# Patient Record
Sex: MALE | Race: WHITE | NOT HISPANIC OR LATINO | Employment: OTHER | URBAN - METROPOLITAN AREA
[De-identification: names, ages, dates, MRNs, and addresses within clinical notes are randomized per-mention and may not be internally consistent; named-entity substitution may affect disease eponyms.]

---

## 2017-01-03 DIAGNOSIS — K50.00 CROHN'S DISEASE OF SMALL INTESTINE WITHOUT COMPLICATION (H): ICD-10-CM

## 2017-01-03 LAB
CRP SERPL-MCNC: <2.9 MG/L (ref 0–8)
ERYTHROCYTE [SEDIMENTATION RATE] IN BLOOD BY WESTERGREN METHOD: 6 MM/H (ref 0–20)

## 2017-01-13 ENCOUNTER — TELEPHONE (OUTPATIENT)
Dept: ENDOCRINOLOGY | Facility: CLINIC | Age: 62
End: 2017-01-13

## 2017-01-13 ENCOUNTER — TELEPHONE (OUTPATIENT)
Dept: GASTROENTEROLOGY | Facility: CLINIC | Age: 62
End: 2017-01-13

## 2017-01-13 DIAGNOSIS — M80.08XA VERTEBRAL FRACTURE, OSTEOPOROTIC (H): Primary | ICD-10-CM

## 2017-01-13 DIAGNOSIS — M81.0 OSTEOPOROSIS: ICD-10-CM

## 2017-01-13 DIAGNOSIS — Z79.52 STEROID DEPENDENT FOR ADRENAL SUPRESSION: ICD-10-CM

## 2017-01-13 NOTE — TELEPHONE ENCOUNTER
----- Message from Hermelinda TORRES Link sent at 1/12/2017  2:14 PM CST -----  Regarding: need lab orders entered  We had a message below to call to set up 1 yr f/u with labs/dexa prior but there's no labs ordered. Can you please enter them? Thanks!      ----- Message -----     From: Dari Mcmillan MA     Sent: 1/8/2017       To: [id number]    Call patient to schedule 1 year follow up with Currie and labs and dexa same time July 2017

## 2017-01-18 ENCOUNTER — TELEPHONE (OUTPATIENT)
Dept: ENDOCRINOLOGY | Facility: CLINIC | Age: 62
End: 2017-01-18

## 2017-01-19 ASSESSMENT — ENCOUNTER SYMPTOMS
BLOATING: 0
HEARTBURN: 0
JAUNDICE: 0
RECTAL BLEEDING: 1
ABDOMINAL PAIN: 0
VOMITING: 0
NAUSEA: 0
BLOOD IN STOOL: 0
RECTAL PAIN: 0
BOWEL INCONTINENCE: 0
CONSTIPATION: 0
DIARRHEA: 1

## 2017-01-20 ENCOUNTER — OFFICE VISIT (OUTPATIENT)
Dept: GASTROENTEROLOGY | Facility: CLINIC | Age: 62
End: 2017-01-20

## 2017-01-20 VITALS
OXYGEN SATURATION: 96 % | HEART RATE: 92 BPM | WEIGHT: 167.3 LBS | SYSTOLIC BLOOD PRESSURE: 117 MMHG | DIASTOLIC BLOOD PRESSURE: 87 MMHG | HEIGHT: 69 IN | BODY MASS INDEX: 24.78 KG/M2

## 2017-01-20 DIAGNOSIS — K50.812 CROHN'S DISEASE OF BOTH SMALL AND LARGE INTESTINE WITH INTESTINAL OBSTRUCTION (H): Primary | ICD-10-CM

## 2017-01-20 RX ORDER — FLUCONAZOLE 50 MG/1
TABLET ORAL
COMMUNITY
End: 2017-01-20

## 2017-01-20 RX ORDER — TRAZODONE HYDROCHLORIDE 50 MG/1
TABLET, FILM COATED ORAL
COMMUNITY
End: 2017-01-20

## 2017-01-20 RX ORDER — CLONAZEPAM 0.5 MG/1
TABLET ORAL
COMMUNITY
End: 2018-02-06

## 2017-01-20 NOTE — PATIENT INSTRUCTIONS
It was a pleasure taking care of you today.  I've included a brief summary of our discussion and care plan from today's visit below.  Please review this information with your primary care provider.  _______________________________________________________________________    My recommendations are summarized as follows:    --  Plan for colonoscopy in August 2017    --  No need for blood work today    --  If develop symptoms anticipate we would do a course of Rifaximin    Return to GI Clinic in with Slick as scheduled to review your progress and schedule colonoscopy.    _______________________________________________________________________    Who do I call with any questions after my visit?  Please be in touch if there are any further questions that arise following today's visit.  There are multiple ways to contact your gastroenterology care team.        During business hours, you may reach a Gastroenterology nurse at [phone] and choose option 3.         To schedule or reschedule an appointment, please call [phone].       You can always send a secure message through [employer].  [employer] messages are answered by your nurse or doctor typically within 24 hours.  Please allow extra time on weekends and holidays.        For urgent/emergent questions after business hours, you may reach the on-call GI Fellow by contacting the [facility]  at [phone].     How will I get the results of any tests ordered?    You will receive all of your results.  If you have signed up for [employer], any tests ordered at your visit will be available to you after your physician reviews them.  Typically this takes 1-2 weeks.  If there are urgent results that require a change in your care plan, your physician or nurse will call you to discuss the next steps.      What is [employer]?  [employer] is a secure way for you to access all of your healthcare records from the [facility].  It is a web based computer  program, so you can sign on to it from any location.  It also allows you to send secure messages to your care team.  I recommend signing up for [place] access if you have not already done so and are comfortable with using a computer.      How to I schedule a follow-up visit?  If you did not schedule a follow-up visit today, please call [phone] to schedule a follow-up office visit.        Sincerely,    Fransisco Leach MD    [facility]  Division of Gastroenterology      [facility]

## 2017-01-20 NOTE — PROGRESS NOTES
IBD CLINIC     CC/REFERRING MD:  Referred Self  REASON FOR CONSULTATION: Crohn's disease       IBD history:  Age at diagnosis: ~18  Extend of disease: Ileo-colonic  Disease phenotype: Inflammatory  Key-anal disease: No  Current CD medications: None  Prior IBD surgeries: Yes - ileocecal resection. Has had 1-2 surgical revisions of anastomosis.    Prior IBD Medications:  Asulfazine and Asacol: 1974 to late 90s, stopped to to disease progression  Prednisone 1986 - current.   Mercaptopurine: Briefly in mid 90s to get him off steroids- Patient thinks he has side effect to medication  Methotrexate: After mercaptopurine, stayed on for years, but after bleeding in 1988 and 1989, he would not taper his prednisone. MTX stopped as he could not get off the steroids.      Surgical history  1974 - [facility] Ileocecectomy  1986 - [facility] - revision of anastomosis  1988 - massive bleeding from ulcer at anastomosis ([place]), cauterized via colonoscopy  1989 - laparoscopic  Revision of anastomosis  2005 - Lap [facility]    DRUG MONITORING  TPMT enzyme activity: none    DISEASE ASSESSMENT  Fecal calprotectin: None  CRP, ESR Results  Recent Labs   Lab Test  01/03/17   1103  11/11/16   1343   CRP  <2.9  14.0*   SED  6  7   Endoscopic assessment: Colonoscopy 8/3/2016 showed normal mucosa in entire colon. Non-patent functional end-to-end ileo-colonic anastomosis that was dilated.  No evidence of active Crohn's disease.  Enterography (12/1/16) showed 4cm of mild thickening and enhancement in neoterminal ileum at anastomosis, decompressed sigmoid colon with questionable wall thickening and enhancement.  Enterography: No active Crohn's disease (3/20/15)  C diff: None (7/12/16)    ASSESSMENT/PLAN:   61 year old male with Crohn's disease s/p ileocecectomy on no current Crohn's therapy.     1. Crohn's disease:  Suspect symptoms related to bacterial overgrowth (likely at anastomosis site) given improvement with rifaximin.  Prior work up has included  alpha 1 antitrypsin for PLE, pancreatic elastase, TTG, DGP all of which were normal.  A spot fecal fat the only isolated value that was increased.  Tentative plan to repeat rifaximin if symptoms return (pending insurance coverage)   -- Colonoscopy in August 2017, unless new symptoms that are refractory to rifaximin - then would repeat colonoscopy with dilation.       -- Continue with yearly LFTs to screen for PSC.     2. Colon polyps: Well defined lesions with adenomatous changes.  Will continue to surveillance   -- Plan for chromoendoscopy with next colonoscopy     3. Diarrhea: Quiescent Crohn's.  May be related to IBS, or lactose intolerance.  Could also be bile salt malabsorption or bacterial overgrowth.  Current loose stools in setting of some lactose ingestion   -- Avoid dietary triggers  -- prn lomotil  -- If diarrhea persists 2-3 days, will check for C diff    4. Possible acid reflux:  Has the Omeprazole but did not use it.  Has not had recurrence of symptoms in the back of his throat.  Will wait to see if symptoms recur and if so will try omeprazole.     5. Pancreatic head cystic lesion: Increased from 2006, potentially representing IPMN.  MRCP with no change in cyst.  Reviewed with Dr. Cruz.  EUS with FNA normal.   -- No further follow-up at this time needed.      6. Steroid dependency/Adrenal insufficiency:  Followed closely with endocrinology.  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      8. Depression: Mild depressive symptoms.  Discussing with  .    -- Continue monitoring  -- Consider medications per Psych.     9. IBD healthcare maintenance based on patients current medication:      Vaccinations:  -- Influenza (every year): Last given 2016  -- TdaP (every 10 years): Last given 2015  -- Pneumococcal Pneumonia (once then every 5 years): Has not obtained.    One time confirmation of immunity or serologies:  --  Hepatitis A (serologies or immunizations): 2005  -- Hepatitis B (serologies or immunizations): 2005  -- Zoster vaccination (>59 yo, discuss if over 50): 2005  -- MMR: Not documented.  -- HPV (all aged 18-26): N/A  -- Meningococcal meningitis (all patients at risk for meningitis): Patient is not at risk.     Cancer Screening:  Colon cancer screening:  Since there is evidence of pancolitis histologically. Also with adenomatous polyps. Colonoscopy every 2-3 years recommended.  Dysplasia screening is recommended 2019.    Cervical cancer screening: N/A    Skin cancer screening: Since patient is not immunocompromised, this is per patient's dermatologist recommendations.    Depression Screening:  -- Over the last month, have you felt down, depressed, or hopeless? No  -- Over the last month, have you felt little interest or pleasure doing things? No    Misc:  -- Avoid tobacco use  -- Avoid NSAIDs as there is potentially a 25% chance of causing an IBD flare    RTC 6 months    Thank you for this consultation.  It was a pleasure to participate in the care of this patient; please contact us with any further questions.  A total of 20 minutes was spent with this patient, >50% of which was counseling regarding the above delineated issues.    Fransisco Leach MD   of Medicine  Division of Gastroenterology, Hepatology and Nutrition  [facility]      HPI:   This is a 61-year-old male who Slick Malone and I follow for Crohn's disease.  The patient has had some intermittent symptoms and an elevated CRP in the past.  His symptom is mainly of diarrhea.  We had a concern initially for Crohn's recurrence and had discussed colonoscopy.  We also treated patient with rifaximin.  The patient got substantially better on rifaximin and actually has not had much diarrhea since then.  Actually, he has had issues with starting his fiber after rifaximin because he gets gas and bloating, so he has not started his fiber.  He  has had a few episodes of diarrhea over the past few months.  These are typically in the setting of abnormal diet.  The most recent was a week or 2 ago in the setting of some sushi intake.  The following day he had 1 day of some loose stools.  However, in general, he has improved and currently feels that he is not having any active symptoms.     ROS:    No fevers or chills  No weight loss  No blurry vision, double vision or change in vision  No sore throat  No lymphadenopathy  No headache, paraesthesias, or weakness in a limb  No shortness of breath or wheezing  No chest pain or pressure  No arthralgias or myalgias  No rashes or skin changes  No odynophagia or dysphagia  No BRBPR, hematochezia, melena  No dysuria, frequency or urgency  No hot/cold intolerance or polyria  No anxiety or depression    Extra intestinal manifestations of IBD:  No uveitis/episcleritis  No aphthous ulcers   No arthritis   No erythema nodosum/pyoderma gangrenosum.     PERTINENT PAST MEDICAL HISTORY:  Past Medical History   Diagnosis Date     Crohn disease (H)      Coccidioidomycosis      GERD (gastroesophageal reflux disease)      Nephrolithiasis      TB (tuberculosis)      Cataract 12/2007     both eyes, too much prednisone, implants     Anemia 2007     History of blood transfusion 1988     ulcerated anastomosis term. ilium bleed     Gallbladder problem 2005?     much gravel, removed     Personal history of colonic polyps      small ones found during colonoscopies     Kidney stone various     both sides, all passed naturally     Fracture 1956     green fracture of leg     Osteoporosis 2007     osteoporosis, too much prednisone, last dexa 1/2014     Other nervous system complications 2007     prednisone overload induced kevin     Anxiety 2012?     much worse since July 2014     Depressive disorder 7/2014     much worse since July 2014     Mental health problem 2007     bipolar though perhaps different     Infection 2007     persistant  "coccidioidomycosis     Crohn's disease (H) 1/13/2015     Osteoporosis      Steroid-induced psychosis      Patient extremely sensitive to regular doses of steroids.  Unclear if this is due to chronic fluconazole use or genetic issue.  In the future, use caution when prescribing steroids.     Hypertriglyceridemia 7/8/2016       PREVIOUS SURGERIES:  Past Surgical History   Procedure Laterality Date     Appendectomy  1974     coincidental with Crohn's surgery     Biopsy  2007, 2013     lump on arm, knee, back of hand for coccidioidomycosis cult.     Cholecystectomy  2005     much gravel, removed laparoscopically     Colonoscopy  7/14/2014 last     Dr. Catherine Bowden, [facility] - many previous     Ent surgery  ?     upper endoscopy     Eye surgery  12/2007     cataract surgery both sides     Gi surgery  1974, 1986(x2), 1989     Crohn's, 1974 RO 18\" term. ilium + 9\" asc. colon all one pc     Soft tissue surgery  3/2013     removed buildup on back of r hand, coccidioidomycosis     Back surgery  12/2007     kyphoplasty on compressed 4th??? vertebra     Colonoscopy Left 9/11/2015     Procedure: COMBINED COLONOSCOPY, SINGLE OR MULTIPLE BIOPSY/POLYPECTOMY BY BIOPSY;  Surgeon: Fransisco Leach MD;  Location:  GI     Colonoscopy N/A 8/3/2016     Procedure: COMBINED COLONOSCOPY, SINGLE OR MULTIPLE BIOPSY/POLYPECTOMY BY BIOPSY;  Surgeon: Fransisco Leach MD;  Location:  GI     Esophagoscopy, gastroscopy, duodenoscopy (egd), combined N/A 11/8/2016     Procedure: COMBINED ENDOSCOPIC ULTRASOUND, ESOPHAGOSCOPY, GASTROSCOPY, DUODENOSCOPY (EGD), FINE NEEDLE ASPIRATE/BIOPSY;  Surgeon: Guru Alexsandra Ballesteros MD;  Location:  GI     Esophagoscopy, gastroscopy, duodenoscopy (egd), combined N/A 11/8/2016     Procedure: COMBINED ESOPHAGOSCOPY, GASTROSCOPY, DUODENOSCOPY (EGD), BIOPSY SINGLE OR MULTIPLE;  Surgeon: Guru Alexsandra Ballesteros MD;  Location:  GI       PREVIOUS ENDOSCOPY:  Summer " 2014: Colonoscopy - not in out system    ALLERGIES:     Allergies   Allergen Reactions     Prednisone Other (See Comments)     Diana with more than 2.5mg chronic dosing of prednisone due to fluconazole interaction per patient.        PERTINENT MEDICATIONS:    Current outpatient prescriptions:      clonazePAM (KLONOPIN) 0.5 MG tablet, , Disp: , Rfl:      MELATONIN PO, , Disp: , Rfl:      traZODone (DESYREL) 50 MG tablet, Take 1 tablet (50 mg) by mouth nightly as needed for sleep, Disp: 90 tablet, Rfl: 3     diphenoxylate-atropine (LOMOTIL) 2.5-0.025 MG per tablet, Take 2 tablets by mouth 4 times daily as needed for diarrhea (Patient taking differently: Take 1 tablet by mouth 4 times daily as needed for diarrhea After loose stools), Disp: 30 tablet, Rfl: 0     fluconazole (DIFLUCAN) 200 MG tablet, Take 1 tablet (200 mg) by mouth daily, Disp: 90 tablet, Rfl: 3     acetaminophen (TYLENOL) 500 MG tablet, Take 500-1,000 mg by mouth every 6 hours as needed for mild pain, Disp: , Rfl:      Cyanocobalamin (VITAMIN B 12 PO), Take 500 mcg by mouth, Disp: , Rfl:      Pyridoxine HCl (VITAMIN B6 PO), Take 100 mg by mouth, Disp: , Rfl:      ferrous sulfate 325 (65 FE) MG tablet, Take by mouth daily (with breakfast), Disp: , Rfl:      multivitamin, therapeutic with minerals (MULTI-VITAMIN) TABS, Take 1 tablet by mouth daily, Disp: , Rfl:      calcium-vitamin D (CALTRATE) 600-400 MG-UNIT per tablet, Take 1 tablet by mouth 2 times daily, Disp: , Rfl:      Cholecalciferol (VITAMIN D3 PO), Take 1,000 Units by mouth daily, Disp: , Rfl:      Ascorbic Acid (VITAMIN C PO), Take 500 mg by mouth, Disp: , Rfl:      [DISCONTINUED] CLONAZEPAM PO, Take 0.5 mg by mouth as needed , Disp: , Rfl:     SOCIAL HISTORY:  Social History     Social History     Marital Status:      Spouse Name: N/A     Number of Children: N/A     Years of Education: N/A     Occupational History     Reserach associate at the [employer]  History Main Topics     Smoking status: Never Smoker      Smokeless tobacco: Never Used     Alcohol Use: Yes      Comment: sometimes one glass of wine a week     Drug Use: No     Sexual Activity: No     Other Topics Concern     Not on file     Social History Narrative       FAMILY HISTORY:  Family History   Problem Relation Age of Onset     Heart Failure Father      Cancer - colorectal Mother      CEREBROVASCULAR DISEASE Paternal Grandmother      CANCER Other      Musculoskeletal Disorder Father      Parkinson's     Musculoskeletal Disorder Brother      Parkinson's     Mother with colon cancer in 80s.  No family member with Crohn's disease or Ulcerative Colitis.      Past/family/social history reviewed and no changes    PHYSICAL EXAMINATION:  Vitals reviewed, AFVSS   Wt   Wt Readings from Last 2 Encounters:   01/20/17 75.887 kg (167 lb 4.8 oz)   12/14/16 71.668 kg (158 lb)      Gen: aaox3, cooperative, pleasant, not dyspneic/diaphoretic, nad  HEENT: ncat, neck supple, no clad, normal op w/o ulcer/exudate, anicteric, mmm  Lymph: No axillary, submandibular, supraclavicular or inguinal lymphadenopathy  Resp/CV unremarkable  Abd: Nondistended, +bs, no hepatosplenomegaly, nontender, no peritoneal signs  Ext: no c/c/e  Skin: warm, perfused, no jaundice      PERTINENT STUDIES:  Most recent CBC:  Recent Labs   Lab Test  03/28/16   1716  08/11/15   1057   WBC  7.4  8.9   HGB  15.3  16.7   HCT  45.3  48.1   PLT  220  193     Most recent hepatic panel:  Recent Labs   Lab Test  07/12/16   1129  01/08/16   1159   ALT  22  29   AST  18  20     Most recent creatinine:  Recent Labs   Lab Test  08/16/16   1223  07/12/16   1129   CR  1.14  1.30*       MRE: 3/20/15:   IMPRESSION:   1. Unremarkable small bowel other than postsurgical changes following right hemicolectomy with ileocolic anastomosis.  2. Small gastric diverticulum. 3. 1.5 x 0.8 cm pancreatic head cystic lesion minimally increased in size compared to the prior exam of  2006, but potentially representing intraductal papillary mucinous neoplasm. This can be reassessed on followup.  4. Hepatic steatosis.

## 2017-01-20 NOTE — Clinical Note
1/20/2017       RE: Trevin Gee  [address]   [facility] [address]     Dear Colleague,    Thank you for referring your patient, Trevin Gee, to the [facility] GASTROENTEROLOGY AND IBD at [facility]. Please see a copy of my visit note below.    IBD CLINIC     CC/REFERRING MD:  Referred Self  REASON FOR CONSULTATION: Crohn's disease       IBD history:  Age at diagnosis: ~18  Extend of disease: Ileo-colonic  Disease phenotype: Inflammatory  Key-anal disease: No  Current CD medications: None  Prior IBD surgeries: Yes - ileocecal resection. Has had 1-2 surgical revisions of anastomosis.    Prior IBD Medications:  Asulfazine and Asacol: 1974 to late 90s, stopped to to disease progression  Prednisone 1986 - current.   Mercaptopurine: Briefly in mid 90s to get him off steroids- Patient thinks he has side effect to medication  Methotrexate: After mercaptopurine, stayed on for years, but after bleeding in 1988 and 1989, he would not taper his prednisone. MTX stopped as he could not get off the steroids.      Surgical history  1974 - [facility] Ileocecectomy  1986 - [facility] - revision of anastomosis  1988 - massive bleeding from ulcer at anastomosis ([place]), cauterized via colonoscopy  1989 - laparoscopic  Revision of anastomosis  2005 - Lap CCY    DRUG MONITORING  TPMT enzyme activity: none    DISEASE ASSESSMENT  Fecal calprotectin: None  CRP, ESR Results  Recent Labs   Lab Test  01/03/17   1103  11/11/16   1343   CRP  <2.9  14.0*   SED  6  7   Endoscopic assessment: Colonoscopy 8/3/2016 showed normal mucosa in entire colon. Non-patent functional end-to-end ileo-colonic anastomosis that was dilated.  No evidence of active Crohn's disease.  Enterography (12/1/16) showed 4cm of mild thickening and enhancement in neoterminal ileum at anastomosis, decompressed sigmoid colon with questionable wall thickening and enhancement.  Enterography: No active Crohn's disease  (3/20/15)  C diff: None (7/12/16)    ASSESSMENT/PLAN:   61 year old male with Crohn's disease s/p ileocecectomy on no current Crohn's therapy.     1. Crohn's disease:  Suspect symptoms related to bacterial overgrowth (likely at anastomosis site) given improvement with rifaximin.  Prior work up has included alpha 1 antitrypsin for PLE, pancreatic elastase, TTG, DGP all of which were normal.  A spot fecal fat the only isolated value that was increased.  Tentative plan to repeat rifaximin if symptoms return (pending insurance coverage)   -- Colonoscopy in August 2017, unless new symptoms that are refractory to rifaximin - then would repeat colonoscopy with dilation.       -- Continue with yearly LFTs to screen for PSC.     2. Colon polyps: Well defined lesions with adenomatous changes.  Will continue to surveillance   -- Plan for chromoendoscopy with next colonoscopy     3. Diarrhea: Quiescent Crohn's.  May be related to IBS, or lactose intolerance.  Could also be bile salt malabsorption or bacterial overgrowth.  Current loose stools in setting of some lactose ingestion   -- Avoid dietary triggers  -- prn lomotil  -- If diarrhea persists 2-3 days, will check for C diff    4. Possible acid reflux:  Has the Omeprazole but did not use it.  Has not had recurrence of symptoms in the back of his throat.  Will wait to see if symptoms recur and if so will try omeprazole.     5. Pancreatic head cystic lesion: Increased from 2006, potentially representing IPMN.  MRCP with no change in cyst.  Reviewed with Dr. Cruz.  EUS with FNA normal.   -- No further follow-up at this time needed.      6. Steroid dependency/Adrenal insufficiency:  Followed closely with endocrinology.  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      8. Depression: Mild depressive symptoms.  Discussing with  .    -- Continue monitoring  -- Consider medications per Psych.      9. IBD healthcare maintenance based on patients current medication:      Vaccinations:  -- Influenza (every year): Last given 2016  -- TdaP (every 10 years): Last given 2015  -- Pneumococcal Pneumonia (once then every 5 years): Has not obtained.    One time confirmation of immunity or serologies:  -- Hepatitis A (serologies or immunizations): 2005  -- Hepatitis B (serologies or immunizations): 2005  -- Zoster vaccination (>61 yo, discuss if over 50): 2005  -- MMR: Not documented.  -- HPV (all aged 18-26): N/A  -- Meningococcal meningitis (all patients at risk for meningitis): Patient is not at risk.     Cancer Screening:  Colon cancer screening:  Since there is evidence of pancolitis histologically. Also with adenomatous polyps. Colonoscopy every 2-3 years recommended.  Dysplasia screening is recommended 2019.    Cervical cancer screening: N/A    Skin cancer screening: Since patient is not immunocompromised, this is per patient's dermatologist recommendations.    Depression Screening:  -- Over the last month, have you felt down, depressed, or hopeless? No  -- Over the last month, have you felt little interest or pleasure doing things? No    Misc:  -- Avoid tobacco use  -- Avoid NSAIDs as there is potentially a 25% chance of causing an IBD flare    RTC 6 months    Thank you for this consultation.  It was a pleasure to participate in the care of this patient; please contact us with any further questions.  A total of 20 minutes was spent with this patient, >50% of which was counseling regarding the above delineated issues.    Fransisco Leach MD   of Medicine  Division of Gastroenterology, Hepatology and Nutrition  [facility]      HPI:   This is a 61-year-old male who Slick Malone and I follow for Crohn's disease.  The patient has had some intermittent symptoms and an elevated CRP in the past.  His symptom is mainly of diarrhea.  We had a concern initially for Crohn's recurrence and had  discussed colonoscopy.  We also treated patient with rifaximin.  The patient got substantially better on rifaximin and actually has not had much diarrhea since then.  Actually, he has had issues with starting his fiber after rifaximin because he gets gas and bloating, so he has not started his fiber.  He has had a few episodes of diarrhea over the past few months.  These are typically in the setting of abnormal diet.  The most recent was a week or 2 ago in the setting of some sushi intake.  The following day he had 1 day of some loose stools.  However, in general, he has improved and currently feels that he is not having any active symptoms.     ROS:    No fevers or chills  No weight loss  No blurry vision, double vision or change in vision  No sore throat  No lymphadenopathy  No headache, paraesthesias, or weakness in a limb  No shortness of breath or wheezing  No chest pain or pressure  No arthralgias or myalgias  No rashes or skin changes  No odynophagia or dysphagia  No BRBPR, hematochezia, melena  No dysuria, frequency or urgency  No hot/cold intolerance or polyria  No anxiety or depression    Extra intestinal manifestations of IBD:  No uveitis/episcleritis  No aphthous ulcers   No arthritis   No erythema nodosum/pyoderma gangrenosum.     PERTINENT PAST MEDICAL HISTORY:  Past Medical History   Diagnosis Date     Crohn disease (H)      Coccidioidomycosis      GERD (gastroesophageal reflux disease)      Nephrolithiasis      TB (tuberculosis)      Cataract 12/2007     both eyes, too much prednisone, implants     Anemia 2007     History of blood transfusion 1988     ulcerated anastomosis term. ilium bleed     Gallbladder problem 2005?     much gravel, removed     Personal history of colonic polyps      small ones found during colonoscopies     Kidney stone various     both sides, all passed naturally     Fracture 1956     green fracture of leg     Osteoporosis 2007     osteoporosis, too much prednisone, last dexa  "1/2014     Other nervous system complications 2007     prednisone overload induced kevin     Anxiety 2012?     much worse since July 2014     Depressive disorder 7/2014     much worse since July 2014     Mental health problem 2007     bipolar though perhaps different     Infection 2007     persistant coccidioidomycosis     Crohn's disease (H) 1/13/2015     Osteoporosis      Steroid-induced psychosis      Patient extremely sensitive to regular doses of steroids.  Unclear if this is due to chronic fluconazole use or genetic issue.  In the future, use caution when prescribing steroids.     Hypertriglyceridemia 7/8/2016       PREVIOUS SURGERIES:  Past Surgical History   Procedure Laterality Date     Appendectomy  1974     coincidental with Crohn's surgery     Biopsy  2007, 2013     lump on arm, knee, back of hand for coccidioidomycosis cult.     Cholecystectomy  2005     much gravel, removed laparoscopically     Colonoscopy  7/14/2014 last     Dr. Catherine Bowden, [facility] - many previous     Ent surgery  ?     upper endoscopy     Eye surgery  12/2007     cataract surgery both sides     Gi surgery  1974, 1986(x2), 1989     Crohn's, 1974 RO 18\" term. ilium + 9\" asc. colon all one pc     Soft tissue surgery  3/2013     removed buildup on back of r hand, coccidioidomycosis     Back surgery  12/2007     kyphoplasty on compressed 4th??? vertebra     Colonoscopy Left 9/11/2015     Procedure: COMBINED COLONOSCOPY, SINGLE OR MULTIPLE BIOPSY/POLYPECTOMY BY BIOPSY;  Surgeon: Fransisco Leach MD;  Location:  GI     Colonoscopy N/A 8/3/2016     Procedure: COMBINED COLONOSCOPY, SINGLE OR MULTIPLE BIOPSY/POLYPECTOMY BY BIOPSY;  Surgeon: Fransisco Leach MD;  Location:  GI     Esophagoscopy, gastroscopy, duodenoscopy (egd), combined N/A 11/8/2016     Procedure: COMBINED ENDOSCOPIC ULTRASOUND, ESOPHAGOSCOPY, GASTROSCOPY, DUODENOSCOPY (EGD), FINE NEEDLE ASPIRATE/BIOPSY;  Surgeon: Guru Alexsandra Ballesteros " MD Liset;  Location:  GI     Esophagoscopy, gastroscopy, duodenoscopy (egd), combined N/A 11/8/2016     Procedure: COMBINED ESOPHAGOSCOPY, GASTROSCOPY, DUODENOSCOPY (EGD), BIOPSY SINGLE OR MULTIPLE;  Surgeon: Guru Alexsandra Ballesteros MD;  Location:  GI       PREVIOUS ENDOSCOPY:  Summer 2014: Colonoscopy - not in out system    ALLERGIES:     Allergies   Allergen Reactions     Prednisone Other (See Comments)     Diana with more than 2.5mg chronic dosing of prednisone due to fluconazole interaction per patient.        PERTINENT MEDICATIONS:    Current outpatient prescriptions:      clonazePAM (KLONOPIN) 0.5 MG tablet, , Disp: , Rfl:      MELATONIN PO, , Disp: , Rfl:      traZODone (DESYREL) 50 MG tablet, Take 1 tablet (50 mg) by mouth nightly as needed for sleep, Disp: 90 tablet, Rfl: 3     diphenoxylate-atropine (LOMOTIL) 2.5-0.025 MG per tablet, Take 2 tablets by mouth 4 times daily as needed for diarrhea (Patient taking differently: Take 1 tablet by mouth 4 times daily as needed for diarrhea After loose stools), Disp: 30 tablet, Rfl: 0     fluconazole (DIFLUCAN) 200 MG tablet, Take 1 tablet (200 mg) by mouth daily, Disp: 90 tablet, Rfl: 3     acetaminophen (TYLENOL) 500 MG tablet, Take 500-1,000 mg by mouth every 6 hours as needed for mild pain, Disp: , Rfl:      Cyanocobalamin (VITAMIN B 12 PO), Take 500 mcg by mouth, Disp: , Rfl:      Pyridoxine HCl (VITAMIN B6 PO), Take 100 mg by mouth, Disp: , Rfl:      ferrous sulfate 325 (65 FE) MG tablet, Take by mouth daily (with breakfast), Disp: , Rfl:      multivitamin, therapeutic with minerals (MULTI-VITAMIN) TABS, Take 1 tablet by mouth daily, Disp: , Rfl:      calcium-vitamin D (CALTRATE) 600-400 MG-UNIT per tablet, Take 1 tablet by mouth 2 times daily, Disp: , Rfl:      Cholecalciferol (VITAMIN D3 PO), Take 1,000 Units by mouth daily, Disp: , Rfl:      Ascorbic Acid (VITAMIN C PO), Take 500 mg by mouth, Disp: , Rfl:      [DISCONTINUED] CLONAZEPAM  PO, Take 0.5 mg by mouth as needed , Disp: , Rfl:     SOCIAL HISTORY:  Social History     Social History     Marital Status:      Spouse Name: N/A     Number of Children: N/A     Years of Education: N/A     Occupational History     Reserach associate at the [employer]     Social History Main Topics     Smoking status: Never Smoker      Smokeless tobacco: Never Used     Alcohol Use: Yes      Comment: sometimes one glass of wine a week     Drug Use: No     Sexual Activity: No     Other Topics Concern     Not on file     Social History Narrative       FAMILY HISTORY:  Family History   Problem Relation Age of Onset     Heart Failure Father      Cancer - colorectal Mother      CEREBROVASCULAR DISEASE Paternal Grandmother      CANCER Other      Musculoskeletal Disorder Father      Parkinson's     Musculoskeletal Disorder Brother      Parkinson's     Mother with colon cancer in 80s.  No family member with Crohn's disease or Ulcerative Colitis.      Past/family/social history reviewed and no changes    PHYSICAL EXAMINATION:  Vitals reviewed, AFVSS   Wt   Wt Readings from Last 2 Encounters:   01/20/17 75.887 kg (167 lb 4.8 oz)   12/14/16 71.668 kg (158 lb)      Gen: aaox3, cooperative, pleasant, not dyspneic/diaphoretic, nad  HEENT: ncat, neck supple, no clad, normal op w/o ulcer/exudate, anicteric, mmm  Lymph: No axillary, submandibular, supraclavicular or inguinal lymphadenopathy  Resp/CV unremarkable  Abd: Nondistended, +bs, no hepatosplenomegaly, nontender, no peritoneal signs  Ext: no c/c/e  Skin: warm, perfused, no jaundice      PERTINENT STUDIES:  Most recent CBC:  Recent Labs   Lab Test  03/28/16   1716  08/11/15   1057   WBC  7.4  8.9   HGB  15.3  16.7   HCT  45.3  48.1   PLT  220  193     Most recent hepatic panel:  Recent Labs   Lab Test  07/12/16   1129  01/08/16   1159   ALT  22  29   AST  18  20     Most recent creatinine:  Recent Labs   Lab Test  08/16/16   1223  07/12/16   1129   CR   1.14  1.30*       MRE: 3/20/15:   IMPRESSION:   1. Unremarkable small bowel other than postsurgical changes following right hemicolectomy with ileocolic anastomosis.  2. Small gastric diverticulum. 3. 1.5 x 0.8 cm pancreatic head cystic lesion minimally increased in size compared to the prior exam of 2006, but potentially representing intraductal papillary mucinous neoplasm. This can be reassessed on followup.  4. Hepatic steatosis.    Again, thank you for allowing me to participate in the care of your patient.      Sincerely,    Fransisco Leach MD

## 2017-01-20 NOTE — MR AVS SNAPSHOT
After Visit Summary   1/20/2017    Trevin Gee    MRN: [id number]           Patient Information     Date Of Birth          1955        Visit Information        Provider Department      1/20/2017 8:40 AM Fransisco Leach MD [facility] Gastroenterology and IBD        Care Instructions    It was a pleasure taking care of you today.  I've included a brief summary of our discussion and care plan from today's visit below.  Please review this information with your primary care provider.  _______________________________________________________________________    My recommendations are summarized as follows:    --  Plan for colonoscopy in August 2017    --  No need for blood work today    --  If develop symptoms anticipate we would do a course of Rifaximin    Return to GI Clinic in with Slick as scheduled to review your progress and schedule colonoscopy.    _______________________________________________________________________    Who do I call with any questions after my visit?  Please be in touch if there are any further questions that arise following today's visit.  There are multiple ways to contact your gastroenterology care team.        During business hours, you may reach a Gastroenterology nurse at [phone] and choose option 3.         To schedule or reschedule an appointment, please call [phone].       You can always send a secure message through [employer].  [employer] messages are answered by your nurse or doctor typically within 24 hours.  Please allow extra time on weekends and holidays.        For urgent/emergent questions after business hours, you may reach the on-call GI Fellow by contacting the [facility] at [phone].     How will I get the results of any tests ordered?    You will receive all of your results.  If you have signed up for [employer], any tests ordered at your visit will be available to you after your physician reviews them.  Typically this  takes 1-2 weeks.  If there are urgent results that require a change in your care plan, your physician or nurse will call you to discuss the next steps.      What is [employer]?  El[employer] is a secure way for you to access all of your healthcare records from the [facility].  It is a web based computer program, so you can sign on to it from any location.  It also allows you to send secure messages to your care team.  I recommend signing up for [employer] access if you have not already done so and are comfortable with using a computer.      How to I schedule a follow-up visit?  If you did not schedule a follow-up visit today, please call [phone] to schedule a follow-up office visit.        Sincerely,    Fransisco Leach MD    [facility]  Division of Gastroenterology      [place] [phone]        Follow-ups after your visit        Your next 10 appointments already scheduled     May 31, 2017 11:30 AM   (Arrive by 11:15 AM)   Return Visit with Slick Malone PA-C   [facility] Gastroenterology and IBD ([facility])    [address] [place] [id number] [phone]            Jul 07, 2017  1:30 PM   DX HIP/PELVIS/SPINE with [address] [facility] ([facility])    [address] [place] [id number] [phone]           Please do not take any of the following 48 hours prior to your exam: vitamins, calcium tablets, antacids.            Jul 07, 2017  2:00 PM   LAB with  LAB   [facility] ([facility])    [address] [place] [id number] [phone]           Patient must bring picture ID.  Patient should be prepared to give a urine specimen  Please do not eat 10-12 hours before your appointment if you are coming in fasting for labs on lipids, cholesterol, or glucose (sugar).  Pregnant women should follow their  Care Team instructions. Water with medications is okay. Do not drink coffee or other fluids.   If you have concerns about taking  your medications, please ask at office or if scheduling via [employer], send a message by clicking on Secure Messaging, Message Your Care Team.            Jul 14, 2017  1:30 PM   (Arrive by 1:00 PM)   RETURN ENDOCRINE with Mag Currie MD   [facility]ology ([facility])    [address] [id number] [phone]              Who to contact     Please call your clinic at [phone] to:    Ask questions about your health    Make or cancel appointments    Discuss your medicines    Learn about your test results    Speak to your doctor   If you have compliments or concerns about an experience at your clinic, or if you wish to file a complaint, please contact [facility] Patient Relations at [phone] or email us at [email or web address]         Additional Information About Your Visit        [employer] Information     [employer] gives you secure access to your electronic health record. If you see a primary care provider, you can also send messages to your care team and make appointments. If you have questions, please call your primary care clinic.  If you do not have a primary care provider, please call [phone] and they will assist you.      [employer] is an electronic gateway that provides easy, online access to your medical records. With [employer], you can request a clinic appointment, read your test results, renew a prescription or communicate with your care team.     To access your existing account, please contact your [facility] or call [phone] for assistance.        Care EveryWhere ID     This is your Care EveryWhere ID. This could be used by other organizations to access your [facility] medical records  [id number]        Your Vitals Were     Pulse  "Height BMI (Body Mass Index) Pulse Oximetry          92 1.753 m (5' 9\") 24.69 kg/m2 96%         Blood Pressure from Last 3 Encounters:   01/20/17 117/87   12/14/16 120/85   11/08/16 108/82    Weight from Last 3 Encounters:   01/20/17 75.887 kg (167 lb 4.8 oz)   12/14/16 71.668 kg (158 lb)   11/08/16 70.761 kg (156 lb)              Today, you had the following     No orders found for display         Today's Medication Changes          These changes are accurate as of: 1/20/17  9:08 AM.  If you have any questions, ask your nurse or doctor.               These medicines have changed or have updated prescriptions.        Dose/Directions    diphenoxylate-atropine 2.5-0.025 MG per tablet   Commonly known as:  LOMOTIL   This may have changed:    - how much to take  - additional instructions   Used for:  Crohn's disease (H)        Dose:  2 tablet   Take 2 tablets by mouth 4 times daily as needed for diarrhea   Quantity:  30 tablet   Refills:  0         Stop taking these medicines if you haven't already. Please contact your care team if you have questions.     hydrocortisone sodium succinate 100 MG injection   Commonly known as:  solu-CORTEF           syringe/needle (disp) 25G X 1\" 3 ML Misc   Commonly known as:  BD ECLIPSE SYRINGE                    Primary Care Provider Office Phone # Fax #    Foster Begum [phone]       [address] [address]        Thank you!     Thank you for choosing [facility] GASTROENTEROLOGY AND IBD  for your care. Our goal is always to provide you with excellent care. Hearing back from our patients is one way we can continue to improve our services. Please take a few minutes to complete the written survey that you may receive in the mail after your visit with us. Thank you!             Your Updated Medication List - Protect others around you: Learn how to safely use, store and throw away your medicines at www.disposemymeds.org.        "   This list is accurate as of: 1/20/17  9:08 AM.  Always use your most recent med list.                   Brand Name Dispense Instructions for use    acetaminophen 500 MG tablet    TYLENOL     Take 500-1,000 mg by mouth every 6 hours as needed for mild pain       calcium-vitamin D 600-400 MG-UNIT per tablet    CALTRATE     Take 1 tablet by mouth 2 times daily       clonazePAM 0.5 MG tablet    klonoPIN         diphenoxylate-atropine 2.5-0.025 MG per tablet    LOMOTIL    30 tablet    Take 2 tablets by mouth 4 times daily as needed for diarrhea       ferrous sulfate 325 (65 FE) MG tablet    IRON     Take by mouth daily (with breakfast)       fluconazole 200 MG tablet    DIFLUCAN    90 tablet    Take 1 tablet (200 mg) by mouth daily       MELATONIN PO          Multi-vitamin Tabs tablet      Take 1 tablet by mouth daily       traZODone 50 MG tablet    DESYREL    90 tablet    Take 1 tablet (50 mg) by mouth nightly as needed for sleep       VITAMIN B 12 PO      Take 500 mcg by mouth       VITAMIN B6 PO      Take 100 mg by mouth       VITAMIN C PO      Take 500 mg by mouth       VITAMIN D3 PO      Take 1,000 Units by mouth daily

## 2017-01-20 NOTE — NURSING NOTE
"Chief Complaint   Patient presents with     Crohns       Filed Vitals:    01/20/17 0845   BP: 117/87   Pulse: 92   Height: 1.753 m (5' 9\")   Weight: 75.887 kg (167 lb 4.8 oz)   SpO2: 96%       Body mass index is 24.69 kg/(m^2).    Mag Abebe CMA                       "

## 2017-04-14 DIAGNOSIS — K50.00 CROHN'S DISEASE OF SMALL INTESTINE WITHOUT COMPLICATION (H): Primary | ICD-10-CM

## 2017-04-14 DIAGNOSIS — K50.00 CROHN'S DISEASE OF SMALL INTESTINE WITHOUT COMPLICATION (H): ICD-10-CM

## 2017-04-14 LAB
BASOPHILS # BLD AUTO: 0 10E9/L (ref 0–0.2)
BASOPHILS NFR BLD AUTO: 0.3 %
DIFFERENTIAL METHOD BLD: NORMAL
EOSINOPHIL # BLD AUTO: 0.1 10E9/L (ref 0–0.7)
EOSINOPHIL NFR BLD AUTO: 1.7 %
ERYTHROCYTE [DISTWIDTH] IN BLOOD BY AUTOMATED COUNT: 13.8 % (ref 10–15)
ERYTHROCYTE [SEDIMENTATION RATE] IN BLOOD BY WESTERGREN METHOD: 5 MM/H (ref 0–20)
HCT VFR BLD AUTO: 47.5 % (ref 40–53)
HGB BLD-MCNC: 16.5 G/DL (ref 13.3–17.7)
LYMPHOCYTES # BLD AUTO: 1.5 10E9/L (ref 0.8–5.3)
LYMPHOCYTES NFR BLD AUTO: 24.4 %
MCH RBC QN AUTO: 31.9 PG (ref 26.5–33)
MCHC RBC AUTO-ENTMCNC: 34.7 G/DL (ref 31.5–36.5)
MCV RBC AUTO: 92 FL (ref 78–100)
MONOCYTES # BLD AUTO: 0.5 10E9/L (ref 0–1.3)
MONOCYTES NFR BLD AUTO: 8.7 %
NEUTROPHILS # BLD AUTO: 3.9 10E9/L (ref 1.6–8.3)
NEUTROPHILS NFR BLD AUTO: 64.9 %
PLATELET # BLD AUTO: 218 10E9/L (ref 150–450)
RBC # BLD AUTO: 5.17 10E12/L (ref 4.4–5.9)
WBC # BLD AUTO: 6 10E9/L (ref 4–11)

## 2017-04-14 PROCEDURE — 86140 C-REACTIVE PROTEIN: CPT | Performed by: PHYSICIAN ASSISTANT

## 2017-04-14 PROCEDURE — 80076 HEPATIC FUNCTION PANEL: CPT | Performed by: PHYSICIAN ASSISTANT

## 2017-04-14 PROCEDURE — 85652 RBC SED RATE AUTOMATED: CPT | Performed by: PHYSICIAN ASSISTANT

## 2017-04-14 PROCEDURE — 85025 COMPLETE CBC W/AUTO DIFF WBC: CPT | Performed by: PHYSICIAN ASSISTANT

## 2017-04-14 PROCEDURE — 80048 BASIC METABOLIC PNL TOTAL CA: CPT | Performed by: PHYSICIAN ASSISTANT

## 2017-04-14 PROCEDURE — 36415 COLL VENOUS BLD VENIPUNCTURE: CPT | Performed by: PHYSICIAN ASSISTANT

## 2017-04-14 NOTE — PROGRESS NOTES
Patient called with increased stools (4-5 per day).  No blood in stool.  Increased gurgling per patent, but no abdominal pain.    PLAN:  Labs (CBC, LFTS, CRP, ESR) today  Stool studies     If negative, will proceed with treatment for SIBO, as he has responded well to Rifaximin in the past.      Slick Malone PA-C  Division of Gastroenterology, Hepatology and Nutrition  [facility]

## 2017-04-15 ENCOUNTER — HOSPITAL ENCOUNTER (OUTPATIENT)
Dept: LAB | Facility: CLINIC | Age: 62
Discharge: HOME | End: 2017-04-15
Attending: PHYSICIAN ASSISTANT | Admitting: PHYSICIAN ASSISTANT
Payer: COMMERCIAL

## 2017-04-15 DIAGNOSIS — K50.00 CROHN'S DISEASE OF SMALL INTESTINE WITHOUT COMPLICATION (H): ICD-10-CM

## 2017-04-15 LAB
C DIFF TOX B STL QL: NORMAL
SPECIMEN SOURCE: NORMAL

## 2017-04-15 PROCEDURE — 87209 SMEAR COMPLEX STAIN: CPT | Performed by: PHYSICIAN ASSISTANT

## 2017-04-15 PROCEDURE — 87329 GIARDIA AG IA: CPT | Performed by: PHYSICIAN ASSISTANT

## 2017-04-15 PROCEDURE — 87177 OVA AND PARASITES SMEARS: CPT | Performed by: PHYSICIAN ASSISTANT

## 2017-04-15 PROCEDURE — 87493 C DIFF AMPLIFIED PROBE: CPT | Performed by: PHYSICIAN ASSISTANT

## 2017-04-15 PROCEDURE — 87506 IADNA-DNA/RNA PROBE TQ 6-11: CPT | Performed by: PHYSICIAN ASSISTANT

## 2017-04-16 LAB
CAMPYLOBACTER GROUP BY NAT: NOT DETECTED
ENTERIC PATHOGEN COMMENT: NORMAL
NOROVIRUS I AND II BY NAT: NOT DETECTED
ROTAVIRUS A BY NAT: NOT DETECTED
SALMONELLA SPECIES BY NAT: NOT DETECTED
SHIGA TOXIN 1 GENE BY NAT: NOT DETECTED
SHIGA TOXIN 2 GENE BY NAT: NOT DETECTED
SHIGELLA SP+EIEC IPAH STL QL NAA+PROBE: NOT DETECTED
VIBRIO GROUP BY NAT: NOT DETECTED
YERSINIA ENTEROCOLITICA BY NAT: NOT DETECTED

## 2017-04-17 LAB
ALBUMIN SERPL-MCNC: 3.9 G/DL (ref 3.4–5)
ALP SERPL-CCNC: 81 U/L (ref 40–150)
ALT SERPL W P-5'-P-CCNC: 33 U/L (ref 0–70)
ANION GAP SERPL CALCULATED.3IONS-SCNC: 10 MMOL/L (ref 3–14)
AST SERPL W P-5'-P-CCNC: 19 U/L (ref 0–45)
BILIRUB DIRECT SERPL-MCNC: <0.1 MG/DL (ref 0–0.2)
BILIRUB SERPL-MCNC: 0.5 MG/DL (ref 0.2–1.3)
BUN SERPL-MCNC: 16 MG/DL (ref 7–30)
CALCIUM SERPL-MCNC: 9.5 MG/DL (ref 8.5–10.1)
CHLORIDE SERPL-SCNC: 108 MMOL/L (ref 94–109)
CO2 SERPL-SCNC: 24 MMOL/L (ref 20–32)
CREAT SERPL-MCNC: 1.26 MG/DL (ref 0.66–1.25)
CRP SERPL-MCNC: <2.9 MG/L (ref 0–8)
G LAMBLIA AG STL QL IA: NORMAL
GFR SERPL CREATININE-BSD FRML MDRD: 58 ML/MIN/1.7M2
GLUCOSE SERPL-MCNC: 109 MG/DL (ref 70–99)
MICRO REPORT STATUS: NORMAL
MICRO REPORT STATUS: NORMAL
O+P STL MICRO: NORMAL
POTASSIUM SERPL-SCNC: 4.3 MMOL/L (ref 3.4–5.3)
PROT SERPL-MCNC: 6.8 G/DL (ref 6.8–8.8)
SODIUM SERPL-SCNC: 142 MMOL/L (ref 133–144)
SPECIMEN SOURCE: NORMAL
SPECIMEN SOURCE: NORMAL

## 2017-04-18 DIAGNOSIS — R19.7 DIARRHEA, UNSPECIFIED TYPE: ICD-10-CM

## 2017-04-24 ENCOUNTER — TELEPHONE (OUTPATIENT)
Dept: GASTROENTEROLOGY | Facility: CLINIC | Age: 62
End: 2017-04-24

## 2017-04-24 DIAGNOSIS — K50.00 CROHN'S DISEASE OF SMALL INTESTINE WITHOUT COMPLICATION (H): ICD-10-CM

## 2017-04-24 RX ORDER — DIPHENOXYLATE HCL/ATROPINE 2.5-.025MG
1 TABLET ORAL 4 TIMES DAILY PRN
Qty: 50 TABLET | Refills: 1 | Status: SHIPPED | OUTPATIENT
Start: 2017-04-24 | End: 2018-07-25

## 2017-04-24 NOTE — TELEPHONE ENCOUNTER
Called patient regarding symptoms and recent C/O diarrhea. Currently states his loose stools are now resolved. He has not had any loose stools or malaise for the past 3-5 days.Explained that Slick encourages a colonoscopy in the near future to assess any narrowing. Would like a prescription to Lomotil at this time.Would prefer to wait a few months before he has another colonoscopy.

## 2017-05-18 ENCOUNTER — CARE COORDINATION (OUTPATIENT)
Dept: GASTROENTEROLOGY | Facility: CLINIC | Age: 62
End: 2017-05-18

## 2017-05-18 NOTE — PROGRESS NOTES
Called pt to let her know that need to reschedule his appt has Slick Malone went on maternity leave early.  Pt states he is doing okay. Let him know that he will be contacted in the next couple of days to reschedule. Pt in agreement with the plan.

## 2017-05-31 ENCOUNTER — TELEPHONE (OUTPATIENT)
Dept: GASTROENTEROLOGY | Facility: CLINIC | Age: 62
End: 2017-05-31

## 2017-05-31 NOTE — TELEPHONE ENCOUNTER
Telephone progress note:   Patient reports that gut is doing OK.  He is having 0-1 stools a day.  No blood.  He does note that he will be stable for a few weeks then have a few days of diarrhea.      He was curious about lectins and their role in inflammation (made prominent from Dr. Camara).  Discussed limited evidence for lectin in IBD and difficult to know how this would impact his disease.  Encouraged good nutrition and healthy lifestyle.     Plan:  1. Crohn's disease: in remission at this time.  He has had LFTs in 2017.  Continue to monitor.  Plan for next colonoscopy in 2019 - consider dilation of anastomosis.      2. Diarrhea: IBS v bacterial overgrowth at this time.  Consider repeat rifaximin if symptoms recur.     RTC in winter 2018.

## 2017-07-05 ENCOUNTER — CARE COORDINATION (OUTPATIENT)
Dept: GASTROENTEROLOGY | Facility: CLINIC | Age: 62
End: 2017-07-05

## 2017-07-05 ENCOUNTER — OFFICE VISIT (OUTPATIENT)
Dept: INTERNAL MEDICINE | Facility: CLINIC | Age: 62
End: 2017-07-05

## 2017-07-05 VITALS
TEMPERATURE: 98 F | BODY MASS INDEX: 23.7 KG/M2 | DIASTOLIC BLOOD PRESSURE: 75 MMHG | HEART RATE: 56 BPM | WEIGHT: 160.5 LBS | SYSTOLIC BLOOD PRESSURE: 108 MMHG

## 2017-07-05 DIAGNOSIS — R10.13 EPIGASTRIC PAIN: Primary | ICD-10-CM

## 2017-07-05 DIAGNOSIS — K50.90 CROHN'S DISEASE (H): Primary | ICD-10-CM

## 2017-07-05 DIAGNOSIS — R10.13 EPIGASTRIC PAIN: ICD-10-CM

## 2017-07-05 DIAGNOSIS — K50.90 CROHN'S DISEASE (H): ICD-10-CM

## 2017-07-05 LAB
ALBUMIN SERPL-MCNC: 3.9 G/DL (ref 3.4–5)
ALP SERPL-CCNC: 95 U/L (ref 40–150)
ALT SERPL W P-5'-P-CCNC: 27 U/L (ref 0–70)
AST SERPL W P-5'-P-CCNC: 18 U/L (ref 0–45)
BASOPHILS # BLD AUTO: 0 10E9/L (ref 0–0.2)
BASOPHILS NFR BLD AUTO: 0.5 %
BILIRUB DIRECT SERPL-MCNC: 0.1 MG/DL (ref 0–0.2)
BILIRUB SERPL-MCNC: 0.7 MG/DL (ref 0.2–1.3)
CRP SERPL-MCNC: 4.5 MG/L (ref 0–8)
DIFFERENTIAL METHOD BLD: NORMAL
EOSINOPHIL # BLD AUTO: 0.1 10E9/L (ref 0–0.7)
EOSINOPHIL NFR BLD AUTO: 1.6 %
ERYTHROCYTE [DISTWIDTH] IN BLOOD BY AUTOMATED COUNT: 12.9 % (ref 10–15)
ERYTHROCYTE [SEDIMENTATION RATE] IN BLOOD BY WESTERGREN METHOD: 8 MM/H (ref 0–20)
HCT VFR BLD AUTO: 49 % (ref 40–53)
HGB BLD-MCNC: 16.9 G/DL (ref 13.3–17.7)
IMM GRANULOCYTES # BLD: 0 10E9/L (ref 0–0.4)
IMM GRANULOCYTES NFR BLD: 0.4 %
LIPASE SERPL-CCNC: 383 U/L (ref 73–393)
LYMPHOCYTES # BLD AUTO: 1.3 10E9/L (ref 0.8–5.3)
LYMPHOCYTES NFR BLD AUTO: 22.9 %
MCH RBC QN AUTO: 31.5 PG (ref 26.5–33)
MCHC RBC AUTO-ENTMCNC: 34.5 G/DL (ref 31.5–36.5)
MCV RBC AUTO: 91 FL (ref 78–100)
MONOCYTES # BLD AUTO: 0.7 10E9/L (ref 0–1.3)
MONOCYTES NFR BLD AUTO: 11.8 %
NEUTROPHILS # BLD AUTO: 3.5 10E9/L (ref 1.6–8.3)
NEUTROPHILS NFR BLD AUTO: 62.8 %
NRBC # BLD AUTO: 0 10*3/UL
NRBC BLD AUTO-RTO: 0 /100
PLATELET # BLD AUTO: 212 10E9/L (ref 150–450)
PROT SERPL-MCNC: 7.2 G/DL (ref 6.8–8.8)
RBC # BLD AUTO: 5.36 10E12/L (ref 4.4–5.9)
WBC # BLD AUTO: 5.6 10E9/L (ref 4–11)

## 2017-07-05 RX ORDER — OMEPRAZOLE 40 MG/1
40 CAPSULE, DELAYED RELEASE ORAL DAILY
COMMUNITY
Start: 2017-07-01 | End: 2018-02-06

## 2017-07-05 ASSESSMENT — PAIN SCALES - GENERAL: PAINLEVEL: NO PAIN (0)

## 2017-07-05 NOTE — PROGRESS NOTES
Pt called in and said he feels he needs to be seen due to his increase in symptoms.  Offered an appt on July 11 with Dr. Leach at 8 am.   Short visit. Pt in agreement.  Seeing primary care today.  Will order labs as has not had done for awhile.

## 2017-07-05 NOTE — PROGRESS NOTES
"Trevin Gee is a 62 year old male who comes in for    CC: abdominal pain  HPI:    Mr. Gee reports a \"different sensation\" in his gut that started last week. Its tarted with sharp pain in the epigastric area, \"just below the surface\". He tried adjusting his diet, since the pain worsened after eating. He woke up with severe gas pains early in the morning, even when he hadn't eaten anything after 10 pm the night before. The gas pains caused both belching and flatus. Denies N/V.  Over the past 3 days, he restricted his diet to diluted apple juice, lactose-free/fat-free milk, rice, bread with honey, banana, and then added chicken breast, which went okay.  Feels different from his Crohn's pain--often has tenderness in the abdomen, but this is not prominent now. Thinks it feels slightly distended from gas.  Started Omeprazole on 7/1 (never took in the past, but still had from an old prescription). Unsure if it helped, since he changed diet at the same time. Overall, feels like symptoms are improving.   Due for Dexa scan on Friday.     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)     Osteoporosis     Anxiety     Vitamin B 12 deficiency     History of kevin     Steroid dependent for adrenal supression     Adrenal insufficiency (H)     Bipolar 2 disorder (H)     Hx of psychiatric care     Hypertriglyceridemia     Lactose intolerance     Vertebral fracture, osteoporotic (H)       Current Outpatient Prescriptions   Medication Sig Dispense Refill     omeprazole (PRILOSEC) 40 MG capsule Take 40 mg by mouth daily       diphenoxylate-atropine (LOMOTIL) 2.5-0.025 MG per tablet Take 1 tablet by mouth 4 times daily as needed for diarrhea After loose stools 50 tablet 1     rifaximin (XIFAXAN) 550 MG TABS tablet Take 1 tablet (550 mg) by mouth 3 times daily 42 tablet 0     clonazePAM (KLONOPIN) 0.5 MG tablet        MELATONIN PO  "       traZODone (DESYREL) 50 MG tablet Take 1 tablet (50 mg) by mouth nightly as needed for sleep 90 tablet 3     fluconazole (DIFLUCAN) 200 MG tablet Take 1 tablet (200 mg) by mouth daily 90 tablet 3     acetaminophen (TYLENOL) 500 MG tablet Take 500-1,000 mg by mouth every 6 hours as needed for mild pain       Cyanocobalamin (VITAMIN B 12 PO) Take 500 mcg by mouth       Pyridoxine HCl (VITAMIN B6 PO) Take 100 mg by mouth       ferrous sulfate 325 (65 FE) MG tablet Take by mouth daily (with breakfast)       multivitamin, therapeutic with minerals (MULTI-VITAMIN) TABS Take 1 tablet by mouth daily       calcium-vitamin D (CALTRATE) 600-400 MG-UNIT per tablet Take 1 tablet by mouth 2 times daily       Cholecalciferol (VITAMIN D3 PO) Take 1,000 Units by mouth daily       Ascorbic Acid (VITAMIN C PO) Take 500 mg by mouth           ALLERGIES: Prednisone    PAST MEDICAL HX:   Past Medical History:   Diagnosis Date     Anemia 2007     Anxiety 2012?    much worse since July 2014     Cataract 12/2007    both eyes, too much prednisone, implants     Coccidioidomycosis      Crohn disease (H)      Crohn's disease (H) 1/13/2015     Depressive disorder 7/2014    much worse since July 2014     Fracture 1956    green fracture of leg     Gallbladder problem 2005?    much gravel, removed     GERD (gastroesophageal reflux disease)      History of blood transfusion 1988    ulcerated anastomosis term. ilium bleed     Hypertriglyceridemia 7/8/2016     Infection 2007    persistant coccidioidomycosis     Kidney stone various    both sides, all passed naturally     Mental health problem 2007    bipolar though perhaps different     Nephrolithiasis      Osteoporosis 2007    osteoporosis, too much prednisone, last dexa 1/2014     Osteoporosis      Other nervous system complications 2007    prednisone overload induced kevin     Personal history of colonic polyps     small ones found during colonoscopies     Steroid-induced psychosis     Patient  "extremely sensitive to regular doses of steroids.  Unclear if this is due to chronic fluconazole use or genetic issue.  In the future, use caution when prescribing steroids.     TB (tuberculosis)        PAST SURGICAL HX:   Past Surgical History:   Procedure Laterality Date     APPENDECTOMY  1974    coincidental with Crohn's surgery     BACK SURGERY  12/2007    kyphoplasty on compressed 4th??? vertebra     BIOPSY  2007, 2013    lump on arm, knee, back of hand for coccidioidomycosis cult.     CHOLECYSTECTOMY  2005    much gravel, removed laparoscopically     COLONOSCOPY  7/14/2014 last    Dr. Catherine Bowden, [facility] - many previous     COLONOSCOPY Left 9/11/2015    Procedure: COMBINED COLONOSCOPY, SINGLE OR MULTIPLE BIOPSY/POLYPECTOMY BY BIOPSY;  Surgeon: Fransisco Leach MD;  Location:  GI     COLONOSCOPY N/A 8/3/2016    Procedure: COMBINED COLONOSCOPY, SINGLE OR MULTIPLE BIOPSY/POLYPECTOMY BY BIOPSY;  Surgeon: Fransisco Leach MD;  Location:  GI     ENT SURGERY  ?    upper endoscopy     ESOPHAGOSCOPY, GASTROSCOPY, DUODENOSCOPY (EGD), COMBINED N/A 11/8/2016    Procedure: COMBINED ENDOSCOPIC ULTRASOUND, ESOPHAGOSCOPY, GASTROSCOPY, DUODENOSCOPY (EGD), FINE NEEDLE ASPIRATE/BIOPSY;  Surgeon: Guru Alexsandra Ballesteros MD;  Location: [facility]     ESOPHAGOSCOPY, GASTROSCOPY, DUODENOSCOPY (EGD), COMBINED N/A 11/8/2016    Procedure: COMBINED ESOPHAGOSCOPY, GASTROSCOPY, DUODENOSCOPY (EGD), BIOPSY SINGLE OR MULTIPLE;  Surgeon: Guru Alexsandra Ballesteros MD;  Location:  GI     EYE SURGERY  12/2007    cataract surgery both sides     GI SURGERY  1974, 1986(x2), 1989    Crohn's, 1974 RO 18\" term. ilium + 9\" asc. colon all one pc     SOFT TISSUE SURGERY  3/2013    removed buildup on back of r hand, coccidioidomycosis       IMMUNIZATION HX:   Immunization History   Administered Date(s) Administered     Influenza (IIV3) 10/08/2015, 11/01/2016     TDAP Vaccine (Boostrix) 10/08/2015     " Tdap (Adacel,Boostrix) 10/12/2005     Twinrix A/B 10/12/2005, 11/15/2005     Typhoid IM 11/15/2005       SOCIAL HX:   Social History     Social History Narrative       ROS:   CONSTITUTIONAL: no fatigue, no unexpected change in weight  SKIN: no worrisome rashes, no worrisome moles, no worrisome lesions  RESP: no significant cough, no shortness of breath  CV: no chest pain, no palpitations, no new or worsening peripheral edema  GI: see HPI    OBJECTIVE:  /75  Pulse 56  Temp 98  F (36.7  C) (Oral)  Wt 72.8 kg (160 lb 8 oz)  BMI 23.7 kg/m2   Wt Readings from Last 1 Encounters:   07/05/17 72.8 kg (160 lb 8 oz)     Constitutional: no distress, comfortable, pleasant, well-groomed   Ears, Nose and Throat:  throat clear, mucosa pink and moist.   Neck: supple with full range of motion, no thyromegaly.   Cardiovascular: regular rate and rhythm, normal S1 and S2, no murmurs, rubs or gallops  Respiratory: clear to auscultation with good air movement bilaterally, no wheezes or crackles, non-labored  Gastrointestinal: active bowel sounds, soft, non-distended, nontender, no hepatosplenomegaly, no masses    Psychological: appropriate mood, demonstrates intact judgment and logical thought process      ASSESSMENT/PLAN:    1. Epigastric pain  Pt is concerned about possible H pylori--discussed this is unlikely given sudden onset in symptoms, which are now improving, but we will test to be sure. Will add lipase on to other labs today. Everything is WNL. Discussed gradually advancing diet as tolerated, avoiding gas-causing foods, and continuing with Omeprazole for the next 2-4 weeks. F/u with Dr. Leach in GI clinic next week as scheduled.  - H Pylori antigen stool; Future  - Lipase; Future    FOLLOW UP: If not improving or if worsening, or as needed for any changes or concerns  Lacey Feng, MARISOL CNP

## 2017-07-05 NOTE — MR AVS SNAPSHOT
After Visit Summary   7/5/2017    Trevin Gee    MRN: [id number]           Patient Information     Date Of Birth          1955        Visit Information        Provider Department      7/5/2017 2:00 PM Lacey Feng APRN [facility]        Today's Diagnoses     Epigastric pain    -  1      Care Instructions    Primary Care Center Medication Refill Request Information:  * Please contact your pharmacy regarding ANY request for medication refills.  ** [facility] Prescription Fax = [phone]  * Please allow 3 business days for routine medication refills.  * Please allow 5 business days for controlled substance medication refills.     Primary Care Center Test Result notification information:  *You will be notified with in 7-10 days of your appointment day regarding the results of your test.  If you are on MyChart you will be notified as soon as the provider has reviewed the results and signed off on them.    Primary Care Center [phone]     Excess Gas  Certain foods produce gas when digested. In some people, these foods make an excessive amount of gas. This may cause bloating, burping, or increased gas passing through the rectum (flatulence).    Foods that cause gas  The following foods are more likely to cause this problem. Limit them, or remove them from your diet:    Broccoli    Cauliflower    [place] sprouts    Cabbage    Cooked dried beans    Fizzy (carbonated) drinks, such as sparkling water, soda, beer, and champagne  Other causes  Other causes of excess gas include:    Eating too fast or talking while you chew. This may cause you to swallow air. This increases the amount of gas in your stomach. And it may make your symptoms worse. Chew each mouthful completely before you swallow. Take your time.    Chewing on gum or sucking on hard candy. These cause you to swallow more often. And some of what you are swallowing is air. This leads to more gas in your  stomach. Avoid chewing gum and hard candy.    Overeating. This may increase the feeling of being bloated and cause more gas. When you are full, stop eating.     Being constipated. This can increase the amount of normal intestinal gas. Avoid constipation by getting more fiber in your diet. Good sources of fiber include whole-grain cereal, fresh vegetables (except those in the above list), and fresh fruits. High-fiber foods absorb water and carry it out of the body. When adding more fiber to your diet, you also need to drink more water. You should drink at least 8, 8-ounce glasses of water (2 quarts) per day.  Date Last Reviewed: 8/1/2016 2000-2017 [employer]. [address] [place] [address], [place] [address]. All rights reserved. This information is not intended as a substitute for professional medical care. Always follow your healthcare professional's instructions.                Follow-ups after your visit        Your next 10 appointments already scheduled     Jul 07, 2017  1:30 PM CDT   DX HIP/PELVIS/SPINE with [facility] Dexa ([facility])    [address] [place] [id number] [phone]           Please do not take any of the following 24 hours prior to the day of your exam: vitamins, calcium tablets, antacids.            Jul 07, 2017  2:00 PM CDT   LAB with  LAB    [facility] ([facility])    [address] [place] [id number] [phone]           Patient must bring picture ID.  Patient should be prepared to give a urine specimen  Please do not eat 10-12 hours before your appointment if you are coming in fasting for labs on lipids, cholesterol, or glucose (sugar).  Pregnant women should follow their Care Team instructions. Water with medications is okay. Do not drink coffee or other fluids.   If you have concerns about taking  your medications, please ask at office or if  scheduling via [employer], send a message by clicking on Secure Messaging, Message Your Care Team.            Jul 11, 2017  8:00 AM CDT   (Arrive by 7:45 AM)   RETURN IRRITABLE BOWEL DISEASE with Fransisco Leach MD   [facility] Gastroenterology and IBD ([facility] [place] [facility])    [address]  [address] [facility] [id number] [phone]            Jul 14, 2017  1:30 PM CDT   (Arrive by 1:00 PM)   RETURN ENDOCRINE with Mag Currie MD   [facility] Endocrinology ([facility] [place] [facility])    [address]  3rd Floor  [facility] [id number] [phone]              Future tests that were ordered for you today     Open Future Orders        Priority Expected Expires Ordered    Lipase Routine 7/5/2017 7/19/2017 7/5/2017    H Pylori antigen stool Routine 7/5/2017 7/5/2018 7/5/2017    CBC with platelets differential Routine 7/5/2017 8/4/2017 7/5/2017    CRP inflammation Routine 7/5/2017 8/4/2017 7/5/2017    Erythrocyte sedimentation rate auto Routine 7/5/2017 8/4/2017 7/5/2017    Hepatic panel Routine 7/5/2017 8/4/2017 7/5/2017            Who to contact     Please call your clinic at [phone] to:    Ask questions about your health    Make or cancel appointments    Discuss your medicines    Learn about your test results    Speak to your doctor   If you have compliments or concerns about an experience at your clinic, or if you wish to file a complaint, please contact [facility] Patient Relations at [phone] or email us at [email or web address]         Additional Information About Your Visit        [employer] Information     [employer] gives you secure access to your electronic health record. If you see a primary care provider, you can also send messages to your care team and make appointments. If you have questions, please call your primary care clinic.  If you do not have a primary care provider, please call [phone] and they  will assist you.      [employer] is an electronic gateway that provides easy, online access to your medical records. With [employer], you can request a clinic appointment, read your test results, renew a prescription or communicate with your care team.     To access your existing account, please contact your [facility] or call [phone] for assistance.        Care EveryWhere ID     This is your Care EveryWhere ID. This could be used by other organizations to access your [place] medical records  [id number]        Your Vitals Were     Pulse Temperature BMI (Body Mass Index)             56 98  F (36.7  C) (Oral) 23.7 kg/m2          Blood Pressure from Last 3 Encounters:   07/05/17 108/75   01/20/17 117/87   12/14/16 120/85    Weight from Last 3 Encounters:   07/05/17 72.8 kg (160 lb 8 oz)   01/20/17 75.9 kg (167 lb 4.8 oz)   12/14/16 71.7 kg (158 lb)               Primary Care Provider Office Phone # Fax #    Foster Begum [phone]       [facility] [address]  [facility] [address]        Equal Access to Services     GUMARO CERRATO : Hadii aad ku hadasho Soomaali, waaxda luqadaha, qaybta kaalmada adeegyada, jessika weavern valeriy romero. So [facility] [phone].    ATENCIÓN: Si habla español, tiene a corey disposición servicios gratuitos de asistencia lingüística. Llame al [phone].    We comply with applicable federal civil rights laws and [place] laws. We do not discriminate on the basis of race, color, national origin, age, disability sex, sexual orientation or gender identity.            Thank you!     Thank you for choosing [facility]  for your care. Our goal is always to provide you with excellent care. Hearing back from our patients is one way we can continue to improve our services. Please take a few minutes to complete the written survey that you may receive in the mail after your visit with us. Thank you!              Your Updated Medication List - Protect others around you: Learn how to safely use, store and throw away your medicines at www.disposemymeds.org.          This list is accurate as of: 7/5/17  2:19 PM.  Always use your most recent med list.                   Brand Name Dispense Instructions for use Diagnosis    acetaminophen 500 MG tablet    TYLENOL     Take 500-1,000 mg by mouth every 6 hours as needed for mild pain        calcium-vitamin D 600-400 MG-UNIT per tablet    CALTRATE     Take 1 tablet by mouth 2 times daily        clonazePAM 0.5 MG tablet    klonoPIN          diphenoxylate-atropine 2.5-0.025 MG per tablet    LOMOTIL    50 tablet    Take 1 tablet by mouth 4 times daily as needed for diarrhea After loose stools    Crohn's disease of small intestine without complication (H)       ferrous sulfate 325 (65 FE) MG tablet    IRON     Take by mouth daily (with breakfast)        fluconazole 200 MG tablet    DIFLUCAN    90 tablet    Take 1 tablet (200 mg) by mouth daily    Coccidioidomycosis       MELATONIN PO           Multi-vitamin Tabs tablet      Take 1 tablet by mouth daily        omeprazole 40 MG capsule    priLOSEC     Take 40 mg by mouth daily        rifaximin 550 MG Tabs tablet    XIFAXAN    42 tablet    Take 1 tablet (550 mg) by mouth 3 times daily    Diarrhea, unspecified type       traZODone 50 MG tablet    DESYREL    90 tablet    Take 1 tablet (50 mg) by mouth nightly as needed for sleep    Bipolar 2 disorder (H)       VITAMIN B 12 PO      Take 500 mcg by mouth        VITAMIN B6 PO      Take 100 mg by mouth        VITAMIN C PO      Take 500 mg by mouth        VITAMIN D3 PO      Take 1,000 Units by mouth daily

## 2017-07-05 NOTE — NURSING NOTE
Chief Complaint   Patient presents with     Abdominal Pain     Patient here for abdominal pain x5 days.       Meche Hidalgo CMA at 1:53 PM on 7/5/2017.

## 2017-07-05 NOTE — PATIENT INSTRUCTIONS
[facility] Medication Refill Request Information:  * Please contact your pharmacy regarding ANY request for medication refills.  ** [facility] Prescription Fax = [phone]  * Please allow 3 business days for routine medication refills.  * Please allow 5 business days for controlled substance medication refills.     [facility] Test Result notification information:  *You will be notified with in 7-10 days of your appointment day regarding the results of your test.  If you are on MyChart you will be notified as soon as the provider has reviewed the results and signed off on them.    [facility] [phone]     Excess Gas  Certain foods produce gas when digested. In some people, these foods make an excessive amount of gas. This may cause bloating, burping, or increased gas passing through the rectum (flatulence).    Foods that cause gas  The following foods are more likely to cause this problem. Limit them, or remove them from your diet:    Broccoli    Cauliflower    [place] sprouts    Cabbage    Cooked dried beans    Fizzy (carbonated) drinks, such as sparkling water, soda, beer, and champagne  Other causes  Other causes of excess gas include:    Eating too fast or talking while you chew. This may cause you to swallow air. This increases the amount of gas in your stomach. And it may make your symptoms worse. Chew each mouthful completely before you swallow. Take your time.    Chewing on gum or sucking on hard candy. These cause you to swallow more often. And some of what you are swallowing is air. This leads to more gas in your stomach. Avoid chewing gum and hard candy.    Overeating. This may increase the feeling of being bloated and cause more gas. When you are full, stop eating.     Being constipated. This can increase the amount of normal intestinal gas. Avoid constipation by getting more fiber in your diet. Good sources of fiber include whole-grain cereal, fresh vegetables (except those in  the above list), and fresh fruits. High-fiber foods absorb water and carry it out of the body. When adding more fiber to your diet, you also need to drink more water. You should drink at least 8, 8-ounce glasses of water (2 quarts) per day.  Date Last Reviewed: 8/1/2016 2000-2017 [employer]. [address] [place] [address], [place] [address]. All rights reserved. This information is not intended as a substitute for professional medical care. Always follow your healthcare professional's instructions.

## 2017-07-07 ENCOUNTER — TELEPHONE (OUTPATIENT)
Dept: GASTROENTEROLOGY | Facility: CLINIC | Age: 62
End: 2017-07-07

## 2017-07-07 DIAGNOSIS — M81.0 OSTEOPOROSIS: ICD-10-CM

## 2017-07-07 DIAGNOSIS — R10.13 EPIGASTRIC PAIN: ICD-10-CM

## 2017-07-07 LAB
CHOLEST SERPL-MCNC: 122 MG/DL
HDLC SERPL-MCNC: 42 MG/DL
LDLC SERPL CALC-MCNC: 43 MG/DL
NONHDLC SERPL-MCNC: 80 MG/DL
TRIGL SERPL-MCNC: 187 MG/DL

## 2017-07-07 PROCEDURE — 36415 COLL VENOUS BLD VENIPUNCTURE: CPT | Performed by: FAMILY MEDICINE

## 2017-07-07 PROCEDURE — 87338 HPYLORI STOOL AG IA: CPT | Performed by: FAMILY MEDICINE

## 2017-07-07 PROCEDURE — 80061 LIPID PANEL: CPT | Performed by: FAMILY MEDICINE

## 2017-07-07 NOTE — TELEPHONE ENCOUNTER
SPOKE TO PT TO CONFIRM LISA ON 7/11/17 W/ DR. COURTNEY NOT TO HAPPY TO RECEIVE NUMEROUS LISA REMINDERS.

## 2017-07-10 LAB
H PYLORI AG STL QL IA: NORMAL
MICRO REPORT STATUS: NORMAL
SPECIMEN SOURCE: NORMAL

## 2017-07-10 ASSESSMENT — ENCOUNTER SYMPTOMS
FATIGUE: 0
ABDOMINAL PAIN: 1
DECREASED APPETITE: 0
BOWEL INCONTINENCE: 1
BLOATING: 1
BLOOD IN STOOL: 0
INSOMNIA: 0
POLYPHAGIA: 0
DIARRHEA: 1
FEVER: 0
DEPRESSION: 0
HEARTBURN: 0
RECTAL PAIN: 0
HALLUCINATIONS: 0
PANIC: 0
ALTERED TEMPERATURE REGULATION: 0
PANIC: 0
BLOOD IN STOOL: 0
INSOMNIA: 0
JAUNDICE: 0
CONSTIPATION: 0
POLYDIPSIA: 0
DIARRHEA: 1
CHILLS: 0
HEARTBURN: 0
JAUNDICE: 0
NIGHT SWEATS: 0
FEVER: 0
CONSTIPATION: 0
DECREASED CONCENTRATION: 1
WEIGHT GAIN: 0
VOMITING: 0
RECTAL BLEEDING: 1
INCREASED ENERGY: 0
WEIGHT LOSS: 0
ALTERED TEMPERATURE REGULATION: 0
DECREASED APPETITE: 0
BOWEL INCONTINENCE: 1
WEIGHT LOSS: 0
VOMITING: 0
NAUSEA: 0
CHILLS: 0
WEIGHT GAIN: 0
NAUSEA: 0
RECTAL PAIN: 0
DECREASED CONCENTRATION: 1
POLYDIPSIA: 0
RECTAL BLEEDING: 1
BLOATING: 1
DEPRESSION: 0
NIGHT SWEATS: 0
POLYPHAGIA: 0
FATIGUE: 0
HALLUCINATIONS: 0
ABDOMINAL PAIN: 1
NERVOUS/ANXIOUS: 1
NERVOUS/ANXIOUS: 1
INCREASED ENERGY: 0

## 2017-07-11 ENCOUNTER — OFFICE VISIT (OUTPATIENT)
Dept: GASTROENTEROLOGY | Facility: CLINIC | Age: 62
End: 2017-07-11

## 2017-07-11 VITALS
SYSTOLIC BLOOD PRESSURE: 106 MMHG | WEIGHT: 161 LBS | HEART RATE: 74 BPM | DIASTOLIC BLOOD PRESSURE: 72 MMHG | OXYGEN SATURATION: 95 % | BODY MASS INDEX: 23.85 KG/M2 | TEMPERATURE: 97.7 F | HEIGHT: 69 IN

## 2017-07-11 DIAGNOSIS — K50.012 CROHN'S DISEASE OF SMALL INTESTINE WITH INTESTINAL OBSTRUCTION (H): Primary | ICD-10-CM

## 2017-07-11 ASSESSMENT — PAIN SCALES - GENERAL: PAINLEVEL: NO PAIN (0)

## 2017-07-11 NOTE — NURSING NOTE
Printed after visit  Summary given to pt along with verbal instructions.  Follow up with Slick Malone.

## 2017-07-11 NOTE — MR AVS SNAPSHOT
After Visit Summary   7/11/2017    Trevin Gee    MRN: [id number]           Patient Information     Date Of Birth          1955        Visit Information        Provider Department      7/11/2017 8:00 AM Fransisco Leach MD [facility] Gastroenterology and IBD        Today's Diagnoses     Crohn's disease (H)    -  1      Care Instructions    It was a pleasure taking care of you today.  I've included a brief summary of our discussion and care plan from today's visit below.  Please review this information with your primary care provider.  _______________________________________________________________________    My recommendations are summarized as follows:    --  I am concerned your symptoms are related to narrowing at the prior surgical site    --  Continue to eat a low fiber diet for now      --  Colonoscopy to assess for narrowing of ileocolonic anastomosis (old surgical site)   You are scheduled on August 16  [address] [place] [address] floor   Check in 845  You will be sent the instructions  You will receive a call from the pre assessment nurse about one week before your procedure      Return to GI Clinic in 6 months to review your progress with Slick Malone.      [place] [phone]      _______________________________________________________________________    Who do I call with any questions after my visit?  Please be in touch if there are any further questions that arise following today's visit.  There are multiple ways to contact your gastroenterology care team.        During business hours, you may reach a Gastroenterology nurse at [phone] and choose option 3.         To schedule or reschedule an appointment, please call [phone].       You can always send a secure message through [employer].  [employer] messages are answered by your nurse or doctor typically within 24 hours.  Please allow extra time on weekends and holidays.        For urgent/emergent questions after  business hours, you may reach the on-call GI Fellow by contacting the [facility]  at [phone].     How will I get the results of any tests ordered?    You will receive all of your results.  If you have signed up for Med.lyhart, any tests ordered at your visit will be available to you after your physician reviews them.  Typically this takes 1-2 weeks.  If there are urgent results that require a change in your care plan, your physician or nurse will call you to discuss the next steps.      What is Graphicly?  Graphicly is a secure way for you to access all of your healthcare records from the [facility].  It is a web based computer program, so you can sign on to it from any location.  It also allows you to send secure messages to your care team.  I recommend signing up for Graphicly access if you have not already done so and are comfortable with using a computer.      How to I schedule a follow-up visit?  If you did not schedule a follow-up visit today, please call [phone] to schedule a follow-up office visit.        Sincerely,    Fransisco Leach MD    [facility]  Division of Gastroenterology                Follow-ups after your visit        Additional Services     GASTROENTEROLOGY ADULT REF PROCEDURE ONLY       Last Lab Result: Creatinine (mg/dL)       Date                     Value                 04/14/2017               1.26 (H)         ----------  Body mass index is 23.78 kg/(m^2).     Needed:  No  Language:  English    Patient will be contacted to schedule procedure.     Please be aware that coverage of these services is subject to the terms and limitations of your health insurance plan.  Call member services at your health plan with any benefit or coverage questions.  Any procedures must be performed at a [facility] facility OR coordinated by your clinic's referral office.    Please bring the following with you to your appointment:    (1) Any  X-Rays, CTs or MRIs which have been performed.  Contact the facility where they were done to arrange for  prior to your scheduled appointment.    (2) List of current medications   (3) This referral request   (4) Any documents/labs given to you for this referral                  Your next 10 appointments already scheduled     Jul 11, 2017 10:15 AM CDT   LAB with  LAB   [facility] ([facility])    [address] [place] [address]  1st [facility] [id number] [phone]           Patient must bring picture ID.  Patient should be prepared to give a urine specimen  Please do not eat 10-12 hours before your appointment if you are coming in fasting for labs on lipids, cholesterol, or glucose (sugar).  Pregnant women should follow their Care Team instructions. Water with medications is okay. Do not drink coffee or other fluids.   If you have concerns about taking  your medications, please ask at office or if scheduling via [email or web address], send a message by clicking on Secure Messaging, Message Your Care Team.            Jul 14, 2017  1:30 PM CDT   (Arrive by 1:00 PM)   RETURN ENDOCRINE with Mag Currie MD   [facility] ([facility])    [address] [place] [address]  3rd [facility] [id number] [phone]            Aug 16, 2017   Procedure with Fransisco Leach MD   [facility] ([facility])    [address] [place] [address]  5th [facility] [id number] [phone]           Located in the Clinics and Surgery Center at [address] [place] [address].   parking is very convenient and highly recommended.  is a $6 flat rate fee.  Both  and self parkers should enter the main arrival plaza from [facility]; parking attendants will direct you based on your parking preference.            Jan 08, 2018  1:00 PM CST   (Arrive by 12:45 PM)   RETURN IRRITABLE BOWEL DISEASE with  "Slick Malone PA-C   [facility] Gastroenterology and IBD ([facility] [place])    [address] [address] [phone]              Who to contact     Please call your clinic at [phone] to:    Ask questions about your health    Make or cancel appointments    Discuss your medicines    Learn about your test results    Speak to your doctor   If you have compliments or concerns about an experience at your clinic, or if you wish to file a complaint, please contact [facility] Patient Relations at [phone] or email us at [email or web address]         Additional Information About Your Visit        [employer] Information     [employer] gives you secure access to your electronic health record. If you see a primary care provider, you can also send messages to your care team and make appointments. If you have questions, please call your primary care clinic.  If you do not have a primary care provider, please call [phone] and they will assist you.      [employer] is an electronic gateway that provides easy, online access to your medical records. With [employer], you can request a clinic appointment, read your test results, renew a prescription or communicate with your care team.     To access your existing account, please contact your [facility] Clinic or call [phone] for assistance.        Care EveryWhere ID     This is your Care EveryWhere ID. This could be used by other organizations to access your [facility] medical records  [id number]        Your Vitals Were     Pulse Temperature Height Pulse Oximetry BMI (Body Mass Index)       74 97.7  F (36.5  C) 1.753 m (5' 9\") 95% 23.78 kg/m2        Blood Pressure from Last 3 Encounters:   07/11/17 106/72   07/05/17 108/75   01/20/17 117/87    Weight from Last 3 Encounters:   07/11/17 73 kg (161 lb)   07/05/17 72.8 kg (160 lb 8 oz)   01/20/17 75.9 kg (167 lb 4.8 oz) "              We Performed the Following     GASTROENTEROLOGY ADULT REF PROCEDURE ONLY        Primary Care Provider Office Phone # Fax #    Foster Begum [phone]       [address] [address]        Equal Access to Services     GUMARO CERRATO : Hadii archie ward hadyosefo Soюиляali, waaxda luqadaha, qaybta kaalmada adeegyada, waxblaire darioin haygeorginan adehuyen anastasiiasrikanth romero. So [facility] [phone].    ATENCIÓN: Si habla español, tiene a corey disposición servicios gratuitos de asistencia lingüística. Llame al [phone].    We comply with applicable federal civil rights laws and [place] laws. We do not discriminate on the basis of race, color, national origin, age, disability sex, sexual orientation or gender identity.            Thank you!     Thank you for choosing [facility] GASTROENTEROLOGY AND IBD  for your care. Our goal is always to provide you with excellent care. Hearing back from our patients is one way we can continue to improve our services. Please take a few minutes to complete the written survey that you may receive in the mail after your visit with us. Thank you!             Your Updated Medication List - Protect others around you: Learn how to safely use, store and throw away your medicines at www.disposemymeds.org.          This list is accurate as of: 7/11/17  8:38 AM.  Always use your most recent med list.                   Brand Name Dispense Instructions for use Diagnosis    acetaminophen 500 MG tablet    TYLENOL     Take 500-1,000 mg by mouth every 6 hours as needed for mild pain        calcium-vitamin D 600-400 MG-UNIT per tablet    CALTRATE     Take 1 tablet by mouth 2 times daily        clonazePAM 0.5 MG tablet    klonoPIN          diphenoxylate-atropine 2.5-0.025 MG per tablet    LOMOTIL    50 tablet    Take 1 tablet by mouth 4 times daily as needed for diarrhea After loose stools    Crohn's disease of small intestine without complication (H)        ferrous sulfate 325 (65 FE) MG tablet    IRON     Take by mouth daily (with breakfast)        fluconazole 200 MG tablet    DIFLUCAN    90 tablet    Take 1 tablet (200 mg) by mouth daily    Coccidioidomycosis       MELATONIN PO           Multi-vitamin Tabs tablet      Take 1 tablet by mouth daily        omeprazole 40 MG capsule    priLOSEC     Take 40 mg by mouth daily        rifaximin 550 MG Tabs tablet    XIFAXAN    42 tablet    Take 1 tablet (550 mg) by mouth 3 times daily    Diarrhea, unspecified type       traZODone 50 MG tablet    DESYREL    90 tablet    Take 1 tablet (50 mg) by mouth nightly as needed for sleep    Bipolar 2 disorder (H)       VITAMIN B 12 PO      Take 500 mcg by mouth        VITAMIN B6 PO      Take 100 mg by mouth        VITAMIN C PO      Take 500 mg by mouth        VITAMIN D3 PO      Take 1,000 Units by mouth daily

## 2017-07-11 NOTE — PATIENT INSTRUCTIONS
It was a pleasure taking care of you today.  I've included a brief summary of our discussion and care plan from today's visit below.  Please review this information with your primary care provider.  _______________________________________________________________________    My recommendations are summarized as follows:    --  I am concerned your symptoms are related to narrowing at the prior surgical site    --  Continue to eat a low fiber diet for now      --  Colonoscopy to assess for narrowing of ileocolonic anastomosis (old surgical site)   You are scheduled on August 16  [address] floor   Check in 845  You will be sent the instructions  You will receive a call from the pre assessment nurse about one week before your procedure      Return to GI Clinic in 6 months to review your progress with Slick Malone.      [facility]   [phone]      _______________________________________________________________________    Who do I call with any questions after my visit?  Please be in touch if there are any further questions that arise following today's visit.  There are multiple ways to contact your gastroenterology care team.        During business hours, you may reach a Gastroenterology nurse at [phone] and choose option 3.         To schedule or reschedule an appointment, please call [phone].       You can always send a secure message through [employer].  [employer] [facility] messages are answered by your nurse or doctor typically within 24 hours.  Please allow extra time on weekends and holidays.        For urgent/emergent questions after business hours, you may reach the on-call GI Fellow by contacting the [facility] at [phone].     How will I get the results of any tests ordered?    You will receive all of your results.  If you have signed up for [employer], any tests ordered at your visit will be available to you after your physician reviews them.  Typically this takes 1-2 weeks.  If  there are urgent results that require a change in your care plan, your physician or nurse will call you to discuss the next steps.      What is Creativit Studioshart?  Qoiza is a secure way for you to access all of your healthcare records from the [facility].  It is a web based computer program, so you can sign on to it from any location.  It also allows you to send secure messages to your care team.  I recommend signing up for Qoiza access if you have not already done so and are comfortable with using a computer.      How to I schedule a follow-up visit?  If you did not schedule a follow-up visit today, please call [phone] to schedule a follow-up office visit.        Sincerely,    Fransisco Leach MD    [facility]  Division of Gastroenterology

## 2017-07-11 NOTE — PROGRESS NOTES
IBD CLINIC     CC/REFERRING MD:  Referred Self  REASON FOR CONSULTATION: Crohn's disease       IBD history:  Age at diagnosis: ~18  Extend of disease: Ileo-colonic  Disease phenotype: Inflammatory  Key-anal disease: No  Current CD medications: None  Prior IBD Medications:  - Asulfazine and Asacol: 1974 to late 90s, stopped to to disease progression  - Prednisone 1986 - current.   - Mercaptopurine: Briefly in mid 90s to get him off steroids- Patient thinks he has side effect to medication  - Methotrexate: After mercaptopurine, stayed on for years, but after bleeding in 1988 and 1989, he would not taper his prednisone. MTX stopped as he could not get off the steroids.      Surgical history:   1974 - [facility] Ileocecectomy  1986 - [facility] - revision of anastomosis  1988 - massive bleeding from ulcer at anastomosis ([place]), cauterized via colonoscopy  1989 - laparoscopic  Revision of anastomosis  2005 - Lap [facility]    DRUG MONITORING  TPMT enzyme activity: none    DISEASE ASSESSMENT  Fecal calprotectin: None  CRP, ESR Results  Recent Labs   Lab Test  07/05/17   1442  04/14/17   1526   CRP  4.5  <2.9   SED  8  5   Endoscopic assessment: Colonoscopy 8/3/2016 showed normal mucosa in entire colon. Non-patent functional end-to-end ileo-colonic anastomosis that was dilated.  No evidence of active Crohn's disease.  Enterography (12/1/16) showed 4cm of mild thickening and enhancement in neoterminal ileum at anastomosis, decompressed sigmoid colon with questionable wall thickening and enhancement.    Enterography: No active Crohn's disease (3/20/15)  C diff: None (7/12/16)    ASSESSMENT/PLAN:  62 year old male with Crohn's disease s/p ileocecectomy on no current Crohn's therapy.     1. Crohn's disease:  Atypical symptoms for obsrtuction, although he did improve with clear liquid diet.  He does not think this was an obstructive episode.  Discission options of continued observation, repeat MRE v repeat colonoscopy.  While there are  some atypical symptoms, I think recurrence of his stricture is the most likely etiology for his symptoms.    -- Colonoscopy with potential dilation of anastomosis  -- Continue with yearly LFTs to screen for PSC.     2. Colon polyps: Well defined lesions with adenomatous changes.  Will continue to surveillance   -- Plan for chromoendoscopy with next colonoscopy     3. Diarrhea: Quiescent Crohn's in past. Prior work up has included alpha 1 antitrypsin for PLE, pancreatic elastase, TTG, DGP all of which were normal.  A spot fecal fat the only isolated value that was increased.  Diarrhea may be related to IBS, or lactose intolerance.  Could also be bile salt malabsorption or bacterial overgrowth.   -- Avoid dietary triggers  -- prn lomotil  -- If diarrhea persists 2-3 days, will check for C diff    4. Possible acid reflux:  No current symptoms of acid reflux.      5. Pancreatic head cystic lesion: Increased from 2006, potentially representing IPMN.  MRCP with no change in cyst.  Reviewed with Dr. Cruz.  EUS with FNA normal.   -- No further follow-up at this time needed.      6. Steroid dependency/Adrenal insufficiency:  Followed closely with endocrinology.  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      8. Depression: Unchanged.     9. IBD healthcare maintenance based on patients current medication:      Vaccinations:  -- Influenza (every year): Last given 2016  -- TdaP (every 10 years): Last given 2015  -- Pneumococcal Pneumonia (once then every 5 years): Has not obtained.    One time confirmation of immunity or serologies:  -- Hepatitis A (serologies or immunizations): 2005  -- Hepatitis B (serologies or immunizations): 2005  -- Zoster vaccination (>61 yo, discuss if over 50): 2005  -- MMR: Not documented.  -- HPV (all aged 18-26): N/A  -- Meningococcal meningitis (all patients at risk for meningitis): Patient is not at risk.      Cancer Screening:  Colon cancer screening:  Since there is evidence of pancolitis histologically. Also with adenomatous polyps. Colonoscopy every 2-3 years recommended.  Dysplasia screening is recommended 2019.    Cervical cancer screening: N/A    Skin cancer screening: Since patient is not immunocompromised, this is per patient's dermatologist recommendations.    Depression Screening:  -- Over the last month, have you felt down, depressed, or hopeless? No  -- Over the last month, have you felt little interest or pleasure doing things? No    Misc:  -- Avoid tobacco use  -- Avoid NSAIDs as there is potentially a 25% chance of causing an IBD flare    RTC 6 months    Thank you for this consultation.  It was a pleasure to participate in the care of this patient; please contact us with any further questions.  A total of 25 minutes was spent with this patient, >50% of which was counseling regarding the above delineated issues.    Fransisco Leach MD   of Medicine  Division of Gastroenterology, Hepatology and Nutrition  [facility]      HPI:   This is a 62-year-old male with Crohn's disease who is here to follow up with me today. Patient is scheduled urgently to recent onset of new epigastric pain. He reports a sharp/burning sensation about 30 minutes after eating. They last only a few minutes. Pain started on July 1. He did try taking omeprazole but did not have any relief. He thinks the pain was slightly better if he laid on his right side compared to his left side. He did make some dietary changes including placing himself on a liquid diet which actually improves the pain. He reports that over 3 days while the pain was the most severe, he did not have any bowel movements and is not sure if he was passing gas. He did not have any nausea or vomiting. He had some issue tolerating fats. He was able to advance his diet after 3 days to a BRATdiet, and is now feeling better on this diet. He has  started stooling again about once a day to once every other day. Stools are soft to loose but nonbloody.    Patient is not sure what his symptoms are from. He is concerned that may be originating from his stomach. It does not feel like an obstruction to him or typical Crohn's flare.    ROS:    No fevers or chills  No weight loss  No blurry vision, double vision or change in vision  No sore throat  No lymphadenopathy  No headache, paraesthesias, or weakness in a limb  No shortness of breath or wheezing  No chest pain or pressure  No arthralgias or myalgias  No rashes or skin changes  No odynophagia or dysphagia  No BRBPR, hematochezia, melena  No dysuria, frequency or urgency  No hot/cold intolerance or polyria  No anxiety or depression    Extra intestinal manifestations of IBD:  No uveitis/episcleritis  No aphthous ulcers   No arthritis   No erythema nodosum/pyoderma gangrenosum.     PERTINENT PAST MEDICAL HISTORY:  Past Medical History:   Diagnosis Date     Anemia 2007     Anxiety 2012?    much worse since July 2014     Cataract 12/2007    both eyes, too much prednisone, implants     Coccidioidomycosis      Crohn disease (H)      Crohn's disease (H) 1/13/2015     Depressive disorder 7/2014    much worse since July 2014     Fracture 1956    green fracture of leg     Gallbladder problem 2005?    much gravel, removed     GERD (gastroesophageal reflux disease)      History of blood transfusion 1988    ulcerated anastomosis term. ilium bleed     Hypertriglyceridemia 7/8/2016     Infection 2007    persistant coccidioidomycosis     Kidney stone various    both sides, all passed naturally     Mental health problem 2007    bipolar though perhaps different     Nephrolithiasis      Osteoporosis 2007    osteoporosis, too much prednisone, last dexa 1/2014     Osteoporosis      Other nervous system complications 2007    prednisone overload induced kevin     Personal history of colonic polyps     small ones found during  "colonoscopies     Steroid-induced psychosis     Patient extremely sensitive to regular doses of steroids.  Unclear if this is due to chronic fluconazole use or genetic issue.  In the future, use caution when prescribing steroids.     TB (tuberculosis)        PREVIOUS SURGERIES:  Past Surgical History:   Procedure Laterality Date     APPENDECTOMY  1974    coincidental with Crohn's surgery     BACK SURGERY  12/2007    kyphoplasty on compressed 4th??? vertebra     BIOPSY  2007, 2013    lump on arm, knee, back of hand for coccidioidomycosis cult.     CHOLECYSTECTOMY  2005    much gravel, removed laparoscopically     COLONOSCOPY  7/14/2014 last    Dr. Catherine Bowden, [facility] - many previous     COLONOSCOPY Left 9/11/2015    Procedure: COMBINED COLONOSCOPY, SINGLE OR MULTIPLE BIOPSY/POLYPECTOMY BY BIOPSY;  Surgeon: Fransisco Leach MD;  Location:  GI     COLONOSCOPY N/A 8/3/2016    Procedure: COMBINED COLONOSCOPY, SINGLE OR MULTIPLE BIOPSY/POLYPECTOMY BY BIOPSY;  Surgeon: Fransisco Leach MD;  Location:  GI     ENT SURGERY  ?    upper endoscopy     ESOPHAGOSCOPY, GASTROSCOPY, DUODENOSCOPY (EGD), COMBINED N/A 11/8/2016    Procedure: COMBINED ENDOSCOPIC ULTRASOUND, ESOPHAGOSCOPY, GASTROSCOPY, DUODENOSCOPY (EGD), FINE NEEDLE ASPIRATE/BIOPSY;  Surgeon: Guru Alexsandra Ballesteros MD;  Location: [facility]     ESOPHAGOSCOPY, GASTROSCOPY, DUODENOSCOPY (EGD), COMBINED N/A 11/8/2016    Procedure: COMBINED ESOPHAGOSCOPY, GASTROSCOPY, DUODENOSCOPY (EGD), BIOPSY SINGLE OR MULTIPLE;  Surgeon: Guru Alexsandra Ballesteros MD;  Location:  GI     EYE SURGERY  12/2007    cataract surgery both sides     GI SURGERY  1974, 1986(x2), 1989    Crohn's, 1974 RO 18\" term. ilium + 9\" asc. colon all one pc     SOFT TISSUE SURGERY  3/2013    removed buildup on back of r hand, coccidioidomycosis       PREVIOUS ENDOSCOPY:  Summer 2014: Colonoscopy - not in out system    ALLERGIES:     Allergies   Allergen " Reactions     Prednisone Other (See Comments)     Diana with more than 2.5mg chronic dosing of prednisone due to fluconazole interaction per patient.        PERTINENT MEDICATIONS:    Current Outpatient Prescriptions:      omeprazole (PRILOSEC) 40 MG capsule, Take 40 mg by mouth daily, Disp: , Rfl:      diphenoxylate-atropine (LOMOTIL) 2.5-0.025 MG per tablet, Take 1 tablet by mouth 4 times daily as needed for diarrhea After loose stools, Disp: 50 tablet, Rfl: 1     rifaximin (XIFAXAN) 550 MG TABS tablet, Take 1 tablet (550 mg) by mouth 3 times daily, Disp: 42 tablet, Rfl: 0     clonazePAM (KLONOPIN) 0.5 MG tablet, , Disp: , Rfl:      MELATONIN PO, , Disp: , Rfl:      traZODone (DESYREL) 50 MG tablet, Take 1 tablet (50 mg) by mouth nightly as needed for sleep, Disp: 90 tablet, Rfl: 3     fluconazole (DIFLUCAN) 200 MG tablet, Take 1 tablet (200 mg) by mouth daily, Disp: 90 tablet, Rfl: 3     acetaminophen (TYLENOL) 500 MG tablet, Take 500-1,000 mg by mouth every 6 hours as needed for mild pain, Disp: , Rfl:      Cyanocobalamin (VITAMIN B 12 PO), Take 500 mcg by mouth, Disp: , Rfl:      Pyridoxine HCl (VITAMIN B6 PO), Take 100 mg by mouth, Disp: , Rfl:      ferrous sulfate 325 (65 FE) MG tablet, Take by mouth daily (with breakfast), Disp: , Rfl:      multivitamin, therapeutic with minerals (MULTI-VITAMIN) TABS, Take 1 tablet by mouth daily, Disp: , Rfl:      calcium-vitamin D (CALTRATE) 600-400 MG-UNIT per tablet, Take 1 tablet by mouth 2 times daily, Disp: , Rfl:      Cholecalciferol (VITAMIN D3 PO), Take 1,000 Units by mouth daily, Disp: , Rfl:      Ascorbic Acid (VITAMIN C PO), Take 500 mg by mouth, Disp: , Rfl:     SOCIAL HISTORY:  Social History     Social History     Marital status:      Spouse name: N/A     Number of children: N/A     Years of education: N/A     Occupational History     Reserach associate at the [employer]     Social History Main Topics     Smoking status: Never Smoker      Smokeless tobacco: Never Used     Alcohol use Yes      Comment: sometimes one glass of wine a week     Drug use: No     Sexual activity: No     Other Topics Concern     Not on file     Social History Narrative       FAMILY HISTORY:  Family History   Problem Relation Age of Onset     Heart Failure Father      Cancer - colorectal Mother      CEREBROVASCULAR DISEASE Paternal Grandmother      CANCER Other      Musculoskeletal Disorder Father      Parkinson's     Musculoskeletal Disorder Brother      Parkinson's     Mother with colon cancer in 80s.  No family member with Crohn's disease or Ulcerative Colitis.      Past/family/social history reviewed and no changes    PHYSICAL EXAMINATION:  Vitals reviewed, AFVSS   Wt   Wt Readings from Last 2 Encounters:   07/11/17 73 kg (161 lb)   07/05/17 72.8 kg (160 lb 8 oz)      Gen: aaox3, cooperative, pleasant, not dyspneic/diaphoretic, nad  HEENT: ncat, neck supple, no clad, normal op w/o ulcer/exudate, anicteric, mmm  Lymph: No axillary, submandibular, supraclavicular or inguinal lymphadenopathy  Resp/CV unremarkable  Abd: Nondistended, +bs, no hepatosplenomegaly, nontender, no peritoneal signs  Ext: no c/c/e  Skin: warm, perfused, no jaundice      PERTINENT STUDIES:  Most recent CBC:  Recent Labs   Lab Test  07/05/17   1442  04/14/17   1526   WBC  5.6  6.0   HGB  16.9  16.5   HCT  49.0  47.5   PLT  212  218     Most recent hepatic panel:  Recent Labs   Lab Test  07/05/17   1442  04/14/17   1526   ALT  27  33   AST  18  19     Most recent creatinine:  Recent Labs   Lab Test  04/14/17   1526  08/16/16   1223   CR  1.26*  1.14       MRE: 3/20/15:   IMPRESSION:   1. Unremarkable small bowel other than postsurgical changes following right hemicolectomy with ileocolic anastomosis.  2. Small gastric diverticulum. 3. 1.5 x 0.8 cm pancreatic head cystic lesion minimally increased in size compared to the prior exam of 2006, but potentially representing intraductal papillary mucinous  neoplasm. This can be reassessed on followup.  4. Hepatic steatosis.      Answers for HPI/ROS submitted by the patient on 7/10/2017   General Symptoms: Yes  Skin Symptoms: No  HENT Symptoms: No  EYE SYMPTOMS: No  HEART SYMPTOMS: No  LUNG SYMPTOMS: No  INTESTINAL SYMPTOMS: Yes  URINARY SYMPTOMS: No  REPRODUCTIVE SYMPTOMS: No  SKELETAL SYMPTOMS: No  BLOOD SYMPTOMS: No  NERVOUS SYSTEM SYMPTOMS: No  MENTAL HEALTH SYMPTOMS: Yes  Fever: No  Loss of appetite: No  Weight loss: No  Weight gain: No  Fatigue: No  Night sweats: No  Chills: No  Increased stress: Yes  Excessive hunger: No  Excessive thirst: No  Feeling hot or cold when others believe the temperature is normal: No  Loss of height: No  Post-operative complications: No  Surgical site pain: No  Hallucinations: No  Change in or Loss of Energy: No  Hyperactivity: No  Confusion: No  Heart burn or indigestion: No  Nausea: No  Vomiting: No  Abdominal pain: Yes  Bloating: Yes  Constipation: No  Diarrhea: Yes  Blood in stool: No  Black stools: No  Rectal or Anal pain: No  Fecal incontinence: Yes  Rectal bleeding: Yes  Yellowing of skin or eyes: No  Vomit with blood: No  Change in stools: Yes  Hemorrhoids: Yes  Nervous or Anxious: Yes  Depression: No  Trouble sleeping: No  Trouble thinking or concentrating: Yes  Mood changes: No  Panic attacks: No

## 2017-07-11 NOTE — LETTER
7/11/2017       RE: Trevin Gee  [address]   [facility] [address]     Dear Colleague,    Thank you for referring your patient, Trevin Gee, to the [facility] GASTROENTEROLOGY AND IBD at [facility]. Please see a copy of my visit note below.    IBD CLINIC     CC/REFERRING MD:  Referred Self  REASON FOR CONSULTATION: Crohn's disease       IBD history:  Age at diagnosis: ~18  Extend of disease: Ileo-colonic  Disease phenotype: Inflammatory  Key-anal disease: No  Current CD medications: None  Prior IBD Medications:  - Asulfazine and Asacol: 1974 to late 90s, stopped to to disease progression  - Prednisone 1986 - current.   - Mercaptopurine: Briefly in mid 90s to get him off steroids- Patient thinks he has side effect to medication  - Methotrexate: After mercaptopurine, stayed on for years, but after bleeding in 1988 and 1989, he would not taper his prednisone. MTX stopped as he could not get off the steroids.      Surgical history:   1974 - [facility] Ileocecectomy  1986 - [facility] - revision of anastomosis  1988 - massive bleeding from ulcer at anastomosis ([place]), cauterized via colonoscopy  1989 - laparoscopic  Revision of anastomosis  2005 - Lap CCY  DRUG MONITORING  TPMT enzyme activity: none    DISEASE ASSESSMENT  Fecal calprotectin: None  CRP, ESR Results  Recent Labs   Lab Test  07/05/17   1442  04/14/17   1526   CRP  4.5  <2.9   SED  8  5   Endoscopic assessment: Colonoscopy 8/3/2016 showed normal mucosa in entire colon. Non-patent functional end-to-end ileo-colonic anastomosis that was dilated.  No evidence of active Crohn's disease.  Enterography (12/1/16) showed 4cm of mild thickening and enhancement in neoterminal ileum at anastomosis, decompressed sigmoid colon with questionable wall thickening and enhancement.    Enterography: No active Crohn's disease (3/20/15)  C diff: None (7/12/16)    ASSESSMENT/PLAN:  62 year old male with Crohn's disease s/p  ileocecectomy on no current Crohn's therapy.     1. Crohn's disease:  Atypical symptoms for obsrtuction, although he did improve with clear liquid diet.  He does not think this was an obstructive episode.  Discission options of continued observation, repeat MRE v repeat colonoscopy.  While there are some atypical symptoms, I think recurrence of his stricture is the most likely etiology for his symptoms.    -- Colonoscopy with potential dilation of anastomosis  -- Continue with yearly LFTs to screen for PSC.     2. Colon polyps: Well defined lesions with adenomatous changes.  Will continue to surveillance   -- Plan for chromoendoscopy with next colonoscopy     3. Diarrhea: Quiescent Crohn's in past. Prior work up has included alpha 1 antitrypsin for PLE, pancreatic elastase, TTG, DGP all of which were normal.  A spot fecal fat the only isolated value that was increased.  Diarrhea may be related to IBS, or lactose intolerance.  Could also be bile salt malabsorption or bacterial overgrowth.   -- Avoid dietary triggers  -- prn lomotil  -- If diarrhea persists 2-3 days, will check for C diff    4. Possible acid reflux:  No current symptoms of acid reflux.      5. Pancreatic head cystic lesion: Increased from 2006, potentially representing IPMN.  MRCP with no change in cyst.  Reviewed with Dr. Cruz.  EUS with FNA normal.   -- No further follow-up at this time needed.      6. Steroid dependency/Adrenal insufficiency:  Followed closely with endocrinology.  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      8. Depression: Unchanged.     9. IBD healthcare maintenance based on patients current medication:      Vaccinations:  -- Influenza (every year): Last given 2016  -- TdaP (every 10 years): Last given 2015  -- Pneumococcal Pneumonia (once then every 5 years): Has not obtained.    One time confirmation of immunity or serologies:  -- Hepatitis A  (serologies or immunizations): 2005  -- Hepatitis B (serologies or immunizations): 2005  -- Zoster vaccination (>61 yo, discuss if over 50): 2005  -- MMR: Not documented.  -- HPV (all aged 18-26): N/A  -- Meningococcal meningitis (all patients at risk for meningitis): Patient is not at risk.     Cancer Screening:  Colon cancer screening:  Since there is evidence of pancolitis histologically. Also with adenomatous polyps. Colonoscopy every 2-3 years recommended.  Dysplasia screening is recommended 2019.    Cervical cancer screening: N/A    Skin cancer screening: Since patient is not immunocompromised, this is per patient's dermatologist recommendations.    Depression Screening:  -- Over the last month, have you felt down, depressed, or hopeless? No  -- Over the last month, have you felt little interest or pleasure doing things? No    Misc:  -- Avoid tobacco use  -- Avoid NSAIDs as there is potentially a 25% chance of causing an IBD flare    RTC 6 months    Thank you for this consultation.  It was a pleasure to participate in the care of this patient; please contact us with any further questions.  A total of 25 minutes was spent with this patient, >50% of which was counseling regarding the above delineated issues.    Fransisco Leach MD   of Medicine  Division of Gastroenterology, Hepatology and Nutrition  [facility]    HPI:   This is a 62-year-old male with Crohn's disease who is here to follow up with me today. Patient is scheduled urgently to recent onset of new epigastric pain. He reports a sharp/burning sensation about 30 minutes after eating. They last only a few minutes. Pain started on July 1. He did try taking omeprazole but did not have any relief. He thinks the pain was slightly better if he laid on his right side compared to his left side. He did make some dietary changes including placing himself on a liquid diet which actually improves the pain. He reports that over 3 days  while the pain was the most severe, he did not have any bowel movements and is not sure if he was passing gas. He did not have any nausea or vomiting. He had some issue tolerating fats. He was able to advance his diet after 3 days to a BRATdiet, and is now feeling better on this diet. He has started stooling again about once a day to once every other day. Stools are soft to loose but nonbloody.    Patient is not sure what his symptoms are from. He is concerned that may be originating from his stomach. It does not feel like an obstruction to him or typical Crohn's flare.    ROS:    No fevers or chills  No weight loss  No blurry vision, double vision or change in vision  No sore throat  No lymphadenopathy  No headache, paraesthesias, or weakness in a limb  No shortness of breath or wheezing  No chest pain or pressure  No arthralgias or myalgias  No rashes or skin changes  No odynophagia or dysphagia  No BRBPR, hematochezia, melena  No dysuria, frequency or urgency  No hot/cold intolerance or polyria  No anxiety or depression    Extra intestinal manifestations of IBD:  No uveitis/episcleritis  No aphthous ulcers   No arthritis   No erythema nodosum/pyoderma gangrenosum.     PERTINENT PAST MEDICAL HISTORY:  Past Medical History:   Diagnosis Date     Anemia 2007     Anxiety 2012?    much worse since July 2014     Cataract 12/2007    both eyes, too much prednisone, implants     Coccidioidomycosis      Crohn disease (H)      Crohn's disease (H) 1/13/2015     Depressive disorder 7/2014    much worse since July 2014     Fracture 1956    green fracture of leg     Gallbladder problem 2005?    much gravel, removed     GERD (gastroesophageal reflux disease)      History of blood transfusion 1988    ulcerated anastomosis term. ilium bleed     Hypertriglyceridemia 7/8/2016     Infection 2007    persistant coccidioidomycosis     Kidney stone various    both sides, all passed naturally     Mental health problem 2007    bipolar  though perhaps different     Nephrolithiasis      Osteoporosis 2007    osteoporosis, too much prednisone, last dexa 1/2014     Osteoporosis      Other nervous system complications 2007    prednisone overload induced kevin     Personal history of colonic polyps     small ones found during colonoscopies     Steroid-induced psychosis     Patient extremely sensitive to regular doses of steroids.  Unclear if this is due to chronic fluconazole use or genetic issue.  In the future, use caution when prescribing steroids.     TB (tuberculosis)        PREVIOUS SURGERIES:  Past Surgical History:   Procedure Laterality Date     APPENDECTOMY  1974    coincidental with Crohn's surgery     BACK SURGERY  12/2007    kyphoplasty on compressed 4th??? vertebra     BIOPSY  2007, 2013    lump on arm, knee, back of hand for coccidioidomycosis cult.     CHOLECYSTECTOMY  2005    much gravel, removed laparoscopically     COLONOSCOPY  7/14/2014 last    Dr. Catherine Bowden, [facility] - many previous     COLONOSCOPY Left 9/11/2015    Procedure: COMBINED COLONOSCOPY, SINGLE OR MULTIPLE BIOPSY/POLYPECTOMY BY BIOPSY;  Surgeon: Fransisco Leach MD;  Location:  GI     COLONOSCOPY N/A 8/3/2016    Procedure: COMBINED COLONOSCOPY, SINGLE OR MULTIPLE BIOPSY/POLYPECTOMY BY BIOPSY;  Surgeon: Fransisco Leach MD;  Location:  GI     ENT SURGERY  ?    upper endoscopy     ESOPHAGOSCOPY, GASTROSCOPY, DUODENOSCOPY (EGD), COMBINED N/A 11/8/2016    Procedure: COMBINED ENDOSCOPIC ULTRASOUND, ESOPHAGOSCOPY, GASTROSCOPY, DUODENOSCOPY (EGD), FINE NEEDLE ASPIRATE/BIOPSY;  Surgeon: Guru Alexsandra Ballesteros MD;  Location:  GI     ESOPHAGOSCOPY, GASTROSCOPY, DUODENOSCOPY (EGD), COMBINED N/A 11/8/2016    Procedure: COMBINED ESOPHAGOSCOPY, GASTROSCOPY, DUODENOSCOPY (EGD), BIOPSY SINGLE OR MULTIPLE;  Surgeon: Guru Alexsandra Ballesteros MD;  Location:  GI     EYE SURGERY  12/2007    cataract surgery both sides     GI SURGERY  " 1974, 1986(x2), 1989    Crohn's, 1974 RO 18\" term. ilium + 9\" asc. colon all one pc     SOFT TISSUE SURGERY  3/2013    removed buildup on back of r hand, coccidioidomycosis     PREVIOUS ENDOSCOPY:  Summer 2014: Colonoscopy - not in out system    ALLERGIES:  Allergies   Allergen Reactions     Prednisone Other (See Comments)     Diana with more than 2.5mg chronic dosing of prednisone due to fluconazole interaction per patient.        PERTINENT MEDICATIONS:  Current Outpatient Prescriptions:      omeprazole (PRILOSEC) 40 MG capsule, Take 40 mg by mouth daily, Disp: , Rfl:      diphenoxylate-atropine (LOMOTIL) 2.5-0.025 MG per tablet, Take 1 tablet by mouth 4 times daily as needed for diarrhea After loose stools, Disp: 50 tablet, Rfl: 1     rifaximin (XIFAXAN) 550 MG TABS tablet, Take 1 tablet (550 mg) by mouth 3 times daily, Disp: 42 tablet, Rfl: 0     clonazePAM (KLONOPIN) 0.5 MG tablet, , Disp: , Rfl:      MELATONIN PO, , Disp: , Rfl:      traZODone (DESYREL) 50 MG tablet, Take 1 tablet (50 mg) by mouth nightly as needed for sleep, Disp: 90 tablet, Rfl: 3     fluconazole (DIFLUCAN) 200 MG tablet, Take 1 tablet (200 mg) by mouth daily, Disp: 90 tablet, Rfl: 3     acetaminophen (TYLENOL) 500 MG tablet, Take 500-1,000 mg by mouth every 6 hours as needed for mild pain, Disp: , Rfl:      Cyanocobalamin (VITAMIN B 12 PO), Take 500 mcg by mouth, Disp: , Rfl:      Pyridoxine HCl (VITAMIN B6 PO), Take 100 mg by mouth, Disp: , Rfl:      ferrous sulfate 325 (65 FE) MG tablet, Take by mouth daily (with breakfast), Disp: , Rfl:      multivitamin, therapeutic with minerals (MULTI-VITAMIN) TABS, Take 1 tablet by mouth daily, Disp: , Rfl:      calcium-vitamin D (CALTRATE) 600-400 MG-UNIT per tablet, Take 1 tablet by mouth 2 times daily, Disp: , Rfl:      Cholecalciferol (VITAMIN D3 PO), Take 1,000 Units by mouth daily, Disp: , Rfl:      Ascorbic Acid (VITAMIN C PO), Take 500 mg by mouth, Disp: , Rfl:     SOCIAL HISTORY:  Social " History     Social History     Marital status:      Spouse name: N/A     Number of children: N/A     Years of education: N/A     Occupational History     Reserach associate at the [employer]     Social History Main Topics     Smoking status: Never Smoker     Smokeless tobacco: Never Used     Alcohol use Yes      Comment: sometimes one glass of wine a week     Drug use: No     Sexual activity: No     Other Topics Concern     Not on file     Social History Narrative     FAMILY HISTORY:  Family History   Problem Relation Age of Onset     Heart Failure Father      Cancer - colorectal Mother      CEREBROVASCULAR DISEASE Paternal Grandmother      CANCER Other      Musculoskeletal Disorder Father      Parkinson's     Musculoskeletal Disorder Brother      Parkinson's     Mother with colon cancer in 80s.  No family member with Crohn's disease or Ulcerative Colitis.      Past/family/social history reviewed and no changes    PHYSICAL EXAMINATION:  Vitals reviewed, AFVSS   Wt   Wt Readings from Last 2 Encounters:   07/11/17 73 kg (161 lb)   07/05/17 72.8 kg (160 lb 8 oz)      Gen: aaox3, cooperative, pleasant, not dyspneic/diaphoretic, nad  HEENT: ncat, neck supple, no clad, normal op w/o ulcer/exudate, anicteric, mmm  Lymph: No axillary, submandibular, supraclavicular or inguinal lymphadenopathy  Resp/CV unremarkable  Abd: Nondistended, +bs, no hepatosplenomegaly, nontender, no peritoneal signs  Ext: no c/c/e  Skin: warm, perfused, no jaundice    PERTINENT STUDIES:  Most recent CBC:  Recent Labs   Lab Test  07/05/17   1442  04/14/17   1526   WBC  5.6  6.0   HGB  16.9  16.5   HCT  49.0  47.5   PLT  212  218     Most recent hepatic panel:  Recent Labs   Lab Test  07/05/17   1442  04/14/17   1526   ALT  27  33   AST  18  19     Most recent creatinine:  Recent Labs   Lab Test  04/14/17   1526  08/16/16   1223   CR  1.26*  1.14       MRE: 3/20/15:   IMPRESSION:   1. Unremarkable small bowel other than  postsurgical changes following right hemicolectomy with ileocolic anastomosis.  2. Small gastric diverticulum. 3. 1.5 x 0.8 cm pancreatic head cystic lesion minimally increased in size compared to the prior exam of 2006, but potentially representing intraductal papillary mucinous neoplasm. This can be reassessed on followup.  4. Hepatic steatosis.      Again, thank you for allowing me to participate in the care of your patient.      Sincerely,    Fransisco Leach MD

## 2017-07-11 NOTE — NURSING NOTE
"Chief Complaint   Patient presents with     RECHECK     f/u       Vitals:    07/11/17 0759   BP: 106/72   BP Location: Left arm   Patient Position: Chair   Cuff Size: Adult Regular   Pulse: 74   Temp: 97.7  F (36.5  C)   SpO2: 95%   Weight: 161 lb   Height: 5' 9\"       Body mass index is 23.78 kg/(m^2).    Kale Fernandez MA                          "

## 2017-07-13 ENCOUNTER — CARE COORDINATION (OUTPATIENT)
Dept: GASTROENTEROLOGY | Facility: CLINIC | Age: 62
End: 2017-07-13

## 2017-07-13 ENCOUNTER — TELEPHONE (OUTPATIENT)
Dept: ENDOCRINOLOGY | Facility: CLINIC | Age: 62
End: 2017-07-13

## 2017-07-13 DIAGNOSIS — K50.90 CROHN'S DISEASE (H): ICD-10-CM

## 2017-07-13 DIAGNOSIS — K21.9 ESOPHAGEAL REFLUX: Primary | ICD-10-CM

## 2017-07-13 NOTE — PROGRESS NOTES
Pt wanted to move up his colonoscopy and also add egd.  Dr. Leach had talked to pt about adding egd when pt was in clinic and has now decided that egd makes sense. Pt would like to move up his endoscopies.  Told him right now no openings but will keep him in mind.

## 2017-07-14 ENCOUNTER — OFFICE VISIT (OUTPATIENT)
Dept: ENDOCRINOLOGY | Facility: CLINIC | Age: 62
End: 2017-07-14

## 2017-07-14 VITALS
HEART RATE: 73 BPM | BODY MASS INDEX: 23.7 KG/M2 | WEIGHT: 160 LBS | DIASTOLIC BLOOD PRESSURE: 81 MMHG | SYSTOLIC BLOOD PRESSURE: 120 MMHG | HEIGHT: 69 IN

## 2017-07-14 DIAGNOSIS — M81.0 OSTEOPOROSIS WITHOUT CURRENT PATHOLOGICAL FRACTURE, UNSPECIFIED OSTEOPOROSIS TYPE: Primary | ICD-10-CM

## 2017-07-14 DIAGNOSIS — M81.0 OSTEOPOROSIS WITHOUT CURRENT PATHOLOGICAL FRACTURE, UNSPECIFIED OSTEOPOROSIS TYPE: ICD-10-CM

## 2017-07-14 DIAGNOSIS — M81.0 OSTEOPOROSIS: ICD-10-CM

## 2017-07-14 LAB
ALBUMIN SERPL-MCNC: 3.9 G/DL (ref 3.4–5)
CA-I SERPL ISE-MCNC: 5.1 MG/DL (ref 4.4–5.2)
CALCIUM SERPL-MCNC: 9.7 MG/DL (ref 8.5–10.1)
CREAT SERPL-MCNC: 1.34 MG/DL (ref 0.66–1.25)
GFR SERPL CREATININE-BSD FRML MDRD: 54 ML/MIN/1.7M2
T4 FREE SERPL-MCNC: 0.87 NG/DL (ref 0.76–1.46)
TSH SERPL DL<=0.05 MIU/L-ACNC: 1.4 MU/L (ref 0.4–4)

## 2017-07-14 RX ORDER — ACETAMINOPHEN 325 MG/1
325 TABLET ORAL ONCE
Status: CANCELLED
Start: 2017-07-14 | End: 2017-07-14

## 2017-07-14 RX ORDER — ZOLEDRONIC ACID 5 MG/100ML
5 INJECTION, SOLUTION INTRAVENOUS ONCE
Status: CANCELLED
Start: 2017-07-14 | End: 2017-07-14

## 2017-07-14 ASSESSMENT — PAIN SCALES - GENERAL: PAINLEVEL: NO PAIN (0)

## 2017-07-14 NOTE — LETTER
Patient:  Trevin Gee  :   1955  MRN:     [id number]        Mr.Mark MORENITA Gee  [address]   [facility] [address]        2017    Dear Trevin    Here are your results which are within normal limits, other than the slightly elevated Cr which has been noted previously.  Let's continue with the plan as discussed.    If you have any questions, please feel free to contact my nurse at [phone] select option #3 for triage nurse  or  option #1 for scheduling related questions.    Regards    Mag Currie MD        Resulted Orders   25 Hydroxyvitamin D2 and D3   Result Value Ref Range    25 OH Vit D2 <5 ug/L    25 OH Vit D3 42 ug/L    25 OH Vit D total  20 - 75 ug/L     <47  Season, race, dietary intake, and treatment affect the concentration of   25-hydroxy-Vitamin D. Values may decrease during winter months and increase   during summer months. Values 20-29 ug/L may indicate Vitamin D insufficiency   and values <20 ug/L may indicate Vitamin D deficiency.   This test was developed and its performance characteristics determined by the   [facility],  Special Chemistry Laboratory. It has   not been cleared or approved by the FDA. The laboratory is regulated under CLIA   as qualified to perform high-complexity testing. This test is used for clinical   purposes. It should not be regarded as investigational or for research.     Calcium   Result Value Ref Range    Calcium 9.7 8.5 - 10.1 mg/dL   Calcium ionized   Result Value Ref Range    Calcium Ionized 5.1 4.4 - 5.2 mg/dL   Creatinine   Result Value Ref Range    Creatinine 1.34 (H) 0.66 - 1.25 mg/dL    GFR Estimate 54 (L) >60 mL/min/1.7m2      Comment:      Non  GFR Calc    GFR Estimate If Black 65 >60 mL/min/1.7m2      Comment:      African American GFR Calc   TSH   Result Value Ref Range    TSH 1.40 0.40 - 4.00 mU/L   Albumin level   Result Value Ref Range    Albumin 3.9 3.4 - 5.0 g/dL   T4 free    Result Value Ref Range    T4 Free 0.87 0.76 - 1.46 ng/dL       Lab

## 2017-07-14 NOTE — NURSING NOTE
Chief Complaint   Patient presents with     RECHECK     F/U ADRENAL INSUFFICIENCY     Dari Mcmillan, [facility]  Endocrinology & Diabetes 3G

## 2017-07-14 NOTE — LETTER
Patient:  Trevin Gee  :   1955  MRN:     [id number]        Mr.Mark MORENITA Gee  [address]   [place] [address]        2017    Dear Trevin    Here are your labs. Your renal function remains stable but you may want to increase your hydration. Your calcium is normal. Your thyroid is normal. Your vitamin D is pending. I see that you are set up for Reclast. This is good.    If you have any questions, please feel free to contact my nurse at [phone] select option #3 for triage nurse  or  option #1 for scheduling related questions.    Regards    Mag Currie MD        Resulted Orders   Calcium   Result Value Ref Range    Calcium 9.7 8.5 - 10.1 mg/dL   Calcium ionized   Result Value Ref Range    Calcium Ionized 5.1 4.4 - 5.2 mg/dL   Creatinine   Result Value Ref Range    Creatinine 1.34 (H) 0.66 - 1.25 mg/dL    GFR Estimate 54 (L) >60 mL/min/1.7m2      Comment:      Non  GFR Calc    GFR Estimate If Black 65 >60 mL/min/1.7m2      Comment:      African American GFR Calc   TSH   Result Value Ref Range    TSH 1.40 0.40 - 4.00 mU/L   Albumin level   Result Value Ref Range    Albumin 3.9 3.4 - 5.0 g/dL   T4 free   Result Value Ref Range    T4 Free 0.87 0.76 - 1.46 ng/dL       Lab

## 2017-07-14 NOTE — PROGRESS NOTES
Trevin is a 60 year old male presents today for RECHECK (F/U ADRENAL INSUFFICIENCY)    HPI  #1 adrenal insufficiency possibly due to chronic fluconazole use    Patient was referred endocrinology for management of adrenal insufficiency in the setting of steroid taper.  The patient has a history of Crohn's disease and has been dependent on steroids for most of his adult life.  In 2007, he was diagnosed with coccidiosis and was started on fluconazole treatment.  He had two episodes of kevin which was eventually determined to be from a drug interaction between fluconazole and prednisone, with underlying hypothesis being that fluconazole suppressed metabolism and clearance of prednisone.  He was tapered off prednisone in 2007, which has caused significant mental and on physical deconditioning including severe weakness, and was thought to have adrenal insufficiency even though there was no documented cortisol level in the chart.  Fluconazole was stopped after it was successfully treated initially, he has a relapse in 2013 with a knee infection, and was placed back on fluconazole 200 mg daily, which he is taking indefinitely.  His Crohn's disease is now controlled with low-dose prednisone 1 mg three times a week, and entercort 3 mg daily with minimal symptoms.  MRI in 2015 show that adrenal glands were normal.  May 2015 ACTH was 192, baseline cortisol was 1.6, 30 minute cortisol was high 0.8, and 60 minute cortisol was 6.9.  He does understand that he gets sick, he should take supplemental steroids and/or IM Solu-Cortef.  repeat ACTH test in July 2015 now shows adrenal insufficiency with suppresssed ACTH. The patient saw infectious disease in October 2015 and the plan is to continue with the fluconazole for now given his history of coccidiomycosis.  April 2016 cortisol level was 10.  The patient stopped his steroids as of April 2016.      Interval history since last visit: Pt currently feels well.    #2 history of  osteoporosis  Patient had a documented low T score on DXA scan in 2005, and the clinical vertebral fracture status post kyphoplasty, confirming the diagnosis of osteoporosis and treatment as indicated.  He did have history of kidney stone even though this occurred in the setting of his Crohn's disease and admitted chocolate dependency.  A 24 urine for calcium was low at 90 mg over 24 hours.  May 2015 DEXA scan show the lowest T score -2.4 at the lumbar spine, lower Z score of -2.0 the lumbar spine, is significant increase in his bone density in the lumbar spine compared with the previous exam in 2005 as well as the baseline exam in 2003.  There's been no significant change in bone density at the level of his hips.  The patient is currently on Fosamax 70 mg weekly.  He does take this properly. March 2015 vitamin D is 28. We looked into the price of Reclast and this was cost prohibitive for him.  The patient had a tooth removed in January 2016 and then received his first dose of   Reclast in March 2016.  He tolerated this infusion    Interval history since last visit: Bone density from July 2017 shows significant improvement at  both hips with lowest T-score of -2.5 at the level of the lumbar spine.    #3 mood concerns  Patient seen in the psychiatry clinic.  History of bipolar disorder and anxiety.  Started lithium in January 2016.  April 2016 TSH and testosterone levels were normal.    Interval history since last visit: per pt report, this is improved.    #4 hypertriglyceridemia  triglycerides noted in April 2016 at about 209.      Interval hx : Pt on low residual diet.  July 2017 triglycerides are 187    Past Medical History  Past Medical History:   Diagnosis Date     Anemia 2007     Anxiety 2012?    much worse since July 2014     Cataract 12/2007    both eyes, too much prednisone, implants     Coccidioidomycosis      Crohn disease (H)      Crohn's disease (H) 1/13/2015     Depressive disorder 7/2014    much worse  since July 2014     Fracture 1956    green fracture of leg     Gallbladder problem 2005?    much gravel, removed     GERD (gastroesophageal reflux disease)      History of blood transfusion 1988    ulcerated anastomosis term. ilium bleed     Hypertriglyceridemia 7/8/2016     Infection 2007    persistant coccidioidomycosis     Kidney stone various    both sides, all passed naturally     Mental health problem 2007    bipolar though perhaps different     Nephrolithiasis      Osteoporosis 2007    osteoporosis, too much prednisone, last dexa 1/2014     Osteoporosis      Other nervous system complications 2007    prednisone overload induced diana     Personal history of colonic polyps     small ones found during colonoscopies     Steroid-induced psychosis     Patient extremely sensitive to regular doses of steroids.  Unclear if this is due to chronic fluconazole use or genetic issue.  In the future, use caution when prescribing steroids.     TB (tuberculosis)        Allergies  Allergies   Allergen Reactions     Prednisone Other (See Comments)     Diana with more than 2.5mg chronic dosing of prednisone due to fluconazole interaction per patient.      Medications  Current Outpatient Prescriptions   Medication Sig Dispense Refill     omeprazole (PRILOSEC) 40 MG capsule Take 40 mg by mouth daily       diphenoxylate-atropine (LOMOTIL) 2.5-0.025 MG per tablet Take 1 tablet by mouth 4 times daily as needed for diarrhea After loose stools 50 tablet 1     rifaximin (XIFAXAN) 550 MG TABS tablet Take 1 tablet (550 mg) by mouth 3 times daily 42 tablet 0     clonazePAM (KLONOPIN) 0.5 MG tablet        MELATONIN PO        traZODone (DESYREL) 50 MG tablet Take 1 tablet (50 mg) by mouth nightly as needed for sleep 90 tablet 3     fluconazole (DIFLUCAN) 200 MG tablet Take 1 tablet (200 mg) by mouth daily 90 tablet 3     acetaminophen (TYLENOL) 500 MG tablet Take 500-1,000 mg by mouth every 6 hours as needed for mild pain        Cyanocobalamin (VITAMIN B 12 PO) Take 500 mcg by mouth       Pyridoxine HCl (VITAMIN B6 PO) Take 100 mg by mouth       ferrous sulfate 325 (65 FE) MG tablet Take by mouth daily (with breakfast)       multivitamin, therapeutic with minerals (MULTI-VITAMIN) TABS Take 1 tablet by mouth daily       calcium-vitamin D (CALTRATE) 600-400 MG-UNIT per tablet Take 1 tablet by mouth 2 times daily       Cholecalciferol (VITAMIN D3 PO) Take 1,000 Units by mouth daily       Ascorbic Acid (VITAMIN C PO) Take 500 mg by mouth       Family History  family history includes CANCER in an other family member; CEREBROVASCULAR DISEASE in his paternal grandmother; Cancer - colorectal in his mother; Heart Failure in his father; Musculoskeletal Disorder in his brother and father.  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[employer]     Social History Main Topics     Smoking status: Never Smoker     Smokeless tobacco: Never Used     Alcohol use Yes      Comment: sometimes one glass of wine a week     Drug use: No     Sexual activity: No     Other Topics Concern     Not on file     Social History Narrative       ROS  Answers for HPI/ROS submitted by the patient on 7/10/2017   General Symptoms: Yes  Skin Symptoms: No  HENT Symptoms: No  EYE SYMPTOMS: No  HEART SYMPTOMS: No  LUNG SYMPTOMS: No  INTESTINAL SYMPTOMS: Yes  URINARY SYMPTOMS: No  REPRODUCTIVE SYMPTOMS: No  SKELETAL SYMPTOMS: No  BLOOD SYMPTOMS: No  NERVOUS SYSTEM SYMPTOMS: No  MENTAL HEALTH SYMPTOMS: Yes  Fever: No  Loss of appetite: No  Weight loss: No  Weight gain: No  Fatigue: No  Night sweats: No  Chills: No  Increased stress: Yes  Excessive hunger: No  Excessive thirst: No  Feeling hot or cold when others believe the temperature is normal: No  Loss of height: No  Post-operative complications:  "No  Surgical site pain: No  Hallucinations: No  Change in or Loss of Energy: No  Hyperactivity: No  Confusion: No  Heart burn or indigestion: No  Nausea: No  Vomiting: No  Abdominal pain: Yes  Bloating: Yes  Constipation: No  Diarrhea: Yes  Blood in stool: No  Black stools: No  Rectal or Anal pain: No  Fecal incontinence: Yes  Rectal bleeding: Yes  Yellowing of skin or eyes: No  Vomit with blood: No  Change in stools: Yes  Hemorrhoids: Yes  Nervous or Anxious: Yes  Depression: No  Trouble sleeping: No  Trouble thinking or concentrating: Yes  Mood changes: No  Panic attacks: No        Physical Exam  /81  Pulse 73  Ht 1.753 m (5' 9\")  Wt 72.6 kg (160 lb)  BMI 23.63 kg/m2  Body mass index is 23.63 kg/(m^2).    Exam:  Pleasant, cooperative, no acute distress      RESULT  Orders Only on 07/14/2017   Component Date Value Ref Range Status     Calcium 07/14/2017 9.7  8.5 - 10.1 mg/dL Final     Calcium Ionized 07/14/2017 5.1  4.4 - 5.2 mg/dL Final     Creatinine 07/14/2017 1.34* 0.66 - 1.25 mg/dL Final     GFR Estimate 07/14/2017 54* >60 mL/min/1.7m2 Final    Non  GFR Calc     GFR Estimate If Black 07/14/2017 65  >60 mL/min/1.7m2 Final    African American GFR Calc     TSH 07/14/2017 1.40  0.40 - 4.00 mU/L Final     Albumin 07/14/2017 3.9  3.4 - 5.0 g/dL Final     T4 Free 07/14/2017 0.87  0.76 - 1.46 ng/dL Final           ASSESSMENT:    #1 adrenal insufficiency possibly due to chronic fluconazole use  Pt no longer on steroids since April 2016.  He understands stress dose steroids when he is sick, hospitalized, or with procedure.    #2 history of osteoporosis  The patient has received Reclast  in March 2016 and tolerated this infusion.  His bone density has improved. Congratulated pt.    Denies any upcoming dental work.  His bone density has significantly improved.  Plan on second dose of IV Reclast.  I discussed with the patient that we generally give five years of Reclast and then consider a drug " holiday.  We will arrange his Reclast infusion once insurance approves.    We will check labs for calcium, creatinine, TSH, albumin, and free T4.    #3 mood concerns  Reports reasonable    #4 hypertriglyceridemia  This has improved    Discussed with patient return visit in one year if he is in town.  Otherwise, he will let me know and I will send some information for an endocrinologist locally.    15 minutes spent with patient of which 10 minutes was spent in face-to-face discussion coordination of care

## 2017-07-14 NOTE — PATIENT INSTRUCTIONS
---  Component      Latest Ref Rng & Units 11/2/2005 3/6/2007 7/12/2016 7/7/2017   Cholesterol      <200 mg/dL 115 136 110 122   Triglycerides      <150 mg/dL 130 177 (H) 247 (H) 187 (H)   HDL Cholesterol      >39 mg/dL 50 53 53 42   LDL Cholesterol Calculated      <100 mg/dL 39 47 7 43   VLDL-Cholesterol      0 - 30 mg/dL 26 35 (H)     Cholesterol/HDL Ratio      0.0 - 5.0 2.3 2.6     Non HDL Cholesterol      <130 mg/dL   57 80       To expedite your medication refill(s), please contact your pharmacy and have them fax a refill request to: [phone].  *Please allow 3 business days for routine medication refills.  *Please allow 5 business days for controlled substance medication refills.  --------------------  For scheduling appointments (including lab work), please request an appointment through [employer], or call: [phone].    For questions for your provider or the endocrine nurse, please send a Get[employer] message.  For after-hours urgent issues, please dial [phone], and ask to speak with the Endocrinologist On-Call.  --------------------  Please Note: If you are active on [employer], all future test results will be sent by Get[employer] message only and will no longer be sent by mail. You may also receive communication directly from your physician.

## 2017-07-14 NOTE — LETTER
7/14/2017       RE: Trevin Gee  [address]   [facility] [address]     Dear Colleague,    Thank you for referring your patient, Trevin Gee, to the [facility] at [facility]. Please see a copy of my visit note below.    Trevin is a 60 year old male presents today for RECHECK (F/U ADRENAL INSUFFICIENCY)    HPI  #1 adrenal insufficiency possibly due to chronic fluconazole use    Patient was referred endocrinology for management of adrenal insufficiency in the setting of steroid taper.  The patient has a history of Crohn's disease and has been dependent on steroids for most of his adult life.  In 2007, he was diagnosed with coccidiosis and was started on fluconazole treatment.  He had two episodes of kevin which was eventually determined to be from a drug interaction between fluconazole and prednisone, with underlying hypothesis being that fluconazole suppressed metabolism and clearance of prednisone.  He was tapered off prednisone in 2007, which has caused significant mental and on physical deconditioning including severe weakness, and was thought to have adrenal insufficiency even though there was no documented cortisol level in the chart.  Fluconazole was stopped after it was successfully treated initially, he has a relapse in 2013 with a knee infection, and was placed back on fluconazole 200 mg daily, which he is taking indefinitely.  His Crohn's disease is now controlled with low-dose prednisone 1 mg three times a week, and entercort 3 mg daily with minimal symptoms.  MRI in 2015 show that adrenal glands were normal.  May 2015 ACTH was 192, baseline cortisol was 1.6, 30 minute cortisol was high 0.8, and 60 minute cortisol was 6.9.  He does understand that he gets sick, he should take supplemental steroids and/or IM Solu-Cortef.  repeat ACTH test in July 2015 now shows adrenal insufficiency with suppresssed ACTH. The patient saw infectious  disease in October 2015 and the plan is to continue with the fluconazole for now given his history of coccidiomycosis.  April 2016 cortisol level was 10.  The patient stopped his steroids as of April 2016.      Interval history since last visit: Pt currently feels well.    #2 history of osteoporosis  Patient had a documented low T score on DXA scan in 2005, and the clinical vertebral fracture status post kyphoplasty, confirming the diagnosis of osteoporosis and treatment as indicated.  He did have history of kidney stone even though this occurred in the setting of his Crohn's disease and admitted chocolate dependency.  A 24 urine for calcium was low at 90 mg over 24 hours.  May 2015 DEXA scan show the lowest T score -2.4 at the lumbar spine, lower Z score of -2.0 the lumbar spine, is significant increase in his bone density in the lumbar spine compared with the previous exam in 2005 as well as the baseline exam in 2003.  There's been no significant change in bone density at the level of his hips.  The patient is currently on Fosamax 70 mg weekly.  He does take this properly. March 2015 vitamin D is 28. We looked into the price of Reclast and this was cost prohibitive for him.  The patient had a tooth removed in January 2016 and then received his first dose of   Reclast in March 2016.  He tolerated this infusion    Interval history since last visit: Bone density from July 2017 shows significant improvement at  both hips with lowest T-score of -2.5 at the level of the lumbar spine.    #3 mood concerns  Patient seen in the psychiatry clinic.  History of bipolar disorder and anxiety.  Started lithium in January 2016.  April 2016 TSH and testosterone levels were normal.    Interval history since last visit: per pt report, this is improved.    #4 hypertriglyceridemia  triglycerides noted in April 2016 at about 209.      Interval hx : Pt on low residual diet.  July 2017 triglycerides are 187    Past Medical History  Past  Medical History:   Diagnosis Date     Anemia 2007     Anxiety 2012?    much worse since July 2014     Cataract 12/2007    both eyes, too much prednisone, implants     Coccidioidomycosis      Crohn disease (H)      Crohn's disease (H) 1/13/2015     Depressive disorder 7/2014    much worse since July 2014     Fracture 1956    green fracture of leg     Gallbladder problem 2005?    much gravel, removed     GERD (gastroesophageal reflux disease)      History of blood transfusion 1988    ulcerated anastomosis term. ilium bleed     Hypertriglyceridemia 7/8/2016     Infection 2007    persistant coccidioidomycosis     Kidney stone various    both sides, all passed naturally     Mental health problem 2007    bipolar though perhaps different     Nephrolithiasis      Osteoporosis 2007    osteoporosis, too much prednisone, last dexa 1/2014     Osteoporosis      Other nervous system complications 2007    prednisone overload induced diana     Personal history of colonic polyps     small ones found during colonoscopies     Steroid-induced psychosis     Patient extremely sensitive to regular doses of steroids.  Unclear if this is due to chronic fluconazole use or genetic issue.  In the future, use caution when prescribing steroids.     TB (tuberculosis)        Allergies  Allergies   Allergen Reactions     Prednisone Other (See Comments)     Diana with more than 2.5mg chronic dosing of prednisone due to fluconazole interaction per patient.      Medications  Current Outpatient Prescriptions   Medication Sig Dispense Refill     omeprazole (PRILOSEC) 40 MG capsule Take 40 mg by mouth daily       diphenoxylate-atropine (LOMOTIL) 2.5-0.025 MG per tablet Take 1 tablet by mouth 4 times daily as needed for diarrhea After loose stools 50 tablet 1     rifaximin (XIFAXAN) 550 MG TABS tablet Take 1 tablet (550 mg) by mouth 3 times daily 42 tablet 0     clonazePAM (KLONOPIN) 0.5 MG tablet        MELATONIN PO        traZODone (DESYREL) 50 MG  "tablet Take 1 tablet (50 mg) by mouth nightly as needed for sleep 90 tablet 3     fluconazole (DIFLUCAN) 200 MG tablet Take 1 tablet (200 mg) by mouth daily 90 tablet 3     acetaminophen (TYLENOL) 500 MG tablet Take 500-1,000 mg by mouth every 6 hours as needed for mild pain       Cyanocobalamin (VITAMIN B 12 PO) Take 500 mcg by mouth       Pyridoxine HCl (VITAMIN B6 PO) Take 100 mg by mouth       ferrous sulfate 325 (65 FE) MG tablet Take by mouth daily (with breakfast)       multivitamin, therapeutic with minerals (MULTI-VITAMIN) TABS Take 1 tablet by mouth daily       calcium-vitamin D (CALTRATE) 600-400 MG-UNIT per tablet Take 1 tablet by mouth 2 times daily       Cholecalciferol (VITAMIN D3 PO) Take 1,000 Units by mouth daily       Ascorbic Acid (VITAMIN C PO) Take 500 mg by mouth       Family History  family history includes CANCER in an other family member; CEREBROVASCULAR DISEASE in his paternal grandmother; Cancer - colorectal in his mother; Heart Failure in his father; Musculoskeletal Disorder in his brother and father.  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    Exam:  Pleasant, cooperative, no acute distress      RESULT  Orders Only on 07/14/2017   Component Date Value Ref Range Status     Calcium 07/14/2017 9.7  8.5 - 10.1 mg/dL Final     Calcium Ionized 07/14/2017 5.1  4.4 - 5.2 mg/dL Final "     Creatinine 07/14/2017 1.34* 0.66 - 1.25 mg/dL Final     GFR Estimate 07/14/2017 54* >60 mL/min/1.7m2 Final    Non  GFR Calc     GFR Estimate If Black 07/14/2017 65  >60 mL/min/1.7m2 Final    African American GFR Calc     TSH 07/14/2017 1.40  0.40 - 4.00 mU/L Final     Albumin 07/14/2017 3.9  3.4 - 5.0 g/dL Final     T4 Free 07/14/2017 0.87  0.76 - 1.46 ng/dL Final           ASSESSMENT:    #1 adrenal insufficiency possibly due to chronic fluconazole use  Pt no longer on steroids since April 2016.  He understands stress dose steroids when he is sick, hospitalized, or with procedure.    #2 history of osteoporosis  The patient has received Reclast  in March 2016 and tolerated this infusion.  His bone density has improved. Congratulated pt.    Denies any upcoming dental work.  His bone density has significantly improved.  Plan on second dose of IV Reclast.  I discussed with the patient that we generally give five years of Reclast and then consider a drug holiday.  We will arrange his Reclast infusion once insurance approves.    We will check labs for calcium, creatinine, TSH, albumin, and free T4.    #3 mood concerns  Reports reasonable    #4 hypertriglyceridemia  This has improved    Discussed with patient return visit in one year if he is in town.  Otherwise, he will let me know and I will send some information for an endocrinologist locally.    15 minutes spent with patient of which 10 minutes was spent in face-to-face discussion coordination of care    Mag Currie MD

## 2017-07-14 NOTE — MR AVS SNAPSHOT
After Visit Summary   7/14/2017    Trevin Gee    MRN: [id number]           Patient Information     Date Of Birth          1955        Visit Information        Provider Department      7/14/2017 1:30 PM Mag Currie MD [facility]        Today's Diagnoses     Osteoporosis without current pathological fracture, unspecified osteoporosis type    -  1      Care Instructions        ---  Component      Latest Ref Rng & Units 11/2/2005 3/6/2007 7/12/2016 7/7/2017   Cholesterol      <200 mg/dL 115 136 110 122   Triglycerides      <150 mg/dL 130 177 (H) 247 (H) 187 (H)   HDL Cholesterol      >39 mg/dL 50 53 53 42   LDL Cholesterol Calculated      <100 mg/dL 39 47 7 43   VLDL-Cholesterol      0 - 30 mg/dL 26 35 (H)     Cholesterol/HDL Ratio      0.0 - 5.0 2.3 2.6     Non HDL Cholesterol      <130 mg/dL   57 80       To expedite your medication refill(s), please contact your pharmacy and have them fax a refill request to: [phone].  *Please allow 3 business days for routine medication refills.  *Please allow 5 business days for controlled substance medication refills.  --------------------  For scheduling appointments (including lab work), please request an appointment through [employer], or call: [phone].    For questions for your provider or the endocrine nurse, please send a [employer] message.  For after-hours urgent issues, please dial [phone], and ask to speak with the Endocrinologist On-Call.  --------------------  Please Note: If you are active on [employer], all future test results will be sent by [employer] message only and will no longer be sent by mail. You may also receive communication directly from your physician.            Follow-ups after your visit        Follow-up notes from your care team     Return in about 1 year (around 7/14/2018).      Your next 10 appointments already scheduled     Jul 14, 2017  2:00 PM CDT   LAB with  LAB    [facility] ( [facility]    [address] [place] [address]  [address] [facility] [id number] [phone]           Patient must bring picture ID.  Patient should be prepared to give a urine specimen  Please do not eat 10-12 hours before your appointment if you are coming in fasting for labs on lipids, cholesterol, or glucose (sugar).  Pregnant women should follow their Care Team instructions. Water with medications is okay. Do not drink coffee or other fluids.   If you have concerns about taking  your medications, please ask at office or if scheduling via [employer], send a message by clicking on Secure Messaging, Message Your Care Team.            Aug 16, 2017   Procedure with Fransisco Leach MD   [facility] ([facility])    [address] [place] [address]  [address] [facility] [id number] [phone]           Located in the Clinics and Surgery Center at [address] [place] [address].   parking is very convenient and highly recommended.  is a $6 flat rate fee.  Both  and self parkers should enter the main arrival plaza from [facility]; parking attendants will direct you based on your parking preference.            Jan 08, 2018  1:00 PM CST   (Arrive by 12:45 PM)   RETURN IRRITABLE BOWEL DISEASE with Slick Malone PA-C   [facility] ([facility])    [address] [place] [address]  [address] [facility] [id number] [phone]              Future tests that were ordered for you today     Open Future Orders        Priority Expected Expires Ordered    Creatinine Routine 7/14/2017 7/28/2017 7/14/2017    TSH Routine 7/14/2017 7/28/2017 7/14/2017    T4 free Routine 7/14/2017 7/28/2017 7/14/2017    25 Hydroxyvitamin D2 and D3 Routine 7/14/2017 7/28/2017 7/14/2017    Albumin level Routine 7/14/2017 7/28/2017 7/14/2017    Calcium Routine 7/14/2017 7/28/2017 7/14/2017            Who to contact     Please  "call your clinic at [phone] to:    Ask questions about your health    Make or cancel appointments    Discuss your medicines    Learn about your test results    Speak to your doctor   If you have compliments or concerns about an experience at your clinic, or if you wish to file a complaint, please contact [facility] Patient Relations at [phone] or email us at [email or web address]         Additional Information About Your Visit        [employer] Information     [employer] gives you secure access to your electronic health record. If you see a primary care provider, you can also send messages to your care team and make appointments. If you have questions, please call your primary care clinic.  If you do not have a primary care provider, please call [phone] and they will assist you.      [employer]You is an electronic gateway that provides easy, online access to your medical records. With [employer]You, you can request a clinic appointment, read your test results, renew a prescription or communicate with your care team.     To access your existing account, please contact your [facility] or call [phone] for assistance.        Care EveryWhere ID     This is your Care EveryWhere ID. This could be used by other organizations to access your [facility] medical records  [id number]        Your Vitals Were     Pulse Height BMI (Body Mass Index)             73 1.753 m (5' 9\") 23.63 kg/m2          Blood Pressure from Last 3 Encounters:   07/14/17 120/81   07/11/17 106/72   07/05/17 108/75    Weight from Last 3 Encounters:   07/14/17 72.6 kg (160 lb)   07/11/17 73 kg (161 lb)   07/05/17 72.8 kg (160 lb 8 oz)               Primary Care Provider Office Phone # Fax #    Foster Begum [phone]       [address] [address]        Equal Access to Services     GUMARO CERRATO AH: Aleta Tom, " wakazda jenniferjorje, qaybta kamikey arora, jessika luaasimona ah. So [facility] [phone].    ATENCIÓN: Si jason candelario, tiene a corey disposición servicios gratuitos de asistencia lingüística. Chantal al [phone].    We comply with applicable federal civil rights laws and [place] laws. We do not discriminate on the basis of race, color, national origin, age, disability sex, sexual orientation or gender identity.            Thank you!     Thank you for choosing [facility] ENDO[facility]IN[facility]  for your care. Our goal is always to provide you with excellent care. Hearing back from our patients is one way we can continue to improve our services. Please take a few minutes to complete the written survey that you may receive in the mail after your visit with us. Thank you!             Your Updated Medication List - Protect others around you: Learn how to safely use, store and throw away your medicines at www.disposemymeds.org.          This list is accurate as of: 7/14/17  1:55 PM.  Always use your most recent med list.                   Brand Name Dispense Instructions for use Diagnosis    acetaminophen 500 MG tablet    TYLENOL     Take 500-1,000 mg by mouth every 6 hours as needed for mild pain        calcium-vitamin D 600-400 MG-UNIT per tablet    CALTRATE     Take 1 tablet by mouth 2 times daily        clonazePAM 0.5 MG tablet    klonoPIN          diphenoxylate-atropine 2.5-0.025 MG per tablet    LOMOTIL    50 tablet    Take 1 tablet by mouth 4 times daily as needed for diarrhea After loose stools    Crohn's disease of small intestine without complication (H)       ferrous sulfate 325 (65 FE) MG tablet    IRON     Take by mouth daily (with breakfast)        fluconazole 200 MG tablet    DIFLUCAN    90 tablet    Take 1 tablet (200 mg) by mouth daily    Coccidioidomycosis       MELATONIN PO           Multi-vitamin Tabs tablet      Take 1 tablet by mouth daily        omeprazole 40 MG capsule    priLOSEC      Take 40 mg by mouth daily        rifaximin 550 MG Tabs tablet    XIFAXAN    42 tablet    Take 1 tablet (550 mg) by mouth 3 times daily    Diarrhea, unspecified type       traZODone 50 MG tablet    DESYREL    90 tablet    Take 1 tablet (50 mg) by mouth nightly as needed for sleep    Bipolar 2 disorder (H)       VITAMIN B 12 PO      Take 500 mcg by mouth        VITAMIN B6 PO      Take 100 mg by mouth        VITAMIN C PO      Take 500 mg by mouth        VITAMIN D3 PO      Take 1,000 Units by mouth daily

## 2017-07-17 ENCOUNTER — TELEPHONE (OUTPATIENT)
Dept: ENDOCRINOLOGY | Facility: CLINIC | Age: 62
End: 2017-07-17

## 2017-07-17 NOTE — TELEPHONE ENCOUNTER
----- Message from Hermelinda TORRES Link sent at 7/17/2017 10:01 AM CDT -----  This is done.  ----- Message -----     From: Melodie Turner RN     Sent: 7/16/2017  12:12 PM       To: Clinic [id number]    PA approved for reclast please help Trevin schedule first available   ----- Message -----     From: Mag Currie MD     Sent: 7/14/2017   4:54 PM       To: Melodie Turner RN    As soon as possible    ----- Message -----     From: Melodie Turner RN     Sent: 7/13/2017  12:44 PM       To: Mag Currie MD    PA reclast approved in January Already . When is the Reclast needed?   ----- Message -----     From: Mag Currie MD     Sent: 7/13/2017  10:41 AM       To: [facility] End[facility] Triage-    Did reclast get aproved for pt?    ----- Message -----     From: Mag Currie MD     Sent: 7/13/2017       To: Mag Currie MD    reclast is approved if needed

## 2017-07-18 NOTE — PROGRESS NOTES
Dear Trevin    Here are your labs. Your renal function remains stable but you may want to increase your hydration. Your calcium is normal. Your thyroid is normal. Your vitamin D is pending. I see that you are set up for Reclast. This is good.    If you have any questions, please feel free to contact my nurse at [phone] select option #3 for triage nurse  or  option #1 for scheduling related questions.    Regards    Mag Currie MD

## 2017-07-19 LAB
DEPRECATED CALCIDIOL+CALCIFEROL SERPL-MC: NORMAL UG/L (ref 20–75)
VITAMIN D2 SERPL-MCNC: <5 UG/L
VITAMIN D3 SERPL-MCNC: 42 UG/L

## 2017-07-19 NOTE — PROGRESS NOTES
The  results which are within normal limits. Will continue with plan as discussed. Pt informed.    Mag Currie MD  7/19/2017  2:21 PM

## 2017-07-20 ENCOUNTER — TELEPHONE (OUTPATIENT)
Dept: INFUSION THERAPY | Facility: CLINIC | Age: 62
End: 2017-07-20

## 2017-07-20 NOTE — TELEPHONE ENCOUNTER
----- Message from Mag Currie MD sent at 7/19/2017  2:05 PM CDT -----  Regarding: RE: reclast infusion  yes  ----- Message -----     From: Wendy Alegre RN     Sent: 7/19/2017  10:15 AM       To: Mag Currie MD  Subject: reclast infusion                                 Hello,  Pt is coming in on Friday for a reclast infusion.  Labs from 7-14-17 creatinine 1.34.  Calcium WNL.    Can we proceed with the reclast infusion?    Thank you,    Wendy Alegre RN   [facility]  [phone]

## 2017-07-21 ENCOUNTER — INFUSION THERAPY VISIT (OUTPATIENT)
Dept: INFUSION THERAPY | Facility: CLINIC | Age: 62
End: 2017-07-21
Attending: INTERNAL MEDICINE
Payer: COMMERCIAL

## 2017-07-21 VITALS
DIASTOLIC BLOOD PRESSURE: 79 MMHG | RESPIRATION RATE: 16 BRPM | TEMPERATURE: 97.2 F | SYSTOLIC BLOOD PRESSURE: 125 MMHG | HEART RATE: 77 BPM

## 2017-07-21 DIAGNOSIS — M80.08XD VERTEBRAL FRACTURE, OSTEOPOROTIC, WITH ROUTINE HEALING, SUBSEQUENT ENCOUNTER: Primary | ICD-10-CM

## 2017-07-21 DIAGNOSIS — Z79.52 STEROID DEPENDENT FOR ADRENAL SUPRESSION: ICD-10-CM

## 2017-07-21 DIAGNOSIS — M81.0 OSTEOPOROSIS WITHOUT CURRENT PATHOLOGICAL FRACTURE, UNSPECIFIED OSTEOPOROSIS TYPE: ICD-10-CM

## 2017-07-21 PROCEDURE — 96365 THER/PROPH/DIAG IV INF INIT: CPT

## 2017-07-21 PROCEDURE — 25000128 H RX IP 250 OP 636: Mod: ZF | Performed by: INTERNAL MEDICINE

## 2017-07-21 RX ORDER — ACETAMINOPHEN 325 MG/1
325 TABLET ORAL ONCE
Status: CANCELLED
Start: 2017-07-21 | End: 2017-07-21

## 2017-07-21 RX ORDER — ACETAMINOPHEN 325 MG/1
325 TABLET ORAL ONCE
Status: DISCONTINUED | OUTPATIENT
Start: 2017-07-21 | End: 2017-07-21 | Stop reason: HOSPADM

## 2017-07-21 RX ORDER — ZOLEDRONIC ACID 5 MG/100ML
5 INJECTION, SOLUTION INTRAVENOUS ONCE
Status: COMPLETED | OUTPATIENT
Start: 2017-07-21 | End: 2017-07-21

## 2017-07-21 RX ORDER — ZOLEDRONIC ACID 5 MG/100ML
5 INJECTION, SOLUTION INTRAVENOUS ONCE
Status: CANCELLED
Start: 2017-07-21 | End: 2017-07-21

## 2017-07-21 RX ADMIN — ZOLEDRONIC ACID 5 MG: 0.05 INJECTION, SOLUTION INTRAVENOUS at 15:30

## 2017-07-21 NOTE — PROGRESS NOTES
Infusion nursing note:    Trevin Gee presents today to [facility] for a reclast infusion.  During today's [facility] appointment orders from Dr Mag Currie were completed.  Frequency: yearly infusion    Progress note:  ID verified by name and .  Assessment completed.  Vitals were stable throughout time in [facility].  verbal education given to patient/representative regarding infusion and possible side effects.  Patient verbalized understanding.    Note: Pt states he had no difficulty with this medication after receiving it one year ago.    Infusion given over approximately  30min     Administrations This Visit     zoledronic Acid (RECLAST) infusion 5 mg     Admin Date Action Dose Rate Route Administered By          2017 New Bag 5 mg 200 mL/hr Intravenous Radha Ingram RN                         /79  Pulse 77  Temp 97.2  F (36.2  C)  Resp 16  Discharge plan:    Discharge instructions were reviewed with patient: Yes  Patient/representative verbalized understanding of discharge instructions and all questions answered: Yes.    Discharged from [facility] at 1620 with self to home.    Radha Ingram

## 2017-07-21 NOTE — MR AVS SNAPSHOT
After Visit Summary   7/21/2017    Trevin Gee    MRN: [id number]           Patient Information     Date Of Birth          1955        Visit Information        Provider Department      7/21/2017 3:00 PM UC 46 ATC; [facility]        Today's Diagnoses     Vertebral fracture, osteoporotic, with routine healing, subsequent encounter    -  1    Steroid dependent for adrenal supression        Osteoporosis without current pathological fracture, unspecified osteoporosis type           Follow-ups after your visit        Your next 10 appointments already scheduled     Aug 16, 2017   Procedure with Fransisco Leach MD   [facility] ([facility])    [address] [place] [address]  5th [facility] [id number] [phone]           Located in the Clinics and Surgery Center at [address] [place] [address].   parking is very convenient and highly recommended.  is a $6 flat rate fee.  Both  and self parkers should enter the main arrival plaza from [facility]; parking attendants will direct you based on your parking preference.            Jan 08, 2018  1:00 PM CST   (Arrive by 12:45 PM)   RETURN INFLAMMATORY BOWEL DISEASE with Slick Malone PA-C   [facility] ([facility])    [address] [place] [address]  4th [facility] [id number] [phone]              Who to contact     If you have questions or need follow up information about today's clinic visit or your schedule please contact [facility] SPECIAL[facility] PROCEDURE directly at [phone].  Normal or non-critical lab and imaging results will be communicated to you by MyChart, letter or phone within 4 business days after the clinic has received the results. If you do not hear from us within 7 days, please contact the clinic through  [employer] or phone. If you have a critical or abnormal lab result, we will notify you by phone as soon as possible.  Submit refill requests through [employer] or call your pharmacy and they will forward the refill request to us. Please allow 3 business days for your refill to be completed.          Additional Information About Your Visit        [employer] Information     [employer] gives you secure access to your electronic health record. If you see a primary care provider, you can also send messages to your care team and make appointments. If you have questions, please call your primary care clinic.  If you do not have a primary care provider, please call [phone] and they will assist you.        Care EveryWhere ID     This is your Care EveryWhere ID. This could be used by other organizations to access your [facility] medical records  [id number]        Your Vitals Were     Pulse Temperature Respirations             77 97.2  F (36.2  C) 16          Blood Pressure from Last 3 Encounters:   07/21/17 125/79   07/14/17 120/81   07/11/17 106/72    Weight from Last 3 Encounters:   07/14/17 72.6 kg (160 lb)   07/11/17 73 kg (161 lb)   07/05/17 72.8 kg (160 lb 8 oz)              We Performed the Following     Nursing Communication 1        Primary Care Provider Office Phone # Fax #    Foster Begum [phone]       [address] [address]        Equal Access to Services     GUMARO CERRATO : Hadii aad ku hadasho Soomaali, waaxda luqadaha, qaybta kaalmada adeegyada, jessika romero. So [facility] [phone].    ATENCIÓN: Si habla español, tiene a corey disposición servicios gratuitos de asistencia lingüística. Llame al [phone].    We comply with applicable federal civil rights laws and [place] laws. We do not discriminate on the basis of race, color, national origin, age, disability sex, sexual orientation or gender identity.            Thank you!      Thank you for choosing [facility] SPECIALTY AND PROCEDURE  for your care. Our goal is always to provide you with excellent care. Hearing back from our patients is one way we can continue to improve our services. Please take a few minutes to complete the written survey that you may receive in the mail after your visit with us. Thank you!             Your Updated Medication List - Protect others around you: Learn how to safely use, store and throw away your medicines at www.disposemymeds.org.          This list is accurate as of: 7/21/17  4:48 PM.  Always use your most recent med list.                   Brand Name Dispense Instructions for use Diagnosis    acetaminophen 500 MG tablet    TYLENOL     Take 500-1,000 mg by mouth every 6 hours as needed for mild pain        calcium-vitamin D 600-400 MG-UNIT per tablet    CALTRATE     Take 1 tablet by mouth 2 times daily        clonazePAM 0.5 MG tablet    klonoPIN          diphenoxylate-atropine 2.5-0.025 MG per tablet    LOMOTIL    50 tablet    Take 1 tablet by mouth 4 times daily as needed for diarrhea After loose stools    Crohn's disease of small intestine without complication (H)       ferrous sulfate 325 (65 FE) MG tablet    IRON     Take by mouth daily (with breakfast)        fluconazole 200 MG tablet    DIFLUCAN    90 tablet    Take 1 tablet (200 mg) by mouth daily    Coccidioidomycosis       MELATONIN PO           Multi-vitamin Tabs tablet      Take 1 tablet by mouth daily        omeprazole 40 MG capsule    priLOSEC     Take 40 mg by mouth daily        rifaximin 550 MG Tabs tablet    XIFAXAN    42 tablet    Take 1 tablet (550 mg) by mouth 3 times daily    Diarrhea, unspecified type       traZODone 50 MG tablet    DESYREL    90 tablet    Take 1 tablet (50 mg) by mouth nightly as needed for sleep    Bipolar 2 disorder (H)       VITAMIN B 12 PO      Take 500 mcg by mouth        VITAMIN B6 PO      Take 100 mg by mouth        VITAMIN C PO       Take 500 mg by mouth        VITAMIN D3 PO      Take 1,000 Units by mouth daily

## 2017-07-27 ENCOUNTER — MYC MEDICAL ADVICE (OUTPATIENT)
Dept: INFECTIOUS DISEASES | Facility: CLINIC | Age: 62
End: 2017-07-27

## 2017-07-27 DIAGNOSIS — B38.9 COCCIDIOIDOMYCOSIS: ICD-10-CM

## 2017-07-27 DIAGNOSIS — B38.7 COCCIDIOIDAL GRANULOMA: Primary | ICD-10-CM

## 2017-07-27 RX ORDER — FLUCONAZOLE 200 MG/1
200 TABLET ORAL DAILY
Qty: 90 TABLET | Refills: 3 | Status: SHIPPED | OUTPATIENT
Start: 2017-07-27 | End: 2018-08-22

## 2017-07-27 RX ORDER — FLUCONAZOLE 200 MG/1
200 TABLET ORAL DAILY
Qty: 90 TABLET | Refills: 3 | Status: SHIPPED | OUTPATIENT
Start: 2017-07-27 | End: 2017-07-27

## 2017-08-10 ENCOUNTER — TELEPHONE (OUTPATIENT)
Dept: GASTROENTEROLOGY | Facility: CLINIC | Age: 62
End: 2017-08-10

## 2017-08-10 DIAGNOSIS — Z12.11 ENCOUNTER FOR SCREENING COLONOSCOPY: Primary | ICD-10-CM

## 2017-08-10 NOTE — TELEPHONE ENCOUNTER
Patient scheduled for EGD and Colonoscopy    Indication for procedure. Crohn's disease, Esophageal reflux    Referring Provider. Fransisco Leach MD    ? Not Needed    Arrival time verified? Yes, 0630    Facility location verified? Yes, [facility]    Instructions given regarding prep and procedure    Prep Type NPO and Golytely    Are you taking any anticoagulants or blood thinners? No    Instructions given? N/A    Electronic implanted devices? No    Pre procedure teaching completed? Yes    Transportation from procedure? Friend    H&P / Pre op physical completed? N/A

## 2017-08-16 ENCOUNTER — SURGERY (OUTPATIENT)
Age: 62
End: 2017-08-16

## 2017-08-16 ENCOUNTER — HOSPITAL ENCOUNTER (OUTPATIENT)
Facility: AMBULATORY SURGERY CENTER | Age: 62
End: 2017-08-16
Attending: INTERNAL MEDICINE

## 2017-08-16 VITALS
SYSTOLIC BLOOD PRESSURE: 124 MMHG | RESPIRATION RATE: 16 BRPM | TEMPERATURE: 98 F | DIASTOLIC BLOOD PRESSURE: 82 MMHG | WEIGHT: 160 LBS | HEIGHT: 69 IN | OXYGEN SATURATION: 97 % | BODY MASS INDEX: 23.7 KG/M2

## 2017-08-16 LAB
COLONOSCOPY: NORMAL
UPPER GI ENDOSCOPY: NORMAL

## 2017-08-16 RX ORDER — LIDOCAINE 40 MG/G
CREAM TOPICAL
Status: DISCONTINUED | OUTPATIENT
Start: 2017-08-16 | End: 2017-08-17 | Stop reason: HOSPADM

## 2017-08-16 RX ORDER — FENTANYL CITRATE 50 UG/ML
INJECTION, SOLUTION INTRAMUSCULAR; INTRAVENOUS PRN
Status: DISCONTINUED | OUTPATIENT
Start: 2017-08-16 | End: 2017-08-16 | Stop reason: HOSPADM

## 2017-08-16 RX ORDER — ONDANSETRON 2 MG/ML
4 INJECTION INTRAMUSCULAR; INTRAVENOUS
Status: DISCONTINUED | OUTPATIENT
Start: 2017-08-16 | End: 2017-08-17 | Stop reason: HOSPADM

## 2017-08-16 RX ADMIN — FENTANYL CITRATE 25 MCG: 50 INJECTION, SOLUTION INTRAMUSCULAR; INTRAVENOUS at 08:05

## 2017-08-16 RX ADMIN — FENTANYL CITRATE 50 MCG: 50 INJECTION, SOLUTION INTRAMUSCULAR; INTRAVENOUS at 07:55

## 2017-08-16 RX ADMIN — FENTANYL CITRATE 100 MCG: 50 INJECTION, SOLUTION INTRAMUSCULAR; INTRAVENOUS at 07:51

## 2017-08-16 RX ADMIN — FENTANYL CITRATE 25 MCG: 50 INJECTION, SOLUTION INTRAMUSCULAR; INTRAVENOUS at 08:36

## 2017-08-16 RX ADMIN — Medication 2 ML: at 08:13

## 2017-08-16 NOTE — OR NURSING
EGD with biopsies completed.  Chromo-colonoscopy with biopsies and dilation completed.  Balloon dilation completed to 12mm completed.  Pt tolerated procedures well under conscious sedation.  Veronica CHRIS present to administer sedation and monitor patient.  Pt taken to recovery by RN.

## 2017-08-17 LAB — COPATH REPORT: NORMAL

## 2017-09-22 ENCOUNTER — TELEPHONE (OUTPATIENT)
Dept: ENDOCRINOLOGY | Facility: CLINIC | Age: 62
End: 2017-09-22

## 2017-09-22 DIAGNOSIS — G47.00 INSOMNIA, UNSPECIFIED TYPE: Primary | ICD-10-CM

## 2017-09-22 DIAGNOSIS — F31.81 BIPOLAR 2 DISORDER (H): ICD-10-CM

## 2017-09-22 RX ORDER — TRAZODONE HYDROCHLORIDE 50 MG/1
50 TABLET, FILM COATED ORAL
Qty: 90 TABLET | Refills: 2 | Status: SHIPPED | OUTPATIENT
Start: 2017-09-22 | End: 2018-12-21

## 2017-09-22 NOTE — TELEPHONE ENCOUNTER
Patient wants temporary refills of trazodone.  Limited refills given.  Patient to establish new primary care provider

## 2017-12-01 ENCOUNTER — TELEPHONE (OUTPATIENT)
Dept: GASTROENTEROLOGY | Facility: CLINIC | Age: 62
End: 2017-12-01

## 2017-12-01 NOTE — TELEPHONE ENCOUNTER
In follow up recommendations post procedure in August; There was no evidence of any acid reflux in the esophagus either.  Overall these findings are very reassuring.  Please let me know what you think about a repeat procedure with dilation in 1-2 months, waiting to see if you develop symptoms.  Because of your prior polyps and the Crohn's disease, I would recommend another colonoscopy regardless in 1 year.      Please advise on next steps.    Halle Arce RN

## 2017-12-08 NOTE — TELEPHONE ENCOUNTER
Called pt per in basket message from Dr. Leach.  Pt states he has a couple of episodes. This is described as body aches across his abdomen with central spasms and pain in his abdominal area.  Goes on liquid diet and resolves in 24 hours or less. Then progress back to normal diet.  Last one at Thanksgiving time. Does not feel like he needs to have dilation at this time.  Has appt in January in clinic. .  Will notify us if another episode and will set up colonoscopy as needed.              Can you touch base with Trevin to see if he is having any more abdominal pain - if so we should set up for another colonoscopy with further dilation

## 2018-01-05 ENCOUNTER — TELEPHONE (OUTPATIENT)
Dept: GASTROENTEROLOGY | Facility: CLINIC | Age: 63
End: 2018-01-05

## 2018-01-05 ASSESSMENT — ENCOUNTER SYMPTOMS
ABDOMINAL PAIN: 1
BLOOD IN STOOL: 0
VOMITING: 0
RECTAL PAIN: 0
NAIL CHANGES: 0
BOWEL INCONTINENCE: 0
POOR WOUND HEALING: 0
NAUSEA: 0
DIARRHEA: 1
CONSTIPATION: 1
BLOATING: 0
JAUNDICE: 0
HEARTBURN: 0
SKIN CHANGES: 0

## 2018-01-08 ENCOUNTER — OFFICE VISIT (OUTPATIENT)
Dept: GASTROENTEROLOGY | Facility: CLINIC | Age: 63
End: 2018-01-08
Payer: COMMERCIAL

## 2018-01-08 VITALS
DIASTOLIC BLOOD PRESSURE: 81 MMHG | BODY MASS INDEX: 24.23 KG/M2 | HEART RATE: 81 BPM | HEIGHT: 69 IN | OXYGEN SATURATION: 97 % | SYSTOLIC BLOOD PRESSURE: 116 MMHG | WEIGHT: 163.6 LBS

## 2018-01-08 DIAGNOSIS — K50.80 CROHN'S DISEASE OF BOTH SMALL AND LARGE INTESTINE WITHOUT COMPLICATION (H): ICD-10-CM

## 2018-01-08 DIAGNOSIS — K50.80 CROHN'S DISEASE OF BOTH SMALL AND LARGE INTESTINE WITHOUT COMPLICATION (H): Primary | ICD-10-CM

## 2018-01-08 LAB
ALBUMIN SERPL-MCNC: 4.2 G/DL (ref 3.4–5)
ALP SERPL-CCNC: 79 U/L (ref 40–150)
ALT SERPL W P-5'-P-CCNC: 40 U/L (ref 0–70)
AST SERPL W P-5'-P-CCNC: 26 U/L (ref 0–45)
BASOPHILS # BLD AUTO: 0 10E9/L (ref 0–0.2)
BASOPHILS NFR BLD AUTO: 0.7 %
BILIRUB DIRECT SERPL-MCNC: 0.2 MG/DL (ref 0–0.2)
BILIRUB SERPL-MCNC: 0.7 MG/DL (ref 0.2–1.3)
CRP SERPL-MCNC: <2.9 MG/L (ref 0–8)
DIFFERENTIAL METHOD BLD: NORMAL
EOSINOPHIL # BLD AUTO: 0.1 10E9/L (ref 0–0.7)
EOSINOPHIL NFR BLD AUTO: 1.3 %
ERYTHROCYTE [DISTWIDTH] IN BLOOD BY AUTOMATED COUNT: 12.6 % (ref 10–15)
ERYTHROCYTE [SEDIMENTATION RATE] IN BLOOD BY WESTERGREN METHOD: 4 MM/H (ref 0–20)
HCT VFR BLD AUTO: 50.7 % (ref 40–53)
HGB BLD-MCNC: 17.6 G/DL (ref 13.3–17.7)
IMM GRANULOCYTES # BLD: 0 10E9/L (ref 0–0.4)
IMM GRANULOCYTES NFR BLD: 0.3 %
LYMPHOCYTES # BLD AUTO: 1.5 10E9/L (ref 0.8–5.3)
LYMPHOCYTES NFR BLD AUTO: 25.4 %
MCH RBC QN AUTO: 32.1 PG (ref 26.5–33)
MCHC RBC AUTO-ENTMCNC: 34.7 G/DL (ref 31.5–36.5)
MCV RBC AUTO: 93 FL (ref 78–100)
MONOCYTES # BLD AUTO: 0.5 10E9/L (ref 0–1.3)
MONOCYTES NFR BLD AUTO: 8.9 %
NEUTROPHILS # BLD AUTO: 3.8 10E9/L (ref 1.6–8.3)
NEUTROPHILS NFR BLD AUTO: 63.4 %
NRBC # BLD AUTO: 0 10*3/UL
NRBC BLD AUTO-RTO: 0 /100
PLATELET # BLD AUTO: 223 10E9/L (ref 150–450)
PROT SERPL-MCNC: 7.8 G/DL (ref 6.8–8.8)
RBC # BLD AUTO: 5.48 10E12/L (ref 4.4–5.9)
WBC # BLD AUTO: 6 10E9/L (ref 4–11)

## 2018-01-08 ASSESSMENT — PAIN SCALES - GENERAL: PAINLEVEL: NO PAIN (0)

## 2018-01-08 NOTE — LETTER
1/8/2018       RE: Trevin Gee  [address]   [facility] [address]     Dear Colleague,    Thank you for referring your patient, Trevin Gee, to the [facility] GASTROENTEROLOG[facility] IBD [facility]INIC at [facility]. Please see a copy of my visit note below.    IBD CLINIC     CC/REFERRING MD:  Referred Self  REASON FOR CONSULTATION: Crohn's disease       IBD history:  Age at diagnosis: ~18  Extend of disease: Ileo-colonic  Disease phenotype: Inflammatory  Key-anal disease: No  Current CD medications: None  Prior IBD Medications:  - Asulfazine and Asacol: 1974 to late 90s, stopped to to disease progression  - Prednisone 1986 - current.   - Mercaptopurine: Briefly in mid 90s to get him off steroids- Patient thinks he has side effect to medication  - Methotrexate: After mercaptopurine, stayed on for years, but after bleeding in 1988 and 1989, he would not taper his prednisone. MTX stopped as he could not get off the steroids.    Surgical history:  1974 - [facility] Ileocecectomy  1986 - [facility] - revision of anastomosis  1988 - massive bleeding from ulcer at anastomosis ([place]), cauterized via colonoscopy  1989 - laparoscopic  Revision of anastomosis  2005 - Lap CCY    DRUG MONITORING  TPMT enzyme activity: none    DISEASE ASSESSMENT  Fecal calprotectin: None  CRP, ESR Results  Recent Labs   Lab Test  07/05/17   1442  04/14/17   1526   CRP  4.5  <2.9   SED  8  5   Endoscopic assessment: Colonoscopy 8/16/2017 showed normal mucosa in entire colon. Non-patent functional end-to-end ileo-colonic anastomosis that was dilated to 12mm.  No evidence of active Crohn's disease.  Enterography (12/1/16) showed 4cm of mild thickening and enhancement in neoterminal ileum at anastomosis, decompressed sigmoid colon with questionable wall thickening and enhancement.    Enterography: No active Crohn's disease (3/20/15)  C diff: None (7/12/16)    ASSESSMENT/PLAN:  62 year old male with  Crohn's disease s/p ileocecectomy on no current Crohn's therapy.     1. Crohn's disease:  No evidence of active disease, although concern for re-stricturing at ileocolonic anastomosis requiring re-dilation in 2016 and 2017.  At this time we will continue to monitor for active Crohn's and dilated when necessary.  Plan to repeat MRA to evaluate for any active/stricturing disease.  No evidence of obstructive symptoms.  --MRE to be scheduled in the near future  -- Colonoscopy this year to assess for active disease and with potential dilation of anastomosis  -- Continue with yearly LFTs to screen for PSC.     2. Colon polyps: Well defined lesions with adenomatous changes.  Will continue to surveillance   -- Plan for chromoendoscopy with next colonoscopy     3. Diarrhea: Quiescent Crohn's. Prior work up has included alpha 1 antitrypsin for PLE, pancreatic elastase, TTG, DGP all of which were normal.  A spot fecal fat the only isolated value that was increased.  Diarrhea may be related to IBS, or lactose intolerance.  Could also be bile salt malabsorption or bacterial overgrowth.  Patient has had a decent response to fiber which he continues at this time.  Diarrhea symptoms are stable and unchanged.  -- Avoid dietary triggers  -- prn lomotil  -- If diarrhea persists 2-3 days, will check for C diff    4. Possible acid reflux:  No current symptoms of acid reflux.      5. Pancreatic head cystic lesion: Increased from 2006, potentially representing IPMN.  MRCP with no change in cyst.  Reviewed with Dr. Cruz.  EUS with FNA normal.   -- No further follow-up at this time needed.      6. Steroid dependency/Adrenal insufficiency:  Followed closely with endocrinology.  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      8. Depression: Unchanged.     9. IBD healthcare maintenance based on patients current medication:      Vaccinations:  -- Influenza  (every year): Last given 2016  -- TdaP (every 10 years): Last given 2015  -- Pneumococcal Pneumonia (once then every 5 years): Has not obtained.    One time confirmation of immunity or serologies:  -- Hepatitis A (serologies or immunizations): 2005  -- Hepatitis B (serologies or immunizations): 2005  -- Zoster vaccination (>59 yo, discuss if over 50): 2005  -- MMR: Not documented.  -- HPV (all aged 18-26): N/A  -- Meningococcal meningitis (all patients at risk for meningitis): Patient is not at risk.     Cancer Screening:  Colon cancer screening:  Since there is evidence of pancolitis histologically. Also with adenomatous polyps. Colonoscopy every 2-3 years recommended.  Dysplasia screening is recommended 2018.    Cervical cancer screening: N/A    Skin cancer screening: Since patient is not immunocompromised, this is per patient's dermatologist recommendations.    Depression Screening:  -- Over the last month, have you felt down, depressed, or hopeless? No  -- Over the last month, have you felt little interest or pleasure doing things? No    Misc:  -- Avoid tobacco use  -- Avoid NSAIDs as there is potentially a 25% chance of causing an IBD flare    RTC 6 months    Thank you for this consultation.  It was a pleasure to participate in the care of this patient; please contact us with any further questions.        Slick Malone PA-C  Division of Gastroenterology, Hepatology and Nutrition  [facility]       HPI:   62-year-old male with ileocolonic Crohn's disease after ileocecal resection and 2 surgical revisions in deep remission on no maintenance therapies.  Patient has had evidence of stricturing at the anastomosis site requiring dilations on subsequent colonoscopies.  He still struggles with persistent diarrhea that has been evaluated extensively in the past.  No evidence of active inflammation to suggest reasoning for diarrhea.  Only positive test obtained in diarrhea was increased spot fecal fat  however pancreatic elastase was in normal limits. Al pha 1 antitrypsin for PLE, pancreatic elastase, TTG, DGP all of which were normal.  He continues to have abdominal pain on rare occurrences but is significant in nature.  He is unable to identify triggers or potential etiologies of abdominal pain.  The abdominal pain eventually subsides on its own.  Patient feels that these instances are very rare but have occurred since he has had dilation with colonoscopy.  Patient is quite concerned that he could have another stricture his other anastomosis site.  There has not been any evidence of other stricturing disease or concerns for obstruction on previous MREs. The episodes are described as a sharp/burning sensation about 30 minutes after eating. They last only a few minutes. A liquid diet help in these instances.  He denies any of these symptoms currently.  He denies any nausea or vomiting.    ROS:    No fevers or chills  No weight loss  No blurry vision, double vision or change in vision  No sore throat  No lymphadenopathy  No headache, paraesthesias, or weakness in a limb  No shortness of breath or wheezing  No chest pain or pressure  No arthralgias or myalgias  No rashes or skin changes  No odynophagia or dysphagia  No BRBPR, hematochezia, melena  No dysuria, frequency or urgency  No hot/cold intolerance or polyria  No anxiety or depression    Extra intestinal manifestations of IBD:  No uveitis/episcleritis  No aphthous ulcers   No arthritis   No erythema nodosum/pyoderma gangrenosum.     PERTINENT PAST MEDICAL HISTORY:  Past Medical History:   Diagnosis Date     Anemia 2007     Anxiety 2012?    much worse since July 2014     Cataract 12/2007    both eyes, too much prednisone, implants     Coccidioidomycosis      Crohn disease (H)      Crohn's disease (H) 1/13/2015     Depressive disorder 7/2014    much worse since July 2014     Fracture 1956    green fracture of leg     Gallbladder problem 2005?    much gravel,  removed     GERD (gastroesophageal reflux disease)      History of blood transfusion 1988    ulcerated anastomosis term. ilium bleed     Hypertriglyceridemia 7/8/2016     Infection 2007    persistant coccidioidomycosis     Kidney stone various    both sides, all passed naturally     Mental health problem 2007    bipolar though perhaps different     Nephrolithiasis      Osteoporosis 2007    osteoporosis, too much prednisone, last dexa 1/2014     Osteoporosis      Other nervous system complications 2007    prednisone overload induced kevin     Personal history of colonic polyps     small ones found during colonoscopies     Steroid-induced psychosis     Patient extremely sensitive to regular doses of steroids.  Unclear if this is due to chronic fluconazole use or genetic issue.  In the future, use caution when prescribing steroids.     TB (tuberculosis)        PREVIOUS SURGERIES:  Past Surgical History:   Procedure Laterality Date     APPENDECTOMY  1974    coincidental with Crohn's surgery     BACK SURGERY  12/2007    kyphoplasty on compressed 4th??? vertebra     BIOPSY  2007, 2013    lump on arm, knee, back of hand for coccidioidomycosis cult.     CHOLECYSTECTOMY  2005    much gravel, removed laparoscopically     COLONOSCOPY  7/14/2014 last    Dr. Catherine Bowden, [facility] - many previous     COLONOSCOPY Left 9/11/2015    Procedure: COMBINED COLONOSCOPY, SINGLE OR MULTIPLE BIOPSY/POLYPECTOMY BY BIOPSY;  Surgeon: Fransisco Leach MD;  Location:  GI     COLONOSCOPY N/A 8/3/2016    Procedure: COMBINED COLONOSCOPY, SINGLE OR MULTIPLE BIOPSY/POLYPECTOMY BY BIOPSY;  Surgeon: Fransisco Leach MD;  Location:  GI     COLONOSCOPY N/A 8/16/2017    Procedure: COMBINED COLONOSCOPY, SINGLE OR MULTIPLE BIOPSY/POLYPECTOMY BY BIOPSY;;  Surgeon: Fransisco Leach MD;  [facility]     ENT SURGERY  ?    upper endoscopy     ESOPHAGOSCOPY, GASTROSCOPY, DUODENOSCOPY (EGD), COMBINED N/A 11/8/2016     "Procedure: COMBINED ENDOSCOPIC ULTRASOUND, ESOPHAGOSCOPY, GASTROSCOPY, DUODENOSCOPY (EGD), FINE NEEDLE ASPIRATE/BIOPSY;  Surgeon: Guru Alexsandra Ballesteros MD;  Location:  GI     ESOPHAGOSCOPY, GASTROSCOPY, DUODENOSCOPY (EGD), COMBINED N/A 11/8/2016    Procedure: COMBINED ESOPHAGOSCOPY, GASTROSCOPY, DUODENOSCOPY (EGD), BIOPSY SINGLE OR MULTIPLE;  Surgeon: Guru Alexsandra Ballesteros MD;  Location:  GI     ESOPHAGOSCOPY, GASTROSCOPY, DUODENOSCOPY (EGD), COMBINED N/A 8/16/2017    Procedure: COMBINED ESOPHAGOSCOPY, GASTROSCOPY, DUODENOSCOPY (EGD), BIOPSY SINGLE OR MULTIPLE;;  Surgeon: Fransisco Leach MD;  [facility]     EYE SURGERY  12/2007    cataract surgery both sides     GI SURGERY  1974, 1986(x2), 1989    Crohn's, 1974 RO 18\" term. ilium + 9\" asc. colon all one pc     SOFT TISSUE SURGERY  3/2013    removed buildup on back of r hand, coccidioidomycosis       PREVIOUS ENDOSCOPY:  Summer 2014: Colonoscopy - not in out system    ALLERGIES:     Allergies   Allergen Reactions     Prednisone Other (See Comments)     Diana with more than 2.5mg chronic dosing of prednisone due to fluconazole interaction per patient.        PERTINENT MEDICATIONS:    Current Outpatient Prescriptions:      traZODone (DESYREL) 50 MG tablet, Take 1 tablet (50 mg) by mouth nightly as needed for sleep, Disp: 90 tablet, Rfl: 2     fluconazole (DIFLUCAN) 200 MG tablet, Take 1 tablet (200 mg) by mouth daily, Disp: 90 tablet, Rfl: 3     omeprazole (PRILOSEC) 40 MG capsule, Take 40 mg by mouth daily, Disp: , Rfl:      diphenoxylate-atropine (LOMOTIL) 2.5-0.025 MG per tablet, Take 1 tablet by mouth 4 times daily as needed for diarrhea After loose stools, Disp: 50 tablet, Rfl: 1     clonazePAM (KLONOPIN) 0.5 MG tablet, , Disp: , Rfl:      MELATONIN PO, , Disp: , Rfl:      acetaminophen (TYLENOL) 500 MG tablet, Take 500-1,000 mg by mouth every 6 hours as needed for mild pain, Disp: , Rfl:      Cyanocobalamin (VITAMIN B " 12 PO), Take 500 mcg by mouth, Disp: , Rfl:      Pyridoxine HCl (VITAMIN B6 PO), Take 100 mg by mouth, Disp: , Rfl:      ferrous sulfate 325 (65 FE) MG tablet, Take by mouth daily (with breakfast), Disp: , Rfl:      multivitamin, therapeutic with minerals (MULTI-VITAMIN) TABS, Take 1 tablet by mouth daily, Disp: , Rfl:      calcium-vitamin D (CALTRATE) 600-400 MG-UNIT per tablet, Take 1 tablet by mouth 2 times daily, Disp: , Rfl:      Cholecalciferol (VITAMIN D3 PO), Take 1,000 Units by mouth daily, Disp: , Rfl:      Ascorbic Acid (VITAMIN C PO), Take 500 mg by mouth, Disp: , Rfl:      rifaximin (XIFAXAN) 550 MG TABS tablet, Take 1 tablet (550 mg) by mouth 3 times daily (Patient not taking: Reported on 1/8/2018), Disp: 42 tablet, Rfl: 0    SOCIAL HISTORY:  Social History     Social History     Marital status:      Spouse name: N/A     Number of children: N/A     Years of education: N/A     Occupational History     Reserach associate at the [employer]     Social History Main Topics     Smoking status: Never Smoker     Smokeless tobacco: Never Used     Alcohol use Yes      Comment: sometimes one glass of wine a week     Drug use: No     Sexual activity: No     Other Topics Concern     Not on file     Social History Narrative       FAMILY HISTORY:  Family History   Problem Relation Age of Onset     Heart Failure Father      Musculoskeletal Disorder Father      Parkinson's     Cancer - colorectal Mother      CEREBROVASCULAR DISEASE Paternal Grandmother      CANCER Other      Musculoskeletal Disorder Brother      Parkinson's     Mother with colon cancer in 80s.  No family member with Crohn's disease or Ulcerative Colitis.      Past/family/social history reviewed and no changes    PHYSICAL EXAMINATION:  Vitals reviewed, AFVSS   Wt   Wt Readings from Last 2 Encounters:   01/08/18 74.2 kg (163 lb 9.6 oz)   08/16/17 72.6 kg (160 lb)      Gen: aaox3, cooperative, pleasant, not dyspneic/diaphoretic,  nad  HEENT: ncat, neck supple, no clad, normal op w/o ulcer/exudate, anicteric, mmm  Lymph: No axillary, submandibular, supraclavicular or inguinal lymphadenopathy  Resp/CV unremarkable  Abd: Nondistended, +bs, no hepatosplenomegaly, nontender, no peritoneal signs  Ext: no c/c/e  Skin: warm, perfused, no jaundice      PERTINENT STUDIES:  Most recent CBC:  Recent Labs   Lab Test  07/05/17   1442  04/14/17   1526   WBC  5.6  6.0   HGB  16.9  16.5   HCT  49.0  47.5   PLT  212  218     Most recent hepatic panel:  Recent Labs   Lab Test  07/05/17   1442  04/14/17   1526   ALT  27  33   AST  18  19     Most recent creatinine:  Recent Labs   Lab Test  07/14/17   1425  04/14/17   1526   CR  1.34*  1.26*       MRE: 3/20/15:   IMPRESSION:   1. Unremarkable small bowel other than postsurgical changes following right hemicolectomy with ileocolic anastomosis.  2. Small gastric diverticulum. 3. 1.5 x 0.8 cm pancreatic head cystic lesion minimally increased in size compared to the prior exam of 2006, but potentially representing intraductal papillary mucinous neoplasm. This can be reassessed on followup.  4. Hepatic steatosis.        Again, thank you for allowing me to participate in the care of your patient.      Sincerely,    Slick Malone PA-C

## 2018-01-08 NOTE — NURSING NOTE
"Chief Complaint   Patient presents with     RECHECK     6 month follow up post colonoscopy in August       Vitals:    01/08/18 1235   BP: 116/81   Pulse: 81   SpO2: 97%   Weight: 163 lb 9.6 oz   Height: 5' 9\"       Body mass index is 24.16 kg/(m^2).  Maricarmen Zamorano CMA                     "

## 2018-01-08 NOTE — PATIENT INSTRUCTIONS
It was a pleasure taking care of you today.  I've included a brief summary of our discussion and care plan from today's visit below.  Please review this information with your primary care provider.  ______________________________________________________________________    My recommendations are summarized as follows:    -- Recommend soluble fiber supplementation on a daily basis (Metamucil, citrucel or benefiber).  Start with 1 tablespoon per day and if tolerated, may increase up to 2-3 tablespoons per day.  You may experience some bloating with initiation of fiber supplementation that will improve over the first month.  A good fiber trial to evaluate the effect is 3-6 months.    -- Plan for MRE in the near future    Return to GI Clinic in 3 months to review your progress.    ______________________________________________________________________    Who do I call with any questions after my visit?  Please be in touch if there are any further questions that arise following today's visit.  There are multiple ways to contact your gastroenterology care team.        During business hours, you may reach a Gastroenterology nurse at [phone], option 3.       To schedule or reschedule an appointment, please call [phone].       You can always send a secure message through ChangeTip.  ChangeTip messages are answered by your nurse or doctor typically within 24 hours.  Please allow extra time on weekends and holidays.        For urgent/emergent questions after business hours, you may reach the on-call GI Fellow by contacting the [facility] at [phone].     How will I get the results of any tests ordered?    You will receive all of your results.  If you have signed up for ChangeTip, any tests ordered at your visit will be available to you after your physician reviews them.  Typically this takes 1-2 weeks.  If there are urgent results that require a change in your care plan, your physician or nurse will  call you to discuss the next steps.      What is [employer]?  Retevo is a secure way for you to access all of your healthcare records from the [facility].  It is a web based computer program, so you can sign on to it from any location.  It also allows you to send secure messages to your care team.  I recommend signing up for Retevo access if you have not already done so and are comfortable with using a computer.      How to I schedule a follow-up visit?  If you did not schedule a follow-up visit today, please call [phone] to schedule a follow-up office visit.        Sincerely,    Slick Malone PA-C  [facility]  Division of Gastroenterology

## 2018-01-08 NOTE — MR AVS SNAPSHOT
After Visit Summary   1/8/2018    Trevin Gee    MRN: [id number]           Patient Information     Date Of Birth          1955        Visit Information        Provider Department      1/8/2018 12:40 PM Slick Malone PA-C [facility]        Today's Diagnoses     Crohn's disease of both small and large intestine without complication (H)    -  1      Care Instructions    It was a pleasure taking care of you today.  I've included a brief summary of our discussion and care plan from today's visit below.  Please review this information with your primary care provider.  ______________________________________________________________________    My recommendations are summarized as follows:    -- Recommend soluble fiber supplementation on a daily basis (Metamucil, citrucel or benefiber).  Start with 1 tablespoon per day and if tolerated, may increase up to 2-3 tablespoons per day.  You may experience some bloating with initiation of fiber supplementation that will improve over the first month.  A good fiber trial to evaluate the effect is 3-6 months.    -- Plan for MRE in the near future    Return to GI Clinic in 3 months to review your progress.    ______________________________________________________________________    Who do I call with any questions after my visit?  Please be in touch if there are any further questions that arise following today's visit.  There are multiple ways to contact your gastroenterology care team.        During business hours, you may reach a Gastroenterology nurse at [phone], option 3.       To schedule or reschedule an appointment, please call [phone].       You can always send a secure message through [employer].  [employer] messages are answered by your nurse or doctor typically within 24 hours.  Please allow extra time on weekends and holidays.        For urgent/emergent questions after business hours, you may reach the on-call  GI Fellow by contacting the [facility]  at [phone].     How will I get the results of any tests ordered?    You will receive all of your results.  If you have signed up for [employer], any tests ordered at your visit will be available to you after your physician reviews them.  Typically this takes 1-2 weeks.  If there are urgent results that require a change in your care plan, your physician or nurse will call you to discuss the next steps.      What is [employer]?  [employer] is a secure way for you to access all of your healthcare records from the [facility].  It is a web based computer program, so you can sign on to it from any location.  It also allows you to send secure messages to your care team.  I recommend signing up for [employer] access if you have not already done so and are comfortable with using a computer.      How to I schedule a follow-up visit?  If you did not schedule a follow-up visit today, please call [phone] to schedule a follow-up office visit.        Sincerely,    Slick Malone PA-C  [facility]  Division of Gastroenterology                Follow-ups after your visit        Follow-up notes from your care team     Return in about 3 months (around 4/8/2018).      Future tests that were ordered for you today     Open Future Orders        Priority Expected Expires Ordered    MR Enterography with and without Contrast [[id number]] Routine  1/8/2019 1/8/2018            Who to contact     Please call your clinic at [phone] to:    Ask questions about your health    Make or cancel appointments    Discuss your medicines    Learn about your test results    Speak to your doctor   If you have compliments or concerns about an experience at your clinic, or if you wish to file a complaint, please contact [facility] Patient Relations at [phone] or email us at [email or web address]         Additional Information About Your  "Visit        [employer] Information     [employer] gives you secure access to your electronic health record. If you see a primary care provider, you can also send messages to your care team and make appointments. If you have questions, please call your primary care clinic.  If you do not have a primary care provider, please call [phone] and they will assist you.      [employer] is an electronic gateway that provides easy, online access to your medical records. With [employer], you can request a clinic appointment, read your test results, renew a prescription or communicate with your care team.     To access your existing account, please contact your [facility] or call [phone] for assistance.        Care EveryWhere ID     This is your Care EveryWhere ID. This could be used by other organizations to access your [place] medical records  [id number]        Your Vitals Were     Pulse Height Pulse Oximetry BMI (Body Mass Index)          81 1.753 m (5' 9\") 97% 24.16 kg/m2         Blood Pressure from Last 3 Encounters:   01/08/18 116/81   08/16/17 124/82   07/21/17 125/79    Weight from Last 3 Encounters:   01/08/18 74.2 kg (163 lb 9.6 oz)   08/16/17 72.6 kg (160 lb)   07/14/17 72.6 kg (160 lb)               Primary Care Provider Office Phone # Fax #    Foster ALBA Begum [phone]       [address]  [place] [id number]        Equal Access to Services     GUMARO CERRATO AH: Hadii aad ku hadasho Soomaali, waaxda luqadaha, qaybta kaalmada adeegyada, waxblaire emmanuel romero. So [facility] [phone].    ATENCIÓN: Si habla español, tiene a corey disposición servicios gratuitos de asistencia lingüística. Llame al [phone].    We comply with applicable federal civil rights laws and [place] laws. We do not discriminate on the basis of race, color, national origin, age, disability, sex, sexual orientation, or gender identity.          "   Thank you!     Thank you for choosing [facility]  for your care. Our goal is always to provide you with excellent care. Hearing back from our patients is one way we can continue to improve our services. Please take a few minutes to complete the written survey that you may receive in the mail after your visit with us. Thank you!             Your Updated Medication List - Protect others around you: Learn how to safely use, store and throw away your medicines at www.disposemymeds.org.          This list is accurate as of: 1/8/18  1:15 PM.  Always use your most recent med list.                   Brand Name Dispense Instructions for use Diagnosis    acetaminophen 500 MG tablet    TYLENOL     Take 500-1,000 mg by mouth every 6 hours as needed for mild pain        calcium-vitamin D 600-400 MG-UNIT per tablet    CALTRATE     Take 1 tablet by mouth 2 times daily        clonazePAM 0.5 MG tablet    klonoPIN          diphenoxylate-atropine 2.5-0.025 MG per tablet    LOMOTIL    50 tablet    Take 1 tablet by mouth 4 times daily as needed for diarrhea After loose stools    Crohn's disease of small intestine without complication (H)       ferrous sulfate 325 (65 FE) MG tablet    IRON     Take by mouth daily (with breakfast)        fluconazole 200 MG tablet    DIFLUCAN    90 tablet    Take 1 tablet (200 mg) by mouth daily    Coccidioidal granuloma       MELATONIN PO           Multi-vitamin Tabs tablet      Take 1 tablet by mouth daily        omeprazole 40 MG capsule    priLOSEC     Take 40 mg by mouth daily        rifaximin 550 MG Tabs tablet    XIFAXAN    42 tablet    Take 1 tablet (550 mg) by mouth 3 times daily    Diarrhea, unspecified type       traZODone 50 MG tablet    DESYREL    90 tablet    Take 1 tablet (50 mg) by mouth nightly as needed for sleep    Bipolar 2 disorder (H)       VITAMIN B 12 PO      Take 500 mcg by mouth        VITAMIN B6 PO      Take 100 mg by mouth        VITAMIN C PO       Take 500 mg by mouth        VITAMIN D3 PO      Take 1,000 Units by mouth daily

## 2018-01-08 NOTE — PROGRESS NOTES
IBD CLINIC     CC/REFERRING MD:  Referred Self  REASON FOR CONSULTATION: Crohn's disease       IBD history:  Age at diagnosis: ~18  Extend of disease: Ileo-colonic  Disease phenotype: Inflammatory  Key-anal disease: No  Current CD medications: None  Prior IBD Medications:  - Asulfazine and Asacol: 1974 to late 90s, stopped to to disease progression  - Prednisone 1986 - current.   - Mercaptopurine: Briefly in mid 90s to get him off steroids- Patient thinks he has side effect to medication  - Methotrexate: After mercaptopurine, stayed on for years, but after bleeding in 1988 and 1989, he would not taper his prednisone. MTX stopped as he could not get off the steroids.    Surgical history:  1974 - [place] Ileocecectomy  1986 - [facility] - revision of anastomosis  1988 - massive bleeding from ulcer at anastomosis ([place]), cauterized via colonoscopy  1989 - laparoscopic  Revision of anastomosis  2005 - Lap CCY    DRUG MONITORING  TPMT enzyme activity: none    DISEASE ASSESSMENT  Fecal calprotectin: None  CRP, ESR Results  Recent Labs   Lab Test  07/05/17   1442  04/14/17   1526   CRP  4.5  <2.9   SED  8  5   Endoscopic assessment: Colonoscopy 8/16/2017 showed normal mucosa in entire colon. Non-patent functional end-to-end ileo-colonic anastomosis that was dilated to 12mm.  No evidence of active Crohn's disease.  Enterography (12/1/16) showed 4cm of mild thickening and enhancement in neoterminal ileum at anastomosis, decompressed sigmoid colon with questionable wall thickening and enhancement.    Enterography: No active Crohn's disease (3/20/15)  C diff: None (7/12/16)    ASSESSMENT/PLAN:  62 year old male with Crohn's disease s/p ileocecectomy on no current Crohn's therapy.     1. Crohn's disease:  No evidence of active disease, although concern for re-stricturing at ileocolonic anastomosis requiring re-dilation in 2016 and 2017.  At this time we will continue to monitor for active Crohn's and dilated when necessary.   Plan to repeat MRA to evaluate for any active/stricturing disease.  No evidence of obstructive symptoms.  --MRE to be scheduled in the near future  -- Colonoscopy this year to assess for active disease and with potential dilation of anastomosis  -- Continue with yearly LFTs to screen for PSC.     2. Colon polyps: Well defined lesions with adenomatous changes.  Will continue to surveillance   -- Plan for chromoendoscopy with next colonoscopy     3. Diarrhea: Quiescent Crohn's. Prior work up has included alpha 1 antitrypsin for PLE, pancreatic elastase, TTG, DGP all of which were normal.  A spot fecal fat the only isolated value that was increased.  Diarrhea may be related to IBS, or lactose intolerance.  Could also be bile salt malabsorption or bacterial overgrowth.  Patient has had a decent response to fiber which he continues at this time.  Diarrhea symptoms are stable and unchanged.  -- Avoid dietary triggers  -- prn lomotil  -- If diarrhea persists 2-3 days, will check for C diff    4. Possible acid reflux:  No current symptoms of acid reflux.      5. Pancreatic head cystic lesion: Increased from 2006, potentially representing IPMN.  MRCP with no change in cyst.  Reviewed with Dr. Cruz.  EUS with FNA normal.   -- No further follow-up at this time needed.      6. Steroid dependency/Adrenal insufficiency:  Followed closely with endocrinology.  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      8. Depression: Unchanged.     9. IBD healthcare maintenance based on patients current medication:      Vaccinations:  -- Influenza (every year): Last given 2016  -- TdaP (every 10 years): Last given 2015  -- Pneumococcal Pneumonia (once then every 5 years): Has not obtained.    One time confirmation of immunity or serologies:  -- Hepatitis A (serologies or immunizations): 2005  -- Hepatitis B (serologies or immunizations): 2005  -- Zoster  vaccination (>59 yo, discuss if over 50): 2005  -- MMR: Not documented.  -- HPV (all aged 18-26): N/A  -- Meningococcal meningitis (all patients at risk for meningitis): Patient is not at risk.     Cancer Screening:  Colon cancer screening:  Since there is evidence of pancolitis histologically. Also with adenomatous polyps. Colonoscopy every 2-3 years recommended.  Dysplasia screening is recommended 2018.    Cervical cancer screening: N/A    Skin cancer screening: Since patient is not immunocompromised, this is per patient's dermatologist recommendations.    Depression Screening:  -- Over the last month, have you felt down, depressed, or hopeless? No  -- Over the last month, have you felt little interest or pleasure doing things? No    Misc:  -- Avoid tobacco use  -- Avoid NSAIDs as there is potentially a 25% chance of causing an IBD flare    RTC 6 months    Thank you for this consultation.  It was a pleasure to participate in the care of this patient; please contact us with any further questions.        Slick Malone PA-C  Division of Gastroenterology, Hepatology and Nutrition  [facility]       HPI:   62-year-old male with ileocolonic Crohn's disease after ileocecal resection and 2 surgical revisions in deep remission on no maintenance therapies.  Patient has had evidence of stricturing at the anastomosis site requiring dilations on subsequent colonoscopies.  He still struggles with persistent diarrhea that has been evaluated extensively in the past.  No evidence of active inflammation to suggest reasoning for diarrhea.  Only positive test obtained in diarrhea was increased spot fecal fat however pancreatic elastase was in normal limits. Al pha 1 antitrypsin for PLE, pancreatic elastase, TTG, DGP all of which were normal.  He continues to have abdominal pain on rare occurrences but is significant in nature.  He is unable to identify triggers or potential etiologies of abdominal pain.  The abdominal  pain eventually subsides on its own.  Patient feels that these instances are very rare but have occurred since he has had dilation with colonoscopy.  Patient is quite concerned that he could have another stricture his other anastomosis site.  There has not been any evidence of other stricturing disease or concerns for obstruction on previous MREs. The episodes are described as a sharp/burning sensation about 30 minutes after eating. They last only a few minutes. A liquid diet help in these instances.  He denies any of these symptoms currently.  He denies any nausea or vomiting.    ROS:    No fevers or chills  No weight loss  No blurry vision, double vision or change in vision  No sore throat  No lymphadenopathy  No headache, paraesthesias, or weakness in a limb  No shortness of breath or wheezing  No chest pain or pressure  No arthralgias or myalgias  No rashes or skin changes  No odynophagia or dysphagia  No BRBPR, hematochezia, melena  No dysuria, frequency or urgency  No hot/cold intolerance or polyria  No anxiety or depression    Extra intestinal manifestations of IBD:  No uveitis/episcleritis  No aphthous ulcers   No arthritis   No erythema nodosum/pyoderma gangrenosum.     PERTINENT PAST MEDICAL HISTORY:  Past Medical History:   Diagnosis Date     Anemia 2007     Anxiety 2012?    much worse since July 2014     Cataract 12/2007    both eyes, too much prednisone, implants     Coccidioidomycosis      Crohn disease (H)      Crohn's disease (H) 1/13/2015     Depressive disorder 7/2014    much worse since July 2014     Fracture 1956    green fracture of leg     Gallbladder problem 2005?    much gravel, removed     GERD (gastroesophageal reflux disease)      History of blood transfusion 1988    ulcerated anastomosis term. ilium bleed     Hypertriglyceridemia 7/8/2016     Infection 2007    persistant coccidioidomycosis     Kidney stone various    both sides, all passed naturally     Mental health problem 2007     bipolar though perhaps different     Nephrolithiasis      Osteoporosis 2007    osteoporosis, too much prednisone, last dexa 1/2014     Osteoporosis      Other nervous system complications 2007    prednisone overload induced kevin     Personal history of colonic polyps     small ones found during colonoscopies     Steroid-induced psychosis     Patient extremely sensitive to regular doses of steroids.  Unclear if this is due to chronic fluconazole use or genetic issue.  In the future, use caution when prescribing steroids.     TB (tuberculosis)        PREVIOUS SURGERIES:  Past Surgical History:   Procedure Laterality Date     APPENDECTOMY  1974    coincidental with Crohn's surgery     BACK SURGERY  12/2007    kyphoplasty on compressed 4th??? vertebra     BIOPSY  2007, 2013    lump on arm, knee, back of hand for coccidioidomycosis cult.     CHOLECYSTECTOMY  2005    much gravel, removed laparoscopically     COLONOSCOPY  7/14/2014 last    Dr. Catherine Bowden, [facility] - many previous     COLONOSCOPY Left 9/11/2015    Procedure: COMBINED COLONOSCOPY, SINGLE OR MULTIPLE BIOPSY/POLYPECTOMY BY BIOPSY;  Surgeon: Fransisco Leach MD;  Location: [facility]     COLONOSCOPY N/A 8/3/2016    Procedure: COMBINED COLONOSCOPY, SINGLE OR MULTIPLE BIOPSY/POLYPECTOMY BY BIOPSY;  Surgeon: Fransisco Leach MD;  Location: [facility]     COLONOSCOPY N/A 8/16/2017    Procedure: COMBINED COLONOSCOPY, SINGLE OR MULTIPLE BIOPSY/POLYPECTOMY BY BIOPSY;;  Surgeon: Fransisco Leach MD;  Location: [facility]     ENT SURGERY  ?    upper endoscopy     ESOPHAGOSCOPY, GASTROSCOPY, DUODENOSCOPY (EGD), COMBINED N/A 11/8/2016    Procedure: COMBINED ENDOSCOPIC ULTRASOUND, ESOPHAGOSCOPY, GASTROSCOPY, DUODENOSCOPY (EGD), FINE NEEDLE ASPIRATE/BIOPSY;  Surgeon: Guru Alexsandra Ballesteros MD;  Location:  GI     ESOPHAGOSCOPY, GASTROSCOPY, DUODENOSCOPY (EGD), COMBINED N/A 11/8/2016    Procedure: COMBINED ESOPHAGOSCOPY, GASTROSCOPY,  "DUODENOSCOPY (EGD), BIOPSY SINGLE OR MULTIPLE;  Surgeon: Guru Alexsandra Ballesteros MD;  Location:  GI     ESOPHAGOSCOPY, GASTROSCOPY, DUODENOSCOPY (EGD), COMBINED N/A 8/16/2017    Procedure: COMBINED ESOPHAGOSCOPY, GASTROSCOPY, DUODENOSCOPY (EGD), BIOPSY SINGLE OR MULTIPLE;;  Surgeon: Fransisco Leach MD;  [facility]     EYE SURGERY  12/2007    cataract surgery both sides     GI SURGERY  1974, 1986(x2), 1989    Crohn's, 1974 RO 18\" term. ilium + 9\" asc. colon all one pc     SOFT TISSUE SURGERY  3/2013    removed buildup on back of r hand, coccidioidomycosis       PREVIOUS ENDOSCOPY:  Summer 2014: Colonoscopy - not in out system    ALLERGIES:     Allergies   Allergen Reactions     Prednisone Other (See Comments)     Diana with more than 2.5mg chronic dosing of prednisone due to fluconazole interaction per patient.        PERTINENT MEDICATIONS:    Current Outpatient Prescriptions:      traZODone (DESYREL) 50 MG tablet, Take 1 tablet (50 mg) by mouth nightly as needed for sleep, Disp: 90 tablet, Rfl: 2     fluconazole (DIFLUCAN) 200 MG tablet, Take 1 tablet (200 mg) by mouth daily, Disp: 90 tablet, Rfl: 3     omeprazole (PRILOSEC) 40 MG capsule, Take 40 mg by mouth daily, Disp: , Rfl:      diphenoxylate-atropine (LOMOTIL) 2.5-0.025 MG per tablet, Take 1 tablet by mouth 4 times daily as needed for diarrhea After loose stools, Disp: 50 tablet, Rfl: 1     clonazePAM (KLONOPIN) 0.5 MG tablet, , Disp: , Rfl:      MELATONIN PO, , Disp: , Rfl:      acetaminophen (TYLENOL) 500 MG tablet, Take 500-1,000 mg by mouth every 6 hours as needed for mild pain, Disp: , Rfl:      Cyanocobalamin (VITAMIN B 12 PO), Take 500 mcg by mouth, Disp: , Rfl:      Pyridoxine HCl (VITAMIN B6 PO), Take 100 mg by mouth, Disp: , Rfl:      ferrous sulfate 325 (65 FE) MG tablet, Take by mouth daily (with breakfast), Disp: , Rfl:      multivitamin, therapeutic with minerals (MULTI-VITAMIN) TABS, Take 1 tablet by mouth daily, Disp: " , Rfl:      calcium-vitamin D (CALTRATE) 600-400 MG-UNIT per tablet, Take 1 tablet by mouth 2 times daily, Disp: , Rfl:      Cholecalciferol (VITAMIN D3 PO), Take 1,000 Units by mouth daily, Disp: , Rfl:      Ascorbic Acid (VITAMIN C PO), Take 500 mg by mouth, Disp: , Rfl:      rifaximin (XIFAXAN) 550 MG TABS tablet, Take 1 tablet (550 mg) by mouth 3 times daily (Patient not taking: Reported on 1/8/2018), Disp: 42 tablet, Rfl: 0    SOCIAL HISTORY:  Social History     Social History     Marital status:      Spouse name: N/A     Number of children: N/A     Years of education: N/A     Occupational History     Reserach associate at the [employer]     Social History Main Topics     Smoking status: Never Smoker     Smokeless tobacco: Never Used     Alcohol use Yes      Comment: sometimes one glass of wine a week     Drug use: No     Sexual activity: No     Other Topics Concern     Not on file     Social History Narrative       FAMILY HISTORY:  Family History   Problem Relation Age of Onset     Heart Failure Father      Musculoskeletal Disorder Father      Parkinson's     Cancer - colorectal Mother      CEREBROVASCULAR DISEASE Paternal Grandmother      CANCER Other      Musculoskeletal Disorder Brother      Parkinson's     Mother with colon cancer in 80s.  No family member with Crohn's disease or Ulcerative Colitis.      Past/family/social history reviewed and no changes    PHYSICAL EXAMINATION:  Vitals reviewed, AFVSS   Wt   Wt Readings from Last 2 Encounters:   01/08/18 74.2 kg (163 lb 9.6 oz)   08/16/17 72.6 kg (160 lb)      Gen: aaox3, cooperative, pleasant, not dyspneic/diaphoretic, nad  HEENT: ncat, neck supple, no clad, normal op w/o ulcer/exudate, anicteric, mmm  Lymph: No axillary, submandibular, supraclavicular or inguinal lymphadenopathy  Resp/CV unremarkable  Abd: Nondistended, +bs, no hepatosplenomegaly, nontender, no peritoneal signs  Ext: no c/c/e  Skin: warm, perfused, no  jaundice      PERTINENT STUDIES:  Most recent CBC:  Recent Labs   Lab Test  07/05/17   1442  04/14/17   1526   WBC  5.6  6.0   HGB  16.9  16.5   HCT  49.0  47.5   PLT  212  218     Most recent hepatic panel:  Recent Labs   Lab Test  07/05/17   1442  04/14/17   1526   ALT  27  33   AST  18  19     Most recent creatinine:  Recent Labs   Lab Test  07/14/17   1425  04/14/17   1526   CR  1.34*  1.26*       MRE: 3/20/15:   IMPRESSION:   1. Unremarkable small bowel other than postsurgical changes following right hemicolectomy with ileocolic anastomosis.  2. Small gastric diverticulum. 3. 1.5 x 0.8 cm pancreatic head cystic lesion minimally increased in size compared to the prior exam of 2006, but potentially representing intraductal papillary mucinous neoplasm. This can be reassessed on followup.  4. Hepatic steatosis.      Answers for HPI/ROS submitted by the patient on 1/5/2018   General Symptoms: No  Skin Symptoms: Yes  HENT Symptoms: No  EYE SYMPTOMS: No  HEART SYMPTOMS: No  LUNG SYMPTOMS: No  INTESTINAL SYMPTOMS: Yes  URINARY SYMPTOMS: No  REPRODUCTIVE SYMPTOMS: No  SKELETAL SYMPTOMS: No  BLOOD SYMPTOMS: No  NERVOUS SYSTEM SYMPTOMS: No  MENTAL HEALTH SYMPTOMS: No  Changes in hair: No  Changes in moles/birth marks: No  Itching: Yes  Rashes: No  Changes in nails: No  Acne: No  Change in facial hair: No  Warts: No  Non-healing sores: No  Scarring: No  Flaking of skin: No  Color changes of hands/feet in cold : No  Sun sensitivity: No  Skin thickening: No  Heart burn or indigestion: No  Nausea: No  Vomiting: No  Abdominal pain: Yes  Bloating: No  Constipation: Yes  Diarrhea: Yes  Blood in stool: No  Black stools: No  Rectal or Anal pain: No  Fecal incontinence: No  Yellowing of skin or eyes: No  Vomit with blood: No  Change in stools: No

## 2018-01-09 ENCOUNTER — TELEPHONE (OUTPATIENT)
Dept: ENDOCRINOLOGY | Facility: CLINIC | Age: 63
End: 2018-01-09

## 2018-01-09 NOTE — LETTER
Patient:  Trevin Gee  :   1955  MRN:     [id number]        Mr.Mark MORENITA Gee  [address] [place] [id number]        2018    Dear Trevin    I see that you do not have a follow-up appointment in endocrinology. I would recommend that you be evaluated by an endocrinologist to minimize complications. If you like to be seen at the [facility], please call [phone] select option #1 for an appointment.    Regards    Mag Currie MD

## 2018-01-15 DIAGNOSIS — K63.8219 SMALL INTESTINAL BACTERIAL OVERGROWTH: Primary | ICD-10-CM

## 2018-01-30 ASSESSMENT — ENCOUNTER SYMPTOMS
HEARTBURN: 0
NAUSEA: 0
POOR WOUND HEALING: 0
NAIL CHANGES: 0
VOMITING: 0
DIARRHEA: 1
BOWEL INCONTINENCE: 0
BLOATING: 0
ABDOMINAL PAIN: 0
SKIN CHANGES: 0
BLOOD IN STOOL: 0
CONSTIPATION: 0
JAUNDICE: 0
RECTAL PAIN: 0

## 2018-02-06 ENCOUNTER — OFFICE VISIT (OUTPATIENT)
Dept: INTERNAL MEDICINE | Facility: CLINIC | Age: 63
End: 2018-02-06
Payer: COMMERCIAL

## 2018-02-06 VITALS
HEIGHT: 71 IN | SYSTOLIC BLOOD PRESSURE: 119 MMHG | BODY MASS INDEX: 23.07 KG/M2 | DIASTOLIC BLOOD PRESSURE: 80 MMHG | HEART RATE: 74 BPM | OXYGEN SATURATION: 96 % | WEIGHT: 164.8 LBS

## 2018-02-06 DIAGNOSIS — Z23 NEED FOR INFLUENZA VACCINATION: Primary | ICD-10-CM

## 2018-02-06 DIAGNOSIS — R19.7 DIARRHEA, UNSPECIFIED TYPE: ICD-10-CM

## 2018-02-06 DIAGNOSIS — M54.50 ACUTE BILATERAL LOW BACK PAIN WITHOUT SCIATICA: ICD-10-CM

## 2018-02-06 LAB
ANION GAP SERPL CALCULATED.3IONS-SCNC: 6 MMOL/L (ref 3–14)
BUN SERPL-MCNC: 18 MG/DL (ref 7–30)
CALCIUM SERPL-MCNC: 9.9 MG/DL (ref 8.5–10.1)
CHLORIDE SERPL-SCNC: 106 MMOL/L (ref 94–109)
CO2 SERPL-SCNC: 29 MMOL/L (ref 20–32)
CREAT SERPL-MCNC: 1.32 MG/DL (ref 0.66–1.25)
GFR SERPL CREATININE-BSD FRML MDRD: 55 ML/MIN/1.7M2
GLUCOSE SERPL-MCNC: 101 MG/DL (ref 70–99)
POTASSIUM SERPL-SCNC: 4.8 MMOL/L (ref 3.4–5.3)
SODIUM SERPL-SCNC: 140 MMOL/L (ref 133–144)

## 2018-02-06 ASSESSMENT — PAIN SCALES - GENERAL: PAINLEVEL: SEVERE PAIN (6)

## 2018-02-06 NOTE — MR AVS SNAPSHOT
"              After Visit Summary   2/6/2018    Trevin Gee    MRN: [id number]           Patient Information     Date Of Birth          1955        Visit Information        Provider Department      2/6/2018 10:50 AM Saroj Haas MD [facility]        Today's Diagnoses     Need for influenza vaccination    -  1    Diarrhea, unspecified type        Acute bilateral low back pain without sciatica           Follow-ups after your visit        Additional Services     PHYSICAL THERAPY REFERRAL       *This therapy referral will be filtered to a centralized scheduling office at [facility] and the patient will receive a call to schedule an appointment at a [place] location most convenient for them. *     [facility] provides Physical Therapy evaluation and treatment and many specialty services across the [place] system.  If requesting a specialty program, please choose from the list below.    If you have not heard from the scheduling office within 2 business days, please call [phone] for all locations, with the exception of [place], please call [phone].  Treatment: Evaluation & Treatment  Special Instructions/Modalities: Assessment and treatment of MSK back and hip pain.  Special Programs: None    Please be aware that coverage of these services is subject to the terms and limitations of your health insurance plan.  Call member services at your health plan with any benefit or coverage questions.      **Note to Provider:  If you are referring outside of [place] for the therapy appointment, please list the name of the location in the \"special instructions\" above, print the referral and give to the patient to schedule the appointment.                  Follow-up notes from your care team     Return in about 4 weeks (around 3/6/2018).      Your next 10 appointments already scheduled     Feb 06, 2018 12:45 PM DOV   LAB with [facility] " Lab ([facility])    [address] [id number] [phone]           Please do not eat 10-12 hours before your appointment if you are coming in fasting for labs on lipids, cholesterol, or glucose (sugar). This does not apply to pregnant women. Water, hot tea and black coffee (with nothing added) are okay. Do not drink other fluids, diet soda or chew gum.            Feb 14, 2018 10:00 AM CST   (Arrive by 9:45 AM)   MR ENTEROGRAPHY with [facility] IMAGING NURSE   [facility] MRI ([facility])    [address] [id number] [phone]           Take your medicines as usual, unless your doctor tells you not to. Bring a list of your current medicines to your exam (including vitamins, minerals and over-the-counter drugs).  You will be asked to drink oral contrast for this exam. You will be given the oral contrast in MRI prior to your exam. Please arrive 60-90 minutes before your exam time to drink the oral contrast. To prepare:   The day before your exam, drink extra fluids   at least six 8-ounce glasses (unless your doctor tells you to restrict your fluids).   Have a blood test (creatinine test) within 30 days of your exam. Go to your clinic or Diagnostic Imaging Department for this test.  Do not eat or drink for 6 hours before your exam. If you need to take medicine, you may take it with small sips of water. (We may ask you to take liquid medicine as well.)  The MRI machine uses a strong magnet. Please wear clothes without metal (snaps, zippers). A sweatsuit works well, or we may give you a hospital gown.  Please remove any body piercings and hair extensions before you arrive. You will also remove watches, jewelry, hairpins, wallets, dentures, partial dental plates and hearing aids. You may wear contact lenses, and you may be able to wear your rings. We have a safe place to keep your personal  items, but it is safer to leave them at home.  If you have any questions, please contact your Imaging Department exam site.            Mar 13, 2018  2:40 PM CDT   (Arrive by 2:25 PM)   Return Visit with Saroj Haas MD   [facility] ([facility])    [address] [place] [id number] [phone]            Apr 09, 2018 12:00 PM CDT   (Arrive by 11:45 AM)   RETURN INFLAMMATORY BOWEL DISEASE with Slick Malone PA-C   [facility] ([facility])    [address] [place] [id number] [phone]              Future tests that were ordered for you today     Open Future Orders        Priority Expected Expires Ordered    Basic metabolic panel Routine 2/6/2018 2/20/2018 2/6/2018            Who to contact     Please call your clinic at [phone] to:    Ask questions about your health    Make or cancel appointments    Discuss your medicines    Learn about your test results    Speak to your doctor   If you have compliments or concerns about an experience at your clinic, or if you wish to file a complaint, please contact [facility] Patient Relations at [phone] or email us at [email or web address]         Additional Information About Your Visit        Instablogshart Information     [employer] gives you secure access to your electronic health record. If you see a primary care provider, you can also send messages to your care team and make appointments. If you have questions, please call your primary care clinic.  If you do not have a primary care provider, please call [phone] and they will assist you.      [employer] is an electronic gateway that provides easy, online access to your medical records. With [employer], you can request a clinic appointment, read your test results, renew a prescription or communicate with your care team.     To  "access your existing account, please contact your [facility] or call [phone] for assistance.        Care EveryWhere ID     This is your Care EveryWhere ID. This could be used by other organizations to access your [facility] medical records  [id number]        Your Vitals Were     Pulse Height Pulse Oximetry BMI (Body Mass Index)          74 1.803 m (5' 11\") 96% 22.98 kg/m2         Blood Pressure from Last 3 Encounters:   02/06/18 119/80   01/08/18 116/81   08/16/17 124/82    Weight from Last 3 Encounters:   02/06/18 74.8 kg (164 lb 12.8 oz)   01/08/18 74.2 kg (163 lb 9.6 oz)   08/16/17 72.6 kg (160 lb)              We Performed the Following     PHYSICAL THERAPY REFERRAL          Today's Medication Changes          These changes are accurate as of 2/6/18 12:25 PM.  If you have any questions, ask your nurse or doctor.               These medicines have changed or have updated prescriptions.        Dose/Directions    traZODone 50 MG tablet   Commonly known as:  DESYREL   This may have changed:  additional instructions   Used for:  Bipolar 2 disorder (H)        Dose:  50 mg   Take 1 tablet (50 mg) by mouth nightly as needed for sleep   Quantity:  90 tablet   Refills:  2         Stop taking these medicines if you haven't already. Please contact your care team if you have questions.     clonazePAM 0.5 MG tablet   Commonly known as:  klonoPIN   Stopped by:  Saroj Haas MD           omeprazole 40 MG capsule   Commonly known as:  priLOSEC   Stopped by:  Saroj Haas MD                    Primary Care Provider Office Phone # Fax #    Saroj Haas [phone]       [facility] [address] [facility] [address]  [facility] [address]        Equal Access to Services     GUMARO CERRATO : Aleta Tom, juan dietz, dhruv arora, jessika romero. [facility] [phone].    ATENCIÓN: Si trever garnica " corey disposición servicios gratuitos de asistencia lingüística. Chantal dodd [phone].    We comply with applicable federal civil rights laws and [place] laws. We do not discriminate on the basis of race, color, national origin, age, disability, sex, sexual orientation, or gender identity.            Thank you!     Thank you for choosing [facility]  for your care. Our goal is always to provide you with excellent care. Hearing back from our patients is one way we can continue to improve our services. Please take a few minutes to complete the written survey that you may receive in the mail after your visit with us. Thank you!             Your Updated Medication List - Protect others around you: Learn how to safely use, store and throw away your medicines at www.disposemymeds.org.          This list is accurate as of 2/6/18 12:25 PM.  Always use your most recent med list.                   Brand Name Dispense Instructions for use Diagnosis    acetaminophen 500 MG tablet    TYLENOL     Take 500-1,000 mg by mouth every 6 hours as needed for mild pain        calcium-vitamin D 600-400 MG-UNIT per tablet    CALTRATE     Take 1 tablet by mouth 2 times daily        diphenoxylate-atropine 2.5-0.025 MG per tablet    LOMOTIL    50 tablet    Take 1 tablet by mouth 4 times daily as needed for diarrhea After loose stools    Crohn's disease of small intestine without complication (H)       ferrous sulfate 325 (65 FE) MG tablet    IRON     Take by mouth daily (with breakfast)        fluconazole 200 MG tablet    DIFLUCAN    90 tablet    Take 1 tablet (200 mg) by mouth daily    Coccidioidal granuloma       MELATONIN PO      Take 1.5 mg by mouth nightly as needed        Multi-vitamin Tabs tablet      Take 1 tablet by mouth daily        * rifaximin 550 MG Tabs tablet    XIFAXAN    42 tablet    Take 1 tablet (550 mg) by mouth 3 times daily    Diarrhea, unspecified type       * rifaximin 550 MG Tabs tablet    XIFAXAN     60 tablet    Take 1 tablet (550 mg) by mouth 2 times daily    Small intestinal bacterial overgrowth       traZODone 50 MG tablet    DESYREL    90 tablet    Take 1 tablet (50 mg) by mouth nightly as needed for sleep    Bipolar 2 disorder (H)       VITAMIN B 12 PO      Take 500 mcg by mouth        VITAMIN B6 PO      Take 100 mg by mouth        VITAMIN C PO      Take 500 mg by mouth        VITAMIN D3 PO      Take 1,000 Units by mouth daily        * Notice:  This list has 2 medication(s) that are the same as other medications prescribed for you. Read the directions carefully, and ask your doctor or other care provider to review them with you.
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
## 2018-02-08 NOTE — PROGRESS NOTES
PRIMARY CARE CLINIC    Resident Clinic Note        Visit Date: February 8, 2018    Trevin Gee  MRN: [id number]  YOB: 1955    HPI:  Trevin Gee is a 62 year old male  has a past medical history of Anemia (2007); Anxiety (2012?); Cataract (12/2007); Coccidioidomycosis; Crohn disease (H); Crohn's disease (H) (1/13/2015); Depressive disorder (7/2014); Fracture (1956); Gallbladder problem (2005?); GERD (gastroesophageal reflux disease); History of blood transfusion (1988); Hypertriglyceridemia (7/8/2016); Infection (2007); Kidney stone (various); Mental health problem (2007); Nephrolithiasis; Osteoporosis (2007); Osteoporosis; Other nervous system complications (2007); Personal history of colonic polyps; Steroid-induced psychosis; and TB (tuberculosis).     The patient present to clinic to reestablish care.  He has been closely followed by GI for many years for his Crohn disease and associated complications.    Today he reports with several acute problems, most notably pain in his lower pain and hips, which started several weeks ago.  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  Patient reports no pain or discomfort during these activities, but several started to have pain in his deep lower back several days after.       ROS:  10 point ROS was reviewed and negative other than stated in HPI    Past medical history reviewed.    Past Medical History:   Diagnosis Date     Anemia 2007     Anxiety 2012?    much worse since July 2014     Cataract 12/2007    both eyes, too much prednisone, implants     Coccidioidomycosis      Crohn disease (H)      Crohn's disease (H) 1/13/2015     Depressive disorder 7/2014    much worse since July 2014     Fracture 1956    green fracture of leg     Gallbladder problem 2005?    much gravel,  removed     GERD (gastroesophageal reflux disease)      History of blood transfusion 1988    ulcerated anastomosis term. ilium bleed     Hypertriglyceridemia 7/8/2016     Infection 2007    persistant coccidioidomycosis     Kidney stone various    both sides, all passed naturally     Mental health problem 2007    bipolar though perhaps different     Nephrolithiasis      Osteoporosis 2007    osteoporosis, too much prednisone, last dexa 1/2014     Osteoporosis      Other nervous system complications 2007    prednisone overload induced kevin     Personal history of colonic polyps     small ones found during colonoscopies     Steroid-induced psychosis     Patient extremely sensitive to regular doses of steroids.  Unclear if this is due to chronic fluconazole use or genetic issue.  In the future, use caution when prescribing steroids.     TB (tuberculosis)        Allergies   Allergen Reactions     Prednisone Other (See Comments)     Kevin with more than 2.5mg chronic dosing of prednisone due to fluconazole interaction per patient.        Past Surgical History:   Procedure Laterality Date     APPENDECTOMY  1974    coincidental with Crohn's surgery     BACK SURGERY  12/2007    kyphoplasty on compressed 4th??? vertebra     BIOPSY  2007, 2013    lump on arm, knee, back of hand for coccidioidomycosis cult.     CHOLECYSTECTOMY  2005    much gravel, removed laparoscopically     COLONOSCOPY  7/14/2014 last    Dr. Catherine Bowden, [facility] - many previous     COLONOSCOPY Left 9/11/2015    Procedure: COMBINED COLONOSCOPY, SINGLE OR MULTIPLE BIOPSY/POLYPECTOMY BY BIOPSY;  Surgeon: Fransisco Leach MD;  Location: [facility]     COLONOSCOPY N/A 8/3/2016    Procedure: COMBINED COLONOSCOPY, SINGLE OR MULTIPLE BIOPSY/POLYPECTOMY BY BIOPSY;  Surgeon: Fransisco Leach MD;  Location: [facility]     COLONOSCOPY N/A 8/16/2017    Procedure: COMBINED COLONOSCOPY, SINGLE OR MULTIPLE BIOPSY/POLYPECTOMY BY BIOPSY;;  Surgeon: Hany  "Fransisco Healy MD;  [facility]     ENT SURGERY  ?    upper endoscopy     ESOPHAGOSCOPY, GASTROSCOPY, DUODENOSCOPY (EGD), COMBINED N/A 11/8/2016    Procedure: COMBINED ENDOSCOPIC ULTRASOUND, ESOPHAGOSCOPY, GASTROSCOPY, DUODENOSCOPY (EGD), FINE NEEDLE ASPIRATE/BIOPSY;  Surgeon: Guru Alexsandra Ballesteros MD;  Location: [facility]     ESOPHAGOSCOPY, GASTROSCOPY, DUODENOSCOPY (EGD), COMBINED N/A 11/8/2016    Procedure: COMBINED ESOPHAGOSCOPY, GASTROSCOPY, DUODENOSCOPY (EGD), BIOPSY SINGLE OR MULTIPLE;  Surgeon: Guru Alexsandra Ballesteros MD;  Location: [facility]     ESOPHAGOSCOPY, GASTROSCOPY, DUODENOSCOPY (EGD), COMBINED N/A 8/16/2017    Procedure: COMBINED ESOPHAGOSCOPY, GASTROSCOPY, DUODENOSCOPY (EGD), BIOPSY SINGLE OR MULTIPLE;;  Surgeon: Fransisco Leach MD;  [facility]     EYE SURGERY  12/2007    cataract surgery both sides     GI SURGERY  1974, 1986(x2), 1989    Crohn's, 1974 RO 18\" term. ilium + 9\" asc. colon all one pc     SOFT TISSUE SURGERY  3/2013    removed buildup on back of r hand, coccidioidomycosis       Family History   Problem Relation Age of Onset     Heart Failure Father      Musculoskeletal Disorder Father      Parkinson's     Cancer - colorectal Mother      CEREBROVASCULAR DISEASE Paternal Grandmother      CANCER Other      Musculoskeletal Disorder Brother      Parkinson's       Social History     Social History     Marital status:      Spouse name: N/A     Number of children: N/A     Years of education: N/A     Occupational History     Reserach associate at the [employer]     Social History Main Topics     Smoking status: Never Smoker     Smokeless tobacco: Never Used     Alcohol use Yes      Comment: sometimes one glass of wine a week     Drug use: No     Sexual activity: No     Other Topics Concern     Not on file     Social History Narrative       Current Outpatient Prescriptions   Medication     rifaximin (XIFAXAN) 550 MG TABS tablet     " "traZODone (DESYREL) 50 MG tablet     fluconazole (DIFLUCAN) 200 MG tablet     diphenoxylate-atropine (LOMOTIL) 2.5-0.025 MG per tablet     MELATONIN PO     acetaminophen (TYLENOL) 500 MG tablet     Cyanocobalamin (VITAMIN B 12 PO)     Pyridoxine HCl (VITAMIN B6 PO)     ferrous sulfate 325 (65 FE) MG tablet     multivitamin, therapeutic with minerals (MULTI-VITAMIN) TABS     calcium-vitamin D (CALTRATE) 600-400 MG-UNIT per tablet     Cholecalciferol (VITAMIN D3 PO)     Ascorbic Acid (VITAMIN C PO)     rifaximin (XIFAXAN) 550 MG TABS tablet     No current facility-administered medications for this visit.      Vitals:  /80  Pulse 74  Ht 1.803 m (5' 11\")  Wt 74.8 kg (164 lb 12.8 oz)  SpO2 96%  BMI 22.98 kg/m2    Physical Exam:  General: NAD  HEENT: Oral mucosa moist and non-erythematous, PERRLA, EOM intact  CV: RRR, normal S1S2, no m/c/r  Resp: Clear to auscultation bilaterally, no wheezes or crackles  Abd: Soft, non-tender, BS+, no masses appreciated  Extremities: WWP, no pedal edema.  Pain with internal rotation of the left hip.  No pain with palpation of hip joint, and lateral bursa bilaterally.  Negative straight leg raise bilaterally  Neuro: AAOx3, no lateralizing symptoms or focal neurologic deficits    Assessment/Plan:  Trevin was seen today for LB/hip MSK pain, and diarrhea.    # Diarrhea:  Reports several days of increasingly liquid, non-bloody stools, started rifaximin per GI recommendations.  -  Basic metabolic panel    # Acute bilateral low back pain without sciatica:  Recreation of pain with internal rotation.  No neurologic findings or slight of impingement.  Likely MSK strain/ pain due to recent activity.  -  PHYSICAL THERAPY REFERRAL    Health Maintenance: None this appointment    Follow-up: In approximately 1 month with me in clinic    Patient seen and discussed with Dr. Sonido Haas MD, A  Internal Medicine PGY-2  [facility]  Pager: [phone]    Pt was " seen and examined with Dr. Haas.  I agree with his documentation as noted above.    My additional comments: none    Reny Head MD                 Answers for HPI/ROS submitted by the patient on 1/30/2018   General Symptoms: No  Skin Symptoms: Yes  HENT Symptoms: No  EYE SYMPTOMS: No  HEART SYMPTOMS: No  LUNG SYMPTOMS: No  INTESTINAL SYMPTOMS: Yes  URINARY SYMPTOMS: No  REPRODUCTIVE SYMPTOMS: No  SKELETAL SYMPTOMS: No  BLOOD SYMPTOMS: No  NERVOUS SYSTEM SYMPTOMS: No  MENTAL HEALTH SYMPTOMS: No  Changes in hair: No  Changes in moles/birth marks: No  Itching: Yes  Rashes: No  Changes in nails: No  Acne: No  Change in facial hair: No  Warts: No  Non-healing sores: No  Scarring: No  Flaking of skin: No  Color changes of hands/feet in cold : No  Sun sensitivity: No  Skin thickening: No  Heart burn or indigestion: No  Nausea: No  Vomiting: No  Abdominal pain: No  Bloating: No  Constipation: No  Diarrhea: Yes  Blood in stool: No  Black stools: No  Rectal or Anal pain: No  Fecal incontinence: No  Yellowing of skin or eyes: No  Vomit with blood: No  Change in stools: No

## 2018-02-09 ENCOUNTER — THERAPY VISIT (OUTPATIENT)
Dept: PHYSICAL THERAPY | Facility: CLINIC | Age: 63
End: 2018-02-09
Payer: COMMERCIAL

## 2018-02-09 DIAGNOSIS — M54.16 LUMBAR RADICULOPATHY: Primary | ICD-10-CM

## 2018-02-09 PROCEDURE — 97530 THERAPEUTIC ACTIVITIES: CPT | Mod: GP | Performed by: PHYSICAL THERAPIST

## 2018-02-09 PROCEDURE — 97161 PT EVAL LOW COMPLEX 20 MIN: CPT | Mod: GP | Performed by: PHYSICAL THERAPIST

## 2018-02-09 PROCEDURE — 97110 THERAPEUTIC EXERCISES: CPT | Mod: GP | Performed by: PHYSICAL THERAPIST

## 2018-02-09 NOTE — MR AVS SNAPSHOT
After Visit Summary   2/9/2018    Trevin Gee    MRN: [id number]           Patient Information     Date Of Birth          1955        Visit Information        Provider Department      2/9/2018 3:30 PM Love Luo PT M [facility] Physical Therapy [facility]        Today's Diagnoses     Lumbar radiculopathy    -  1       Follow-ups after your visit        Your next 10 appointments already scheduled     Feb 14, 2018 10:00 AM CST   (Arrive by 9:45 AM)   MR ENTEROGRAPHY with UCMR1, UC IMAGING NURSE   [facility] MRI ([facility])    [address] [place] [id number] [phone]           Take your medicines as usual, unless your doctor tells you not to. Bring a list of your current medicines to your exam (including vitamins, minerals and over-the-counter drugs).  You may or may not receive intravenous (IV) contrast for this exam pending the discretion of the Radiologist.  You will be asked to drink oral contrast for this exam. You will be given the oral contrast in MRI prior to your exam. Please arrive 60-90 minutes before your exam time to drink the oral contrast. To prepare:   Do not eat or drink for 6 hours before the exam. If you need to take medicine, you may take it with small sips of water. (We may ask you to take liquid medicine as well.)  The MRI machine uses a strong magnet. Please wear clothes without metal (snaps, zippers). A sweatsuit works well, or we may give you a hospital gown.  Please remove any body piercings and hair extensions before you arrive. You will also remove watches, jewelry, hairpins, wallets, dentures, partial dental plates and hearing aids. You may wear contact lenses, and you may be able to wear your rings. We have a safe place to keep your personal items, but it is safer to leave them at home.  If you have any questions, please contact your Imaging Department exam site.            Feb 16, 2018  2:40 PM  CST   [facility] Spine with Marifer Persaud PT   [facility] Physical Therapy [facility] ([facility])    [address] [place] [id number] [phone]            Feb 23, 2018  2:40 PM CST   [facility] Spine with Marifer Persaud PT   [facility] Physical Therapy [facility] ([facility])    [address] [place] [id number] [phone]            Mar 13, 2018  2:40 PM CDT   (Arrive by 2:25 PM)   Return Visit with Saroj Haas MD   [facility] ([facility])    [address] [place] [id number] [phone]            Apr 09, 2018 12:00 PM CDT   (Arrive by 11:45 AM)   RETURN INFLAMMATORY BOWEL DISEASE with Slick Malone PA-C   [facility] Gastroenterology and IBD Clinic ([facility])    [address] [place] [id number] [phone]              Who to contact     If you have questions or need follow up information about today's clinic visit or your schedule please contact [facility] PHYSICAL THERAPY [facility] directly at [phone].  Normal or non-critical lab and imaging results will be communicated to you by [employer], letter or phone within 4 business days after the clinic has received the results. If you do not hear from us within 7 days, please contact the clinic through [employer] or phone. If you have a critical or abnormal lab result, we will notify you by phone as soon as possible.  Submit refill requests through [employer] or call your pharmacy and they will forward the refill request to us. Please allow 3 business days for your refill to be completed.          Additional Information About Your Visit        [employer] Information     [employer] gives you secure access to your electronic health record. If you see a primary care provider, you can also send messages to your care team and make appointments. If you have questions,  please call your primary care clinic.  If you do not have a primary care provider, please call [phone] and they will assist you.        Care EveryWhere ID     This is your Care EveryWhere ID. This could be used by other organizations to access your [facility] medical records  [id number]         Blood Pressure from Last 3 Encounters:   02/06/18 119/80   01/08/18 116/81   08/16/17 124/82    Weight from Last 3 Encounters:   02/06/18 74.8 kg (164 lb 12.8 oz)   01/08/18 74.2 kg (163 lb 9.6 oz)   08/16/17 72.6 kg (160 lb)              We Performed the Following     HC PT EVAL, LOW COMPLEXITY     [facility] INITIAL EVAL REPORT     THERAPEUTIC ACTIVITIES     THERAPEUTIC EXERCISES          Today's Medication Changes          These changes are accurate as of 2/9/18  4:22 PM.  If you have any questions, ask your nurse or doctor.               These medicines have changed or have updated prescriptions.        Dose/Directions    traZODone 50 MG tablet   Commonly known as:  DESYREL   This may have changed:  additional instructions   Used for:  Bipolar 2 disorder (H)        Dose:  50 mg   Take 1 tablet (50 mg) by mouth nightly as needed for sleep   Quantity:  90 tablet   Refills:  2                Primary Care Provider Office Phone # Fax #    Saroj Haas [phone]       [address] [address]        Equal Access to Services     GUMARO CERRATO AH: Hadvenita ward hadasho Sonerissa, waaxda luqadaha, qaybta kaalmada jessika arora. So [facility] [place] [facility] [phone].    ATENCIÓN: Si habla español, tiene a corey disposición servicios gratuitos de asistencia lingüística. Chantal dodd [phone].    We comply with applicable federal civil rights laws and [place] laws. We do not discriminate on the basis of race, color, national origin, age, disability, sex, sexual orientation, or gender identity.            Thank you!     Thank you for choosing [facility]  THERAPY [facility]  for your care. Our goal is always to provide you with excellent care. Hearing back from our patients is one way we can continue to improve our services. Please take a few minutes to complete the written survey that you may receive in the mail after your visit with us. Thank you!             Your Updated Medication List - Protect others around you: Learn how to safely use, store and throw away your medicines at www.disposemymeds.org.          This list is accurate as of 2/9/18  4:22 PM.  Always use your most recent med list.                   Brand Name Dispense Instructions for use Diagnosis    acetaminophen 500 MG tablet    TYLENOL     Take 500-1,000 mg by mouth every 6 hours as needed for mild pain        calcium-vitamin D 600-400 MG-UNIT per tablet    CALTRATE     Take 1 tablet by mouth 2 times daily        diphenoxylate-atropine 2.5-0.025 MG per tablet    LOMOTIL    50 tablet    Take 1 tablet by mouth 4 times daily as needed for diarrhea After loose stools    Crohn's disease of small intestine without complication (H)       ferrous sulfate 325 (65 FE) MG tablet    IRON     Take by mouth daily (with breakfast)        fluconazole 200 MG tablet    DIFLUCAN    90 tablet    Take 1 tablet (200 mg) by mouth daily    Coccidioidal granuloma       MELATONIN PO      Take 1.5 mg by mouth nightly as needed        Multi-vitamin Tabs tablet      Take 1 tablet by mouth daily        * rifaximin 550 MG Tabs tablet    XIFAXAN    42 tablet    Take 1 tablet (550 mg) by mouth 3 times daily    Diarrhea, unspecified type       * rifaximin 550 MG Tabs tablet    XIFAXAN    60 tablet    Take 1 tablet (550 mg) by mouth 2 times daily    Small intestinal bacterial overgrowth       traZODone 50 MG tablet    DESYREL    90 tablet    Take 1 tablet (50 mg) by mouth nightly as needed for sleep    Bipolar 2 disorder (H)       VITAMIN B 12 PO      Take 500 mcg by mouth        VITAMIN B6 PO      Take 100 mg by mouth        VITAMIN C PO       Take 500 mg by mouth        VITAMIN D3 PO      Take 1,000 Units by mouth daily        * Notice:  This list has 2 medication(s) that are the same as other medications prescribed for you. Read the directions carefully, and ask your doctor or other care provider to review them with you.
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
## 2018-02-14 ENCOUNTER — RADIANT APPOINTMENT (OUTPATIENT)
Dept: MRI IMAGING | Facility: CLINIC | Age: 63
End: 2018-02-14
Attending: PHYSICIAN ASSISTANT
Payer: COMMERCIAL

## 2018-02-14 DIAGNOSIS — K50.80 CROHN'S DISEASE OF BOTH SMALL AND LARGE INTESTINE WITHOUT COMPLICATION (H): ICD-10-CM

## 2018-02-14 RX ORDER — GADOTERATE MEGLUMINE 376.9 MG/ML
15 INJECTION INTRAVENOUS ONCE
Status: COMPLETED | OUTPATIENT
Start: 2018-02-14 | End: 2018-02-14

## 2018-02-14 RX ADMIN — GADOTERATE MEGLUMINE 15 ML: 376.9 INJECTION INTRAVENOUS at 10:03

## 2018-02-14 NOTE — DISCHARGE INSTRUCTIONS
MRI Contrast Discharge Instructions    The IV contrast you received today will pass out of your body in your  urine. This will happen in the next 24 hours. You will not feel this process.  Your urine will not change color.    Drink at least 4 extra glasses of water or juice today (unless your doctor  has restricted your fluids). This reduces the stress on your kidneys.  You may take your regular medicines.    If you are on dialysis: It is best to have dialysis today.    If you have a reaction: Most reactions happen right away. If you have  any new symptoms after leaving the hospital (such as hives or swelling),  call your hospital at the correct number below. Or call your family doctor.  If you have breathing distress or wheezing, call 911.    Special instructions: ***    I have read and understand the above information.    Signature:______________________________________ Date:___________    Staff:__________________________________________ Date:___________     Time:__________    [facility] Departments:    ___[place]: [phone]  ___[facility]: [phone]  ___[place]: [phone] ___[facility]: [phone]  ___[facility]: [phone]  ___[facility]: [phone]  ___[phone]  ___[facility]: [phone]  ___[phone]

## 2018-02-16 ENCOUNTER — THERAPY VISIT (OUTPATIENT)
Dept: PHYSICAL THERAPY | Facility: CLINIC | Age: 63
End: 2018-02-16
Payer: COMMERCIAL

## 2018-02-16 DIAGNOSIS — M54.16 LUMBAR RADICULOPATHY: ICD-10-CM

## 2018-02-16 PROCEDURE — 97110 THERAPEUTIC EXERCISES: CPT | Mod: GP | Performed by: PHYSICAL THERAPIST

## 2018-02-16 PROCEDURE — 97530 THERAPEUTIC ACTIVITIES: CPT | Mod: GP | Performed by: PHYSICAL THERAPIST

## 2018-03-13 ENCOUNTER — OFFICE VISIT (OUTPATIENT)
Dept: INTERNAL MEDICINE | Facility: CLINIC | Age: 63
End: 2018-03-13
Payer: COMMERCIAL

## 2018-03-13 VITALS
OXYGEN SATURATION: 97 % | HEART RATE: 100 BPM | BODY MASS INDEX: 22.97 KG/M2 | DIASTOLIC BLOOD PRESSURE: 78 MMHG | WEIGHT: 164.7 LBS | TEMPERATURE: 98 F | SYSTOLIC BLOOD PRESSURE: 118 MMHG

## 2018-03-13 DIAGNOSIS — M54.5 ACUTE LOW BACK PAIN, UNSPECIFIED BACK PAIN LATERALITY, WITH SCIATICA PRESENCE UNSPECIFIED: Primary | ICD-10-CM

## 2018-03-13 ASSESSMENT — PAIN SCALES - GENERAL: PAINLEVEL: NO PAIN (0)

## 2018-03-13 NOTE — MR AVS SNAPSHOT
After Visit Summary   3/13/2018    rTevin Gee    MRN: [id number]           Patient Information     Date Of Birth          1955        Visit Information        Provider Department      3/13/2018 2:40 PM Saroj Haas MD [facility]        Today's Diagnoses     Acute low back pain, unspecified back pain laterality, with sciatica presence unspecified    -  1       Follow-ups after your visit        Your next 10 appointments already scheduled     Apr 09, 2018 12:00 PM CDT   (Arrive by 11:45 AM)   RETURN INFLAMMATORY BOWEL DISEASE with Slick Malone PA-C   [facility] Gastroenterology and IBD Clinic ([facility] [place])    [address] [facility] [address] [phone]              Who to contact     Please call your clinic at [phone] to:    Ask questions about your health    Make or cancel appointments    Discuss your medicines    Learn about your test results    Speak to your doctor            Additional Information About Your Visit        [employer] Information     [facility] gives you secure access to your electronic health record. If you see a primary care provider, you can also send messages to your care team and make appointments. If you have questions, please call your primary care clinic.  If you do not have a primary care provider, please call [phone] and they will assist you.      [facility] is an electronic gateway that provides easy, online access to your medical records. With [facility], you can request a clinic appointment, read your test results, renew a prescription or communicate with your care team.     To access your existing account, please contact your [facility] or call [phone] for assistance.        Care EveryWhere ID     This is your Care EveryWhere ID. This could be used by other organizations to access your [facility] medical records  [id number]        Your Vitals  Were     Pulse Temperature Pulse Oximetry BMI (Body Mass Index)          100 98  F (36.7  C) (Oral) 97% 22.97 kg/m2         Blood Pressure from Last 3 Encounters:   03/13/18 118/78   02/06/18 119/80   01/08/18 116/81    Weight from Last 3 Encounters:   03/13/18 74.7 kg (164 lb 11.2 oz)   02/06/18 74.8 kg (164 lb 12.8 oz)   01/08/18 74.2 kg (163 lb 9.6 oz)              Today, you had the following     No orders found for display         Today's Medication Changes          These changes are accurate as of 3/13/18 11:59 PM.  If you have any questions, ask your nurse or doctor.               These medicines have changed or have updated prescriptions.        Dose/Directions    traZODone 50 MG tablet   Commonly known as:  DESYREL   This may have changed:  additional instructions   Used for:  Bipolar 2 disorder (H)        Dose:  50 mg   Take 1 tablet (50 mg) by mouth nightly as needed for sleep   Quantity:  90 tablet   Refills:  2                Primary Care Provider Office Phone # Fax #    Saroj Haas [phone]       [address]  [facility] [address]        Equal Access to Services     GUMARO CERRATO AH: Aleta Tom, walaith dietz, qakarolynta kaalmada ulysses, jessika romero. So [facility] [phone].    ATENCIÓN: Si habla español, tiene a corey disposición servicios gratuitos de asistencia lingüística. Llame al [phone].    We comply with applicable federal civil rights laws and [place] laws. We do not discriminate on the basis of race, color, national origin, age, disability, sex, sexual orientation, or gender identity.            Thank you!     Thank you for choosing [facility] PRIMARY CARE CLINIC  for your care. Our goal is always to provide you with excellent care. Hearing back from our patients is one way we can continue to improve our services. Please take a few minutes to complete the written survey that you may receive in  the mail after your visit with us. Thank you!             Your Updated Medication List - Protect others around you: Learn how to safely use, store and throw away your medicines at www.disposemymeds.org.          This list is accurate as of 3/13/18 11:59 PM.  Always use your most recent med list.                   Brand Name Dispense Instructions for use Diagnosis    acetaminophen 500 MG tablet    TYLENOL     Take 500-1,000 mg by mouth every 6 hours as needed for mild pain        calcium-vitamin D 600-400 MG-UNIT per tablet    CALTRATE     Take 1 tablet by mouth 2 times daily        diphenoxylate-atropine 2.5-0.025 MG per tablet    LOMOTIL    50 tablet    Take 1 tablet by mouth 4 times daily as needed for diarrhea After loose stools    Crohn's disease of small intestine without complication (H)       ferrous sulfate 325 (65 FE) MG tablet    IRON     Take by mouth daily (with breakfast)        fluconazole 200 MG tablet    DIFLUCAN    90 tablet    Take 1 tablet (200 mg) by mouth daily    Coccidioidal granuloma       MELATONIN PO      Take 1.5 mg by mouth nightly as needed        Multi-vitamin Tabs tablet      Take 1 tablet by mouth daily        * rifaximin 550 MG Tabs tablet    XIFAXAN    42 tablet    Take 1 tablet (550 mg) by mouth 3 times daily    Diarrhea, unspecified type       * rifaximin 550 MG Tabs tablet    XIFAXAN    60 tablet    Take 1 tablet (550 mg) by mouth 2 times daily    Small intestinal bacterial overgrowth       traZODone 50 MG tablet    DESYREL    90 tablet    Take 1 tablet (50 mg) by mouth nightly as needed for sleep    Bipolar 2 disorder (H)       VITAMIN B 12 PO      Take 500 mcg by mouth        VITAMIN B6 PO      Take 100 mg by mouth        VITAMIN C PO      Take 500 mg by mouth        VITAMIN D3 PO      Take 1,000 Units by mouth daily        * Notice:  This list has 2 medication(s) that are the same as other medications prescribed for you. Read the directions carefully, and ask your doctor or  other care provider to review them with you.

## 2018-03-13 NOTE — NURSING NOTE
Chief Complaint   Patient presents with     Pain     Patient here for check on back pain after going to ANGELA Parrish LPN at 2:39 PM on 3/13/2018.

## 2018-03-15 NOTE — PROGRESS NOTES
PRIMARY CARE CLINIC    Resident Clinic Note        Visit Date: March 15, 2018    Trevin Gee  MRN: [id number]  YOB: 1955    HPI:  Trevin Gee is a 62 year old male  has a past medical history of Anemia (2007); Anxiety (2012?); Cataract (12/2007); Coccidioidomycosis; Crohn disease (H); Crohn's disease (H) (1/13/2015); Depressive disorder (7/2014); Fracture (1956); Gallbladder problem (2005?); GERD (gastroesophageal reflux disease); History of blood transfusion (1988); Hypertriglyceridemia (7/8/2016); Infection (2007); Kidney stone (various); Mental health problem (2007); Nephrolithiasis; Osteoporosis (2007); Osteoporosis; Other nervous system complications (2007); Personal history of colonic polyps; Steroid-induced psychosis; and TB (tuberculosis).     Patient presents to clinic for follow up regarding low back and hip pain.  He reports overall feeling like his symptoms resolved with 2 sessions with PT and he didn't attend his third scheduled appointment because he was doing so well with no pain or discomfort with sitting.  He also reports his previously reports problem with pins and needles effecting his lower back has also resolved.  Patient reports no other acute changes to his health.  He plans to follow up with GI for his Crohn's disease and recent GI issues, but believes that this is also improved since his last appointment.    ROS:  7 point ROS was reviewed and negative other than stated in HPI    Past medical history reviewed.    Past Medical History:   Diagnosis Date     Anemia 2007     Anxiety 2012?    much worse since July 2014     Cataract 12/2007    both eyes, too much prednisone, implants     Coccidioidomycosis      Crohn disease (H)      Crohn's disease (H) 1/13/2015     Depressive disorder 7/2014    much worse since July 2014     Fracture 1956    green fracture of leg     Gallbladder problem 2005?    much gravel, removed     GERD (gastroesophageal reflux disease)       History of blood transfusion 1988    ulcerated anastomosis term. ilium bleed     Hypertriglyceridemia 7/8/2016     Infection 2007    persistant coccidioidomycosis     Kidney stone various    both sides, all passed naturally     Mental health problem 2007    bipolar though perhaps different     Nephrolithiasis      Osteoporosis 2007    osteoporosis, too much prednisone, last dexa 1/2014     Osteoporosis      Other nervous system complications 2007    prednisone overload induced diana     Personal history of colonic polyps     small ones found during colonoscopies     Steroid-induced psychosis     Patient extremely sensitive to regular doses of steroids.  Unclear if this is due to chronic fluconazole use or genetic issue.  In the future, use caution when prescribing steroids.     TB (tuberculosis)        Allergies   Allergen Reactions     Prednisone Other (See Comments)     Diana with more than 2.5mg chronic dosing of prednisone due to fluconazole interaction per patient.        Past Surgical History:   Procedure Laterality Date     APPENDECTOMY  1974    coincidental with Crohn's surgery     BACK SURGERY  12/2007    kyphoplasty on compressed 4th??? vertebra     BIOPSY  2007, 2013    lump on arm, knee, back of hand for coccidioidomycosis cult.     CHOLECYSTECTOMY  2005    much gravel, removed laparoscopically     COLONOSCOPY  7/14/2014 last    Dr. Catherine Bowden, [facility] - many previous     COLONOSCOPY Left 9/11/2015    Procedure: COMBINED COLONOSCOPY, SINGLE OR MULTIPLE BIOPSY/POLYPECTOMY BY BIOPSY;  Surgeon: Fransisco Leach MD;  Location: [facility]     COLONOSCOPY N/A 8/3/2016    Procedure: COMBINED COLONOSCOPY, SINGLE OR MULTIPLE BIOPSY/POLYPECTOMY BY BIOPSY;  Surgeon: Fransisco Leach MD;  [facility]: [facility]     COLONOSCOPY N/A 8/16/2017    Procedure: COMBINED COLONOSCOPY, SINGLE OR MULTIPLE BIOPSY/POLYPECTOMY BY BIOPSY;;  Surgeon: Fransisco Leach MD;  Location: [facility]     ENT SURGERY  ?     "upper endoscopy     ESOPHAGOSCOPY, GASTROSCOPY, DUODENOSCOPY (EGD), COMBINED N/A 11/8/2016    Procedure: COMBINED ENDOSCOPIC ULTRASOUND, ESOPHAGOSCOPY, GASTROSCOPY, DUODENOSCOPY (EGD), FINE NEEDLE ASPIRATE/BIOPSY;  Surgeon: Guru Alexsandra Ballesteros MD;  Location: [facility]     ESOPHAGOSCOPY, GASTROSCOPY, DUODENOSCOPY (EGD), COMBINED N/A 11/8/2016    Procedure: COMBINED ESOPHAGOSCOPY, GASTROSCOPY, DUODENOSCOPY (EGD), BIOPSY SINGLE OR MULTIPLE;  Surgeon: Guru Alxesandra Ballesteros MD;  Location: [facility]     ESOPHAGOSCOPY, GASTROSCOPY, DUODENOSCOPY (EGD), COMBINED N/A 8/16/2017    Procedure: COMBINED ESOPHAGOSCOPY, GASTROSCOPY, DUODENOSCOPY (EGD), BIOPSY SINGLE OR MULTIPLE;;  Surgeon: Fransisco Leach MD;  Location: [facility]     EYE SURGERY  12/2007    cataract surgery both sides     GI SURGERY  1974, 1986(x2), 1989    Crohn's, 1974 RO 18\" term. ilium + 9\" asc. colon all one pc     SOFT TISSUE SURGERY  3/2013    removed buildup on back of r hand, coccidioidomycosis       Family History   Problem Relation Age of Onset     Heart Failure Father      Musculoskeletal Disorder Father      Parkinson's     Cancer - colorectal Mother      CEREBROVASCULAR DISEASE Paternal Grandmother      CANCER Other      Musculoskeletal Disorder Brother      Parkinson's       Social History     Social History     Marital status:      Spouse name: N/A     Number of children: N/A     Years of education: N/A     Occupational History     Reserach associate at the [employer]     Social History Main Topics     Smoking status: Never Smoker     Smokeless tobacco: Never Used     Alcohol use Yes      Comment: sometimes one glass of wine a week     Drug use: No     Sexual activity: No     Other Topics Concern     Not on file     Social History Narrative       Current Outpatient Prescriptions   Medication     rifaximin (XIFAXAN) 550 MG TABS tablet     traZODone (DESYREL) 50 MG tablet     fluconazole (DIFLUCAN) 200 " MG tablet     diphenoxylate-atropine (LOMOTIL) 2.5-0.025 MG per tablet     rifaximin (XIFAXAN) 550 MG TABS tablet     MELATONIN PO     acetaminophen (TYLENOL) 500 MG tablet     Cyanocobalamin (VITAMIN B 12 PO)     Pyridoxine HCl (VITAMIN B6 PO)     ferrous sulfate 325 (65 FE) MG tablet     multivitamin, therapeutic with minerals (MULTI-VITAMIN) TABS     calcium-vitamin D (CALTRATE) 600-400 MG-UNIT per tablet     Cholecalciferol (VITAMIN D3 PO)     Ascorbic Acid (VITAMIN C PO)     No current facility-administered medications for this visit.        Vitals:  /78  Pulse 100  Temp 98  F (36.7  C) (Oral)  Wt 74.7 kg (164 lb 11.2 oz)  SpO2 97%  BMI 22.97 kg/m2    Physical Exam:  General: NAD  HEENT: Oral mucosa moist and non-erythematous  CV: RRR, normal S1S2, no m/c/r  Resp: Clear to auscultation bilaterally, no wheezes or crackles  Abd: Soft, non-tender, BS+, no masses appreciated  Neuro: AAOx3, no lateralizing symptoms or focal neurologic deficits    Assessment/Plan:  Trevin was seen today for follow up regarding acute LB/hip MSK pain.     # Acute bilateral low back pain: Resolved   Patient reports complete resolution of symptoms after PT treatment, which supports previous diagnosis of MSK strain/ pain due to recent activity.  - Follow up with PT as needed, return to clinic if new symptoms or acute worsening of symptoms.     Health Maintenance: None at today's visit    Follow-up: With me in clinic as needed    Patient seen and discussed with Dr. Sherwin Haas MD, A  Internal Medicine PGY-2  [facility]  Pager: [phone]         While the patient was in clinic, I reviewed the pertinent medical history and results.  I discussed the current findings on physical examination, as well as the patient s diagnosis and treatment plan with the resident and agree with the information as documented with the following exceptions: none.  Halle Courtney MD  Internal Medicine

## 2018-04-04 ENCOUNTER — TELEPHONE (OUTPATIENT)
Dept: GASTROENTEROLOGY | Facility: CLINIC | Age: 63
End: 2018-04-04

## 2018-04-06 ASSESSMENT — ENCOUNTER SYMPTOMS
ABDOMINAL PAIN: 1
RECTAL PAIN: 0
BLOATING: 0
POOR WOUND HEALING: 0
BOWEL INCONTINENCE: 0
DIARRHEA: 0
BLOOD IN STOOL: 0
VOMITING: 0
JAUNDICE: 0
NAIL CHANGES: 0
HEARTBURN: 0
CONSTIPATION: 0
SKIN CHANGES: 0
NAUSEA: 0

## 2018-04-09 ENCOUNTER — OFFICE VISIT (OUTPATIENT)
Dept: GASTROENTEROLOGY | Facility: CLINIC | Age: 63
End: 2018-04-09
Payer: COMMERCIAL

## 2018-04-09 VITALS
BODY MASS INDEX: 23.32 KG/M2 | SYSTOLIC BLOOD PRESSURE: 125 MMHG | OXYGEN SATURATION: 95 % | HEART RATE: 76 BPM | TEMPERATURE: 97.4 F | HEIGHT: 71 IN | DIASTOLIC BLOOD PRESSURE: 76 MMHG | WEIGHT: 166.6 LBS

## 2018-04-09 DIAGNOSIS — K50.80 CROHN'S DISEASE OF BOTH SMALL AND LARGE INTESTINE WITHOUT COMPLICATION (H): Primary | ICD-10-CM

## 2018-04-09 ASSESSMENT — PAIN SCALES - GENERAL: PAINLEVEL: NO PAIN (0)

## 2018-04-09 NOTE — PATIENT INSTRUCTIONS
It was a pleasure taking care of you today.  I've included a brief summary of our discussion and care plan from today's visit below.  Please review this information with your primary care provider.  ______________________________________________________________________    My recommendations are summarized as follows:    -- Call if you experience symptoms of obstruction. We will consider abdominal xray to evaluate.  - If diarrhea returns, we will get a fecal calprotectin  -- Next endoscopic assessment: TBD  -- Patient with IBD we recommend supplementation vitamin D 1000 units daily and calcium 500 mg twice daily.  -- No NSAIDs (ibuprofen, or anything containing ibuprofen)     Return to GI Clinic in 6 months to review your progress.    ______________________________________________________________________    Who do I call with any questions after my visit?  Please be in touch if there are any further questions that arise following today's visit.  There are multiple ways to contact your gastroenterology care team.        During business hours, you may reach a Gastroenterology nurse at [phone], option 3.       To schedule or reschedule an appointment, please call [phone].       You can always send a secure message through Avuxi.  Avuxi messages are answered by your nurse or doctor typically within 24 hours.  Please allow extra time on weekends and holidays.        For urgent/emergent questions after business hours, you may reach the on-call GI Fellow by contacting the [facility] at [phone].     How will I get the results of any tests ordered?    You will receive all of your results.  If you have signed up for [employer], any tests ordered at your visit will be available to you after your physician reviews them.  Typically this takes 1-2 weeks.  If there are urgent results that require a change in your care plan, your physician or nurse will call you to discuss the next steps.       What is MadeClose?  MadeClose is a secure way for you to access all of your healthcare records from the [facility].  It is a web based computer program, so you can sign on to it from any location.  It also allows you to send secure messages to your care team.  I recommend signing up for MadeClose access if you have not already done so and are comfortable with using a computer.      How to I schedule a follow-up visit?  If you did not schedule a follow-up visit today, please call [phone] to schedule a follow-up office visit.        Sincerely,    Slick Malone PA-C  [facility]  Division of Gastroenterology

## 2018-04-09 NOTE — PROGRESS NOTES
IBD CLINIC     CC/REFERRING MD:  Referred Self  REASON FOR CONSULTATION: Crohn's disease       IBD history:  Age at diagnosis: ~18  Extend of disease: Ileo-colonic  Disease phenotype: Inflammatory  Key-anal disease: No  Current CD medications: None  Prior IBD Medications:  - Asulfazine and Asacol: 1974 to late 90s, stopped to to disease progression  - Prednisone 1986 - current.   - Mercaptopurine: Briefly in mid 90s to get him off steroids- Patient thinks he has side effect to medication  - Methotrexate: After mercaptopurine, stayed on for years, but after bleeding in 1988 and 1989, he would not taper his prednisone. MTX stopped as he could not get off the steroids.    Surgical history:  1974 - [facility] Ileocecectomy  1986 - [facility] - revision of anastomosis  1988 - massive bleeding from ulcer at anastomosis ([place]), cauterized via colonoscopy  1989 - laparoscopic  Revision of anastomosis  2005 - [facility]    DRUG MONITORING  TPMT enzyme activity: none    DISEASE ASSESSMENT  Fecal calprotectin: None  CRP, ESR Results  Recent Labs   Lab Test  01/08/18   1345  07/05/17   1442   CRP  <2.9  4.5   SED  4  8   Endoscopic assessment: Colonoscopy 8/16/2017 showed normal mucosa in entire colon. Non-patent functional end-to-end ileo-colonic anastomosis that was dilated to 12mm.  No evidence of active Crohn's disease.  Enterography (12/1/16) showed 4cm of mild thickening and enhancement in neoterminal ileum at anastomosis, decompressed sigmoid colon with questionable wall thickening and enhancement.    Enterography: No active Crohn's disease (3/20/15)  C diff: None (7/12/16)    ASSESSMENT/PLAN:  62 year old male with Crohn's disease s/p ileocecectomy on no current Crohn's therapy.     1. Crohn's disease: In symptomatic and endoscopic remission, and has required intermittent dilations of rao-TI anastomosis sites.  Patient more concerned about 2nd anastomosis site.  I did call radiology to confirm that this additional anastomosis  site did indeed exist and was notable on his CT scan obtained in 2006.  Radiologist stated it was likely a proximal jejunal anastomosis.  Questionable whether this was from a bowel injury?  Patient is concerned that this is the anastomosis that is narrowed. Difficult to see the actual anastomosis site on most recent MRE, however, there is no evidence of significant narrowing or obstruction at this site, rather just evidence of surgical material.  Of course these imaging modalities are a moment in time and he certainly could be presenting with intermittent obstructions.    -- We discussed that if he has another episode, and abdominal x-ray (if acute) could be obtained to evaluate for any possible obstruction.    --Colonoscopy if proceeding with redilation of rao-TI anastomotic site previously dilated, given no substantial change in the 4 cm stricture of the rao-TI at the ileocolonic anastomosis in the right upper quadrant on MRE.  Encouraging there is no active inflammation.  -- Continue to monitor for active Crohn's. No evidence of obstructive symptoms.  -- Continue with yearly LFTs to screen for PSC.     2. Colon polyps: Well defined lesions with adenomatous changes.  Will continue to surveillance   --Would require chromoendoscopy for surveillance colonoscopies     3. Diarrhea: Quiescent Crohn's. Prior work up has included alpha 1 antitrypsin for PLE, pancreatic elastase, TTG, DGP all of which were normal.  A spot fecal fat the only isolated value that was increased.  Diarrhea may be related to IBS, or lactose intolerance.  Could also be bile salt malabsorption or bacterial overgrowth.  Patient has had a decent response to fiber which he continues at this time.  Diarrhea symptoms are stable and unchanged.  -- Avoid dietary triggers  -- prn lomotil  -- If diarrhea persists 2-3 days, will check for C diff    4. Possible acid reflux:  No current symptoms of acid reflux.      5. Pancreatic head cystic lesion: Increased  from 2006, potentially representing IPMN.  MRCP with no change in cyst.  Reviewed with Dr. Cruz.  EUS with FNA normal.   -- No further follow-up at this time needed.      6. Steroid dependency/Adrenal insufficiency:  Followed closely with endocrinology.  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      8. Depression: Unchanged.     9. IBD healthcare maintenance based on patients current medication:      Vaccinations:  -- Influenza (every year): Last given 2016  -- TdaP (every 10 years): Last given 2015  -- Pneumococcal Pneumonia (once then every 5 years): Has not obtained.    One time confirmation of immunity or serologies:  -- Hepatitis A (serologies or immunizations): 2005  -- Hepatitis B (serologies or immunizations): 2005  -- Zoster vaccination (>61 yo, discuss if over 50): 2005  -- MMR: Not documented.  -- HPV (all aged 18-26): N/A  -- Meningococcal meningitis (all patients at risk for meningitis): Patient is not at risk.     Cancer Screening:  Colon cancer screening:  Since there is evidence of pancolitis histologically. Also with adenomatous polyps. Colonoscopy every 2-3 years recommended.  Dysplasia screening is recommended 2018.    Cervical cancer screening: N/A    Skin cancer screening: Since patient is not immunocompromised, this is per patient's dermatologist recommendations.    Depression Screening:  -- Over the last month, have you felt down, depressed, or hopeless? No  -- Over the last month, have you felt little interest or pleasure doing things? No    Misc:  -- Avoid tobacco use  -- Avoid NSAIDs as there is potentially a 25% chance of causing an IBD flare    RTC 6 months    Thank you for this consultation.  It was a pleasure to participate in the care of this patient; please contact us with any further questions.        Slick Malone PA-C  Division of Gastroenterology, Hepatology and Nutrition  [facility]  "[place]       HPI:   62-year-old male with ileocolonic Crohn's disease after ileocecal resection and 2 surgical revisions in deep remission on no maintenance therapies.  Patient has had evidence of stricturing at the anastomosis site requiring dilations on subsequent colonoscopies.  Patient has struggled with intermittent diarrhea however has been quite stable currently.  He continues with a fiber supplementation which has been most helpful in relieving diarrhea episodes.  He is currently having 1 bowel movement per day that is formed without blood.  He also notes he has been struggling with pruritus in variable locations on his body.  It has become so bothersome that he tried a prednisone tablet over the course of a few days (which also happened to be during time of diarrhea) and at that time in particular, interestingly his diarrhea also went away.  He has been treated for small intestinal bacterial overgrowth in the past and shortly after he had tried the prednisone the symptoms went away for approximately 2 weeks but then diarrhea returned so he did complete a rifaximin 2 week trial with minimal relief.  As stated above he has been quite stable as of recently with one formed bowel movement per day. He has not had to use lomotil recently.    He has had 2 instances of \"blockages\" that will clear within 24 hours.  He feels that there are certain dietary triggers prior to these events, such as almonds. Patient feels that these instances are very rare but have occurred since he has had dilation with colonoscopy.  Patient is quite concerned that he could have another stricture his other anastomosis site.  There has not been any evidence of other stricturing disease or concerns for obstruction on previous MREs. The episodes are described as a sharp/burning sensation about 30 minutes after eating. A liquid diet help in these instances.  He denies any of these symptoms currently.  He denies any nausea or vomiting. " Weight is stable.  No EIM.    ROS:    No fevers or chills  No weight loss  No blurry vision, double vision or change in vision  No sore throat  No lymphadenopathy  No headache, paraesthesias, or weakness in a limb  No shortness of breath or wheezing  No chest pain or pressure  No arthralgias or myalgias  No rashes or skin changes  No odynophagia or dysphagia  No BRBPR, hematochezia, melena  No dysuria, frequency or urgency  No hot/cold intolerance or polyria  No anxiety or depression    Extra intestinal manifestations of IBD:  No uveitis/episcleritis  No aphthous ulcers   No arthritis   No erythema nodosum/pyoderma gangrenosum.     PERTINENT PAST MEDICAL HISTORY:  Past Medical History:   Diagnosis Date     Anemia 2007     Anxiety 2012?    much worse since July 2014     Cataract 12/2007    both eyes, too much prednisone, implants     Coccidioidomycosis      Crohn disease (H)      Crohn's disease (H) 1/13/2015     Depressive disorder 7/2014    much worse since July 2014     Fracture 1956    green fracture of leg     Gallbladder problem 2005?    much gravel, removed     GERD (gastroesophageal reflux disease)      History of blood transfusion 1988    ulcerated anastomosis term. ilium bleed     Hypertriglyceridemia 7/8/2016     Infection 2007    persistant coccidioidomycosis     Kidney stone various    both sides, all passed naturally     Mental health problem 2007    bipolar though perhaps different     Nephrolithiasis      Osteoporosis 2007    osteoporosis, too much prednisone, last dexa 1/2014     Osteoporosis      Other nervous system complications 2007    prednisone overload induced kevin     Personal history of colonic polyps     small ones found during colonoscopies     Steroid-induced psychosis     Patient extremely sensitive to regular doses of steroids.  Unclear if this is due to chronic fluconazole use or genetic issue.  In the future, use caution when prescribing steroids.     TB (tuberculosis)   "      PREVIOUS SURGERIES:  Past Surgical History:   Procedure Laterality Date     APPENDECTOMY  1974    coincidental with Crohn's surgery     BACK SURGERY  12/2007    kyphoplasty on compressed 4th??? vertebra     BIOPSY  2007, 2013    lump on arm, knee, back of hand for coccidioidomycosis cult.     CHOLECYSTECTOMY  2005    much gravel, removed laparoscopically     COLONOSCOPY  7/14/2014 last    Dr. Catherine Bowden, [facility] - many previous     COLONOSCOPY Left 9/11/2015    Procedure: COMBINED COLONOSCOPY, SINGLE OR MULTIPLE BIOPSY/POLYPECTOMY BY BIOPSY;  Surgeon: Fransisco Leach MD;  Location: [facility]     COLONOSCOPY N/A 8/3/2016    Procedure: COMBINED COLONOSCOPY, SINGLE OR MULTIPLE BIOPSY/POLYPECTOMY BY BIOPSY;  Surgeon: Fransisco Leach MD;  [facility]     COLONOSCOPY N/A 8/16/2017    Procedure: COMBINED COLONOSCOPY, SINGLE OR MULTIPLE BIOPSY/POLYPECTOMY BY BIOPSY;;  Surgeon: Fransisco Leach MD;  Location: [facility]     ENT SURGERY  ?    upper endoscopy     ESOPHAGOSCOPY, GASTROSCOPY, DUODENOSCOPY (EGD), COMBINED N/A 11/8/2016    Procedure: COMBINED ENDOSCOPIC ULTRASOUND, ESOPHAGOSCOPY, GASTROSCOPY, DUODENOSCOPY (EGD), FINE NEEDLE ASPIRATE/BIOPSY;  Surgeon: Guru Alexsandra Ballesteros MD;  Location:  GI     ESOPHAGOSCOPY, GASTROSCOPY, DUODENOSCOPY (EGD), COMBINED N/A 11/8/2016    Procedure: COMBINED ESOPHAGOSCOPY, GASTROSCOPY, DUODENOSCOPY (EGD), BIOPSY SINGLE OR MULTIPLE;  Surgeon: Guru Alexsandra Ballesteros MD;  Location:  GI     ESOPHAGOSCOPY, GASTROSCOPY, DUODENOSCOPY (EGD), COMBINED N/A 8/16/2017    Procedure: COMBINED ESOPHAGOSCOPY, GASTROSCOPY, DUODENOSCOPY (EGD), BIOPSY SINGLE OR MULTIPLE;;  Surgeon: Fransisco Leach MD;  [facility]     EYE SURGERY  12/2007    cataract surgery both sides     GI SURGERY  1974, 1986(x2), 1989    Crohn's, 1974 RO 18\" term. ilium + 9\" asc. colon all one pc     SOFT TISSUE SURGERY  3/2013    removed buildup on back " of r hand, coccidioidomycosis       PREVIOUS ENDOSCOPY:  Summer 2014: Colonoscopy - not in out system    ALLERGIES:     Allergies   Allergen Reactions     Prednisone Other (See Comments)     Diana with more than 2.5mg chronic dosing of prednisone due to fluconazole interaction per patient.        PERTINENT MEDICATIONS:    Current Outpatient Prescriptions:      rifaximin (XIFAXAN) 550 MG TABS tablet, Take 1 tablet (550 mg) by mouth 2 times daily, Disp: 60 tablet, Rfl: 0     traZODone (DESYREL) 50 MG tablet, Take 1 tablet (50 mg) by mouth nightly as needed for sleep (Patient taking differently: Take 50 mg by mouth nightly as needed for sleep Take 1/2 tablet at night), Disp: 90 tablet, Rfl: 2     fluconazole (DIFLUCAN) 200 MG tablet, Take 1 tablet (200 mg) by mouth daily, Disp: 90 tablet, Rfl: 3     diphenoxylate-atropine (LOMOTIL) 2.5-0.025 MG per tablet, Take 1 tablet by mouth 4 times daily as needed for diarrhea After loose stools, Disp: 50 tablet, Rfl: 1     rifaximin (XIFAXAN) 550 MG TABS tablet, Take 1 tablet (550 mg) by mouth 3 times daily, Disp: 42 tablet, Rfl: 0     MELATONIN PO, Take 1.5 mg by mouth nightly as needed , Disp: , Rfl:      acetaminophen (TYLENOL) 500 MG tablet, Take 500-1,000 mg by mouth every 6 hours as needed for mild pain, Disp: , Rfl:      Cyanocobalamin (VITAMIN B 12 PO), Take 500 mcg by mouth, Disp: , Rfl:      Pyridoxine HCl (VITAMIN B6 PO), Take 100 mg by mouth, Disp: , Rfl:      ferrous sulfate 325 (65 FE) MG tablet, Take by mouth daily (with breakfast), Disp: , Rfl:      multivitamin, therapeutic with minerals (MULTI-VITAMIN) TABS, Take 1 tablet by mouth daily, Disp: , Rfl:      calcium-vitamin D (CALTRATE) 600-400 MG-UNIT per tablet, Take 1 tablet by mouth 2 times daily, Disp: , Rfl:      Cholecalciferol (VITAMIN D3 PO), Take 1,000 Units by mouth daily, Disp: , Rfl:      Ascorbic Acid (VITAMIN C PO), Take 500 mg by mouth, Disp: , Rfl:     SOCIAL HISTORY:  Social History     Social  History     Marital status:      Spouse name: N/A     Number of children: N/A     Years of education: N/A     Occupational History     Reserach associate at the [employer]     Social History Main Topics     Smoking status: Never Smoker     Smokeless tobacco: Never Used     Alcohol use Yes      Comment: sometimes one glass of wine a week     Drug use: No     Sexual activity: No     Other Topics Concern     Not on file     Social History Narrative       FAMILY HISTORY:  Family History   Problem Relation Age of Onset     Heart Failure Father      Musculoskeletal Disorder Father      Parkinson's     Cancer - colorectal Mother      CEREBROVASCULAR DISEASE Paternal Grandmother      CANCER Other      Musculoskeletal Disorder Brother      Parkinson's     Mother with colon cancer in 80s.  No family member with Crohn's disease or Ulcerative Colitis.      Past/family/social history reviewed and no changes    PHYSICAL EXAMINATION:  Vitals reviewed, AFVSS   Wt   Wt Readings from Last 2 Encounters:   03/13/18 74.7 kg (164 lb 11.2 oz)   02/06/18 74.8 kg (164 lb 12.8 oz)      Gen: aaox3, cooperative, pleasant, not dyspneic/diaphoretic, nad  HEENT: ncat, neck supple, no clad, normal op w/o ulcer/exudate, anicteric, mmm  Lymph: No axillary, submandibular, supraclavicular or inguinal lymphadenopathy  Resp/CV unremarkable  Abd: Nondistended, +bs, no hepatosplenomegaly, nontender, no peritoneal signs  Ext: no c/c/e  Skin: warm, perfused, no jaundice      PERTINENT STUDIES:  Most recent CBC:  Recent Labs   Lab Test  01/08/18   1345  07/05/17   1442   WBC  6.0  5.6   HGB  17.6  16.9   HCT  50.7  49.0   PLT  223  212     Most recent hepatic panel:  Recent Labs   Lab Test  01/08/18   1345  07/05/17   1442   ALT  40  27   AST  26  18     Most recent creatinine:  Recent Labs   Lab Test  02/06/18   1237  07/14/17   1425   CR  1.32*  1.34*       MRE: 3/20/15:   IMPRESSION:   1. Unremarkable small bowel other than  postsurgical changes following right hemicolectomy with ileocolic anastomosis.  2. Small gastric diverticulum. 3. 1.5 x 0.8 cm pancreatic head cystic lesion minimally increased in size compared to the prior exam of 2006, but potentially representing intraductal papillary mucinous neoplasm. This can be reassessed on followup.  4. Hepatic steatosis.      Answers for HPI/ROS submitted by the patient on 1/5/2018   General Symptoms: No  Skin Symptoms: Yes  HENT Symptoms: No  EYE SYMPTOMS: No  HEART SYMPTOMS: No  LUNG SYMPTOMS: No  INTESTINAL SYMPTOMS: Yes  URINARY SYMPTOMS: No  REPRODUCTIVE SYMPTOMS: No  SKELETAL SYMPTOMS: No  BLOOD SYMPTOMS: No  NERVOUS SYSTEM SYMPTOMS: No  MENTAL HEALTH SYMPTOMS: No  Changes in hair: No  Changes in moles/birth marks: No  Itching: Yes  Rashes: No  Changes in nails: No  Acne: No  Change in facial hair: No  Warts: No  Non-healing sores: No  Scarring: No  Flaking of skin: No  Color changes of hands/feet in cold : No  Sun sensitivity: No  Skin thickening: No  Heart burn or indigestion: No  Nausea: No  Vomiting: No  Abdominal pain: Yes  Bloating: No  Constipation: Yes  Diarrhea: Yes  Blood in stool: No  Black stools: No  Rectal or Anal pain: No  Fecal incontinence: No  Yellowing of skin or eyes: No  Vomit with blood: No  Change in stools: No    Answers for HPI/ROS submitted by the patient on 4/6/2018   General Symptoms: No  Skin Symptoms: Yes  HENT Symptoms: No  EYE SYMPTOMS: No  HEART SYMPTOMS: No  LUNG SYMPTOMS: No  INTESTINAL SYMPTOMS: Yes  URINARY SYMPTOMS: No  REPRODUCTIVE SYMPTOMS: No  SKELETAL SYMPTOMS: No  BLOOD SYMPTOMS: No  NERVOUS SYSTEM SYMPTOMS: No  MENTAL HEALTH SYMPTOMS: No  Changes in hair: No  Changes in moles/birth marks: No  Itching: Yes  Rashes: No  Changes in nails: No  Acne: No  Change in facial hair: No  Warts: No  Non-healing sores: No  Scarring: No  Flaking of skin: No  Color changes of hands/feet in cold : No  Sun sensitivity: No  Skin thickening: No  Heart burn or  indigestion: No  Nausea: No  Vomiting: No  Abdominal pain: Yes  Bloating: No  Constipation: No  Diarrhea: No  Blood in stool: No  Black stools: No  Rectal or Anal pain: No  Fecal incontinence: No  Yellowing of skin or eyes: No  Vomit with blood: No  Change in stools: No

## 2018-04-09 NOTE — LETTER
4/9/2018       RE: Trevin Gee  [address]   [facility] [address]     Dear Colleague,    Thank you for referring your patient, Trevin Gee, to the [facility] GASTROENTEROLOG[facility] IBD [facility]INIC at [facility]. Please see a copy of my visit note below.    IBD CLINIC     CC/REFERRING MD:  Referred Self  REASON FOR CONSULTATION: Crohn's disease       IBD history:  Age at diagnosis: ~18  Extend of disease: Ileo-colonic  Disease phenotype: Inflammatory  Key-anal disease: No  Current CD medications: None  Prior IBD Medications:  - Asulfazine and Asacol: 1974 to late 90s, stopped to to disease progression  - Prednisone 1986 - current.   - Mercaptopurine: Briefly in mid 90s to get him off steroids- Patient thinks he has side effect to medication  - Methotrexate: After mercaptopurine, stayed on for years, but after bleeding in 1988 and 1989, he would not taper his prednisone. MTX stopped as he could not get off the steroids.    Surgical history:  1974 - [facility]anya Ileocecectomy  1986 - [facility] - revision of anastomosis  1988 - massive bleeding from ulcer at anastomosis ([place]), cauterized via colonoscopy  1989 - laparoscopic  Revision of anastomosis  2005 - Lap [facility]    DRUG MONITORING  TPMT enzyme activity: none    DISEASE ASSESSMENT  Fecal calprotectin: None  CRP, ESR Results  Recent Labs   Lab Test  01/08/18   1345  07/05/17   1442   CRP  <2.9  4.5   SED  4  8   Endoscopic assessment: Colonoscopy 8/16/2017 showed normal mucosa in entire colon. Non-patent functional end-to-end ileo-colonic anastomosis that was dilated to 12mm.  No evidence of active Crohn's disease.  Enterography (12/1/16) showed 4cm of mild thickening and enhancement in neoterminal ileum at anastomosis, decompressed sigmoid colon with questionable wall thickening and enhancement.    Enterography: No active Crohn's disease (3/20/15)  C diff: None (7/12/16)    ASSESSMENT/PLAN:  62 year old male with  Crohn's disease s/p ileocecectomy on no current Crohn's therapy.     1. Crohn's disease: In symptomatic and endoscopic remission, and has required intermittent dilations of rao-TI anastomosis sites.  Patient more concerned about 2nd anastomosis site.  I did call radiology to confirm that this additional anastomosis site did indeed exist and was notable on his CT scan obtained in 2006.  Radiologist stated it was likely a proximal jejunal anastomosis.  Questionable whether this was from a bowel injury?  Patient is concerned that this is the anastomosis that is narrowed. Difficult to see the actual anastomosis site on most recent MRE, however, there is no evidence of significant narrowing or obstruction at this site, rather just evidence of surgical material.  Of course these imaging modalities are a moment in time and he certainly could be presenting with intermittent obstructions.    -- We discussed that if he has another episode, and abdominal x-ray (if acute) could be obtained to evaluate for any possible obstruction.    --Colonoscopy if proceeding with redilation of rao-TI anastomotic site previously dilated, given no substantial change in the 4 cm stricture of the rao-TI at the ileocolonic anastomosis in the right upper quadrant on MRE.  Encouraging there is no active inflammation.  -- Continue to monitor for active Crohn's. No evidence of obstructive symptoms.  -- Continue with yearly LFTs to screen for PSC.     2. Colon polyps: Well defined lesions with adenomatous changes.  Will continue to surveillance   --Would require chromoendoscopy for surveillance colonoscopies     3. Diarrhea: Quiescent Crohn's. Prior work up has included alpha 1 antitrypsin for PLE, pancreatic elastase, TTG, DGP all of which were normal.  A spot fecal fat the only isolated value that was increased.  Diarrhea may be related to IBS, or lactose intolerance.  Could also be bile salt malabsorption or bacterial overgrowth.  Patient has had a  decent response to fiber which he continues at this time.  Diarrhea symptoms are stable and unchanged.  -- Avoid dietary triggers  -- prn lomotil  -- If diarrhea persists 2-3 days, will check for C diff    4. Possible acid reflux:  No current symptoms of acid reflux.      5. Pancreatic head cystic lesion: Increased from 2006, potentially representing IPMN.  MRCP with no change in cyst.  Reviewed with Dr. Cruz.  EUS with FNA normal.   -- No further follow-up at this time needed.      6. Steroid dependency/Adrenal insufficiency:  Followed closely with endocrinology.  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      8. Depression: Unchanged.     9. IBD healthcare maintenance based on patients current medication:      Vaccinations:  -- Influenza (every year): Last given 2016  -- TdaP (every 10 years): Last given 2015  -- Pneumococcal Pneumonia (once then every 5 years): Has not obtained.    One time confirmation of immunity or serologies:  -- Hepatitis A (serologies or immunizations): 2005  -- Hepatitis B (serologies or immunizations): 2005  -- Zoster vaccination (>59 yo, discuss if over 50): 2005  -- MMR: Not documented.  -- HPV (all aged 18-26): N/A  -- Meningococcal meningitis (all patients at risk for meningitis): Patient is not at risk.     Cancer Screening:  Colon cancer screening:  Since there is evidence of pancolitis histologically. Also with adenomatous polyps. Colonoscopy every 2-3 years recommended.  Dysplasia screening is recommended 2018.    Cervical cancer screening: N/A    Skin cancer screening: Since patient is not immunocompromised, this is per patient's dermatologist recommendations.    Depression Screening:  -- Over the last month, have you felt down, depressed, or hopeless? No  -- Over the last month, have you felt little interest or pleasure doing things? No    Misc:  -- Avoid tobacco use  -- Avoid NSAIDs as there is  "potentially a 25% chance of causing an IBD flare    RTC 6 months    Thank you for this consultation.  It was a pleasure to participate in the care of this patient; please contact us with any further questions.        Slick Malone PA-C  Division of Gastroenterology, Hepatology and Nutrition  [facility]       HPI:   62-year-old male with ileocolonic Crohn's disease after ileocecal resection and 2 surgical revisions in deep remission on no maintenance therapies.  Patient has had evidence of stricturing at the anastomosis site requiring dilations on subsequent colonoscopies.  Patient has struggled with intermittent diarrhea however has been quite stable currently.  He continues with a fiber supplementation which has been most helpful in relieving diarrhea episodes.  He is currently having 1 bowel movement per day that is formed without blood.  He also notes he has been struggling with pruritus in variable locations on his body.  It has become so bothersome that he tried a prednisone tablet over the course of a few days (which also happened to be during time of diarrhea) and at that time in particular, interestingly his diarrhea also went away.  He has been treated for small intestinal bacterial overgrowth in the past and shortly after he had tried the prednisone the symptoms went away for approximately 2 weeks but then diarrhea returned so he did complete a rifaximin 2 week trial with minimal relief.  As stated above he has been quite stable as of recently with one formed bowel movement per day. He has not had to use lomotil recently.    He has had 2 instances of \"blockages\" that will clear within 24 hours.  He feels that there are certain dietary triggers prior to these events, such as almonds. Patient feels that these instances are very rare but have occurred since he has had dilation with colonoscopy.  Patient is quite concerned that he could have another stricture his other anastomosis site.  There " has not been any evidence of other stricturing disease or concerns for obstruction on previous MREs. The episodes are described as a sharp/burning sensation about 30 minutes after eating. A liquid diet help in these instances.  He denies any of these symptoms currently.  He denies any nausea or vomiting. Weight is stable.  No EIM.    ROS:    No fevers or chills  No weight loss  No blurry vision, double vision or change in vision  No sore throat  No lymphadenopathy  No headache, paraesthesias, or weakness in a limb  No shortness of breath or wheezing  No chest pain or pressure  No arthralgias or myalgias  No rashes or skin changes  No odynophagia or dysphagia  No BRBPR, hematochezia, melena  No dysuria, frequency or urgency  No hot/cold intolerance or polyria  No anxiety or depression    Extra intestinal manifestations of IBD:  No uveitis/episcleritis  No aphthous ulcers   No arthritis   No erythema nodosum/pyoderma gangrenosum.     PERTINENT PAST MEDICAL HISTORY:  Past Medical History:   Diagnosis Date     Anemia 2007     Anxiety 2012?    much worse since July 2014     Cataract 12/2007    both eyes, too much prednisone, implants     Coccidioidomycosis      Crohn disease (H)      Crohn's disease (H) 1/13/2015     Depressive disorder 7/2014    much worse since July 2014     Fracture 1956    green fracture of leg     Gallbladder problem 2005?    much gravel, removed     GERD (gastroesophageal reflux disease)      History of blood transfusion 1988    ulcerated anastomosis term. ilium bleed     Hypertriglyceridemia 7/8/2016     Infection 2007    persistant coccidioidomycosis     Kidney stone various    both sides, all passed naturally     Mental health problem 2007    bipolar though perhaps different     Nephrolithiasis      Osteoporosis 2007    osteoporosis, too much prednisone, last dexa 1/2014     Osteoporosis      Other nervous system complications 2007    prednisone overload induced kevin     Personal history of  colonic polyps     small ones found during colonoscopies     Steroid-induced psychosis     Patient extremely sensitive to regular doses of steroids.  Unclear if this is due to chronic fluconazole use or genetic issue.  In the future, use caution when prescribing steroids.     TB (tuberculosis)        PREVIOUS SURGERIES:  Past Surgical History:   Procedure Laterality Date     APPENDECTOMY  1974    coincidental with Crohn's surgery     BACK SURGERY  12/2007    kyphoplasty on compressed 4th??? vertebra     BIOPSY  2007, 2013    lump on arm, knee, back of hand for coccidioidomycosis cult.     CHOLECYSTECTOMY  2005    much gravel, removed laparoscopically     COLONOSCOPY  7/14/2014 last    Dr. Catherine Bowden, [facility] - many previous     COLONOSCOPY Left 9/11/2015    Procedure: COMBINED COLONOSCOPY, SINGLE OR MULTIPLE BIOPSY/POLYPECTOMY BY BIOPSY;  Surgeon: Fransisco Leach MD;  Location: [facility]     COLONOSCOPY N/A 8/3/2016    Procedure: COMBINED COLONOSCOPY, SINGLE OR MULTIPLE BIOPSY/POLYPECTOMY BY BIOPSY;  Surgeon: Fransisco Leach MD;  Location: [facility]     COLONOSCOPY N/A 8/16/2017    Procedure: COMBINED COLONOSCOPY, SINGLE OR MULTIPLE BIOPSY/POLYPECTOMY BY BIOPSY;;  Surgeon: Fransisco Leach MD;  Location: [facility]     ENT SURGERY  ?    upper endoscopy     ESOPHAGOSCOPY, GASTROSCOPY, DUODENOSCOPY (EGD), COMBINED N/A 11/8/2016    Procedure: COMBINED ENDOSCOPIC ULTRASOUND, ESOPHAGOSCOPY, GASTROSCOPY, DUODENOSCOPY (EGD), FINE NEEDLE ASPIRATE/BIOPSY;  Surgeon: Guru Alexsandra Ballesteros MD;  Location: [facility]     ESOPHAGOSCOPY, GASTROSCOPY, DUODENOSCOPY (EGD), COMBINED N/A 11/8/2016    Procedure: COMBINED ESOPHAGOSCOPY, GASTROSCOPY, DUODENOSCOPY (EGD), BIOPSY SINGLE OR MULTIPLE;  Surgeon: Guru Alexsandra Ballesteros MD;  Location: [facility]     ESOPHAGOSCOPY, GASTROSCOPY, DUODENOSCOPY (EGD), COMBINED N/A 8/16/2017    Procedure: COMBINED ESOPHAGOSCOPY, GASTROSCOPY, DUODENOSCOPY (EGD),  "BIOPSY SINGLE OR MULTIPLE;;  Surgeon: Fransisco Leach MD;  Location: [facility]     EYE SURGERY  12/2007    cataract surgery both sides     GI SURGERY  1974, 1986(x2), 1989    Crohn's, 1974 RO 18\" term. ilium + 9\" asc. colon all one pc     SOFT TISSUE SURGERY  3/2013    removed buildup on back of r hand, coccidioidomycosis       PREVIOUS ENDOSCOPY:  Summer 2014: Colonoscopy - not in out system    ALLERGIES:     Allergies   Allergen Reactions     Prednisone Other (See Comments)     Diana with more than 2.5mg chronic dosing of prednisone due to fluconazole interaction per patient.        PERTINENT MEDICATIONS:    Current Outpatient Prescriptions:      rifaximin (XIFAXAN) 550 MG TABS tablet, Take 1 tablet (550 mg) by mouth 2 times daily, Disp: 60 tablet, Rfl: 0     traZODone (DESYREL) 50 MG tablet, Take 1 tablet (50 mg) by mouth nightly as needed for sleep (Patient taking differently: Take 50 mg by mouth nightly as needed for sleep Take 1/2 tablet at night), Disp: 90 tablet, Rfl: 2     fluconazole (DIFLUCAN) 200 MG tablet, Take 1 tablet (200 mg) by mouth daily, Disp: 90 tablet, Rfl: 3     diphenoxylate-atropine (LOMOTIL) 2.5-0.025 MG per tablet, Take 1 tablet by mouth 4 times daily as needed for diarrhea After loose stools, Disp: 50 tablet, Rfl: 1     rifaximin (XIFAXAN) 550 MG TABS tablet, Take 1 tablet (550 mg) by mouth 3 times daily, Disp: 42 tablet, Rfl: 0     MELATONIN PO, Take 1.5 mg by mouth nightly as needed , Disp: , Rfl:      acetaminophen (TYLENOL) 500 MG tablet, Take 500-1,000 mg by mouth every 6 hours as needed for mild pain, Disp: , Rfl:      Cyanocobalamin (VITAMIN B 12 PO), Take 500 mcg by mouth, Disp: , Rfl:      Pyridoxine HCl (VITAMIN B6 PO), Take 100 mg by mouth, Disp: , Rfl:      ferrous sulfate 325 (65 FE) MG tablet, Take by mouth daily (with breakfast), Disp: , Rfl:      multivitamin, therapeutic with minerals (MULTI-VITAMIN) TABS, Take 1 tablet by mouth daily, Disp: , Rfl:      calcium-vitamin " D (CALTRATE) 600-400 MG-UNIT per tablet, Take 1 tablet by mouth 2 times daily, Disp: , Rfl:      Cholecalciferol (VITAMIN D3 PO), Take 1,000 Units by mouth daily, Disp: , Rfl:      Ascorbic Acid (VITAMIN C PO), Take 500 mg by mouth, Disp: , Rfl:     SOCIAL HISTORY:  Social History     Social History     Marital status:      Spouse name: N/A     Number of children: N/A     Years of education: N/A     Occupational History     Reserach associate at the [employer]     Social History Main Topics     Smoking status: Never Smoker     Smokeless tobacco: Never Used     Alcohol use Yes      Comment: sometimes one glass of wine a week     Drug use: No     Sexual activity: No     Other Topics Concern     Not on file     Social History Narrative       FAMILY HISTORY:  Family History   Problem Relation Age of Onset     Heart Failure Father      Musculoskeletal Disorder Father      Parkinson's     Cancer - colorectal Mother      CEREBROVASCULAR DISEASE Paternal Grandmother      CANCER Other      Musculoskeletal Disorder Brother      Parkinson's     Mother with colon cancer in 80s.  No family member with Crohn's disease or Ulcerative Colitis.      Past/family/social history reviewed and no changes    PHYSICAL EXAMINATION:  Vitals reviewed, AFVSS   Wt   Wt Readings from Last 2 Encounters:   03/13/18 74.7 kg (164 lb 11.2 oz)   02/06/18 74.8 kg (164 lb 12.8 oz)      Gen: aaox3, cooperative, pleasant, not dyspneic/diaphoretic, nad  HEENT: ncat, neck supple, no clad, normal op w/o ulcer/exudate, anicteric, mmm  Lymph: No axillary, submandibular, supraclavicular or inguinal lymphadenopathy  Resp/CV unremarkable  Abd: Nondistended, +bs, no hepatosplenomegaly, nontender, no peritoneal signs  Ext: no c/c/e  Skin: warm, perfused, no jaundice      PERTINENT STUDIES:  Most recent CBC:  Recent Labs   Lab Test  01/08/18   1345  07/05/17   1442   WBC  6.0  5.6   HGB  17.6  16.9   HCT  50.7  49.0   PLT  223  212     Most  recent hepatic panel:  Recent Labs   Lab Test  01/08/18   1345  07/05/17   1442   ALT  40  27   AST  26  18     Most recent creatinine:  Recent Labs   Lab Test  02/06/18   1237  07/14/17   1425   CR  1.32*  1.34*       MRE: 3/20/15:   IMPRESSION:   1. Unremarkable small bowel other than postsurgical changes following right hemicolectomy with ileocolic anastomosis.  2. Small gastric diverticulum. 3. 1.5 x 0.8 cm pancreatic head cystic lesion minimally increased in size compared to the prior exam of 2006, but potentially representing intraductal papillary mucinous neoplasm. This can be reassessed on followup.  4. Hepatic steatosis.      Again, thank you for allowing me to participate in the care of your patient.      Sincerely,    Slick Malone PA-C

## 2018-04-09 NOTE — MR AVS SNAPSHOT
After Visit Summary   4/9/2018    Trevin Gee    MRN: [id number]           Patient Information     Date Of Birth          1955        Visit Information        Provider Department      4/9/2018 12:00 PM Slick Malone PA-C [facility]        Care Instructions    It was a pleasure taking care of you today.  I've included a brief summary of our discussion and care plan from today's visit below.  Please review this information with your primary care provider.  ______________________________________________________________________    My recommendations are summarized as follows:    -- Call if you experience symptoms of obstruction. We will consider abdominal xray to evaluate.  - If diarrhea returns, we will get a fecal calprotectin  -- Next endoscopic assessment: TBD  -- Patient with IBD we recommend supplementation vitamin D 1000 units daily and calcium 500 mg twice daily.  -- No NSAIDs (ibuprofen, or anything containing ibuprofen)     Return to GI Clinic in 6 months to review your progress.    ______________________________________________________________________    Who do I call with any questions after my visit?  Please be in touch if there are any further questions that arise following today's visit.  There are multiple ways to contact your gastroenterology care team.        During business hours, you may reach a Gastroenterology nurse at [phone], option 3.       To schedule or reschedule an appointment, please call [phone].       You can always send a secure message through [employer].  [employer] messages are answered by your nurse or doctor typically within 24 hours.  Please allow extra time on weekends and holidays.        For urgent/emergent questions after business hours, you may reach the on-call GI Fellow by contacting the [facility] at [phone].     How will I get the results of any tests ordered?    You will receive all  of your results.  If you have signed up for [email or web address], any tests ordered at your visit will be available to you after your physician reviews them.  Typically this takes 1-2 weeks.  If there are urgent results that require a change in your care plan, your physician or nurse will call you to discuss the next steps.      What is [email or web address]?  [employer] is a secure way for you to access all of your healthcare records from the [facility].  It is a web based computer program, so you can sign on to it from any location.  It also allows you to send secure messages to your care team.  I recommend signing up for Tryolabst access if you have not already done so and are comfortable with using a computer.      How to I schedule a follow-up visit?  If you did not schedule a follow-up visit today, please call [phone] to schedule a follow-up office visit.        Sincerely,    Slick Malone PA-C  [facility]  Division of Gastroenterology                Follow-ups after your visit        Follow-up notes from your care team     Return in about 6 months (around 10/9/2018).      Who to contact     Please call your clinic at [phone] to:    Ask questions about your health    Make or cancel appointments    Discuss your medicines    Learn about your test results    Speak to your doctor            Additional Information About Your Visit        [email or web address] Information     [employer] gives you secure access to your electronic health record. If you see a primary care provider, you can also send messages to your care team and make appointments. If you have questions, please call your primary care clinic.  If you do not have a primary care provider, please call [phone] and they will assist you.      [employer] is an electronic gateway that provides easy, online access to your medical records. With [employer], you can request a clinic appointment, read your test results, renew a prescription or communicate with your care team.    "  To access your existing account, please contact your [facility] or call [phone] for assistance.        Care EveryWhere ID     This is your Care EveryWhere ID. This could be used by other organizations to access your [facility] medical records  [id number]        Your Vitals Were     Pulse Temperature Height Pulse Oximetry BMI (Body Mass Index)       76 97.4  F (36.3  C) 1.803 m (5' 11\") 95% 23.24 kg/m2        Blood Pressure from Last 3 Encounters:   04/09/18 125/76   03/13/18 118/78   02/06/18 119/80    Weight from Last 3 Encounters:   04/09/18 75.6 kg (166 lb 9.6 oz)   03/13/18 74.7 kg (164 lb 11.2 oz)   02/06/18 74.8 kg (164 lb 12.8 oz)              Today, you had the following     No orders found for display         Today's Medication Changes          These changes are accurate as of 4/9/18 12:58 PM.  If you have any questions, ask your nurse or doctor.               These medicines have changed or have updated prescriptions.        Dose/Directions    traZODone 50 MG tablet   Commonly known as:  DESYREL   This may have changed:  additional instructions   Used for:  Bipolar 2 disorder (H)        Dose:  50 mg   Take 1 tablet (50 mg) by mouth nightly as needed for sleep   Quantity:  90 tablet   Refills:  2                Primary Care Provider Office Phone # Fax #    Saroj Haas [phone]       [facility] [address]  [place] [address]        Equal Access to Services     GUMARO CERRATO AH: Hadii archie ku hadasho Sonerissa, waaxda luqadaha, qaybta kaalmada vanessaegyada, jessika romero. So [facility] [phone].    ATENCIÓN: Si habla español, tiene a corey disposición servicios gratuitos de asistencia lingüística. Llame al [phone].    We comply with applicable federal civil rights laws and [place] laws. We do not discriminate on the basis of race, color, national origin, age, disability, sex, sexual orientation, or gender " identity.            Thank you!     Thank you for choosing [facility]  for your care. Our goal is always to provide you with excellent care. Hearing back from our patients is one way we can continue to improve our services. Please take a few minutes to complete the written survey that you may receive in the mail after your visit with us. Thank you!             Your Updated Medication List - Protect others around you: Learn how to safely use, store and throw away your medicines at www.disposemymeds.org.          This list is accurate as of 4/9/18 12:58 PM.  Always use your most recent med list.                   Brand Name Dispense Instructions for use Diagnosis    acetaminophen 500 MG tablet    TYLENOL     Take 500-1,000 mg by mouth every 6 hours as needed for mild pain        calcium-vitamin D 600-400 MG-UNIT per tablet    CALTRATE     Take 1 tablet by mouth 2 times daily        diphenoxylate-atropine 2.5-0.025 MG per tablet    LOMOTIL    50 tablet    Take 1 tablet by mouth 4 times daily as needed for diarrhea After loose stools    Crohn's disease of small intestine without complication (H)       ferrous sulfate 325 (65 FE) MG tablet    IRON     Take by mouth daily (with breakfast)        fluconazole 200 MG tablet    DIFLUCAN    90 tablet    Take 1 tablet (200 mg) by mouth daily    Coccidioidal granuloma       MELATONIN PO      Take 1.5 mg by mouth nightly as needed        Multi-vitamin Tabs tablet      Take 1 tablet by mouth daily        * rifaximin 550 MG Tabs tablet    XIFAXAN    42 tablet    Take 1 tablet (550 mg) by mouth 3 times daily    Diarrhea, unspecified type       * rifaximin 550 MG Tabs tablet    XIFAXAN    60 tablet    Take 1 tablet (550 mg) by mouth 2 times daily    Small intestinal bacterial overgrowth       traZODone 50 MG tablet    DESYREL    90 tablet    Take 1 tablet (50 mg) by mouth nightly as needed for sleep    Bipolar 2 disorder (H)       VITAMIN B 12 PO       Take 500 mcg by mouth        VITAMIN B6 PO      Take 100 mg by mouth        VITAMIN C PO      Take 500 mg by mouth        VITAMIN D3 PO      Take 1,000 Units by mouth daily        * Notice:  This list has 2 medication(s) that are the same as other medications prescribed for you. Read the directions carefully, and ask your doctor or other care provider to review them with you.

## 2018-04-09 NOTE — NURSING NOTE
"No chief complaint on file.      Vitals:    04/09/18 1206   BP: 125/76   BP Location: Left arm   Patient Position: Chair   Cuff Size: Adult Regular   Pulse: 76   Temp: 97.4  F (36.3  C)   SpO2: 95%   Weight: 166 lb 9.6 oz   Height: 5' 11\"       Body mass index is 23.24 kg/(m^2).      Rosalinda Cantrell                          "

## 2018-04-11 ENCOUNTER — TELEPHONE (OUTPATIENT)
Dept: ENDOCRINOLOGY | Facility: CLINIC | Age: 63
End: 2018-04-11

## 2018-04-11 NOTE — LETTER
Patient:  Trevin Gee  :   1955  MRN:     [id number]        Mr.Mark MORENITA Gee  [address] [place] [id number]        2018    Dear Trevin    I see that you do not have a follow-up appointment in endocrinology.If you like to be seen at the [facility], please call [phone] select option #1 for an appointment.    Regards    Mag Currie MD

## 2018-04-18 ENCOUNTER — TELEPHONE (OUTPATIENT)
Dept: ENDOCRINOLOGY | Facility: CLINIC | Age: 63
End: 2018-04-18

## 2018-04-18 DIAGNOSIS — L29.9 PRURITIC DISORDER: Primary | ICD-10-CM

## 2018-06-04 ENCOUNTER — RADIANT APPOINTMENT (OUTPATIENT)
Dept: GENERAL RADIOLOGY | Facility: CLINIC | Age: 63
End: 2018-06-04
Attending: PHYSICIAN ASSISTANT
Payer: COMMERCIAL

## 2018-06-04 ENCOUNTER — MYC MEDICAL ADVICE (OUTPATIENT)
Dept: GASTROENTEROLOGY | Facility: CLINIC | Age: 63
End: 2018-06-04

## 2018-06-04 ENCOUNTER — CARE COORDINATION (OUTPATIENT)
Dept: GASTROENTEROLOGY | Facility: CLINIC | Age: 63
End: 2018-06-04

## 2018-06-04 DIAGNOSIS — K50.00 CROHN'S DISEASE OF SMALL INTESTINE WITHOUT COMPLICATION (H): Primary | ICD-10-CM

## 2018-06-04 DIAGNOSIS — K50.00 CROHN'S DISEASE OF SMALL INTESTINE WITHOUT COMPLICATION (H): ICD-10-CM

## 2018-06-04 PROCEDURE — 74019 RADEX ABDOMEN 2 VIEWS: CPT | Mod: FY

## 2018-06-04 NOTE — PROGRESS NOTES
Pt had called and left a message about order for abdominal xray.  sent a message via my chart that the order was in.  Pt said he is doing a little better.  So not sure he would go to have xray.  Suggested liquids to clear liquids until feeling that pain is resolving. Pt said he was doing a low residue diet.  Stressed liquids resolution of symptoms.

## 2018-06-05 ENCOUNTER — HOSPITAL ENCOUNTER (EMERGENCY)
Facility: CLINIC | Age: 63
Discharge: HOME | End: 2018-06-05
Attending: EMERGENCY MEDICINE | Admitting: EMERGENCY MEDICINE
Payer: COMMERCIAL

## 2018-06-05 ENCOUNTER — APPOINTMENT (OUTPATIENT)
Dept: GENERAL RADIOLOGY | Facility: CLINIC | Age: 63
End: 2018-06-05
Attending: EMERGENCY MEDICINE
Payer: COMMERCIAL

## 2018-06-05 VITALS
DIASTOLIC BLOOD PRESSURE: 88 MMHG | HEART RATE: 69 BPM | SYSTOLIC BLOOD PRESSURE: 130 MMHG | RESPIRATION RATE: 16 BRPM | HEIGHT: 70 IN | OXYGEN SATURATION: 95 % | TEMPERATURE: 97.9 F | WEIGHT: 160 LBS | BODY MASS INDEX: 22.9 KG/M2

## 2018-06-05 DIAGNOSIS — S20.211A RIB CONTUSION, RIGHT, INITIAL ENCOUNTER: ICD-10-CM

## 2018-06-05 DIAGNOSIS — R10.11 ABDOMINAL PAIN, RIGHT UPPER QUADRANT: ICD-10-CM

## 2018-06-05 LAB
ALBUMIN SERPL-MCNC: 3.7 G/DL (ref 3.4–5)
ALP SERPL-CCNC: 87 U/L (ref 40–150)
ALT SERPL W P-5'-P-CCNC: 33 U/L (ref 0–70)
ANION GAP SERPL CALCULATED.3IONS-SCNC: 6 MMOL/L (ref 3–14)
AST SERPL W P-5'-P-CCNC: 22 U/L (ref 0–45)
BASOPHILS # BLD AUTO: 0 10E9/L (ref 0–0.2)
BASOPHILS NFR BLD AUTO: 0.3 %
BILIRUB SERPL-MCNC: 0.6 MG/DL (ref 0.2–1.3)
BUN SERPL-MCNC: 14 MG/DL (ref 7–30)
CALCIUM SERPL-MCNC: 9 MG/DL (ref 8.5–10.1)
CHLORIDE SERPL-SCNC: 105 MMOL/L (ref 94–109)
CO2 SERPL-SCNC: 30 MMOL/L (ref 20–32)
CREAT SERPL-MCNC: 1.36 MG/DL (ref 0.66–1.25)
DIFFERENTIAL METHOD BLD: NORMAL
EOSINOPHIL # BLD AUTO: 0.1 10E9/L (ref 0–0.7)
EOSINOPHIL NFR BLD AUTO: 1.7 %
ERYTHROCYTE [DISTWIDTH] IN BLOOD BY AUTOMATED COUNT: 12.9 % (ref 10–15)
GFR SERPL CREATININE-BSD FRML MDRD: 53 ML/MIN/1.7M2
GLUCOSE SERPL-MCNC: 96 MG/DL (ref 70–99)
HCT VFR BLD AUTO: 44.5 % (ref 40–53)
HGB BLD-MCNC: 15.5 G/DL (ref 13.3–17.7)
IMM GRANULOCYTES # BLD: 0 10E9/L (ref 0–0.4)
IMM GRANULOCYTES NFR BLD: 0.2 %
LACTATE BLD-SCNC: 1.1 MMOL/L (ref 0.7–2)
LIPASE SERPL-CCNC: 171 U/L (ref 73–393)
LYMPHOCYTES # BLD AUTO: 1.2 10E9/L (ref 0.8–5.3)
LYMPHOCYTES NFR BLD AUTO: 20.9 %
MCH RBC QN AUTO: 32.4 PG (ref 26.5–33)
MCHC RBC AUTO-ENTMCNC: 34.8 G/DL (ref 31.5–36.5)
MCV RBC AUTO: 93 FL (ref 78–100)
MONOCYTES # BLD AUTO: 0.7 10E9/L (ref 0–1.3)
MONOCYTES NFR BLD AUTO: 11 %
NEUTROPHILS # BLD AUTO: 3.9 10E9/L (ref 1.6–8.3)
NEUTROPHILS NFR BLD AUTO: 65.9 %
NRBC # BLD AUTO: 0 10*3/UL
NRBC BLD AUTO-RTO: 0 /100
PLATELET # BLD AUTO: 186 10E9/L (ref 150–450)
POTASSIUM SERPL-SCNC: 4.2 MMOL/L (ref 3.4–5.3)
PROT SERPL-MCNC: 6.7 G/DL (ref 6.8–8.8)
RBC # BLD AUTO: 4.78 10E12/L (ref 4.4–5.9)
SODIUM SERPL-SCNC: 141 MMOL/L (ref 133–144)
WBC # BLD AUTO: 5.9 10E9/L (ref 4–11)

## 2018-06-05 PROCEDURE — 99284 EMERGENCY DEPT VISIT MOD MDM: CPT | Mod: 25

## 2018-06-05 PROCEDURE — 83690 ASSAY OF LIPASE: CPT | Performed by: EMERGENCY MEDICINE

## 2018-06-05 PROCEDURE — 80053 COMPREHEN METABOLIC PANEL: CPT | Performed by: EMERGENCY MEDICINE

## 2018-06-05 PROCEDURE — 99284 EMERGENCY DEPT VISIT MOD MDM: CPT | Mod: Z6 | Performed by: EMERGENCY MEDICINE

## 2018-06-05 PROCEDURE — 71101 X-RAY EXAM UNILAT RIBS/CHEST: CPT | Mod: RT

## 2018-06-05 PROCEDURE — 83605 ASSAY OF LACTIC ACID: CPT | Performed by: EMERGENCY MEDICINE

## 2018-06-05 PROCEDURE — 85025 COMPLETE CBC W/AUTO DIFF WBC: CPT | Performed by: EMERGENCY MEDICINE

## 2018-06-05 ASSESSMENT — ENCOUNTER SYMPTOMS
NAUSEA: 0
ABDOMINAL PAIN: 1
DIARRHEA: 0
WOUND: 0
COUGH: 0
ABDOMINAL DISTENTION: 1
FEVER: 0
DYSURIA: 0
FLANK PAIN: 0
VOMITING: 0
CONSTIPATION: 0
CHILLS: 0
SHORTNESS OF BREATH: 0

## 2018-06-05 NOTE — ED PROVIDER NOTES
"  History     Chief Complaint   Patient presents with     Abdominal Pain     HPI  63-year-old male with history of ileocolonic Crohn's disease status post resection currently in remission.  He arrives tonight to the emergency department for evaluation of intermittent abdominal discomfort.  The patient reports more recently in the past day he has had intermittent right upper quadrant spasm type pain with localized slight distention.  Since arrival to the emergency department he states that this discomfort and distention have completely resolved.  Current pain is rated at 0.  The patient reports no associated nausea, vomiting, diarrhea, melena, hematochezia.  Patient does seem to be quite knowledgeable about his underlying condition and reports concern of intermittent \"blockages \"that will oftentimes clear within 24 hours.  He believes there are certain triggers such as types of foods he has eaten.  He states he would like to follow-up with his colorectal surgeon but that this is typically been 3 months out.  The patient was evaluated by gastroenterology in April of this year.    I have reviewed the Medications, Allergies, Past Medical and Surgical History, and Social History in the Epic system.    Review of Systems   Constitutional: Negative for chills and fever.   Respiratory: Negative for cough and shortness of breath.    Gastrointestinal: Positive for abdominal distention and abdominal pain. Negative for constipation, diarrhea, nausea and vomiting.   Genitourinary: Negative for dysuria and flank pain.   Skin: Negative for rash and wound.   All other systems reviewed and are negative.      Physical Exam   BP: (!) 149/95  Pulse: 85  Temp: 97.9  F (36.6  C)  Resp: 16  Height: 177.8 cm (5' 10\")  Weight: 72.6 kg (160 lb)  SpO2: 97 %      Physical Exam   Constitutional: He appears well-developed.   HENT:   Mouth/Throat: Oropharynx is clear and moist.   Cardiovascular: Normal rate and normal heart sounds.  "   Pulmonary/Chest: Effort normal. No respiratory distress. He has no wheezes. He has no rales.       Abdominal: Soft. He exhibits no distension. There is no tenderness. There is no rebound and no guarding.   Neurological: He is alert.   Skin: No rash noted. He is not diaphoretic.   Psychiatric: He has a normal mood and affect.       ED Course     ED Course     Procedures         Labs Ordered and Resulted from Time of ED Arrival Up to the Time of Departure from the ED   COMPREHENSIVE METABOLIC PANEL - Abnormal; Notable for the following:        Result Value    Creatinine 1.36 (*)     GFR Estimate 53 (*)     Protein Total 6.7 (*)     All other components within normal limits   CBC WITH PLATELETS DIFFERENTIAL   LIPASE   LACTIC ACID WHOLE BLOOD   UA MACROSCOPIC WITH REFLEX TO MICRO AND CULTURE            Assessments & Plan (with Medical Decision Making)     63-year-old male with history of ileocolonic Crohn's disease status post resection and prior anastomotic dilation arriving today to the emergency department for intermittent abdominal discomfort now resolved.  On evaluation he is noted to be alert, afebrile, and hemodynamically stable.  He seated upright in bed and appears nontoxic.  By evaluation of the abdomen this is benign with no involuntary guarding, mass, hernia.  There is no signs of external trauma overlying skin rash.  I did have a long discussion regarding risks and benefits of CT imaging and he agrees that at this time there is a low suspicion for ongoing bowel obstruction.  We did discuss potential for stricture or narrowing at anastomotic site.  He was evaluated by gastroenterology in April this year with reassuring examination.  Plan at that time was for yearly screening.  Otherwise the patient reports a fall approximately 2 weeks ago with some persistent right-sided chest wall pain.  X-rays were obtained demonstrating no sign of obvious fracture underlying pneumothorax, effusion, atelectasis, or  pneumonia.  We discussed ongoing symptomatic management.  With regards to abdominal discomfort we discussed closely watching, monitoring food intake as is been previously discussed by gastroenterology as well as follow-up with his specialty services.    I have reviewed the nursing notes.    I have reviewed the findings, diagnosis, plan and need for follow up with the patient.    New Prescriptions    No medications on file       Final diagnoses:   Rib contusion, right, initial encounter   Abdominal pain, right upper quadrant       6/5/2018   [facility], [place], EMERGENCY DEPARTMENT     David Patel MD  06/05/18 1495

## 2018-06-05 NOTE — DISCHARGE INSTRUCTIONS
Abdominal Pain    Abdominal pain is pain in the stomach or belly area. Everyone has this pain from time to time. In many cases it goes away on its own. But abdominal pain can sometimes be due to a serious problem, such as appendicitis. So it s important to know when to seek help.  Causes of abdominal pain  There are many possible causes of abdominal pain. Common causes in adults include:    Constipation, diarrhea, or gas    Stomach acid flowing back up into the esophagus (acid reflux or heartburn)    Severe acid reflux, called GERD (gastroesophageal reflux disease)    A sore in the lining of the stomach or small intestine (peptic ulcer)    Inflammation of the gallbladder, liver, or pancreas    Gallstones or kidney stones    Appendicitis     Intestinal blockage     An internal organ pushing through a muscle or other tissue (hernia)    Urinary tract infections    In women, menstrual cramps, fibroids, or endometriosis    Inflammation or infection of the intestines  Diagnosing the cause of abdominal pain  Your healthcare provider will do a physical exam help find the cause of your pain. If needed, tests will be ordered. Belly pain has many possible causes. So it can be hard to find the reason for your pain. Giving details about your pain can help. Tell your provider where and when you feel the pain, and what makes it better or worse. Also let your provider know if you have other symptoms such as:    Fever    Tiredness    Upset stomach (nausea)    Vomiting    Changes in bathroom habits  Treating abdominal pain  Some causes of pain need emergency medical treatment right away. These include appendicitis or a bowel blockage. Other problems can be treated with rest, fluids, or medicines. Your healthcare provider can give you specific instructions for treatment or self-care based on what is causing your pain.  If you have vomiting or diarrhea, sip water or other clear fluids. When you are ready to eat solid foods again,  start with small amounts of easy-to-digest, low-fat foods. These include apple sauce, toast, or crackers.   When to seek medical care  Call 911 or go to the hospital right away if you:    Can t pass stool and are vomiting    Are vomiting blood or have bloody diarrhea or black, tarry diarrhea    Have chest, neck, or shoulder pain    Feel like you might pass out    Have pain in your shoulder blades with nausea    Have sudden, severe belly pain    Have new, severe pain unlike any you have felt before    Have a belly that is rigid, hard, and tender to touch  Call your healthcare provider if you have:    Pain for more than 5 days    Bloating for more than 2 days    Diarrhea for more than 5 days    A fever of 100.4 F (38 C) or higher, or as directed by your healthcare provider    Pain that gets worse    Weight loss for no reason    Continued lack of appetite    Blood in your stool  How to prevent abdominal pain  Here are some tips to help prevent abdominal pain:    Eat smaller amounts of food at one time.    Avoid greasy, fried, or other high-fat foods.    Avoid foods that give you gas.    Exercise regularly.    Drink plenty of fluids.  To help prevent GERD symptoms:    Quit smoking.    Reduce alcohol and certain foods that increase stomach acid.    Avoid aspirin and over-the-counter pain and fever medicines (NSAIDS or nonsteroidal anti-inflammatory drugs), if possible    Lose extra weight.    Finish eating at least 2 hours before you go to bed or lie down.    Raise the head of your bed.  Date Last Reviewed: 7/1/2016 2000-2017 [employer]. [address] [place] [address], [place] [address]. All rights reserved. This information is not intended as a substitute for professional medical care. Always follow your healthcare professional's instructions.          Rib Contusion or Minor Fracture    A rib contusion is a bruise to one or more rib bones. It may cause pain, tenderness, swelling, and a purplish tint to the  skin. There may be a sharp pain with each breath. A rib contusion takes anywhere from a few days to a few weeks to heal. A minor rib fracture or break may cause the same symptoms as a rib contusion. The small crack may not be seen on a regular chest X-ray. Treatment for both problems is the same.  Home care    You may use over-the-counter pain medicine to control pain, unless another pain medicine was prescribed. If you have chronic liver or kidney disease or ever had a stomach ulcer or GI bleeding, talk with your healthcare provider before using these medicines.    Rest. Do not lift anything heavy or do any activity that causes pain.    Apply an ice pack over the injured area for 15 to 20 minutes every 1 to 2 hours. You should do this for the first 24 to 48 hours. You can make an ice pack by filling a plastic bag that seals at the top with ice cubes and then wrapping it with a thin towel. Continue with ice packs as needed for the relief of pain and swelling.    The first 3 to 4 weeks of healing will be the most painful. If your pain is not under control with the treatment given, call your healthcare provider. Sometimes a stronger pain medicine may be needed. A nerve block can be done in case of severe pain. It will numb the nerve between the ribs.  Follow-up care  Follow up with your healthcare provider, or as advised.  If X-rays were taken, you will be told of any new findings that may affect your care.  Call 911  Call 911 if you have:    Dizziness, weakness or fainting    Shortness of breath with or without chest discomfort    New or worsening pain  When to seek medical advice  Call your healthcare provider right away if any of these occur:    Fever of 100.4 F (38 C) or higher, or as directed by your healthcare provider    Stomach pain  Date Last Reviewed: 12/2/2015 2000-2017 [employer]. [address] [place] [address], [place] [address]. All rights reserved. This information is not intended as a  substitute for professional medical care. Always follow your healthcare professional's instructions.

## 2018-06-05 NOTE — ED TRIAGE NOTES
Patient presents with c/o abdominal pain. Patient reports intermittent abdominal pain/spasms since yesterday morning. Denies N/VD. Also reports right-sided rib pain from a recent fall. Denies SOB. Hx of Crohn's. VSS.

## 2018-06-05 NOTE — ED AVS SNAPSHOT
[facility], [place], Emergency Department    [address] [place] [id number]    Phone:  [phone]                                       Trevin Gee   MRN: [id number]    Department:  [facility], Emergency Department   Date of Visit:  6/5/2018           After Visit Summary Signature Page     I have received my discharge instructions, and my questions have been answered. I have discussed any challenges I see with this plan with the nurse or doctor.    ..........................................................................................................................................  Patient/Patient Representative Signature      ..........................................................................................................................................  Patient Representative Print Name and Relationship to Patient    ..................................................               ................................................  Date                                            Time    ..........................................................................................................................................  Reviewed by Signature/Title    ...................................................              ..............................................  Date                                                            Time

## 2018-06-05 NOTE — ED AVS SNAPSHOT
[facility], Emergency Department    [address] [facility] [place] [address]    Phone:  [phone]                                       Trevin Gee   MRN: [id number]    Department:  [facility], Emergency Department   Date of Visit:  6/5/2018           Patient Information     Date Of Birth          1955        Your diagnoses for this visit were:     Rib contusion, right, initial encounter     Abdominal pain, right upper quadrant        You were seen by David Patel MD.      Follow-up Information     Follow up with Saroj Haas MD.    Specialty:  Student in organized health care education/training program    Contact information:    [facility]  [address]  [facility] [address]  [phone]          Discharge Instructions         Abdominal Pain    Abdominal pain is pain in the stomach or belly area. Everyone has this pain from time to time. In many cases it goes away on its own. But abdominal pain can sometimes be due to a serious problem, such as appendicitis. So it s important to know when to seek help.  Causes of abdominal pain  There are many possible causes of abdominal pain. Common causes in adults include:    Constipation, diarrhea, or gas    Stomach acid flowing back up into the esophagus (acid reflux or heartburn)    Severe acid reflux, called GERD (gastroesophageal reflux disease)    A sore in the lining of the stomach or small intestine (peptic ulcer)    Inflammation of the gallbladder, liver, or pancreas    Gallstones or kidney stones    Appendicitis     Intestinal blockage     An internal organ pushing through a muscle or other tissue (hernia)    Urinary tract infections    In women, menstrual cramps, fibroids, or endometriosis    Inflammation or infection of the intestines  Diagnosing the cause of abdominal pain  Your healthcare provider will do a physical exam help find the cause of your pain. If needed, tests will be ordered. Belly pain has many  possible causes. So it can be hard to find the reason for your pain. Giving details about your pain can help. Tell your provider where and when you feel the pain, and what makes it better or worse. Also let your provider know if you have other symptoms such as:    Fever    Tiredness    Upset stomach (nausea)    Vomiting    Changes in bathroom habits  Treating abdominal pain  Some causes of pain need emergency medical treatment right away. These include appendicitis or a bowel blockage. Other problems can be treated with rest, fluids, or medicines. Your healthcare provider can give you specific instructions for treatment or self-care based on what is causing your pain.  If you have vomiting or diarrhea, sip water or other clear fluids. When you are ready to eat solid foods again, start with small amounts of easy-to-digest, low-fat foods. These include apple sauce, toast, or crackers.   When to seek medical care  Call 911 or go to the hospital right away if you:    Can t pass stool and are vomiting    Are vomiting blood or have bloody diarrhea or black, tarry diarrhea    Have chest, neck, or shoulder pain    Feel like you might pass out    Have pain in your shoulder blades with nausea    Have sudden, severe belly pain    Have new, severe pain unlike any you have felt before    Have a belly that is rigid, hard, and tender to touch  Call your healthcare provider if you have:    Pain for more than 5 days    Bloating for more than 2 days    Diarrhea for more than 5 days    A fever of 100.4 F (38 C) or higher, or as directed by your healthcare provider    Pain that gets worse    Weight loss for no reason    Continued lack of appetite    Blood in your stool  How to prevent abdominal pain  Here are some tips to help prevent abdominal pain:    Eat smaller amounts of food at one time.    Avoid greasy, fried, or other high-fat foods.    Avoid foods that give you gas.    Exercise regularly.    Drink plenty of fluids.  To help  prevent GERD symptoms:    Quit smoking.    Reduce alcohol and certain foods that increase stomach acid.    Avoid aspirin and over-the-counter pain and fever medicines (NSAIDS or nonsteroidal anti-inflammatory drugs), if possible    Lose extra weight.    Finish eating at least 2 hours before you go to bed or lie down.    Raise the head of your bed.  Date Last Reviewed: 7/1/2016 2000-2017 [employer]. [address] [place] [address], [place] [address]. All rights reserved. This information is not intended as a substitute for professional medical care. Always follow your healthcare professional's instructions.          Rib Contusion or Minor Fracture    A rib contusion is a bruise to one or more rib bones. It may cause pain, tenderness, swelling, and a purplish tint to the skin. There may be a sharp pain with each breath. A rib contusion takes anywhere from a few days to a few weeks to heal. A minor rib fracture or break may cause the same symptoms as a rib contusion. The small crack may not be seen on a regular chest X-ray. Treatment for both problems is the same.  Home care    You may use over-the-counter pain medicine to control pain, unless another pain medicine was prescribed. If you have chronic liver or kidney disease or ever had a stomach ulcer or GI bleeding, talk with your healthcare provider before using these medicines.    Rest. Do not lift anything heavy or do any activity that causes pain.    Apply an ice pack over the injured area for 15 to 20 minutes every 1 to 2 hours. You should do this for the first 24 to 48 hours. You can make an ice pack by filling a plastic bag that seals at the top with ice cubes and then wrapping it with a thin towel. Continue with ice packs as needed for the relief of pain and swelling.    The first 3 to 4 weeks of healing will be the most painful. If your pain is not under control with the treatment given, call your healthcare provider. Sometimes a stronger pain  medicine may be needed. A nerve block can be done in case of severe pain. It will numb the nerve between the ribs.  Follow-up care  Follow up with your healthcare provider, or as advised.  If X-rays were taken, you will be told of any new findings that may affect your care.  Call 911  Call 911 if you have:    Dizziness, weakness or fainting    Shortness of breath with or without chest discomfort    New or worsening pain  When to seek medical advice  Call your healthcare provider right away if any of these occur:    Fever of 100.4 F (38 C) or higher, or as directed by your healthcare provider    Stomach pain  Date Last Reviewed: 12/2/2015 2000-2017 [employer]. [address] [place] [address], [place] [address]. All rights reserved. This information is not intended as a substitute for professional medical care. Always follow your healthcare professional's instructions.          Your next 10 appointments already scheduled     Jun 06, 2018 12:30 PM CDT   (Arrive by 12:15 PM)   New Patient Visit with Cece Cullen PA-C   [facility] ([facility])    [address] [place] [address] [phone]            Aug 24, 2018  1:30 PM CDT   (Arrive by 1:15 PM)   RETURN ENDOCRINE with Mag Currie MD   [facility] ([facility])    [address] [place] [address] [phone]              24 Hour Appointment Hotline       To make an appointment at any [facility], call [phone] ([phone]). If you don't have a family doctor or clinic, we will help you find one. [facility] clinics are conveniently located to serve the needs of you and your family.             Review of your medicines      Our records show that you are taking the medicines listed below. If these are incorrect, please call your family doctor or clinic.        Dose / Directions Last dose taken    acetaminophen  500 MG tablet   Commonly known as:  TYLENOL   Dose:  500-1000 mg        Take 500-1,000 mg by mouth every 6 hours as needed for mild pain   Refills:  0        calcium-vitamin D 600-400 MG-UNIT per tablet   Commonly known as:  CALTRATE   Dose:  1 tablet        Take 1 tablet by mouth 2 times daily   Refills:  0        diphenoxylate-atropine 2.5-0.025 MG per tablet   Commonly known as:  LOMOTIL   Dose:  1 tablet   Quantity:  50 tablet        Take 1 tablet by mouth 4 times daily as needed for diarrhea After loose stools   Refills:  1        ferrous sulfate 325 (65 Fe) MG tablet   Commonly known as:  IRON        Take by mouth daily (with breakfast)   Refills:  0        fluconazole 200 MG tablet   Commonly known as:  DIFLUCAN   Dose:  200 mg   Quantity:  90 tablet        Take 1 tablet (200 mg) by mouth daily   Refills:  3        MELATONIN PO   Dose:  1.5 mg        Take 1.5 mg by mouth nightly as needed   Refills:  0        Multi-vitamin Tabs tablet   Dose:  1 tablet        Take 1 tablet by mouth daily   Refills:  0        * rifaximin 550 MG Tabs tablet   Commonly known as:  XIFAXAN   Dose:  550 mg   Quantity:  42 tablet        Take 1 tablet (550 mg) by mouth 3 times daily   Refills:  0        * rifaximin 550 MG Tabs tablet   Commonly known as:  XIFAXAN   Dose:  550 mg   Quantity:  60 tablet        Take 1 tablet (550 mg) by mouth 2 times daily   Refills:  0        traZODone 50 MG tablet   Commonly known as:  DESYREL   Dose:  50 mg   Quantity:  90 tablet        Take 1 tablet (50 mg) by mouth nightly as needed for sleep   Refills:  2        VITAMIN B 12 PO   Dose:  500 mcg        Take 500 mcg by mouth   Refills:  0        VITAMIN B6 PO   Dose:  100 mg        Take 100 mg by mouth   Refills:  0        VITAMIN C PO   Dose:  500 mg        Take 500 mg by mouth   Refills:  0        VITAMIN D3 PO   Dose:  1000 Units        Take 1,000 Units by mouth daily   Refills:  0        * Notice:  This list has 2 medication(s) that are the same  as other medications prescribed for you. Read the directions carefully, and ask your doctor or other care provider to review them with you.            Procedures and tests performed during your visit     CBC with platelets differential    Comprehensive metabolic panel    Lactic acid whole blood    Lipase    Ribs XR, unilat 3 views + PA chest, right      Orders Needing Specimen Collection     None      Pending Results     Date and Time Order Name Status Description    6/5/2018 0358 Ribs XR, unilat 3 views + PA chest, right Preliminary     6/4/2018 1633 XR ABDOMEN 2 VW Preliminary             Pending Culture Results     No orders found from 6/3/2018 to 6/6/2018.            Pending Results Instructions     If you had any lab results that were not finalized at the time of your Discharge, you can call the ED Lab Result RN at [phone]. You will be contacted by this team for any positive Lab results or changes in treatment. The nurses are available 7 days a week from 10A to 6:30P.  You can leave a message 24 hours per day and they will return your call.        Thank you for choosing [facility]       Thank you for choosing [facility] for your care. Our goal is always to provide you with excellent care. Hearing back from our patients is one way we can continue to improve our services. Please take a few minutes to complete the written survey that you may receive in the mail after you visit with us. Thank you!        [employer] Information     [employer] gives you secure access to your electronic health record. If you see a primary care provider, you can also send messages to your care team and make appointments. If you have questions, please call your primary care clinic.  If you do not have a primary care provider, please call [phone] and they will assist you.        Care EveryWhere ID     This is your Care EveryWhere ID. This could be used by other organizations to access your [facility] medical records  [id number]         Equal Access to Services     GUMARO CERRATO : Aleta Tom, juan dietz, jessika carolina. So [facility] [phone].    ATENCIÓN: Si habla español, tiene a corey disposición servicios gratuitos de asistencia lingüística. Llame al [phone].    We comply with applicable federal civil rights laws and [place] laws. We do not discriminate on the basis of race, color, national origin, age, disability, sex, sexual orientation, or gender identity.            After Visit Summary       This is your record. Keep this with you and show to your community pharmacist(s) and doctor(s) at your next visit.

## 2018-06-06 ENCOUNTER — CARE COORDINATION (OUTPATIENT)
Dept: GASTROENTEROLOGY | Facility: CLINIC | Age: 63
End: 2018-06-06

## 2018-06-06 DIAGNOSIS — K50.90 CROHN'S DISEASE (H): Primary | ICD-10-CM

## 2018-06-06 NOTE — PROGRESS NOTES
Called pt to discuss the next step is a colonoscopy per Ms. Malone.   Pt will start his citrucel  Again and try some miralax due to the xray results.  Pt now scheduled for colonoscopy on July 2.

## 2018-06-13 ENCOUNTER — OFFICE VISIT (OUTPATIENT)
Dept: INTERNAL MEDICINE | Facility: CLINIC | Age: 63
End: 2018-06-13
Payer: COMMERCIAL

## 2018-06-13 VITALS
HEART RATE: 79 BPM | RESPIRATION RATE: 18 BRPM | BODY MASS INDEX: 23 KG/M2 | SYSTOLIC BLOOD PRESSURE: 117 MMHG | DIASTOLIC BLOOD PRESSURE: 78 MMHG | WEIGHT: 160.3 LBS

## 2018-06-13 DIAGNOSIS — M62.838 MUSCLE SPASM: Primary | ICD-10-CM

## 2018-06-13 DIAGNOSIS — M54.2 CERVICALGIA: ICD-10-CM

## 2018-06-13 ASSESSMENT — PAIN SCALES - GENERAL: PAINLEVEL: NO PAIN (0)

## 2018-06-13 NOTE — MR AVS SNAPSHOT
After Visit Summary   6/13/2018    Trevin Gee    MRN: [id number]           Patient Information     Date Of Birth          1955        Visit Information        Provider Department      6/13/2018 1:00 PM [facility]        Today's Diagnoses     Muscle spasm    -  1    Cervicalgia          Care Instructions    Primary Care Center Medication Refill Request Information:  * Please contact your pharmacy regarding ANY request for medication refills.  ** [facility] Prescription Fax = [phone]  * Please allow 3 business days for routine medication refills.  * Please allow 5 business days for controlled substance medication refills.     Primary Care Center Test Result notification information:  *You will be notified with in 7-10 days of your appointment day regarding the results of your test.  If you are on MyChart you will be notified as soon as the provider has reviewed the results and signed off on them.    Primary Care Center [phone]     Head Tilt / Upper Trapezius Stretch (Flexibility)    1. Sit up straight in a chair with your head and neck in a neutral position, ears in line with shoulders. Hold the edge of your chair seat with your right hand. Tuck your chin in slightly.  2. Tilt your head to the left, while looking straight ahead.  3. Put your left hand on the right side of your head. Gently pull your head to the left. Hold for 30 to 60 seconds. Use gentle pressure to increase the stretch. Don t force your head into position.  4. Return your head and neck to the neutral position.  5. Repeat this exercise 2 times, or as instructed.  6. Switch sides and repeat 2 times, or as instructed.  Challenge yourself  Tuck one end of a towel under your left arm. Then bring the other end over your right shoulder. Pull the towel down on your right shoulder with both hands as you side-bend your head to the left. Repeat with the other side.   Date Last  "Reviewed: 3/10/2016    5322-9312 [employer]. [address] [place] [address], [place] [address]. All rights reserved. This information is not intended as a substitute for professional medical care. Always follow your healthcare professional's instructions.                Follow-ups after your visit        Additional Services     PHYSICAL THERAPY REFERRAL       *This therapy referral will be filtered to a centralized scheduling office at [facility] and the patient will receive a call to schedule an appointment at a [facility] location most convenient for them. *     [facility] provides Physical Therapy evaluation and treatment and many specialty services across the [facility] system.  If requesting a specialty program, please choose from the list below.    If you have not heard from the scheduling office within 2 business days, please call [phone] for all locations, with the exception of [place], please call [phone] and [facility], please call [phone]  Treatment: Evaluation & Treatment  Special Instructions/Modalities:   Special Programs:     Please be aware that coverage of these services is subject to the terms and limitations of your health insurance plan.  Call member services at your health plan with any benefit or coverage questions.      **Note to Provider:  If you are referring outside of [facility] for the therapy appointment, please list the name of the location in the \"special instructions\" above, print the referral and give to the patient to schedule the appointment.                  Your next 10 appointments already scheduled     Jul 02, 2018  8:00 AM CDT   Colonoscopy with Fransisco Leach MD   [facility] ([facility])    [address] [place] [address]   [phone]            Aug 24, 2018  1:30 PM CDT   (Arrive by 1:15 PM)   RETURN ENDOCRINE with Mag Currie MD   [facility] " Endocrinology ([facility] [place])    [address] [facility]  [address] [facility] [address] [phone]              Who to contact     Please call your clinic at [phone] to:    Ask questions about your health    Make or cancel appointments    Discuss your medicines    Learn about your test results    Speak to your doctor            Additional Information About Your Visit        [employer] Information     [employer] gives you secure access to your electronic health record. If you see a primary care provider, you can also send messages to your care team and make appointments. If you have questions, please call your primary care clinic.  If you do not have a primary care provider, please call [phone] and they will assist you.      [employer] is an electronic gateway that provides easy, online access to your medical records. With [employer], you can request a clinic appointment, read your test results, renew a prescription or communicate with your care team.     To access your existing account, please contact your [facility] or call [phone] for assistance.        Care EveryWhere ID     This is your Care EveryWhere ID. This could be used by other organizations to access your [facility] medical records  [id number]        Your Vitals Were     Pulse Respirations BMI (Body Mass Index)             79 18 23 kg/m2          Blood Pressure from Last 3 Encounters:   06/13/18 117/78   06/05/18 130/88   04/09/18 125/76    Weight from Last 3 Encounters:   06/13/18 72.7 kg (160 lb 4.8 oz)   06/05/18 72.6 kg (160 lb)   04/09/18 75.6 kg (166 lb 9.6 oz)              We Performed the Following     PHYSICAL THERAPY REFERRAL          Today's Medication Changes          These changes are accurate as of 6/13/18  1:41 PM.  If you have any questions, ask your nurse or doctor.               Start taking these medicines.        Dose/Directions    tiZANidine 4 MG tablet   Commonly known  as:  ZANAFLEX   Used for:  Muscle spasm, Cervicalgia   Started by:  Lacey Feng APRN CNP        Dose:  4-8 mg   Take 1-2 tablets (4-8 mg) by mouth 3 times daily as needed for muscle spasms   Quantity:  30 tablet   Refills:  0         These medicines have changed or have updated prescriptions.        Dose/Directions    traZODone 50 MG tablet   Commonly known as:  DESYREL   This may have changed:  additional instructions   Used for:  Bipolar 2 disorder (H)        Dose:  50 mg   Take 1 tablet (50 mg) by mouth nightly as needed for sleep   Quantity:  90 tablet   Refills:  2            Where to get your medicines      These medications were sent to [facility] [id number] [place] [address] [address] [facility] [address], [facility] [address]     Phone:  [phone]     tiZANidine 4 MG tablet                Primary Care Provider Office Phone # Fax #    Saroj Haas [phone]       [address]  [facility] [address]        Equal Access to Services     GUMARO CERRATO : Hadii archie ward hadasho Sonerissa, waaxda luqadaha, qaybta kaalmada adehuyenyaalmas, jessika romero. So [facility] [phone].    ATENCIÓN: Si habla español, tiene a corey disposición servicios gratuitos de asistencia lingüística. [facility] [phone].    We comply with applicable federal civil rights laws and [place] laws. We do not discriminate on the basis of race, color, national origin, age, disability, sex, sexual orientation, or gender identity.            Thank you!     Thank you for choosing [facility]  for your care. Our goal is always to provide you with excellent care. Hearing back from our patients is one way we can continue to improve our services. Please take a few minutes to complete the written survey that you may receive in the mail after your visit with us. Thank you!             Your Updated Medication List  - Protect others around you: Learn how to safely use, store and throw away your medicines at www.disposemymeds.org.          This list is accurate as of 6/13/18  1:41 PM.  Always use your most recent med list.                   Brand Name Dispense Instructions for use Diagnosis    acetaminophen 500 MG tablet    TYLENOL     Take 500-1,000 mg by mouth every 6 hours as needed for mild pain        calcium-vitamin D 600-400 MG-UNIT per tablet    CALTRATE     Take 1 tablet by mouth 2 times daily        diphenoxylate-atropine 2.5-0.025 MG per tablet    LOMOTIL    50 tablet    Take 1 tablet by mouth 4 times daily as needed for diarrhea After loose stools    Crohn's disease of small intestine without complication (H)       ferrous sulfate 325 (65 Fe) MG tablet    IRON     Take by mouth daily (with breakfast)        fluconazole 200 MG tablet    DIFLUCAN    90 tablet    Take 1 tablet (200 mg) by mouth daily    Coccidioidal granuloma       MELATONIN PO      Take 1.5 mg by mouth nightly as needed        Multi-vitamin Tabs tablet      Take 1 tablet by mouth daily        * rifaximin 550 MG Tabs tablet    XIFAXAN    42 tablet    Take 1 tablet (550 mg) by mouth 3 times daily    Diarrhea, unspecified type       * rifaximin 550 MG Tabs tablet    XIFAXAN    60 tablet    Take 1 tablet (550 mg) by mouth 2 times daily    Small intestinal bacterial overgrowth       tiZANidine 4 MG tablet    ZANAFLEX    30 tablet    Take 1-2 tablets (4-8 mg) by mouth 3 times daily as needed for muscle spasms    Muscle spasm, Cervicalgia       traZODone 50 MG tablet    DESYREL    90 tablet    Take 1 tablet (50 mg) by mouth nightly as needed for sleep    Bipolar 2 disorder (H)       VITAMIN B 12 PO      Take 500 mcg by mouth        VITAMIN B6 PO      Take 100 mg by mouth        VITAMIN C PO      Take 500 mg by mouth        VITAMIN D3 PO      Take 1,000 Units by mouth daily        * Notice:  This list has 2 medication(s) that are the same as other medications  prescribed for you. Read the directions carefully, and ask your doctor or other care provider to review them with you.

## 2018-06-13 NOTE — NURSING NOTE
Chief Complaint   Patient presents with     Spasms     Patient is here for muscle spasms       Cm Razo CMA (AAMA) at 1:19 PM on 6/13/2018

## 2018-06-13 NOTE — PROGRESS NOTES
"[facility]  [facility] [place]   Lacey ARRIETAMookie Feng, MARISOL CNP  06/13/2018      Chief Complaint:   Spasms       History of Present Illness:   Trevin Gee is a 63 year old male with a history of anemia, Crohn's disease, depression, GERD, and bi-polar 2 disorder who presents for evaluation of muscle spasms.    Muscle Spasms:  Trevin states he began experiencing muscle spasms in his neck about a week ago after sleeping \"funny\" one night. He notes that this has happened before and normally stretches or ices his neck which alleviates the symptoms. However, this time these tactics didn't work. He did try a massage therapist for an hour a few days ago but with little improvement. Today his neck feels stiff. He feels that the stiffness and soreness is \"deep\". Trevin doesn't experience any numbness or tingling. He has no pain in the back of his neck, back or left side of his neck. Trevin notes that he has tried Tylenol once but saw minimal improvement.     Review of Systems:   Pertinent items are noted in HPI, remainder of complete ROS is negative.      Active Medications:      acetaminophen (TYLENOL) 500 MG tablet, Take 500-1,000 mg by mouth every 6 hours as needed for mild pain, Disp: , Rfl:      Ascorbic Acid (VITAMIN C PO), Take 500 mg by mouth, Disp: , Rfl:      calcium-vitamin D (CALTRATE) 600-400 MG-UNIT per tablet, Take 1 tablet by mouth 2 times daily, Disp: , Rfl:      Cholecalciferol (VITAMIN D3 PO), Take 1,000 Units by mouth daily, Disp: , Rfl:      Cyanocobalamin (VITAMIN B 12 PO), Take 500 mcg by mouth, Disp: , Rfl:      diphenoxylate-atropine (LOMOTIL) 2.5-0.025 MG per tablet, Take 1 tablet by mouth 4 times daily as needed for diarrhea After loose stools, Disp: 50 tablet, Rfl: 1     ferrous sulfate 325 (65 FE) MG tablet, Take by mouth daily (with breakfast), Disp: , Rfl:      fluconazole (DIFLUCAN) 200 MG tablet, Take 1 tablet (200 mg) by mouth daily, Disp: 90 tablet, Rfl: 3     MELATONIN PO, Take 1.5 mg by " mouth nightly as needed , Disp: , Rfl:      multivitamin, therapeutic with minerals (MULTI-VITAMIN) TABS, Take 1 tablet by mouth daily, Disp: , Rfl:      Pyridoxine HCl (VITAMIN B6 PO), Take 100 mg by mouth, Disp: , Rfl:      rifaximin (XIFAXAN) 550 MG TABS tablet, Take 1 tablet (550 mg) by mouth 2 times daily, Disp: 60 tablet, Rfl: 0     rifaximin (XIFAXAN) 550 MG TABS tablet, Take 1 tablet (550 mg) by mouth 3 times daily, Disp: 42 tablet, Rfl: 0     traZODone (DESYREL) 50 MG tablet, Take 1 tablet (50 mg) by mouth nightly as needed for sleep (Patient taking differently: Take 50 mg by mouth nightly as needed for sleep Take 1/2 tablet at night), Disp: 90 tablet, Rfl: 2      Allergies:   Prednisone      Past Medical History:  Anemia  Anxiety  Cataract, both eyes  Chrohn's Disease  Depressive Disorder  Fracture, leg  Gallbladder problem  GERD  History of blood transfusion  Hypertriglyceridemia  Infection, persistent coccidioidomycosis  Kidney Stones, both sides  Mental Health Problems  Nephrolithiasis  Osteoporosis  Other Nervous System Complications  Personal history of colonic polyps  Steroid-induced psychosis  TB  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  Smoking Status: Never   Smokeless Tobacco: Never Used   Alcohol Use: Yes, one glass of wine per week     Physical Exam:   /78 (BP Location: Right arm, Patient Position: Chair, Cuff Size: Adult Regular)  Pulse  79  Resp 18  Wt 72.7 kg (160 lb 4.8 oz)  BMI 23 kg/m2   Constitutional: Alert, oriented, pleasant, no acute distress  Head: Normocephalic, atraumatic  Neck: Supple, no lymphadenopathy, no thyromegaly, tender right trapezius, ROM limited due to pain with rotation and lateral flexion  Cardiovascular: Regular rate and rhythm, no murmurs, rubs or gallops  Respiratory: Good air movement bilaterally, lungs clear, no wheezes/rales/rhonchi  Psychiatric: normal mentation, affect and mood    Assessment and Plan:  Muscle spasm and Cervicalgia  Will do short course of Zanaflex to help with the muscle spasm and help improve ROM. I will also refer him to physical therapy to assist him with stretching techniques and exercise to reduce chance of causing another episode. To help I showed Trevin a few stretches he can do in the mean time to help improve the neck pain.  - tiZANidine (ZANAFLEX) 4 MG tablet  Dispense: 30 tablet; Refill: 0  - PHYSICAL THERAPY REFERRAL     Follow-up: If symptoms persist or worsen         Scribe Disclosure:   I, Santy Lockhart, am serving as a scribe to document services personally performed by MARISOL Alfaro CNP at this visit, based upon the provider's statements to me. All documentation has been reviewed by the aforementioned provider prior to being entered into the official medical record.     Portions of this medical record were completed by a scribe. UPON MY REVIEW AND AUTHENTICATION BY ELECTRONIC SIGNATURE, this confirms (a) I performed the applicable clinical services, and (b) the record is accurate.     MARISOL Alfaro CNP

## 2018-06-13 NOTE — PATIENT INSTRUCTIONS
[facility] Medication Refill Request Information:  * Please contact your pharmacy regarding ANY request for medication refills.  ** [facility] Prescription Fax = [phone]  * Please allow 3 business days for routine medication refills.  * Please allow 5 business days for controlled substance medication refills.     [facility] Test Result notification information:  *You will be notified with in 7-10 days of your appointment day regarding the results of your test.  If you are on MyChart you will be notified as soon as the provider has reviewed the results and signed off on them.    [facility] [phone]     Head Tilt / Upper Trapezius Stretch (Flexibility)    1. Sit up straight in a chair with your head and neck in a neutral position, ears in line with shoulders. Hold the edge of your chair seat with your right hand. Tuck your chin in slightly.  2. Tilt your head to the left, while looking straight ahead.  3. Put your left hand on the right side of your head. Gently pull your head to the left. Hold for 30 to 60 seconds. Use gentle pressure to increase the stretch. Don t force your head into position.  4. Return your head and neck to the neutral position.  5. Repeat this exercise 2 times, or as instructed.  6. Switch sides and repeat 2 times, or as instructed.  Challenge yourself  Tuck one end of a towel under your left arm. Then bring the other end over your right shoulder. Pull the towel down on your right shoulder with both hands as you side-bend your head to the left. Repeat with the other side.   Date Last Reviewed: 3/10/2016    6611-4887 [employer]. [address] [place] [address], [place] [address]. All rights reserved. This information is not intended as a substitute for professional medical care. Always follow your healthcare professional's instructions.

## 2018-06-19 ENCOUNTER — THERAPY VISIT (OUTPATIENT)
Dept: PHYSICAL THERAPY | Facility: CLINIC | Age: 63
End: 2018-06-19
Payer: COMMERCIAL

## 2018-06-19 DIAGNOSIS — M54.2 CERVICALGIA: Primary | ICD-10-CM

## 2018-06-19 PROCEDURE — 97161 PT EVAL LOW COMPLEX 20 MIN: CPT | Mod: GP | Performed by: PHYSICAL THERAPIST

## 2018-06-19 PROCEDURE — 97110 THERAPEUTIC EXERCISES: CPT | Mod: GP | Performed by: PHYSICAL THERAPIST

## 2018-06-19 PROCEDURE — 97530 THERAPEUTIC ACTIVITIES: CPT | Mod: GP | Performed by: PHYSICAL THERAPIST

## 2018-06-19 NOTE — MR AVS SNAPSHOT
After Visit Summary   6/19/2018    Trevin Gee    MRN: [id number]           Patient Information     Date Of Birth          1955        Visit Information        Provider Department      6/19/2018 11:40 AM Marifer Persaud PT [facility]        Today's Diagnoses     Cervicalgia    -  1       Follow-ups after your visit        Your next 10 appointments already scheduled     Jul 02, 2018  8:00 AM CDT   Colonoscopy with Fransisco Leach MD   [facility] ([facility])    [address]  [facility] [id number] [phone]            Aug 24, 2018  1:30 PM CDT   (Arrive by 1:15 PM)   RETURN ENDOCRINE with Mag Currie MD   [facility] ([facility])    [address] [address] [phone]              Who to contact     If you have questions or need follow up information about today's clinic visit or your schedule please contact [facility] directly at [phone].  Normal or non-critical lab and imaging results will be communicated to you by DalloulNWhart, letter or phone within 4 business days after the clinic has received the results. If you do not hear from us within 7 days, please contact the clinic through [employer] or phone. If you have a critical or abnormal lab result, we will notify you by phone as soon as possible.  Submit refill requests through Dick or Bro or call your pharmacy and they will forward the refill request to us. Please allow 3 business days for your refill to be completed.          Additional Information About Your Visit        DalloulNWhart Information     Dick or Bro gives you secure access to your electronic health record. If you see a primary care provider, you can also send messages to your care team and make appointments. If you have questions, please call your primary care clinic.  If you do not have a primary care provider,  please call [phone] and they will assist you.        Care EveryWhere ID     This is your Care EveryWhere ID. This could be used by other organizations to access your [place] medical records  [id number]         Blood Pressure from Last 3 Encounters:   06/13/18 117/78   06/05/18 130/88   04/09/18 125/76    Weight from Last 3 Encounters:   06/13/18 72.7 kg (160 lb 4.8 oz)   06/05/18 72.6 kg (160 lb)   04/09/18 75.6 kg (166 lb 9.6 oz)              We Performed the Following     HC PT EVAL, LOW COMPLEXITY     [facility] INITIAL EVAL REPORT     THERAPEUTIC ACTIVITIES     THERAPEUTIC EXERCISES          Today's Medication Changes          These changes are accurate as of 6/19/18  2:11 PM.  If you have any questions, ask your nurse or doctor.               These medicines have changed or have updated prescriptions.        Dose/Directions    traZODone 50 MG tablet   Commonly known as:  DESYREL   This may have changed:  additional instructions   Used for:  Bipolar 2 disorder (H)        Dose:  50 mg   Take 1 tablet (50 mg) by mouth nightly as needed for sleep   Quantity:  90 tablet   Refills:  2                Primary Care Provider Office Phone # Fax #    Saroj Haas [phone]       [place] [address]        Equal Access to Services     GUMARO CERRATO AH: Aleta patricko Soomaali, waaxda luqadaha, qaybta kaalmada adetravis, jessika romero. So [facility] [phone].    ATENCIÓN: Si habla español, tiene a corey disposición servicios gratuitos de asistencia lingüística. Llame al [phone].    We comply with applicable federal civil rights laws and [place] laws. We do not discriminate on the basis of race, color, national origin, age, disability, sex, sexual orientation, or gender identity.            Thank you!     Thank you for choosing [facility]  for your care. Our goal is always to provide you with excellent care.  Hearing back from our patients is one way we can continue to improve our services. Please take a few minutes to complete the written survey that you may receive in the mail after your visit with us. Thank you!             Your Updated Medication List - Protect others around you: Learn how to safely use, store and throw away your medicines at www.disposemymeds.org.          This list is accurate as of 6/19/18  2:11 PM.  Always use your most recent med list.                   Brand Name Dispense Instructions for use Diagnosis    acetaminophen 500 MG tablet    TYLENOL     Take 500-1,000 mg by mouth every 6 hours as needed for mild pain        calcium-vitamin D 600-400 MG-UNIT per tablet    CALTRATE     Take 1 tablet by mouth 2 times daily        diphenoxylate-atropine 2.5-0.025 MG per tablet    LOMOTIL    50 tablet    Take 1 tablet by mouth 4 times daily as needed for diarrhea After loose stools    Crohn's disease of small intestine without complication (H)       ferrous sulfate 325 (65 Fe) MG tablet    IRON     Take by mouth daily (with breakfast)        fluconazole 200 MG tablet    DIFLUCAN    90 tablet    Take 1 tablet (200 mg) by mouth daily    Coccidioidal granuloma       MELATONIN PO      Take 1.5 mg by mouth nightly as needed        Multi-vitamin Tabs tablet      Take 1 tablet by mouth daily        * rifaximin 550 MG Tabs tablet    XIFAXAN    42 tablet    Take 1 tablet (550 mg) by mouth 3 times daily    Diarrhea, unspecified type       * rifaximin 550 MG Tabs tablet    XIFAXAN    60 tablet    Take 1 tablet (550 mg) by mouth 2 times daily    Small intestinal bacterial overgrowth       tiZANidine 4 MG tablet    ZANAFLEX    30 tablet    Take 1-2 tablets (4-8 mg) by mouth 3 times daily as needed for muscle spasms    Muscle spasm, Cervicalgia       traZODone 50 MG tablet    DESYREL    90 tablet    Take 1 tablet (50 mg) by mouth nightly as needed for sleep    Bipolar 2 disorder (H)       VITAMIN B 12 PO      Take 500  mcg by mouth        VITAMIN B6 PO      Take 100 mg by mouth        VITAMIN C PO      Take 500 mg by mouth        VITAMIN D3 PO      Take 1,000 Units by mouth daily        * Notice:  This list has 2 medication(s) that are the same as other medications prescribed for you. Read the directions carefully, and ask your doctor or other care provider to review them with you.

## 2018-06-19 NOTE — PROGRESS NOTES
"KEY PT FINDINGS:  1) Right sided scapular winging  2) Impaired scapular strength bilaterally  3) Poor posture    Physical Therapy Initial Evaluation: Subjective History     Injury/Condition Details:  Presenting Complaint Right neck pain   Onset Timing/Date 2 weeks ago, early June   Mechanism \"I sleep funny\" and shifts side to side.   Primarily responses to ice and stretching for 24 hours. This most recent episode took longer to resolve. No change in symptoms with massage.   Saw the DrMookie About 1 week after the onset and things were much better.   Comes to PT today to help with interventions to help it stay better.      Symptom Behavior Details    Primary Symptoms Sporadic symptoms; Activity/position dependent, pain (Location: right sided neck pain, extends up to the head and to the medial scapula, denies pain into the arm, Quality: Aching/Throbbing), stiffness, denies numbness/tingling, denies headaches.    Worst Pain 5/10 (with sleeping on the right side)   Symptom Provocators Certain arm movements   Best Pain 0/10    Symptom Relievers Heat, massage, stretching   Time of day dependent? No   Recent symptom change? 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   Usual physical activities  (within past year) Nothing beyond daily activities   Orthopaedic history See Epic Chart   Notable medical history See [facility]     [facility] Initial Evaluation  Subjective:  HPI                    Objective:  Standing Alignment:    Cervical/Thoracic:  Forward head and thoracic kyphosis increased  Shoulder/UE:  Rounded shoulders, scapular winging R, scapular winging L and elevated scapula R                  Flexibility/Screens:   Positive screens:  Cervical and Shoulder  Upper Extremity:    Decreased left upper extremity " flexibility at:  Pectoralis Major and Pectoralis Minor    Decreased right upper extremity flexibility present at:  Pectoralis Major and Pectoralis Minor                      Cervical/Thoracic Evaluation  Cervical AROM: normal     AROM:    AROM Thoracic:    Flexion:               Extension:          Limited  Rotation:            Left: Limited     Right: Limited    Strength: Middle trap: 4/5, Lower trap: 4-/5, rhomboid 4/5 - poor activation into depression  Headaches: none          Cervical Palpation:      Tenderness present at Right:    Upper Trap; Levator; Erector Spinae and Suboccipitals               Shoulder Evaluation:  ROM:  AROM:  normal                                  Strength:  normal                                                               General     ROS    Assessment/Plan:    Patient is a 63 year old male with cervical and right side shoulder complaints.    Patient has the following significant findings with corresponding treatment plan.                Diagnosis 1:  Right upper trap pain  Pain -  hot/cold therapy, manual therapy, STS, splint/taping/bracing/orthotics, self management and education  Decreased ROM/flexibility - manual therapy, therapeutic exercise and home program  Decreased strength - therapeutic exercise, therapeutic activities and home program  Impaired muscle performance - neuro re-education and home program  Decreased function - therapeutic activities and home program  Impaired posture - neuro re-education and home program    Therapy Evaluation Codes:   1) History comprised of:   Personal factors that impact the plan of care:      Age, Overall behavior pattern and Time since onset of symptoms.    Comorbidity factors that impact the plan of care are:      Weakness.     Medications impacting care: None.  2) Examination of Body Systems comprised of:   Body structures and functions that impact the plan of care:      Cervical spine and Shoulder.   Activity limitations that impact the  plan of care are:      Lifting and Sleeping.  3) Clinical presentation characteristics are:   Stable/Uncomplicated.  4) Decision-Making    Low complexity using standardized patient assessment instrument and/or measureable assessment of functional outcome.  Cumulative Therapy Evaluation is: Low complexity.    Previous and current functional limitations:  (See Goal Flow Sheet for this information)    Short term and Long term goals: (See Goal Flow Sheet for this information)     Communication ability:  Patient appears to be able to clearly communicate and understand verbal and written communication and follow directions correctly.  Treatment Explanation - The following has been discussed with the patient:   RX ordered/plan of care  Anticipated outcomes  Possible risks and side effects  This patient would benefit from PT intervention to resume normal activities.   Rehab potential is good.    Frequency:  `1 X week, once daily  Duration:  for 1 weeks  Discharge Plan:  Achieve all LTG.  Independent in home treatment program.  Reach maximal therapeutic benefit.    Please refer to the daily flowsheet for treatment today, total treatment time and time spent performing 1:1 timed codes.

## 2018-06-22 ENCOUNTER — TELEPHONE (OUTPATIENT)
Dept: GASTROENTEROLOGY | Facility: OUTPATIENT CENTER | Age: 63
End: 2018-06-22

## 2018-06-22 NOTE — TELEPHONE ENCOUNTER
Patient taking any blood thinners ? No    Heart disease ? denies    Lung disease ? denies      Sleep apnea ? denies    Diabetic ? denies    Kidney disease ? denies    Dialysis ? n/a    Electronic implanted medical devices ? denies    Are you taking any narcotic pain medication ? no   What is your daily dosage ?    PTSD ? n/a    Prep instructions reviewed with patient ? Patient declined review.  policy, MAC sedation plan reviewed. Advised patient to have someone stay with him post exam    Pharmacy : [facility]    Indication for procedure : Crohn's disease (H) [K50.90]    Referring provider :Slick Malone PA-C      Arrival Time : Patient will arrive at 7 AM

## 2018-06-26 ENCOUNTER — CARE COORDINATION (OUTPATIENT)
Dept: GASTROENTEROLOGY | Facility: CLINIC | Age: 63
End: 2018-06-26

## 2018-06-26 NOTE — PROGRESS NOTES
Nel states that he does not need to move that his ride has worked out the details so he can stay on July 2.      Received call from Trevin. He would like to move colonoscopy which is currently scheduled for 7/2. He cannot find a ride.     I wasn't sure the urgency so I said you would follow up when you return.

## 2018-07-02 ENCOUNTER — TRANSFERRED RECORDS (OUTPATIENT)
Dept: HEALTH INFORMATION MANAGEMENT | Facility: CLINIC | Age: 63
End: 2018-07-02

## 2018-07-02 ENCOUNTER — DOCUMENTATION ONLY (OUTPATIENT)
Dept: GASTROENTEROLOGY | Facility: OUTPATIENT CENTER | Age: 63
End: 2018-07-02
Payer: COMMERCIAL

## 2018-07-02 DIAGNOSIS — K50.90 CROHN'S DISEASE (H): Primary | ICD-10-CM

## 2018-07-05 LAB — COPATH REPORT: NORMAL

## 2018-07-12 ENCOUNTER — CARE COORDINATION (OUTPATIENT)
Dept: GASTROENTEROLOGY | Facility: CLINIC | Age: 63
End: 2018-07-12

## 2018-07-12 NOTE — PROGRESS NOTES
Patient stated that he would like to at least once more try the dilatation.  Will notify  Dr. Leach of pt's wish.           [place] - can you see if he wants to do a repeat colonoscopy in 4-8 weeks to repeat the dilation (this is my recommendation) or if he would rather meet with a colorectal surgeon.     Thanks!

## 2018-07-23 ENCOUNTER — CARE COORDINATION (OUTPATIENT)
Dept: GASTROENTEROLOGY | Facility: CLINIC | Age: 63
End: 2018-07-23

## 2018-07-23 NOTE — PROGRESS NOTES
Called pt to discuss colonoscopy and symptoms.   Pt said his diarrhea has decreased even with eating foods that he does not normally eat and out of his normal rountine. Would like a refill of lomotil and  Rifmaxin.   Pt is okay with colonoscopy first week of August. Will check on refills and also if okay to schedule at [facility].

## 2018-07-25 ENCOUNTER — CARE COORDINATION (OUTPATIENT)
Dept: GASTROENTEROLOGY | Facility: CLINIC | Age: 63
End: 2018-07-25

## 2018-07-25 DIAGNOSIS — K50.90 CROHN'S DISEASE (H): Primary | ICD-10-CM

## 2018-07-25 DIAGNOSIS — K50.00 CROHN'S DISEASE OF SMALL INTESTINE WITHOUT COMPLICATION (H): ICD-10-CM

## 2018-07-25 DIAGNOSIS — K63.8219 SMALL INTESTINAL BACTERIAL OVERGROWTH: ICD-10-CM

## 2018-07-25 RX ORDER — DIPHENOXYLATE HCL/ATROPINE 2.5-.025MG
1 TABLET ORAL 4 TIMES DAILY PRN
Qty: 50 TABLET | Refills: 1 | Status: ON HOLD | OUTPATIENT
Start: 2018-07-25 | End: 2019-04-19

## 2018-07-25 NOTE — PROGRESS NOTES
Called pt to let him know the plan as recommended by Dr. Leach.  Pt now scheduled on Septmember 10. Pt was offered earlier appts but need to work around pt's avaialibablity and vacations.  Called in to [facility] to Jovani pharmacist Lomotil rx.  E prescribed the rifaximin.        OK to refil lomotil     OK to re-try rifaximin     OK to schedule in Aug at [facility], but also OK at [facility] or [facility]

## 2018-08-22 ENCOUNTER — MYC MEDICAL ADVICE (OUTPATIENT)
Dept: INFECTIOUS DISEASES | Facility: CLINIC | Age: 63
End: 2018-08-22

## 2018-08-22 DIAGNOSIS — B38.7 COCCIDIOIDAL GRANULOMA: ICD-10-CM

## 2018-08-22 RX ORDER — FLUCONAZOLE 200 MG/1
200 TABLET ORAL DAILY
Qty: 90 TABLET | Refills: 3 | Status: SHIPPED | OUTPATIENT
Start: 2018-08-22 | End: 2019-10-07

## 2018-08-22 ASSESSMENT — ENCOUNTER SYMPTOMS
NERVOUS/ANXIOUS: 1
PANIC: 0
DEPRESSION: 1
DECREASED CONCENTRATION: 1
INSOMNIA: 0

## 2018-08-24 ENCOUNTER — TELEPHONE (OUTPATIENT)
Dept: ENDOCRINOLOGY | Facility: CLINIC | Age: 63
End: 2018-08-24

## 2018-08-24 ENCOUNTER — RADIANT APPOINTMENT (OUTPATIENT)
Dept: BONE DENSITY | Facility: CLINIC | Age: 63
End: 2018-08-24
Attending: INTERNAL MEDICINE
Payer: COMMERCIAL

## 2018-08-24 ENCOUNTER — OFFICE VISIT (OUTPATIENT)
Dept: ENDOCRINOLOGY | Facility: CLINIC | Age: 63
End: 2018-08-24
Payer: COMMERCIAL

## 2018-08-24 VITALS
HEIGHT: 71 IN | SYSTOLIC BLOOD PRESSURE: 104 MMHG | DIASTOLIC BLOOD PRESSURE: 74 MMHG | BODY MASS INDEX: 22.79 KG/M2 | WEIGHT: 162.8 LBS | HEART RATE: 94 BPM

## 2018-08-24 DIAGNOSIS — M81.0 OSTEOPOROSIS WITHOUT CURRENT PATHOLOGICAL FRACTURE, UNSPECIFIED OSTEOPOROSIS TYPE: Primary | ICD-10-CM

## 2018-08-24 DIAGNOSIS — Z13.220 SCREENING FOR HYPERLIPIDEMIA: ICD-10-CM

## 2018-08-24 DIAGNOSIS — M81.0 OSTEOPOROSIS WITHOUT CURRENT PATHOLOGICAL FRACTURE, UNSPECIFIED OSTEOPOROSIS TYPE: ICD-10-CM

## 2018-08-24 PROBLEM — K21.9 ESOPHAGEAL REFLUX: Status: ACTIVE | Noted: 2018-08-24

## 2018-08-24 LAB
ANION GAP SERPL CALCULATED.3IONS-SCNC: 6 MMOL/L (ref 3–14)
BUN SERPL-MCNC: 22 MG/DL (ref 7–30)
CALCIUM SERPL-MCNC: 9.9 MG/DL (ref 8.5–10.1)
CHLORIDE SERPL-SCNC: 102 MMOL/L (ref 94–109)
CHOLEST SERPL-MCNC: 129 MG/DL
CO2 SERPL-SCNC: 30 MMOL/L (ref 20–32)
CORTIS SERPL-MCNC: 9.7 UG/DL (ref 4–22)
CREAT SERPL-MCNC: 1.47 MG/DL (ref 0.66–1.25)
GFR SERPL CREATININE-BSD FRML MDRD: 48 ML/MIN/1.7M2
GLUCOSE SERPL-MCNC: 95 MG/DL (ref 70–99)
HDLC SERPL-MCNC: 56 MG/DL
LDLC SERPL CALC-MCNC: 36 MG/DL
NONHDLC SERPL-MCNC: 73 MG/DL
POTASSIUM SERPL-SCNC: 4.6 MMOL/L (ref 3.4–5.3)
SODIUM SERPL-SCNC: 138 MMOL/L (ref 133–144)
TRIGL SERPL-MCNC: 189 MG/DL
TSH SERPL DL<=0.005 MIU/L-ACNC: 1.49 MU/L (ref 0.4–4)

## 2018-08-24 RX ORDER — ZOLEDRONIC ACID 5 MG/100ML
5 INJECTION, SOLUTION INTRAVENOUS ONCE
Status: CANCELLED
Start: 2018-08-24 | End: 2018-08-24

## 2018-08-24 ASSESSMENT — PAIN SCALES - GENERAL: PAINLEVEL: NO PAIN (0)

## 2018-08-24 NOTE — PROGRESS NOTES
[facility]  Endocrinology  Mag Currie MD  08/24/2018      Chief Complaint:   RECHECK     History of Present Illness:   Trevin Gee is a 63 year old male with a history of adrenal insufficiency and osteoporosis who presents for evaluation of recheck.    #1 adrenal insufficiency possibly due to chronic fluconazole use    Patient was referred endocrinology for management of adrenal insufficiency in the setting of steroid taper.  The patient has a history of Crohn's disease and has been dependent on steroids for most of his adult life.  In 2007, he was diagnosed with coccidiosis and was started on fluconazole treatment.  He had two episodes of kevin which was eventually determined to be from a drug interaction between fluconazole and prednisone, with underlying hypothesis being that fluconazole suppressed metabolism and clearance of prednisone.  He was tapered off prednisone in 2007, which has caused significant mental and on physical deconditioning including severe weakness, and was thought to have adrenal insufficiency even though there was no documented cortisol level in the chart.  Fluconazole was stopped after it was successfully treated initially, he has a relapse in 2013 with a knee infection, and was placed back on fluconazole 200 mg daily, which he is taking indefinitely.  His Crohn's disease is now controlled with low-dose prednisone 1 mg three times a week, and entercort 3 mg daily with minimal symptoms.  MRI in 2015 show that adrenal glands were normal.  May 2015 ACTH was 192, baseline cortisol was 1.6, 30 minute cortisol was high 0.8, and 60 minute cortisol was 6.9.  He does understand that he gets sick, he should take supplemental steroids and/or IM Solu-Cortef.  repeat ACTH test in July 2015 now shows adrenal insufficiency with suppresssed ACTH. The patient saw infectious disease in October 2015 and the plan is to continue with the fluconazole for now given his history of coccidiomycosis.   April 2016 cortisol level was 10.  The patient stopped his steroids as of April 2016.       Interval History:At this time, he states that he continues to take his Fluconazole. He does feel that he has some decreased energy, but this has been a gradual decrease he attributes to age.      #2 history of osteoporosis  Patient had a documented low T score on DXA scan in 2005, and the clinical vertebral fracture status post kyphoplasty, confirming the diagnosis of osteoporosis and treatment as indicated.  He did have history of kidney stone even though this occurred in the setting of his Crohn's disease and admitted chocolate dependency.  A 24 urine for calcium was low at 90 mg over 24 hours.  May 2015 DEXA scan show the lowest T score -2.4 at the lumbar spine, lower Z score of -2.0 the lumbar spine, is significant increase in his bone density in the lumbar spine compared with the previous exam in 2005 as well as the baseline exam in 2003.  There's been no significant change in bone density at the level of his hips.  The patient is currently on Fosamax 70 mg weekly.  He does take this properly. March 2015 vitamin D is 28. We looked into the price of Reclast and this was cost prohibitive for him.  The patient had a tooth removed in January 2016 and then received his first dose of   Reclast in March 2016.  He tolerated this infusion. His second Reclast infusion was in July 2017 with no complications. His DEXA scan on 7/7/2017 showed in increased in bone density at the levels of L1-L2, and in the total right and left hips.     Interval History: The patient denies any recent falls or recent fractures. He also denies having any upcoming dental extractions or surgeries. He has been getting Reclast infusions, most recently in July 2017 with the plan of him having yearly infusions for 5 years. He would be interested in a repeat DEXA scan at this time to see if this is continuing to improve his bone density.      #3 Skin  itching  The patient notes that for the past few years, he has been experiencing itchy skin in the winter and spring. He states that that itching would often start after siting in his car for long periods of time and preventing contact with the car seat did no help. He also has tried cortisone cream, showering, and changing detergents with no improvement. Mr. Gee feels that this may be a seasonal dry skin issue, as he never has noticed a rash in itchy areas and the itching changes location. He was going to follow up with dermatology for this, but cancelled the appointment due to having abdominal pain at the time.  Currently, he reports that his skin itching has resolved.    Review of Systems:   Pertinent items are noted in HPI, remainder of complete ROS is negative.      Active Medications:     Current Outpatient Prescriptions:      acetaminophen (TYLENOL) 500 MG tablet, Take 500-1,000 mg by mouth every 6 hours as needed for mild pain, Disp: , Rfl:      Ascorbic Acid (VITAMIN C PO), Take 500 mg by mouth, Disp: , Rfl:      calcium-vitamin D (CALTRATE) 600-400 MG-UNIT per tablet, Take 1 tablet by mouth 2 times daily, Disp: , Rfl:      Cholecalciferol (VITAMIN D3 PO), Take 1,000 Units by mouth daily, Disp: , Rfl:      Cyanocobalamin (VITAMIN B 12 PO), Take 500 mcg by mouth, Disp: , Rfl:      diphenoxylate-atropine (LOMOTIL) 2.5-0.025 MG per tablet, Take 1 tablet by mouth 4 times daily as needed for diarrhea After loose stools, Disp: 50 tablet, Rfl: 1     ferrous sulfate 325 (65 FE) MG tablet, Take by mouth daily (with breakfast), Disp: , Rfl:      fluconazole (DIFLUCAN) 200 MG tablet, Take 1 tablet (200 mg) by mouth daily, Disp: 90 tablet, Rfl: 3     MELATONIN PO, Take 1.5 mg by mouth nightly as needed , Disp: , Rfl:      multivitamin, therapeutic with minerals (MULTI-VITAMIN) TABS, Take 1 tablet by mouth daily, Disp: , Rfl:      Pyridoxine HCl (VITAMIN B6 PO), Take 100 mg by mouth, Disp: , Rfl:      rifaximin  "(XIFAXAN) 550 MG TABS tablet, Take 1 tablet (550 mg) by mouth 2 times daily, Disp: 60 tablet, Rfl: 0     rifaximin (XIFAXAN) 550 MG TABS tablet, Take 1 tablet (550 mg) by mouth 3 times daily, Disp: 42 tablet, Rfl: 0     tiZANidine (ZANAFLEX) 4 MG tablet, Take 1-2 tablets (4-8 mg) by mouth 3 times daily as needed for muscle spasms, Disp: 30 tablet, Rfl: 0     traZODone (DESYREL) 50 MG tablet, Take 1 tablet (50 mg) by mouth nightly as needed for sleep (Patient taking differently: Take 50 mg by mouth nightly as needed for sleep Take 1/2 tablet at night), Disp: 90 tablet, Rfl: 2      Allergies:   Prednisone      Past Medical History:  Anemia  Anxiety  Cataract, both eyes  Chrohn's Disease  Depressive Disorder  Fracture, leg  Gallbladder problem  GERD  History of blood transfusion  Hypertriglyceridemia  Infection, persistent coccidioidomycosis  Kidney Stones, both sides  Mental Health Problems  Nephrolithiasis  Osteoporosis  Other Nervous System Complications  Personal history of colonic polyps  Steroid-induced psychosis  TB  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 He has never smoked tobacco and socially drinks alcohol.      Physical Exam:   /74  Pulse 94  Ht 1.803 m (5' 11\")  Wt 73.8 kg (162 lb 12.8 oz)  BMI 22.71 kg/m2   GENERAL APPEARANCE: Alert and no distress  NECK: No lymphadenopathy appreciated  Eyes: No obvious retinopathy noted  Thyroid: No obvious " nodules palpated   CV: RRR without M/R/G  Lungs: CTA bilaterally  Abdomen: Soft, Nontender, non distended, positive bowel sounds   Neuro: normal reflexes, no focal deficits  Skin: No infection in feet   Mood: Normal   Lymph: neg in neck and supraclavicular area    Assessment and Plan:  1. Osteoporosis without current pathological fracture, unspecified osteoporosis type-Repeat DEXA scan and labs completed today. Will discuss results over phone and will schedule a Reclast infusion by the end of the year if this would benefit him.   - Dexa hip/pelvis/spine  - TSH  - Cortisol  - 25 Hydroxyvitamin D2 and D3  - Basic metabolic panel    2. Screening for hyperlipidemia  - Lipid Profile  - TSH  - Cortisol  - 25 Hydroxyvitamin D2 and D3  - Basic metabolic panel    3. adrenal insufficiency possibly due to chronic fluconazole use  Patient clinically doing well.  We will check cortisol levels today although it is in the afternoon.  Patient understands the need for stress dose steroids.    4. Skin itching  Recommended topical creams at this time.  Patient will let me know if he needs repeat referral to dermatology.        Follow-up: Return in about 1 year (around 8/24/2019).         Scribe Disclosure:   I, Roro Parham, am serving as a scribe to document services personally performed by Mag Currie MD at this visit, based upon the provider's statements to me. All documentation has been reviewed by the aforementioned provider prior to being entered into the official medical record.     Portions of this medical record were completed by a scribe. UPON MY REVIEW AND AUTHENTICATION BY ELECTRONIC SIGNATURE, this confirms (a) I performed the applicable clinical services, and (b) the record is accurate.    Answers for HPI/ROS submitted by the patient on 8/22/2018   General Symptoms: No  Skin Symptoms: No  HENT Symptoms: No  EYE SYMPTOMS: No  HEART SYMPTOMS: No  LUNG SYMPTOMS: No  INTESTINAL SYMPTOMS: No  URINARY SYMPTOMS:  No  REPRODUCTIVE SYMPTOMS: No  SKELETAL SYMPTOMS: No  BLOOD SYMPTOMS: No  NERVOUS SYSTEM SYMPTOMS: No  MENTAL HEALTH SYMPTOMS: Yes  Nervous or Anxious: Yes  Depression: Yes  Trouble sleeping: No  Trouble thinking or concentrating: Yes  Mood changes: No  Panic attacks: No

## 2018-08-24 NOTE — LETTER
Patient:  Trevin Gee  :   1955  MRN:     [id number]        Mr.Mark MORENITA Gee  [address]   [facility] [address]        2018    Dear Trevin    Here are your lab results. Your kidney function is slightly higher, which we can see with dehydration. We will recheck this when you received the Reclast.    If you have any questions, please feel free to contact my nurse at [phone] select option #3 for triage nurse  or  option #1 for scheduling related questions.    Regards    Mag Currie MD        Resulted Orders   Lipid Profile   Result Value Ref Range    Cholesterol 129 <200 mg/dL    Triglycerides 189 (H) <150 mg/dL      Comment:      Borderline high:  150-199 mg/dl  High:             200-499 mg/dl  Very high:       >499 mg/dl      HDL Cholesterol 56 >39 mg/dL    LDL Cholesterol Calculated 36 <100 mg/dL      Comment:      Desirable:       <100 mg/dl    Non HDL Cholesterol 73 <130 mg/dL   TSH   Result Value Ref Range    TSH 1.49 0.40 - 4.00 mU/L   Cortisol   Result Value Ref Range    Cortisol Serum 9.7 4 - 22 ug/dL      Comment:      8 AM Cortisol Reference Range = 4-22 ug/dL  4 PM Cortisol Reference Range = 3-17 ug/dL     25 Hydroxyvitamin D2 and D3   Result Value Ref Range    25 OH Vit D2 <5 ug/L    25 OH Vit D3 38 ug/L    25 OH Vit D total <43 20 - 75 ug/L      Comment:      Season, race, dietary intake, and treatment affect the concentration of   25-hydroxy-Vitamin D. Values may decrease during winter months and increase   during summer months. Values 20-29 ug/L may indicate Vitamin D insufficiency   and values <20 ug/L may indicate Vitamin D deficiency.  This test was developed and its performance characteristics determined by the   [facility],  Special [facility] Laboratory. It has   not been cleared or approved by the FDA. The laboratory is regulated under   CLIA as qualified to perform high-complexity testing. This test is used for   clinical  purposes. It should not be regarded as investigational or for   research.     Basic metabolic panel   Result Value Ref Range    Sodium 138 133 - 144 mmol/L    Potassium 4.6 3.4 - 5.3 mmol/L    Chloride 102 94 - 109 mmol/L    Carbon Dioxide 30 20 - 32 mmol/L    Anion Gap 6 3 - 14 mmol/L    Glucose 95 70 - 99 mg/dL    Urea Nitrogen 22 7 - 30 mg/dL    Creatinine 1.47 (H) 0.66 - 1.25 mg/dL    GFR Estimate 48 (L) >60 mL/min/1.7m2      Comment:      Non  GFR Calc    GFR Estimate If Black 58 (L) >60 mL/min/1.7m2      Comment:       GFR Calc    Calcium 9.9 8.5 - 10.1 mg/dL       Lab

## 2018-08-24 NOTE — LETTER
Patient:  Eddy Gee  :   1955  MRN:     [id number]        Mr.Mark MORENITA Gee  [address]   [facility] [address]        2018    Dear Eddy    Here is your DEXA scan result.  Your bone density has significantly improved compared with the previous imaging.  This is great news.  We will arrange for the repeat Reclast and your next infusion (after this one) should be in 2 years.    If you have any questions, please feel free to contact my nurse at [phone] select option #3 for triage nurse  or  option #1 for scheduling related questions.    Regards    Mag Schneider MD      Resulted Orders   Dexa hip/pelvis/spine    Narrative    [facility]   [address] [place] [id number]  Phone: [phone]   Fax: [phone]     Patient name:   EDDY GEE ([id number] )  Patient demographics[age] year old White Male of 71.0 in. height and   162.0 lbs. weight  Ordering provider: MAG SCHNEIDER  History:  CROHN'S, HX OF OSTEOPOROSIS, KIDNEY STONES. There is a history   of fracture.    Current treatments: Calcium, Vitamin D, ZOLENDRONIC ACID  Scan:  DXA exam ([id number] ): [employer]   Exam date:  2018   Comparison:   2018  2017  05/15/2015  2005  2003    Dual energy x-ray absorptiometry (DXA) results:    Region Date BMD T - score Z - score BMD change from baseline BMD % change   from baseline BMD change from previous BMD % change from previous   L1-L4 2018 1.058 g/cm  -1.4 -0.8 0.158 g/cm  17.6% 0.031 g/cm  3.0%   L1-L4 2017 1.027 g/cm          L1-L4 05/15/2015 0.990 g/cm          L1-L4 2005 0.902 g/cm          L1-L4 2003 0.900 g/cm    baseline baseline - -     Neck Left 2018 0.919 g/cm  -1.2 0.0       Neck Left 2017 0.948 g/cm          Neck Left 05/15/2015 0.901 g/cm          Neck Left 2005 1.033 g/cm          Neck Left 2003  1.064 g/cm            Total Left 08/24/2018 0.918 g/cm  -1.3 -0.7 0.005 g/cm  0.5% -0.004 g/cm    -0.4%   Total Left 07/07/2017 0.922 g/cm          Total Left 05/15/2015 0.869 g/cm          Total Left 03/03/2005 0.912 g/cm          Total Left 11/19/2003 0.913 g/cm    baseline baseline - -     Neck Right 08/24/2018 0.959 g/cm  -0.9 0.3       Neck Right 07/07/2017 0.942 g/cm          Neck Right 05/15/2015 0.916 g/cm          Neck Right 03/03/2005 1.026 g/cm          Neck Right 11/19/2003 1.091 g/cm            Total Right 08/24/2018 0.960 g/cm  -1.0 -0.4 0.025 g/cm  2.7% 0.025 g/cm    2.7%   Total Right 07/07/2017 0.935 g/cm          Total Right 05/15/2015 0.895 g/cm          Total Right 03/03/2005 0.911 g/cm          Total Right 11/19/2003 0.935 g/cm    baseline baseline - -                Conclusions:    Based on the lowest and valid T-score of -1.4 at the level of the lumbar   spine according to WHO criteria for postmenopausal females and men age 50   and over (see Ref. #1), this individual has LOW bone density. (see Ref.   #4) The risk of osteoporotic fracture increases approximately 2-fold for   each 1.0 SD decrease in T-score.  Low bone density is not the only risk   factor for fracture; other factors to consider would include patient's   age, risk of falling, risk of injury, previous osteoporotic fracture,   family history of osteoporosis, etc.    COMPARISONS WERE MADE ONLY TO THE PREVIOUS AND BASELINE STUDIES:  Taking into account the precision errors for this center, the calculated   change in BMD at the level of the right total hip (bone gain) as shown is   significant (see Ref.#7) compared with 2003 and 2017.    Taking into account the precision errors for this center, the calculated   change in BMD at the level of the lumbar spine (bone gain) as shown is   significant (see Ref.#7) compared with 2003.    Please note that the   differential diagnosis of BMD increase in the spine includes improvement   due to  pharmacotherapy vs inter-current progression of spine degeneration   or fracture.      Clinical correlation is recommended based on the history of past fracture   (see Ref #8)    Bone densitometry cannot rule out secondary causes of bone loss.    Therefore, further metabolic testing to look for secondary causes of low   BMD should be performed if indicated.    Repeat DXA testing in 2 years could be considered.      Clinical correlation recommended.      Principal result :  Antoinette Dai MD, [facility]  Division of Diabetes, Endocrinology and Metabolism  [facility]  [phone]    ____________________________________  Ref. 1. WHO categories:                   T-score > -1.0  = normal             .                                                            T-score -1.0 to -2.5 = low bone density         T-score < -2.5 = osteoporosis       .    Ref. 2. NO Physician's Guideline Website address:  www.nof.org.    Ref. 3. 2017 American College of Rheumatology Guideline for the prevention   and treatment of Glucocorticoid-induced Osteoporosis:          Arthritis Care & Research 2017; 69(8): 6583-1869.    Ref. 4. ISCD position statements:  www.iscd.org.    Ref. 5. Implementation of suggestions is at the discretion of the ordering   provider.  Clinical correlation is recommended.    Ref. 6. Bone density results acquired from different machines at different   facilities, even if of the same make and model, are not directly   comparable. Without accompanying images, determination of technical   similarity and positioning differences are not possible.    Ref. 7.  (LSC = least significant change)  As of 6.26.07, the LSC for the AP spine at the [facility] center is 0.032   g/cm2.  As of 6.15.07, the LSC for the right hip at the [facility] is   0.018 gm/cm2.  As of 6.15.07, the LSC for the left hip at the [facility] is 0.029   g/cm2.  As of 6.26.07, the LSC for the left mid  radius at the [facility] imaging center   is 0.043 g/cm2.  As of 12.19.04, the LSC for the lateral spine region is pending.   As of 12.19.04, the LSC for the total body region is pending.   According to the ISCD position statements, total hip rather than femoral   neck regions are to be compared because larger areas give better   precision.    According to the ISCD position statements, total hip rather than femoral   neck regions are to be compared because larger areas give better   precision.      Ref. 8.  By definition, osteoporosis may be diagnosed in the presence or   with the history of a low trauma or fragility fracture.  Fragility and low   trauma fracture is defined as a fracture resulting from the force of a   fall from a standing height or less or a bone that breaks under conditions   that would not cause a normal bone to break.           [facility] BONE DENSITOMETRY

## 2018-08-24 NOTE — MR AVS SNAPSHOT
After Visit Summary   8/24/2018    Trevin Gee    MRN: [id number]           Patient Information     Date Of Birth          1955        Visit Information        Provider Department      8/24/2018 1:30 PM Mag Currie MD [facility]        Today's Diagnoses     Osteoporosis without current pathological fracture, unspecified osteoporosis type    -  1    Screening for hyperlipidemia          Care Instructions    vanicream lite    sarna cream          Follow-ups after your visit        Follow-up notes from your care team     Return in about 1 year (around 8/24/2019).      Your next 10 appointments already scheduled     Sep 10, 2018  7:30 AM CDT   Colonoscopy with Fransisco Leach MD   [facility] ([facility])    [address] [address] [phone]              Who to contact     Please call your clinic at [phone] to:    Ask questions about your health    Make or cancel appointments    Discuss your medicines    Learn about your test results    Speak to your doctor            Additional Information About Your Visit        [email or web address] Information     [employer] gives you secure access to your electronic health record. If you see a primary care provider, you can also send messages to your care team and make appointments. If you have questions, please call your primary care clinic.  If you do not have a primary care provider, please call [phone] and they will assist you.      [employer] is an electronic gateway that provides easy, online access to your medical records. With [employer], you can request a clinic appointment, read your test results, renew a prescription or communicate with your care team.     To access your existing account, please contact your [facility] or call [phone] for assistance.        Care EveryWhere ID     This is your Care EveryWhere ID. This could be used  "by other organizations to access your [facility] medical records  [id number]        Your Vitals Were     Pulse Height BMI (Body Mass Index)             94 1.803 m (5' 11\") 22.71 kg/m2          Blood Pressure from Last 3 Encounters:   08/24/18 104/74   06/13/18 117/78   06/05/18 130/88    Weight from Last 3 Encounters:   08/24/18 73.8 kg (162 lb 12.8 oz)   06/13/18 72.7 kg (160 lb 4.8 oz)   06/05/18 72.6 kg (160 lb)                 Today's Medication Changes          These changes are accurate as of 8/24/18  5:53 PM.  If you have any questions, ask your nurse or doctor.               These medicines have changed or have updated prescriptions.        Dose/Directions    traZODone 50 MG tablet   Commonly known as:  DESYREL   This may have changed:  additional instructions   Used for:  Bipolar 2 disorder (H)        Dose:  50 mg   Take 1 tablet (50 mg) by mouth nightly as needed for sleep   Quantity:  90 tablet   Refills:  2                Primary Care Provider Office Phone # Fax #    Saroj Haas [phone]       [facility] [address] [facility] [address]  [place] [address]        Equal Access to Services     GUMARO CERRATO AH: Aleta patricko Sonerissa, waaxda luqadaha, qaybta kaalmada adeegyada, jessika romero. So [facility] [phone].    ATENCIÓN: Si habla español, tiene a corey disposición servicios gratuitos de asistencia lingüística. Llame al [phone].    We comply with applicable federal civil rights laws and [place] laws. We do not discriminate on the basis of race, color, national origin, age, disability, sex, sexual orientation, or gender identity.            Thank you!     Thank you for choosing [facility] END[facility]  for your care. Our goal is always to provide you with excellent care. Hearing back from our patients is one way we can continue to improve our services. Please take a few minutes to complete the written survey that you may receive in the mail " after your visit with us. Thank you!             Your Updated Medication List - Protect others around you: Learn how to safely use, store and throw away your medicines at www.disposemymeds.org.          This list is accurate as of 8/24/18  5:53 PM.  Always use your most recent med list.                   Brand Name Dispense Instructions for use Diagnosis    acetaminophen 500 MG tablet    TYLENOL     Take 500-1,000 mg by mouth every 6 hours as needed for mild pain        calcium carbonate 600 mg-vitamin D 400 units 600-400 MG-UNIT per tablet    CALTRATE     Take 1 tablet by mouth 2 times daily        diphenoxylate-atropine 2.5-0.025 MG per tablet    LOMOTIL    50 tablet    Take 1 tablet by mouth 4 times daily as needed for diarrhea After loose stools    Crohn's disease of small intestine without complication (H)       ferrous sulfate 325 (65 Fe) MG tablet    IRON     Take by mouth daily (with breakfast)        fluconazole 200 MG tablet    DIFLUCAN    90 tablet    Take 1 tablet (200 mg) by mouth daily    Coccidioidal granuloma       MELATONIN PO      Take 1.5 mg by mouth nightly as needed        Multi-vitamin Tabs tablet      Take 1 tablet by mouth daily        * rifaximin 550 MG Tabs tablet    XIFAXAN    42 tablet    Take 1 tablet (550 mg) by mouth 3 times daily    Diarrhea, unspecified type       * rifaximin 550 MG Tabs tablet    XIFAXAN    60 tablet    Take 1 tablet (550 mg) by mouth 2 times daily    Small intestinal bacterial overgrowth       tiZANidine 4 MG tablet    ZANAFLEX    30 tablet    Take 1-2 tablets (4-8 mg) by mouth 3 times daily as needed for muscle spasms    Muscle spasm, Cervicalgia       traZODone 50 MG tablet    DESYREL    90 tablet    Take 1 tablet (50 mg) by mouth nightly as needed for sleep    Bipolar 2 disorder (H)       VITAMIN B 12 PO      Take 500 mcg by mouth        VITAMIN B6 PO      Take 100 mg by mouth        VITAMIN C PO      Take 500 mg by mouth        VITAMIN D3 PO      Take 1,000  Units by mouth daily        * Notice:  This list has 2 medication(s) that are the same as other medications prescribed for you. Read the directions carefully, and ask your doctor or other care provider to review them with you.

## 2018-08-24 NOTE — TELEPHONE ENCOUNTER
----- Message from Mag Currie MD sent at 8/24/2018  1:50 PM CDT -----  Please look into cost of reclast for pt

## 2018-08-24 NOTE — NURSING NOTE
Chief Complaint   Patient presents with     RECHECK     adrenal insufficiency      Shanika Alvarez CMA

## 2018-08-24 NOTE — LETTER
8/24/2018       RE: Trevin Gee  [address]  [place] [place] [address]     Dear Colleague,    Thank you for referring your patient, Trevin Gee, to the [facility] ENDOCRINOLOGY at [facility]. Please see a copy of my visit note below.    [facility]  Endocrinology  Mag Currie MD  08/24/2018      Chief Complaint:   RECHECK     History of Present Illness:   Trevin Gee is a 63 year old male with a history of adrenal insufficiency and osteoporosis who presents for evaluation of recheck.    #1 adrenal insufficiency possibly due to chronic fluconazole use    Patient was referred endocrinology for management of adrenal insufficiency in the setting of steroid taper.  The patient has a history of Crohn's disease and has been dependent on steroids for most of his adult life.  In 2007, he was diagnosed with coccidiosis and was started on fluconazole treatment.  He had two episodes of kevin which was eventually determined to be from a drug interaction between fluconazole and prednisone, with underlying hypothesis being that fluconazole suppressed metabolism and clearance of prednisone.  He was tapered off prednisone in 2007, which has caused significant mental and on physical deconditioning including severe weakness, and was thought to have adrenal insufficiency even though there was no documented cortisol level in the chart.  Fluconazole was stopped after it was successfully treated initially, he has a relapse in 2013 with a knee infection, and was placed back on fluconazole 200 mg daily, which he is taking indefinitely.  His Crohn's disease is now controlled with low-dose prednisone 1 mg three times a week, and entercort 3 mg daily with minimal symptoms.  MRI in 2015 show that adrenal glands were normal.  May 2015 ACTH was 192, baseline cortisol was 1.6, 30 minute cortisol was high 0.8, and 60 minute cortisol was 6.9.  He does understand that he gets sick, he  should take supplemental steroids and/or IM Solu-Cortef.  repeat ACTH test in July 2015 now shows adrenal insufficiency with suppresssed ACTH. The patient saw infectious disease in October 2015 and the plan is to continue with the fluconazole for now given his history of coccidiomycosis.  April 2016 cortisol level was 10.  The patient stopped his steroids as of April 2016.       Interval History:At this time, he states that he continues to take his Fluconazole. He does feel that he has some decreased energy, but this has been a gradual decrease he attributes to age.      #2 history of osteoporosis  Patient had a documented low T score on DXA scan in 2005, and the clinical vertebral fracture status post kyphoplasty, confirming the diagnosis of osteoporosis and treatment as indicated.  He did have history of kidney stone even though this occurred in the setting of his Crohn's disease and admitted chocolate dependency.  A 24 urine for calcium was low at 90 mg over 24 hours.  May 2015 DEXA scan show the lowest T score -2.4 at the lumbar spine, lower Z score of -2.0 the lumbar spine, is significant increase in his bone density in the lumbar spine compared with the previous exam in 2005 as well as the baseline exam in 2003.  There's been no significant change in bone density at the level of his hips.  The patient is currently on Fosamax 70 mg weekly.  He does take this properly. March 2015 vitamin D is 28. We looked into the price of Reclast and this was cost prohibitive for him.  The patient had a tooth removed in January 2016 and then received his first dose of   Reclast in March 2016.  He tolerated this infusion. His second Reclast infusion was in July 2017 with no complications. His DEXA scan on 7/7/2017 showed in increased in bone density at the levels of L1-L2, and in the total right and left hips.     Interval History: The patient denies any recent falls or recent fractures. He also denies having any upcoming  dental extractions or surgeries. He has been getting Reclast infusions, most recently in July 2017 with the plan of him having yearly infusions for 5 years. He would be interested in a repeat DEXA scan at this time to see if this is continuing to improve his bone density.      #3 Skin itching  The patient notes that for the past few years, he has been experiencing itchy skin in the winter and spring. He states that that itching would often start after siting in his car for long periods of time and preventing contact with the car seat did no help. He also has tried cortisone cream, showering, and changing detergents with no improvement. Mr. Gee feels that this may be a seasonal dry skin issue, as he never has noticed a rash in itchy areas and the itching changes location. He was going to follow up with dermatology for this, but cancelled the appointment due to having abdominal pain at the time.  Currently, he reports that his skin itching has resolved.    Review of Systems:   Pertinent items are noted in HPI, remainder of complete ROS is negative.      Active Medications:     Current Outpatient Prescriptions:      acetaminophen (TYLENOL) 500 MG tablet, Take 500-1,000 mg by mouth every 6 hours as needed for mild pain, Disp: , Rfl:      Ascorbic Acid (VITAMIN C PO), Take 500 mg by mouth, Disp: , Rfl:      calcium-vitamin D (CALTRATE) 600-400 MG-UNIT per tablet, Take 1 tablet by mouth 2 times daily, Disp: , Rfl:      Cholecalciferol (VITAMIN D3 PO), Take 1,000 Units by mouth daily, Disp: , Rfl:      Cyanocobalamin (VITAMIN B 12 PO), Take 500 mcg by mouth, Disp: , Rfl:      diphenoxylate-atropine (LOMOTIL) 2.5-0.025 MG per tablet, Take 1 tablet by mouth 4 times daily as needed for diarrhea After loose stools, Disp: 50 tablet, Rfl: 1     ferrous sulfate 325 (65 FE) MG tablet, Take by mouth daily (with breakfast), Disp: , Rfl:      fluconazole (DIFLUCAN) 200 MG tablet, Take 1 tablet (200 mg) by mouth daily, Disp:  90 tablet, Rfl: 3     MELATONIN PO, Take 1.5 mg by mouth nightly as needed , Disp: , Rfl:      multivitamin, therapeutic with minerals (MULTI-VITAMIN) TABS, Take 1 tablet by mouth daily, Disp: , Rfl:      Pyridoxine HCl (VITAMIN B6 PO), Take 100 mg by mouth, Disp: , Rfl:      rifaximin (XIFAXAN) 550 MG TABS tablet, Take 1 tablet (550 mg) by mouth 2 times daily, Disp: 60 tablet, Rfl: 0     rifaximin (XIFAXAN) 550 MG TABS tablet, Take 1 tablet (550 mg) by mouth 3 times daily, Disp: 42 tablet, Rfl: 0     tiZANidine (ZANAFLEX) 4 MG tablet, Take 1-2 tablets (4-8 mg) by mouth 3 times daily as needed for muscle spasms, Disp: 30 tablet, Rfl: 0     traZODone (DESYREL) 50 MG tablet, Take 1 tablet (50 mg) by mouth nightly as needed for sleep (Patient taking differently: Take 50 mg by mouth nightly as needed for sleep Take 1/2 tablet at night), Disp: 90 tablet, Rfl: 2      Allergies:   Prednisone      Past Medical History:  Anemia  Anxiety  Cataract, both eyes  Chrohn's Disease  Depressive Disorder  Fracture, leg  Gallbladder problem  GERD  History of blood transfusion  Hypertriglyceridemia  Infection, persistent coccidioidomycosis  Kidney Stones, both sides  Mental Health Problems  Nephrolithiasis  Osteoporosis  Other Nervous System Complications  Personal history of colonic polyps  Steroid-induced psychosis  TB  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 He has never smoked  "tobacco and socially drinks alcohol.      Physical Exam:   /74  Pulse 94  Ht 1.803 m (5' 11\")  Wt 73.8 kg (162 lb 12.8 oz)  BMI 22.71 kg/m2   GENERAL APPEARANCE: Alert and no distress  NECK: No lymphadenopathy appreciated  Eyes: No obvious retinopathy noted  Thyroid: No obvious nodules palpated   CV: RRR without M/R/G  Lungs: CTA bilaterally  Abdomen: Soft, Nontender, non distended, positive bowel sounds   Neuro: normal reflexes, no focal deficits  Skin: No infection in feet   Mood: Normal   Lymph: neg in neck and supraclavicular area    Assessment and Plan:  1. Osteoporosis without current pathological fracture, unspecified osteoporosis type-Repeat DEXA scan and labs completed today. Will discuss results over phone and will schedule a Reclast infusion by the end of the year if this would benefit him.   - Dexa hip/pelvis/spine  - TSH  - Cortisol  - 25 Hydroxyvitamin D2 and D3  - Basic metabolic panel    2. Screening for hyperlipidemia  - Lipid Profile  - TSH  - Cortisol  - 25 Hydroxyvitamin D2 and D3  - Basic metabolic panel    3. adrenal insufficiency possibly due to chronic fluconazole use  Patient clinically doing well.  We will check cortisol levels today although it is in the afternoon.  Patient understands the need for stress dose steroids.    4. Skin itching  Recommended topical creams at this time.  Patient will let me know if he needs repeat referral to dermatology.        Follow-up: Return in about 1 year (around 8/24/2019).         Scribe Disclosure:   I, Roro Parham, am serving as a scribe to document services personally performed by Mag Currie MD at this visit, based upon the provider's statements to me. All documentation has been reviewed by the aforementioned provider prior to being entered into the official medical record.     Portions of this medical record were completed by a scribe. UPON MY REVIEW AND AUTHENTICATION BY ELECTRONIC SIGNATURE, this confirms (a) I performed the " applicable clinical services, and (b) the record is accurate.      Again, thank you for allowing me to participate in the care of your patient.      Sincerely,    Mag Currie MD

## 2018-08-27 LAB
DEPRECATED CALCIDIOL+CALCIFEROL SERPL-MC: <43 UG/L (ref 20–75)
VITAMIN D2 SERPL-MCNC: <5 UG/L
VITAMIN D3 SERPL-MCNC: 38 UG/L

## 2018-08-29 ENCOUNTER — TELEPHONE (OUTPATIENT)
Dept: ENDOCRINOLOGY | Facility: CLINIC | Age: 63
End: 2018-08-29

## 2018-08-29 NOTE — TELEPHONE ENCOUNTER
Pt to proceed with reclast - pt notified.    --  Dear Trevin    Here is your DEXA scan result.  Your bone density has significantly improved compared with the previous imaging.  This is great news.  We will arrange for the repeat Reclast and your next infusion (after this one) should be in 2 years.    If you have any questions, please feel free to contact my nurse at [phone] select option #3 for triage nurse  or  option #1 for scheduling related questions.    Regards    Mag Currie MD

## 2018-08-29 NOTE — TELEPHONE ENCOUNTER
PA sent  For Reclast . He wants to schedule after 9/10 18  And does not want to be called until then to set up schedule.

## 2018-08-29 NOTE — PROGRESS NOTES
Dear Trevin    Here are your lab results. Your kidney function is slightly higher, which we can see with dehydration. We will recheck this when you received the Reclast.    If you have any questions, please feel free to contact my nurse at [phone] select option #3 for triage nurse  or  option #1 for scheduling related questions.    Regards    Mag Currie MD

## 2018-08-29 NOTE — PROGRESS NOTES
Dear Trevin    Here is your DEXA scan result.  Your bone density has significantly improved compared with the previous imaging.  This is great news.  We will arrange for the repeat Reclast and your next infusion (after this one) should be in 2 years.    If you have any questions, please feel free to contact my nurse at [phone] select option #3 for triage nurse  or  option #1 for scheduling related questions.    Regards    Mag Currie MD

## 2018-08-29 NOTE — TELEPHONE ENCOUNTER
----- Message from Mag Currie MD sent at 8/29/2018  3:50 PM CDT -----  Pt wants REclast after 9/10 - please do not call pt until after 9/10 as he will be away

## 2018-08-30 DIAGNOSIS — K50.90 CROHN'S DISEASE (H): Primary | ICD-10-CM

## 2018-08-31 ENCOUNTER — CARE COORDINATION (OUTPATIENT)
Dept: GASTROENTEROLOGY | Facility: CLINIC | Age: 63
End: 2018-08-31

## 2018-08-31 NOTE — PROGRESS NOTES
Follow up on a message from pt that he was waiting for his prep for colon to be sent to him. confirmed that he has received.

## 2018-09-10 ENCOUNTER — TRANSFERRED RECORDS (OUTPATIENT)
Dept: HEALTH INFORMATION MANAGEMENT | Facility: CLINIC | Age: 63
End: 2018-09-10

## 2018-09-10 ENCOUNTER — DOCUMENTATION ONLY (OUTPATIENT)
Dept: GASTROENTEROLOGY | Facility: OUTPATIENT CENTER | Age: 63
End: 2018-09-10
Payer: COMMERCIAL

## 2018-09-11 LAB — COPATH REPORT: NORMAL

## 2018-09-19 ENCOUNTER — INFUSION THERAPY VISIT (OUTPATIENT)
Dept: INFUSION THERAPY | Facility: CLINIC | Age: 63
End: 2018-09-19
Attending: INTERNAL MEDICINE
Payer: COMMERCIAL

## 2018-09-19 VITALS
OXYGEN SATURATION: 98 % | DIASTOLIC BLOOD PRESSURE: 82 MMHG | SYSTOLIC BLOOD PRESSURE: 142 MMHG | TEMPERATURE: 97.7 F | HEART RATE: 80 BPM | RESPIRATION RATE: 18 BRPM

## 2018-09-19 DIAGNOSIS — M81.0 OSTEOPOROSIS WITHOUT CURRENT PATHOLOGICAL FRACTURE, UNSPECIFIED OSTEOPOROSIS TYPE: ICD-10-CM

## 2018-09-19 DIAGNOSIS — K21.9 GASTROESOPHAGEAL REFLUX DISEASE, ESOPHAGITIS PRESENCE NOT SPECIFIED: Primary | ICD-10-CM

## 2018-09-19 DIAGNOSIS — Z79.52 STEROID DEPENDENT FOR ADRENAL SUPRESSION: ICD-10-CM

## 2018-09-19 DIAGNOSIS — M80.08XD FRACTURE OF VERTEBRA DUE TO OSTEOPOROSIS WITH ROUTINE HEALING, SUBSEQUENT ENCOUNTER: ICD-10-CM

## 2018-09-19 PROCEDURE — 96365 THER/PROPH/DIAG IV INF INIT: CPT

## 2018-09-19 PROCEDURE — 25000128 H RX IP 250 OP 636: Mod: ZF | Performed by: INTERNAL MEDICINE

## 2018-09-19 RX ORDER — ZOLEDRONIC ACID 5 MG/100ML
5 INJECTION, SOLUTION INTRAVENOUS ONCE
Status: CANCELLED
Start: 2018-09-19 | End: 2018-09-19

## 2018-09-19 RX ORDER — ZOLEDRONIC ACID 5 MG/100ML
5 INJECTION, SOLUTION INTRAVENOUS ONCE
Status: COMPLETED | OUTPATIENT
Start: 2018-09-19 | End: 2018-09-19

## 2018-09-19 RX ADMIN — ZOLEDRONIC ACID 5 MG: 0.05 INJECTION, SOLUTION INTRAVENOUS at 12:24

## 2018-09-19 NOTE — MR AVS SNAPSHOT
After Visit Summary   9/19/2018    Trevin Gee    MRN: [id number]           Patient Information     Date Of Birth          1955        Visit Information        Provider Department      9/19/2018 1:00 PM UC [facility] ATC; [facility] SPE[facility] INFUS[facility] Special[facility] Procedure        Today's Diagnoses     Gastroesophageal reflux disease, esophagitis presence not specified    -  1    Fracture of vertebra due to osteoporosis with routine healing, subsequent encounter        Steroid dependent for adrenal supression        Osteoporosis without current pathological fracture, unspecified osteoporosis type           Follow-ups after your visit        Who to contact     If you have questions or need follow up information about today's clinic visit or your schedule please contact [facility] SPECIALTY AND PROCEDURE directly at [phone].  Normal or non-critical lab and imaging results will be communicated to you by Yorxshart, letter or phone within 4 business days after the clinic has received the results. If you do not hear from us within 7 days, please contact the clinic through Yorxshart or phone. If you have a critical or abnormal lab result, we will notify you by phone as soon as possible.  Submit refill requests through Magine or call your pharmacy and they will forward the refill request to us. Please allow 3 business days for your refill to be completed.          Additional Information About Your Visit        MyChart Information     Magine gives you secure access to your electronic health record. If you see a primary care provider, you can also send messages to your care team and make appointments. If you have questions, please call your primary care clinic.  If you do not have a primary care provider, please call [phone] and they will assist you.        Care EveryWhere ID     This is your Care EveryWhere ID. This could be used by other organizations  to access your [facility] medical records  [id number]        Your Vitals Were     Pulse Temperature Respirations Pulse Oximetry          80 97.7  F (36.5  C) (Oral) 18 98%         Blood Pressure from Last 3 Encounters:   09/19/18 142/82   08/24/18 104/74   06/13/18 117/78    Weight from Last 3 Encounters:   08/24/18 73.8 kg (162 lb 12.8 oz)   06/13/18 72.7 kg (160 lb 4.8 oz)   06/05/18 72.6 kg (160 lb)              Today, you had the following     No orders found for display         Today's Medication Changes          These changes are accurate as of 9/19/18  4:26 PM.  If you have any questions, ask your nurse or doctor.               These medicines have changed or have updated prescriptions.        Dose/Directions    traZODone 50 MG tablet   Commonly known as:  DESYREL   This may have changed:  additional instructions   Used for:  Bipolar 2 disorder (H)        Dose:  50 mg   Take 1 tablet (50 mg) by mouth nightly as needed for sleep   Quantity:  90 tablet   Refills:  2                Primary Care Provider Office Phone # Fax #    Saroj Haas [phone]       [facility] [address]  [place] [address]        Equal Access to Services     GUMARO CERRATO AH: Aleta patricko Sonerissa, waaxda luqadaha, qaybta kaalmada adehuyenyada, jessika romero. So [facility] [phone].    ATENCIÓN: Si habla español, tiene a corey disposición servicios gratuitos de asistencia lingüística. Llame al [phone].    We comply with applicable federal civil rights laws and [place] laws. We do not discriminate on the basis of race, color, national origin, age, disability, sex, sexual orientation, or gender identity.            Thank you!     Thank you for choosing [facility] SPECIALTY AND PROCEDURE  for your care. Our goal is always to provide you with excellent care. Hearing back from our patients is one way we can continue to improve our services.  Please take a few minutes to complete the written survey that you may receive in the mail after your visit with us. Thank you!             Your Updated Medication List - Protect others around you: Learn how to safely use, store and throw away your medicines at www.disposemymeds.org.          This list is accurate as of 9/19/18  4:26 PM.  Always use your most recent med list.                   Brand Name Dispense Instructions for use Diagnosis    acetaminophen 500 MG tablet    TYLENOL     Take 500-1,000 mg by mouth every 6 hours as needed for mild pain        calcium carbonate 600 mg-vitamin D 400 units 600-400 MG-UNIT per tablet    CALTRATE     Take 1 tablet by mouth 2 times daily        diphenoxylate-atropine 2.5-0.025 MG per tablet    LOMOTIL    50 tablet    Take 1 tablet by mouth 4 times daily as needed for diarrhea After loose stools    Crohn's disease of small intestine without complication (H)       ferrous sulfate 325 (65 Fe) MG tablet    IRON     Take by mouth daily (with breakfast)        fluconazole 200 MG tablet    DIFLUCAN    90 tablet    Take 1 tablet (200 mg) by mouth daily    Coccidioidal granuloma       MELATONIN PO      Take 1.5 mg by mouth nightly as needed        Multi-vitamin Tabs tablet      Take 1 tablet by mouth daily        * rifaximin 550 MG Tabs tablet    XIFAXAN    42 tablet    Take 1 tablet (550 mg) by mouth 3 times daily    Diarrhea, unspecified type       * rifaximin 550 MG Tabs tablet    XIFAXAN    60 tablet    Take 1 tablet (550 mg) by mouth 2 times daily    Small intestinal bacterial overgrowth       tiZANidine 4 MG tablet    ZANAFLEX    30 tablet    Take 1-2 tablets (4-8 mg) by mouth 3 times daily as needed for muscle spasms    Muscle spasm, Cervicalgia       traZODone 50 MG tablet    DESYREL    90 tablet    Take 1 tablet (50 mg) by mouth nightly as needed for sleep    Bipolar 2 disorder (H)       VITAMIN B 12 PO      Take 500 mcg by mouth        VITAMIN B6 PO      Take 100 mg by  mouth        VITAMIN C PO      Take 500 mg by mouth        VITAMIN D3 PO      Take 1,000 Units by mouth daily        * Notice:  This list has 2 medication(s) that are the same as other medications prescribed for you. Read the directions carefully, and ask your doctor or other care provider to review them with you.

## 2018-09-19 NOTE — PROGRESS NOTES
Infusion Nursing Note:  Trevin Gee presents today to [facility] for a Reclast infusion.  During today's [facility] appointment orders from Dr. Currie were completed.  Frequency: yearly    Progress note:  ID verified by name and .  Assessment completed. Vitals were stable throughout time in [facility]. Patient education regarding infusion and possible side effects provided.  Patient verbalized understanding. yes    Premedications: were not ordered.    Infusion Rates: Infusion given over approximately 30 minutes.     Labs were reviewed from 18    Vascular access: Peripheral IV placed today.    Treatment Conditions:   Patient s Creatinine Clearance within parameters. Medication administered per order.    Patient tolerated infusion well.    Discharge Plan:   Follow up plan of care with: Primary Medical Doctor  Discharge instructions were reviewed with patient.  Patient/representative verbalized understanding of discharge instructions and all questions answered.    [facility] in [facility] condition.    Norah Mason RN      Administrations This Visit     zoledronic Acid (RECLAST) infusion 5 mg     Admin Date Action Dose Rate Route Administered By          2018 New Bag 5 mg 200 mL/hr Intravenous Norah Mason RN

## 2018-10-03 ENCOUNTER — TELEPHONE (OUTPATIENT)
Dept: ENDOCRINOLOGY | Facility: CLINIC | Age: 63
End: 2018-10-03

## 2018-10-03 NOTE — TELEPHONE ENCOUNTER
----- Message from Melodie Turner RN sent at 10/2/2018  5:43 PM CDT -----  Regarding: call   Please reach  Trevin and see how he tolerated the Reclast infusion. Can sometimes have mild to severe flu like symptoms after but should  Improve  Within  2-3 weeks   ----- Message -----     From: Mag Currie MD     Sent: 10/2/2018  12:30 PM       To: Med Specialties Endo Triage-[facility]    How did reclast go for pt?

## 2018-10-03 NOTE — TELEPHONE ENCOUNTER
Patient had Reclast infusion on 9/19 he state she had no complications and feels just fine I did inform him about possibly developing flu like symptoms 2 - 3 weeks after to not be alarmed if these symptoms develop.

## 2018-10-22 DIAGNOSIS — K63.8219 SMALL INTESTINAL BACTERIAL OVERGROWTH: Primary | ICD-10-CM

## 2018-10-22 NOTE — PROGRESS NOTES
Patient called regarding excess gas and explosive diarrhea.  Upon review with Dr. Leach and review of images from most recent colonoscopy shows inflammation limited to anastomosis site.  Upstream ileum looked good and colon looked great.     Will confirm no dietary triggers.    Plan to start Rifaximin 550 TID x 14 days and if not better then fecal calprotectin.      Slick Malone PA-C  Division of Gastroenterology, Hepatology and Nutrition  [facility]

## 2018-11-19 ENCOUNTER — OFFICE VISIT (OUTPATIENT)
Dept: GASTROENTEROLOGY | Facility: CLINIC | Age: 63
End: 2018-11-19
Payer: COMMERCIAL

## 2018-11-19 VITALS — WEIGHT: 159.7 LBS | BODY MASS INDEX: 22.27 KG/M2

## 2018-11-19 DIAGNOSIS — Z71.3 NUTRITIONAL COUNSELING: ICD-10-CM

## 2018-11-19 DIAGNOSIS — K50.90 CROHN'S DISEASE (H): Primary | ICD-10-CM

## 2018-11-19 NOTE — PATIENT INSTRUCTIONS
Roldan Zhong,    It was great meeting you today. Below is a summary of what we dicussed:    1. Using [place] diet handout continue current exclusion diet     2. As symptoms improve begin food reintroduction    Best regards,  Augustine Quiñones, PhD, RD

## 2018-11-19 NOTE — LETTER
11/19/2018       RE: Trevin Gee  [address]  [place] [address]     Dear Colleague,    Thank you for referring your patient, Trevin Gee, to the [facility]OENTEROLOG[facility] at [facility]. Please see a copy of my visit note below.    [facility] Outpatient Medical Nutrition Therapy      Time Spent: 60 minutes  Session Type:  Initial Individual Session  Referring Physician: Dr. Leach  Reason for RD Visit: IBD     Nutrition Plan: Temporary exclusion diet based on [place] diet for those with Small Bowel Bacterial Overgrowth     Recommendations for MD/Provider to order: None at this time    Malnutrition Diagnosis: Patient does not meet the criteria necessary for diagnosing malnutrition    Nutrition Assessment:  Patient is here for intial visit with Registered Dietitian (JAZMÍN).  Patient is a 63 year old male with history of Crohn's (ileo-colonic; inflammatory). His surgical history is pertinent for ileocectomy in 1974 with multiple revisions of the anastomosis. He presents today to discuss diet interventions for possible bacterial overgrowth - likely at his surgical site. He has recently been struggling with diarrhea, which seems to have been responsive to diet modifications.    Past Surgical History: Ileocecectomy 1974; Revision of anastomosis 1986 and 1989.    Symptom Review  1. Nausea/vomiting? No  2. Heartburn? No  3. Bloating? Yes: Some amount of gas, but not really  4. Belching? No  5. Feeling full quickly? No  6. Decreased appetite? Yes: Sort of  7. Weight loss/gain? No  8. Constipation/Diarrhea? Yes: Both, but primarily diarrhea. Reduced recently with diet changes. Gradually shifted to no raw vegetables, limited cooked vegetables (asparagus or green beans, broccoli, pan-fried butter-nut squash). Gone more and more towards toast.  9. Abdominal pain/pain with or without eating? No  10. Feeling tired? No    Dietary beliefs and  Practices  1.  Did you modify your diet leading up to diagnosis OR Have you modified your diet since diagnosis?  Yes Recently excluded down to toast and reintroducing foods. Has not found in the past that diet impacts Crohns.  2.  Do you take any vitamin, mineral, or herbal supplements? Vitamin C, Calcium+D, Vitamin D3, Vitamin B12, Iron, Vitamin B6, MVI  3.  Do you use any calorie/protein supplements?  No    Diet Recall:  (Typical intake)  Meal Name Time Food (Yesterday)   Breakfast  No breakfast. Tea at [employer], but afraid to eat before [employer] as he sings in the choir and didn't want to have to use the restroom        Lunch 1:00 p.m. Toast (white; 1 slice) with strawberry preserves         2:00 p.m. Whole banana        Dinner 5:30 p.m. Avocado (1/4) on toast         8:45 p.m. Toast with cooked asparagus(7 lou) with butter and salt         12:40 a.m. Green beans with small amount of butter and salt AND thin slice of ham AND half of a brownie        Snacks  None   Beverages  Diluted fruit juice   Alcohol   None   Only 1 BM yesterday.   *Typical intake consists of: Cold cereal with fat- and lactose-free milk AND 1/2 banana or berries on cereal, diluted juice (<25% juice) for breakfast; Ham, aged cheddar, and avocado on white roll OR almond/peanut butter and banana on sourdough bread for lunch; 1/4 chicken breast OR 3-4 oz country style pork rib with small amount of BBQ sauce OR 46 oz baked salmon or walleye (pecan encrusted) OR hamburger with cheddar cheese slice, mustard, and pickle for supper. Vegetables regularly consumed include broccoli, caulifower, green beans, and butternut squash. Enjoys dark chocolate as a snack.      Anthropometrics:   Body mass index is 22.27 kg/(m^2).     Usual Weight: 150-160 pounds (in general closer to 155 pounds)  Weight change in past 6 months: Stable  Weight History:  Wt Readings from Last 10 Encounters:   11/19/18 72.4 kg (159 lb 11.2 oz)   08/24/18 73.8 kg (162 lb 12.8 oz)    06/13/18 72.7 kg (160 lb 4.8 oz)   06/05/18 72.6 kg (160 lb)   04/09/18 75.6 kg (166 lb 9.6 oz)   03/13/18 74.7 kg (164 lb 11.2 oz)   02/06/18 74.8 kg (164 lb 12.8 oz)   01/08/18 74.2 kg (163 lb 9.6 oz)   08/16/17 72.6 kg (160 lb)   07/14/17 72.6 kg (160 lb)       Labs: Reviewed  Pertinent Medications/vitamin and mineral supplements:  Outpatient Encounter Prescriptions as of 11/19/2018   Medication Sig Dispense Refill     acetaminophen (TYLENOL) 500 MG tablet Take 500-1,000 mg by mouth every 6 hours as needed for mild pain       Ascorbic Acid (VITAMIN C PO) Take 500 mg by mouth       calcium-vitamin D (CALTRATE) 600-400 MG-UNIT per tablet Take 1 tablet by mouth 2 times daily       Cholecalciferol (VITAMIN D3 PO) Take 1,000 Units by mouth daily       Cyanocobalamin (VITAMIN B 12 PO) Take 500 mcg by mouth       diphenoxylate-atropine (LOMOTIL) 2.5-0.025 MG per tablet Take 1 tablet by mouth 4 times daily as needed for diarrhea After loose stools 50 tablet 1     ferrous sulfate 325 (65 FE) MG tablet Take by mouth daily (with breakfast)       fluconazole (DIFLUCAN) 200 MG tablet Take 1 tablet (200 mg) by mouth daily 90 tablet 3     MELATONIN PO Take 1.5 mg by mouth nightly as needed        multivitamin, therapeutic with minerals (MULTI-VITAMIN) TABS Take 1 tablet by mouth daily       Pyridoxine HCl (VITAMIN B6 PO) Take 100 mg by mouth       rifaximin (XIFAXAN) 550 MG TABS tablet Take 1 tablet (550 mg) by mouth 2 times daily 60 tablet 0     rifaximin (XIFAXAN) 550 MG TABS tablet Take 1 tablet (550 mg) by mouth 3 times daily 42 tablet 0     tiZANidine (ZANAFLEX) 4 MG tablet Take 1-2 tablets (4-8 mg) by mouth 3 times daily as needed for muscle spasms 30 tablet 0     traZODone (DESYREL) 50 MG tablet Take 1 tablet (50 mg) by mouth nightly as needed for sleep (Patient taking differently: Take 50 mg by mouth nightly as needed for sleep Take 1/2 tablet at night) 90 tablet 2     No facility-administered encounter medications on  file as of 2018.        Estimated Nutrition Needs based on current body weight of 72.4 k calories (25 kcals/kg), 75 g protein (1 g/kg), 1800 ml fluids (~1 ml/kcal or total fluids per MD).     MALNUTRITION:  % Weight Loss:  None noted  % Intake:  No decreased intake noted  Subcutaneous Fat Loss:  None observed  Muscle Loss:  None observed  Fluid Retention:  None noted    Nutrition Diagnosis:    Food and nutrition related knowledge deficit related to Crohn's disease as evidenced by need for diet education.    Nutrition Prescription: [place] diet for those with Small Bowel Bacterial Overgrowth    Nutrition Intervention:    Nutrition Education/Counseling:   Discussed diet and current symptoms. He also notes that a confounding factor with regard to symptoms is that he has been experiencing increased stress lately from a project he is working on. He is struggling with a specific project that involves a lot of programming that he states is having a significant negative psychological impact. He notes that he lives a fairly solitary life and has limited motivation to leave the house/be active, but when he does something as simple as take a walk he notes that this has a positive impact on symptoms. We discussed role of diet in aiding with bacterial overgrowth. Discussed that foods that do not contain carbohydrates (meat, cheese, oil, etc) often do not cause problems. Provided a list of foods that may contribute to symptoms and discussed removal of those he eats a lot of. He has already placed himself on an exclusion diet, so primarily discussed food reintroduction. His current exclusion diet seems to have improved his symptoms. Discussed that the food list provided can also serve as a guide for food reintroduction. He will plan to continue adding back 1 food at a time and monitoring symptoms.     Educational Materials Provided: [place] Diet for Those with Small Bowel Bacterial  Overgrowth  Patient verbalized understanding of education provided. See all recommendations under Goals.    Goals:  1. Using [place] diet handout continue current exclusion diet     2. As symptoms improve begin food reintroduction    Nutrition Monitoring and Evaluation: Will monitor adherence to nutrition recommendations at future RD visits.     Further Medical Nutrition Therapy:  As needed  Next Appointment (if applicable):  N/A  Patient was encouraged to call/contact RD with any further questions.    Augustine Quiñones, PhD, RD

## 2018-11-19 NOTE — PROGRESS NOTES
[facility] Outpatient Medical Nutrition Therapy      Time Spent: 60 minutes  Session Type:  Initial Individual Session  Referring Physician: Dr. Leach  Reason for RD Visit: IBD     Nutrition Plan: Temporary exclusion diet based on [place] diet for those with Small Bowel Bacterial Overgrowth     Recommendations for MD/Provider to order: None at this time    Malnutrition Diagnosis: Patient does not meet the criteria necessary for diagnosing malnutrition    Nutrition Assessment:  Patient is here for intial visit with Registered Dietitian (RD).  Patient is a 63 year old male with history of Crohn's (ileo-colonic; inflammatory). His surgical history is pertinent for ileocectomy in 1974 with multiple revisions of the anastomosis. He presents today to discuss diet interventions for possible bacterial overgrowth - likely at his surgical site. He has recently been struggling with diarrhea, which seems to have been responsive to diet modifications.    Past Surgical History: Ileocecectomy 1974; Revision of anastomosis 1986 and 1989.    Symptom Review  1. Nausea/vomiting? No  2. Heartburn? No  3. Bloating? Yes: Some amount of gas, but not really  4. Belching? No  5. Feeling full quickly? No  6. Decreased appetite? Yes: Sort of  7. Weight loss/gain? No  8. Constipation/Diarrhea? Yes: Both, but primarily diarrhea. Reduced recently with diet changes. Gradually shifted to no raw vegetables, limited cooked vegetables (asparagus or green beans, broccoli, pan-fried butter-nut squash). Gone more and more towards toast.  9. Abdominal pain/pain with or without eating? No  10. Feeling tired? No    Dietary beliefs and Practices  1.  Did you modify your diet leading up to diagnosis OR Have you modified your diet since diagnosis?  Yes Recently excluded down to toast and reintroducing foods. Has not found in the past that diet impacts Crohns.  2.  Do you take any vitamin, mineral, or herbal supplements? Vitamin C, Calcium+D,  Vitamin D3, Vitamin B12, Iron, Vitamin B6, MVI  3.  Do you use any calorie/protein supplements?  No    Diet Recall:  (Typical intake)  Meal Name Time Food (Yesterday)   Breakfast  No breakfast. Tea at [employer], but afraid to eat before [employer] as he sings in the choir and didn't want to have to use the restroom        Lunch 1:00 p.m. Toast (white; 1 slice) with strawberry preserves         2:00 p.m. Whole banana        Dinner 5:30 p.m. Avocado (1/4) on toast         8:45 p.m. Toast with cooked asparagus(7 lou) with butter and salt         12:40 a.m. Green beans with small amount of butter and salt AND thin slice of ham AND half of a brownie        Snacks  None   Beverages  Diluted fruit juice   Alcohol   None   Only 1 BM yesterday.   *Typical intake consists of: Cold cereal with fat- and lactose-free milk AND 1/2 banana or berries on cereal, diluted juice (<25% juice) for breakfast; Ham, aged cheddar, and avocado on white roll OR almond/peanut butter and banana on sourdough bread for lunch; 1/4 chicken breast OR 3-4 oz country style pork rib with small amount of BBQ sauce OR 46 oz baked salmon or walleye (pecan encrusted) OR hamburger with cheddar cheese slice, mustard, and pickle for supper. Vegetables regularly consumed include broccoli, caulifower, green beans, and butternut squash. Enjoys dark chocolate as a snack.      Anthropometrics:   Body mass index is 22.27 kg/(m^2).     Usual Weight: 150-160 pounds (in general closer to 155 pounds)  Weight change in past 6 months: Stable  Weight History:  Wt Readings from Last 10 Encounters:   11/19/18 72.4 kg (159 lb 11.2 oz)   08/24/18 73.8 kg (162 lb 12.8 oz)   06/13/18 72.7 kg (160 lb 4.8 oz)   06/05/18 72.6 kg (160 lb)   04/09/18 75.6 kg (166 lb 9.6 oz)   03/13/18 74.7 kg (164 lb 11.2 oz)   02/06/18 74.8 kg (164 lb 12.8 oz)   01/08/18 74.2 kg (163 lb 9.6 oz)   08/16/17 72.6 kg (160 lb)   07/14/17 72.6 kg (160 lb)       Labs: Reviewed  Pertinent Medications/vitamin  and mineral supplements:  Outpatient Encounter Prescriptions as of 2018   Medication Sig Dispense Refill     acetaminophen (TYLENOL) 500 MG tablet Take 500-1,000 mg by mouth every 6 hours as needed for mild pain       Ascorbic Acid (VITAMIN C PO) Take 500 mg by mouth       calcium-vitamin D (CALTRATE) 600-400 MG-UNIT per tablet Take 1 tablet by mouth 2 times daily       Cholecalciferol (VITAMIN D3 PO) Take 1,000 Units by mouth daily       Cyanocobalamin (VITAMIN B 12 PO) Take 500 mcg by mouth       diphenoxylate-atropine (LOMOTIL) 2.5-0.025 MG per tablet Take 1 tablet by mouth 4 times daily as needed for diarrhea After loose stools 50 tablet 1     ferrous sulfate 325 (65 FE) MG tablet Take by mouth daily (with breakfast)       fluconazole (DIFLUCAN) 200 MG tablet Take 1 tablet (200 mg) by mouth daily 90 tablet 3     MELATONIN PO Take 1.5 mg by mouth nightly as needed        multivitamin, therapeutic with minerals (MULTI-VITAMIN) TABS Take 1 tablet by mouth daily       Pyridoxine HCl (VITAMIN B6 PO) Take 100 mg by mouth       rifaximin (XIFAXAN) 550 MG TABS tablet Take 1 tablet (550 mg) by mouth 2 times daily 60 tablet 0     rifaximin (XIFAXAN) 550 MG TABS tablet Take 1 tablet (550 mg) by mouth 3 times daily 42 tablet 0     tiZANidine (ZANAFLEX) 4 MG tablet Take 1-2 tablets (4-8 mg) by mouth 3 times daily as needed for muscle spasms 30 tablet 0     traZODone (DESYREL) 50 MG tablet Take 1 tablet (50 mg) by mouth nightly as needed for sleep (Patient taking differently: Take 50 mg by mouth nightly as needed for sleep Take 1/2 tablet at night) 90 tablet 2     No facility-administered encounter medications on file as of 2018.        Estimated Nutrition Needs based on current body weight of 72.4 k calories (25 kcals/kg), 75 g protein (1 g/kg), 1800 ml fluids (~1 ml/kcal or total fluids per MD).     MALNUTRITION:  % Weight Loss:  None noted  % Intake:  No decreased intake noted  Subcutaneous Fat Loss:   None observed  Muscle Loss:  None observed  Fluid Retention:  None noted    Nutrition Diagnosis:    Food and nutrition related knowledge deficit related to Crohn's disease as evidenced by need for diet education.    Nutrition Prescription: [place] diet for those with Small Bowel Bacterial Overgrowth    Nutrition Intervention:    Nutrition Education/Counseling:   Discussed diet and current symptoms. He also notes that a confounding factor with regard to symptoms is that he has been experiencing increased stress lately from a project he is working on. He is struggling with a specific project that involves a lot of programming that he states is having a significant negative psychological impact. He notes that he lives a fairly solitary life and has limited motivation to leave the house/be active, but when he does something as simple as take a walk he notes that this has a positive impact on symptoms. We discussed role of diet in aiding with bacterial overgrowth. Discussed that foods that do not contain carbohydrates (meat, cheese, oil, etc) often do not cause problems. Provided a list of foods that may contribute to symptoms and discussed removal of those he eats a lot of. He has already placed himself on an exclusion diet, so primarily discussed food reintroduction. His current exclusion diet seems to have improved his symptoms. Discussed that the food list provided can also serve as a guide for food reintroduction. He will plan to continue adding back 1 food at a time and monitoring symptoms.     Educational Materials Provided: [place] Diet for Those with Small Bowel Bacterial Overgrowth  Patient verbalized understanding of education provided. See all recommendations under Goals.    Goals:  1. Using [place] diet handout continue current exclusion diet     2. As symptoms improve begin food reintroduction    Nutrition Monitoring and Evaluation: Will monitor adherence to  nutrition recommendations at future RD visits.     Further Medical Nutrition Therapy:  As needed  Next Appointment (if applicable):  N/A  Patient was encouraged to call/contact RD with any further questions.    Augustine Quiñones, PhD, RD

## 2018-11-19 NOTE — MR AVS SNAPSHOT
After Visit Summary   11/19/2018    Trevin Gee    MRN: [id number]           Patient Information     Date Of Birth          1955        Visit Information        Provider Department      11/19/2018 12:30 PM Augustine Quiñones RD M [facility]        Care Instructions    Hi Trevin,    It was great meeting you today. Below is a summary of what we dicussed:    1. Using [place] diet handout continue current exclusion diet     2. As symptoms improve begin food reintroduction    Best regards,  Augustine Quiñones, PhD, RD            Follow-ups after your visit        Follow-up notes from your care team     Return if symptoms worsen or fail to improve.      Who to contact     Please call your clinic at [phone] to:    Ask questions about your health    Make or cancel appointments    Discuss your medicines    Learn about your test results    Speak to your doctor            Additional Information About Your Visit        MyChart Information     [employer] gives you secure access to your electronic health record. If you see a primary care provider, you can also send messages to your care team and make appointments. If you have questions, please call your primary care clinic.  If you do not have a primary care provider, please call [phone] and they will assist you.      [employer] is an electronic gateway that provides easy, online access to your medical records. With [employer], you can request a clinic appointment, read your test results, renew a prescription or communicate with your care team.     To access your existing account, please contact your [facility] or call [phone] for assistance.        Care EveryWhere ID     This is your Care EveryWhere ID. This could be used by other organizations to access your [place] medical records  [id number]        Your Vitals Were     BMI (Body Mass Index)                   22.27 kg/m2             Blood Pressure from Last 3 Encounters:   09/19/18 142/82   08/24/18 104/74   06/13/18 117/78    Weight from Last 3 Encounters:   11/19/18 72.4 kg (159 lb 11.2 oz)   08/24/18 73.8 kg (162 lb 12.8 oz)   06/13/18 72.7 kg (160 lb 4.8 oz)              Today, you had the following     No orders found for display         Today's Medication Changes          These changes are accurate as of 11/19/18  1:31 PM.  If you have any questions, ask your nurse or doctor.               These medicines have changed or have updated prescriptions.        Dose/Directions    traZODone 50 MG tablet   Commonly known as:  DESYREL   This may have changed:  additional instructions   Used for:  Bipolar 2 disorder (H)        Dose:  50 mg   Take 1 tablet (50 mg) by mouth nightly as needed for sleep   Quantity:  90 tablet   Refills:  2                Primary Care Provider Office Phone # Fax #    Saroj Haas [phone]       [place] [address]        Equal Access to Services     [facility]: Hadii archie patricko Sonerissa, waaxda luqadaha, qaybta kaalmaalmas arora, jessika kincaid . So [facility] [phone].    ATENCIÓN: Si habla español, tiene a corey disposición servicios gratuitos de asistencia lingüística. [facility] [phone].    We comply with applicable federal civil rights laws and [place] laws. We do not discriminate on the basis of race, color, national origin, age, disability, sex, sexual orientation, or gender identity.            Thank you!     Thank you for choosing [facility]  for your care. Our goal is always to provide you with excellent care. Hearing back from our patients is one way we can continue to improve our services. Please take a few minutes to complete the written survey that you may receive in the mail after your visit with us. Thank you!             Your Updated Medication List - Protect others  around you: Learn how to safely use, store and throw away your medicines at www.disposemymeds.org.          This list is accurate as of 11/19/18  1:31 PM.  Always use your most recent med list.                   Brand Name Dispense Instructions for use Diagnosis    acetaminophen 500 MG tablet    TYLENOL     Take 500-1,000 mg by mouth every 6 hours as needed for mild pain        calcium carbonate 600 mg-vitamin D 400 units 600-400 MG-UNIT per tablet    CALTRATE     Take 1 tablet by mouth 2 times daily        diphenoxylate-atropine 2.5-0.025 MG per tablet    LOMOTIL    50 tablet    Take 1 tablet by mouth 4 times daily as needed for diarrhea After loose stools    Crohn's disease of small intestine without complication (H)       ferrous sulfate 325 (65 Fe) MG tablet    IRON     Take by mouth daily (with breakfast)        fluconazole 200 MG tablet    DIFLUCAN    90 tablet    Take 1 tablet (200 mg) by mouth daily    Coccidioidal granuloma       MELATONIN PO      Take 1.5 mg by mouth nightly as needed        Multi-vitamin Tabs tablet      Take 1 tablet by mouth daily        * rifaximin 550 MG Tabs tablet    XIFAXAN    42 tablet    Take 1 tablet (550 mg) by mouth 3 times daily    Diarrhea, unspecified type       * rifaximin 550 MG Tabs tablet    XIFAXAN    60 tablet    Take 1 tablet (550 mg) by mouth 2 times daily    Small intestinal bacterial overgrowth       tiZANidine 4 MG tablet    ZANAFLEX    30 tablet    Take 1-2 tablets (4-8 mg) by mouth 3 times daily as needed for muscle spasms    Muscle spasm, Cervicalgia       traZODone 50 MG tablet    DESYREL    90 tablet    Take 1 tablet (50 mg) by mouth nightly as needed for sleep    Bipolar 2 disorder (H)       VITAMIN B 12 PO      Take 500 mcg by mouth        VITAMIN B6 PO      Take 100 mg by mouth        VITAMIN C PO      Take 500 mg by mouth        VITAMIN D3 PO      Take 1,000 Units by mouth daily        * Notice:  This list has 2 medication(s) that are the same as other  medications prescribed for you. Read the directions carefully, and ask your doctor or other care provider to review them with you.

## 2018-12-19 ENCOUNTER — MYC MEDICAL ADVICE (OUTPATIENT)
Dept: INTERNAL MEDICINE | Facility: CLINIC | Age: 63
End: 2018-12-19

## 2018-12-19 DIAGNOSIS — F31.81 BIPOLAR 2 DISORDER (H): ICD-10-CM

## 2018-12-21 ENCOUNTER — PATIENT OUTREACH (OUTPATIENT)
Dept: GASTROENTEROLOGY | Facility: CLINIC | Age: 63
End: 2018-12-21

## 2018-12-21 DIAGNOSIS — R19.7 DIARRHEA, UNSPECIFIED TYPE: ICD-10-CM

## 2018-12-21 RX ORDER — TRAZODONE HYDROCHLORIDE 50 MG/1
50 TABLET, FILM COATED ORAL
Qty: 90 TABLET | Refills: 1 | Status: SHIPPED | OUTPATIENT
Start: 2018-12-21 | End: 2020-09-21

## 2018-12-21 NOTE — TELEPHONE ENCOUNTER
Trazodone 50mg Tabs       Last Written Prescription Date:  9-22-17  Ordered by endo due to fact that patient did not have PCP at that time. Now would like PCP to reorder.  Last Fill Quantity: 90,   # refills: 2  Last Office Visit : 6-13-18  Future Office visit:  none    Routing refill request to provider for review/approval because:  Needs new provider to order.      Kathleen M Doege RN [place]

## 2019-04-16 ENCOUNTER — HOSPITAL ENCOUNTER (INPATIENT)
Facility: CLINIC | Age: 64
LOS: 2 days | Discharge: HOME | DRG: 389 | End: 2019-04-19
Attending: EMERGENCY MEDICINE | Admitting: HOSPITALIST
Payer: COMMERCIAL

## 2019-04-16 ENCOUNTER — APPOINTMENT (OUTPATIENT)
Dept: CT IMAGING | Facility: CLINIC | Age: 64
DRG: 389 | End: 2019-04-16
Attending: EMERGENCY MEDICINE
Payer: COMMERCIAL

## 2019-04-16 DIAGNOSIS — K56.7 ILEUS (H): ICD-10-CM

## 2019-04-16 DIAGNOSIS — R11.2 NON-INTRACTABLE VOMITING WITH NAUSEA, UNSPECIFIED VOMITING TYPE: ICD-10-CM

## 2019-04-16 DIAGNOSIS — R10.84 ABDOMINAL PAIN, GENERALIZED: ICD-10-CM

## 2019-04-16 DIAGNOSIS — E86.0 DEHYDRATION: ICD-10-CM

## 2019-04-16 LAB
ALBUMIN SERPL-MCNC: 5 G/DL (ref 3.4–5)
ALP SERPL-CCNC: 144 U/L (ref 40–150)
ALT SERPL W P-5'-P-CCNC: 29 U/L (ref 0–70)
ANION GAP SERPL CALCULATED.3IONS-SCNC: 12 MMOL/L (ref 3–14)
AST SERPL W P-5'-P-CCNC: 25 U/L (ref 0–45)
BASOPHILS # BLD AUTO: 0.1 10E9/L (ref 0–0.2)
BASOPHILS NFR BLD AUTO: 0.4 %
BILIRUB SERPL-MCNC: 1.2 MG/DL (ref 0.2–1.3)
BUN SERPL-MCNC: 25 MG/DL (ref 7–30)
CALCIUM SERPL-MCNC: 10.8 MG/DL (ref 8.5–10.1)
CHLORIDE SERPL-SCNC: 100 MMOL/L (ref 94–109)
CO2 SERPL-SCNC: 23 MMOL/L (ref 20–32)
CREAT SERPL-MCNC: 1.88 MG/DL (ref 0.66–1.25)
DIFFERENTIAL METHOD BLD: ABNORMAL
EOSINOPHIL # BLD AUTO: 0.1 10E9/L (ref 0–0.7)
EOSINOPHIL NFR BLD AUTO: 0.4 %
ERYTHROCYTE [DISTWIDTH] IN BLOOD BY AUTOMATED COUNT: 13.2 % (ref 10–15)
GFR SERPL CREATININE-BSD FRML MDRD: 37 ML/MIN/{1.73_M2}
GLUCOSE SERPL-MCNC: 136 MG/DL (ref 70–99)
HCT VFR BLD AUTO: 62.4 % (ref 40–53)
HGB BLD-MCNC: 20.4 G/DL (ref 13.3–17.7)
IMM GRANULOCYTES # BLD: 0 10E9/L (ref 0–0.4)
IMM GRANULOCYTES NFR BLD: 0.3 %
LIPASE SERPL-CCNC: 366 U/L (ref 73–393)
LYMPHOCYTES # BLD AUTO: 2.9 10E9/L (ref 0.8–5.3)
LYMPHOCYTES NFR BLD AUTO: 22 %
MCH RBC QN AUTO: 31.4 PG (ref 26.5–33)
MCHC RBC AUTO-ENTMCNC: 32.7 G/DL (ref 31.5–36.5)
MCV RBC AUTO: 96 FL (ref 78–100)
MONOCYTES # BLD AUTO: 1 10E9/L (ref 0–1.3)
MONOCYTES NFR BLD AUTO: 8 %
NEUTROPHILS # BLD AUTO: 9 10E9/L (ref 1.6–8.3)
NEUTROPHILS NFR BLD AUTO: 68.9 %
NRBC # BLD AUTO: 0 10*3/UL
NRBC BLD AUTO-RTO: 0 /100
PLATELET # BLD AUTO: 350 10E9/L (ref 150–450)
POTASSIUM SERPL-SCNC: 3.7 MMOL/L (ref 3.4–5.3)
PROT SERPL-MCNC: 9.6 G/DL (ref 6.8–8.8)
RBC # BLD AUTO: 6.5 10E12/L (ref 4.4–5.9)
SODIUM SERPL-SCNC: 135 MMOL/L (ref 133–144)
WBC # BLD AUTO: 13 10E9/L (ref 4–11)

## 2019-04-16 PROCEDURE — 99223 1ST HOSP IP/OBS HIGH 75: CPT | Mod: AI | Performed by: HOSPITALIST

## 2019-04-16 PROCEDURE — 96376 TX/PRO/DX INJ SAME DRUG ADON: CPT

## 2019-04-16 PROCEDURE — 25800030 ZZH RX IP 258 OP 636: Performed by: EMERGENCY MEDICINE

## 2019-04-16 PROCEDURE — 74176 CT ABD & PELVIS W/O CONTRAST: CPT

## 2019-04-16 PROCEDURE — 83690 ASSAY OF LIPASE: CPT | Performed by: EMERGENCY MEDICINE

## 2019-04-16 PROCEDURE — 85025 COMPLETE CBC W/AUTO DIFF WBC: CPT | Performed by: EMERGENCY MEDICINE

## 2019-04-16 PROCEDURE — 96361 HYDRATE IV INFUSION ADD-ON: CPT

## 2019-04-16 PROCEDURE — 80053 COMPREHEN METABOLIC PANEL: CPT | Performed by: EMERGENCY MEDICINE

## 2019-04-16 PROCEDURE — 99285 EMERGENCY DEPT VISIT HI MDM: CPT | Mod: 25

## 2019-04-16 PROCEDURE — 99285 EMERGENCY DEPT VISIT HI MDM: CPT | Mod: Z6 | Performed by: EMERGENCY MEDICINE

## 2019-04-16 PROCEDURE — 25000128 H RX IP 250 OP 636: Performed by: EMERGENCY MEDICINE

## 2019-04-16 PROCEDURE — 96375 TX/PRO/DX INJ NEW DRUG ADDON: CPT

## 2019-04-16 PROCEDURE — 96374 THER/PROPH/DIAG INJ IV PUSH: CPT

## 2019-04-16 RX ORDER — ONDANSETRON 2 MG/ML
4 INJECTION INTRAMUSCULAR; INTRAVENOUS EVERY 30 MIN PRN
Status: DISCONTINUED | OUTPATIENT
Start: 2019-04-16 | End: 2019-04-17 | Stop reason: CLARIF

## 2019-04-16 RX ORDER — SODIUM CHLORIDE, SODIUM LACTATE, POTASSIUM CHLORIDE, CALCIUM CHLORIDE 600; 310; 30; 20 MG/100ML; MG/100ML; MG/100ML; MG/100ML
1000 INJECTION, SOLUTION INTRAVENOUS CONTINUOUS
Status: DISCONTINUED | OUTPATIENT
Start: 2019-04-16 | End: 2019-04-17 | Stop reason: DRUGHIGH

## 2019-04-16 RX ORDER — MORPHINE SULFATE 4 MG/ML
4 INJECTION, SOLUTION INTRAMUSCULAR; INTRAVENOUS
Status: DISCONTINUED | OUTPATIENT
Start: 2019-04-16 | End: 2019-04-17 | Stop reason: CLARIF

## 2019-04-16 RX ADMIN — SODIUM CHLORIDE, POTASSIUM CHLORIDE, SODIUM LACTATE AND CALCIUM CHLORIDE 1000 ML: 600; 310; 30; 20 INJECTION, SOLUTION INTRAVENOUS at 22:09

## 2019-04-16 RX ADMIN — SODIUM CHLORIDE, POTASSIUM CHLORIDE, SODIUM LACTATE AND CALCIUM CHLORIDE 1000 ML: 600; 310; 30; 20 INJECTION, SOLUTION INTRAVENOUS at 21:24

## 2019-04-16 RX ADMIN — SODIUM CHLORIDE, POTASSIUM CHLORIDE, SODIUM LACTATE AND CALCIUM CHLORIDE 1000 ML: 600; 310; 30; 20 INJECTION, SOLUTION INTRAVENOUS at 18:55

## 2019-04-16 RX ADMIN — MORPHINE SULFATE 4 MG: 4 INJECTION INTRAVENOUS at 18:55

## 2019-04-16 RX ADMIN — ONDANSETRON 4 MG: 2 INJECTION INTRAMUSCULAR; INTRAVENOUS at 18:54

## 2019-04-16 ASSESSMENT — ENCOUNTER SYMPTOMS
CARDIOVASCULAR NEGATIVE: 1
NAUSEA: 1
ABDOMINAL PAIN: 1
RESPIRATORY NEGATIVE: 1
ACTIVITY CHANGE: 1
WEAKNESS: 1
FEVER: 0
ABDOMINAL DISTENTION: 0
VOMITING: 1
FATIGUE: 1
APPETITE CHANGE: 1

## 2019-04-16 ASSESSMENT — MIFFLIN-ST. JEOR: SCORE: 1458.97

## 2019-04-16 NOTE — ED TRIAGE NOTES
"ED TRIAGE    Medical / Trauma C/o:  63-yr male patient - presenting with abdominal cramping, nausea and vomiting; all intermittently.  Previous SBO's and abdominal surgeries.    Duration of C/o:  2-3 days    Contributing Factors / Concerning HX:  See HX    Significant Med's / Tx's:  See med's    Febrile / Afebrile:  Afebrile    Patient Vitals for the past 24 hrs:   BP Temp Temp src Pulse Heart Rate Resp SpO2 Height Weight   04/16/19 1551 (!) 141/97 98.3  F (36.8  C) Oral 119 119 18 96 % 1.778 m (5' 10\") 65.8 kg (145 lb)       Brad Feliz  April 16, 2019  3:54 PM  "

## 2019-04-16 NOTE — ED PROVIDER NOTES
"  History     Chief Complaint   Patient presents with     Abdominal Pain     nausea, vomiting, abdominal cramps     HPI  Trevin Gee is a 63 year old male with a history of Crohn's status post ileocectomy (1974) with multiple revisions (1986 and 1989) of the anastomotic dilation, status post appendectomy (1974) and cholecystectomy (2005), adrenal insufficiency, GERD, bipolar II disorder, and anxiety, who presents to the Emergency Department for evaluation of abdominal pain ongoing for approximately 24 hours. Patient complains of intermittent colicky pain which he describes as an occasional spasm localized to his lower abdomen. Patient notes that this is associated with nausea and emesis. He reports having an episode of emesis around noon today, approximately 3 to 4 hours prior to his arrival. Patient states that prior to that he had an episode of small volume emesis. Currently, patient does not have the above described discomfort, but he notes that following his last vomiting episode his \"stomach feels extremely sensitized\". He also reports feeling dehydrated, and is not able to tolerate much oral intake.     He had an ilealcecal resection in 1974 with multiple revisions of the anastomosis    Patient reports that he has a history of \"blockages\" wherein he will typically have abdominal distention, however, he currently does not have any distention with his recent symptoms. Patient states that he last had the aforementioned blockage about nine months ago at which time he did not require any hospitalizations. Patient reports that he is currently in remission for his Crohn's and has not been on treatment for awhile now. He was previously on prednisone chronically, but denies any recent use of this. He denies history of diabetes mellitus or hypertension     I have reviewed the Medications, Allergies, Past Medical and Surgical History, and Social History in the [facility] system.    Past Medical History:   Diagnosis Date "     Anemia 2007     Anxiety 2012?    much worse since July 2014     Cataract 12/2007    both eyes, too much prednisone, implants     Coccidioidomycosis      Crohn disease (H)      Crohn's disease (H) 1/13/2015     Depressive disorder 7/2014    much worse since July 2014     Fracture 1956    green fracture of leg     Gallbladder problem 2005?    much gravel, removed     GERD (gastroesophageal reflux disease)      History of blood transfusion 1988    ulcerated anastomosis term. ilium bleed     Hypertriglyceridemia 7/8/2016     Infection 2007    persistant coccidioidomycosis     Kidney stone various    both sides, all passed naturally     Mental health problem 2007    bipolar though perhaps different     Nephrolithiasis      Osteoporosis 2007    osteoporosis, too much prednisone, last dexa 1/2014     Osteoporosis      Other nervous system complications 2007    prednisone overload induced kevin     Personal history of colonic polyps     small ones found during colonoscopies     Steroid-induced psychosis     Patient extremely sensitive to regular doses of steroids.  Unclear if this is due to chronic fluconazole use or genetic issue.  In the future, use caution when prescribing steroids.     TB (tuberculosis)        Past Surgical History:   Procedure Laterality Date     APPENDECTOMY  1974    coincidental with Crohn's surgery     BACK SURGERY  12/2007    kyphoplasty on compressed 4th??? vertebra     BIOPSY  2007, 2013    lump on arm, knee, back of hand for coccidioidomycosis cult.     CHOLECYSTECTOMY  2005    much gravel, removed laparoscopically     COLONOSCOPY  7/14/2014 last    Dr. Catherine Bowden, [facility] - many previous     COLONOSCOPY Left 9/11/2015    Procedure: COMBINED COLONOSCOPY, SINGLE OR MULTIPLE BIOPSY/POLYPECTOMY BY BIOPSY;  Surgeon: Fransisco Leach MD;  Location:  GI     COLONOSCOPY N/A 8/3/2016    Procedure: COMBINED COLONOSCOPY, SINGLE OR MULTIPLE BIOPSY/POLYPECTOMY BY BIOPSY;   "Surgeon: Fransisco Leach MD;  Location: [facility] GI     COLONOSCOPY N/A 8/16/2017    Procedure: COMBINED COLONOSCOPY, SINGLE OR MULTIPLE BIOPSY/POLYPECTOMY BY BIOPSY;;  Surgeon: Fransisco Leach MD;  Location: [facility] OR     ENT SURGERY  ?    upper endoscopy     ESOPHAGOSCOPY, GASTROSCOPY, DUODENOSCOPY (EGD), COMBINED N/A 11/8/2016    Procedure: COMBINED ENDOSCOPIC ULTRASOUND, ESOPHAGOSCOPY, GASTROSCOPY, DUODENOSCOPY (EGD), FINE NEEDLE ASPIRATE/BIOPSY;  Surgeon: Guru Alexsandra Ballesteros MD;  Location: [facility] GI     ESOPHAGOSCOPY, GASTROSCOPY, DUODENOSCOPY (EGD), COMBINED N/A 11/8/2016    Procedure: COMBINED ESOPHAGOSCOPY, GASTROSCOPY, DUODENOSCOPY (EGD), BIOPSY SINGLE OR MULTIPLE;  Surgeon: Guru Alexsandra Ballesteros MD;  Location: [facility] GI     ESOPHAGOSCOPY, GASTROSCOPY, DUODENOSCOPY (EGD), COMBINED N/A 8/16/2017    Procedure: COMBINED ESOPHAGOSCOPY, GASTROSCOPY, DUODENOSCOPY (EGD), BIOPSY SINGLE OR MULTIPLE;;  Surgeon: Fransisco Leach MD;  Location: [facility] OR     EYE SURGERY  12/2007    cataract surgery both sides     GI SURGERY  1974, 1986(x2), 1989    Crohn's, 1974 RO 18\" term. ilium + 9\" asc. colon all one pc     SOFT TISSUE SURGERY  3/2013    removed buildup on back of r hand, coccidioidomycosis       Family History   Problem Relation Age of Onset     Heart Failure Father      Musculoskeletal Disorder Father         Parkinson's     Cancer - colorectal Mother      Cerebrovascular Disease Paternal Grandmother      Cancer Other      Musculoskeletal Disorder Brother         Parkinson's       Social History     Tobacco Use     Smoking status: Never Smoker     Smokeless tobacco: Never Used   Substance Use Topics     Alcohol use: Yes     Comment: sometimes one glass of wine a week     Current Facility-Administered Medications   Medication     lactated ringers infusion     morphine (PF) injection 4 mg     ondansetron (ZOFRAN) injection 4 mg     Current Outpatient Medications   Medication     " "acetaminophen (TYLENOL) 500 MG tablet     Ascorbic Acid (VITAMIN C PO)     calcium-vitamin D (CALTRATE) 600-400 MG-UNIT per tablet     Cholecalciferol (VITAMIN D3 PO)     Cyanocobalamin (VITAMIN B 12 PO)     diphenoxylate-atropine (LOMOTIL) 2.5-0.025 MG per tablet     ferrous sulfate 325 (65 FE) MG tablet     fluconazole (DIFLUCAN) 200 MG tablet     MELATONIN PO     multivitamin, therapeutic with minerals (MULTI-VITAMIN) TABS     Pyridoxine HCl (VITAMIN B6 PO)     rifaximin (XIFAXAN) 550 MG TABS tablet     rifaximin (XIFAXAN) 550 MG TABS tablet     tiZANidine (ZANAFLEX) 4 MG tablet     traZODone (DESYREL) 50 MG tablet        Allergies   Allergen Reactions     Prednisone Other (See Comments)     Diana with more than 2.5mg chronic dosing of prednisone due to fluconazole interaction per patient.        Review of Systems   Constitutional: Positive for activity change, appetite change and fatigue. Negative for fever.   Respiratory: Negative.    Cardiovascular: Negative.    Gastrointestinal: Positive for abdominal pain, nausea and vomiting. Negative for abdominal distention.   Genitourinary: Negative.    Neurological: Positive for weakness.   All other systems reviewed and are negative.      Physical Exam   BP: (!) 141/97  Pulse: 119  Heart Rate: 119  Temp: 98.3  F (36.8  C)  Resp: 18  Height: 177.8 cm (5' 10\")  Weight: 65.8 kg (145 lb)  SpO2: 96 %      Physical Exam   Constitutional: He is oriented to person, place, and time. He appears well-developed and well-nourished. He appears distressed.   HENT:   Head: Normocephalic and atraumatic.   Eyes: Pupils are equal, round, and reactive to light. Conjunctivae and EOM are normal. No scleral icterus.   Neck: Neck supple.   Cardiovascular: Normal rate, regular rhythm and normal heart sounds.   Pulmonary/Chest: Effort normal and breath sounds normal. No respiratory distress. He has no wheezes.   Abdominal: Soft. He exhibits no distension. There is no tenderness.   Neurological: " He is alert and oriented to person, place, and time. No cranial nerve deficit.   Skin: Skin is warm and dry.   Psychiatric: He has a normal mood and affect. His behavior is normal.   Nursing note and vitals reviewed.      ED Course        Procedures        Medications   lactated ringers BOLUS 1,000 mL (1,000 mLs Intravenous New Bag 4/16/19 2124)     Followed by   lactated ringers BOLUS 1,000 mL (0 mLs Intravenous Stopped 4/16/19 2124)     Followed by   lactated ringers infusion (1,000 mLs Intravenous New Bag 4/16/19 2209)   ondansetron (ZOFRAN) injection 4 mg (4 mg Intravenous Given 4/16/19 1854)   morphine (PF) injection 4 mg (4 mg Intravenous Given 4/16/19 1855)     Surgery consulted, saw the patient and recommend admission to medicine.  I do not see an indication for an NG at this time as ileus is more likely than obstruction however he is at high risk for obstruction given his multiple previous abdominal surgeries and cannot rule out partial bowel obstruction.       Labs Ordered and Resulted from Time of ED Arrival Up to the Time of Departure from the ED   CBC WITH PLATELETS DIFFERENTIAL - Abnormal; Notable for the following components:       Result Value    WBC 13.0 (*)     RBC Count 6.50 (*)     Hemoglobin 20.4 (*)     Hematocrit 62.4 (*)     Absolute Neutrophil 9.0 (*)     All other components within normal limits   COMPREHENSIVE METABOLIC PANEL - Abnormal; Notable for the following components:    Glucose 136 (*)     Creatinine 1.88 (*)     GFR Estimate 37 (*)     GFR Estimate If Black 43 (*)     Calcium 10.8 (*)     Protein Total 9.6 (*)     All other components within normal limits   LIPASE   FREE WATER            Assessments & Plan (with Medical Decision Making)     This is a 63 year old gentleman with history of Crohn's status post ileocecal resection and multiple revisions, threfore multiple abdominal surgeries, presents with acute crampy colicky abdominal pain, nausea and vomiting without prominent  diarrhea. He is dehydrated by labs and clinically. He was given IV fludis. A CT scan favors ileus over obstruction. He was seen by Surgery who thinks he is appropriate for Medicine. He is clinically improved after IV fludis, morphine for pain and Zofran fro nausea. he will go to the medicine service he is stable condition.    I have reviewed the nursing notes.    I have reviewed the findings, diagnosis, plan and need for follow up with the patient.       Medication List      There are no discharge medications for this visit.         Final diagnoses:   Abdominal pain, generalized   Dehydration   Non-intractable vomiting with nausea, unspecified vomiting type   Ileus (H)     I, Irene Tom, am serving as a trained medical scribe to document services personally performed by Cole Lopez MD, based on the provider's statements to me.   ICole MD, was physically present and have reviewed and verified the accuracy of this note documented by Irene Tom.    4/16/2019   [facility], [place], EMERGENCY DEPARTMENT     Cole Lopez MD  04/20/19 0025

## 2019-04-17 PROBLEM — R10.9 ABDOMINAL PAIN: Status: ACTIVE | Noted: 2019-04-17

## 2019-04-17 LAB
ALBUMIN SERPL-MCNC: 3.2 G/DL (ref 3.4–5)
ALP SERPL-CCNC: 92 U/L (ref 40–150)
ALT SERPL W P-5'-P-CCNC: 21 U/L (ref 0–70)
ANION GAP SERPL CALCULATED.3IONS-SCNC: 9 MMOL/L (ref 3–14)
AST SERPL W P-5'-P-CCNC: 12 U/L (ref 0–45)
BILIRUB SERPL-MCNC: 1.1 MG/DL (ref 0.2–1.3)
BUN SERPL-MCNC: 22 MG/DL (ref 7–30)
C COLI+JEJUNI+LARI FUSA STL QL NAA+PROBE: NOT DETECTED
CALCIUM SERPL-MCNC: 8.4 MG/DL (ref 8.5–10.1)
CHLORIDE SERPL-SCNC: 107 MMOL/L (ref 94–109)
CO2 SERPL-SCNC: 23 MMOL/L (ref 20–32)
CREAT SERPL-MCNC: 1.32 MG/DL (ref 0.66–1.25)
CRP SERPL-MCNC: 5.8 MG/L (ref 0–8)
EC STX1 GENE STL QL NAA+PROBE: NOT DETECTED
EC STX2 GENE STL QL NAA+PROBE: NOT DETECTED
ENTERIC PATHOGEN COMMENT: NORMAL
ERYTHROCYTE [DISTWIDTH] IN BLOOD BY AUTOMATED COUNT: 13 % (ref 10–15)
GFR SERPL CREATININE-BSD FRML MDRD: 57 ML/MIN/{1.73_M2}
GLUCOSE SERPL-MCNC: 85 MG/DL (ref 70–99)
HCT VFR BLD AUTO: 47.9 % (ref 40–53)
HGB BLD-MCNC: 15.6 G/DL (ref 13.3–17.7)
INR PPP: 1.04 (ref 0.86–1.14)
MCH RBC QN AUTO: 31 PG (ref 26.5–33)
MCHC RBC AUTO-ENTMCNC: 32.6 G/DL (ref 31.5–36.5)
MCV RBC AUTO: 95 FL (ref 78–100)
NOROV GI+II ORF1-ORF2 JNC STL QL NAA+PR: NOT DETECTED
PLATELET # BLD AUTO: 218 10E9/L (ref 150–450)
POTASSIUM SERPL-SCNC: 4.2 MMOL/L (ref 3.4–5.3)
PROT SERPL-MCNC: 6.1 G/DL (ref 6.8–8.8)
RADIOLOGIST FLAGS: ABNORMAL
RBC # BLD AUTO: 5.03 10E12/L (ref 4.4–5.9)
RVA NSP5 STL QL NAA+PROBE: NOT DETECTED
SALMONELLA SP RPOD STL QL NAA+PROBE: NOT DETECTED
SHIGELLA SP+EIEC IPAH STL QL NAA+PROBE: NOT DETECTED
SODIUM SERPL-SCNC: 139 MMOL/L (ref 133–144)
V CHOL+PARA RFBL+TRKH+TNAA STL QL NAA+PR: NOT DETECTED
WBC # BLD AUTO: 6.4 10E9/L (ref 4–11)
Y ENTERO RECN STL QL NAA+PROBE: NOT DETECTED

## 2019-04-17 PROCEDURE — 25000128 H RX IP 250 OP 636: Performed by: STUDENT IN AN ORGANIZED HEALTH CARE EDUCATION/TRAINING PROGRAM

## 2019-04-17 PROCEDURE — 99233 SBSQ HOSP IP/OBS HIGH 50: CPT | Mod: GC | Performed by: PEDIATRICS

## 2019-04-17 PROCEDURE — 86140 C-REACTIVE PROTEIN: CPT | Performed by: STUDENT IN AN ORGANIZED HEALTH CARE EDUCATION/TRAINING PROGRAM

## 2019-04-17 PROCEDURE — 85610 PROTHROMBIN TIME: CPT | Performed by: STUDENT IN AN ORGANIZED HEALTH CARE EDUCATION/TRAINING PROGRAM

## 2019-04-17 PROCEDURE — 80053 COMPREHEN METABOLIC PANEL: CPT | Performed by: STUDENT IN AN ORGANIZED HEALTH CARE EDUCATION/TRAINING PROGRAM

## 2019-04-17 PROCEDURE — 87506 IADNA-DNA/RNA PROBE TQ 6-11: CPT | Performed by: STUDENT IN AN ORGANIZED HEALTH CARE EDUCATION/TRAINING PROGRAM

## 2019-04-17 PROCEDURE — 85027 COMPLETE CBC AUTOMATED: CPT | Performed by: STUDENT IN AN ORGANIZED HEALTH CARE EDUCATION/TRAINING PROGRAM

## 2019-04-17 PROCEDURE — 87449 NOS EACH ORGANISM AG IA: CPT | Performed by: PEDIATRICS

## 2019-04-17 PROCEDURE — 25800030 ZZH RX IP 258 OP 636: Performed by: STUDENT IN AN ORGANIZED HEALTH CARE EDUCATION/TRAINING PROGRAM

## 2019-04-17 PROCEDURE — 12000001 ZZH R&B MED SURG/OB UMMC

## 2019-04-17 PROCEDURE — 25000128 H RX IP 250 OP 636: Performed by: EMERGENCY MEDICINE

## 2019-04-17 RX ORDER — NICOTINE POLACRILEX 4 MG
15-30 LOZENGE BUCCAL
Status: DISCONTINUED | OUTPATIENT
Start: 2019-04-17 | End: 2019-04-19 | Stop reason: HOSPADM

## 2019-04-17 RX ORDER — SODIUM CHLORIDE, SODIUM LACTATE, POTASSIUM CHLORIDE, CALCIUM CHLORIDE 600; 310; 30; 20 MG/100ML; MG/100ML; MG/100ML; MG/100ML
INJECTION, SOLUTION INTRAVENOUS CONTINUOUS
Status: DISCONTINUED | OUTPATIENT
Start: 2019-04-17 | End: 2019-04-18

## 2019-04-17 RX ORDER — ONDANSETRON 2 MG/ML
4 INJECTION INTRAMUSCULAR; INTRAVENOUS EVERY 6 HOURS PRN
Status: DISCONTINUED | OUTPATIENT
Start: 2019-04-17 | End: 2019-04-19 | Stop reason: HOSPADM

## 2019-04-17 RX ORDER — NALOXONE HYDROCHLORIDE 0.4 MG/ML
.1-.4 INJECTION, SOLUTION INTRAMUSCULAR; INTRAVENOUS; SUBCUTANEOUS
Status: DISCONTINUED | OUTPATIENT
Start: 2019-04-17 | End: 2019-04-19 | Stop reason: HOSPADM

## 2019-04-17 RX ORDER — PROCHLORPERAZINE 25 MG
25 SUPPOSITORY, RECTAL RECTAL EVERY 12 HOURS PRN
Status: DISCONTINUED | OUTPATIENT
Start: 2019-04-17 | End: 2019-04-19 | Stop reason: HOSPADM

## 2019-04-17 RX ORDER — ONDANSETRON 4 MG/1
4 TABLET, ORALLY DISINTEGRATING ORAL EVERY 6 HOURS PRN
Status: DISCONTINUED | OUTPATIENT
Start: 2019-04-17 | End: 2019-04-19 | Stop reason: HOSPADM

## 2019-04-17 RX ORDER — DEXTROSE MONOHYDRATE 25 G/50ML
25-50 INJECTION, SOLUTION INTRAVENOUS
Status: DISCONTINUED | OUTPATIENT
Start: 2019-04-17 | End: 2019-04-19 | Stop reason: HOSPADM

## 2019-04-17 RX ORDER — PROCHLORPERAZINE MALEATE 10 MG
10 TABLET ORAL EVERY 6 HOURS PRN
Status: DISCONTINUED | OUTPATIENT
Start: 2019-04-17 | End: 2019-04-19 | Stop reason: HOSPADM

## 2019-04-17 RX ORDER — FLUCONAZOLE 2 MG/ML
200 INJECTION, SOLUTION INTRAVENOUS EVERY 24 HOURS
Status: DISCONTINUED | OUTPATIENT
Start: 2019-04-17 | End: 2019-04-19

## 2019-04-17 RX ADMIN — SODIUM CHLORIDE, POTASSIUM CHLORIDE, SODIUM LACTATE AND CALCIUM CHLORIDE: 600; 310; 30; 20 INJECTION, SOLUTION INTRAVENOUS at 04:03

## 2019-04-17 RX ADMIN — MORPHINE SULFATE 4 MG: 4 INJECTION INTRAVENOUS at 01:16

## 2019-04-17 RX ADMIN — FLUCONAZOLE, SODIUM CHLORIDE 200 MG: 2 INJECTION INTRAVENOUS at 06:31

## 2019-04-17 RX ADMIN — ONDANSETRON 4 MG: 2 INJECTION INTRAMUSCULAR; INTRAVENOUS at 01:16

## 2019-04-17 ASSESSMENT — ACTIVITIES OF DAILY LIVING (ADL)
ADLS_ACUITY_SCORE: 12

## 2019-04-17 ASSESSMENT — MIFFLIN-ST. JEOR: SCORE: 1474.85

## 2019-04-17 NOTE — CONSULTS
GASTROENTEROLOGY CONSULTATION      Date of Admission:  4/16/2019           Reason for Consultation:   We were asked by Dr. 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 Suspect he had transient obstruction most likely related to adhesions given the reported area of decompressed bowel is in jejunum. Other potential etiology would be a stricture at reported area of jejunal-jejunal anastomosis, which was not well-visualized on MRE in 2018. Less likely this is related to active Crohn's given normal CRP, although CT non-contrast limited in evaluation bowel inflammation. He is overall improving now with conservative management.     Unclear etiology for his preceding diarrhea - question SIBO given improvement with rifaximin then dietary interventions. More recently was having formed stool prior to this presentation.     Recommendations  - Advance to clear liquids if tolerated  - If tolerating diet tomorrow with evidence of antegrade bowel function, recommended MRE to evaluate for evidence of stricture at jejunal-jejunal anastomosis or ileo-colonic anastomosis, as well as for evidence of active IBD. If unable to be done as inpatient, will arrange as outpatient  - No plan for endoscopic dilation at this time, will consider further colonoscopy as outpatient pending MRE  - C diff/stool enteric panel given recent diarrhea, if he has loose stool  - Plan for outpatient follow-up with GI pending MRE  - Trend CRP    GI will continue to follow    Thank you for involving us in this patient's care. Please do not hesitate to contact the GI service with any questions or concerns.  "    Pt care plan discussed with Dr. Moreno, GI staff physician.    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     He says that 4-5 weeks ago he developed increased diarrhea. He was given a 2 week course of rifaximin for possible SIBO, which initially helped however during the last 2 days of the course he had increased diarrhea, going after every time he ate., 3-4x/day. He started eating less and slowly increased his diet, which helped resolve the diarrhea to where he had 2 formed BMs over the past week. However, the day before yesterday at 4pm he started having \"abdominal spasms,\" described as diffuse colicky abdominal pain, which last 16 hours. He had a loose BM yesterday that improved pain, but then at noon the pain returned and was associated with N/V and lack of flatus. No blood in stool.     He presented to ED with NALLELY, hemoconcentration. CT non-con with dilated small bowel with no clear transition point but decompressed area of jejunum in pelvis. He received fluids and is feeling improved this AM, now with no pain, N/V, and passing a small amount of flatus this AM.     He reports intermittent feelings of \"blockages\" in RLQ 2-3 x/year. Had endoscopic dilation 9/2018 and since then no blockages and increased intermittent diarrhea. There was also abnormal mucosa at anastomosis that was inflammation on pathology. MRE 2/2018 4 cm stricture at anastomosis with inflammatory change.     IBD history:  Age at diagnosis: ~18  Extend of disease: Ileo-colonic  Disease phenotype: Inflammatory  Key-anal disease: No  Current CD medications: None  Prior IBD Medications:  - Asulfazine and Asacol: 1974 " to late 90s, stopped to to disease progression  - Prednisone 1986 - current.   - Mercaptopurine: Briefly in mid 90s to get him off steroids- Patient thinks he has side effect to medication  - Methotrexate: After mercaptopurine, stayed on for years, but after bleeding in 1988 and 1989, he would not taper his prednisone. MTX stopped as he could not get off the steroids.    Surgical history:  1974 - [facility] Ileocecectomy  1986 - [facility] - revision of anastomosis  1988 - massive bleeding from ulcer at anastomosis ([place]), cauterized via colonoscopy  1989 - laparoscopic  Revision of anastomosis  2005 - Lap [facility]  He also has a reported jejunal-jejunal anastomosis from a prior surgery done for adhesions    Past Medical History    Reviewed and edited as appropriate  Past Medical History:   Diagnosis Date     Anemia 2007     Anxiety 2012?    much worse since July 2014     Cataract 12/2007    both eyes, too much prednisone, implants     Coccidioidomycosis      Crohn disease (H)      Crohn's disease (H) 1/13/2015     Depressive disorder 7/2014    much worse since July 2014     Fracture 1956    green fracture of leg     Gallbladder problem 2005?    much gravel, removed     GERD (gastroesophageal reflux disease)      History of blood transfusion 1988    ulcerated anastomosis term. ilium bleed     Hypertriglyceridemia 7/8/2016     Infection 2007    persistant coccidioidomycosis     Kidney stone various    both sides, all passed naturally     Mental health problem 2007    bipolar though perhaps different     Nephrolithiasis      Osteoporosis 2007    osteoporosis, too much prednisone, last dexa 1/2014     Osteoporosis      Other nervous system complications 2007    prednisone overload induced kevin     Personal history of colonic polyps     small ones found during colonoscopies     Steroid-induced psychosis     Patient extremely sensitive to regular doses of steroids.  Unclear if this is due to chronic fluconazole use or genetic  issue.  In the future, use caution when prescribing steroids.     TB (tuberculosis)        Past Surgical History   Reviewed and edited as appropriate   Past Surgical History:   Procedure Laterality Date     APPENDECTOMY  1974    coincidental with Crohn's surgery     BACK SURGERY  12/2007    kyphoplasty on compressed 4th??? vertebra     BIOPSY  2007, 2013    lump on arm, knee, back of hand for coccidioidomycosis cult.     CHOLECYSTECTOMY  2005    much gravel, removed laparoscopically     COLONOSCOPY  7/14/2014 last    Dr. Catherine Bowden, [facility] - many previous     COLONOSCOPY Left 9/11/2015    Procedure: COMBINED COLONOSCOPY, SINGLE OR MULTIPLE BIOPSY/POLYPECTOMY BY BIOPSY;  Surgeon: Fransisco Leach MD;  Location: [facility]     COLONOSCOPY N/A 8/3/2016    Procedure: COMBINED COLONOSCOPY, SINGLE OR MULTIPLE BIOPSY/POLYPECTOMY BY BIOPSY;  Surgeon: Fransisco Leach MD;  Location: [facility]     COLONOSCOPY N/A 8/16/2017    Procedure: COMBINED COLONOSCOPY, SINGLE OR MULTIPLE BIOPSY/POLYPECTOMY BY BIOPSY;;  Surgeon: Fransisco Leach MD;  Location: [facility]     ENT SURGERY  ?    upper endoscopy     ESOPHAGOSCOPY, GASTROSCOPY, DUODENOSCOPY (EGD), COMBINED N/A 11/8/2016    Procedure: COMBINED ENDOSCOPIC ULTRASOUND, ESOPHAGOSCOPY, GASTROSCOPY, DUODENOSCOPY (EGD), FINE NEEDLE ASPIRATE/BIOPSY;  Surgeon: Guru Alexsandra Ballesteros MD;  Location: [facility]     ESOPHAGOSCOPY, GASTROSCOPY, DUODENOSCOPY (EGD), COMBINED N/A 11/8/2016    Procedure: COMBINED ESOPHAGOSCOPY, GASTROSCOPY, DUODENOSCOPY (EGD), BIOPSY SINGLE OR MULTIPLE;  Surgeon: Guru Alexsandra Ballesteros MD;  Location: [facility]     ESOPHAGOSCOPY, GASTROSCOPY, DUODENOSCOPY (EGD), COMBINED N/A 8/16/2017    Procedure: COMBINED ESOPHAGOSCOPY, GASTROSCOPY, DUODENOSCOPY (EGD), BIOPSY SINGLE OR MULTIPLE;;  Surgeon: Fransisco Leach MD;  [facility]     EYE SURGERY  12/2007    cataract surgery both sides     GI SURGERY  1974, 1986(x2),  "1989    Crohn's, 1974 RO 18\" term. ilium + 9\" asc. colon all one pc     SOFT TISSUE SURGERY  3/2013    removed buildup on back of r hand, coccidioidomycosis       Social History   Reviewed and edited as appropriate  Social History     Socioeconomic History     Marital status:      Spouse name: Not on file     Number of children: Not on file     Years of education: Not on file     Highest education level: Not on file   Occupational History     Occupation: Reserach associate at the      Employer: [employer]   Social Needs     Financial resource strain: Not on file     Food insecurity:     Worry: Not on file     Inability: Not on file     Transportation needs:     Medical: Not on file     Non-medical: Not on file   Tobacco Use     Smoking status: Never Smoker     Smokeless tobacco: Never Used   Substance and Sexual Activity     Alcohol use: Yes     Comment: sometimes one glass of wine a week     Drug use: No     Sexual activity: Never   Lifestyle     Physical activity:     Days per week: Not on file     Minutes per session: Not on file     Stress: Not on file   Relationships     Social connections:     Talks on phone: Not on file     Gets together: Not on file     Attends Sikhism service: Not on file     Active member of club or organization: Not on file     Attends meetings of clubs or organizations: Not on file     Relationship status: Not on file     Intimate partner violence:     Fear of current or ex partner: Not on file     Emotionally abused: Not on file     Physically abused: Not on file     Forced sexual activity: Not on file   Other Topics Concern     Not on file   Social History Narrative     Not on file       Family History     Reviewed and edited as appropriate  Family History   Problem Relation Age of Onset     Heart Failure Father      Musculoskeletal Disorder Father         Parkinson's     Cancer - colorectal Mother      Cerebrovascular Disease Paternal Grandmother      Cancer " Other      Musculoskeletal Disorder Brother         Parkinson's       Allergies   Reviewed and edited as appropriate     Allergies   Allergen Reactions     Prednisone Other (See Comments)     Diana with more than 2.5mg chronic dosing of prednisone due to fluconazole interaction per patient.         Prior to Admission Medications    Current Facility-Administered Medications   Medication     glucose gel 15-30 g    Or     dextrose 50 % injection 25-50 mL    Or     glucagon injection 1 mg     fluconazole (DIFLUCAN) intermittent infusion 200 mg     [START ON 4/18/2019] influenza recomb quadrivalent PF (FLUBLOK) injection 0.5 mL     lactated ringers infusion     naloxone (NARCAN) injection 0.1-0.4 mg     ondansetron (ZOFRAN-ODT) ODT tab 4 mg    Or     ondansetron (ZOFRAN) injection 4 mg     prochlorperazine (COMPAZINE) injection 10 mg    Or     prochlorperazine (COMPAZINE) tablet 10 mg    Or     prochlorperazine (COMPAZINE) Suppository 25 mg      Medications Prior to Admission   Medication Sig Dispense Refill Last Dose     Cyanocobalamin (VITAMIN B 12 PO) Take 500 mcg by mouth   Past Week at Unknown time     diphenoxylate-atropine (LOMOTIL) 2.5-0.025 MG per tablet Take 1 tablet by mouth 4 times daily as needed for diarrhea After loose stools 50 tablet 1 Past Month at Unknown time     fluconazole (DIFLUCAN) 200 MG tablet Take 1 tablet (200 mg) by mouth daily 90 tablet 3 Past Week at Unknown time     MELATONIN PO Take 1.5 mg by mouth nightly as needed    Past Week at Unknown time     rifaximin (XIFAXAN) 550 MG TABS tablet Take 1 tablet (550 mg) by mouth 3 times daily 42 tablet 0 Past Month at Unknown time     rifaximin (XIFAXAN) 550 MG TABS tablet Take 1 tablet (550 mg) by mouth 2 times daily 60 tablet 0 Past Month at Unknown time     traZODone (DESYREL) 50 MG tablet Take 1 tablet (50 mg) by mouth nightly as needed for sleep 90 tablet 1 Past Week at Unknown time     acetaminophen (TYLENOL) 500 MG tablet Take 500-1,000 mg by  "mouth every 6 hours as needed for mild pain   More than a month at Unknown time     Ascorbic Acid (VITAMIN C PO) Take 500 mg by mouth   More than a month at Unknown time     calcium-vitamin D (CALTRATE) 600-400 MG-UNIT per tablet Take 1 tablet by mouth 2 times daily   More than a month at Unknown time     Cholecalciferol (VITAMIN D3 PO) Take 1,000 Units by mouth daily   More than a month at Unknown time     ferrous sulfate 325 (65 FE) MG tablet Take by mouth daily (with breakfast)   More than a month at Unknown time     multivitamin, therapeutic with minerals (MULTI-VITAMIN) TABS Take 1 tablet by mouth daily   More than a month at Unknown time     Pyridoxine HCl (VITAMIN B6 PO) Take 100 mg by mouth   More than a month at Unknown time     [] rifaximin (XIFAXAN) 550 MG TABS tablet Take 1 tablet (550 mg) by mouth 3 times daily for 14 days 42 tablet 0      tiZANidine (ZANAFLEX) 4 MG tablet Take 1-2 tablets (4-8 mg) by mouth 3 times daily as needed for muscle spasms 30 tablet 0 Unknown at Unknown time        Review of Systems   A complete review of systems was performed and is negative except as noted in the HPI      Physical Exam   /70   Pulse 68   Temp 97.7  F (36.5  C) (Oral)   Resp 16   Ht 1.753 m (5' 9\")   Wt 68.9 kg (152 lb)   SpO2 97%   BMI 22.45 kg/m    Wt:   Wt Readings from Last 2 Encounters:   19 68.9 kg (152 lb)   18 72.4 kg (159 lb 11.2 oz)      Constitutional: cooperative, pleasant, not dyspneic/diaphoretic, no acute distress  Eyes: Sclera anicteric/injected  Ears/nose/mouth/throat: Normal oropharynx without ulcers or exudate, mucus membranes moist,  Neck: supple  CV: No edema  Respiratory: Unlabored breathing  Abd: Nondistended, +bs, no hepatosplenomegaly, nontender, no peritoneal signs  Skin: warm, perfused, no jaundice  Neuro: AAO x 3  Psych: Normal affect  MSK: No gross deformities    Data   Labs and imaging below were independently reviewed and interpreted    BMP  Recent " Labs   Lab 04/17/19  0645 04/16/19  1856    135   POTASSIUM 4.2 3.7   CHLORIDE 107 100   YUNIER 8.4* 10.8*   CO2 23 23   BUN 22 25   CR 1.32* 1.88*   GLC 85 136*     CBC  Recent Labs   Lab 04/17/19  0645 04/16/19  1856   WBC 6.4 13.0*   RBC 5.03 6.50*   HGB 15.6 20.4*   HCT 47.9 62.4*   MCV 95 96   MCH 31.0 31.4   MCHC 32.6 32.7   RDW 13.0 13.2    350     INRNo lab results found in last 7 days.  LFTs  Recent Labs   Lab 04/17/19  0645 04/16/19  1856   ALKPHOS 92 144   AST 12 25   ALT 21 29   BILITOTAL 1.1 1.2   PROTTOTAL 6.1* 9.6*   ALBUMIN 3.2* 5.0      PANC  Recent Labs   Lab 04/16/19  1856   LIPASE 366       Imaging:  Reviewed in EMR

## 2019-04-17 NOTE — PROGRESS NOTES
"Colorectal Surgery Progress Note    Subjective:  NAEO. - flatus or BM. Reports that his gut is becoming more active this am. C/o cramping abdominal pain. Denies vomiting. Reports he has not been on treatment for Chrons for 4 years, was previously on prednisone.    Vitals:  Vitals:    04/17/19 0115 04/17/19 0151 04/17/19 0236 04/17/19 0700   BP: (!) 135/92 (!) 135/92 126/76 115/70   BP Location:   Right arm    Pulse: 84 84 73 68   Resp:  18 18 16   Temp:  98.3  F (36.8  C) 97.4  F (36.3  C) 97.7  F (36.5  C)   TempSrc:  Oral Oral Oral   SpO2: 94%  95% 97%   Weight:   68.9 kg (152 lb)    Height:   1.753 m (5' 9\")      I/O:  I/O last 3 completed shifts:  In: 293.33 [I.V.:293.33]  Out: -     Physical Exam:  Gen: AAOx3, NAD  Pulm: Non-labored breathing  Abd: Soft, minimal-distention, non-tender, no guarding  Ext:  Warm and well-perfused    BMP  Recent Labs   Lab 04/17/19  0645 04/16/19  1856    135   POTASSIUM 4.2 3.7   CHLORIDE 107 100   CO2 23 23   BUN 22 25   CR 1.32* 1.88*   GLC 85 136*     CBC  Recent Labs   Lab 04/17/19  0645 04/16/19  1856   WBC 6.4 13.0*   HGB 15.6 20.4*   HCT 47.9 62.4*    350         ASSESSMENT: 62 yo M with PMH Crohn's s/p ileocectomy complicated by anastomotic stricture requiring intermittent dilations who presents with 1 day of abdominal pain, n/v, and decreased stool in the setting of frequent loose diarrhea. He is not clinically obstructed and imaging is more suggestive of ileus rather than SBO. Improvement of hemoconcentration with fluid received in ED.   - Continue IVFs for dehydration as he still appears to be hemoconcentrated   - Given history of coccidioidosis infection not unreasonable to get GI panel to evaluate for infectious etiology of diarrhea  - Recommend GI consult for continued care of anastomotic stricture, possible endoscopic evaluation and dilation  - No indication for acute surgical intervention  - Colorectal surgery will follow, appreciate the consult "       Saroj Burden  PGY-1, General Surgery  [phone]      Patient was seen and discussed with Dr. Saunders who will discuss with staff

## 2019-04-17 NOTE — ED NOTES
[facility], [place]   ED Nurse to Floor Handoff     Trevin Gee is a 63 year old male who speaks English and lives with others,  in a home  They arrived in the ED by car from home    ED Chief Complaint: Abdominal Pain (nausea, vomiting, abdominal cramps)    ED Dx;   Final diagnoses:   Abdominal pain, generalized   Dehydration   Non-intractable vomiting with nausea, unspecified vomiting type         Needed?: No    Allergies:   Allergies   Allergen Reactions     Prednisone Other (See Comments)     Diana with more than 2.5mg chronic dosing of prednisone due to fluconazole interaction per patient.    .  Past Medical Hx:   Past Medical History:   Diagnosis Date     Anemia 2007     Anxiety 2012?    much worse since July 2014     Cataract 12/2007    both eyes, too much prednisone, implants     Coccidioidomycosis      Crohn disease (H)      Crohn's disease (H) 1/13/2015     Depressive disorder 7/2014    much worse since July 2014     Fracture 1956    green fracture of leg     Gallbladder problem 2005?    much gravel, removed     GERD (gastroesophageal reflux disease)      History of blood transfusion 1988    ulcerated anastomosis term. ilium bleed     Hypertriglyceridemia 7/8/2016     Infection 2007    persistant coccidioidomycosis     Kidney stone various    both sides, all passed naturally     Mental health problem 2007    bipolar though perhaps different     Nephrolithiasis      Osteoporosis 2007    osteoporosis, too much prednisone, last dexa 1/2014     Osteoporosis      Other nervous system complications 2007    prednisone overload induced diana     Personal history of colonic polyps     small ones found during colonoscopies     Steroid-induced psychosis     Patient extremely sensitive to regular doses of steroids.  Unclear if this is due to chronic fluconazole use or genetic issue.  In the future, use caution when prescribing steroids.     TB (tuberculosis)       Baseline  Mental status: WDL  Current Mental Status changes: at basesline    Infection present or suspected this encounter: no  Sepsis suspected: No  Isolation type: No active isolations     Activity level - Baseline/Home:  Independent  Activity Level - Current:   Independent    Bariatric equipment needed?: No    In the ED these meds were given:   Medications   lactated ringers BOLUS 1,000 mL (1,000 mLs Intravenous New Bag 4/16/19 2124)     Followed by   lactated ringers BOLUS 1,000 mL (0 mLs Intravenous Stopped 4/16/19 2124)     Followed by   lactated ringers infusion (1,000 mLs Intravenous New Bag 4/16/19 2209)   ondansetron (ZOFRAN) injection 4 mg (4 mg Intravenous Given 4/16/19 1854)   morphine (PF) injection 4 mg (4 mg Intravenous Given 4/16/19 1855)       Drips running?  No    Home pump  No    Current LDAs  Peripheral IV 09/19/18 Right (Active)   Number of days: 209       Peripheral IV 04/16/19 Right Upper forearm (Active)   Site Assessment WDL 4/16/2019  6:57 PM   Line Status Infusing 4/16/2019  6:57 PM   Phlebitis Scale 0-->no symptoms 4/16/2019  6:57 PM   Dressing Intervention New dressing  4/16/2019  6:57 PM   Number of days: 0       Labs results:   Labs Ordered and Resulted from Time of ED Arrival Up to the Time of Departure from the ED   CBC WITH PLATELETS DIFFERENTIAL - Abnormal; Notable for the following components:       Result Value    WBC 13.0 (*)     RBC Count 6.50 (*)     Hemoglobin 20.4 (*)     Hematocrit 62.4 (*)     Absolute Neutrophil 9.0 (*)     All other components within normal limits   COMPREHENSIVE METABOLIC PANEL - Abnormal; Notable for the following components:    Glucose 136 (*)     Creatinine 1.88 (*)     GFR Estimate 37 (*)     GFR Estimate If Black 43 (*)     Calcium 10.8 (*)     Protein Total 9.6 (*)     All other components within normal limits   LIPASE   FREE WATER       Imaging Studies:   Recent Results (from the past 24 hour(s))   CT Abdomen Pelvis w/o Contrast   Result Value     Radiologist flags Possible small bowel obstruction (Urgent)    Narrative    Examination:  CT ABDOMEN PELVIS W/O CONTRAST 4/16/2019 8:08 PM     History: Abdominal pain, nausea and vomiting.    Comparison: Abdominal radiograph 6/4/2018, MRI abdomen 12/1/2016    Technique: CT of the abdomen and pelvis were obtained without  contrast. Sagittal and coronal reconstructions created and reviewed.    Findings:   Liver is unremarkable on noncontrast examination. Hypoattenuating cyst  in the pancreatic head measuring approximately 12 cm, unchanged  compared to MR 2016. The spleen and adrenal glands are unremarkable.  Gallbladder is surgically absent.    Bilateral hypoattenuating renal cysts and calyceal tip  calcifications/nonobstructing stones. No hydronephrosis or  hydroureter. Urinary bladder is unremarkable.    Stomach is unremarkable. Multiple dilated, fluid-filled proximal small  bowel, measuring up to 4.9 cm. No definite transition points, however,  the mid jejunum decompresses in the mid pelvis (series 5 image 241).  Postoperative changes of right hemicolectomy. The colon is  predominantly fluid-filled. The bowel was not dilated on recent MR of  2/14/2018.    No abnormal free peritoneal fluid or air. No intraperitoneal or  retroperitoneal lymphadenopathy.    Intra-abdominal vasculature caliber within normal limits.    Lower thorax: Bibasilar scarring. Heart size within normal limits. No  pericardial effusion..    Bones and soft tissues: Mild degenerative changes of the lumbar spine,  prominent Schmorl's node deformity along the anterior aspect of L2.  Mild left lateral listhesis of L4 and L5. No acute or suspicious  osseous abnormality.      Impression    Impression: In this patient with history of Crohn's disease and  subsequent hemicolectomy requiring multiple revisions:  1. Fluid-filled, dilated proximal small bowel, likely ileus. No  evidence of perforation or pneumatosis.  2. Postoperative changes of right  "hemicolectomy.  3. Stable pancreatic head IPMN.    Preliminary report had suggested possible small bowel obstruction. I  believe this is more likely ileus.    [Urgent Result: Possible small bowel obstruction]    Finding was identified on 4/16/2019 8:46 PM.     Dr. Lopez was contacted by Dr. Law at 4/16/2019 9:11 PM and  verbalized understanding of the urgent finding.         Recent vital signs:   BP (!) 124/91   Pulse 119   Temp 98.3  F (36.8  C) (Oral)   Resp 16   Ht 1.778 m (5' 10\")   Wt 65.8 kg (145 lb)   SpO2 97%   BMI 20.81 kg/m      Cardiac Rhythm: Normal Sinus  Pt needs tele? No  Skin/wound Issues: None    Code Status: Full Code    Pain control: good    Nausea control: fair    Abnormal labs/tests/findings requiring intervention: See imaging    Family present during ED course? No   Family Comments/Social Situation comments: none    Tasks needing completion: None    Siri Pang, RN    [phone] [facility]      "

## 2019-04-17 NOTE — PLAN OF CARE
Nursing Focus: Admission  D: Arrived at 0230 from ER via transport. Patient accompanied by self. Admitted for constipation, vomiting, and abdominal pain. Complains of abdominal discomfort that is now relieved from pain medication from ER.      I: Admission process began.  Patient oriented to room, enviroment, call light.  Md. notified of patients arrival on unit.     A: Vital signs stable, afebrile.  Patient stable at this time, denies pain/SOB/n/v. No significant events this shift, pt rested comfortably between nursing cares.    P: Implement plan of care when available. Continue to monitor patient. Nursing interventions as appropriate. Notify md with changes in pt status.

## 2019-04-17 NOTE — PLAN OF CARE
"[id number]  VSS. A&Ox4. Up independently walking halls. NPO most of the shift. No nausea. Started taking ice chips at 1400, order for clear liquid diet. C/o \"stomach spasms\" periodically. Denies need for pain meds. Had one small loose stool. Sent stool sample for enteric panel, need additional stool sample sent for coccidioides test (not enough stool sent for both tests). Enteric panel negative.   "

## 2019-04-17 NOTE — H&P
[facility], [place]    History and Physical - [facility] Night Service   Patient Name: Trevin Gee   Date of Admission:  4/16/2019    Assessment & Plan   Trevin Gee is a 63 year old male with a PMH of Crohn's disease s/p ileocectomy complicated by anastomotic stricture requiring intermittent colonoscopic dilations who was admitted on 4/16/2019 with crampy abdominal pain for 2 days, with 2 episodes of emesis, and decreased stool output who was found to have CT findings of fluid-filled dilated proximal small bowel, likely presenting ileus.  .     # ileus  # Crohns s/p ileocectomy c/b anastomotic stricture requiring c-scope dilations  -  Patient with hx of ileus and SBO, likely secondary to multiple abdominal surgeries.  Evaluated by colorectal surg in the ED, who felt that this was more likely ileus rather than SBO and recommended conservative management.  - Plan  -- NPO  -- continue 100cc/hr LR  -- zofran PRN for nausea  -- colorectal surg consulted, appreciate recs  -- GI consulted, appreciate recs  -- hold of on NG tube at this time       # NALLELY  - Cr 1.88 on admission, baseline of 1.3-1.5. Suspect this is secondary to prerenal etiology in the setting of decreased PO intake, emesis, and previous chronic diarrhea. Currently  is making urine. no significant electrolytes abnormalities on admission. No indications for urgent/emergent dialysis. Patient already given 3L LR in the ED  - Plan  -- order UA/UC  -- trend BMP  -- strict I/Os  -- mIVF with 100cc/hr LR while NPO  -- Bladder scans; straight intermittent catheterization for PVR > 200 cc with sx, >500cc with or without sx.   -- avoid nephrotoxins      # erythrocytosis  - Hgb of 20.4 on admission, baseline of 15-16.  Possibly hemoconcentrated in the setting of setting of decreased PO intake, emesis, and previous chronic diarrhea.   - Plan  -- trend CBC  -- consider ordering EPO or Jak2 kinase if hgb does not improve with  "fluid resuscitation       # hx of recurrent coccidioidosis infections  - hx of cutaneous coccidioidosis and septic joint coccidioidosis, on lifelong fluconazole. Discussed dosing for IV formulation with pharmacy  - Plan  -- 200mg fluconazole IV daily until able to tolerate PO intake      Diet: NPO  Fluids: 100cc/hr   DVT Prophylaxis: Pneumatic Compression Devices  Nicole Catheter: not present  Code Status: FULL code    Disposition Plan   Expected discharge: 2 - 3 days, recommended to prior living arrangement once adequate pain management/ tolerating PO medications.  Entered: Migue Luke MD  04/17/2019, 2:02 AM       Patient to be staffed in the AM    Migue Luke MD  Internal Medicine, PGY-1  [facility], [place], [place]  Pager: [phone]  Please see sticky note for cross cover information  ______________________________________________________________________    Chief Complaint   Abdominal pain    History is obtained from the patient    History of Present Illness   Trevin Gee is a 63 year old male with a PMH of Crohn's disease s/p ileocectomy complicated by anastomotic stricture requiring intermittent colonoscopic dilations who was admitted on 4/16/2019 with crampy abdominal pain for 2 days.  Patient reports that his current abdominal cramping pain started yesterday mid afternoon.  Reports he had one small stool morning.  Also reports 2 episodes of \"violent vomiting\" today, the first being after he tried to drink diluted juice in the second while he was in the ED..  However, over the past month he is reported history of chronic diarrhea.  Reports 4-5 weeks ago he began having significant diarrhea and was started on rifaximin by GI.  He reported initial improvement in the beginning of the 2-week course however the last 2 or 3 days of course he reported returning diarrhea.  After he completed the course, he reports he had a significant amount of diarrhea again.  He is " noticed that if he ate smaller volumes he had less diarrhea.  Over the week prior to his current presentation he reports having closer to his baseline soft stools.  He denies fevers, chills, shortness of breath, chest pain, lightheadedness, hematochezia, melena, hematemesis.    ED Course  - Vitals: T 90 8.3F,  -> mid 80s after IV fluids, /97, RR 18, SPO2 96% on room air  - Notable Labs: Creatinine 1.88 (baseline 1.3-1.5), Hgb 20.4, WBC 13.0  - Imaging/Studies: CT abdomen and pelvis showed fluid-filled dilated proximal small bowel, likely presenting ileus.  - Interventions: 3 L LR, 4 mg IV morphine x2, 4 mg IV Zofran x2, evaluated by colorectal surgery in the ED who recommended conservative management at this time.    Review of Systems    The 10 point Review of Systems is negative other than noted in the HPI.    Past Medical History    Past Medical History:   Diagnosis Date     Anemia 2007     Anxiety 2012?    much worse since July 2014     Cataract 12/2007    both eyes, too much prednisone, implants     Coccidioidomycosis      Crohn disease (H)      Crohn's disease (H) 1/13/2015     Depressive disorder 7/2014    much worse since July 2014     Fracture 1956    green fracture of leg     Gallbladder problem 2005?    much gravel, removed     GERD (gastroesophageal reflux disease)      History of blood transfusion 1988    ulcerated anastomosis term. ilium bleed     Hypertriglyceridemia 7/8/2016     Infection 2007    persistant coccidioidomycosis     Kidney stone various    both sides, all passed naturally     Mental health problem 2007    bipolar though perhaps different     Nephrolithiasis      Osteoporosis 2007    osteoporosis, too much prednisone, last dexa 1/2014     Osteoporosis      Other nervous system complications 2007    prednisone overload induced kevin     Personal history of colonic polyps     small ones found during colonoscopies     Steroid-induced psychosis     Patient extremely sensitive to  regular doses of steroids.  Unclear if this is due to chronic fluconazole use or genetic issue.  In the future, use caution when prescribing steroids.     TB (tuberculosis)        Past Surgical History   Past Surgical History:   Procedure Laterality Date     APPENDECTOMY  1974    coincidental with Crohn's surgery     BACK SURGERY  12/2007    kyphoplasty on compressed 4th??? vertebra     BIOPSY  2007, 2013    lump on arm, knee, back of hand for coccidioidomycosis cult.     CHOLECYSTECTOMY  2005    much gravel, removed laparoscopically     COLONOSCOPY  7/14/2014 last    Dr. Catherine Bowden, [facility] - many previous     COLONOSCOPY Left 9/11/2015    Procedure: COMBINED COLONOSCOPY, SINGLE OR MULTIPLE BIOPSY/POLYPECTOMY BY BIOPSY;  Surgeon: Fransisco Leach MD;  Location: [facility]     COLONOSCOPY N/A 8/3/2016    Procedure: COMBINED COLONOSCOPY, SINGLE OR MULTIPLE BIOPSY/POLYPECTOMY BY BIOPSY;  Surgeon: Fransisco Leach MD;  Location: [facility]     COLONOSCOPY N/A 8/16/2017    Procedure: COMBINED COLONOSCOPY, SINGLE OR MULTIPLE BIOPSY/POLYPECTOMY BY BIOPSY;;  Surgeon: Fransisco Leach MD;  [facility]     ENT SURGERY  ?    upper endoscopy     ESOPHAGOSCOPY, GASTROSCOPY, DUODENOSCOPY (EGD), COMBINED N/A 11/8/2016    Procedure: COMBINED ENDOSCOPIC ULTRASOUND, ESOPHAGOSCOPY, GASTROSCOPY, DUODENOSCOPY (EGD), FINE NEEDLE ASPIRATE/BIOPSY;  Surgeon: Guru Alexsandra Ballesteros MD;  Location: [facility]     ESOPHAGOSCOPY, GASTROSCOPY, DUODENOSCOPY (EGD), COMBINED N/A 11/8/2016    Procedure: COMBINED ESOPHAGOSCOPY, GASTROSCOPY, DUODENOSCOPY (EGD), BIOPSY SINGLE OR MULTIPLE;  Surgeon: Guru Alexsandra Ballesteros MD;  [facility]: [facility]     ESOPHAGOSCOPY, GASTROSCOPY, DUODENOSCOPY (EGD), COMBINED N/A 8/16/2017    Procedure: COMBINED ESOPHAGOSCOPY, GASTROSCOPY, DUODENOSCOPY (EGD), BIOPSY SINGLE OR MULTIPLE;;  Surgeon: Fransisco Leach MD;  [facility]     EYE SURGERY  12/2007    cataract  "surgery both sides     GI SURGERY  1974, 1986(x2), 1989    Crohn's, 1974 RO 18\" term. ilium + 9\" asc. colon all one pc     SOFT TISSUE SURGERY  3/2013    removed buildup on back of r hand, coccidioidomycosis       Social History   Social History     Socioeconomic History     Marital status:      Spouse name: Not on file     Number of children: Not on file     Years of education: Not on file     Highest education level: Not on file   Occupational History     Occupation: Reserach associate at the      Employer: [employer]   Social Needs     Financial resource strain: Not on file     Food insecurity:     Worry: Not on file     Inability: Not on file     Transportation needs:     Medical: Not on file     Non-medical: Not on file   Tobacco Use     Smoking status: Never Smoker     Smokeless tobacco: Never Used   Substance and Sexual Activity     Alcohol use: Yes     Comment: sometimes one glass of wine a week     Drug use: No     Sexual activity: Never   Lifestyle     Physical activity:     Days per week: Not on file     Minutes per session: Not on file     Stress: Not on file   Relationships     Social connections:     Talks on phone: Not on file     Gets together: Not on file     Attends Cheondoism service: Not on file     Active member of club or organization: Not on file     Attends meetings of clubs or organizations: Not on file     Relationship status: Not on file     Intimate partner violence:     Fear of current or ex partner: Not on file     Emotionally abused: Not on file     Physically abused: Not on file     Forced sexual activity: Not on file   Other Topics Concern     Not on file   Social History Narrative     Not on file       Family History   Family History   Problem Relation Age of Onset     Heart Failure Father      Musculoskeletal Disorder Father         Parkinson's     Cancer - colorectal Mother      Cerebrovascular Disease Paternal Grandmother      Cancer Other      " Musculoskeletal Disorder Brother         Parkinson's       Prior to Admission Medications   Prior to Admission Medications   Prescriptions Last Dose Informant Patient Reported? Taking?   Ascorbic Acid (VITAMIN C PO)   Yes No   Sig: Take 500 mg by mouth   Cholecalciferol (VITAMIN D3 PO)   Yes No   Sig: Take 1,000 Units by mouth daily   Cyanocobalamin (VITAMIN B 12 PO)   Yes No   Sig: Take 500 mcg by mouth   MELATONIN PO   Yes No   Sig: Take 1.5 mg by mouth nightly as needed    Pyridoxine HCl (VITAMIN B6 PO)   Yes No   Sig: Take 100 mg by mouth   acetaminophen (TYLENOL) 500 MG tablet   Yes No   Sig: Take 500-1,000 mg by mouth every 6 hours as needed for mild pain   calcium-vitamin D (CALTRATE) 600-400 MG-UNIT per tablet   Yes No   Sig: Take 1 tablet by mouth 2 times daily   diphenoxylate-atropine (LOMOTIL) 2.5-0.025 MG per tablet   No No   Sig: Take 1 tablet by mouth 4 times daily as needed for diarrhea After loose stools   ferrous sulfate 325 (65 FE) MG tablet   Yes No   Sig: Take by mouth daily (with breakfast)   fluconazole (DIFLUCAN) 200 MG tablet   No No   Sig: Take 1 tablet (200 mg) by mouth daily   multivitamin, therapeutic with minerals (MULTI-VITAMIN) TABS   Yes No   Sig: Take 1 tablet by mouth daily   rifaximin (XIFAXAN) 550 MG TABS tablet   No No   Sig: Take 1 tablet (550 mg) by mouth 2 times daily   rifaximin (XIFAXAN) 550 MG TABS tablet   No No   Sig: Take 1 tablet (550 mg) by mouth 3 times daily   tiZANidine (ZANAFLEX) 4 MG tablet   No No   Sig: Take 1-2 tablets (4-8 mg) by mouth 3 times daily as needed for muscle spasms   traZODone (DESYREL) 50 MG tablet   No No   Sig: Take 1 tablet (50 mg) by mouth nightly as needed for sleep      Facility-Administered Medications: None       Allergies      Allergies   Allergen Reactions     Prednisone Other (See Comments)     Diana with more than 2.5mg chronic dosing of prednisone due to fluconazole interaction per patient.        Physical Exam   Vital Signs: Temp:  98.3  F (36.8  C) Temp src: Oral BP: (!) 135/92 Pulse: 84 Heart Rate: 97 Resp: 18 SpO2: 94 % O2 Device: None (Room air)    Weight: 145 lbs 0 oz      GENERAL: Alert, interactive, NAD  HEENT: AT/NC, sclera anicteric, EOMI  RESP: clear to auscultation bilaterally, no crackles or wheezes  CARDIAC: regular rate and rhythm, normal S1 and S2, no murmur appreciated  ABDOMEN: Soft, nontender, nondistended. +BS, well healed surgical scar.  EXTREMITIES: No LE edema  SKIN: Warm and dry, no jaundice or rash  NEURO: moving all 4 extremities equally, sensation intact  PSYCH: alert, appropriate mood, normal speech, linear thought.       Data       CMP  Recent Labs   Lab 04/16/19  1856      POTASSIUM 3.7   CHLORIDE 100   CO2 23   ANIONGAP 12   *   BUN 25   CR 1.88*   GFRESTIMATED 37*   GFRESTBLACK 43*   YUNIER 10.8*   PROTTOTAL 9.6*   ALBUMIN 5.0   BILITOTAL 1.2   ALKPHOS 144   AST 25   ALT 29     CBC  Recent Labs   Lab 04/16/19  1856   WBC 13.0*   RBC 6.50*   HGB 20.4*   HCT 62.4*   MCV 96   MCH 31.4   MCHC 32.7   RDW 13.2          Imaging:  Recent Results (from the past 24 hour(s))   CT Abdomen Pelvis w/o Contrast   Result Value    Radiologist flags Possible small bowel obstruction (Urgent)    Narrative    Examination:  CT ABDOMEN PELVIS W/O CONTRAST 4/16/2019 8:08 PM     History: Abdominal pain, nausea and vomiting.    Comparison: Abdominal radiograph 6/4/2018, MRI abdomen 12/1/2016    Technique: CT of the abdomen and pelvis were obtained without  contrast. Sagittal and coronal reconstructions created and reviewed.    Findings:   Liver is unremarkable on noncontrast examination. Hypoattenuating cyst  in the pancreatic head measuring approximately 12 cm, unchanged  compared to MR 2016. The spleen and adrenal glands are unremarkable.  Gallbladder is surgically absent.    Bilateral hypoattenuating renal cysts and calyceal tip  calcifications/nonobstructing stones. No hydronephrosis or  hydroureter. Urinary bladder  is unremarkable.    Stomach is unremarkable. Multiple dilated, fluid-filled proximal small  bowel, measuring up to 4.9 cm. No definite transition points, however,  the mid jejunum decompresses in the mid pelvis (series 5 image 241).  Postoperative changes of right hemicolectomy. The colon is  predominantly fluid-filled. The bowel was not dilated on recent MR of  2/14/2018.    No abnormal free peritoneal fluid or air. No intraperitoneal or  retroperitoneal lymphadenopathy.    Intra-abdominal vasculature caliber within normal limits.    Lower thorax: Bibasilar scarring. Heart size within normal limits. No  pericardial effusion..    Bones and soft tissues: Mild degenerative changes of the lumbar spine,  prominent Schmorl's node deformity along the anterior aspect of L2.  Mild left lateral listhesis of L4 and L5. No acute or suspicious  osseous abnormality.      Impression    Impression: In this patient with history of Crohn's disease and  subsequent hemicolectomy requiring multiple revisions:  1. Fluid-filled, dilated proximal small bowel, likely ileus. No  evidence of perforation or pneumatosis.  2. Postoperative changes of right hemicolectomy.  3. Stable pancreatic head IPMN.    Preliminary report had suggested possible small bowel obstruction. I  believe this is more likely ileus.    [Urgent Result: Possible small bowel obstruction]    Finding was identified on 4/16/2019 8:46 PM.     Dr. Lopez was contacted by Dr. Law at 4/16/2019 9:11 PM and  verbalized understanding of the urgent finding.

## 2019-04-17 NOTE — CONSULTS
Colorectal Surgery Consult Note     Patient name: Trevin Gee  Date of Service: 4/16/2019  Reason for Consult: SBO vs Ileus    Requesting Physician: Dr. Lopez      Assessment/Plan:   62 yo M with PMH Crohn's s/p ileocectomy complicated by anastomotic stricture requiring intermittent dilations who presents with 1 day of abdominal pain, n/v, and decreased stool in the setting of frequent loose diarrhea.  He is quite hemoconcentrated and has an NALLELY likely from his significant diarrhea.  He is not clinically obstructed and imaging is more suggestive of ileus rather than SBO.      - No indication for acute surgical intervention  - Recommend admission to medicine, with GI consultation  - IVF resuscitation  - Colorectal surgery will follow    Discussed with fellow, Dr. Case.    Katia Atkins  General Surgery PGY2  ------------------------------------------------------------------------  HPI:   62 yo M with PMH Crohn's s/p ileocectomy ('74) with multiple anastomotic revisions ('86, '89) complicated by anastomotic stricture requiring intermittent colonoscopic dilations who presents to the ED with 1 day of intermittent crampy abdominal pain, 2 episodes of vomiting, and decreased stool frequency in the setting of ~ 1 months of frequent diarrhea.  He has had looser stools since his initial operation however about 5 weeks ago began having more significant and frequent diarrhea.  He follows with GI for his Crohn's and was started on a course of Rifaximin which helped somewhat however the diarrhea came back when he finished the course.  He states ~2 days ago he had about 1 day of more normal formed stools and then yesterday at 4pm he began having diffuse, crampy, intermittent abdominal pain.  This morning he had a small liquid BM with improvement in his pain however shortly after he vomited.  He tried to drink a small amount of juice and the pain returned prompting his presentation to the ED.  He currently states his  abdominal pain is completely resolved and he has no nausea now.  He does not feel particularly bloated however states he has not been passing flatus today.  He denies fevers, chills, SOB, or CP.    PMH:   Past Medical History:   Diagnosis Date     Anemia 2007     Anxiety 2012?    much worse since July 2014     Cataract 12/2007    both eyes, too much prednisone, implants     Coccidioidomycosis      Crohn disease (H)      Crohn's disease (H) 1/13/2015     Depressive disorder 7/2014    much worse since July 2014     Fracture 1956    green fracture of leg     Gallbladder problem 2005?    much gravel, removed     GERD (gastroesophageal reflux disease)      History of blood transfusion 1988    ulcerated anastomosis term. ilium bleed     Hypertriglyceridemia 7/8/2016     Infection 2007    persistant coccidioidomycosis     Kidney stone various    both sides, all passed naturally     Mental health problem 2007    bipolar though perhaps different     Nephrolithiasis      Osteoporosis 2007    osteoporosis, too much prednisone, last dexa 1/2014     Osteoporosis      Other nervous system complications 2007    prednisone overload induced kevin     Personal history of colonic polyps     small ones found during colonoscopies     Steroid-induced psychosis     Patient extremely sensitive to regular doses of steroids.  Unclear if this is due to chronic fluconazole use or genetic issue.  In the future, use caution when prescribing steroids.     TB (tuberculosis)      PSH:   Past Surgical History:   Procedure Laterality Date     APPENDECTOMY  1974    coincidental with Crohn's surgery     BACK SURGERY  12/2007    kyphoplasty on compressed 4th??? vertebra     BIOPSY  2007, 2013    lump on arm, knee, back of hand for coccidioidomycosis cult.     CHOLECYSTECTOMY  2005    much gravel, removed laparoscopically     COLONOSCOPY  7/14/2014 last    Dr. Catherine Bowden, [facility] - many previous     COLONOSCOPY Left 9/11/2015     "Procedure: COMBINED COLONOSCOPY, SINGLE OR MULTIPLE BIOPSY/POLYPECTOMY BY BIOPSY;  Surgeon: Fransisco Leach MD;  [facility]     COLONOSCOPY N/A 8/3/2016    Procedure: COMBINED COLONOSCOPY, SINGLE OR MULTIPLE BIOPSY/POLYPECTOMY BY BIOPSY;  Surgeon: Fransisco Leach MD;  [facility]: [facility]     COLONOSCOPY N/A 8/16/2017    Procedure: COMBINED COLONOSCOPY, SINGLE OR MULTIPLE BIOPSY/POLYPECTOMY BY BIOPSY;;  Surgeon: Fransisco Leach MD;  [facility]     ENT SURGERY  ?    upper endoscopy     ESOPHAGOSCOPY, GASTROSCOPY, DUODENOSCOPY (EGD), COMBINED N/A 11/8/2016    Procedure: COMBINED ENDOSCOPIC ULTRASOUND, ESOPHAGOSCOPY, GASTROSCOPY, DUODENOSCOPY (EGD), FINE NEEDLE ASPIRATE/BIOPSY;  Surgeon: Guru Alexsandra Ballesteros MD;  Location: [facility]     ESOPHAGOSCOPY, GASTROSCOPY, DUODENOSCOPY (EGD), COMBINED N/A 11/8/2016    Procedure: COMBINED ESOPHAGOSCOPY, GASTROSCOPY, DUODENOSCOPY (EGD), BIOPSY SINGLE OR MULTIPLE;  Surgeon: Guru Alexsandra Ballesteros MD;  [facility]     ESOPHAGOSCOPY, GASTROSCOPY, DUODENOSCOPY (EGD), COMBINED N/A 8/16/2017    Procedure: COMBINED ESOPHAGOSCOPY, GASTROSCOPY, DUODENOSCOPY (EGD), BIOPSY SINGLE OR MULTIPLE;;  Surgeon: Fransisco Leach MD;  [facility]     EYE SURGERY  12/2007    cataract surgery both sides     GI SURGERY  1974, 1986(x2), 1989    Crohn's, 1974 RO 18\" term. ilium + 9\" asc. colon all one pc     SOFT TISSUE SURGERY  3/2013    removed buildup on back of r hand, coccidioidomycosis     Meds:   (Not in a hospital admission)  Allergies:   Allergies   Allergen Reactions     Prednisone Other (See Comments)     Diana with more than 2.5mg chronic dosing of prednisone due to fluconazole interaction per patient.      FamHx:   Family History   Problem Relation Age of Onset     Heart Failure Father      Musculoskeletal Disorder Father         Parkinson's     Cancer - colorectal Mother      Cerebrovascular Disease Paternal Grandmother      " "Cancer Other      Musculoskeletal Disorder Brother         Parkinson's     SocHx:   Social History     Socioeconomic History     Marital status:      Spouse name: Not on file     Number of children: Not on file     Years of education: Not on file     Highest education level: Not on file   Occupational History     Occupation: Reserach associate at the      Employer: [employer]   Social Needs     Financial resource strain: Not on file     Food insecurity:     Worry: Not on file     Inability: Not on file     Transportation needs:     Medical: Not on file     Non-medical: Not on file   Tobacco Use     Smoking status: Never Smoker     Smokeless tobacco: Never Used   Substance and Sexual Activity     Alcohol use: Yes     Comment: sometimes one glass of wine a week     Drug use: No     Sexual activity: Never   Lifestyle     Physical activity:     Days per week: Not on file     Minutes per session: Not on file     Stress: Not on file   Relationships     Social connections:     Talks on phone: Not on file     Gets together: Not on file     Attends Judaism service: Not on file     Active member of club or organization: Not on file     Attends meetings of clubs or organizations: Not on file     Relationship status: Not on file     Intimate partner violence:     Fear of current or ex partner: Not on file     Emotionally abused: Not on file     Physically abused: Not on file     Forced sexual activity: Not on file   Other Topics Concern     Not on file   Social History Narrative     Not on file        ROS: 10-pt ROS negative except as noted above.     Objective:   BP (!) 124/91   Pulse 119   Temp 98.3  F (36.8  C) (Oral)   Resp 16   Ht 1.778 m (5' 10\")   Wt 65.8 kg (145 lb)   SpO2 97%   BMI 20.81 kg/m    General - no acute distress, comfortable  HEENT - normocephalic, atraumatic, EOMI  Cardio - regular rate, regular rhythm  Pulm - non labored respirations on room air  Abdomen - soft, mildly " distended, nontender to palpation, midline surgical scar, no rebound or guarding.  Neuro - moves all extremities without apparent deficit, non-focal  Extremities - no lower extremity edema, warm, well-perfused  Skin - no rash or bruising  Psych - age appropriate mental status / engagement     Labs:  WBC 13.0  Hbg 20.4  hematocrit 62.4  Cr 1.88 (baseline 1.3-1.4)    Imaging:  CT A/P:  Impression: In this patient with history of Crohn's disease and  subsequent hemicolectomy requiring multiple revisions:  1. Fluid-filled, dilated proximal small bowel, likely ileus. No  evidence of perforation or pneumatosis.  2. Postoperative changes of right hemicolectomy.  3. Stable pancreatic head IPMN.

## 2019-04-18 LAB
ANION GAP SERPL CALCULATED.3IONS-SCNC: 8 MMOL/L (ref 3–14)
BUN SERPL-MCNC: 18 MG/DL (ref 7–30)
CALCIUM SERPL-MCNC: 7.7 MG/DL (ref 8.5–10.1)
CHLORIDE SERPL-SCNC: 110 MMOL/L (ref 94–109)
CO2 SERPL-SCNC: 22 MMOL/L (ref 20–32)
CREAT SERPL-MCNC: 1.19 MG/DL (ref 0.66–1.25)
CRP SERPL-MCNC: 3.2 MG/L (ref 0–8)
ERYTHROCYTE [DISTWIDTH] IN BLOOD BY AUTOMATED COUNT: 13 % (ref 10–15)
GFR SERPL CREATININE-BSD FRML MDRD: 64 ML/MIN/{1.73_M2}
GLUCOSE SERPL-MCNC: 67 MG/DL (ref 70–99)
HCT VFR BLD AUTO: 43.1 % (ref 40–53)
HGB BLD-MCNC: 13.9 G/DL (ref 13.3–17.7)
MCH RBC QN AUTO: 31.3 PG (ref 26.5–33)
MCHC RBC AUTO-ENTMCNC: 32.3 G/DL (ref 31.5–36.5)
MCV RBC AUTO: 97 FL (ref 78–100)
PLATELET # BLD AUTO: 165 10E9/L (ref 150–450)
POTASSIUM SERPL-SCNC: 4 MMOL/L (ref 3.4–5.3)
RBC # BLD AUTO: 4.44 10E12/L (ref 4.4–5.9)
SODIUM SERPL-SCNC: 140 MMOL/L (ref 133–144)
WBC # BLD AUTO: 5.2 10E9/L (ref 4–11)

## 2019-04-18 PROCEDURE — 12000001 ZZH R&B MED SURG/OB UMMC

## 2019-04-18 PROCEDURE — 36415 COLL VENOUS BLD VENIPUNCTURE: CPT | Performed by: STUDENT IN AN ORGANIZED HEALTH CARE EDUCATION/TRAINING PROGRAM

## 2019-04-18 PROCEDURE — 80048 BASIC METABOLIC PNL TOTAL CA: CPT | Performed by: STUDENT IN AN ORGANIZED HEALTH CARE EDUCATION/TRAINING PROGRAM

## 2019-04-18 PROCEDURE — 85027 COMPLETE CBC AUTOMATED: CPT | Performed by: STUDENT IN AN ORGANIZED HEALTH CARE EDUCATION/TRAINING PROGRAM

## 2019-04-18 PROCEDURE — 25000128 H RX IP 250 OP 636: Performed by: STUDENT IN AN ORGANIZED HEALTH CARE EDUCATION/TRAINING PROGRAM

## 2019-04-18 PROCEDURE — 25800030 ZZH RX IP 258 OP 636: Performed by: STUDENT IN AN ORGANIZED HEALTH CARE EDUCATION/TRAINING PROGRAM

## 2019-04-18 PROCEDURE — 99232 SBSQ HOSP IP/OBS MODERATE 35: CPT | Mod: GC | Performed by: PEDIATRICS

## 2019-04-18 PROCEDURE — 86140 C-REACTIVE PROTEIN: CPT | Performed by: STUDENT IN AN ORGANIZED HEALTH CARE EDUCATION/TRAINING PROGRAM

## 2019-04-18 PROCEDURE — 25000132 ZZH RX MED GY IP 250 OP 250 PS 637: Performed by: STUDENT IN AN ORGANIZED HEALTH CARE EDUCATION/TRAINING PROGRAM

## 2019-04-18 RX ORDER — LANOLIN ALCOHOL/MO/W.PET/CERES
3 CREAM (GRAM) TOPICAL
Status: DISCONTINUED | OUTPATIENT
Start: 2019-04-18 | End: 2019-04-19 | Stop reason: HOSPADM

## 2019-04-18 RX ORDER — TRAZODONE HYDROCHLORIDE 50 MG/1
50 TABLET, FILM COATED ORAL
Status: DISCONTINUED | OUTPATIENT
Start: 2019-04-18 | End: 2019-04-19 | Stop reason: HOSPADM

## 2019-04-18 RX ADMIN — SODIUM CHLORIDE, POTASSIUM CHLORIDE, SODIUM LACTATE AND CALCIUM CHLORIDE: 600; 310; 30; 20 INJECTION, SOLUTION INTRAVENOUS at 01:25

## 2019-04-18 RX ADMIN — FLUCONAZOLE, SODIUM CHLORIDE 200 MG: 2 INJECTION INTRAVENOUS at 06:21

## 2019-04-18 RX ADMIN — MELATONIN TAB 3 MG 3 MG: 3 TAB at 00:46

## 2019-04-18 ASSESSMENT — ACTIVITIES OF DAILY LIVING (ADL)
ADLS_ACUITY_SCORE: 10
ADLS_ACUITY_SCORE: 12
ADLS_ACUITY_SCORE: 12
ADLS_ACUITY_SCORE: 10
ADLS_ACUITY_SCORE: 12
ADLS_ACUITY_SCORE: 10

## 2019-04-18 ASSESSMENT — MIFFLIN-ST. JEOR: SCORE: 1485.28

## 2019-04-18 NOTE — PROGRESS NOTES
Colorectal Surgery Progress Note    Subjective:  NAEO. 500 mls of stool recorded yesterday. Denies pain, SOB, n/v.    Vitals:  Vitals:    04/1955 04/17/19 2218 04/18/19 0807 04/18/19 0900   BP: 124/75 111/57 100/55    BP Location: Left arm Left arm Left arm    Pulse:       Resp: 20 20 16    Temp: 96.5  F (35.8  C) 97.2  F (36.2  C) 96.8  F (36  C)    TempSrc: Oral Oral Oral    SpO2: 99% 98% 97%    Weight:    70 kg (154 lb 4.8 oz)   Height:         I/O:  I/O last 3 completed shifts:  In: 1290 [P.O.:160; I.V.:1130]  Out: 1825 [Urine:1325; Stool:500]    Physical Exam:  Gen: Alert and oriented, NAD  Pulm: Non-labored breathing  Abd: Soft, minimal-distention, non-tender, no guarding  Ext:  Warm and well-perfused    BMP  Recent Labs   Lab 04/18/19  0550 04/17/19  0645 04/16/19  1856    139 135   POTASSIUM 4.0 4.2 3.7   CHLORIDE 110* 107 100   CO2 22 23 23   BUN 18 22 25   CR 1.19 1.32* 1.88*   GLC 67* 85 136*     CBC  Recent Labs   Lab 04/18/19  0550 04/17/19  0645 04/16/19  1856   WBC 5.2 6.4 13.0*   HGB 13.9 15.6 20.4*   HCT 43.1 47.9 62.4*    218 350         ASSESSMENT: 64 yo M with PMH Crohn's s/p ileocectomy complicated by anastomotic stricture requiring intermittent dilations who presents with 1 day of abdominal pain, n/v, and decreased stool in the setting of frequent loose diarrhea. He is not clinically obstructed and imaging is more suggestive of ileus rather than SBO. Improvement of hemoconcentration with fluid received in ED. Having bowel function and tolerating diet.  - Continue CLD advance as tolerated  - Given history of coccidioidosis infection not unreasonable to get GI panel to evaluate for infectious etiology of diarrhea  - Recommend GI consult for continued care of anastomotic stricture, possible endoscopic evaluation and dilation  - No indication for acute surgical intervention  - Colorectal surgery will sign off, please call with any questions      Saroj Burden  PGY-1, General  Surgery  [phone]      Patient was seen and discussed with Dr. Saunders who will discuss with staff

## 2019-04-18 NOTE — PROGRESS NOTES
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 Suspect he had transient obstruction most likely related to adhesions given the reported area of decompressed bowel is in jejunum. Other potential etiology would be a stricture at reported area of jejunal-jejunal anastomosis, which was not well-visualized on MRE in 2018. Less likely this is related to active Crohn's given normal CRP, although CT non-contrast limited in evaluation bowel inflammation. He is overall improving now with conservative management.      Unclear etiology for his preceding diarrhea - question SIBO given improvement with rifaximin then dietary interventions. More recently was having formed stool prior to this presentation.      Recommendations  - ADAT  - If tolerating diet with evidence of antegrade bowel function, recommended MRE to evaluate for evidence of stricture at jejunal-jejunal anastomosis or ileo-colonic anastomosis, as well as for evidence of active IBD. If unable to be done as inpatient, will arrange as outpatient  - No plan for endoscopic dilation at this time, will consider further colonoscopy as outpatient pending MRE  - C diff given recent diarrhea, if he has loose stool  - Plan for outpatient follow-up with GI pending MRE  - Trend CRP    GI will continue to follow    Thank you for involving us in this patient's care. Please do not hesitate to contact the GI service with any questions or concerns.     Pt care plan discussed with Dr. Moreno, GI staff physician.    Cleveland Payan MD  GI Fellow  P: [phone]          Interval History:     Feeling improved this AM. Tolerating water and juice yesterday. No N/V. No abdominal pain. No  "flatus, but had BM yesterday.    4 point ROS performed and negative unless noted above          Medications:     Current Facility-Administered Medications   Medication     glucose gel 15-30 g    Or     dextrose 50 % injection 25-50 mL    Or     glucagon injection 1 mg     fluconazole (DIFLUCAN) intermittent infusion 200 mg     influenza recomb quadrivalent PF (FLUBLOK) injection 0.5 mL     melatonin tablet 3 mg     naloxone (NARCAN) injection 0.1-0.4 mg     ondansetron (ZOFRAN-ODT) ODT tab 4 mg    Or     ondansetron (ZOFRAN) injection 4 mg     prochlorperazine (COMPAZINE) injection 10 mg    Or     prochlorperazine (COMPAZINE) tablet 10 mg    Or     prochlorperazine (COMPAZINE) Suppository 25 mg        Physical Exam   Blood pressure 100/55, pulse 68, temperature 96.8  F (36  C), temperature source Oral, resp. rate 16, height 1.753 m (5' 9\"), weight 68.9 kg (152 lb), SpO2 97 %.  Constitutional: cooperative, pleasant, not dyspneic/diaphoretic, no acute distress  Eyes: Sclera anicteric/injected  Ears/nose/mouth/throat: Normal oropharynx without ulcers or exudate, mucus membranes moist  Neck: supple  CV: No edema  Respiratory: Unlabored breathing  Abd: Nondistended, +bs, no hepatosplenomegaly, nontender, no peritoneal signs  Skin: warm, perfused, no jaundice  Neuro: AAO x 3  Psych: Normal affect  MSK: No gross deformities    Data   Current Labs  CBC  Recent Labs   Lab 04/18/19  0550 04/17/19  0645 04/16/19  1856   WBC 5.2 6.4 13.0*   RBC 4.44 5.03 6.50*   HGB 13.9 15.6 20.4*   HCT 43.1 47.9 62.4*   MCV 97 95 96   MCH 31.3 31.0 31.4   MCHC 32.3 32.6 32.7   RDW 13.0 13.0 13.2    218 350     BMP  Recent Labs   Lab 04/18/19  0550 04/17/19  0645 04/16/19  1856    139 135   POTASSIUM 4.0 4.2 3.7   CHLORIDE 110* 107 100   CO2 22 23 23   ANIONGAP 8 9 12   GLC 67* 85 136*   BUN 18 22 25   CR 1.19 1.32* 1.88*   GFRESTIMATED 64 57* 37*   GFRESTBLACK 74 66 43*   YUNIER 7.7* 8.4* 10.8*      INR  Recent Labs   Lab " 04/17/19  0645   INR 1.04     Liver panel  Recent Labs   Lab 04/17/19  0645 04/16/19  1856   PROTTOTAL 6.1* 9.6*   ALBUMIN 3.2* 5.0   BILITOTAL 1.1 1.2   ALKPHOS 92 144   AST 12 25   ALT 21 29

## 2019-04-18 NOTE — PROGRESS NOTES
[facility], [place]    Progress Note - G[place]elle 5 Service        Date of Admission:  4/16/2019    Assessment & Plan   Trevin Gee is a 63 year old male with a PMH of Crohn's disease s/p ileocectomy complicated by anastomotic stricture requiring intermittent colonoscopic dilations who was admitted on 4/16/2019 with crampy abdominal pain for 2 days, with 2 episodes of emesis, and decreased stool output who was found to have CT findings of fluid-filled dilated proximal small bowel, likely presenting ileus.  .  Today:   -clears  -if tolerating clears, procedure for worsening stricture tomorrow       # ileus  # Crohns s/p ileocectomy c/b anastomotic stricture requiring c-scope dilations  -  Patient with hx of ileus and SBO, likely secondary to multiple abdominal surgeries. Was seen in ED by colorectal surgery for SBO versus ileus. GI evaluated patient the day of admission, recs appreciated.   -Surgery consulted, recs appreciated  -GI consulted, recs appreicated   Advance to clear liquids if tolerated  - If tolerating diet tomorrow with evidence of antegrade bowel function, recommended MRE to evaluate for evidence of stricture at jejunal-jejunal anastomosis or ileo-colonic anastomosis, as well as for evidence of active IBD. If unable to be done as inpatient, will arrange as outpatient  - No plan for endoscopic dilation at this time, will consider further colonoscopy as outpatient pending MRE  - C diff/stool enteric panel given recent diarrhea, if he has loose stool  - Plan for outpatient follow-up with GI pending MRE  - Trend CRP  -will stop MIVF if better PO intake tomorrow, patient does not think he will be successful with clears this afternoon      # NALLELY, improving  - Cr 1.88 on admission, baseline of 1.3-1.5. Suspect this is secondary to prerenal etiology in the setting of decreased PO intake, emesis, and previous chronic diarrhea. Currently  is making urine. no significant  "electrolytes abnormalities on admission. No indications for urgent/emergent dialysis. Patient already given 3L LR in the ED  - Plan  - order UA/UC  - trend BMP  - avoid nephrotoxins        # erythrocytosis, resolved  - Hgb of 20.4 on admission, returned to baseline of 15-16 after fluid resuscitation, no further workup required at this time.        # hx of recurrent coccidioidosis infections  - hx of cutaneous coccidioidosis and septic joint coccidioidosis, on lifelong fluconazole. Discussed dosing for IV formulation with pharmacy  - 200mg fluconazole IV daily until able to tolerate PO intake  - f/u Stool cocci        Diet: Clears  Fluids: 100cc/hr           DVT Prophylaxis: Pneumatic Compression Devices  Nicole Catheter: not present  Code Status: FULL code       Disposition Plan   Expected discharge: 2 - 3 days, recommended to prior living arrangement once resolution of SBO or ileus, taking PO. .  Entered: Ijeoma Danielle MD 04/17/2019, 9:03 PM       This patient seen and plan discussed with the attending physician, Dr. Thrasher.     Ijeoma Danielle, PGY-2  Internal Medicine/Pediatrics    ______________________________________________________________________    Interval History   Patient passing flatus but no stools. No further vomiting since yesterday. His abdominal discomfort was slightly better than on admission but his abdomen still felt quite \"tight\" and crampy when getting up and walking. After was evaluated by GI, encouraged to try clears as tolerated. Discussed possible worsened stricture and medical management. Encouraged patient to updated care providers with worsening abdominal pain or more vomiting.     4- pt ROS reviewed.     Data reviewed today: I reviewed all medications, new labs and imaging results over the last 24 hours. I personally reviewed the CT image(s) showing abdomen/pelvis.    Physical Exam   Vital Signs: Temp: 96.5  F (35.8  C) Temp src: Oral BP: 124/75 Pulse: 68 Heart Rate: 70 Resp: 20 " SpO2: 99 % O2 Device: None (Room air)    Weight: 152 lbs 0 oz  GENERAL: Alert, interactive, NAD  HEENT: AT/NC, sclera anicteric, EOMI  RESP: clear to auscultation bilaterally, no crackles or wheezes  CARDIAC: regular rate and rhythm, normal S1 and S2, no murmur appreciated  ABDOMEN: Soft, some mild tenderness diffusely, nondistended. ++BS, well healed surgical scar.  EXTREMITIES: No LE edema  SKIN: Warm and dry, no jaundice or rash  NEURO: moving all 4 extremities equally, sensation intact  PSYCH: alert, appropriate mood, normal speech, linear thought.       Data   Reviewed.

## 2019-04-18 NOTE — PLAN OF CARE
[id number]  VSS. A&Ox4. Up independently walking halls. On clears. No nausea.  Denies need for pain meds. Stool sample sent for coccidioides test on previous shift- waiting on result. Enteric panel negative. No BM on shift. Pt asked for melatonin and MD paged. No order put in yet. Continue to monitor.

## 2019-04-18 NOTE — PROGRESS NOTES
[facility], [place]    Progress Note - Marmarquise 5 Service        Date of Admission:  4/16/2019    Assessment & Plan   Trevin Gee is a 63 year old male with a PMH of Crohn's disease s/p ileocectomy complicated by anastomotic stricture requiring intermittent colonoscopic dilations who was admitted on 4/16/2019 with crampy abdominal pain for 2 days, with 2 episodes of emesis, and decreased stool output who was found to have CT findings of fluid-filled dilated proximal small bowel, likely presenting ileus.  .  Today:   -Trazodone (PTA) ordered   -if tolerating clears, procedure for worsening stricture tomorrow       # ileus  # Crohns s/p ileocectomy c/b anastomotic stricture requiring c-scope dilations  -  Patient with hx of ileus and SBO, likely secondary to multiple abdominal surgeries. Was seen in ED by colorectal surgery for SBO versus ileus. GI evaluated patient the day of admission, recs appreciated.   -Surgery consulted, recs appreciated  -GI consulted, recs appreicated   Advance to clear liquids if tolerated  - If tolerating diet tomorrow with evidence of antegrade bowel function, recommended MRE to evaluate for evidence of stricture at jejunal-jejunal anastomosis or ileo-colonic anastomosis, as well as for evidence of active IBD. If unable to be done as inpatient, will arrange as outpatient  - No plan for endoscopic dilation at this time, will consider further colonoscopy as outpatient pending MRE  - C diff/stool enteric panel given recent diarrhea, if he has loose stool  - Plan for outpatient follow-up with GI pending MRE  - Trend CRP  -stop IVF, see if patient can drink today      # NALLELY, improving  - Cr 1.88 on admission, baseline of 1.3-1.5. Suspect this is secondary to prerenal etiology in the setting of decreased PO intake, emesis, and previous chronic diarrhea. Currently  is making urine. no significant electrolytes abnormalities on admission. No indications for  urgent/emergent dialysis. Patient already given 3L LR in the ED  - Plan  - order UA/UC  - trend BMP  - avoid nephrotoxins     # erythrocytosis, resolved  - Hgb of 20.4 on admission, returned to baseline of 15-16 after fluid resuscitation, no further workup required at this time.        # hx of recurrent coccidioidosis infections  - hx of cutaneous coccidioidosis and septic joint coccidioidosis, on lifelong fluconazole. Discussed dosing for IV formulation with pharmacy  - 200mg fluconazole IV daily until able to tolerate PO intake  - Stool cocci canceled for inadequate stool   - can consider reordering if patient having more loose stools        Diet: Clears  Fluids: 100cc/hr           DVT Prophylaxis: Pneumatic Compression Devices  Nicole Catheter: not present  Code Status: FULL code       Disposition Plan   Expected discharge: 2 - 3 days, recommended to prior living arrangement once resolution of SBO or ileus, taking PO. .  Entered: Ijeoma Danielle MD 04/18/2019, 1:43 PM       This patient seen and plan discussed with the attending physician, Dr. Thrasher.     Ijeoma Danielle, PGY-2  Internal Medicine/Pediatrics    ______________________________________________________________________    Interval History   Patient now had one liquid stool. Abdominal discomfort somewhat improved. Has been walking around, still gets cramping after lying down. Successfully drank 1 glass of juice last night. GI came by this morning and offered MRE, but patient didn't think he could drink contrast. Paged by nursing staff to advance diet to full liquid, which was done. No nausea, blood in stool. Encouraged patient to drink fluids as able.     4- pt ROS reviewed.     Data reviewed today: I reviewed all medications, new labs and imaging results over the last 24 hours. I personally reviewed the CT image(s) showing abdomen/pelvis.    Physical Exam   Vital Signs: Temp: 96.1  F (35.6  C) Temp src: Oral BP: 129/79   Heart Rate: 72 Resp: 16 SpO2:  100 % O2 Device: None (Room air)    Weight: 154 lbs 4.8 oz  GENERAL: Alert, interactive, NAD  HEENT: AT/NC, sclera anicteric, EOMI  RESP: clear to auscultation bilaterally, no crackles or wheezes  CARDIAC: regular rate and rhythm, normal S1 and S2, no murmur appreciated  ABDOMEN: Soft, some mild tenderness diffusely, nondistended. ++BS, well healed surgical scar.  EXTREMITIES: No LE edema  SKIN: Warm and dry, no jaundice or rash  NEURO: moving all 4 extremities equally, sensation intact  PSYCH: alert, appropriate mood, normal speech, linear thought.       Data   Reviewed.

## 2019-04-18 NOTE — PLAN OF CARE
[id number]  VSS. A&Ox4. Up independently walking halls. Advanced to full liquid diet, tolerating fine. Denies nausea and pain. PIV SL'd. Voiding. One watery BM this shift.

## 2019-04-18 NOTE — PLAN OF CARE
VS stable, afebrile. Pt denied pain/SOB/n/v. Pt reported no BM this shift. Pt requested sleeping aid - melatonin given with slight relief. Fluconazole administered as scheduled. LR running 100ml/hr. No significant events this shift, continue plan of care.

## 2019-04-19 VITALS
OXYGEN SATURATION: 99 % | DIASTOLIC BLOOD PRESSURE: 77 MMHG | TEMPERATURE: 96 F | BODY MASS INDEX: 22.6 KG/M2 | HEART RATE: 69 BPM | WEIGHT: 152.6 LBS | HEIGHT: 69 IN | SYSTOLIC BLOOD PRESSURE: 129 MMHG | RESPIRATION RATE: 20 BRPM

## 2019-04-19 PROCEDURE — 99238 HOSP IP/OBS DSCHRG MGMT 30/<: CPT | Mod: GC | Performed by: PEDIATRICS

## 2019-04-19 PROCEDURE — 90682 RIV4 VACC RECOMBINANT DNA IM: CPT | Performed by: HOSPITALIST

## 2019-04-19 PROCEDURE — 25000132 ZZH RX MED GY IP 250 OP 250 PS 637: Performed by: STUDENT IN AN ORGANIZED HEALTH CARE EDUCATION/TRAINING PROGRAM

## 2019-04-19 PROCEDURE — 25000128 H RX IP 250 OP 636: Performed by: HOSPITALIST

## 2019-04-19 RX ORDER — FLUCONAZOLE 200 MG/1
200 TABLET ORAL DAILY
Status: DISCONTINUED | OUTPATIENT
Start: 2019-04-19 | End: 2019-04-19 | Stop reason: HOSPADM

## 2019-04-19 RX ADMIN — FLUCONAZOLE 200 MG: 200 TABLET ORAL at 09:27

## 2019-04-19 RX ADMIN — INFLUENZA A VIRUS A/MICHIGAN/45/2015 (H1N1) RECOMBINANT HEMAGGLUTININ ANTIGEN, INFLUENZA A VIRUS A/SINGAPORE/INFIMH-16-0019/2016 (H3N2) RECOMBINANT HEMAGGLUTININ ANTIGEN, INFLUENZA B VIRUS B/MARYLAND/15/2016 RECOMBINANT HEMAGGLUTININ ANTIGEN, AND INFLUENZA B VIRUS B/PHUKET/3073/2013 RECOMBINANT HEMAGGLUTININ ANTIGEN 0.5 ML: 45; 45; 45; 45 INJECTION INTRAMUSCULAR at 16:45

## 2019-04-19 ASSESSMENT — MIFFLIN-ST. JEOR: SCORE: 1477.57

## 2019-04-19 ASSESSMENT — ACTIVITIES OF DAILY LIVING (ADL)
ADLS_ACUITY_SCORE: 10

## 2019-04-19 NOTE — PLAN OF CARE
"  /75 (BP Location: Left arm)   Pulse 68   Temp 95.6  F (35.3  C) (Axillary)   Resp 18   Ht 1.753 m (5' 9\")   Wt 69.2 kg (152 lb 9.6 oz)   SpO2 100%   BMI 22.54 kg/m      Time: 9284-3406.  Reason for admission: SBO.   VS: VSS on RA with O2 sats in high 90s. Afebrile.   Activity: Up independently, steady on feet. Calls appropriately.   Neuros: A&Ox4. Neuros intact. Denies pain.   Cardiac: WDL. HR stable in 60s. Denies chest pain.   Respiratory: WDL. LS clear. Denies SOB.   GI/: Voiding without difficulty. No BM on this shift, LBM 4/18. +BS, +gas. Denies nausea or vomiting.   Diet: Advanced to regular diet at lunch with restrictions on dairy. Tolerated well so far.   Skin: WDL.   Lines: No IV access, MD aware.   Labs: WDL.   New changes this shift/Plan: IV Fluconazole changed to PO, received first dose, tolerated well. Diet advanced, ate a late lunch, regular diet lunch has been tolerated so far, pt up ambulating. Possible discharge this evening. Pt would like flu vaccine right before he discharges.   Will continue to monitor & follow POC.  "

## 2019-04-19 NOTE — PROGRESS NOTES
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 Suspect he had transient obstruction most likely related to adhesions given the reported area of decompressed bowel is in jejunum. Other potential etiology would be a stricture at reported area of jejunal-jejunal anastomosis, which was not well-visualized on MRE in 2018, or at ileocolonic anastomosis. Less likely this is related to active Crohn's given normal CRP, although CT non-contrast limited in evaluation bowel inflammation. He is overall improving now with conservative management.      Unclear etiology for his preceding diarrhea - question SIBO given improvement with rifaximin then dietary interventions. More recently was having formed stool prior to this presentation.      Recommendations  - ADAT  - If tolerating diet with evidence of antegrade bowel function, recommended MRE to evaluate for evidence of stricture at jejunal-jejunal anastomosis or ileo-colonic anastomosis, as well as for evidence of active IBD. If unable to be done as inpatient, will arrange as outpatient  - No plan for endoscopic dilation at this time, will consider further colonoscopy as outpatient pending MRE  - C diff given recent diarrhea, if he has loose stool  - Plan for outpatient follow-up with GI pending MRE  - Trend CRP    GI will follow peripherally over the weekend, please call if questions.     Thank you for involving us in this patient's care. Please do not hesitate to contact the GI service with any questions or concerns.     Pt care plan discussed with Dr. Boone, GI staff physician.    Cleveland Payan MD  GI Fellow  P: [phone]          Interval History:     Feeling improved this  "AM. Tolerating full liquids. No N/V. No abdominal pain. Passing flatus, had BM yesterday AM.     4 point ROS performed and negative unless noted above          Medications:     Current Facility-Administered Medications   Medication     glucose gel 15-30 g    Or     dextrose 50 % injection 25-50 mL    Or     glucagon injection 1 mg     fluconazole (DIFLUCAN) tablet 200 mg     influenza recomb quadrivalent PF (FLUBLOK) injection 0.5 mL     melatonin tablet 3 mg     naloxone (NARCAN) injection 0.1-0.4 mg     ondansetron (ZOFRAN-ODT) ODT tab 4 mg    Or     ondansetron (ZOFRAN) injection 4 mg     prochlorperazine (COMPAZINE) injection 10 mg    Or     prochlorperazine (COMPAZINE) tablet 10 mg    Or     prochlorperazine (COMPAZINE) Suppository 25 mg     traZODone (DESYREL) tablet 50 mg        Physical Exam   Blood pressure 122/75, pulse 68, temperature 95.6  F (35.3  C), temperature source Axillary, resp. rate 18, height 1.753 m (5' 9\"), weight 69.2 kg (152 lb 9.6 oz), SpO2 100 %.  Constitutional: cooperative, pleasant, not dyspneic/diaphoretic, no acute distress  Eyes: Sclera anicteric/injected  Ears/nose/mouth/throat: Normal oropharynx without ulcers or exudate, mucus membranes moist  Neck: supple  CV: No edema  Respiratory: Unlabored breathing  Abd: Nondistended, +bs, no hepatosplenomegaly, nontender, no peritoneal signs  Skin: warm, perfused, no jaundice  Neuro: AAO x 3  Psych: Normal affect  MSK: No gross deformities    Data   Current Labs  CBC  Recent Labs   Lab 04/18/19  0550 04/17/19  0645 04/16/19  1856   WBC 5.2 6.4 13.0*   RBC 4.44 5.03 6.50*   HGB 13.9 15.6 20.4*   HCT 43.1 47.9 62.4*   MCV 97 95 96   MCH 31.3 31.0 31.4   MCHC 32.3 32.6 32.7   RDW 13.0 13.0 13.2    218 350     BMP  Recent Labs   Lab 04/18/19  0550 04/17/19  0645 04/16/19  1856    139 135   POTASSIUM 4.0 4.2 3.7   CHLORIDE 110* 107 100   CO2 22 23 23   ANIONGAP 8 9 12   GLC 67* 85 136*   BUN 18 22 25   CR 1.19 1.32* 1.88* "   GFRESTIMATED 64 57* 37*   GFRESTBLACK 74 66 43*   YUNIER 7.7* 8.4* 10.8*      INR  Recent Labs   Lab 04/17/19  0645   INR 1.04     Liver panel  Recent Labs   Lab 04/17/19  0645 04/16/19  1856   PROTTOTAL 6.1* 9.6*   ALBUMIN 3.2* 5.0   BILITOTAL 1.1 1.2   ALKPHOS 92 144   AST 12 25   ALT 21 29

## 2019-04-19 NOTE — PLAN OF CARE
VSS, afebrile. Up independently ambulating the halls overnight. Denies pain, nausea and SOB. Pt on full liquid diet - tolerating well. PIV sluggish with flush - new IV will need to be placed when order is obtained. Pt wondering if he can take fluconazole PO instead of IV, MD paged. Slept between cares. No acute events. Continue to monitor w/ POC.

## 2019-04-19 NOTE — PROGRESS NOTES
Discharge  D: Orders for discharge and outpatient medications written.  I: Home medications and return to clinic schedule reviewed with patient. Discharge instructions and parameters for calling Health Care Provider reviewed. Patient left at 1724 HRS. Ambulated off unit independently.   A: Patient/family verbalized understanding and was ready for discharge.   P: Patient instructed to follow up with GI for imaging in 1-2 weeks.

## 2019-04-19 NOTE — PLAN OF CARE
"/67 (BP Location: Left arm)   Pulse 68   Temp 96.9  F (36.1  C) (Oral)   Resp 18   Ht 1.753 m (5' 9\")   Wt 70 kg (154 lb 4.8 oz)   SpO2 99%   BMI 22.79 kg/m    7814-7788: A/Ox4, denies pain or nausea. Tolerating diet. Up independently. BM today. Will continue to monitor per POC.   "

## 2019-04-19 NOTE — PLAN OF CARE
Alert and oriented X 4. AVSS. Denies SOB, nausea or abdominal discomfort. Tolerating full liquid and regular diet well. Passing flatus. Last bowel movement on Wednesday. He has been ambulating in hallway independently. Discharging  to home shortly.

## 2019-04-20 NOTE — DISCHARGE SUMMARY
[facility], [place]  Discharge Summary - Medicine & Pediatrics       Date of Admission:  4/16/2019  Date of Discharge:  4/19/2019  5:24 PM  Discharging Provider: Dr. Thrasher  Discharge Service: Giselle 5    Discharge Diagnoses     # Ileus  # Crohns s/p ileocectomy c/b anastomotic stricture requiring c-scope dilations  #Dehydration  # NALLELY, improving  # hx of recurrent coccidioidosis infections    Follow-ups Needed After Discharge   Follow-up Appointments     Follow Up and recommended labs and tests      Follow up with GI for imaging in 1-2 weeks for MRE imaging to evaluate   the narrow part of the intestines.             Unresulted Labs Ordered in the Past 30 Days of this Admission     No orders found from 2/15/2019 to 4/17/2019.          Discharge Disposition   Discharged to home  Condition at discharge: Stable    Hospital Course   Trevin Gee is a 63 year old male with a PMH of Crohn's disease s/p ileocectomy complicated by anastomotic stricture requiring intermittent colonoscopic dilations who was admitted on 4/16/2019 with crampy abdominal pain for 2 days, with 2 episodes of emesis, and decreased stool output who was found to have CT findings of fluid-filled dilated proximal small bowel, likely presenting ileus. There was also some concern this could represent acute on chronic worsening of his chronic stricture. However, symptoms resolved over several days. Surgyer and GI consulted on his care.     Inpatient MRE was offered but patient declined. He was advised to follow up closely with GI to consider this study and follow up for hospitalization.     At the time of discharge he was having slightly loose stools, eating soft foods and walking around the wards. He felt comfortable discharging and was given the below follow up instructions. He was advised on red flag signs that should prompt immediate follow up.   .     # Crohns s/p ileocectomy c/b anastomotic stricture  requiring c-scope dilations  # Acute on chronic stricture, improving  Patient with hx of ileus versus partial SBO, likely secondary to multiple abdominal surgeries. Was seen in ED by colorectal surgery for SBO versus ileus. Not likely to be Crohn's flair, with normal CRP and lack of other symptoms. Did not have adequate stool for cocci testing. Some mild loose stools but low concern for C diff, no testing pursued. Surgery and GI followed. evaluated patient the day of admission, recs appreciated. GI recommended MRE to evaluate for evidence of stricture at jejunal-jejunal anastomosis or ileo-colonic anastomosis, as well as for evidence of active IBD. No plan for endoscopic dilation recommended this hospitalization, will consider further colonoscopy as outpatient pending MRE  -outpatient MRE  -GI f/u 1-2 weeks        # NALLELY, improving  Cr improved and trending down after fluid resuscitation. Emphasized importance of staying hydrated going home.      # hx of recurrent coccidioidosis infections  Hx of cutaneous coccidioidosis and septic joint coccidioidosis, on lifelong fluconazole. Stool testing for cocci canceled due to inadequate volume.   - PTA flucon        Code Status: FULL code    Consultations This Hospital Stay   COLORECTAL SURGERY ADULT IP CONSULT  GI LUMINAL ADULT IP CONSULT  VASCULAR ACCESS CARE ADULT IP CONSULT    Code Status   Prior       This patient seen and plan discussed with the attending physician, Dr. Thrasher.     Ijeoma Danielle, PGY-2  Internal Medicine/Pediatrics  ______________________________________________________________________    Physical Exam   Vital Signs: Temp: 96  F (35.6  C) Temp src: Oral BP: 129/77 Pulse: 69 Heart Rate: 63 Resp: 20 SpO2: 99 % O2 Device: None (Room air)    Weight: 152 lbs 9.6 oz  GENERAL: Alert, interactive, NAD  HEENT: AT/NC, sclera anicteric, EOMI  RESP: clear to auscultation bilaterally, no crackles or wheezes  CARDIAC: regular rate and rhythm, normal S1 and S2, no  murmur appreciated  ABDOMEN: Soft, now without tenderness on palpation, nondistended. Now normoactive BS, well healed surgical scar.  EXTREMITIES: No LE edema  SKIN: Warm and dry, no jaundice or rash  NEURO: moving all 4 extremities equally, sensation intact  PSYCH: alert, appropriate mood, normal speech, linear thought.            Primary Care Physician   Saroj Haas    Discharge Orders      Reason for your hospital stay    You were hospitalized for a narrowing in the intestines.     Follow Up and recommended labs and tests    Follow up with GI for imaging in 1-2 weeks for MRE imaging to evaluate the narrow part of the intestines.     Activity    Your activity upon discharge: activity as tolerated     When to contact your care team    Call your primary doctor if you have any of the following: severe abdominal pain, vomiting, worsening/persistent diarrhea, dehydration, blood in your stools, fevers with any of the above symptoms or any other concerning symptoms.     Diet    Follow this diet upon discharge: Orders Placed This Encounter      Full Liquid Diet       Significant Results and Procedures   Normal CRP    Discharge Medications   Discharge Medication List as of 4/19/2019  4:59 PM      CONTINUE these medications which have NOT CHANGED    Details   acetaminophen (TYLENOL) 500 MG tablet Take 500-1,000 mg by mouth every 6 hours as needed for mild pain, Historical      Ascorbic Acid (VITAMIN C PO) Take 500 mg by mouth, Historical      calcium-vitamin D (CALTRATE) 600-400 MG-UNIT per tablet Take 1 tablet by mouth 2 times daily, Historical      Cholecalciferol (VITAMIN D3 PO) Take 1,000 Units by mouth daily, Historical      Cyanocobalamin (VITAMIN B 12 PO) Take 500 mcg by mouth, Historical      ferrous sulfate 325 (65 FE) MG tablet Take by mouth daily (with breakfast), Historical      fluconazole (DIFLUCAN) 200 MG tablet Take 1 tablet (200 mg) by mouth daily, Disp-90 tablet, R-3, E-Prescribe      MELATONIN PO  Take 1.5 mg by mouth nightly as needed , Historical      multivitamin, therapeutic with minerals (MULTI-VITAMIN) TABS Take 1 tablet by mouth daily, Historical      Pyridoxine HCl (VITAMIN B6 PO) Take 100 mg by mouth, Historical      tiZANidine (ZANAFLEX) 4 MG tablet Take 1-2 tablets (4-8 mg) by mouth 3 times daily as needed for muscle spasms, Disp-30 tablet, R-0, E-Prescribe      traZODone (DESYREL) 50 MG tablet Take 1 tablet (50 mg) by mouth nightly as needed for sleep, Disp-90 tablet, R-1, E-Prescribe         STOP taking these medications       diphenoxylate-atropine (LOMOTIL) 2.5-0.025 MG per tablet Comments:   Reason for Stopping:         rifaximin (XIFAXAN) 550 MG TABS tablet Comments:   Reason for Stopping:         rifaximin (XIFAXAN) 550 MG TABS tablet Comments:   Reason for Stopping:         rifaximin (XIFAXAN) 550 MG TABS tablet Comments:   Reason for Stopping:             Allergies   Allergies   Allergen Reactions     Prednisone Other (See Comments)     Diana with more than 2.5mg chronic dosing of prednisone due to fluconazole interaction per patient.

## 2019-04-21 ENCOUNTER — APPOINTMENT (OUTPATIENT)
Dept: CT IMAGING | Facility: CLINIC | Age: 64
End: 2019-04-21
Attending: INTERNAL MEDICINE
Payer: COMMERCIAL

## 2019-04-21 ENCOUNTER — HOSPITAL ENCOUNTER (EMERGENCY)
Facility: CLINIC | Age: 64
Discharge: HOME | End: 2019-04-21
Attending: INTERNAL MEDICINE | Admitting: INTERNAL MEDICINE
Payer: COMMERCIAL

## 2019-04-21 ENCOUNTER — PATIENT OUTREACH (OUTPATIENT)
Dept: CARE COORDINATION | Facility: CLINIC | Age: 64
End: 2019-04-21

## 2019-04-21 VITALS
HEART RATE: 68 BPM | OXYGEN SATURATION: 98 % | SYSTOLIC BLOOD PRESSURE: 121 MMHG | BODY MASS INDEX: 20.9 KG/M2 | DIASTOLIC BLOOD PRESSURE: 83 MMHG | WEIGHT: 146 LBS | RESPIRATION RATE: 16 BRPM | TEMPERATURE: 97.8 F | HEIGHT: 70 IN

## 2019-04-21 DIAGNOSIS — N20.1 URETERAL STONE: ICD-10-CM

## 2019-04-21 DIAGNOSIS — N13.2 HYDRONEPHROSIS WITH RENAL AND URETERAL CALCULUS OBSTRUCTION: ICD-10-CM

## 2019-04-21 DIAGNOSIS — N23 RENAL COLIC: ICD-10-CM

## 2019-04-21 LAB
ALBUMIN SERPL-MCNC: 3.9 G/DL (ref 3.4–5)
ALBUMIN UR-MCNC: NEGATIVE MG/DL
ALP SERPL-CCNC: 107 U/L (ref 40–150)
ALT SERPL W P-5'-P-CCNC: 23 U/L (ref 0–70)
ANION GAP SERPL CALCULATED.3IONS-SCNC: 7 MMOL/L (ref 3–14)
APPEARANCE UR: CLEAR
AST SERPL W P-5'-P-CCNC: 23 U/L (ref 0–45)
BASOPHILS # BLD AUTO: 0 10E9/L (ref 0–0.2)
BASOPHILS NFR BLD AUTO: 0.7 %
BILIRUB SERPL-MCNC: 1.1 MG/DL (ref 0.2–1.3)
BILIRUB UR QL STRIP: NEGATIVE
BUN SERPL-MCNC: 12 MG/DL (ref 7–30)
CALCIUM SERPL-MCNC: 9.3 MG/DL (ref 8.5–10.1)
CHLORIDE SERPL-SCNC: 105 MMOL/L (ref 94–109)
CO2 BLDCOV-SCNC: 25 MMOL/L (ref 21–28)
CO2 SERPL-SCNC: 28 MMOL/L (ref 20–32)
COLOR UR AUTO: ABNORMAL
CREAT SERPL-MCNC: 1.42 MG/DL (ref 0.66–1.25)
CRP SERPL-MCNC: <2.9 MG/L (ref 0–8)
DIFFERENTIAL METHOD BLD: NORMAL
EOSINOPHIL # BLD AUTO: 0.1 10E9/L (ref 0–0.7)
EOSINOPHIL NFR BLD AUTO: 1.2 %
ERYTHROCYTE [DISTWIDTH] IN BLOOD BY AUTOMATED COUNT: 13 % (ref 10–15)
ERYTHROCYTE [SEDIMENTATION RATE] IN BLOOD BY WESTERGREN METHOD: 6 MM/H (ref 0–20)
GFR SERPL CREATININE-BSD FRML MDRD: 52 ML/MIN/{1.73_M2}
GLUCOSE SERPL-MCNC: 120 MG/DL (ref 70–99)
GLUCOSE UR STRIP-MCNC: NEGATIVE MG/DL
HCT VFR BLD AUTO: 46.8 % (ref 40–53)
HGB BLD-MCNC: 15.5 G/DL (ref 13.3–17.7)
HGB UR QL STRIP: NEGATIVE
IMM GRANULOCYTES # BLD: 0 10E9/L (ref 0–0.4)
IMM GRANULOCYTES NFR BLD: 0.5 %
INR PPP: 0.99 (ref 0.86–1.14)
KETONES UR STRIP-MCNC: 10 MG/DL
LACTATE BLD-SCNC: 0.6 MMOL/L (ref 0.7–2.1)
LACTATE BLD-SCNC: 2.8 MMOL/L (ref 0.7–2)
LEUKOCYTE ESTERASE UR QL STRIP: NEGATIVE
LIPASE SERPL-CCNC: 178 U/L (ref 73–393)
LYMPHOCYTES # BLD AUTO: 2.2 10E9/L (ref 0.8–5.3)
LYMPHOCYTES NFR BLD AUTO: 36.5 %
MCH RBC QN AUTO: 31.2 PG (ref 26.5–33)
MCHC RBC AUTO-ENTMCNC: 33.1 G/DL (ref 31.5–36.5)
MCV RBC AUTO: 94 FL (ref 78–100)
MONOCYTES # BLD AUTO: 0.6 10E9/L (ref 0–1.3)
MONOCYTES NFR BLD AUTO: 10.4 %
MUCOUS THREADS #/AREA URNS LPF: PRESENT /LPF
NEUTROPHILS # BLD AUTO: 3.1 10E9/L (ref 1.6–8.3)
NEUTROPHILS NFR BLD AUTO: 50.7 %
NITRATE UR QL: NEGATIVE
NRBC # BLD AUTO: 0 10*3/UL
NRBC BLD AUTO-RTO: 0 /100
PCO2 BLDV: 53 MM HG (ref 40–50)
PH BLDV: 7.29 PH (ref 7.32–7.43)
PH UR STRIP: 5.5 PH (ref 5–7)
PLATELET # BLD AUTO: 262 10E9/L (ref 150–450)
PO2 BLDV: 12 MM HG (ref 25–47)
POTASSIUM SERPL-SCNC: 3.4 MMOL/L (ref 3.4–5.3)
PROT SERPL-MCNC: 7.3 G/DL (ref 6.8–8.8)
RADIOLOGIST FLAGS: ABNORMAL
RBC # BLD AUTO: 4.97 10E12/L (ref 4.4–5.9)
RBC #/AREA URNS AUTO: 1 /HPF (ref 0–2)
SAO2 % BLDV FROM PO2: 11 %
SODIUM SERPL-SCNC: 140 MMOL/L (ref 133–144)
SOURCE: ABNORMAL
SP GR UR STRIP: 1.02 (ref 1–1.03)
UROBILINOGEN UR STRIP-MCNC: NORMAL MG/DL (ref 0–2)
WBC # BLD AUTO: 6.1 10E9/L (ref 4–11)
WBC #/AREA URNS AUTO: <1 /HPF (ref 0–5)

## 2019-04-21 PROCEDURE — 83605 ASSAY OF LACTIC ACID: CPT

## 2019-04-21 PROCEDURE — 99285 EMERGENCY DEPT VISIT HI MDM: CPT | Mod: Z6 | Performed by: INTERNAL MEDICINE

## 2019-04-21 PROCEDURE — 81001 URINALYSIS AUTO W/SCOPE: CPT | Performed by: INTERNAL MEDICINE

## 2019-04-21 PROCEDURE — 85652 RBC SED RATE AUTOMATED: CPT | Performed by: INTERNAL MEDICINE

## 2019-04-21 PROCEDURE — 83690 ASSAY OF LIPASE: CPT | Performed by: INTERNAL MEDICINE

## 2019-04-21 PROCEDURE — 96376 TX/PRO/DX INJ SAME DRUG ADON: CPT

## 2019-04-21 PROCEDURE — 82803 BLOOD GASES ANY COMBINATION: CPT

## 2019-04-21 PROCEDURE — 96361 HYDRATE IV INFUSION ADD-ON: CPT

## 2019-04-21 PROCEDURE — 99285 EMERGENCY DEPT VISIT HI MDM: CPT | Mod: 25

## 2019-04-21 PROCEDURE — 80053 COMPREHEN METABOLIC PANEL: CPT | Performed by: INTERNAL MEDICINE

## 2019-04-21 PROCEDURE — 85025 COMPLETE CBC W/AUTO DIFF WBC: CPT | Performed by: INTERNAL MEDICINE

## 2019-04-21 PROCEDURE — 25000128 H RX IP 250 OP 636: Performed by: INTERNAL MEDICINE

## 2019-04-21 PROCEDURE — 83605 ASSAY OF LACTIC ACID: CPT | Performed by: INTERNAL MEDICINE

## 2019-04-21 PROCEDURE — 25000132 ZZH RX MED GY IP 250 OP 250 PS 637: Performed by: INTERNAL MEDICINE

## 2019-04-21 PROCEDURE — 25000125 ZZHC RX 250: Performed by: STUDENT IN AN ORGANIZED HEALTH CARE EDUCATION/TRAINING PROGRAM

## 2019-04-21 PROCEDURE — 86140 C-REACTIVE PROTEIN: CPT | Performed by: INTERNAL MEDICINE

## 2019-04-21 PROCEDURE — 87086 URINE CULTURE/COLONY COUNT: CPT | Performed by: INTERNAL MEDICINE

## 2019-04-21 PROCEDURE — 74177 CT ABD & PELVIS W/CONTRAST: CPT

## 2019-04-21 PROCEDURE — 85610 PROTHROMBIN TIME: CPT | Performed by: INTERNAL MEDICINE

## 2019-04-21 PROCEDURE — 96374 THER/PROPH/DIAG INJ IV PUSH: CPT

## 2019-04-21 PROCEDURE — 25000128 H RX IP 250 OP 636: Performed by: STUDENT IN AN ORGANIZED HEALTH CARE EDUCATION/TRAINING PROGRAM

## 2019-04-21 PROCEDURE — 96375 TX/PRO/DX INJ NEW DRUG ADDON: CPT

## 2019-04-21 RX ORDER — TAMSULOSIN HYDROCHLORIDE 0.4 MG/1
0.4 CAPSULE ORAL ONCE
Status: COMPLETED | OUTPATIENT
Start: 2019-04-21 | End: 2019-04-21

## 2019-04-21 RX ORDER — TAMSULOSIN HYDROCHLORIDE 0.4 MG/1
0.4 CAPSULE ORAL DAILY
Qty: 10 CAPSULE | Refills: 0 | Status: SHIPPED | OUTPATIENT
Start: 2019-04-21 | End: 2019-05-21

## 2019-04-21 RX ORDER — HYDROCODONE BITARTRATE AND ACETAMINOPHEN 5; 325 MG/1; MG/1
1 TABLET ORAL EVERY 6 HOURS PRN
Qty: 18 TABLET | Refills: 0 | Status: SHIPPED | OUTPATIENT
Start: 2019-04-21 | End: 2019-05-21

## 2019-04-21 RX ORDER — HYDROMORPHONE HYDROCHLORIDE 1 MG/ML
0.5 INJECTION, SOLUTION INTRAMUSCULAR; INTRAVENOUS; SUBCUTANEOUS
Status: COMPLETED | OUTPATIENT
Start: 2019-04-21 | End: 2019-04-21

## 2019-04-21 RX ORDER — SODIUM CHLORIDE 9 MG/ML
1000 INJECTION, SOLUTION INTRAVENOUS CONTINUOUS
Status: DISCONTINUED | OUTPATIENT
Start: 2019-04-21 | End: 2019-04-21 | Stop reason: HOSPADM

## 2019-04-21 RX ORDER — ONDANSETRON 2 MG/ML
4 INJECTION INTRAMUSCULAR; INTRAVENOUS EVERY 30 MIN PRN
Status: DISCONTINUED | OUTPATIENT
Start: 2019-04-21 | End: 2019-04-21 | Stop reason: HOSPADM

## 2019-04-21 RX ORDER — TAMSULOSIN HYDROCHLORIDE 0.4 MG/1
0.4 CAPSULE ORAL DAILY
Status: DISCONTINUED | OUTPATIENT
Start: 2019-04-22 | End: 2019-04-21 | Stop reason: HOSPADM

## 2019-04-21 RX ORDER — ONDANSETRON 4 MG/1
4 TABLET, ORALLY DISINTEGRATING ORAL EVERY 8 HOURS PRN
Qty: 10 TABLET | Refills: 0 | Status: SHIPPED | OUTPATIENT
Start: 2019-04-21 | End: 2019-05-21

## 2019-04-21 RX ORDER — SODIUM CHLORIDE 9 MG/ML
INJECTION, SOLUTION INTRAVENOUS CONTINUOUS
Status: DISCONTINUED | OUTPATIENT
Start: 2019-04-21 | End: 2019-04-21 | Stop reason: HOSPADM

## 2019-04-21 RX ORDER — IOPAMIDOL 755 MG/ML
89 INJECTION, SOLUTION INTRAVASCULAR ONCE
Status: COMPLETED | OUTPATIENT
Start: 2019-04-21 | End: 2019-04-21

## 2019-04-21 RX ADMIN — IOPAMIDOL 89 ML: 755 INJECTION, SOLUTION INTRAVENOUS at 17:16

## 2019-04-21 RX ADMIN — SODIUM CHLORIDE 1000 ML: 900 INJECTION, SOLUTION INTRAVENOUS at 18:20

## 2019-04-21 RX ADMIN — SODIUM CHLORIDE 1000 ML: 900 INJECTION, SOLUTION INTRAVENOUS at 16:50

## 2019-04-21 RX ADMIN — SODIUM CHLORIDE, PRESERVATIVE FREE 72 ML: 5 INJECTION INTRAVENOUS at 17:16

## 2019-04-21 RX ADMIN — Medication 0.5 MG: at 17:20

## 2019-04-21 RX ADMIN — SODIUM CHLORIDE 1000 ML: 900 INJECTION, SOLUTION INTRAVENOUS at 19:04

## 2019-04-21 RX ADMIN — TAMSULOSIN HYDROCHLORIDE 0.4 MG: 0.4 CAPSULE ORAL at 19:35

## 2019-04-21 RX ADMIN — Medication 0.5 MG: at 16:54

## 2019-04-21 RX ADMIN — Medication 0.5 MG: at 19:04

## 2019-04-21 RX ADMIN — ONDANSETRON 4 MG: 2 INJECTION INTRAMUSCULAR; INTRAVENOUS at 16:52

## 2019-04-21 ASSESSMENT — ENCOUNTER SYMPTOMS
FLANK PAIN: 1
WHEEZING: 0
LIGHT-HEADEDNESS: 0
NAUSEA: 1
BACK PAIN: 0
SHORTNESS OF BREATH: 0
ADENOPATHY: 0
HEADACHES: 0
NUMBNESS: 0
CONFUSION: 0
VOMITING: 1
CONSTIPATION: 0
WEAKNESS: 0
DIARRHEA: 0
CHILLS: 0
FEVER: 0
ABDOMINAL PAIN: 1
DYSURIA: 0
COUGH: 0
NECK PAIN: 0

## 2019-04-21 ASSESSMENT — MIFFLIN-ST. JEOR: SCORE: 1463.5

## 2019-04-21 NOTE — ED AVS SNAPSHOT
[facility], [place], Emergency Department  [address] [place] [id number]  Phone:  [phone]                                    Trevin Gee   MRN: [id number]    Department:  [facility], Emergency Department   Date of Visit:  4/21/2019           After Visit Summary Signature Page    I have received my discharge instructions, and my questions have been answered. I have discussed any challenges I see with this plan with the nurse or doctor.    ..........................................................................................................................................  Patient/Patient Representative Signature      ..........................................................................................................................................  Patient Representative Print Name and Relationship to Patient    ..................................................               ................................................  Date                                   Time    ..........................................................................................................................................  Reviewed by Signature/Title    ...................................................              ..............................................  Date                                               Time          22EPIC Rev 08/18

## 2019-04-21 NOTE — ED TRIAGE NOTES
Trevin is BIBA from home for right flank pain since 3 PM. His pain seems to wax and wane and sometimes radiate to the front although the patient is unable to isolate the area. He denies hematuria and other symptoms.

## 2019-04-21 NOTE — ED PROVIDER NOTES
History     Chief Complaint   Patient presents with     Flank Pain     HPI  Trevin Gee is a 63 year old male who presents with severe right sided abdominal pain. He was recently admitted to the hospital with abdominal pain and concern for SBO. Symptoms resolved spontaneously. He has a history of Crohn's disease with ileocecectomy and recurrent anastomotic strictures. Today he states he was lying on his left side shortly after a bowel movement when he had onset of severe cramping right abdominal pain radiating to his right flank and associated with nausea and vomiting. The pain is quite different from his typical pain. He has no fever or chills. He has no URI symptoms, cough, sputum, shortness of breath, chest pain, dysuria or hematuria.    Past Medical History:   Diagnosis Date     Anemia 2007     Anxiety 2012?    much worse since July 2014     Cataract 12/2007    both eyes, too much prednisone, implants     Coccidioidomycosis      Crohn disease (H)      Crohn's disease (H) 1/13/2015     Depressive disorder 7/2014    much worse since July 2014     Fracture 1956    green fracture of leg     Gallbladder problem 2005?    much gravel, removed     GERD (gastroesophageal reflux disease)      History of blood transfusion 1988    ulcerated anastomosis term. ilium bleed     Hypertriglyceridemia 7/8/2016     Infection 2007    persistant coccidioidomycosis     Kidney stone various    both sides, all passed naturally     Mental health problem 2007    bipolar though perhaps different     Nephrolithiasis      Osteoporosis 2007    osteoporosis, too much prednisone, last dexa 1/2014     Osteoporosis      Other nervous system complications 2007    prednisone overload induced kevin     Personal history of colonic polyps     small ones found during colonoscopies     Steroid-induced psychosis     Patient extremely sensitive to regular doses of steroids.  Unclear if this is due to chronic fluconazole use or genetic issue.  In  "the future, use caution when prescribing steroids.     TB (tuberculosis)        Past Surgical History:   Procedure Laterality Date     APPENDECTOMY  1974    coincidental with Crohn's surgery     BACK SURGERY  12/2007    kyphoplasty on compressed 4th??? vertebra     BIOPSY  2007, 2013    lump on arm, knee, back of hand for coccidioidomycosis cult.     CHOLECYSTECTOMY  2005    much gravel, removed laparoscopically     COLONOSCOPY  7/14/2014 last    Dr. Catherine Bowden, [facility] - many previous     COLONOSCOPY Left 9/11/2015    Procedure: COMBINED COLONOSCOPY, SINGLE OR MULTIPLE BIOPSY/POLYPECTOMY BY BIOPSY;  Surgeon: Fransisco Leach MD;  Location: [facility] GI     COLONOSCOPY N/A 8/3/2016    Procedure: COMBINED COLONOSCOPY, SINGLE OR MULTIPLE BIOPSY/POLYPECTOMY BY BIOPSY;  Surgeon: Fransisco Leach MD;  Location:  GI     COLONOSCOPY N/A 8/16/2017    Procedure: COMBINED COLONOSCOPY, SINGLE OR MULTIPLE BIOPSY/POLYPECTOMY BY BIOPSY;;  Surgeon: Fransisco Leach MD;  Location: [facility] OR     ENT SURGERY  ?    upper endoscopy     ESOPHAGOSCOPY, GASTROSCOPY, DUODENOSCOPY (EGD), COMBINED N/A 11/8/2016    Procedure: COMBINED ENDOSCOPIC ULTRASOUND, ESOPHAGOSCOPY, GASTROSCOPY, DUODENOSCOPY (EGD), FINE NEEDLE ASPIRATE/BIOPSY;  Surgeon: Guru Alexsandra Ballesteros MD;  Location:  GI     ESOPHAGOSCOPY, GASTROSCOPY, DUODENOSCOPY (EGD), COMBINED N/A 11/8/2016    Procedure: COMBINED ESOPHAGOSCOPY, GASTROSCOPY, DUODENOSCOPY (EGD), BIOPSY SINGLE OR MULTIPLE;  Surgeon: Guru Alexsandra Ballesteros MD;  Location:  GI     ESOPHAGOSCOPY, GASTROSCOPY, DUODENOSCOPY (EGD), COMBINED N/A 8/16/2017    Procedure: COMBINED ESOPHAGOSCOPY, GASTROSCOPY, DUODENOSCOPY (EGD), BIOPSY SINGLE OR MULTIPLE;;  Surgeon: Fransisco Leach MD;  Location: [facility] OR     EYE SURGERY  12/2007    cataract surgery both sides     GI SURGERY  1974, 1986(x2), 1989    Crohn's, 1974 RO 18\" term. ilium + 9\" asc. colon all one pc     " "SOFT TISSUE SURGERY  3/2013    removed buildup on back of r hand, coccidioidomycosis       Family History   Problem Relation Age of Onset     Heart Failure Father      Musculoskeletal Disorder Father         Parkinson's     Cancer - colorectal Mother      Cerebrovascular Disease Paternal Grandmother      Cancer Other      Musculoskeletal Disorder Brother         Parkinson's       Social History     Tobacco Use     Smoking status: Never Smoker     Smokeless tobacco: Never Used   Substance Use Topics     Alcohol use: Yes     Comment: sometimes one glass of wine a week         I have reviewed the Medications, Allergies, Past Medical and Surgical History, and Social History in the Epic system.    Review of Systems   Constitutional: Negative for chills and fever.   HENT: Negative for congestion.    Eyes: Negative for visual disturbance.   Respiratory: Negative for cough, shortness of breath and wheezing.    Cardiovascular: Negative for chest pain and leg swelling.   Gastrointestinal: Positive for abdominal pain, nausea and vomiting. Negative for constipation and diarrhea.   Genitourinary: Positive for flank pain. Negative for dysuria.   Musculoskeletal: Negative for back pain and neck pain.   Skin: Negative for rash.   Neurological: Negative for weakness, light-headedness, numbness and headaches.   Hematological: Negative for adenopathy.   Psychiatric/Behavioral: Negative for confusion.       Physical Exam   BP: (!) 115/96  Pulse: 66  Temp: 97.3  F (36.3  C)  Resp: 20  Height: 177.8 cm (5' 10\")  Weight: 66.2 kg (146 lb)  SpO2: 94 %      Physical Exam   Constitutional: He is oriented to person, place, and time. He appears well-developed and well-nourished. He appears distressed.   HENT:   Head: Normocephalic and atraumatic.   Right Ear: External ear normal.   Left Ear: External ear normal.   Nose: Nose normal.   Mouth/Throat: Oropharynx is clear and moist.   Eyes: Pupils are equal, round, and reactive to light. " Conjunctivae and EOM are normal.   Neck: Normal range of motion. Neck supple.   Cardiovascular: Normal rate, regular rhythm and normal heart sounds.   Pulmonary/Chest: Effort normal and breath sounds normal.   Abdominal: There is generalized tenderness and tenderness in the right upper quadrant, right lower quadrant and periumbilical area. There is no rebound.   Musculoskeletal: He exhibits no edema or tenderness.   Neurological: He is alert and oriented to person, place, and time. He displays normal reflexes. No cranial nerve deficit. Coordination normal.   Skin: Skin is warm and dry.   Psychiatric: He has a normal mood and affect. His behavior is normal.   Nursing note and vitals reviewed.      ED Course        Procedures          The Lactic acid level is elevated due to vomiting, at this time there is no sign of severe sepsis or septic shock.  Labs/Imaging    Results for orders placed or performed during the hospital encounter of 04/21/19 (from the past 24 hour(s))   Lactic acid whole blood   Result Value Ref Range    Lactic Acid 2.8 (H) 0.7 - 2.0 mmol/L   CBC with platelets differential   Result Value Ref Range    WBC 6.1 4.0 - 11.0 10e9/L    RBC Count 4.97 4.4 - 5.9 10e12/L    Hemoglobin 15.5 13.3 - 17.7 g/dL    Hematocrit 46.8 40.0 - 53.0 %    MCV 94 78 - 100 fl    MCH 31.2 26.5 - 33.0 pg    MCHC 33.1 31.5 - 36.5 g/dL    RDW 13.0 10.0 - 15.0 %    Platelet Count 262 150 - 450 10e9/L    Diff Method Automated Method     % Neutrophils 50.7 %    % Lymphocytes 36.5 %    % Monocytes 10.4 %    % Eosinophils 1.2 %    % Basophils 0.7 %    % Immature Granulocytes 0.5 %    Nucleated RBCs 0 0 /100    Absolute Neutrophil 3.1 1.6 - 8.3 10e9/L    Absolute Lymphocytes 2.2 0.8 - 5.3 10e9/L    Absolute Monocytes 0.6 0.0 - 1.3 10e9/L    Absolute Eosinophils 0.1 0.0 - 0.7 10e9/L    Absolute Basophils 0.0 0.0 - 0.2 10e9/L    Abs Immature Granulocytes 0.0 0 - 0.4 10e9/L    Absolute Nucleated RBC 0.0    CRP inflammation   Result  Value Ref Range    CRP Inflammation <2.9 0.0 - 8.0 mg/L   Erythrocyte sedimentation rate auto   Result Value Ref Range    Sed Rate 6 0 - 20 mm/h   INR   Result Value Ref Range    INR 0.99 0.86 - 1.14   Lipase   Result Value Ref Range    Lipase 178 73 - 393 U/L   Comprehensive metabolic panel   Result Value Ref Range    Sodium 140 133 - 144 mmol/L    Potassium 3.4 3.4 - 5.3 mmol/L    Chloride 105 94 - 109 mmol/L    Carbon Dioxide 28 20 - 32 mmol/L    Anion Gap 7 3 - 14 mmol/L    Glucose 120 (H) 70 - 99 mg/dL    Urea Nitrogen 12 7 - 30 mg/dL    Creatinine 1.42 (H) 0.66 - 1.25 mg/dL    GFR Estimate 52 (L) >60 mL/min/[1.73_m2]    GFR Estimate If Black 60 (L) >60 mL/min/[1.73_m2]    Calcium 9.3 8.5 - 10.1 mg/dL    Bilirubin Total 1.1 0.2 - 1.3 mg/dL    Albumin 3.9 3.4 - 5.0 g/dL    Protein Total 7.3 6.8 - 8.8 g/dL    Alkaline Phosphatase 107 40 - 150 U/L    ALT 23 0 - 70 U/L    AST 23 0 - 45 U/L   CT Abdomen Pelvis w Contrast   Result Value Ref Range    Radiologist flags Obstructing stone (Urgent)     Narrative    EXAMINATION: CT ABDOMEN PELVIS W CONTRAST, 4/21/2019 5:20 PM    TECHNIQUE:  Helical CT images from the lung bases through the  symphysis pubis were obtained with IV contrast. Contrast dose: 89cc of  Isovue 370    COMPARISON: CT abdomen pelvis 4/16/2019.    HISTORY: Crohn's, history of ileocecectomy and strictures Severe  abdominal pain.    FINDINGS:    Abdomen and pelvis:     5 x 4 mm obstructing distal right ureter stone just above the right  UVJ with mild to moderate right-sided hydroureteronephrosis. Few  additional nonobstructing right-sided renal stone. Small  nonobstructing left-sided renal stone. Bilateral simple renal cysts.    Liver, adrenal glands, spleen, pancreas are unremarkable.  Cholecystectomy. Borderline dilated and stool-filled left lower  quadrant small bowel loops. Mildly distended transverse colon. No free  air or fluid in the abdomen and pelvis.    Lung bases: Minimal left basilar  atelectasis.    Bones and soft tissues: No acute or suspicious osseous lesion.  Degenerative changes of the L3-L4.      Impression    IMPRESSION:   1. 5 x 4 mm obstructing distal right ureter stone just above the right  UVJ with mild to moderate hydroureteronephrosis. Additional  nonobstructing bilateral renal stones.  2. Nonspecific borderline dilated and stool-filled left lower quadrant  small bowel loops.    [Urgent Result: Obstructing stone]    Finding was identified on 4/21/2019 5:26 PM.     Dr. Pope was contacted by Dr. Juliette Tracey M.D.  at  4/21/2019 5:31 PM and verbalized understanding of the urgent finding.     I have personally reviewed the examination and initial interpretation  and I agree with the findings.    SABA TURNER MD   UA with Microscopic   Result Value Ref Range    Color Urine Straw     Appearance Urine Clear     Glucose Urine Negative NEG^Negative mg/dL    Bilirubin Urine Negative NEG^Negative    Ketones Urine 10 (A) NEG^Negative mg/dL    Specific Gravity Urine 1.019 1.003 - 1.035    Blood Urine Negative NEG^Negative    pH Urine 5.5 5.0 - 7.0 pH    Protein Albumin Urine Negative NEG^Negative mg/dL    Urobilinogen mg/dL Normal 0.0 - 2.0 mg/dL    Nitrite Urine Negative NEG^Negative    Leukocyte Esterase Urine Negative NEG^Negative    Source Urine     WBC Urine <1 0 - 5 /HPF    RBC Urine 1 0 - 2 /HPF    Mucous Urine Present (A) NEG^Negative /LPF   Urine Culture   Result Value Ref Range    Specimen Description Unspecified Urine     Special Requests Specimen received in preservative     Culture Micro PENDING          Assessments & Plan (with Medical Decision Making)   Impression:  Middle aged male with a history of Crohn's disease, ileocolic resection with anastomotic strictures. He presents with acute onset of severe right flank and RLQ abdominal pain associated with nausea and vomiting different from his past pain today. He has a 5 mm obstructing stone in the distal right  ureter. He is afebrile, has normal WBC. He has chronic renal insufficiency with creatinine similar to his recent past results. Urine has no evidence of infection. ESR and CRP are normal. He has had past calcium urate stones. His pain has been well controlled for several hours after receiving initial dilaudid for pain. Vital signs remain stable. We discussed ED observation for pain control vs home management. Mr Gee feels comfortable with home management. In absence of evidence of infection he will be sent home on flomax, urine straining and vicodin for pain and extra fluids. He should push fluids. We discussed red flag issues for return such as fever, uncontrolled pain or frequent vomiting.    I have reviewed the nursing notes.    I have reviewed the findings, diagnosis, plan and need for follow up with the patient.       Medication List      Started    HYDROcodone-acetaminophen 5-325 MG tablet  Commonly known as:  NORCO  1 tablet, Oral, EVERY 6 HOURS PRN     ondansetron 4 MG ODT tab  Commonly known as:  ZOFRAN ODT  4 mg, Oral, EVERY 8 HOURS PRN     tamsulosin 0.4 MG capsule  Commonly known as:  FLOMAX  0.4 mg, Oral, DAILY            Final diagnoses:   Renal colic   Ureteral stone       4/21/2019   [facility], [place], EMERGENCY DEPARTMENT     Trevin Massey MD  04/21/19 [id number]

## 2019-04-22 ENCOUNTER — PATIENT OUTREACH (OUTPATIENT)
Dept: GASTROENTEROLOGY | Facility: CLINIC | Age: 64
End: 2019-04-22

## 2019-04-22 DIAGNOSIS — K50.90 CROHN'S DISEASE (H): Primary | ICD-10-CM

## 2019-04-22 LAB
BACTERIA SPEC CULT: NO GROWTH
Lab: NORMAL
SPECIMEN SOURCE: NORMAL

## 2019-04-22 NOTE — DISCHARGE INSTRUCTIONS
Strain your urine through the filter. Save stone when passed.  Flomax 0.4 mg daily.  Vicodin 1-2 every 6 hours as needed for pain.  Zofran 4 mg every 8 hours as needed for nausea.  Return for fever, uncontrolled pain, uncontrolled nausea or vomiting.  Please make an appointment to follow up with Urology Clinic (phone: [phone]) in 7-10 days.

## 2019-04-22 NOTE — PROGRESS NOTES
Order placed for mre. Called patient to discuss mre and number to call to set up appointment. Pt recently discharged from hospital and also see in emergency room for kidney stone. Pt will wait a few days to recover from kidney stone.

## 2019-04-23 NOTE — RESULT ENCOUNTER NOTE
Final urine culture report is NEGATIVE per [facility] Lab Result protocol.    If NEGATIVE result, no change in treatment, per [facility] Lab Result protocol.

## 2019-04-26 ENCOUNTER — PATIENT OUTREACH (OUTPATIENT)
Dept: GASTROENTEROLOGY | Facility: CLINIC | Age: 64
End: 2019-04-26

## 2019-04-26 NOTE — PROGRESS NOTES
Pt called in to say he is experiencing some stomach pain. That he thinks he has a case of gastritis. Started first dose of omeprazole.  Denies any belching, vomiting, or  bile regurgitation. Pt has started a soft bland diet. Sent him the gerd precautions via my chart.  Pt will take omeprazole at least 30 minutes before he eats. [place] diet and avoid caeffine. Pt will update early next week.

## 2019-05-21 ENCOUNTER — OFFICE VISIT (OUTPATIENT)
Dept: INTERNAL MEDICINE | Facility: CLINIC | Age: 64
End: 2019-05-21
Payer: COMMERCIAL

## 2019-05-21 VITALS
OXYGEN SATURATION: 97 % | SYSTOLIC BLOOD PRESSURE: 125 MMHG | DIASTOLIC BLOOD PRESSURE: 78 MMHG | WEIGHT: 148 LBS | BODY MASS INDEX: 21.92 KG/M2 | HEART RATE: 64 BPM | HEIGHT: 69 IN

## 2019-05-21 DIAGNOSIS — N20.0 KIDNEY STONE: Primary | ICD-10-CM

## 2019-05-21 DIAGNOSIS — R79.89 ELEVATED SERUM CREATININE: ICD-10-CM

## 2019-05-21 DIAGNOSIS — N20.0 KIDNEY STONE: ICD-10-CM

## 2019-05-21 LAB
ALBUMIN UR-MCNC: NEGATIVE MG/DL
ANION GAP SERPL CALCULATED.3IONS-SCNC: 6 MMOL/L (ref 3–14)
APPEARANCE UR: CLEAR
BILIRUB UR QL STRIP: NEGATIVE
BUN SERPL-MCNC: 16 MG/DL (ref 7–30)
CALCIUM SERPL-MCNC: 9.3 MG/DL (ref 8.5–10.1)
CHLORIDE SERPL-SCNC: 104 MMOL/L (ref 94–109)
CO2 SERPL-SCNC: 27 MMOL/L (ref 20–32)
COLOR UR AUTO: ABNORMAL
CREAT SERPL-MCNC: 1.88 MG/DL (ref 0.66–1.25)
ERYTHROCYTE [DISTWIDTH] IN BLOOD BY AUTOMATED COUNT: 12.8 % (ref 10–15)
GFR SERPL CREATININE-BSD FRML MDRD: 37 ML/MIN/{1.73_M2}
GLUCOSE SERPL-MCNC: 116 MG/DL (ref 70–99)
GLUCOSE UR STRIP-MCNC: NEGATIVE MG/DL
HCT VFR BLD AUTO: 46.1 % (ref 40–53)
HGB BLD-MCNC: 15.4 G/DL (ref 13.3–17.7)
HGB UR QL STRIP: ABNORMAL
KETONES UR STRIP-MCNC: NEGATIVE MG/DL
LEUKOCYTE ESTERASE UR QL STRIP: NEGATIVE
MCH RBC QN AUTO: 31.3 PG (ref 26.5–33)
MCHC RBC AUTO-ENTMCNC: 33.4 G/DL (ref 31.5–36.5)
MCV RBC AUTO: 94 FL (ref 78–100)
MUCOUS THREADS #/AREA URNS LPF: PRESENT /LPF
NITRATE UR QL: NEGATIVE
PH UR STRIP: 5 PH (ref 5–7)
PLATELET # BLD AUTO: 195 10E9/L (ref 150–450)
POTASSIUM SERPL-SCNC: 4 MMOL/L (ref 3.4–5.3)
RBC # BLD AUTO: 4.92 10E12/L (ref 4.4–5.9)
RBC #/AREA URNS AUTO: 2 /HPF (ref 0–2)
SODIUM SERPL-SCNC: 138 MMOL/L (ref 133–144)
SOURCE: ABNORMAL
SP GR UR STRIP: 1 (ref 1–1.03)
UROBILINOGEN UR STRIP-MCNC: 0 MG/DL (ref 0–2)
WBC # BLD AUTO: 9.3 10E9/L (ref 4–11)
WBC #/AREA URNS AUTO: <1 /HPF (ref 0–5)

## 2019-05-21 RX ORDER — HYDROCODONE BITARTRATE AND ACETAMINOPHEN 5; 325 MG/1; MG/1
1 TABLET ORAL EVERY 6 HOURS PRN
Qty: 15 TABLET | Refills: 0 | Status: ON HOLD | OUTPATIENT
Start: 2019-05-21 | End: 2021-01-17

## 2019-05-21 RX ORDER — ONDANSETRON 4 MG/1
4 TABLET, ORALLY DISINTEGRATING ORAL EVERY 8 HOURS PRN
Qty: 10 TABLET | Refills: 0 | Status: ON HOLD | OUTPATIENT
Start: 2019-05-21 | End: 2021-01-17

## 2019-05-21 RX ORDER — TAMSULOSIN HYDROCHLORIDE 0.4 MG/1
0.4 CAPSULE ORAL DAILY
Qty: 30 CAPSULE | Refills: 0 | Status: SHIPPED | OUTPATIENT
Start: 2019-05-21 | End: 2020-01-27

## 2019-05-21 ASSESSMENT — PAIN SCALES - GENERAL: PAINLEVEL: NO PAIN (0)

## 2019-05-21 ASSESSMENT — MIFFLIN-ST. JEOR: SCORE: 1451.95

## 2019-05-21 NOTE — NURSING NOTE
Chief Complaint   Patient presents with     Kidney Stone Related     Patient has some questions about meds related to ureter stone        Kiana Kelly EMT at 2:30 PM on 5/21/2019.

## 2019-05-21 NOTE — PROGRESS NOTES
[facility] [place]   Lacey LMookie eFng, MARISOL CNP  05/21/2019      Chief Complaint:   Kidney Stone Related       History of Present Illness:   Trevin Gee is a 64 year old male with a history of coccidioidomycosis, Crohn's disease, and gastroesophageal reflux disease who presents alone for evaluation of kidney stone related problem. He has a history of kidney stones and was found to have one recently. He was seen in the ED on 4/21/19 where he was found to have a 5 mm obstructing stone in the distal R ureter; he believes this passed into his bladder while he was in the ED, as pain abruptly improved while he was there. He was given a prescription for Flomax and a sieve at the time of discharge and has been sifting for weeks but has not found any stones, but did see some small particles of debris. This morning he developed kidney stone-related pain located on his lower back which is gone right now, however he is not sure if this is due to his Norco or if he passed the stone. He took Norco this morning at 7am. This seemed to help to gradually alleviate his pain. He denies nausea and vomiting but does note that he became a little nauseous this morning due to his pain. He did complete his course of prescribed Flomax. Of note, he believes he has passed a total of 4 stones within his lifetime thus far. The stones that were analyzed years ago were found to be calcium oxylate stones.     Within the last week his bowel movements have not been abnormal for him. He denies any blood in his stool. Of note, he has recently been dealing with gastritis as well.  He was advised to follow-up with GI following hospitalization for ileus (4/16-4/19/19). He notes that this pain has improved. He questions whether the MRE is really necessary (had been recommended on his hospital discharge).    Review of Systems:   Pertinent items are noted in HPI, remainder of complete ROS is negative.      Active Medications:       acetaminophen (TYLENOL) 500 MG tablet, Take 500-1,000 mg by mouth every 6 hours as needed for mild pain, Disp: , Rfl:      Ascorbic Acid (VITAMIN C PO), Take 500 mg by mouth, Disp: , Rfl:      calcium-vitamin D (CALTRATE) 600-400 MG-UNIT per tablet, Take 1 tablet by mouth 2 times daily, Disp: , Rfl:      Cholecalciferol (VITAMIN D3 PO), Take 1,000 Units by mouth daily, Disp: , Rfl:      Cyanocobalamin (VITAMIN B 12 PO), Take 500 mcg by mouth, Disp: , Rfl:      ferrous sulfate 325 (65 FE) MG tablet, Take by mouth daily (with breakfast), Disp: , Rfl:      fluconazole (DIFLUCAN) 200 MG tablet, Take 1 tablet (200 mg) by mouth daily, Disp: 90 tablet, Rfl: 3     MELATONIN PO, Take 1.5 mg by mouth nightly as needed , Disp: , Rfl:      multivitamin, therapeutic with minerals (MULTI-VITAMIN) TABS, Take 1 tablet by mouth daily, Disp: , Rfl:      Pyridoxine HCl (VITAMIN B6 PO), Take 100 mg by mouth, Disp: , Rfl:      tiZANidine (ZANAFLEX) 4 MG tablet, Take 1-2 tablets (4-8 mg) by mouth 3 times daily as needed for muscle spasms, Disp: 30 tablet, Rfl: 0     traZODone (DESYREL) 50 MG tablet, Take 1 tablet (50 mg) by mouth nightly as needed for sleep, Disp: 90 tablet, Rfl: 1      Allergies:   Prednisone      Past Medical History:  Anemia  Anxiety  Coccidioidomycosis  Crohn's disease  Depressive disorder  Gastroesophageal reflux disease  Gallbladder problem  Hypertriglyceridemia  Kidney stones - all passed naturally  Nephrolithiasis  Osteoporosis  Colonic polyps  Steroid-induced psychosis  Mental health problem   Tuberculosis  Anxiety  Vitamin B12 deficiency  Steroid dependent for adrenal suppression  Bipolar 2 disorder  Lactose intolerance  Vertebral fracture, osteoporotic     Past Surgical History:  Appendectomy  Back surgery  Biopsy, lump on arm, knee, back of hand   Cholecystectomy  Upper endoscopy  Esophagoscopy, gastroscopy, duodenoscopy, combined (x3)  Cataract surgery on both sides  Gastrointestinal surgery  Soft tissue  surgery, removed buildup on back of right hand, coccidioidosis     Family History:   Heart failure - father  Parkinson's disease - father, brother  Colorectal cancer - mother  Cerebrovascular disease - paternal grandmother       Social History:   The patient is , a nonsmoker, and does occasionally consume alcohol. 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      Recent Labs:  Component      Latest Ref Rng & Units 5/21/2019   Color Urine       Straw   Appearance Urine       Clear   Glucose Urine      NEG:Negative mg/dL Negative   Bilirubin Urine      NEG:Negative Negative   Ketones Urine      NEG:Negative mg/dL Negative   Specific Gravity Urine      1.003 - 1.035 1.005   Blood Urine      NEG:Negative Moderate (A)   pH Urine      5.0 - 7.0 pH 5.0   Protein Albumin Urine      NEG:Negative mg/dL Negative   Urobilinogen mg/dL      0.0 - 2.0 mg/dL 0.0   Nitrite Urine      NEG:Negative Negative   Leukocyte Esterase Urine      NEG:Negative Negative   Source       Midstream Urine   WBC Urine      0 - 5 /HPF <1   RBC Urine      0 - 2 /HPF 2   Mucous Urine      NEG:Negative /LPF Present (A)   Sodium      133 - 144 mmol/L 138   Potassium      3.4 - 5.3 mmol/L 4.0   Chloride      94 - 109 mmol/L 104   Carbon Dioxide      20 - 32 mmol/L 27   Anion Gap      3 - 14 mmol/L 6   Glucose      70 - 99 mg/dL 116 (H)   Urea Nitrogen      7 - 30 mg/dL 16   Creatinine      0.66 - 1.25 mg/dL 1.88 (H)   GFR Estimate      >60 mL/min/1.73:m2 37 (L)   GFR Estimate If Black      >60 mL/min/1.73:m2 43 (L)   Calcium      8.5 - 10.1 mg/dL 9.3   WBC      4.0 - 11.0 10e9/L 9.3   RBC Count      4.4 - 5.9 10e12/L 4.92   Hemoglobin      13.3 - 17.7 g/dL 15.4   Hematocrit      40.0 - 53.0 % 46.1   MCV      78 - 100 fl 94   MCH      26.5 - 33.0 pg 31.3   MCHC      31.5 - 36.5 g/dL 33.4   RDW      10.0 - 15.0 % 12.8   Platelet Count      150 - 450 10e9/L 195        Physical Exam:   /78 (BP Location: Right arm, Patient Position:  "Sitting, Cuff Size: Adult Regular)   Pulse 64   Ht 1.753 m (5' 9.02\")   Wt 67.1 kg (148 lb)   SpO2 97%   BMI 21.85 kg/m     Constitutional: Alert, oriented, pleasant, no acute distress  Head: Normocephalic, atraumatic  Eyes: Extra-ocular movements intact, pupils equally round and reactive bilaterally, no scleral icterus  ENT: Oropharynx clear, moist mucus membranes, good dentition  Neck: Supple, no lymphadenopathy, no thyromegaly  Cardiovascular: Regular rate and rhythm, no murmurs, rubs or gallops  Respiratory: Good air movement bilaterally, lungs clear, no wheezes/rales/rhonchi  GI: Abdomen soft, bowel sounds present, nondistended, nontender, no organomegaly or masses, no rebound/guarding, no CVA tenderness   Musculoskeletal: No edema, normal muscle tone, normal gait  Psychiatric: normal mentation, affect and mood      Assessment and Plan:  1. Kidney stone  Patient has a history of kidney stones and was found to have a new obstructing stone on the R while in the ED last month. This morning he developed some severe pain that he thought was kidney stone related. We will recheck blood work for kidney function and to check for infection. I also gave him a prescription for Flomax for him to use if his symptoms act up again. I recommended follow-up with GI and urology if the kidney issues persist.   Creatine is elevated today, will recheck in 1 week, advised pushing fluids and f/u with Urology.  - ondansetron (ZOFRAN ODT) 4 MG ODT tab  Dispense: 10 tablet; Refill: 0  - HYDROcodone-acetaminophen (NORCO) 5-325 MG tablet  Dispense: 15 tablet; Refill: 0  - UA with Micro reflex to Culture  - tamsulosin (FLOMAX) 0.4 MG capsule  Dispense: 30 capsule; Refill: 0  - CBC with platelets  - Basic metabolic panel       Follow-up: as needed if symptoms worsen or persist.         Scribe Disclosure:  I, Iman Clement, am serving as a scribe to document services personally performed by MARISOL Alfaro CNP at this visit, " based upon the provider's statements to me. All documentation has been reviewed by the aforementioned provider prior to being entered into the official medical record.     Portions of this medical record were completed by a scribe. UPON MY REVIEW AND AUTHENTICATION BY ELECTRONIC SIGNATURE, this confirms (a) I performed the applicable clinical services, and (b) the record is accurate.     MARISOL Alfaro CNP

## 2019-05-21 NOTE — PROGRESS NOTES
[facility]   Lacey ARRIETAMookie Feng, APRN CNP  05/21/2019      Chief Complaint:   No chief complaint on file.       History of Present Illness:   Trevin Gee is a 64 year old male with a history of coccidioidomycosis, Crohn's disease, and gastroesophageal reflux disease who presents *** for evaluation of ***.    Other concerns discussed:  ***     Review of Systems:   Pertinent items are noted in HPI, remainder of complete ROS is negative.      Active Medications:     Current Outpatient Medications:      acetaminophen (TYLENOL) 500 MG tablet, Take 500-1,000 mg by mouth every 6 hours as needed for mild pain, Disp: , Rfl:      Ascorbic Acid (VITAMIN C PO), Take 500 mg by mouth, Disp: , Rfl:      calcium-vitamin D (CALTRATE) 600-400 MG-UNIT per tablet, Take 1 tablet by mouth 2 times daily, Disp: , Rfl:      Cholecalciferol (VITAMIN D3 PO), Take 1,000 Units by mouth daily, Disp: , Rfl:      Cyanocobalamin (VITAMIN B 12 PO), Take 500 mcg by mouth, Disp: , Rfl:      ferrous sulfate 325 (65 FE) MG tablet, Take by mouth daily (with breakfast), Disp: , Rfl:      fluconazole (DIFLUCAN) 200 MG tablet, Take 1 tablet (200 mg) by mouth daily, Disp: 90 tablet, Rfl: 3     MELATONIN PO, Take 1.5 mg by mouth nightly as needed , Disp: , Rfl:      multivitamin, therapeutic with minerals (MULTI-VITAMIN) TABS, Take 1 tablet by mouth daily, Disp: , Rfl:      Pyridoxine HCl (VITAMIN B6 PO), Take 100 mg by mouth, Disp: , Rfl:      tiZANidine (ZANAFLEX) 4 MG tablet, Take 1-2 tablets (4-8 mg) by mouth 3 times daily as needed for muscle spasms, Disp: 30 tablet, Rfl: 0     traZODone (DESYREL) 50 MG tablet, Take 1 tablet (50 mg) by mouth nightly as needed for sleep, Disp: 90 tablet, Rfl: 1      Allergies:   Prednisone      Past Medical History:  Anemia  Anxiety  Coccidioidomycosis  Crohn's disease  Depressive disorder  Gastroesophageal reflux disease  Gallbladder problem  Hypertriglyceridemia  Kidney stones - all passed  naturally  Nephrolithiasis  Osteoporosis  Colonic polyps  Steroid-induced psychosis  Mental health problem   Tuberculosis  Anxiety  Vitamin B12 deficiency  Steroid dependent for adrenal suppression  Bipolar 2 disorder  Lactose intolerance  Vertebral fracture, osteoporotic     Past Surgical History:  Appendectomy  Back surgery  Biopsy, lump on arm, knee, back of hand   Cholecystectomy  Upper endoscopy  Esopohagoscopy, gastroscopy, duodenoscopy, combined (x3)  Cataract surgery on both sides  Gastrointestinal surgery  Soft tissue surgery, removed buildup on back of right hand, coccidioidosis     Family History:   Heart failure - father  Parkinson's disease - father, brother  Colorectal cancer - mother  Cerebrovascular disease - paternal grandmother       Social History:   The patient is , a nonsmoker, and does occasionally consume alcohol. 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       Physical Exam:   There were no vitals taken for this visit.   Constitutional: Alert, oriented, pleasant, no acute distress  Head: Normocephalic, atraumatic  Eyes: Extra-ocular movements intact, pupils equally round and reactive bilaterally, no scleral icterus  Ears: tympanic membranes pearly gray with positive light reflex  ENT: Oropharynx clear, moist mucus membranes, good dentition  Neck: Supple, no lymphadenopathy, no thyromegaly  Cardiovascular: Regular rate and rhythm, no murmurs, rubs or gallops, peripheral pulses full/symmetric  Respiratory: Good air movement bilaterally, lungs clear, no wheezes/rales/rhonchi  GI: Abdomen soft, bowel sounds present, nondistended, nontender, no organomegaly or masses, no rebound/guarding  Musculoskeletal: No edema, normal muscle tone, normal gait  Neurologic: Alert and oriented, cranial nerves 2-12 intact, strength 5/5 throughout, sensation to light touch intact, reflexes 2+ bilaterally  Skin: No rashes/lesions  Psychiatric: normal mentation, affect and mood       Assessment and Plan:  There are no diagnoses linked to this encounter.     Follow-up: No follow-ups on file.         Scribe Disclosure:  I, Iman Clement, am serving as a scribe to document services personally performed by MARISOL Alfaro CNP at this visit, based upon the provider's statements to me. All documentation has been reviewed by the aforementioned provider prior to being entered into the official medical record.     Portions of this medical record were completed by a scribe. UPON MY REVIEW AND AUTHENTICATION BY ELECTRONIC SIGNATURE, this confirms (a) I performed the applicable clinical services, and (b) the record is accurate.

## 2019-05-21 NOTE — PATIENT INSTRUCTIONS
[facility] Medication Refill Request Information:  * Please contact your pharmacy regarding ANY request for medication refills.  ** [facility] Prescription Fax = [phone]  * Please allow 3 business days for routine medication refills.  * Please allow 5 business days for controlled substance medication refills.     [facility] Test Result notification information:  *You will be notified with in 7-10 days of your appointment day regarding the results of your test.  If you are on MyChart you will be notified as soon as the provider has reviewed the results and signed off on them.    [facility]: [phone]

## 2019-05-23 ENCOUNTER — TELEPHONE (OUTPATIENT)
Dept: GASTROENTEROLOGY | Facility: CLINIC | Age: 64
End: 2019-05-23

## 2019-05-23 ENCOUNTER — PATIENT OUTREACH (OUTPATIENT)
Dept: GASTROENTEROLOGY | Facility: CLINIC | Age: 64
End: 2019-05-23

## 2019-05-23 DIAGNOSIS — K50.90 CROHN'S DISEASE (H): Primary | ICD-10-CM

## 2019-05-23 NOTE — PROGRESS NOTES
"Called pt to inform him that he is due for another colonoscopy with Dr. Leach. Also if he has second thoughts of the mre. Pt said he is wondering if this is related to his recent admission. He wants to think about the mre. Does not feel like drinking the contrast as he has just recovered from gastritis. Also will wait for awhile to schedule his colonoscopy. Pt requesting to talk to Dr. Leach to discuss timing and reasons for mre and colonoscopy .            Yes - he had an \"ileus\" in April - concern is really that his stricture is back and he needs repeat dilation.     Ideally want both MRE and colonoscopy - although looks like he does not want MRE based on PCP note.  "

## 2019-05-23 NOTE — TELEPHONE ENCOUNTER
Referring Provider: Fransisco Leach MD    What is your current height? 5'9    What is your current weight?148    Are you on daily home oxygen? no    Have you had a heart or lung transplant? no      In the past year, have you had any heart related issues  Including cardiomyopathy or a MI within 6 months, that required cardiac stenting or other implantable devices? no    What type of implantable device do you have? no    Do you take nitroglycerin? If yes, how often? no    Are you currently taking any blood thinners?no      (Females) Are you currently pregnant? no  If yes, how many weeks?      Have you had a procedure in the past that was difficult to tolerate with conscious sedation? Any allergies to Fentanyl or Versed no        Do you currently use any of the following?    Are you taking any scheduled prescription narcotics more than once daily? no    Drink alcohol daily? no    Currently using any street drugs or methadone?no    Do you have any history of post-traumatic stress syndrome or mental health issues? yes

## 2019-06-13 ENCOUNTER — PATIENT OUTREACH (OUTPATIENT)
Dept: GASTROENTEROLOGY | Facility: CLINIC | Age: 64
End: 2019-06-13

## 2019-06-13 NOTE — PROGRESS NOTES
Patient called to discuss his concerns with drinking the prep for mre as he is having pain and symptoms.  Pt wondering if he can take norco prior to exam. Pt transferred to imaging scheduling and then to take to tech about norco prior to exam.

## 2019-06-17 ENCOUNTER — HOSPITAL ENCOUNTER (OUTPATIENT)
Dept: MRI IMAGING | Facility: CLINIC | Age: 64
Discharge: HOME | End: 2019-06-17
Attending: INTERNAL MEDICINE | Admitting: INTERNAL MEDICINE
Payer: COMMERCIAL

## 2019-06-17 DIAGNOSIS — K50.90 CROHN'S DISEASE (H): ICD-10-CM

## 2019-06-17 PROCEDURE — 72197 MRI PELVIS W/O & W/DYE: CPT

## 2019-06-17 PROCEDURE — 25000128 H RX IP 250 OP 636: Performed by: INTERNAL MEDICINE

## 2019-06-17 PROCEDURE — 25500064 ZZH RX 255 OP 636: Performed by: INTERNAL MEDICINE

## 2019-06-17 PROCEDURE — A9585 GADOBUTROL INJECTION: HCPCS | Performed by: INTERNAL MEDICINE

## 2019-06-17 PROCEDURE — 25000128 H RX IP 250 OP 636: Performed by: RADIOLOGY

## 2019-06-17 RX ORDER — GADOBUTROL 604.72 MG/ML
7.5 INJECTION INTRAVENOUS ONCE
Status: COMPLETED | OUTPATIENT
Start: 2019-06-17 | End: 2019-06-17

## 2019-06-17 RX ORDER — ONDANSETRON 2 MG/ML
4 INJECTION INTRAMUSCULAR; INTRAVENOUS
Status: COMPLETED | OUTPATIENT
Start: 2019-06-17 | End: 2019-06-17

## 2019-06-17 RX ADMIN — GLUCAGON HYDROCHLORIDE 0.5 MG: 1 INJECTION, POWDER, FOR SOLUTION INTRAMUSCULAR; INTRAVENOUS; SUBCUTANEOUS at 12:41

## 2019-06-17 RX ADMIN — GADOBUTROL 7.5 ML: 604.72 INJECTION INTRAVENOUS at 13:03

## 2019-06-17 RX ADMIN — ONDANSETRON 4 MG: 2 INJECTION INTRAMUSCULAR; INTRAVENOUS at 12:42

## 2019-07-08 ENCOUNTER — TELEPHONE (OUTPATIENT)
Dept: GASTROENTEROLOGY | Facility: OUTPATIENT CENTER | Age: 64
End: 2019-07-08

## 2019-07-08 NOTE — TELEPHONE ENCOUNTER
Patient taking any blood thinners ? No     Heart disease ? Denies     Lung disease ?Denies       Sleep apnea ?Denies     Diabetic ?Denies     Kidney disease ?Denies     Electronic implanted medical devices ?Denies     Are you taking any narcotic pain medication ? Denies   What is your daily dosage ?    PTSD ? N/a     Prep instructions reviewed with patient ? Patient declined review.  policy, MAC sedation plan reviewed. Advised patient to have someone stay with him post exam    Pharmacy : [facility]    Indication for procedure :Crohn's disease (H) [K50.90] AD JENNINGS     Referring provider : Self      Arrival Time :7 AM

## 2019-07-09 DIAGNOSIS — Z12.11 ENCOUNTER FOR SCREENING COLONOSCOPY: Primary | ICD-10-CM

## 2019-07-10 ENCOUNTER — PATIENT OUTREACH (OUTPATIENT)
Dept: GASTROENTEROLOGY | Facility: CLINIC | Age: 64
End: 2019-07-10

## 2019-07-10 DIAGNOSIS — K50.90 CROHN'S DISEASE (H): Primary | ICD-10-CM

## 2019-07-15 ENCOUNTER — DOCUMENTATION ONLY (OUTPATIENT)
Dept: GASTROENTEROLOGY | Facility: OUTPATIENT CENTER | Age: 64
End: 2019-07-15
Payer: COMMERCIAL

## 2019-07-15 ENCOUNTER — TRANSFERRED RECORDS (OUTPATIENT)
Dept: HEALTH INFORMATION MANAGEMENT | Facility: CLINIC | Age: 64
End: 2019-07-15

## 2019-07-15 DIAGNOSIS — R19.7 DIARRHEA, UNSPECIFIED TYPE: Primary | ICD-10-CM

## 2019-07-15 RX ORDER — DICYCLOMINE HYDROCHLORIDE 10 MG/1
10 CAPSULE ORAL
Qty: 90 CAPSULE | Refills: 1 | Status: SHIPPED | OUTPATIENT
Start: 2019-07-15 | End: 2020-11-13

## 2019-07-16 DIAGNOSIS — R19.7 DIARRHEA, UNSPECIFIED TYPE: ICD-10-CM

## 2019-07-16 LAB — COPATH REPORT: NORMAL

## 2019-07-17 ENCOUNTER — PATIENT OUTREACH (OUTPATIENT)
Dept: GASTROENTEROLOGY | Facility: CLINIC | Age: 64
End: 2019-07-17

## 2019-07-17 DIAGNOSIS — R19.7 DIARRHEA, UNSPECIFIED TYPE: ICD-10-CM

## 2019-07-17 PROCEDURE — 83516 IMMUNOASSAY NONANTIBODY: CPT | Performed by: INTERNAL MEDICINE

## 2019-07-17 PROCEDURE — 36415 COLL VENOUS BLD VENIPUNCTURE: CPT | Performed by: INTERNAL MEDICINE

## 2019-07-19 LAB
GLIADIN IGA SER-ACNC: 1 U/ML
GLIADIN IGG SER-ACNC: <1 U/ML
TTG IGA SER-ACNC: <1 U/ML
TTG IGG SER-ACNC: <1 U/ML

## 2019-07-29 ENCOUNTER — OFFICE VISIT (OUTPATIENT)
Dept: GASTROENTEROLOGY | Facility: CLINIC | Age: 64
End: 2019-07-29
Payer: COMMERCIAL

## 2019-07-29 VITALS — WEIGHT: 147 LBS | BODY MASS INDEX: 21.7 KG/M2

## 2019-07-29 DIAGNOSIS — Z71.3 NUTRITIONAL COUNSELING: ICD-10-CM

## 2019-07-29 DIAGNOSIS — K63.8219 SMALL INTESTINAL BACTERIAL OVERGROWTH: ICD-10-CM

## 2019-07-29 DIAGNOSIS — K50.90 CROHN'S DISEASE (H): Primary | ICD-10-CM

## 2019-07-29 NOTE — PROGRESS NOTES
[facility] Outpatient Medical Nutrition Therapy      Time Spent: 60 minutes  Session Type: Follow-up Individual Session  Referring Physician: Dr. Leach  Reason for RD Visit: IBD     Nutrition Plan: Continue current diet as it seems to facilitate symptom control    Recommendations for MD/Provider to order: None at this time    Malnutrition Diagnosis: Non-Severe malnutrition  In Context of:  Chronic illness or disease     Nutrition Assessment:  Patient is here for follow-up visit with Registered Dietitian (RD).  Patient is a 63 year old male with history of Crohn's (ileo-colonic; inflammatory). His surgical history is pertinent for ileocectomy in 1974 with multiple revisions of the anastomosis. Upon presentation today he notes that symptoms have dramatically improved in the past 2-3 weeks while following a minimal gluten diet.    Past Surgical History: Ileocecectomy 1974; Revision of anastomosis 1986 and 1989.    Symptom Review  1. Nausea/vomiting? No  2. Heartburn? No  3. Bloating? No  4. Belching? No  5. Feeling full quickly? No  6. Decreased appetite? No, but notes that he isn't really hungry in the morning.  7. Weight loss/gain? No  8. Constipation/Diarrhea? No  9. Abdominal pain/pain with or without eating? No  10. Feeling tired? No    Dietary beliefs and Practices  1.  Do you take any vitamin, mineral, or herbal supplements? Vitamin C, Calcium+D, Vitamin D3, Vitamin B12, Iron, Vitamin B6, MVI  2.  Do you use any calorie/protein supplements?  No    Diet Recall:  (Typical intake)  Meal Name Time Food (Yesterday)   Breakfast  Gluten-free or low wheat cereals - cheerios, rice chex, rice with lactose free milk. Also adds frozen blueberries.        Lunch  Used to have ham sandwich or almond butter sandwich, but due to missing front tooth now having a 1.5 servings of mashed potatoes        Dinner  Porkribs, chicken, fish. Occasionally can of soup. Always potatoes or sweet potatoes AND some combo of green beans or  "broccoli          Cereals as snacks AND small dish of ice cream        Snacks  Almonds, cereals low in wheat, banana chips   Beverages  Diluted fruit juice   Alcohol   None   *Snacking a lot on almonds and cereals that are \"low in wheat\"     Anthropometrics:   Body mass index is 21.7 kg/m .     Usual Weight: 150-160 pounds (in general closer to 155 pounds)  Weight change in past 6 months: Stable  Weight History:  Wt Readings from Last 10 Encounters:   07/29/19 66.7 kg (147 lb)   05/21/19 67.1 kg (148 lb)   04/21/19 66.2 kg (146 lb)   04/19/19 69.2 kg (152 lb 9.6 oz)   11/19/18 72.4 kg (159 lb 11.2 oz)   08/24/18 73.8 kg (162 lb 12.8 oz)   06/13/18 72.7 kg (160 lb 4.8 oz)   06/05/18 72.6 kg (160 lb)   04/09/18 75.6 kg (166 lb 9.6 oz)   03/13/18 74.7 kg (164 lb 11.2 oz)     Labs: Reviewed  Pertinent Medications/vitamin and mineral supplements:  Outpatient Encounter Medications as of 7/29/2019   Medication Sig Dispense Refill     acetaminophen (TYLENOL) 500 MG tablet Take 500-1,000 mg by mouth every 6 hours as needed for mild pain       Ascorbic Acid (VITAMIN C PO) Take 500 mg by mouth       calcium-vitamin D (CALTRATE) 600-400 MG-UNIT per tablet Take 1 tablet by mouth 2 times daily       Cholecalciferol (VITAMIN D3 PO) Take 1,000 Units by mouth daily       Cyanocobalamin (VITAMIN B 12 PO) Take 500 mcg by mouth       dicyclomine (BENTYL) 10 MG capsule Take 1 capsule (10 mg) by mouth 2 times daily (before meals) 90 capsule 1     ferrous sulfate 325 (65 FE) MG tablet Take by mouth daily (with breakfast)       fluconazole (DIFLUCAN) 200 MG tablet Take 1 tablet (200 mg) by mouth daily 90 tablet 3     HYDROcodone-acetaminophen (NORCO) 5-325 MG tablet Take 1 tablet by mouth every 6 hours as needed for pain 15 tablet 0     MELATONIN PO Take 1.5 mg by mouth nightly as needed        multivitamin, therapeutic with minerals (MULTI-VITAMIN) TABS Take 1 tablet by mouth daily       ondansetron (ZOFRAN ODT) 4 MG ODT tab Take 1 tablet " "(4 mg) by mouth every 8 hours as needed for nausea or vomiting 10 tablet 0     [] polyethylene glycol (GOLYTELY/NULYTELY) 236 g suspension Take 4,000 mLs (4 L) by mouth once for 1 dose Refer to \"Getting Ready for a Colonoscopy\" instruction handout 4000 mL 0     Pyridoxine HCl (VITAMIN B6 PO) Take 100 mg by mouth       tamsulosin (FLOMAX) 0.4 MG capsule Take 1 capsule (0.4 mg) by mouth daily 30 capsule 0     tiZANidine (ZANAFLEX) 4 MG tablet Take 1-2 tablets (4-8 mg) by mouth 3 times daily as needed for muscle spasms 30 tablet 0     traZODone (DESYREL) 50 MG tablet Take 1 tablet (50 mg) by mouth nightly as needed for sleep 90 tablet 1     No facility-administered encounter medications on file as of 2019.        Estimated Nutrition Needs based on current body weight of 72.4 k calories (25 kcals/kg), 75 g protein (1 g/kg), 1800 ml fluids (~1 ml/kcal or total fluids per MD).     MALNUTRITION:  % Weight Loss:  Up to 20% in 1 year (non-severe malnutrition)  % Intake:  No decreased intake noted  Subcutaneous Fat Loss:  None observed  Muscle Loss: Trevin reports that he hasn't noticed clear muscle loss, but remarks that friends recently commented on his visible loss of weight. No noticeable temporal wasting. (non-severe malnutrition)  Fluid Retention:  None noted    Nutrition Diagnosis:    Food and nutrition related knowledge deficit related to Crohn's disease as evidenced by need for diet education.    Nutrition Prescription: Current diet    Nutrition Intervention:    Nutrition Education/Counseling:   Trevin notes that he observed recently that when he is having diarrhea he increases wheat intake substantially, but felt this made it worse so he pursued a strict gluten free diet. Approximately 3 weeks ago he started a strict gluten free, grain free, and dairy free diet for ~1 week and symptoms began to improve. Occasionally loose stool now, but very happy with current situation as most bowel movements are " formed. He also notes that before the worst diarrhea symptoms he was taking fiber supplements regularly (citracal), but he has since stopped those. Following strict exclusion diet he eventually started adding dairy and minor amount of wheat (cracker). He reports formed BMs with 0-1 BMs/day (may actually go a couple of days between BMs). His primary concern at this time is amount of snacking that he is doing in the evening. He notes that he spends most of the evening snacking on dry cereal or almonds. Discussed current weight and recent weight loss and that the overall goal at this time is weight maintenance or gain. Therefore, there is no major concern with evening snacking. Also discussed that the calorie contribution from such snacking, especially if primarily dry cereal, will be fairly minimal. We also discussed current fiber sources in Trevin's diet, such as potatoes and banana chips, that may be aiding with stool consistency. Trevin notes that symptom improvement began prior to recent dilation, but it is notable that a dilation of previous surgical site occurred during colonoscopy procedure 7/15/2019.    Educational Materials Provided: No materials provided during this visit  Patient verbalized understanding of education provided. See all recommendations under Goals.    Goals:  1. Continue current diet. It is great to hear that symptoms have improved!    2. Continue to monitor weight. Primary goal at this time would be weight maintenance or weight gain. We want to ensure no additional weight loss.     Nutrition Monitoring and Evaluation: Will monitor adherence to nutrition recommendations at future RD visits.     Further Medical Nutrition Therapy:  As needed  Next Appointment (if applicable):  N/A  Patient was encouraged to call/contact RD with any further questions.    Augustine Quiñones, PhD, RD

## 2019-07-29 NOTE — PATIENT INSTRUCTIONS
Roldan Zhong,    It was great seeing you again today. Below is a summary of what we discussed:    1. Continue current diet. It is great to hear that symptoms have improved!    2. Continue to monitor weight. Primary goal at this time would be weight maintenance or weight gain. We want to ensure no additional weight loss.     Best regards,  Augustine Quiñones, PhD, RD

## 2019-07-29 NOTE — LETTER
7/29/2019       RE: Trevin Gee  [address]  [place] [address]     Dear Colleague,    Thank you for referring your patient, Trevin Gee, to the [facility] GASTRO[facility]OLOGY AND IBD CLINIC at [facility]. Please see a copy of my visit note below.    [facility] Outpatient Medical Nutrition Therapy      Time Spent: 60 minutes  Session Type: Follow-up Individual Session  Referring Physician: Dr. Leach  Reason for RD Visit: IBD     Nutrition Plan: Continue current diet as it seems to facilitate symptom control    Recommendations for MD/Provider to order: None at this time    Malnutrition Diagnosis: Non-Severe malnutrition  In Context of:  Chronic illness or disease     Nutrition Assessment:  Patient is here for follow-up visit with Registered Dietitian (JAZMÍN).  Patient is a 63 year old male with history of Crohn's (ileo-colonic; inflammatory). His surgical history is pertinent for ileocectomy in 1974 with multiple revisions of the anastomosis. Upon presentation today he notes that symptoms have dramatically improved in the past 2-3 weeks while following a minimal gluten diet.    Past Surgical History: Ileocecectomy 1974; Revision of anastomosis 1986 and 1989.    Symptom Review  1. Nausea/vomiting? No  2. Heartburn? No  3. Bloating? No  4. Belching? No  5. Feeling full quickly? No  6. Decreased appetite? No, but notes that he isn't really hungry in the morning.  7. Weight loss/gain? No  8. Constipation/Diarrhea? No  9. Abdominal pain/pain with or without eating? No  10. Feeling tired? No    Dietary beliefs and Practices  1.  Do you take any vitamin, mineral, or herbal supplements? Vitamin C, Calcium+D, Vitamin D3, Vitamin B12, Iron, Vitamin B6, MVI  2.  Do you use any calorie/protein supplements?  No    Diet Recall:  (Typical intake)  Meal Name Time Food (Yesterday)   Breakfast  Gluten-free or low wheat cereals - cheerios, rice chex, rice with lactose free milk.  "Also adds frozen blueberries.        Lunch  Used to have ham sandwich or almond butter sandwich, but due to missing front tooth now having a 1.5 servings of mashed potatoes        Dinner  Porkribs, chicken, fish. Occasionally can of soup. Always potatoes or sweet potatoes AND some combo of green beans or broccoli          Cereals as snacks AND small dish of ice cream        Snacks  Almonds, cereals low in wheat, banana chips   Beverages  Diluted fruit juice   Alcohol   None   *Snacking a lot on almonds and cereals that are \"low in wheat\"     Anthropometrics:   Body mass index is 21.7 kg/m .     Usual Weight: 150-160 pounds (in general closer to 155 pounds)  Weight change in past 6 months: Stable  Weight History:  Wt Readings from Last 10 Encounters:   07/29/19 66.7 kg (147 lb)   05/21/19 67.1 kg (148 lb)   04/21/19 66.2 kg (146 lb)   04/19/19 69.2 kg (152 lb 9.6 oz)   11/19/18 72.4 kg (159 lb 11.2 oz)   08/24/18 73.8 kg (162 lb 12.8 oz)   06/13/18 72.7 kg (160 lb 4.8 oz)   06/05/18 72.6 kg (160 lb)   04/09/18 75.6 kg (166 lb 9.6 oz)   03/13/18 74.7 kg (164 lb 11.2 oz)     Labs: Reviewed  Pertinent Medications/vitamin and mineral supplements:  Outpatient Encounter Medications as of 7/29/2019   Medication Sig Dispense Refill     acetaminophen (TYLENOL) 500 MG tablet Take 500-1,000 mg by mouth every 6 hours as needed for mild pain       Ascorbic Acid (VITAMIN C PO) Take 500 mg by mouth       calcium-vitamin D (CALTRATE) 600-400 MG-UNIT per tablet Take 1 tablet by mouth 2 times daily       Cholecalciferol (VITAMIN D3 PO) Take 1,000 Units by mouth daily       Cyanocobalamin (VITAMIN B 12 PO) Take 500 mcg by mouth       dicyclomine (BENTYL) 10 MG capsule Take 1 capsule (10 mg) by mouth 2 times daily (before meals) 90 capsule 1     ferrous sulfate 325 (65 FE) MG tablet Take by mouth daily (with breakfast)       fluconazole (DIFLUCAN) 200 MG tablet Take 1 tablet (200 mg) by mouth daily 90 tablet 3     " "HYDROcodone-acetaminophen (NORCO) 5-325 MG tablet Take 1 tablet by mouth every 6 hours as needed for pain 15 tablet 0     MELATONIN PO Take 1.5 mg by mouth nightly as needed        multivitamin, therapeutic with minerals (MULTI-VITAMIN) TABS Take 1 tablet by mouth daily       ondansetron (ZOFRAN ODT) 4 MG ODT tab Take 1 tablet (4 mg) by mouth every 8 hours as needed for nausea or vomiting 10 tablet 0     [] polyethylene glycol (GOLYTELY/NULYTELY) 236 g suspension Take 4,000 mLs (4 L) by mouth once for 1 dose Refer to \"Getting Ready for a Colonoscopy\" instruction handout 4000 mL 0     Pyridoxine HCl (VITAMIN B6 PO) Take 100 mg by mouth       tamsulosin (FLOMAX) 0.4 MG capsule Take 1 capsule (0.4 mg) by mouth daily 30 capsule 0     tiZANidine (ZANAFLEX) 4 MG tablet Take 1-2 tablets (4-8 mg) by mouth 3 times daily as needed for muscle spasms 30 tablet 0     traZODone (DESYREL) 50 MG tablet Take 1 tablet (50 mg) by mouth nightly as needed for sleep 90 tablet 1     No facility-administered encounter medications on file as of 2019.        Estimated Nutrition Needs based on current body weight of 72.4 k calories (25 kcals/kg), 75 g protein (1 g/kg), 1800 ml fluids (~1 ml/kcal or total fluids per MD).     MALNUTRITION:  % Weight Loss:  Up to 20% in 1 year (non-severe malnutrition)  % Intake:  No decreased intake noted  Subcutaneous Fat Loss:  None observed  Muscle Loss: Trevin reports that he hasn't noticed clear muscle loss, but remarks that friends recently commented on his visible loss of weight. No noticeable temporal wasting. (non-severe malnutrition)  Fluid Retention:  None noted    Nutrition Diagnosis:    Food and nutrition related knowledge deficit related to Crohn's disease as evidenced by need for diet education.    Nutrition Prescription: Current diet    Nutrition Intervention:    Nutrition Education/Counseling:   Trevin notes that he observed recently that when he is having diarrhea he increases " wheat intake substantially, but felt this made it worse so he pursued a strict gluten free diet. Approximately 3 weeks ago he started a strict gluten free, grain free, and dairy free diet for ~1 week and symptoms began to improve. Occasionally loose stool now, but very happy with current situation as most bowel movements are formed. He also notes that before the worst diarrhea symptoms he was taking fiber supplements regularly (citracal), but he has since stopped those. Following strict exclusion diet he eventually started adding dairy and minor amount of wheat (cracker). He reports formed BMs with 0-1 BMs/day (may actually go a couple of days between BMs). His primary concern at this time is amount of snacking that he is doing in the evening. He notes that he spends most of the evening snacking on dry cereal or almonds. Discussed current weight and recent weight loss and that the overall goal at this time is weight maintenance or gain. Therefore, there is no major concern with evening snacking. Also discussed that the calorie contribution from such snacking, especially if primarily dry cereal, will be fairly minimal. We also discussed current fiber sources in Trevin's diet, such as potatoes and banana chips, that may be aiding with stool consistency. Trevin notes that symptom improvement began prior to recent dilation, but it is notable that a dilation of previous surgical site occurred during colonoscopy procedure 7/15/2019.    Educational Materials Provided: No materials provided during this visit  Patient verbalized understanding of education provided. See all recommendations under Goals.    Goals:  1. Continue current diet. It is great to hear that symptoms have improved!    2. Continue to monitor weight. Primary goal at this time would be weight maintenance or weight gain. We want to ensure no additional weight loss.     Nutrition Monitoring and Evaluation: Will monitor adherence to nutrition recommendations at  future RD visits.     Further Medical Nutrition Therapy:  As needed  Next Appointment (if applicable):  N/A  Patient was encouraged to call/contact RD with any further questions.  Augustine Quiñones, PhD, RD

## 2019-08-08 ENCOUNTER — DOCUMENTATION ONLY (OUTPATIENT)
Dept: CARE COORDINATION | Facility: CLINIC | Age: 64
End: 2019-08-08

## 2019-08-13 ENCOUNTER — TELEPHONE (OUTPATIENT)
Dept: ENDOCRINOLOGY | Facility: CLINIC | Age: 64
End: 2019-08-13

## 2019-08-13 DIAGNOSIS — M81.0 OSTEOPOROSIS WITHOUT CURRENT PATHOLOGICAL FRACTURE, UNSPECIFIED OSTEOPOROSIS TYPE: Primary | ICD-10-CM

## 2019-08-14 ENCOUNTER — TELEPHONE (OUTPATIENT)
Dept: GASTROENTEROLOGY | Facility: CLINIC | Age: 64
End: 2019-08-14

## 2019-08-14 NOTE — TELEPHONE ENCOUNTER
MEDICAL RECORDS REQUEST   [facility]  Urology Clinic  [address] [place] [address]  PHONE: [phone]  Fax: [phone]        FUTURE VISIT INFORMATION                                                   Trevin XAVIER Belencharlijustin, : 1955 scheduled for future visit at [facility]    APPOINTMENT INFORMATION:    Date: 19 2PM     Provider:  Ilene Remdond MD     Reason for Visit/Diagnosis: Kidney stone    REFERRAL INFORMATION:    Referring provider:  LYNN HEBERT    Specialty: APRN CNP     Referring providers clinic:  [facility]     Clinic contact number:  [phone]    RECORDS REQUESTED FOR VISIT                                                     NOTES  STATUS/DETAILS   OFFICE NOTE from referring provider  yes   OFFICE NOTE from other specialist  no   DISCHARGE SUMMARY from hospital  yes   DISCHARGE REPORT from the ER  yes   OPERATIVE REPORT  no   MEDICATION LIST  no   KIDNEY STONE     CT STONE  yes     PRE-VISIT CHECKLIST      Record collection complete Yes- All recs in epic  Images in [facility] PACS   Appointment appropriately scheduled           (right time/right provider) Yes   MyChart activation Yes   Questionnaire complete If no, please explain: In process      Completed by: Rahel Razo

## 2019-08-20 ENCOUNTER — OFFICE VISIT (OUTPATIENT)
Dept: GASTROENTEROLOGY | Facility: CLINIC | Age: 64
End: 2019-08-20
Payer: COMMERCIAL

## 2019-08-20 VITALS
SYSTOLIC BLOOD PRESSURE: 129 MMHG | BODY MASS INDEX: 22.25 KG/M2 | OXYGEN SATURATION: 95 % | DIASTOLIC BLOOD PRESSURE: 80 MMHG | HEIGHT: 69 IN | WEIGHT: 150.2 LBS | HEART RATE: 93 BPM

## 2019-08-20 DIAGNOSIS — K50.012 CROHN'S DISEASE OF SMALL INTESTINE WITH INTESTINAL OBSTRUCTION (H): Primary | ICD-10-CM

## 2019-08-20 RX ORDER — DIPHENOXYLATE HCL/ATROPINE 2.5-.025MG
1 TABLET ORAL 4 TIMES DAILY PRN
Qty: 60 TABLET | Refills: 1 | Status: SHIPPED | OUTPATIENT
Start: 2019-08-20 | End: 2020-10-20

## 2019-08-20 ASSESSMENT — ENCOUNTER SYMPTOMS
NAUSEA: 0
RECTAL PAIN: 0
VOMITING: 0
DIARRHEA: 1
BLOATING: 0
JAUNDICE: 0
BLOOD IN STOOL: 0
ABDOMINAL PAIN: 0
HEARTBURN: 0
CONSTIPATION: 0
BOWEL INCONTINENCE: 1

## 2019-08-20 ASSESSMENT — MIFFLIN-ST. JEOR: SCORE: 1461.99

## 2019-08-20 ASSESSMENT — PAIN SCALES - GENERAL: PAINLEVEL: NO PAIN (0)

## 2019-08-20 ASSESSMENT — PATIENT HEALTH QUESTIONNAIRE - PHQ9
SUM OF ALL RESPONSES TO PHQ QUESTIONS 1-9: 2
SUM OF ALL RESPONSES TO PHQ QUESTIONS 1-9: 2
10. IF YOU CHECKED OFF ANY PROBLEMS, HOW DIFFICULT HAVE THESE PROBLEMS MADE IT FOR YOU TO DO YOUR WORK, TAKE CARE OF THINGS AT HOME, OR GET ALONG WITH OTHER PEOPLE: NOT DIFFICULT AT ALL

## 2019-08-20 NOTE — LETTER
8/20/2019       RE: Trevin Gee  [address]  [facility] [address]     Dear Colleague,    Thank you for referring your patient, Trevin Gee, to the [facility] at [facility]. Please see a copy of my visit note below.    IBD CLINIC     CC/REFERRING MD:  Referred Self  REASON FOR CONSULTATION: Crohn's disease       ASSESSMENT/PLAN:  64 year old male with Crohn's disease s/p ileocecectomy on no current Crohn's therapy.     1. Crohn's disease: Continues in remission as well as endoscopic remission although has persistent ileal colonic anastomotic stricture.  I dilated this to 13 mm in July 2019.  Given that I have dilated this 2-3 times I think we should consider an 8-week trial of a stent.  There is no active Crohn's in the ileum and I believe this to be a fibrotic stricture, therefore I think it would be a good candidate for temporary stent.  I will refer him to my colleague Dr. Cruz for further discussion on this.  Otherwise he continues to be in remission on no specific therapy and we will discontinue to observe him.  -- Dr. Cruz visit to discuss stenting of IC stricture  -- Due for annual vitamin B-12  -- Yearly CBC and hepatic labs are up to date.     2. Colon polyps: Well defined lesions with adenomatous changes.    -- Will continue to surveillance     3. Diarrhea: Quiescent Crohn's. Prior work up has included alpha 1 antitrypsin for PLE, pancreatic elastase, TTG, DGP all of which were normal.  A spot fecal fat the only isolated value that was increased.  Diarrhea may be related to IBS, or lactose intolerance.  Has at least to clear dietary triggers including wheat and dairy.  He noticed a substantial improvement with decreasing wheat consumption.  Celiac testing was negative.  -- Avoid dietary triggers  -- prn lomotil    4. Possible acid reflux:  No current symptoms of acid reflux.      5. Pancreatic head cystic  lesion: Increased from 2006, potentially representing IPMN.  MRCP with no change in cyst.  Reviewed with Dr. Cruz.  EUS with FNA normal. Stable on 6/17/19 MRE (1.4cm)  -- No further follow-up at this time needed.      6. Steroid dependency/Adrenal insufficiency:  Followed closely with endocrinology.  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      8. Depression: Unchanged.     IBD history:  Age at diagnosis: ~18  Extend of disease: Ileo-colonic  Disease phenotype: Inflammatory  Key-anal disease: No  Current CD medications: None  Prior IBD Medications:  - Asulfazine and Asacol: 1974 to late 90s, stopped to to disease progression  - Prednisone 1986 - current.   - Mercaptopurine: Briefly in mid 90s to get him off steroids- Patient thinks he has side effect to medication  - Methotrexate: After mercaptopurine, stayed on for years, but after bleeding in 1988 and 1989, he would not taper his prednisone. MTX stopped as he could not get off the steroids.    Surgical history:  1974 - [facility] Ileocecectomy  1986 - [facility] - revision of anastomosis  1988 - massive bleeding from ulcer at anastomosis ([place]), cauterized via colonoscopy  1989 - laparoscopic  Revision of anastomosis  2005 - Lap [facility]    DRUG MONITORING  TPMT enzyme activity: none    DISEASE ASSESSMENT  Fecal calprotectin: None  CRP, ESR Results  Recent Labs   Lab Test 04/21/19  1610 04/18/19  0550  01/08/18  1345   CRP <2.9 3.2   < > <2.9   SED 6  --   --  4    < > = values in this interval not displayed.   Endoscopic assessment: Colonoscopy 8/16/2017 showed normal mucosa in entire colon. Non-patent functional end-to-end ileo-colonic anastomosis that was dilated to 12mm.  No evidence of active Crohn's disease.  Enterography (12/1/16) showed 4cm of mild thickening and enhancement in neoterminal ileum at anastomosis, decompressed sigmoid colon with questionable wall thickening and  enhancement.    Enterography: No active Crohn's disease (3/20/15)  C diff: None (7/12/16)    IBD healthcare maintenance based on patients current medication:      Vaccinations:  -- Influenza (every year): Last given 2016  -- TdaP (every 10 years): Last given 2015  -- Pneumococcal Pneumonia (once then every 5 years): Has not obtained.    One time confirmation of immunity or serologies:  -- Hepatitis A (serologies or immunizations): 2005  -- Hepatitis B (serologies or immunizations): 2005  -- Zoster vaccination (>59 yo, discuss if over 50): 2005  -- MMR: Not documented.  -- HPV (all aged 18-26): N/A  -- Meningococcal meningitis (all patients at risk for meningitis): Patient is not at risk.     Cancer Screening:  Colon cancer screening:  Since there is evidence of pancolitis histologically. Also with adenomatous polyps. Colonoscopy every 2-3 years recommended.  Dysplasia screening is recommended 2018.    Cervical cancer screening: N/A    Skin cancer screening: Since patient is not immunocompromised, this is per patient's dermatologist recommendations.    Depression Screening:  -- Over the last month, have you felt down, depressed, or hopeless? No  -- Over the last month, have you felt little interest or pleasure doing things? No    Misc:  -- Avoid tobacco use  -- Avoid NSAIDs as there is potentially a 25% chance of causing an IBD flare    RTC 6 months    Thank you for this consultation.  It was a pleasure to participate in the care of this patient; please contact us with any further questions.      Fransisco Leach MD    [facility]  Inflammatory Bowel Disease Program  Division of Gastroenterology, Hepatology and Nutrition    HPI:   He was hospitalized for an obstruction in April 2019.  This was likely thought to be due to adhesive disease versus an ileus.  No definite transition point was noted however the mid jejunum was decompressed in the mid pelvis.  Follow-up MR in June demonstrated  short segment stricture at the ileocolic anastomosis with little to no inflammation.  No other new sites of bowel inflammation were noted.  Is 1.4 cm cystic focus in the pancreas neck with stable.    I then performed a colonoscopy on 7/15/19 that demonstrated stenosis at the ileocecal anastomosis.  Adult colonoscope but unable to pass dilated to 13 mm after which adult scope was able to pass.  There was no active mucosal inflammation. Ileal biopsies with mild chronic inactive inflammation.     Additionally celiac serologies were negative.    Cut out wheat and feels much better.  Will still have issues with large boluses of lactose.   He things he had more diarrhea after colonoscopy with dilation year prior.      Currently, bowel pattern is mostly regular. He has diarrhea over weekend after full bowl of ice cream.  Took some lomotil and resolved.  Having a stool roughly every other days.  Stools are mostly well formed, no urgency, no blood.     He continues to eat low residue foods, just avoiding wheat.  He is taking 1-2 fiber caps a day.      HBI:  Overall patient well being (prior day): 1 (Slightly below par)  Abdominal pain (prior day): 0 (None)  Number of liquid or soft stools (prior day): 0 (1 point per stool)  Abdominal mass on exam: 0 (None)  Complications (1 point for each):   None     ROS:    No fevers or chills  No weight loss  No blurry vision, double vision or change in vision  No sore throat  No lymphadenopathy  No headache, paraesthesias, or weakness in a limb  No shortness of breath or wheezing  No chest pain or pressure  No arthralgias or myalgias  No rashes or skin changes  No odynophagia or dysphagia  No BRBPR, hematochezia, melena  No dysuria, frequency or urgency  No hot/cold intolerance or polyria  No anxiety or depression    Extra intestinal manifestations of IBD:  No uveitis/episcleritis  No aphthous ulcers   No arthritis   No erythema nodosum/pyoderma gangrenosum.     PERTINENT PAST MEDICAL  HISTORY:  Past Medical History:   Diagnosis Date     Anemia 2007     Anxiety 2012?    much worse since July 2014     Cataract 12/2007    both eyes, too much prednisone, implants     Coccidioidomycosis      Crohn disease (H)      Crohn's disease (H) 1/13/2015     Depressive disorder 7/2014    much worse since July 2014     Fracture 1956    green fracture of leg     Gallbladder problem 2005?    much gravel, removed     GERD (gastroesophageal reflux disease)      History of blood transfusion 1988    ulcerated anastomosis term. ilium bleed     Hypertriglyceridemia 7/8/2016     Infection 2007    persistant coccidioidomycosis     Kidney stone various    both sides, all passed naturally     Mental health problem 2007    bipolar though perhaps different     Nephrolithiasis      Osteoporosis 2007    osteoporosis, too much prednisone, last dexa 1/2014     Osteoporosis      Other nervous system complications 2007    prednisone overload induced kevin     Personal history of colonic polyps     small ones found during colonoscopies     Steroid-induced psychosis     Patient extremely sensitive to regular doses of steroids.  Unclear if this is due to chronic fluconazole use or genetic issue.  In the future, use caution when prescribing steroids.     TB (tuberculosis)        PREVIOUS SURGERIES:  Past Surgical History:   Procedure Laterality Date     APPENDECTOMY  1974    coincidental with Crohn's surgery     BACK SURGERY  12/2007    kyphoplasty on compressed 4th??? vertebra     BIOPSY  2007, 2013    lump on arm, knee, back of hand for coccidioidomycosis cult.     CHOLECYSTECTOMY  2005    much gravel, removed laparoscopically     COLONOSCOPY  7/14/2014 last    Dr. Catherine Bowden, [facility] - many previous     COLONOSCOPY Left 9/11/2015    Procedure: COMBINED COLONOSCOPY, SINGLE OR MULTIPLE BIOPSY/POLYPECTOMY BY BIOPSY;  Surgeon: Fransisco Leach MD;  Location:  GI     COLONOSCOPY N/A 8/3/2016    Procedure: COMBINED  "COLONOSCOPY, SINGLE OR MULTIPLE BIOPSY/POLYPECTOMY BY BIOPSY;  Surgeon: Fransisco Leach MD;  Location: [facility]     COLONOSCOPY N/A 8/16/2017    Procedure: COMBINED COLONOSCOPY, SINGLE OR MULTIPLE BIOPSY/POLYPECTOMY BY BIOPSY;;  Surgeon: Fransisco Leach MD;  Location: [facility]     ENT SURGERY  ?    upper endoscopy     ESOPHAGOSCOPY, GASTROSCOPY, DUODENOSCOPY (EGD), COMBINED N/A 11/8/2016    Procedure: COMBINED ENDOSCOPIC ULTRASOUND, ESOPHAGOSCOPY, GASTROSCOPY, DUODENOSCOPY (EGD), FINE NEEDLE ASPIRATE/BIOPSY;  Surgeon: Guru Alexsandra Ballesteros MD;  Location: [facility] GI     ESOPHAGOSCOPY, GASTROSCOPY, DUODENOSCOPY (EGD), COMBINED N/A 11/8/2016    Procedure: COMBINED ESOPHAGOSCOPY, GASTROSCOPY, DUODENOSCOPY (EGD), BIOPSY SINGLE OR MULTIPLE;  Surgeon: Guru Alexsandra Ballesteros MD;  Location: [facility] GI     ESOPHAGOSCOPY, GASTROSCOPY, DUODENOSCOPY (EGD), COMBINED N/A 8/16/2017    Procedure: COMBINED ESOPHAGOSCOPY, GASTROSCOPY, DUODENOSCOPY (EGD), BIOPSY SINGLE OR MULTIPLE;;  Surgeon: Fransisco Leach MD;  Location[facility]     EYE SURGERY  12/2007    cataract surgery both sides     GI SURGERY  1974, 1986(x2), 1989    Crohn's, 1974 RO 18\" term. ilium + 9\" asc. colon all one pc     SOFT TISSUE SURGERY  3/2013    removed buildup on back of r hand, coccidioidomycosis       PREVIOUS ENDOSCOPY:  Summer 2014: Colonoscopy - not in out system    ALLERGIES:     Allergies   Allergen Reactions     Prednisone Other (See Comments)     Diana with more than 2.5mg chronic dosing of prednisone due to fluconazole interaction per patient.        PERTINENT MEDICATIONS:    Current Outpatient Medications:      acetaminophen (TYLENOL) 500 MG tablet, Take 500-1,000 mg by mouth every 6 hours as needed for mild pain, Disp: , Rfl:      Ascorbic Acid (VITAMIN C PO), Take 500 mg by mouth, Disp: , Rfl:      calcium-vitamin D (CALTRATE) 600-400 MG-UNIT per tablet, Take 1 tablet by mouth 2 times daily, Disp: , Rfl:      " Cholecalciferol (VITAMIN D3 PO), Take 1,000 Units by mouth daily, Disp: , Rfl:      Cyanocobalamin (VITAMIN B 12 PO), Take 500 mcg by mouth, Disp: , Rfl:      dicyclomine (BENTYL) 10 MG capsule, Take 1 capsule (10 mg) by mouth 2 times daily (before meals), Disp: 90 capsule, Rfl: 1     ferrous sulfate 325 (65 FE) MG tablet, Take by mouth daily (with breakfast), Disp: , Rfl:      fluconazole (DIFLUCAN) 200 MG tablet, Take 1 tablet (200 mg) by mouth daily, Disp: 90 tablet, Rfl: 3     HYDROcodone-acetaminophen (NORCO) 5-325 MG tablet, Take 1 tablet by mouth every 6 hours as needed for pain, Disp: 15 tablet, Rfl: 0     MELATONIN PO, Take 1.5 mg by mouth nightly as needed , Disp: , Rfl:      multivitamin, therapeutic with minerals (MULTI-VITAMIN) TABS, Take 1 tablet by mouth daily, Disp: , Rfl:      ondansetron (ZOFRAN ODT) 4 MG ODT tab, Take 1 tablet (4 mg) by mouth every 8 hours as needed for nausea or vomiting, Disp: 10 tablet, Rfl: 0     Pyridoxine HCl (VITAMIN B6 PO), Take 100 mg by mouth, Disp: , Rfl:      tamsulosin (FLOMAX) 0.4 MG capsule, Take 1 capsule (0.4 mg) by mouth daily, Disp: 30 capsule, Rfl: 0     tiZANidine (ZANAFLEX) 4 MG tablet, Take 1-2 tablets (4-8 mg) by mouth 3 times daily as needed for muscle spasms, Disp: 30 tablet, Rfl: 0     traZODone (DESYREL) 50 MG tablet, Take 1 tablet (50 mg) by mouth nightly as needed for sleep, Disp: 90 tablet, Rfl: 1    SOCIAL HISTORY:  Social History     Socioeconomic History     Marital status:      Spouse name: Not on file     Number of children: Not on file     Years of education: Not on file     Highest education level: Not on file   Occupational History     Occupation: Reserach associate at the      Employer: [employer]   Social Needs     Financial resource strain: Not on file     Food insecurity:     Worry: Not on file     Inability: Not on file     Transportation needs:     Medical: Not on file     Non-medical: Not on file   Tobacco Use      Smoking status: Never Smoker     Smokeless tobacco: Never Used   Substance and Sexual Activity     Alcohol use: Yes     Comment: sometimes one glass of wine a week     Drug use: No     Sexual activity: Never   Lifestyle     Physical activity:     Days per week: Not on file     Minutes per session: Not on file     Stress: Not on file   Relationships     Social connections:     Talks on phone: Not on file     Gets together: Not on file     Attends Congregation service: Not on file     Active member of club or organization: Not on file     Attends meetings of clubs or organizations: Not on file     Relationship status: Not on file     Intimate partner violence:     Fear of current or ex partner: Not on file     Emotionally abused: Not on file     Physically abused: Not on file     Forced sexual activity: Not on file   Other Topics Concern     Not on file   Social History Narrative     Not on file       FAMILY HISTORY:  Family History   Problem Relation Age of Onset     Heart Failure Father      Musculoskeletal Disorder Father         Parkinson's     Cancer - colorectal Mother      Cerebrovascular Disease Paternal Grandmother      Cancer Other      Musculoskeletal Disorder Brother         Parkinson's     Mother with colon cancer in 80s.  No family member with Crohn's disease or Ulcerative Colitis.      Past/family/social history reviewed and no changes    PHYSICAL EXAMINATION:  Vitals reviewed, AFVSS   Wt   Wt Readings from Last 2 Encounters:   07/29/19 66.7 kg (147 lb)   05/21/19 67.1 kg (148 lb)      Gen: aaox3, cooperative, pleasant, not dyspneic/diaphoretic, nad  HEENT: ncat, neck supple, no clad, normal op w/o ulcer/exudate, anicteric, mmm  Lymph: No axillary, submandibular, supraclavicular or inguinal lymphadenopathy  Resp/CV unremarkable  Abd: Nondistended, +bs, no hepatosplenomegaly, nontender, no peritoneal signs  Ext: no c/c/e  Skin: warm, perfused, no jaundice    PERTINENT STUDIES:  Most recent CBC:  Recent Labs    Lab Test 05/21/19  1511 04/21/19  1610   WBC 9.3 6.1   HGB 15.4 15.5   HCT 46.1 46.8    262     Most recent hepatic panel:  Recent Labs   Lab Test 04/21/19  1610 04/17/19  0645   ALT 23 21   AST 23 12     Most recent creatinine:  Recent Labs   Lab Test 05/21/19  1511 04/21/19  1610   CR 1.88* 1.42*       MRE: 3/20/15:   IMPRESSION:   1. Unremarkable small bowel other than postsurgical changes following right hemicolectomy with ileocolic anastomosis.  2. Small gastric diverticulum. 3. 1.5 x 0.8 cm pancreatic head cystic lesion minimally increased in size compared to the prior exam of 2006, but potentially representing intraductal papillary mucinous neoplasm. This can be reassessed on followup.  4. Hepatic steatosis.    Again, thank you for allowing me to participate in the care of your patient.      Sincerely,    Fransisco Leach MD

## 2019-08-20 NOTE — PATIENT INSTRUCTIONS
-- Evaluation with Dr. Cruz to discuss stenting of the surgical site (ileo-colonic anastomosis)    You will be called by Pan/biliary team Aminta  to schedule stent placement with Dr. Klein     Follow up with Dr. Leach in 6 months          Thanks Geneva Mejía RN Care Coordinator for Dr. Leach  Phone   [phone]     For questions regarding your care Monday through Friday, contact the RN GI care coordinator,  Call   [phone] . Your call will be  returned same day, or if consultation is needed with the provider, it may be following business day - or you may send a My Chart message.    For medication refills (prescribed by the GI clinic), contact your pharmacy.    For appointment rescheduling/cancellation, contact [phone]     After hours, or if you have an immediate GI concern and cannot wait for a return call, contact the GI Fellow at [phone] and select option #4.

## 2019-08-20 NOTE — PROGRESS NOTES
Cut out wheat and feels much better.  Will still have issues with large boluses of lactose.   He things he had more diarrhea after colonoscopy with dilation year prior.      Currently, bowel pattern is mostly regular. He has diarrhea over weekend after full bowl of ice cream.  Took some lomotil and resolved.  Having a stool roughly every other days.  Stools are mostly well formed, no urgency, no blood.     He continues to eat low residue foods, just avoiding wheat.  He is taking 1-2 fiber caps a day.      HBI:  Overall patient well being (prior day): 1 (Slightly below par)  Abdominal pain (prior day): 0 (None)  Number of liquid or soft stools (prior day): 0 (1 point per stool)  Abdominal mass on exam: 0 (None)  Complications (1 point for each):   None

## 2019-08-20 NOTE — PROGRESS NOTES
IBD CLINIC     CC/REFERRING MD:  Referred Self  REASON FOR CONSULTATION: Crohn's disease       ASSESSMENT/PLAN:  64 year old male with Crohn's disease s/p ileocecectomy on no current Crohn's therapy.     1. Crohn's disease: Continues in remission as well as endoscopic remission although has persistent ileal colonic anastomotic stricture.  I dilated this to 13 mm in July 2019.  Given that I have dilated this 2-3 times I think we should consider an 8-week trial of a stent.  There is no active Crohn's in the ileum and I believe this to be a fibrotic stricture, therefore I think it would be a good candidate for temporary stent.  I will refer him to my colleague Dr. Cruz for further discussion on this.  Otherwise he continues to be in remission on no specific therapy and we will discontinue to observe him.  -- Dr. Cruz visit to discuss stenting of IC stricture  -- Due for annual vitamin B-12  -- Yearly CBC and hepatic labs are up to date.     2. Colon polyps: Well defined lesions with adenomatous changes.    -- Will continue to surveillance     3. Diarrhea: Quiescent Crohn's. Prior work up has included alpha 1 antitrypsin for PLE, pancreatic elastase, TTG, DGP all of which were normal.  A spot fecal fat the only isolated value that was increased.  Diarrhea may be related to IBS, or lactose intolerance.  Has at least to clear dietary triggers including wheat and dairy.  He noticed a substantial improvement with decreasing wheat consumption.  Celiac testing was negative.  -- Avoid dietary triggers  -- prn lomotil    4. Possible acid reflux:  No current symptoms of acid reflux.      5. Pancreatic head cystic lesion: Increased from 2006, potentially representing IPMN.  MRCP with no change in cyst.  Reviewed with Dr. Cruz.  EUS with FNA normal. Stable on 6/17/19 MRE (1.4cm)  -- No further follow-up at this time needed.      6. Steroid dependency/Adrenal insufficiency:  Followed closely with endocrinology.  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      8. Depression: Unchanged.     IBD history:  Age at diagnosis: ~18  Extend of disease: Ileo-colonic  Disease phenotype: Inflammatory  Key-anal disease: No  Current CD medications: None  Prior IBD Medications:  - Asulfazine and Asacol: 1974 to late 90s, stopped to to disease progression  - Prednisone 1986 - current.   - Mercaptopurine: Briefly in mid 90s to get him off steroids- Patient thinks he has side effect to medication  - Methotrexate: After mercaptopurine, stayed on for years, but after bleeding in 1988 and 1989, he would not taper his prednisone. MTX stopped as he could not get off the steroids.    Surgical history:  1974 - [facility] Ileocecectomy  1986 - [facility] - revision of anastomosis  1988 - massive bleeding from ulcer at anastomosis ([place]), cauterized via colonoscopy  1989 - laparoscopic  Revision of anastomosis  2005 - Lap [facility]    DRUG MONITORING  TPMT enzyme activity: none    DISEASE ASSESSMENT  Fecal calprotectin: None  CRP, ESR Results  Recent Labs   Lab Test 04/21/19  1610 04/18/19  0550  01/08/18  1345   CRP <2.9 3.2   < > <2.9   SED 6  --   --  4    < > = values in this interval not displayed.   Endoscopic assessment: Colonoscopy 8/16/2017 showed normal mucosa in entire colon. Non-patent functional end-to-end ileo-colonic anastomosis that was dilated to 12mm.  No evidence of active Crohn's disease.  Enterography (12/1/16) showed 4cm of mild thickening and enhancement in neoterminal ileum at anastomosis, decompressed sigmoid colon with questionable wall thickening and enhancement.    Enterography: No active Crohn's disease (3/20/15)  C diff: None (7/12/16)    IBD healthcare maintenance based on patients current medication:      Vaccinations:  -- Influenza (every year): Last given 2016  -- TdaP (every 10 years): Last given 2015  -- Pneumococcal Pneumonia (once then every 5 years):  Has not obtained.    One time confirmation of immunity or serologies:  -- Hepatitis A (serologies or immunizations): 2005  -- Hepatitis B (serologies or immunizations): 2005  -- Zoster vaccination (>59 yo, discuss if over 50): 2005  -- MMR: Not documented.  -- HPV (all aged 18-26): N/A  -- Meningococcal meningitis (all patients at risk for meningitis): Patient is not at risk.     Cancer Screening:  Colon cancer screening:  Since there is evidence of pancolitis histologically. Also with adenomatous polyps. Colonoscopy every 2-3 years recommended.  Dysplasia screening is recommended 2018.    Cervical cancer screening: N/A    Skin cancer screening: Since patient is not immunocompromised, this is per patient's dermatologist recommendations.    Depression Screening:  -- Over the last month, have you felt down, depressed, or hopeless? No  -- Over the last month, have you felt little interest or pleasure doing things? No    Misc:  -- Avoid tobacco use  -- Avoid NSAIDs as there is potentially a 25% chance of causing an IBD flare    RTC 6 months    Thank you for this consultation.  It was a pleasure to participate in the care of this patient; please contact us with any further questions.      Fransisco Leach MD    [facility]  Inflammatory Bowel Disease Program  Division of Gastroenterology, Hepatology and Nutrition       HPI:   He was hospitalized for an obstruction in April 2019.  This was likely thought to be due to adhesive disease versus an ileus.  No definite transition point was noted however the mid jejunum was decompressed in the mid pelvis.  Follow-up MR in June demonstrated short segment stricture at the ileocolic anastomosis with little to no inflammation.  No other new sites of bowel inflammation were noted.  Is 1.4 cm cystic focus in the pancreas neck with stable.    I then performed a colonoscopy on 7/15/19 that demonstrated stenosis at the ileocecal anastomosis.  Adult colonoscope  but unable to pass dilated to 13 mm after which adult scope was able to pass.  There was no active mucosal inflammation. Ileal biopsies with mild chronic inactive inflammation.     Additionally celiac serologies were negative.    Cut out wheat and feels much better.  Will still have issues with large boluses of lactose.   He things he had more diarrhea after colonoscopy with dilation year prior.      Currently, bowel pattern is mostly regular. He has diarrhea over weekend after full bowl of ice cream.  Took some lomotil and resolved.  Having a stool roughly every other days.  Stools are mostly well formed, no urgency, no blood.     He continues to eat low residue foods, just avoiding wheat.  He is taking 1-2 fiber caps a day.      HBI:  Overall patient well being (prior day): 1 (Slightly below par)  Abdominal pain (prior day): 0 (None)  Number of liquid or soft stools (prior day): 0 (1 point per stool)  Abdominal mass on exam: 0 (None)  Complications (1 point for each):   None     ROS:    No fevers or chills  No weight loss  No blurry vision, double vision or change in vision  No sore throat  No lymphadenopathy  No headache, paraesthesias, or weakness in a limb  No shortness of breath or wheezing  No chest pain or pressure  No arthralgias or myalgias  No rashes or skin changes  No odynophagia or dysphagia  No BRBPR, hematochezia, melena  No dysuria, frequency or urgency  No hot/cold intolerance or polyria  No anxiety or depression    Extra intestinal manifestations of IBD:  No uveitis/episcleritis  No aphthous ulcers   No arthritis   No erythema nodosum/pyoderma gangrenosum.     PERTINENT PAST MEDICAL HISTORY:  Past Medical History:   Diagnosis Date     Anemia 2007     Anxiety 2012?    much worse since July 2014     Cataract 12/2007    both eyes, too much prednisone, implants     Coccidioidomycosis      Crohn disease (H)      Crohn's disease (H) 1/13/2015     Depressive disorder 7/2014    much worse since July 2014      Fracture 1956    green fracture of leg     Gallbladder problem 2005?    much gravel, removed     GERD (gastroesophageal reflux disease)      History of blood transfusion 1988    ulcerated anastomosis term. ilium bleed     Hypertriglyceridemia 7/8/2016     Infection 2007    persistant coccidioidomycosis     Kidney stone various    both sides, all passed naturally     Mental health problem 2007    bipolar though perhaps different     Nephrolithiasis      Osteoporosis 2007    osteoporosis, too much prednisone, last dexa 1/2014     Osteoporosis      Other nervous system complications 2007    prednisone overload induced kevin     Personal history of colonic polyps     small ones found during colonoscopies     Steroid-induced psychosis     Patient extremely sensitive to regular doses of steroids.  Unclear if this is due to chronic fluconazole use or genetic issue.  In the future, use caution when prescribing steroids.     TB (tuberculosis)        PREVIOUS SURGERIES:  Past Surgical History:   Procedure Laterality Date     APPENDECTOMY  1974    coincidental with Crohn's surgery     BACK SURGERY  12/2007    kyphoplasty on compressed 4th??? vertebra     BIOPSY  2007, 2013    lump on arm, knee, back of hand for coccidioidomycosis cult.     CHOLECYSTECTOMY  2005    much gravel, removed laparoscopically     COLONOSCOPY  7/14/2014 last    Dr. Catherine Bowden, [facility] - many previous     COLONOSCOPY Left 9/11/2015    Procedure: COMBINED COLONOSCOPY, SINGLE OR MULTIPLE BIOPSY/POLYPECTOMY BY BIOPSY;  Surgeon: Fransisco Leach MD;  Location: [facility]     COLONOSCOPY N/A 8/3/2016    Procedure: COMBINED COLONOSCOPY, SINGLE OR MULTIPLE BIOPSY/POLYPECTOMY BY BIOPSY;  Surgeon: Fransisco Leach MD;  Location: [facility]     COLONOSCOPY N/A 8/16/2017    Procedure: COMBINED COLONOSCOPY, SINGLE OR MULTIPLE BIOPSY/POLYPECTOMY BY BIOPSY;;  Surgeon: Fransisco Leach MD;  [facility]     ENT SURGERY  ?    upper  "endoscopy     ESOPHAGOSCOPY, GASTROSCOPY, DUODENOSCOPY (EGD), COMBINED N/A 11/8/2016    Procedure: COMBINED ENDOSCOPIC ULTRASOUND, ESOPHAGOSCOPY, GASTROSCOPY, DUODENOSCOPY (EGD), FINE NEEDLE ASPIRATE/BIOPSY;  Surgeon: Guru Alexsandra Ballesteros MD;  Location: [facility]     ESOPHAGOSCOPY, GASTROSCOPY, DUODENOSCOPY (EGD), COMBINED N/A 11/8/2016    Procedure: COMBINED ESOPHAGOSCOPY, GASTROSCOPY, DUODENOSCOPY (EGD), BIOPSY SINGLE OR MULTIPLE;  Surgeon: Guru Alexsandra Ballesteros MD;  Location: [facility]     ESOPHAGOSCOPY, GASTROSCOPY, DUODENOSCOPY (EGD), COMBINED N/A 8/16/2017    Procedure: COMBINED ESOPHAGOSCOPY, GASTROSCOPY, DUODENOSCOPY (EGD), BIOPSY SINGLE OR MULTIPLE;;  Surgeon: Fransisco Leach MD;  [facility]     EYE SURGERY  12/2007    cataract surgery both sides     GI SURGERY  1974, 1986(x2), 1989    Crohn's, 1974 RO 18\" term. ilium + 9\" asc. colon all one pc     SOFT TISSUE SURGERY  3/2013    removed buildup on back of r hand, coccidioidomycosis       PREVIOUS ENDOSCOPY:  Summer 2014: Colonoscopy - not in out system    ALLERGIES:     Allergies   Allergen Reactions     Prednisone Other (See Comments)     Diana with more than 2.5mg chronic dosing of prednisone due to fluconazole interaction per patient.        PERTINENT MEDICATIONS:    Current Outpatient Medications:      acetaminophen (TYLENOL) 500 MG tablet, Take 500-1,000 mg by mouth every 6 hours as needed for mild pain, Disp: , Rfl:      Ascorbic Acid (VITAMIN C PO), Take 500 mg by mouth, Disp: , Rfl:      calcium-vitamin D (CALTRATE) 600-400 MG-UNIT per tablet, Take 1 tablet by mouth 2 times daily, Disp: , Rfl:      Cholecalciferol (VITAMIN D3 PO), Take 1,000 Units by mouth daily, Disp: , Rfl:      Cyanocobalamin (VITAMIN B 12 PO), Take 500 mcg by mouth, Disp: , Rfl:      dicyclomine (BENTYL) 10 MG capsule, Take 1 capsule (10 mg) by mouth 2 times daily (before meals), Disp: 90 capsule, Rfl: 1     ferrous sulfate 325 (65 FE) MG " tablet, Take by mouth daily (with breakfast), Disp: , Rfl:      fluconazole (DIFLUCAN) 200 MG tablet, Take 1 tablet (200 mg) by mouth daily, Disp: 90 tablet, Rfl: 3     HYDROcodone-acetaminophen (NORCO) 5-325 MG tablet, Take 1 tablet by mouth every 6 hours as needed for pain, Disp: 15 tablet, Rfl: 0     MELATONIN PO, Take 1.5 mg by mouth nightly as needed , Disp: , Rfl:      multivitamin, therapeutic with minerals (MULTI-VITAMIN) TABS, Take 1 tablet by mouth daily, Disp: , Rfl:      ondansetron (ZOFRAN ODT) 4 MG ODT tab, Take 1 tablet (4 mg) by mouth every 8 hours as needed for nausea or vomiting, Disp: 10 tablet, Rfl: 0     Pyridoxine HCl (VITAMIN B6 PO), Take 100 mg by mouth, Disp: , Rfl:      tamsulosin (FLOMAX) 0.4 MG capsule, Take 1 capsule (0.4 mg) by mouth daily, Disp: 30 capsule, Rfl: 0     tiZANidine (ZANAFLEX) 4 MG tablet, Take 1-2 tablets (4-8 mg) by mouth 3 times daily as needed for muscle spasms, Disp: 30 tablet, Rfl: 0     traZODone (DESYREL) 50 MG tablet, Take 1 tablet (50 mg) by mouth nightly as needed for sleep, Disp: 90 tablet, Rfl: 1    SOCIAL HISTORY:  Social History     Socioeconomic History     Marital status:      Spouse name: Not on file     Number of children: Not on file     Years of education: Not on file     Highest education level: Not on file   Occupational History     Occupation: Reserach associate at the      Employer: [employer]   Social Needs     Financial resource strain: Not on file     Food insecurity:     Worry: Not on file     Inability: Not on file     Transportation needs:     Medical: Not on file     Non-medical: Not on file   Tobacco Use     Smoking status: Never Smoker     Smokeless tobacco: Never Used   Substance and Sexual Activity     Alcohol use: Yes     Comment: sometimes one glass of wine a week     Drug use: No     Sexual activity: Never   Lifestyle     Physical activity:     Days per week: Not on file     Minutes per session: Not on file      Stress: Not on file   Relationships     Social connections:     Talks on phone: Not on file     Gets together: Not on file     Attends Quaker service: Not on file     Active member of club or organization: Not on file     Attends meetings of clubs or organizations: Not on file     Relationship status: Not on file     Intimate partner violence:     Fear of current or ex partner: Not on file     Emotionally abused: Not on file     Physically abused: Not on file     Forced sexual activity: Not on file   Other Topics Concern     Not on file   Social History Narrative     Not on file       FAMILY HISTORY:  Family History   Problem Relation Age of Onset     Heart Failure Father      Musculoskeletal Disorder Father         Parkinson's     Cancer - colorectal Mother      Cerebrovascular Disease Paternal Grandmother      Cancer Other      Musculoskeletal Disorder Brother         Parkinson's     Mother with colon cancer in 80s.  No family member with Crohn's disease or Ulcerative Colitis.      Past/family/social history reviewed and no changes    PHYSICAL EXAMINATION:  Vitals reviewed, AFVSS   Wt   Wt Readings from Last 2 Encounters:   07/29/19 66.7 kg (147 lb)   05/21/19 67.1 kg (148 lb)      Gen: aaox3, cooperative, pleasant, not dyspneic/diaphoretic, nad  HEENT: ncat, neck supple, no clad, normal op w/o ulcer/exudate, anicteric, mmm  Lymph: No axillary, submandibular, supraclavicular or inguinal lymphadenopathy  Resp/CV unremarkable  Abd: Nondistended, +bs, no hepatosplenomegaly, nontender, no peritoneal signs  Ext: no c/c/e  Skin: warm, perfused, no jaundice      PERTINENT STUDIES:  Most recent CBC:  Recent Labs   Lab Test 05/21/19  1511 04/21/19  1610   WBC 9.3 6.1   HGB 15.4 15.5   HCT 46.1 46.8    262     Most recent hepatic panel:  Recent Labs   Lab Test 04/21/19  1610 04/17/19  0645   ALT 23 21   AST 23 12     Most recent creatinine:  Recent Labs   Lab Test 05/21/19  1511 04/21/19  1610   CR 1.88* 1.42*        MRE: 3/20/15:   IMPRESSION:   1. Unremarkable small bowel other than postsurgical changes following right hemicolectomy with ileocolic anastomosis.  2. Small gastric diverticulum. 3. 1.5 x 0.8 cm pancreatic head cystic lesion minimally increased in size compared to the prior exam of 2006, but potentially representing intraductal papillary mucinous neoplasm. This can be reassessed on followup.  4. Hepatic steatosis.      Answers for HPI/ROS submitted by the patient on 8/20/2019   If you checked off any problems, how difficult have these problems made it for you to do your work, take care of things at home, or get along with other people?: Not difficult at all  PHQ9 TOTAL SCORE: 2  General Symptoms: No  Skin Symptoms: No  HENT Symptoms: No  EYE SYMPTOMS: No  HEART SYMPTOMS: No  LUNG SYMPTOMS: No  INTESTINAL SYMPTOMS: Yes  URINARY SYMPTOMS: No  REPRODUCTIVE SYMPTOMS: No  SKELETAL SYMPTOMS: No  BLOOD SYMPTOMS: No  NERVOUS SYSTEM SYMPTOMS: No  MENTAL HEALTH SYMPTOMS: No  Heart burn or indigestion: No  Nausea: No  Vomiting: No  Abdominal pain: No  Bloating: No  Constipation: No  Diarrhea: Yes  Blood in stool: No  Black stools: No  Rectal or Anal pain: No  Fecal incontinence: Yes  Yellowing of skin or eyes: No  Vomit with blood: No  Change in stools: Yes

## 2019-08-20 NOTE — NURSING NOTE
"Chief Complaint   Patient presents with     RECHECK     return IBD       Vitals:    08/20/19 0958   BP: 129/80   Pulse: 93   SpO2: 95%   Weight: 68.1 kg (150 lb 3.2 oz)   Height: 1.753 m (5' 9.02\")       Body mass index is 22.17 kg/m .    Judy Razo CMA    "

## 2019-08-22 ENCOUNTER — ANCILLARY PROCEDURE (OUTPATIENT)
Dept: BONE DENSITY | Facility: CLINIC | Age: 64
End: 2019-08-22
Attending: INTERNAL MEDICINE
Payer: COMMERCIAL

## 2019-08-22 DIAGNOSIS — M81.0 OSTEOPOROSIS WITHOUT CURRENT PATHOLOGICAL FRACTURE, UNSPECIFIED OSTEOPOROSIS TYPE: ICD-10-CM

## 2019-08-22 DIAGNOSIS — K50.00 CROHN'S DISEASE OF SMALL INTESTINE WITHOUT COMPLICATION (H): ICD-10-CM

## 2019-08-22 LAB
ALBUMIN SERPL-MCNC: 4 G/DL (ref 3.4–5)
ALP SERPL-CCNC: 88 U/L (ref 40–150)
ALT SERPL W P-5'-P-CCNC: 34 U/L (ref 0–70)
ANION GAP SERPL CALCULATED.3IONS-SCNC: 5 MMOL/L (ref 3–14)
AST SERPL W P-5'-P-CCNC: 21 U/L (ref 0–45)
BILIRUB SERPL-MCNC: 0.7 MG/DL (ref 0.2–1.3)
BUN SERPL-MCNC: 17 MG/DL (ref 7–30)
CALCIUM SERPL-MCNC: 9 MG/DL (ref 8.5–10.1)
CHLORIDE SERPL-SCNC: 104 MMOL/L (ref 94–109)
CHOLEST SERPL-MCNC: 134 MG/DL
CO2 SERPL-SCNC: 30 MMOL/L (ref 20–32)
CORTIS SERPL-MCNC: 12.8 UG/DL (ref 4–22)
CREAT SERPL-MCNC: 1.3 MG/DL (ref 0.66–1.25)
GFR SERPL CREATININE-BSD FRML MDRD: 58 ML/MIN/{1.73_M2}
GLUCOSE SERPL-MCNC: 87 MG/DL (ref 70–99)
HBA1C MFR BLD: 5.6 % (ref 0–5.6)
HDLC SERPL-MCNC: 60 MG/DL
LDLC SERPL CALC-MCNC: 41 MG/DL
NONHDLC SERPL-MCNC: 73 MG/DL
POTASSIUM SERPL-SCNC: 4.5 MMOL/L (ref 3.4–5.3)
PROT SERPL-MCNC: 7.3 G/DL (ref 6.8–8.8)
SODIUM SERPL-SCNC: 139 MMOL/L (ref 133–144)
T4 FREE SERPL-MCNC: 0.79 NG/DL (ref 0.76–1.46)
TRIGL SERPL-MCNC: 160 MG/DL
TSH SERPL DL<=0.005 MIU/L-ACNC: 2.21 MU/L (ref 0.4–4)
VIT B12 SERPL-MCNC: 538 PG/ML (ref 193–986)

## 2019-08-24 LAB
DEPRECATED CALCIDIOL+CALCIFEROL SERPL-MC: <40 UG/L (ref 20–75)
VITAMIN D2 SERPL-MCNC: <5 UG/L
VITAMIN D3 SERPL-MCNC: 35 UG/L

## 2019-08-27 ENCOUNTER — TELEPHONE (OUTPATIENT)
Dept: ENDOCRINOLOGY | Facility: CLINIC | Age: 64
End: 2019-08-27

## 2019-08-27 NOTE — TELEPHONE ENCOUNTER
Labs noted    --  Dear Trevin    Here are your labs which look pretty good. Your measure of kidney function (Cr) is a little higher (1.3), but better than previous values (1.88 in May 2019). I think we can watch for now. Your hormone labs (thyroid, cortiosl, HbA1c, vitamin D) all look good.    If you have any questions, please feel free to contact my nurse at [phone] select option #3 for triage nurse  or  option #1 for scheduling related questions.    Regards    Mag Currie MD

## 2019-08-28 ENCOUNTER — CARE COORDINATION (OUTPATIENT)
Dept: GASTROENTEROLOGY | Facility: CLINIC | Age: 64
End: 2019-08-28

## 2019-08-28 DIAGNOSIS — K91.30 COLORECTAL ANASTOMOTIC STRICTURE: Primary | ICD-10-CM

## 2019-08-28 NOTE — PROGRESS NOTES
Received message to organize the following: Please assist in scheduling:     Procedure/Imaging/Clinic: Colonoscopy   Physician: Nancy   Timing: Non urgent   Dx: Anastomotic stricture     Spoke to patient and he would like to discuss in clinic before procedure. He would like to get procedure scheduled in the meantime so he is not waiting.     Order placed and prep instructions will be sent to [facility]. He will call with any questions.     KAT [address] Dr. Cruz, & Dr. Ballesteros  [facility]  [phone]

## 2019-08-30 ENCOUNTER — OFFICE VISIT (OUTPATIENT)
Dept: ENDOCRINOLOGY | Facility: CLINIC | Age: 64
End: 2019-08-30
Payer: COMMERCIAL

## 2019-08-30 VITALS
WEIGHT: 152.4 LBS | HEART RATE: 91 BPM | DIASTOLIC BLOOD PRESSURE: 81 MMHG | BODY MASS INDEX: 22.57 KG/M2 | HEIGHT: 69 IN | SYSTOLIC BLOOD PRESSURE: 114 MMHG

## 2019-08-30 DIAGNOSIS — M81.0 OSTEOPOROSIS WITHOUT CURRENT PATHOLOGICAL FRACTURE, UNSPECIFIED OSTEOPOROSIS TYPE: Primary | ICD-10-CM

## 2019-08-30 RX ORDER — ZOLEDRONIC ACID 5 MG/100ML
5 INJECTION, SOLUTION INTRAVENOUS ONCE
Status: CANCELLED
Start: 2019-08-30

## 2019-08-30 RX ORDER — HEPARIN SODIUM,PORCINE 10 UNIT/ML
5 VIAL (ML) INTRAVENOUS
Status: CANCELLED | OUTPATIENT
Start: 2019-08-30

## 2019-08-30 RX ORDER — HEPARIN SODIUM (PORCINE) LOCK FLUSH IV SOLN 100 UNIT/ML 100 UNIT/ML
5 SOLUTION INTRAVENOUS
Status: CANCELLED | OUTPATIENT
Start: 2019-08-30

## 2019-08-30 ASSESSMENT — MIFFLIN-ST. JEOR: SCORE: 1471.66

## 2019-08-30 ASSESSMENT — PAIN SCALES - GENERAL: PAINLEVEL: NO PAIN (0)

## 2019-08-30 NOTE — PROGRESS NOTES
[facility]  Endocrinology  Mag Currie MD  08/30/2019      Chief Complaint:   RECHECK     History of Present Illness:   Trevin Gee is a 64 year old male with a history of adrenal insufficiency and osteoporosis who presents for evaluation of recheck.     #1 adrenal insufficiency possibly due to chronic fluconazole use    Patient was referred endocrinology for management of adrenal insufficiency in the setting of steroid taper.  The patient has a history of Crohn's disease and has been dependent on steroids for most of his adult life.  In 2007, he was diagnosed with coccidiosis and was started on fluconazole treatment.  He had two episodes of kevin which was eventually determined to be from a drug interaction between fluconazole and prednisone, with underlying hypothesis being that fluconazole suppressed metabolism and clearance of prednisone.  He was tapered off prednisone in 2007, which has caused significant mental and on physical deconditioning including severe weakness, and was thought to have adrenal insufficiency even though there was no documented cortisol level in the chart.  Fluconazole was stopped after it was successfully treated initially, he has a relapse in 2013 with a knee infection, and was placed back on fluconazole 200 mg daily, which he is taking indefinitely.  His Crohn's disease is now controlled with low-dose prednisone 1 mg three times a week, and entercort 3 mg daily with minimal symptoms.  MRI in 2015 show that adrenal glands were normal.  May 2015 ACTH was 192, baseline cortisol was 1.6, 30 minute cortisol was high 0.8, and 60 minute cortisol was 6.9.  He does understand that he gets sick, he should take supplemental steroids and/or IM Solu-Cortef.  repeat ACTH test in July 2015 now shows adrenal insufficiency with suppresssed ACTH. The patient saw infectious disease in October 2015 and the plan is to continue with the fluconazole for now given his history of  coccidiomycosis.  April 2016 cortisol level was 10.  The patient stopped his steroids as of April 2016.     Interval history:  He continues to take Fluconazole, August 2019 cortisol was 12.8.     #2 history of osteoporosis  Patient had a documented low T score on DXA scan in 2005, and the clinical vertebral fracture status post kyphoplasty, confirming the diagnosis of osteoporosis and treatment as indicated.  He did have history of kidney stone even though this occurred in the setting of his Crohn's disease and admitted chocolate dependency.  A 24 urine for calcium was low at 90 mg over 24 hours.  May 2015 DEXA scan show the lowest T score -2.4 at the lumbar spine, lower Z score of -2.0 the lumbar spine, is significant increase in his bone density in the lumbar spine compared with the previous exam in 2005 as well as the baseline exam in 2003.  There's been no significant change in bone density at the level of his hips.  The patient is currently on Fosamax 70 mg weekly.  He does take this properly. March 2015 vitamin D is 28. We looked into the price of Reclast and this was cost prohibitive for him.  The patient had a tooth removed in January 2016 and then received his first dose of   Reclast in March 2016.  He tolerated this infusion. His second Reclast infusion was in July 2017 with no complications and again in September in 2018. His DEXA scan on 7/7/2017 showed in increased in bone density at the levels of L1-L2, and in the total right and left hips.     Interval history: He states the in the past year has had major digestive issues with accompanying diarrhea. He discontinued many medications and stopped eating many foods to determined what is causing this. Once thing he stopped taking was calcium, and is afraid this may have effected the results of his DEXA scan this year. His August 2019 DEXA, demonstrated that he now has osteopenia. The lowest T-score is -2.4 at the level of the L1-L2 lumbar spine. The  calculated changes in BMD to the previous exam (8//24/2018) are not significantly changed in all levels. The calculated changes in BMD to the basline exam (11/19/2003) are significantly increased at the level of the L1-L2 lumbar spine ( +7.4%) and at the right total hip (+4.3%).     He had a root canal tooth that became infected, and had it pulled in June 2019. He does not think that there will be any more teeth being pulled.  He denies any falls or fractures. Of note, he is wondering if he should have concern for calcium causing him to develop kidney stones. In 2015 his the Ca levels in his urine were normal.      #3 Skin itching  On 8/24/2019, the patient notes that for the past few years, he has been experiencing itchy skin in the winter and spring. He states that that itching would often start after siting in his car for long periods of time and preventing contact with the car seat did no help. He also has tried cortisone cream, showering, and changing detergents with no improvement. Mr. Gee feels that this may be a seasonal dry skin issue, as he never has noticed a rash in itchy areas and the itching changes location. He was going to follow up with dermatology for this, but cancelled the appointment due to having abdominal pain at the time.  Currently, he reports that his skin itching has resolved.    Interval history: He describes that the Rosetta cream seemed to help in the winter, and once summer came around this was much less bothersome.      #4 Diarrhea   The GI team believed that he had colonic overgrowth, and asked him to avoid sugar. His hemoglobin A1c today was 5.6% and his triglycerides have come down to 160 in August 2019 from 189 in August 2018. Of note, his creatinine was elevated at 1.3.    Review of Systems:   Pertinent items are noted in HPI and below, remainder of complete ROS is negative.    Answers for HPI/ROS submitted by the patient on 8/30/2019   General Symptoms: No  Skin Symptoms:  No  HENT Symptoms: No  EYE SYMPTOMS: No  HEART SYMPTOMS: No  LUNG SYMPTOMS: No  INTESTINAL SYMPTOMS: No  URINARY SYMPTOMS: No  REPRODUCTIVE SYMPTOMS: No  SKELETAL SYMPTOMS: No  BLOOD SYMPTOMS: No  NERVOUS SYSTEM SYMPTOMS: No  MENTAL HEALTH SYMPTOMS: No      Active Medications:      Ascorbic Acid (VITAMIN C PO), Take 500 mg by mouth, Disp: , Rfl:      calcium-vitamin D (CALTRATE) 600-400 MG-UNIT per tablet, Take 1 tablet by mouth 2 times daily, Disp: , Rfl:      Cholecalciferol (VITAMIN D3 PO), Take 1,000 Units by mouth daily, Disp: , Rfl:      Cyanocobalamin (VITAMIN B 12 PO), Take 500 mcg by mouth, Disp: , Rfl:      dicyclomine (BENTYL) 10 MG capsule, Take 1 capsule (10 mg) by mouth 2 times daily (before meals), Disp: 90 capsule, Rfl: 1     diphenoxylate-atropine (LOMOTIL) 2.5-0.025 MG tablet, Take 1 tablet by mouth 4 times daily as needed for diarrhea, Disp: 60 tablet, Rfl: 1     ferrous sulfate 325 (65 FE) MG tablet, Take by mouth daily (with breakfast), Disp: , Rfl:      fluconazole (DIFLUCAN) 200 MG tablet, Take 1 tablet (200 mg) by mouth daily, Disp: 90 tablet, Rfl: 3     HYDROcodone-acetaminophen (NORCO) 5-325 MG tablet, Take 1 tablet by mouth every 6 hours as needed for pain, Disp: 15 tablet, Rfl: 0     MELATONIN PO, Take 1.5 mg by mouth nightly as needed , Disp: , Rfl:      multivitamin, therapeutic with minerals (MULTI-VITAMIN) TABS, Take 1 tablet by mouth daily, Disp: , Rfl:      ondansetron (ZOFRAN ODT) 4 MG ODT tab, Take 1 tablet (4 mg) by mouth every 8 hours as needed for nausea or vomiting, Disp: 10 tablet, Rfl: 0     Pyridoxine HCl (VITAMIN B6 PO), Take 100 mg by mouth, Disp: , Rfl:      tamsulosin (FLOMAX) 0.4 MG capsule, Take 1 capsule (0.4 mg) by mouth daily, Disp: 30 capsule, Rfl: 0     tiZANidine (ZANAFLEX) 4 MG tablet, Take 1-2 tablets (4-8 mg) by mouth 3 times daily as needed for muscle spasms, Disp: 30 tablet, Rfl: 0     traZODone (DESYREL) 50 MG tablet, Take 1 tablet (50 mg) by mouth nightly  "as needed for sleep, Disp: 90 tablet, Rfl: 1      Allergies:   Prednisone      Past Medical History:  Anemia  Anxiety  Cataract  Coccidioidomycosis  Chron disease  Depression  Green fracture of leg  Gastroesophageal reflux disease  Hypertriglyceridemia   Kidney stone  Nephrolithiasis  Osteoporosis  Prednisone overload induced kevin  Tuberculosis   Vitamin B12 deficiency  Adrenal insufficiency  Bipolar 2 disorder  Lactose intolerance   Vertebral fracture, osteoporotic     Past Surgical History:  Appendectomy  kyphoplasty   Biopsy lump on arm  Cholecystectomy  Colonoscopy x4  Esophagoscopy, gastroscopy, duodenoscopy combined x3  Cataract surgery bilateral  GI surgery  Soft tissue surgery     Family History:   Hear failure - father  Parkinson's - father, brother  Colorectal cancer - mother  Cerebrovascular disease - paternal grandmother  Cancer - other      Social History:  This patient presented alone.   Smoking status: never  Smokeless tobacco: never  Alcohol use: yes  Drug use: no      Physical Exam:   /81   Pulse 91   Ht 1.753 m (5' 9\")   Wt 69.1 kg (152 lb 6.4 oz)   BMI 22.51 kg/m     GENERAL APPEARANCE: Alert and no distress  NECK: No lymphadenopathy appreciated  Eyes: No obvious retinopathy noted  Thyroid: No obvious nodules palpated   CV: RRR without M/R/G  Lungs: CTA bilaterally  Abdomen: Soft, Nontender, non distended, positive bowel sounds   Neuro: normal reflexes, no focal deficits  Skin: No infection in feet   Mood: Normal   Lymph: neg in neck and supraclavicular area    DEXA scan from August 2019 personally reviewed with patient.  They show the lowest T score -2.4 at the L1-L2 region.  Compared with the 2018 exam, his bone density had increased at the lumbar spine and both total hips but this was not significant.  Compared with the 2003 exam, his bone density had increased at the level of the lumbar spine and right total hip which was significant.    Assessment and Plan:  #1 adrenal " insufficiency possibly due to chronic fluconazole use  Patient continues on fluconazole.  August 2019 cortisol was 12.8.     #2 history of osteoporosis  Congratulated patient.  His most recent bone density from August 2019 shows osteopenia.  It appears that he has gained bone compared with 2003 and has stable, if not improved bone density compared with 2018 although this was not significant.  We will plan for Reclast next year in 2020 along with DEXA scan at that time.  - Dexa hip/pelvis/spine  - 25 Hydroxyvitamin D2 and D3  - Hemoglobin A1c  - Basic metabolic panel  - TSH  - Cortisol    #3 Skin itching  Less bothersome for him in the Summer, and Rosetta cream works well in the winter.      Follow-up: Return in about 1 year (around 8/30/2020).      Scribe Disclosure:  I, Yenni Genao, am serving as a scribe to document services personally performed by Mag Currie MD at this visit, based upon the provider's statements to me. All documentation has been reviewed by the aforementioned provider prior to being entered into the official medical record.     Portions of this medical record were completed by a scribe. UPON MY REVIEW AND AUTHENTICATION BY ELECTRONIC SIGNATURE, this confirms (a) I performed the applicable clinical services, and (b) the record is accurate.

## 2019-08-30 NOTE — LETTER
8/30/2019       RE: Trevin Gee  [address]  [place] [address]     Dear Colleague,    Thank you for referring your patient, Trevin Gee, to the [facility] ENDOCRINOLOGY at [facility]. Please see a copy of my visit note below.    [facility]  Endocrinology  Mag Currie MD  08/30/2019      Chief Complaint:   RECHECK     History of Present Illness:   Trevin Gee is a 64 year old male with a history of adrenal insufficiency and osteoporosis who presents for evaluation of recheck.     #1 adrenal insufficiency possibly due to chronic fluconazole use    Patient was referred endocrinology for management of adrenal insufficiency in the setting of steroid taper.  The patient has a history of Crohn's disease and has been dependent on steroids for most of his adult life.  In 2007, he was diagnosed with coccidiosis and was started on fluconazole treatment.  He had two episodes of kevin which was eventually determined to be from a drug interaction between fluconazole and prednisone, with underlying hypothesis being that fluconazole suppressed metabolism and clearance of prednisone.  He was tapered off prednisone in 2007, which has caused significant mental and on physical deconditioning including severe weakness, and was thought to have adrenal insufficiency even though there was no documented cortisol level in the chart.  Fluconazole was stopped after it was successfully treated initially, he has a relapse in 2013 with a knee infection, and was placed back on fluconazole 200 mg daily, which he is taking indefinitely.  His Crohn's disease is now controlled with low-dose prednisone 1 mg three times a week, and entercort 3 mg daily with minimal symptoms.  MRI in 2015 show that adrenal glands were normal.  May 2015 ACTH was 192, baseline cortisol was 1.6, 30 minute cortisol was high 0.8, and 60 minute cortisol was 6.9.  He does understand that he gets sick,  he should take supplemental steroids and/or IM Solu-Cortef.  repeat ACTH test in July 2015 now shows adrenal insufficiency with suppresssed ACTH. The patient saw infectious disease in October 2015 and the plan is to continue with the fluconazole for now given his history of coccidiomycosis.  April 2016 cortisol level was 10.  The patient stopped his steroids as of April 2016.     Interval history:  He continues to take Fluconazole, August 2019 cortisol was 12.8.     #2 history of osteoporosis  Patient had a documented low T score on DXA scan in 2005, and the clinical vertebral fracture status post kyphoplasty, confirming the diagnosis of osteoporosis and treatment as indicated.  He did have history of kidney stone even though this occurred in the setting of his Crohn's disease and admitted chocolate dependency.  A 24 urine for calcium was low at 90 mg over 24 hours.  May 2015 DEXA scan show the lowest T score -2.4 at the lumbar spine, lower Z score of -2.0 the lumbar spine, is significant increase in his bone density in the lumbar spine compared with the previous exam in 2005 as well as the baseline exam in 2003.  There's been no significant change in bone density at the level of his hips.  The patient is currently on Fosamax 70 mg weekly.  He does take this properly. March 2015 vitamin D is 28. We looked into the price of Reclast and this was cost prohibitive for him.  The patient had a tooth removed in January 2016 and then received his first dose of   Reclast in March 2016.  He tolerated this infusion. His second Reclast infusion was in July 2017 with no complications and again in September in 2018. His DEXA scan on 7/7/2017 showed in increased in bone density at the levels of L1-L2, and in the total right and left hips.     Interval history: He states the in the past year has had major digestive issues with accompanying diarrhea. He discontinued many medications and stopped eating many foods to determined what  is causing this. Once thing he stopped taking was calcium, and is afraid this may have effected the results of his DEXA scan this year. His August 2019 DEXA, demonstrated that he now has osteopenia. The lowest T-score is -2.4 at the level of the L1-L2 lumbar spine. The calculated changes in BMD to the previous exam (8//24/2018) are not significantly changed in all levels. The calculated changes in BMD to the basline exam (11/19/2003) are significantly increased at the level of the L1-L2 lumbar spine ( +7.4%) and at the right total hip (+4.3%).     He had a root canal tooth that became infected, and had it pulled in June 2019. He does not think that there will be any more teeth being pulled.  He denies any falls or fractures. Of note, he is wondering if he should have concern for calcium causing him to develop kidney stones. In 2015 his the Ca levels in his urine were normal.      #3 Skin itching  On 8/24/2019, the patient notes that for the past few years, he has been experiencing itchy skin in the winter and spring. He states that that itching would often start after siting in his car for long periods of time and preventing contact with the car seat did no help. He also has tried cortisone cream, showering, and changing detergents with no improvement. Mr. Gee feels that this may be a seasonal dry skin issue, as he never has noticed a rash in itchy areas and the itching changes location. He was going to follow up with dermatology for this, but cancelled the appointment due to having abdominal pain at the time.  Currently, he reports that his skin itching has resolved.    Interval history: He describes that the Rosetta cream seemed to help in the winter, and once summer came around this was much less bothersome.      #4 Diarrhea   The GI team believed that he had colonic overgrowth, and asked him to avoid sugar. His hemoglobin A1c today was 5.6% and his triglycerides have come down to 160 in August 2019 from 189  in August 2018. Of note, his creatinine was elevated at 1.3.    Review of Systems:   Pertinent items are noted in HPI and below, remainder of complete ROS is negative.    Answers for HPI/ROS submitted by the patient on 8/30/2019   General Symptoms: No  Skin Symptoms: No  HENT Symptoms: No  EYE SYMPTOMS: No  HEART SYMPTOMS: No  LUNG SYMPTOMS: No  INTESTINAL SYMPTOMS: No  URINARY SYMPTOMS: No  REPRODUCTIVE SYMPTOMS: No  SKELETAL SYMPTOMS: No  BLOOD SYMPTOMS: No  NERVOUS SYSTEM SYMPTOMS: No  MENTAL HEALTH SYMPTOMS: No      Active Medications:      Ascorbic Acid (VITAMIN C PO), Take 500 mg by mouth, Disp: , Rfl:      calcium-vitamin D (CALTRATE) 600-400 MG-UNIT per tablet, Take 1 tablet by mouth 2 times daily, Disp: , Rfl:      Cholecalciferol (VITAMIN D3 PO), Take 1,000 Units by mouth daily, Disp: , Rfl:      Cyanocobalamin (VITAMIN B 12 PO), Take 500 mcg by mouth, Disp: , Rfl:      dicyclomine (BENTYL) 10 MG capsule, Take 1 capsule (10 mg) by mouth 2 times daily (before meals), Disp: 90 capsule, Rfl: 1     diphenoxylate-atropine (LOMOTIL) 2.5-0.025 MG tablet, Take 1 tablet by mouth 4 times daily as needed for diarrhea, Disp: 60 tablet, Rfl: 1     ferrous sulfate 325 (65 FE) MG tablet, Take by mouth daily (with breakfast), Disp: , Rfl:      fluconazole (DIFLUCAN) 200 MG tablet, Take 1 tablet (200 mg) by mouth daily, Disp: 90 tablet, Rfl: 3     HYDROcodone-acetaminophen (NORCO) 5-325 MG tablet, Take 1 tablet by mouth every 6 hours as needed for pain, Disp: 15 tablet, Rfl: 0     MELATONIN PO, Take 1.5 mg by mouth nightly as needed , Disp: , Rfl:      multivitamin, therapeutic with minerals (MULTI-VITAMIN) TABS, Take 1 tablet by mouth daily, Disp: , Rfl:      ondansetron (ZOFRAN ODT) 4 MG ODT tab, Take 1 tablet (4 mg) by mouth every 8 hours as needed for nausea or vomiting, Disp: 10 tablet, Rfl: 0     Pyridoxine HCl (VITAMIN B6 PO), Take 100 mg by mouth, Disp: , Rfl:      tamsulosin (FLOMAX) 0.4 MG capsule, Take 1 capsule  "(0.4 mg) by mouth daily, Disp: 30 capsule, Rfl: 0     tiZANidine (ZANAFLEX) 4 MG tablet, Take 1-2 tablets (4-8 mg) by mouth 3 times daily as needed for muscle spasms, Disp: 30 tablet, Rfl: 0     traZODone (DESYREL) 50 MG tablet, Take 1 tablet (50 mg) by mouth nightly as needed for sleep, Disp: 90 tablet, Rfl: 1      Allergies:   Prednisone      Past Medical History:  Anemia  Anxiety  Cataract  Coccidioidomycosis  Chron disease  Depression  Green fracture of leg  Gastroesophageal reflux disease  Hypertriglyceridemia   Kidney stone  Nephrolithiasis  Osteoporosis  Prednisone overload induced kevin  Tuberculosis   Vitamin B12 deficiency  Adrenal insufficiency  Bipolar 2 disorder  Lactose intolerance   Vertebral fracture, osteoporotic     Past Surgical History:  Appendectomy  kyphoplasty   Biopsy lump on arm  Cholecystectomy  Colonoscopy x4  Esophagoscopy, gastroscopy, duodenoscopy combined x3  Cataract surgery bilateral  GI surgery  Soft tissue surgery     Family History:   Hear failure - father  Parkinson's - father, brother  Colorectal cancer - mother  Cerebrovascular disease - paternal grandmother  Cancer - other      Social History:  This patient presented alone.   Smoking status: never  Smokeless tobacco: never  Alcohol use: yes  Drug use: no      Physical Exam:   /81   Pulse 91   Ht 1.753 m (5' 9\")   Wt 69.1 kg (152 lb 6.4 oz)   BMI 22.51 kg/m      GENERAL APPEARANCE: Alert and no distress  NECK: No lymphadenopathy appreciated  Eyes: No obvious retinopathy noted  Thyroid: No obvious nodules palpated   CV: RRR without M/R/G  Lungs: CTA bilaterally  Abdomen: Soft, Nontender, non distended, positive bowel sounds   Neuro: normal reflexes, no focal deficits  Skin: No infection in feet   Mood: Normal   Lymph: neg in neck and supraclavicular area    DEXA scan from August 2019 personally reviewed with patient.  They show the lowest T score -2.4 at the L1-L2 region.  Compared with the 2018 exam, his bone density " had increased at the lumbar spine and both total hips but this was not significant.  Compared with the 2003 exam, his bone density had increased at the level of the lumbar spine and right total hip which was significant.    Assessment and Plan:  #1 adrenal insufficiency possibly due to chronic fluconazole use  Patient continues on fluconazole.  August 2019 cortisol was 12.8.     #2 history of osteoporosis  Congratulated patient.  His most recent bone density from August 2019 shows osteopenia.  It appears that he has gained bone compared with 2003 and has stable, if not improved bone density compared with 2018 although this was not significant.  We will plan for Reclast next year in 2020 along with DEXA scan at that time.  - Dexa hip/pelvis/spine  - 25 Hydroxyvitamin D2 and D3  - Hemoglobin A1c  - Basic metabolic panel  - TSH  - Cortisol    #3 Skin itching  Less bothersome for him in the Summer, and Rosetta cream works well in the winter.      Follow-up: Return in about 1 year (around 8/30/2020).      Scribe Disclosure:  I, Yenni Genao, am serving as a scribe to document services personally performed by Mag Currie MD at this visit, based upon the provider's statements to me. All documentation has been reviewed by the aforementioned provider prior to being entered into the official medical record.     Portions of this medical record were completed by a scribe. UPON MY REVIEW AND AUTHENTICATION BY ELECTRONIC SIGNATURE, this confirms (a) I performed the applicable clinical services, and (b) the record is accurate.        Again, thank you for allowing me to participate in the care of your patient.      Sincerely,    Mag Currie MD

## 2019-09-04 ENCOUNTER — TELEPHONE (OUTPATIENT)
Dept: GASTROENTEROLOGY | Facility: CLINIC | Age: 64
End: 2019-09-04

## 2019-09-04 NOTE — TELEPHONE ENCOUNTER
Spoke to patient in regards to scheduled procedure. Informed patient he is scheduled with Dr. Cruz on 10/1/19. Informed patient he will need an updated pre-op physical within 30 days of his procedure. Patient stated he is going to have this done locally. Informed patient he will need a  and someone to monitor him for 24 hours after the procedure. Informed patient all scheduling details will be given to him when he comes to see Dr. Cruz in clinic on 9/19/19     HK 9/4/19 [id number]

## 2019-09-09 ENCOUNTER — PRE VISIT (OUTPATIENT)
Dept: UROLOGY | Facility: CLINIC | Age: 64
End: 2019-09-09

## 2019-09-09 NOTE — TELEPHONE ENCOUNTER
Chief Complaint : New-Referred by Lacey TRAMMELL    Hx/Sx: Kidney Stones    Records/Orders: Available     Pt Contacted: Y-Called on 9/9, he states he is sx free and thinks he passed the stone/s, and denied a new CT    At Rooming: Normal

## 2019-09-13 ENCOUNTER — PRE VISIT (OUTPATIENT)
Dept: UROLOGY | Facility: CLINIC | Age: 64
End: 2019-09-13

## 2019-09-13 ENCOUNTER — OFFICE VISIT (OUTPATIENT)
Dept: UROLOGY | Facility: CLINIC | Age: 64
End: 2019-09-13
Payer: COMMERCIAL

## 2019-09-13 VITALS
SYSTOLIC BLOOD PRESSURE: 109 MMHG | DIASTOLIC BLOOD PRESSURE: 76 MMHG | HEART RATE: 77 BPM | BODY MASS INDEX: 22.22 KG/M2 | WEIGHT: 150 LBS | HEIGHT: 69 IN

## 2019-09-13 DIAGNOSIS — N20.0 CALCULUS OF KIDNEY: Primary | ICD-10-CM

## 2019-09-13 ASSESSMENT — MIFFLIN-ST. JEOR: SCORE: 1460.78

## 2019-09-13 ASSESSMENT — PAIN SCALES - GENERAL: PAINLEVEL: NO PAIN (0)

## 2019-09-13 NOTE — LETTER
9/13/2019       RE: Trevin Gee  [address]  [facility] [address]     Dear Colleague,    Thank you for referring your patient, Trevin Gee, to the [facility] UROLOGY AND INST FOR PROSTATE AND UROLOGIC CANCERS at [facility]. Please see a copy of my visit note below.      SUBJECTIVE:                                                      Trevin Gee is a 64 year old male who comes in for problems related to kidney stones.    Patient presents after patient was found to have a distal ureteral calculus on the right. Patient has subsequently passed the stone. He has brought it with him today. Patient has declined further imaging.       Last stone was 10 years ago, made of calcium oxalate.     We discussed stone passage rates, rate of stone formation, stone risk assessment. Discussed further imaging with stone protocol CT scan (to decrease radiation dose).             ROS:  CONSTITUTIONAL:NEGATIVE for fever, chills, change in weight  INTEGUMENTARY/SKIN: NEGATIVE for worrisome rashes, moles or lesions  EYES: NEGATIVE for vision changes or irritation  ENT/MOUTH: NEGATIVE for ear, mouth and throat problems  RESP:NEGATIVE for significant cough or SOB  CV: NEGATIVE for chest pain, palpitations or peripheral edema  GI: NEGATIVE for nausea, abdominal pain, heartburn, or change in bowel habits  MUSCULOSKELETAL: NEGATIVE for significant arthralgias or myalgia  PSYCHIATRIC: NEGATIVE for changes in mood or affect    Past Medical History:   Diagnosis Date     Anemia 2007     Anxiety 2012?    much worse since July 2014     Cataract 12/2007    both eyes, too much prednisone, implants     Coccidioidomycosis      Crohn disease (H)      Crohn's disease (H) 1/13/2015     Depressive disorder 7/2014    much worse since July 2014     Fracture 1956    green fracture of leg     Gallbladder problem 2005?    much gravel, removed     GERD (gastroesophageal reflux disease)       History of blood transfusion 1988    ulcerated anastomosis term. ilium bleed     Hypertriglyceridemia 7/8/2016     Infection 2007    persistant coccidioidomycosis     Kidney stone various    both sides, all passed naturally     Mental health problem 2007    bipolar though perhaps different     Nephrolithiasis      Osteoporosis 2007    osteoporosis, too much prednisone, last dexa 1/2014     Osteoporosis      Other nervous system complications 2007    prednisone overload induced kevin     Personal history of colonic polyps     small ones found during colonoscopies     Steroid-induced psychosis     Patient extremely sensitive to regular doses of steroids.  Unclear if this is due to chronic fluconazole use or genetic issue.  In the future, use caution when prescribing steroids.     TB (tuberculosis)        Past Surgical History:   Procedure Laterality Date     APPENDECTOMY  1974    coincidental with Crohn's surgery     BACK SURGERY  12/2007    kyphoplasty on compressed 4th??? vertebra     BIOPSY  2007, 2013    lump on arm, knee, back of hand for coccidioidomycosis cult.     CHOLECYSTECTOMY  2005    much gravel, removed laparoscopically     COLONOSCOPY  7/14/2014 last    Dr. Catherine Bowden, [facility] - many previous     COLONOSCOPY Left 9/11/2015    Procedure: COMBINED COLONOSCOPY, SINGLE OR MULTIPLE BIOPSY/POLYPECTOMY BY BIOPSY;  Surgeon: Fransisco Leach MD;  Location: [facility]     COLONOSCOPY N/A 8/3/2016    Procedure: COMBINED COLONOSCOPY, SINGLE OR MULTIPLE BIOPSY/POLYPECTOMY BY BIOPSY;  Surgeon: Fransisco Leach MD;  Location: [facility]     COLONOSCOPY N/A 8/16/2017    Procedure: COMBINED COLONOSCOPY, SINGLE OR MULTIPLE BIOPSY/POLYPECTOMY BY BIOPSY;;  Surgeon: Fransisco Leach MD;  [facility]     ENT SURGERY  ?    upper endoscopy     ESOPHAGOSCOPY, GASTROSCOPY, DUODENOSCOPY (EGD), COMBINED N/A 11/8/2016    Procedure: COMBINED ENDOSCOPIC ULTRASOUND, ESOPHAGOSCOPY, GASTROSCOPY, DUODENOSCOPY  "(EGD), FINE NEEDLE ASPIRATE/BIOPSY;  Surgeon: Guru Alexsandra Ballesteros MD;  Location: [facility]     ESOPHAGOSCOPY, GASTROSCOPY, DUODENOSCOPY (EGD), COMBINED N/A 11/8/2016    Procedure: COMBINED ESOPHAGOSCOPY, GASTROSCOPY, DUODENOSCOPY (EGD), BIOPSY SINGLE OR MULTIPLE;  Surgeon: Guru Alexsandra Ballesteros MD;  Location: [facility]     ESOPHAGOSCOPY, GASTROSCOPY, DUODENOSCOPY (EGD), COMBINED N/A 8/16/2017    Procedure: COMBINED ESOPHAGOSCOPY, GASTROSCOPY, DUODENOSCOPY (EGD), BIOPSY SINGLE OR MULTIPLE;;  Surgeon: Fransisco Leach MD;  [facility]     EYE SURGERY  12/2007    cataract surgery both sides     GI SURGERY  1974, 1986(x2), 1989    Crohn's, 1974 RO 18\" term. ilium + 9\" asc. colon all one pc     SOFT TISSUE SURGERY  3/2013    removed buildup on back of r hand, coccidioidomycosis       Family History   Problem Relation Age of Onset     Heart Failure Father      Musculoskeletal Disorder Father         Parkinson's     Cancer - colorectal Mother      Cerebrovascular Disease Paternal Grandmother      Cancer Other      Musculoskeletal Disorder Brother         Parkinson's       Social History     Tobacco Use     Smoking status: Never Smoker     Smokeless tobacco: Never Used   Substance Use Topics     Alcohol use: Yes     Comment: sometimes one glass of wine a week         OBJECTIVE:                                                    /76   Pulse 77   Ht 1.753 m (5' 9\")   Wt 68 kg (150 lb)   BMI 22.15 kg/m     Body mass index is 22.15 kg/m .    EXAM:        GENERAL APPEARANCE: healthy, alert and no distress  RESP: negative   CV: negative   Abdomen: Abdomen soft, non-tender  CVAT: absent  SKIN: no suspicious lesions or rashes    IMPRESSION: 63 yo M with history of Crohn's disease, history of stone disease prior, now with ureteral calculus that passed spontaneously         Diagnostic test results:  CT scan reviewed with the patient      ASSESSMENT/PLAN:                                       "              No diagnosis found.    24 hr urine x2   Follow up in 6-8 weeks with Yue Wilson NP to review results         I spent over 30 minutes with the patient.  Over half this time was spent on counseling for ureteral calculus.       Ilene Redmond MD  [facility] UROLOGY AND [facility] FOR PROSTATE AND UROLOGIC CANCERS    Answers for HPI/ROS submitted by the patient on 8/30/2019   General Symptoms: No  Skin Symptoms: No  HENT Symptoms: No  EYE SYMPTOMS: No  HEART SYMPTOMS: No  LUNG SYMPTOMS: No  INTESTINAL SYMPTOMS: No  URINARY SYMPTOMS: No  REPRODUCTIVE SYMPTOMS: No  SKELETAL SYMPTOMS: No  BLOOD SYMPTOMS: No  NERVOUS SYSTEM SYMPTOMS: No  MENTAL HEALTH SYMPTOMS: No      Again, thank you for allowing me to participate in the care of your patient.      Sincerely,    Ilene Redmond MD

## 2019-09-13 NOTE — NURSING NOTE
"New-Kidney Stones     Hx of stones over the past few years, his last episode being ~ May 2019.  He denies any urinary sx and pains and any stone sx or pains.      Chief Complaint   Patient presents with     New Patient     Kidney Stones        Blood pressure 109/76, pulse 77, height 1.753 m (5' 9\"), weight 68 kg (150 lb). Body mass index is 22.15 kg/m .    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        Current Outpatient Medications   Medication Sig Dispense Refill     acetaminophen (TYLENOL) 500 MG tablet Take 500-1,000 mg by mouth every 6 hours as needed for mild pain       Ascorbic Acid (VITAMIN C PO) Take 500 mg by mouth       calcium-vitamin D (CALTRATE) 600-400 MG-UNIT per tablet Take 1 tablet by mouth 2 times daily       Cholecalciferol (VITAMIN D3 PO) Take 1,000 Units by mouth daily       Cyanocobalamin (VITAMIN B 12 PO) Take 500 mcg by mouth       dicyclomine (BENTYL) 10 MG capsule Take 1 capsule (10 mg) by mouth 2 times daily (before meals) 90 capsule 1     diphenoxylate-atropine (LOMOTIL) 2.5-0.025 MG tablet Take 1 tablet by mouth 4 times daily as needed for diarrhea 60 tablet 1     ferrous sulfate 325 (65 FE) MG tablet Take by mouth daily (with breakfast)       fluconazole (DIFLUCAN) 200 MG tablet Take 1 tablet (200 mg) by mouth daily 90 tablet 3     MELATONIN PO Take 1.5 mg by mouth nightly as needed        multivitamin, therapeutic with " minerals (MULTI-VITAMIN) TABS Take 1 tablet by mouth daily       Pyridoxine HCl (VITAMIN B6 PO) Take 100 mg by mouth       tiZANidine (ZANAFLEX) 4 MG tablet Take 1-2 tablets (4-8 mg) by mouth 3 times daily as needed for muscle spasms 30 tablet 0     traZODone (DESYREL) 50 MG tablet Take 1 tablet (50 mg) by mouth nightly as needed for sleep 90 tablet 1     HYDROcodone-acetaminophen (NORCO) 5-325 MG tablet Take 1 tablet by mouth every 6 hours as needed for pain (Patient not taking: Reported on 9/13/2019) 15 tablet 0     ondansetron (ZOFRAN ODT) 4 MG ODT tab Take 1 tablet (4 mg) by mouth every 8 hours as needed for nausea or vomiting (Patient not taking: Reported on 9/13/2019) 10 tablet 0     tamsulosin (FLOMAX) 0.4 MG capsule Take 1 capsule (0.4 mg) by mouth daily (Patient not taking: Reported on 9/13/2019) 30 capsule 0       Social History     Tobacco Use     Smoking status: Never Smoker     Smokeless tobacco: Never Used   Substance Use Topics     Alcohol use: Yes     Comment: sometimes one glass of wine a week     Drug use: No       Chito Wyatt, EMT, EMT  9/13/2019  2:14 PM

## 2019-09-13 NOTE — PATIENT INSTRUCTIONS
Please complete the [employer] 24hr Urine collection x2 . They will mail you the kit with instructions on completing the collection and mailing it back to them. If you do not receive the [employer] in 7-10 days please call [phone].     Follow up with Dr. Redmond or Yue TRAMMELL in six-eight weeks to review the [employer]    It was a pleasure meeting with you today.  Thank you for allowing me and my team the privilege of caring for you today.  YOU are the reason we are here, and I truly hope we provided you with the excellent service you deserve.  Please let us know if there is anything else we can do for you so that we can be sure you are leaving completely satisfied with your care experience.      Chito Wyatt, EMT

## 2019-09-13 NOTE — PROGRESS NOTES
SUBJECTIVE:                                                      Trevin Gee is a 64 year old male who comes in for problems related to kidney stones.    Patient presents after patient was found to have a distal ureteral calculus on the right. Patient has subsequently passed the stone. He has brought it with him today. Patient has declined further imaging.       Last stone was 10 years ago, made of calcium oxalate.     We discussed stone passage rates, rate of stone formation, stone risk assessment. Discussed further imaging with stone protocol CT scan (to decrease radiation dose).             ROS:  CONSTITUTIONAL:NEGATIVE for fever, chills, change in weight  INTEGUMENTARY/SKIN: NEGATIVE for worrisome rashes, moles or lesions  EYES: NEGATIVE for vision changes or irritation  ENT/MOUTH: NEGATIVE for ear, mouth and throat problems  RESP:NEGATIVE for significant cough or SOB  CV: NEGATIVE for chest pain, palpitations or peripheral edema  GI: NEGATIVE for nausea, abdominal pain, heartburn, or change in bowel habits  MUSCULOSKELETAL: NEGATIVE for significant arthralgias or myalgia  PSYCHIATRIC: NEGATIVE for changes in mood or affect    Past Medical History:   Diagnosis Date     Anemia 2007     Anxiety 2012?    much worse since July 2014     Cataract 12/2007    both eyes, too much prednisone, implants     Coccidioidomycosis      Crohn disease (H)      Crohn's disease (H) 1/13/2015     Depressive disorder 7/2014    much worse since July 2014     Fracture 1956    green fracture of leg     Gallbladder problem 2005?    much gravel, removed     GERD (gastroesophageal reflux disease)      History of blood transfusion 1988    ulcerated anastomosis term. ilium bleed     Hypertriglyceridemia 7/8/2016     Infection 2007    persistant coccidioidomycosis     Kidney stone various    both sides, all passed naturally     Mental health problem 2007    bipolar though perhaps different     Nephrolithiasis      Osteoporosis 2007     osteoporosis, too much prednisone, last dexa 1/2014     Osteoporosis      Other nervous system complications 2007    prednisone overload induced kevin     Personal history of colonic polyps     small ones found during colonoscopies     Steroid-induced psychosis     Patient extremely sensitive to regular doses of steroids.  Unclear if this is due to chronic fluconazole use or genetic issue.  In the future, use caution when prescribing steroids.     TB (tuberculosis)        Past Surgical History:   Procedure Laterality Date     APPENDECTOMY  1974    coincidental with Crohn's surgery     BACK SURGERY  12/2007    kyphoplasty on compressed 4th??? vertebra     BIOPSY  2007, 2013    lump on arm, knee, back of hand for coccidioidomycosis cult.     CHOLECYSTECTOMY  2005    much gravel, removed laparoscopically     COLONOSCOPY  7/14/2014 last    Dr. Catherine Bowden, [facility] - many previous     COLONOSCOPY Left 9/11/2015    Procedure: COMBINED COLONOSCOPY, SINGLE OR MULTIPLE BIOPSY/POLYPECTOMY BY BIOPSY;  Surgeon: Fransisco Leach MD;  Location:  GI     COLONOSCOPY N/A 8/3/2016    Procedure: COMBINED COLONOSCOPY, SINGLE OR MULTIPLE BIOPSY/POLYPECTOMY BY BIOPSY;  Surgeon: Fransisco Leach MD;  Location:  GI     COLONOSCOPY N/A 8/16/2017    Procedure: COMBINED COLONOSCOPY, SINGLE OR MULTIPLE BIOPSY/POLYPECTOMY BY BIOPSY;;  Surgeon: Fransisco Leach MD;  [facility]     ENT SURGERY  ?    upper endoscopy     ESOPHAGOSCOPY, GASTROSCOPY, DUODENOSCOPY (EGD), COMBINED N/A 11/8/2016    Procedure: COMBINED ENDOSCOPIC ULTRASOUND, ESOPHAGOSCOPY, GASTROSCOPY, DUODENOSCOPY (EGD), FINE NEEDLE ASPIRATE/BIOPSY;  Surgeon: Guru Alexsandra Ballesteros MD;  Location:  GI     ESOPHAGOSCOPY, GASTROSCOPY, DUODENOSCOPY (EGD), COMBINED N/A 11/8/2016    Procedure: COMBINED ESOPHAGOSCOPY, GASTROSCOPY, DUODENOSCOPY (EGD), BIOPSY SINGLE OR MULTIPLE;  Surgeon: Guru Alexsandra Ballesteros MD;  [facility]:  "[facility] GI     ESOPHAGOSCOPY, GASTROSCOPY, DUODENOSCOPY (EGD), COMBINED N/A 8/16/2017    Procedure: COMBINED ESOPHAGOSCOPY, GASTROSCOPY, DUODENOSCOPY (EGD), BIOPSY SINGLE OR MULTIPLE;;  Surgeon: Fransisco Leach MD;  [facility]     EYE SURGERY  12/2007    cataract surgery both sides     GI SURGERY  1974, 1986(x2), 1989    Crohn's, 1974 RO 18\" term. ilium + 9\" asc. colon all one pc     SOFT TISSUE SURGERY  3/2013    removed buildup on back of r hand, coccidioidomycosis       Family History   Problem Relation Age of Onset     Heart Failure Father      Musculoskeletal Disorder Father         Parkinson's     Cancer - colorectal Mother      Cerebrovascular Disease Paternal Grandmother      Cancer Other      Musculoskeletal Disorder Brother         Parkinson's       Social History     Tobacco Use     Smoking status: Never Smoker     Smokeless tobacco: Never Used   Substance Use Topics     Alcohol use: Yes     Comment: sometimes one glass of wine a week         OBJECTIVE:                                                    /76   Pulse 77   Ht 1.753 m (5' 9\")   Wt 68 kg (150 lb)   BMI 22.15 kg/m    Body mass index is 22.15 kg/m .    EXAM:        GENERAL APPEARANCE: healthy, alert and no distress  RESP: negative   CV: negative   Abdomen: Abdomen soft, non-tender  CVAT: absent  SKIN: no suspicious lesions or rashes    IMPRESSION: 63 yo M with history of Crohn's disease, history of stone disease prior, now with ureteral calculus that passed spontaneously         Diagnostic test results:  CT scan reviewed with the patient      ASSESSMENT/PLAN:                                                    No diagnosis found.    24 hr urine x2   Follow up in 6-8 weeks with Yue Wilson NP to review results         I spent over 30 minutes with the patient.  Over half this time was spent on counseling for ureteral calculus.       Ilene Redmond MD  [facility] UROLOGY AND [facility] FOR PROSTATE AND UROLOGIC " CANCERS    Answers for HPI/ROS submitted by the patient on 8/30/2019   General Symptoms: No  Skin Symptoms: No  HENT Symptoms: No  EYE SYMPTOMS: No  HEART SYMPTOMS: No  LUNG SYMPTOMS: No  INTESTINAL SYMPTOMS: No  URINARY SYMPTOMS: No  REPRODUCTIVE SYMPTOMS: No  SKELETAL SYMPTOMS: No  BLOOD SYMPTOMS: No  NERVOUS SYSTEM SYMPTOMS: No  MENTAL HEALTH SYMPTOMS: No

## 2019-09-16 LAB
APPEARANCE STONE: NORMAL
COMPN STONE: NORMAL
NUMBER STONE: 3
SIZE STONE: NORMAL MM
WT STONE: 5 MG

## 2019-09-18 ASSESSMENT — ENCOUNTER SYMPTOMS
ABDOMINAL PAIN: 0
JAUNDICE: 0
DIARRHEA: 1
BLOATING: 0
BOWEL INCONTINENCE: 0
BLOOD IN STOOL: 0
NAUSEA: 0
CONSTIPATION: 0
VOMITING: 0
HEARTBURN: 0
RECTAL PAIN: 0

## 2019-09-19 ENCOUNTER — OFFICE VISIT (OUTPATIENT)
Dept: GASTROENTEROLOGY | Facility: CLINIC | Age: 64
End: 2019-09-19
Payer: COMMERCIAL

## 2019-09-19 VITALS
WEIGHT: 156 LBS | BODY MASS INDEX: 23.11 KG/M2 | HEART RATE: 79 BPM | SYSTOLIC BLOOD PRESSURE: 120 MMHG | HEIGHT: 69 IN | DIASTOLIC BLOOD PRESSURE: 81 MMHG | OXYGEN SATURATION: 97 %

## 2019-09-19 DIAGNOSIS — K91.30 COLORECTAL ANASTOMOTIC STRICTURE: Primary | ICD-10-CM

## 2019-09-19 RX ORDER — BISACODYL 5 MG/1
15 TABLET, DELAYED RELEASE ORAL ONCE
Qty: 3 TABLET | Refills: 0 | Status: SHIPPED | OUTPATIENT
Start: 2019-09-19 | End: 2020-01-27

## 2019-09-19 ASSESSMENT — PAIN SCALES - GENERAL: PAINLEVEL: NO PAIN (0)

## 2019-09-19 ASSESSMENT — MIFFLIN-ST. JEOR: SCORE: 1487.99

## 2019-09-19 NOTE — PATIENT INSTRUCTIONS
Follow up:    Please call with any questions or concerns regarding your clinic visit today.    It is a pleasure being involved in your health care.    Contacts post-consultation depending on your need:    Schedule Clinic Appointments                 [phone] # 1   M-F 7:30 - 5 pm    Barbie Peña RN Care Coordinator       [phone]  #3  Monica Balderrama LPN                   [phone]  #3     OR Procedure Scheduling                       [phone]    My Chart is available 24 hours a day and is a secure way to access your records and communicate with your care team.  I strongly recommend signing up if you haven't already done so, if you are comfortable with computers.  If you would like to inquire about this or are having problems with My Chart access, you may call [phone] or go online at [email or web address].  Please allow at least 24 hours for a response and extra time on weekends and Holidays.

## 2019-09-19 NOTE — PROGRESS NOTES
Therapeutic Endoscopy Clinic Visit    CC/Referring Physician:  Fransisco Leach  Question for Consultation:  Consideration of anastomotic stent placement    Assessment and Plan:  Mr Gee is a 65yo gentleman with what sounds like currently quiescent ileal Crohns status post ileocolonic resection who is struggling with recurrent obstructive symptoms from a persistent anastomotic stenosis despite aggressive serial endoscopic dilations. We discussed the concept and risks of management with an endoscopic lumen apposing metal stent, including the fact that this is not an FDA approved indication. Moreover, we reviewed my personal experience with this stent in this particular scenario which is mostly positive. Risks including migration, but this would be downstream and the stent would be easily recoverable, as well as the theoretical possibility that the stent would irritate the surrounding mucosa to prompt an inflammatory response. I believe, that he would do quite well with the stent in situ for upwards of 3m at which time we would recover the stent and evaluate the mucosa.  Of course, if for some reason he develops untoward symptoms thought related to the stent we will organize early retrieval. This will happen in the OR using at least deep sedation and be fluoroscopically guided.    RTC as clinical course dictates    Thank you for this consultation.  It was a pleasure to participate in the care of this patient; please contact us with any further questions.  A total of 40 minutes was spent in face to face evaluation with this patient, >50% of which was counseling regarding the above delineated issues.    Robbie Cruz MD PhD MARZENA BONILLA  Director of Endoscopy  Associate Professor of Medicine, Surgery and Pediatrics  Interventional and Therapeutic Endoscopy    [facility]  Division of Gastroenterology and Hepatology  [facility] [address] [place], [place]  [address]    New Consultations  [phone]  Procedure Scheduling [phone]  Clinical Nurse Coordinator [phone]  Clinical Fax   [phone]  Administrative   [phone]  Administrative Fax  [phone]    -------------------------------------------------------------------------------------------------------------------  History of Presentation:  Mr Gee is a 64 year old gentleman with Crohns diagnosed in 1974 (young 20s) during a surgery thought to be for appendicitis during which they did an ileocolonic resection. Soon thereafter he had at least two revisions of the anastomosis in 86 and 89 and more recently has required dilations for intermitted obstructive symptoms. He is no longer on directed medications for the past 4 years, and was mostly treated over the years with steroids and more recently methotrexate. Currently, he has no obstructive symptoms as he had his last dilation about 1m back with Dr Leach. He notes that he can about 10-12 months between dilations though has interval symptoms. Dr Leach did short interval serial dilations over a period of 3m and with each the stenosis appeared recalcitrant. He is therefore sent to discuss placement of a covered metal stent. He has no current gastroenterology complaints. He notes diarrhea after his dilations, though this was thought to be dietary/senstivity rather than mechanical.    Review of systems:  A twelve point review was performed and was otherwise negative beyond what is mentioned in the history above.    Pertinent past medical history:  Past Medical History:   Diagnosis Date     Anemia 2007     Anxiety 2012?    much worse since July 2014     Cataract 12/2007    both eyes, too much prednisone, implants     Coccidioidomycosis      Crohn disease (H)      Crohn's disease (H) 1/13/2015     Depressive disorder 7/2014    much worse since July 2014     Fracture 1956    green fracture of leg     Gallbladder problem 2005?    much gravel, removed      GERD (gastroesophageal reflux disease)      History of blood transfusion 1988    ulcerated anastomosis term. ilium bleed     Hypertriglyceridemia 7/8/2016     Infection 2007    persistant coccidioidomycosis     Kidney stone various    both sides, all passed naturally     Mental health problem 2007    bipolar though perhaps different     Nephrolithiasis      Osteoporosis 2007    osteoporosis, too much prednisone, last dexa 1/2014     Osteoporosis      Other nervous system complications 2007    prednisone overload induced kevin     Personal history of colonic polyps     small ones found during colonoscopies     Steroid-induced psychosis     Patient extremely sensitive to regular doses of steroids.  Unclear if this is due to chronic fluconazole use or genetic issue.  In the future, use caution when prescribing steroids.     TB (tuberculosis)        Previous surgeries:  Past Surgical History:   Procedure Laterality Date     APPENDECTOMY  1974    coincidental with Crohn's surgery     BACK SURGERY  12/2007    kyphoplasty on compressed 4th??? vertebra     BIOPSY  2007, 2013    lump on arm, knee, back of hand for coccidioidomycosis cult.     CHOLECYSTECTOMY  2005    much gravel, removed laparoscopically     COLONOSCOPY  7/14/2014 last    Dr. Catherine Bowden, [facility] - many previous     COLONOSCOPY Left 9/11/2015    Procedure: COMBINED COLONOSCOPY, SINGLE OR MULTIPLE BIOPSY/POLYPECTOMY BY BIOPSY;  Surgeon: Fransisco Leach MD;  Location:  GI     COLONOSCOPY N/A 8/3/2016    Procedure: COMBINED COLONOSCOPY, SINGLE OR MULTIPLE BIOPSY/POLYPECTOMY BY BIOPSY;  Surgeon: Fransisco Leach MD;  Location:  GI     COLONOSCOPY N/A 8/16/2017    Procedure: COMBINED COLONOSCOPY, SINGLE OR MULTIPLE BIOPSY/POLYPECTOMY BY BIOPSY;;  Surgeon: Fransisco Leach MD;  [facility]     ENT SURGERY  ?    upper endoscopy     ESOPHAGOSCOPY, GASTROSCOPY, DUODENOSCOPY (EGD), COMBINED N/A 11/8/2016    Procedure:  "COMBINED ENDOSCOPIC ULTRASOUND, ESOPHAGOSCOPY, GASTROSCOPY, DUODENOSCOPY (EGD), FINE NEEDLE ASPIRATE/BIOPSY;  Surgeon: Guru Alexsandra Ballesteros MD;  Location:  GI     ESOPHAGOSCOPY, GASTROSCOPY, DUODENOSCOPY (EGD), COMBINED N/A 11/8/2016    Procedure: COMBINED ESOPHAGOSCOPY, GASTROSCOPY, DUODENOSCOPY (EGD), BIOPSY SINGLE OR MULTIPLE;  Surgeon: Guru Alexsandra Ballesteros MD;  Location:  GI     ESOPHAGOSCOPY, GASTROSCOPY, DUODENOSCOPY (EGD), COMBINED N/A 8/16/2017    Procedure: COMBINED ESOPHAGOSCOPY, GASTROSCOPY, DUODENOSCOPY (EGD), BIOPSY SINGLE OR MULTIPLE;;  Surgeon: Fransisco Leach MD;  [facility]     EYE SURGERY  12/2007    cataract surgery both sides     GI SURGERY  1974, 1986(x2), 1989    Crohn's, 1974 RO 18\" term. ilium + 9\" asc. colon all one pc     SOFT TISSUE SURGERY  3/2013    removed buildup on back of r hand, coccidioidomycosis     Current mediations:  Current Outpatient Medications   Medication     acetaminophen (TYLENOL) 500 MG tablet     Ascorbic Acid (VITAMIN C PO)     calcium-vitamin D (CALTRATE) 600-400 MG-UNIT per tablet     Cholecalciferol (VITAMIN D3 PO)     Cyanocobalamin (VITAMIN B 12 PO)     dicyclomine (BENTYL) 10 MG capsule     diphenoxylate-atropine (LOMOTIL) 2.5-0.025 MG tablet     ferrous sulfate 325 (65 FE) MG tablet     fluconazole (DIFLUCAN) 200 MG tablet     MELATONIN PO     multivitamin, therapeutic with minerals (MULTI-VITAMIN) TABS     Pyridoxine HCl (VITAMIN B6 PO)     tiZANidine (ZANAFLEX) 4 MG tablet     traZODone (DESYREL) 50 MG tablet     HYDROcodone-acetaminophen (NORCO) 5-325 MG tablet     ondansetron (ZOFRAN ODT) 4 MG ODT tab     tamsulosin (FLOMAX) 0.4 MG capsule     No current facility-administered medications for this visit.      Medications reviewed with patient today, see Medication List/Assessment for details.  No other NSAID/anticoagulation reported by patient.  No other OTC/herbal/supplements reported by patient.    Social " history:  Social History     Socioeconomic History     Marital status:      Spouse name: Not on file     Number of children: Not on file     Years of education: Not on file     Highest education level: Not on file   Occupational History     Occupation: Reserach associate at the      Employer: [employer]   Social Needs     Financial resource strain: Not on file     Food insecurity:     Worry: Not on file     Inability: Not on file     Transportation needs:     Medical: Not on file     Non-medical: Not on file   Tobacco Use     Smoking status: Never Smoker     Smokeless tobacco: Never Used   Substance and Sexual Activity     Alcohol use: Yes     Comment: sometimes one glass of wine a week     Drug use: No     Sexual activity: Never   Lifestyle     Physical activity:     Days per week: Not on file     Minutes per session: Not on file     Stress: Not on file   Relationships     Social connections:     Talks on phone: Not on file     Gets together: Not on file     Attends Roman [employer] service: Not on file     Active member of club or organization: Not on file     Attends meetings of clubs or organizations: Not on file     Relationship status: Not on file     Intimate partner violence:     Fear of current or ex partner: Not on file     Emotionally abused: Not on file     Physically abused: Not on file     Forced sexual activity: Not on file   Other Topics Concern     Not on file   Social History Narrative     Not on file       Family history:  Family History   Problem Relation Age of Onset     Heart Failure Father      Musculoskeletal Disorder Father         Parkinson's     Cancer - colorectal Mother      Cerebrovascular Disease Paternal Grandmother      Cancer Other      Musculoskeletal Disorder Brother         Parkinson's     Family history reviewed and updated in EPIC  No colon/panc/other GI CA, no other HNPCC-related Román.  No IBD/celiac, no AI/liver disease.    PHYSICAL EXAMINATION:  Vitals  reviewed, AFS   Wt   Wt Readings from Last 2 Encounters:   09/19/19 70.8 kg (156 lb)   09/13/19 68 kg (150 lb)      Gen: nontoxic, aaox3, cooperative, pleasant, not dyspneic/diaphoretic  HEENT: neck supple, normal op w/o ulcer/exudate, anicteric  Resp/CV unremarkable without significant finding  Abd: benign, soft, nondistended, intact bs, no hepatosplenomegaly, nontender, no peritoneal signs  Ext: no c/c/e  Skin: warm, perfused, no jaundice  Neuro: grossly intact    Pertinent outside studies:      Answers for HPI/ROS submitted by the patient on 9/18/2019   General Symptoms: No  Skin Symptoms: No  HENT Symptoms: No  EYE SYMPTOMS: No  HEART SYMPTOMS: No  LUNG SYMPTOMS: No  INTESTINAL SYMPTOMS: Yes  URINARY SYMPTOMS: No  REPRODUCTIVE SYMPTOMS: No  SKELETAL SYMPTOMS: No  BLOOD SYMPTOMS: No  NERVOUS SYSTEM SYMPTOMS: No  MENTAL HEALTH SYMPTOMS: No  Heart burn or indigestion: No  Nausea: No  Vomiting: No  Abdominal pain: No  Bloating: No  Constipation: No  Diarrhea: Yes  Blood in stool: No  Black stools: No  Rectal or Anal pain: No  Fecal incontinence: No  Yellowing of skin or eyes: No  Vomit with blood: No  Change in stools: No

## 2019-09-19 NOTE — NURSING NOTE
"Chief Complaint   Patient presents with     Consult     new pt here to discuss colonoscopy; chron's pt w chronic anastomotic stricture,referred bu Dr Leach       Vitals:    09/19/19 0922   BP: 120/81   BP Location: Left arm   Patient Position: Chair   Cuff Size: Adult Regular   Pulse: 79   SpO2: 97%   Weight: 70.8 kg (156 lb)   Height: 1.753 m (5' 9\")       Body mass index is 23.04 kg/m .    Ever Hilliard, EMT    "

## 2019-09-19 NOTE — LETTER
9/19/2019       RE: Trevin Gee  [address]  [facility] [address]     Dear Colleague,    Thank you for referring your patient, Trevin Gee, to the [facility]. Please see a copy of my visit note below.    Therapeutic Endoscopy Clinic Visit    CC/Referring Physician:  Fransisco Leach  Question for Consultation:  Consideration of anastomotic stent placement    Assessment and Plan:  Mr Gee is a 65yo gentleman with what sounds like currently quiescent ileal Crohns status post ileocolonic resection who is struggling with recurrent obstructive symptoms from a persistent anastomotic stenosis despite aggressive serial endoscopic dilations. We discussed the concept and risks of management with an endoscopic lumen apposing metal stent, including the fact that this is not an FDA approved indication. Moreover, we reviewed my personal experience with this stent in this particular scenario which is mostly positive. Risks including migration, but this would be downstream and the stent would be easily recoverable, as well as the theoretical possibility that the stent would irritate the surrounding mucosa to prompt an inflammatory response. I believe, that he would do quite well with the stent in situ for upwards of 3m at which time we would recover the stent and evaluate the mucosa.  Of course, if for some reason he develops untoward symptoms thought related to the stent we will organize early retrieval. This will happen in the OR using at least deep sedation and be fluoroscopically guided.    RTC as clinical course dictates    Thank you for this consultation.  It was a pleasure to participate in the care of this patient; please contact us with any further questions.  A total of 40 minutes was spent in face to face evaluation with this patient, >50% of which was counseling regarding the above delineated issues.    Robbie WEINBERG  Nancy AGUIRRE PhD MARZENA BONILLA  Director of Endoscopy  Associate Professor of Medicine, Surgery and Pediatrics  Interventional and Therapeutic Endoscopy    [facility]  Division of Gastroenterology and Hepatology  [facility] [address], [place] [address]    New Consultations  [phone]  Procedure Scheduling [phone]  Clinical Nurse Coordinator [phone]  Clinical Fax   [phone]  Administrative   [phone]  Administrative Fax  [phone]    -------------------------------------------------------------------------------------------------------------------  History of Presentation:  Mr Gee is a 64 year old gentleman with Crohns diagnosed in 1974 (young 20s) during a surgery thought to be for appendicitis during which they did an ileocolonic resection. Soon thereafter he had at least two revisions of the anastomosis in 86 and 89 and more recently has required dilations for intermitted obstructive symptoms. He is no longer on directed medications for the past 4 years, and was mostly treated over the years with steroids and more recently methotrexate. Currently, he has no obstructive symptoms as he had his last dilation about 1m back with Dr Leach. He notes that he can about 10-12 months between dilations though has interval symptoms. Dr Leach did short interval serial dilations over a period of 3m and with each the stenosis appeared recalcitrant. He is therefore sent to discuss placement of a covered metal stent. He has no current gastroenterology complaints. He notes diarrhea after his dilations, though this was thought to be dietary/senstivity rather than mechanical.    Review of systems:  A twelve point review was performed and was otherwise negative beyond what is mentioned in the history above.    Pertinent past medical history:  Past Medical History:   Diagnosis Date     Anemia 2007     Anxiety 2012?    much worse since July 2014     Cataract  12/2007    both eyes, too much prednisone, implants     Coccidioidomycosis      Crohn disease (H)      Crohn's disease (H) 1/13/2015     Depressive disorder 7/2014    much worse since July 2014     Fracture 1956    green fracture of leg     Gallbladder problem 2005?    much gravel, removed     GERD (gastroesophageal reflux disease)      History of blood transfusion 1988    ulcerated anastomosis term. ilium bleed     Hypertriglyceridemia 7/8/2016     Infection 2007    persistant coccidioidomycosis     Kidney stone various    both sides, all passed naturally     Mental health problem 2007    bipolar though perhaps different     Nephrolithiasis      Osteoporosis 2007    osteoporosis, too much prednisone, last dexa 1/2014     Osteoporosis      Other nervous system complications 2007    prednisone overload induced kevin     Personal history of colonic polyps     small ones found during colonoscopies     Steroid-induced psychosis     Patient extremely sensitive to regular doses of steroids.  Unclear if this is due to chronic fluconazole use or genetic issue.  In the future, use caution when prescribing steroids.     TB (tuberculosis)        Previous surgeries:  Past Surgical History:   Procedure Laterality Date     APPENDECTOMY  1974    coincidental with Crohn's surgery     BACK SURGERY  12/2007    kyphoplasty on compressed 4th??? vertebra     BIOPSY  2007, 2013    lump on arm, knee, back of hand for coccidioidomycosis cult.     CHOLECYSTECTOMY  2005    much gravel, removed laparoscopically     COLONOSCOPY  7/14/2014 last    Dr. Catherine Bowden, [facility] - many previous     COLONOSCOPY Left 9/11/2015    Procedure: COMBINED COLONOSCOPY, SINGLE OR MULTIPLE BIOPSY/POLYPECTOMY BY BIOPSY;  Surgeon: Fransisco Leach MD;  Location:  GI     COLONOSCOPY N/A 8/3/2016    Procedure: COMBINED COLONOSCOPY, SINGLE OR MULTIPLE BIOPSY/POLYPECTOMY BY BIOPSY;  Surgeon: Fransisco Leach MD;  Location:  GI      "COLONOSCOPY N/A 8/16/2017    Procedure: COMBINED COLONOSCOPY, SINGLE OR MULTIPLE BIOPSY/POLYPECTOMY BY BIOPSY;;  Surgeon: Fransisco Leach MD;  [facility]     ENT SURGERY  ?    upper endoscopy     ESOPHAGOSCOPY, GASTROSCOPY, DUODENOSCOPY (EGD), COMBINED N/A 11/8/2016    Procedure: COMBINED ENDOSCOPIC ULTRASOUND, ESOPHAGOSCOPY, GASTROSCOPY, DUODENOSCOPY (EGD), FINE NEEDLE ASPIRATE/BIOPSY;  Surgeon: Guru Alexsandra Ballesteros MD;  Location: [facility] GI     ESOPHAGOSCOPY, GASTROSCOPY, DUODENOSCOPY (EGD), COMBINED N/A 11/8/2016    Procedure: COMBINED ESOPHAGOSCOPY, GASTROSCOPY, DUODENOSCOPY (EGD), BIOPSY SINGLE OR MULTIPLE;  Surgeon: Guru Alexsandra Ballesteros MD;  Location: [facility] GI     ESOPHAGOSCOPY, GASTROSCOPY, DUODENOSCOPY (EGD), COMBINED N/A 8/16/2017    Procedure: COMBINED ESOPHAGOSCOPY, GASTROSCOPY, DUODENOSCOPY (EGD), BIOPSY SINGLE OR MULTIPLE;;  Surgeon: Fransisco Leach MD;  [facility]     EYE SURGERY  12/2007    cataract surgery both sides     GI SURGERY  1974, 1986(x2), 1989    Crohn's, 1974 RO 18\" term. ilium + 9\" asc. colon all one pc     SOFT TISSUE SURGERY  3/2013    removed buildup on back of r hand, coccidioidomycosis     Current mediations:  Current Outpatient Medications   Medication     acetaminophen (TYLENOL) 500 MG tablet     Ascorbic Acid (VITAMIN C PO)     calcium-vitamin D (CALTRATE) 600-400 MG-UNIT per tablet     Cholecalciferol (VITAMIN D3 PO)     Cyanocobalamin (VITAMIN B 12 PO)     dicyclomine (BENTYL) 10 MG capsule     diphenoxylate-atropine (LOMOTIL) 2.5-0.025 MG tablet     ferrous sulfate 325 (65 FE) MG tablet     fluconazole (DIFLUCAN) 200 MG tablet     MELATONIN PO     multivitamin, therapeutic with minerals (MULTI-VITAMIN) TABS     Pyridoxine HCl (VITAMIN B6 PO)     tiZANidine (ZANAFLEX) 4 MG tablet     traZODone (DESYREL) 50 MG tablet     HYDROcodone-acetaminophen (NORCO) 5-325 MG tablet     ondansetron (ZOFRAN ODT) 4 MG ODT tab     tamsulosin (FLOMAX) " 0.4 MG capsule     No current facility-administered medications for this visit.      Medications reviewed with patient today, see Medication List/Assessment for details.  No other NSAID/anticoagulation reported by patient.  No other OTC/herbal/supplements reported by patient.    Social history:  Social History     Socioeconomic History     Marital status:      Spouse name: Not on file     Number of children: Not on file     Years of education: Not on file     Highest education level: Not on file   Occupational History     Occupation: Reserach associate at the      Employer: [employer]   Social Needs     Financial resource strain: Not on file     Food insecurity:     Worry: Not on file     Inability: Not on file     Transportation needs:     Medical: Not on file     Non-medical: Not on file   Tobacco Use     Smoking status: Never Smoker     Smokeless tobacco: Never Used   Substance and Sexual Activity     Alcohol use: Yes     Comment: sometimes one glass of wine a week     Drug use: No     Sexual activity: Never   Lifestyle     Physical activity:     Days per week: Not on file     Minutes per session: Not on file     Stress: Not on file   Relationships     Social connections:     Talks on phone: Not on file     Gets together: Not on file     Attends Restorationism service: Not on file     Active member of club or organization: Not on file     Attends meetings of clubs or organizations: Not on file     Relationship status: Not on file     Intimate partner violence:     Fear of current or ex partner: Not on file     Emotionally abused: Not on file     Physically abused: Not on file     Forced sexual activity: Not on file   Other Topics Concern     Not on file   Social History Narrative     Not on file       Family history:  Family History   Problem Relation Age of Onset     Heart Failure Father      Musculoskeletal Disorder Father         Parkinson's     Cancer - colorectal Mother      Cerebrovascular  Disease Paternal Grandmother      Cancer Other      Musculoskeletal Disorder Brother         Parkinson's     Family history reviewed and updated in EPIC  No colon/panc/other GI CA, no other HNPCC-related Román.  No IBD/celiac, no AI/liver disease.    PHYSICAL EXAMINATION:  Vitals reviewed, AFVSS   Wt   Wt Readings from Last 2 Encounters:   09/19/19 70.8 kg (156 lb)   09/13/19 68 kg (150 lb)      Gen: nontoxic, aaox3, cooperative, pleasant, not dyspneic/diaphoretic  HEENT: neck supple, normal op w/o ulcer/exudate, anicteric  Resp/CV unremarkable without significant finding  Abd: benign, soft, nondistended, intact bs, no hepatosplenomegaly, nontender, no peritoneal signs  Ext: no c/c/e  Skin: warm, perfused, no jaundice  Neuro: grossly intact    Again, thank you for allowing me to participate in the care of your patient.      Sincerely,    Robbie Cruz MD

## 2019-09-27 ENCOUNTER — PATIENT OUTREACH (OUTPATIENT)
Dept: GASTROENTEROLOGY | Facility: CLINIC | Age: 64
End: 2019-09-27

## 2019-09-27 NOTE — PROGRESS NOTES
Patient called to inquire about    I did the preop phone call with this patient and he is confused about how this procedure differs from the procedures he has had at the [facility]. Could someone call him and talk about the procedure with him? Also this patient did NOT get an H&P   Patient's # [phone]     Answered patients questions regarding sedation and procedure by reading clinic note from Dr. Cruz on 9/19.     Barbie Peña, RN Care Coordinator

## 2019-09-30 ENCOUNTER — MYC REFILL (OUTPATIENT)
Dept: INFECTIOUS DISEASES | Facility: CLINIC | Age: 64
End: 2019-09-30

## 2019-09-30 DIAGNOSIS — B38.7 COCCIDIOIDAL GRANULOMA: ICD-10-CM

## 2019-09-30 RX ORDER — FLUCONAZOLE 200 MG/1
200 TABLET ORAL DAILY
Qty: 90 TABLET | Refills: 3 | Status: CANCELLED | OUTPATIENT
Start: 2019-09-30

## 2019-10-01 ENCOUNTER — HOSPITAL ENCOUNTER (OUTPATIENT)
Facility: CLINIC | Age: 64
Discharge: HOME | End: 2019-10-01
Attending: INTERNAL MEDICINE | Admitting: INTERNAL MEDICINE
Payer: COMMERCIAL

## 2019-10-01 ENCOUNTER — ANESTHESIA EVENT (OUTPATIENT)
Dept: SURGERY | Facility: CLINIC | Age: 64
End: 2019-10-01
Payer: COMMERCIAL

## 2019-10-01 ENCOUNTER — ANESTHESIA (OUTPATIENT)
Dept: SURGERY | Facility: CLINIC | Age: 64
End: 2019-10-01
Payer: COMMERCIAL

## 2019-10-01 ENCOUNTER — APPOINTMENT (OUTPATIENT)
Dept: GENERAL RADIOLOGY | Facility: CLINIC | Age: 64
End: 2019-10-01
Attending: INTERNAL MEDICINE
Payer: COMMERCIAL

## 2019-10-01 VITALS
HEIGHT: 69 IN | DIASTOLIC BLOOD PRESSURE: 92 MMHG | SYSTOLIC BLOOD PRESSURE: 138 MMHG | OXYGEN SATURATION: 99 % | TEMPERATURE: 97.6 F | BODY MASS INDEX: 22.66 KG/M2 | WEIGHT: 153 LBS | RESPIRATION RATE: 15 BRPM | HEART RATE: 67 BPM

## 2019-10-01 LAB
COLONOSCOPY: NORMAL
GLUCOSE BLDC GLUCOMTR-MCNC: 86 MG/DL (ref 70–99)

## 2019-10-01 PROCEDURE — 36000051 ZZH SURGERY LEVEL 2 1ST 30 MIN - UMMC: Performed by: INTERNAL MEDICINE

## 2019-10-01 PROCEDURE — C1876 STENT, NON-COA/NON-COV W/DEL: HCPCS | Performed by: INTERNAL MEDICINE

## 2019-10-01 PROCEDURE — 25000128 H RX IP 250 OP 636: Performed by: NURSE ANESTHETIST, CERTIFIED REGISTERED

## 2019-10-01 PROCEDURE — 36000053 ZZH SURGERY LEVEL 2 EA 15 ADDTL MIN - UMMC: Performed by: INTERNAL MEDICINE

## 2019-10-01 PROCEDURE — 40000170 ZZH STATISTIC PRE-PROCEDURE ASSESSMENT II: Performed by: INTERNAL MEDICINE

## 2019-10-01 PROCEDURE — 82962 GLUCOSE BLOOD TEST: CPT

## 2019-10-01 PROCEDURE — 71000027 ZZH RECOVERY PHASE 2 EACH 15 MINS: Performed by: INTERNAL MEDICINE

## 2019-10-01 PROCEDURE — 37000009 ZZH ANESTHESIA TECHNICAL FEE, EACH ADDTL 15 MIN: Performed by: INTERNAL MEDICINE

## 2019-10-01 PROCEDURE — 25800030 ZZH RX IP 258 OP 636: Performed by: NURSE ANESTHETIST, CERTIFIED REGISTERED

## 2019-10-01 PROCEDURE — 27210794 ZZH OR GENERAL SUPPLY STERILE: Performed by: INTERNAL MEDICINE

## 2019-10-01 PROCEDURE — C1769 GUIDE WIRE: HCPCS | Performed by: INTERNAL MEDICINE

## 2019-10-01 PROCEDURE — 25000125 ZZHC RX 250: Performed by: NURSE ANESTHETIST, CERTIFIED REGISTERED

## 2019-10-01 PROCEDURE — 25000125 ZZHC RX 250: Performed by: INTERNAL MEDICINE

## 2019-10-01 PROCEDURE — 37000008 ZZH ANESTHESIA TECHNICAL FEE, 1ST 30 MIN: Performed by: INTERNAL MEDICINE

## 2019-10-01 PROCEDURE — 40000277 XR SURGERY CARM FLUORO LESS THAN 5 MIN W STILLS: Mod: TC

## 2019-10-01 DEVICE — STENT ESU AXIOS W/DEL SYS 20X10MM 10.8FR 138CM M00553660
Type: IMPLANTABLE DEVICE | Site: ILEUM | Status: NON-FUNCTIONAL
Removed: 2020-02-05

## 2019-10-01 RX ORDER — ONDANSETRON 2 MG/ML
4 INJECTION INTRAMUSCULAR; INTRAVENOUS EVERY 30 MIN PRN
Status: DISCONTINUED | OUTPATIENT
Start: 2019-10-01 | End: 2019-10-01 | Stop reason: HOSPADM

## 2019-10-01 RX ORDER — ONDANSETRON 2 MG/ML
4 INJECTION INTRAMUSCULAR; INTRAVENOUS
Status: DISCONTINUED | OUTPATIENT
Start: 2019-10-01 | End: 2019-10-01 | Stop reason: HOSPADM

## 2019-10-01 RX ORDER — LIDOCAINE 40 MG/G
CREAM TOPICAL
Status: DISCONTINUED | OUTPATIENT
Start: 2019-10-01 | End: 2019-10-01 | Stop reason: HOSPADM

## 2019-10-01 RX ORDER — NALOXONE HYDROCHLORIDE 0.4 MG/ML
.1-.4 INJECTION, SOLUTION INTRAMUSCULAR; INTRAVENOUS; SUBCUTANEOUS
Status: DISCONTINUED | OUTPATIENT
Start: 2019-10-01 | End: 2019-10-01 | Stop reason: HOSPADM

## 2019-10-01 RX ORDER — SODIUM CHLORIDE, SODIUM LACTATE, POTASSIUM CHLORIDE, CALCIUM CHLORIDE 600; 310; 30; 20 MG/100ML; MG/100ML; MG/100ML; MG/100ML
INJECTION, SOLUTION INTRAVENOUS CONTINUOUS
Status: DISCONTINUED | OUTPATIENT
Start: 2019-10-01 | End: 2019-10-01 | Stop reason: HOSPADM

## 2019-10-01 RX ORDER — FENTANYL CITRATE 50 UG/ML
25-50 INJECTION, SOLUTION INTRAMUSCULAR; INTRAVENOUS
Status: DISCONTINUED | OUTPATIENT
Start: 2019-10-01 | End: 2019-10-01 | Stop reason: HOSPADM

## 2019-10-01 RX ORDER — ONDANSETRON 4 MG/1
4 TABLET, ORALLY DISINTEGRATING ORAL EVERY 30 MIN PRN
Status: DISCONTINUED | OUTPATIENT
Start: 2019-10-01 | End: 2019-10-01 | Stop reason: HOSPADM

## 2019-10-01 RX ORDER — PROPOFOL 10 MG/ML
INJECTION, EMULSION INTRAVENOUS CONTINUOUS PRN
Status: DISCONTINUED | OUTPATIENT
Start: 2019-10-01 | End: 2019-10-01

## 2019-10-01 RX ORDER — FENTANYL CITRATE 50 UG/ML
INJECTION, SOLUTION INTRAMUSCULAR; INTRAVENOUS PRN
Status: DISCONTINUED | OUTPATIENT
Start: 2019-10-01 | End: 2019-10-01

## 2019-10-01 RX ORDER — SODIUM CHLORIDE, SODIUM LACTATE, POTASSIUM CHLORIDE, CALCIUM CHLORIDE 600; 310; 30; 20 MG/100ML; MG/100ML; MG/100ML; MG/100ML
INJECTION, SOLUTION INTRAVENOUS CONTINUOUS PRN
Status: DISCONTINUED | OUTPATIENT
Start: 2019-10-01 | End: 2019-10-01

## 2019-10-01 RX ORDER — LIDOCAINE HYDROCHLORIDE 20 MG/ML
INJECTION, SOLUTION INFILTRATION; PERINEURAL PRN
Status: DISCONTINUED | OUTPATIENT
Start: 2019-10-01 | End: 2019-10-01

## 2019-10-01 RX ADMIN — FENTANYL CITRATE 50 MCG: 50 INJECTION, SOLUTION INTRAMUSCULAR; INTRAVENOUS at 08:29

## 2019-10-01 RX ADMIN — PHENYLEPHRINE HYDROCHLORIDE 100 MCG: 10 INJECTION INTRAVENOUS at 08:57

## 2019-10-01 RX ADMIN — MIDAZOLAM 2 MG: 1 INJECTION INTRAMUSCULAR; INTRAVENOUS at 08:17

## 2019-10-01 RX ADMIN — LIDOCAINE HYDROCHLORIDE 40 MG: 20 INJECTION, SOLUTION INFILTRATION; PERINEURAL at 08:19

## 2019-10-01 RX ADMIN — PROPOFOL 100 MCG/KG/MIN: 10 INJECTION, EMULSION INTRAVENOUS at 08:19

## 2019-10-01 RX ADMIN — SODIUM CHLORIDE, POTASSIUM CHLORIDE, SODIUM LACTATE AND CALCIUM CHLORIDE: 600; 310; 30; 20 INJECTION, SOLUTION INTRAVENOUS at 08:17

## 2019-10-01 RX ADMIN — PHENYLEPHRINE HYDROCHLORIDE 100 MCG: 10 INJECTION INTRAVENOUS at 08:50

## 2019-10-01 ASSESSMENT — MIFFLIN-ST. JEOR: SCORE: 1474.38

## 2019-10-01 NOTE — OR NURSING
Patient ride unable to arrive until noon. Once patient meets discharge criteria patient will be placed in a hold. Discharge instructions reviewed with patient and with friend via phone.

## 2019-10-01 NOTE — OP NOTE
COLONOSCOPY 10/01/2019  8:01 AM [facility], [address] [place] [address] [phone]     Endoscopy Department   _______________________________________________________________________________   Patient Name: Trevin Gee        Procedure Date: 10/1/2019 8:01 AM   MRN: [id number]                       Account Number: [id number]   YOB: 1955              Admit Type: Outpatient   Age: 64                               Room: OR   Gender: Male                          Note Status: Finalized   Attending MD: Robbie Cruz MD   Total Sedation Time:   _______________________________________________________________________________       Procedure:           Colonoscopy   Indications:         Therapeutic procedure, Follow-up of Crohn's disease of                        the small bowel   Providers:           Robbie Cruz MD   Patient Profile:     Mr Gee is a 63yo gentleman with ileal                        stricturing Crohns now currently thought to be quiescent                        status post right hemocolectomy who has been struggling                        with a recalcitrant anastomotic stenosis. We now proceed                        to colonoscopy for placement of a lumen apposing metal                        stent across the anstomosis.   Referring MD:        Fransisco Leach   Requesting Provider: Jayleen Boone MD   Medicines:           Deep sedation was administered   Complications:       No immediate complications.   _______________________________________________________________________________   Procedure:           Pre-Anesthesia Assessment:                        - Prior to the procedure, a History and Physical was                        performed, and patient medications and allergies were                        reviewed. The patient is competent. The risks and                        benefits of the procedure and the sedation  options and                        risks were discussed with the patient. All questions                        were answered and informed consent was obtained. Patient                        identification and proposed procedure were verified by                        the nurse in the pre-procedure area. Mental Status                        Examination: alert and oriented. Airway Examination:                        Mallampati Class II (the uvula but not tonsillar pillars                        visualized). Respiratory Examination: clear to                        auscultation. CV Examination: normal. ASA Grade                        Assessment: II - A patient with mild systemic disease.                        After reviewing the risks and benefits, the patient was                        deemed in satisfactory condition to undergo the                        procedure. The anesthesia plan was to use deep sedation                        / analgesia. Immediately prior to administration of                        medications, the patient was re-assessed for adequacy to                        receive sedatives. The heart rate, respiratory rate,                        oxygen saturations, blood pressure, adequacy of                        pulmonary ventilation, and response to care were                        monitored throughout the procedure. The physical status                        of the patient was re-assessed after the procedure.                        After obtaining informed consent, the colonoscope was                        passed under direct vision. Throughout the procedure,                        the patient's blood pressure, pulse, and oxygen                        saturations were monitored continuously. The 1T                        gastroscope was introduced through the anus and advanced                        to the ileocolonic anastomosis. The colonoscopy was                        performed without  "difficulty. The patient tolerated the                        procedure well. The quality of the bowel preparation was                        adequate.                                                                                    Findings:         films were unremarkable and fluoroscopy was used throughout to        ensure short approach to the anastomosis. Digital exam demonstrated        impressive skin tags and or thrombosed external hemorrhoids though there        was no mass and the tone was intact. The anastomosis was found in the        expected location of the hepatic flexure. There was a mild stenosis        (patency of near 10-12mm) with an ischemic appearing erosion around the        rim. The ileum beyond appeared unremarkable as did the entire length of        remaining colon, without inflammation or polyp. A long 0.025\" Visiglide        wire was then culed under fluoroscopic guidance across the anastomosis        into the neoterminal ileum. Over this wire a [id number] Axios covered metal        stent was deployed across the anastomosis. The wire, catheter and        endoscope were then removed.                                                                                    Impression:          - Healthy appearing colon with status post removal of                        the cecum and ascending                        - Mildly stenotic ileocolonic anastomosis with mild                        ischemic erosion                        - Successful placement of a [id number] Axios stent across                        the stenosis                        - External thrombosed hemorrhoids and tags   Recommendation:      - Deep sedation recovery with probable discharge home                        this morning                        - Regular diet and activity may resume without change                        - If stent is to migrate, we would expect some fullness                        or pain in the rectum and " the patient is to contact our                        team fore removal                        - Othewise, plan will be to keep the stent in situ for                        3m with removal by repeat colonoscopy using deep sedation                        - The findings and recommendations were discussed with                        the patient and their family                                                                                       electronically signed by FABI Cruz

## 2019-10-01 NOTE — OR NURSING
Unable to contact patient discharge transport/ responsible adult for 24 hours- Jovani (friend) [phone] (phone is currently off and is going straight to voicemail) Dr. Cruz aware. Will continue to attempt to contact.

## 2019-10-01 NOTE — ANESTHESIA PREPROCEDURE EVALUATION
Anesthesia Pre-Procedure Evaluation    Patient: Trevin Gee   MRN:     [id number] Gender:   male   Age:    64 year old :      1955        Preoperative Diagnosis: Colorectal Anastomotic Stricture   Procedure(s):  Colonoscopy     Past Medical History:   Diagnosis Date     Anemia      Anxiety ?    much worse since 2014     Cataract 2007    both eyes, too much prednisone, implants     Coccidioidomycosis      Crohn disease (H)      Crohn's disease (H) 2015     Depressive disorder 2014    much worse since 2014     Fracture 195    green fracture of leg     Gallbladder problem ?    much gravel, removed     GERD (gastroesophageal reflux disease)      History of blood transfusion     ulcerated anastomosis term. ilium bleed     Hypertriglyceridemia 2016     Infection     persistant coccidioidomycosis     Kidney stone various    both sides, all passed naturally     Mental health problem     bipolar though perhaps different     Nephrolithiasis      Osteoporosis 2007    osteoporosis, too much prednisone, last dexa 2014     Osteoporosis      Other nervous system complications 2007    prednisone overload induced kevin     Personal history of colonic polyps     small ones found during colonoscopies     Steroid-induced psychosis     Patient extremely sensitive to regular doses of steroids.  Unclear if this is due to chronic fluconazole use or genetic issue.  In the future, use caution when prescribing steroids.     TB (tuberculosis)       Past Surgical History:   Procedure Laterality Date     APPENDECTOMY  1974    coincidental with Crohn's surgery     BACK SURGERY  2007    kyphoplasty on compressed 4th??? vertebra     BIOPSY  ,     lump on arm, knee, back of hand for coccidioidomycosis cult.     CHOLECYSTECTOMY      much gravel, removed laparoscopically     COLONOSCOPY  2014 last    Dr. Catherine Bowden, [facility] - many previous      "COLONOSCOPY Left 9/11/2015    Procedure: COMBINED COLONOSCOPY, SINGLE OR MULTIPLE BIOPSY/POLYPECTOMY BY BIOPSY;  Surgeon: Fransisco Leach MD;  Location: [facility]     COLONOSCOPY N/A 8/3/2016    Procedure: COMBINED COLONOSCOPY, SINGLE OR MULTIPLE BIOPSY/POLYPECTOMY BY BIOPSY;  Surgeon: Fransisco Leach MD;  Location: [facility]     COLONOSCOPY N/A 8/16/2017    Procedure: COMBINED COLONOSCOPY, SINGLE OR MULTIPLE BIOPSY/POLYPECTOMY BY BIOPSY;;  Surgeon: Fransisco Leach MD;  Location: [facility]     ENT SURGERY  ?    upper endoscopy     ESOPHAGOSCOPY, GASTROSCOPY, DUODENOSCOPY (EGD), COMBINED N/A 11/8/2016    Procedure: COMBINED ENDOSCOPIC ULTRASOUND, ESOPHAGOSCOPY, GASTROSCOPY, DUODENOSCOPY (EGD), FINE NEEDLE ASPIRATE/BIOPSY;  Surgeon: Guru Alexsandra Ballesteros MD;  Location: [facility]     ESOPHAGOSCOPY, GASTROSCOPY, DUODENOSCOPY (EGD), COMBINED N/A 11/8/2016    Procedure: COMBINED ESOPHAGOSCOPY, GASTROSCOPY, DUODENOSCOPY (EGD), BIOPSY SINGLE OR MULTIPLE;  Surgeon: Guru Alexsandra Ballesteros MD;  Location: [facility]     ESOPHAGOSCOPY, GASTROSCOPY, DUODENOSCOPY (EGD), COMBINED N/A 8/16/2017    Procedure: COMBINED ESOPHAGOSCOPY, GASTROSCOPY, DUODENOSCOPY (EGD), BIOPSY SINGLE OR MULTIPLE;;  Surgeon: Fransisco Leach MD;  Location: [facility]     EYE SURGERY  12/2007    cataract surgery both sides     GI SURGERY  1974, 1986(x2), 1989    Crohn's, 1974 RO 18\" term. ilium + 9\" asc. colon all one pc     SOFT TISSUE SURGERY  3/2013    removed buildup on back of r hand, coccidioidomycosis          Anesthesia Evaluation     .             ROS/MED HX    ENT/Pulmonary:  - neg pulmonary ROS     Neurologic:  - neg neurologic ROS     Cardiovascular:        (-) hypertension   METS/Exercise Tolerance:     Hematologic:         Musculoskeletal:         GI/Hepatic:     (+) GERD Asymptomatic on medication, Inflammatory bowel disease,       Renal/Genitourinary:         Endo:         Psychiatric:     (+) psychiatric history " bipolar      Infectious Disease:         Malignancy:         Other:                         PHYSICAL EXAM:   Mental Status/Neuro: A/A/O   Airway: Facies: Feasible  Mallampati: II  Mouth/Opening: Full  TM distance: > 6 cm  Neck ROM: Full   Respiratory: Auscultation: CTAB     Resp. Rate: Normal     Resp. Effort: Normal      CV: Rhythm: Regular  Rate: Age appropriate  Heart: Normal Sounds  Edema: None   Comments:                      LABS:  CBC:   Lab Results   Component Value Date    WBC 9.3 05/21/2019    WBC 6.1 04/21/2019    HGB 15.4 05/21/2019    HGB 15.5 04/21/2019    HCT 46.1 05/21/2019    HCT 46.8 04/21/2019     05/21/2019     04/21/2019     BMP:   Lab Results   Component Value Date     08/22/2019     05/21/2019    POTASSIUM 4.5 08/22/2019    POTASSIUM 4.0 05/21/2019    CHLORIDE 104 08/22/2019    CHLORIDE 104 05/21/2019    CO2 30 08/22/2019    CO2 27 05/21/2019    BUN 17 08/22/2019    BUN 16 05/21/2019    CR 1.30 (H) 08/22/2019    CR 1.88 (H) 05/21/2019    GLC 87 08/22/2019     (H) 05/21/2019     COAGS:   Lab Results   Component Value Date    PTT 23 08/04/2007    INR 0.99 04/21/2019     POC:   Lab Results   Component Value Date    BGM 86 10/01/2019     OTHER:   Lab Results   Component Value Date    PH 7.43 08/04/2007    LACT 0.6 (L) 04/21/2019    A1C 5.6 08/22/2019    YUNIER 9.0 08/22/2019    PHOS 4.1 09/05/2007    MAG 1.9 09/05/2007    ALBUMIN 4.0 08/22/2019    PROTTOTAL 7.3 08/22/2019    ALT 34 08/22/2019    AST 21 08/22/2019    ALKPHOS 88 08/22/2019    BILITOTAL 0.7 08/22/2019    LIPASE 178 04/21/2019    AMYLASE 68 11/11/2016    TSH 2.21 08/22/2019    T4 0.79 08/22/2019    CRP <2.9 04/21/2019    SED 6 04/21/2019        Preop Vitals    BP Readings from Last 3 Encounters:   10/01/19 136/87   09/19/19 120/81   09/13/19 109/76    Pulse Readings from Last 3 Encounters:   10/01/19 90   09/19/19 79   09/13/19 77      Resp Readings from Last 3 Encounters:   10/01/19 15   04/21/19 16  "  04/19/19 20    SpO2 Readings from Last 3 Encounters:   10/01/19 99%   09/19/19 97%   08/20/19 95%      Temp Readings from Last 1 Encounters:   10/01/19 36.6  C (97.8  F) (Oral)    Ht Readings from Last 1 Encounters:   10/01/19 1.753 m (5' 9\")      Wt Readings from Last 1 Encounters:   10/01/19 69.4 kg (153 lb)    Estimated body mass index is 22.59 kg/m  as calculated from the following:    Height as of this encounter: 1.753 m (5' 9\").    Weight as of this encounter: 69.4 kg (153 lb).     LDA:  Peripheral IV 09/19/18 Right (Active)   Number of days: 377        Assessment:   ASA SCORE: 2    H&P: History and physical reviewed and following examination; no interval change.   Smoking Status:  Non-Smoker/Unknown   NPO Status: NPO Appropriate     Plan:   Anes. Type:  MAC   Pre-Medication: Midazolam   Induction:  IV (Standard)      Access/Monitoring: PIV   Maintenance: Balanced     Postop Plan:   Postop Pain: Opioids  Postop Sedation/Airway: Sedation likely       Postop Sedation: Propofol Infusion  Disposition: Outpatient     PONV Management:   Adult Risk Factors:, Non-Smoker, Postop Opioids     CONSENT: Direct conversation   Plan and risks discussed with: Patient   Blood Products: N/a                   Yanique Aguila MD  "

## 2019-10-01 NOTE — ANESTHESIA CARE TRANSFER NOTE
Patient: Trevin Gee    Procedure(s):  Colonoscopy With Colonic Stent Insertion    Diagnosis: Colorectal Anastomotic Stricture  Diagnosis Additional Information: No value filed.    Anesthesia Type:   MAC     Note:  Airway :Room Air  Patient transferred to:Phase II  Handoff Report: Identifed the Patient, Identified the Reponsible Provider, Reviewed the pertinent medical history, Discussed the surgical course, Reviewed Intra-OP anesthesia mangement and issues during anesthesia, Set expectations for post-procedure period and Allowed opportunity for questions and acknowledgement of understanding      Vitals: (Last set prior to Anesthesia Care Transfer)    CRNA VITALS  10/1/2019 0830 - 10/1/2019 0908      10/1/2019             EKG:  NSR                Electronically Signed By: MARISOL Alfred CRNA  October 1, 2019  9:08 AM

## 2019-10-01 NOTE — ANESTHESIA POSTPROCEDURE EVALUATION
Anesthesia POST Procedure Evaluation    Patient: Trevin Gee   MRN:     [id number] Gender:   male   Age:    64 year old :      1955        Preoperative Diagnosis: Colorectal Anastomotic Stricture   Procedure(s):  Colonoscopy With Colonic Stent Insertion   Postop Comments: No value filed.       Anesthesia Type:  Not documented  MAC    Reportable Event: NO     PAIN: Uncomplicated   Sign Out status: Comfortable, Well controlled pain     PONV: No PONV   Sign Out status:  No Nausea or Vomiting     Neuro/Psych: Uneventful perioperative course   Sign Out Status: Preoperative baseline; Age appropriate mentation     Airway/Resp.: Uneventful perioperative course   Sign Out Status: Non labored breathing, age appropriate RR; Resp. Status within EXPECTED Parameters     CV: Uneventful perioperative course   Sign Out status: Appropriate BP and perfusion indices; Appropriate HR/Rhythm     Disposition:   Sign Out in:  PACU  Disposition:  Phase II; Home  Recovery Course: Uneventful  Follow-Up: Not required           Last Anesthesia Record Vitals:  CRNA VITALS  10/1/2019 0830 - 10/1/2019 0930      10/1/2019             EKG:  NSR          Last PACU Vitals:  Vitals Value Taken Time   BP     Temp     Pulse     Resp     SpO2     Temp src     NIBP 85/51 10/1/2019  8:56 AM   Pulse 74 10/1/2019  9:00 AM   SpO2 100 % 10/1/2019  9:00 AM   Resp     Temp 29.4  C (84.9  F) 10/1/2019  8:58 AM   Ht Rate 65 10/1/2019  8:59 AM   Temp 2 0  C (32  F) 10/1/2019  8:58 AM         Electronically Signed By: Yanique Aguila MD, 2019, 10:09 AM

## 2019-10-01 NOTE — DISCHARGE INSTRUCTIONS
[facility]  Same-Day Surgery   Adult Discharge Orders & Instructions     For 24 hours after surgery    1. Get plenty of rest.  A responsible adult must stay with you for at least 24 hours after you leave the hospital.   2. Do not drive or use heavy equipment.  If you have weakness or tingling, don't drive or use heavy equipment until this feeling goes away.  3. Do not drink alcohol.  4. Avoid strenuous or risky activities.  Ask for help when climbing stairs.   5. You may feel lightheaded.  IF so, sit for a few minutes before standing.  Have someone help you get up.   6. If you have nausea (feel sick to your stomach): Drink only clear liquids such as apple juice, ginger ale, broth or 7-Up.  Rest may also help.  Be sure to drink enough fluids.  Move to a regular diet as you feel able.  7. You may have a slight fever. Call the doctor if your fever is over 100 F (37.7 C) (taken under the tongue) or lasts longer than 24 hours.  8. You may have a dry mouth, a sore throat, muscle aches or trouble sleeping.  These should go away after 24 hours.  9. Do not make important or legal decisions.   Call your doctor for any of the followin.  Signs of infection (fever, growing tenderness at the surgery site, a large amount of drainage or bleeding, severe pain, foul-smelling drainage, redness, swelling).    2. It has been over 8 to 10 hours since surgery and you are still not able to urinate (pass water).    3.  Headache for over 24 hours.  To contact a doctor, call _____Dr Cruz at [phone] (clinic)___________________________________ or:        [phone] and ask for the resident on call for   ___________GI___________________________________ (answered 24 hours a day)      Emergency Department:    [facility]: [phone]       (TTY for hearing impaired: [phone])    [facility]: [phone]       (TTY for hearing impaired: [phone])

## 2019-10-03 ENCOUNTER — PREP FOR PROCEDURE (OUTPATIENT)
Dept: GASTROENTEROLOGY | Facility: CLINIC | Age: 64
End: 2019-10-03

## 2019-10-03 ENCOUNTER — CARE COORDINATION (OUTPATIENT)
Dept: GASTROENTEROLOGY | Facility: CLINIC | Age: 64
End: 2019-10-03

## 2019-10-03 DIAGNOSIS — K91.30 COLORECTAL ANASTOMOTIC STRICTURE: Primary | ICD-10-CM

## 2019-10-03 RX ORDER — BISACODYL 5 MG/1
15 TABLET, DELAYED RELEASE ORAL ONCE
Qty: 3 TABLET | Refills: 0 | Status: SHIPPED | OUTPATIENT
Start: 2019-10-03 | End: 2019-10-03

## 2019-10-03 NOTE — LETTER
October 25, 2019      Trevin Gee  [address]   [facility] [address]    Dear Trevin,      You are scheduled for your Colonoscopy with Dr. Cruz on 1/8/2020 at 10:00am.     Please check in at  8:00am.     Your time is subject to change. A pre-surgery nurse will call you 1-2 days prior to your procedure to go over your medication, inform you of your final arrival time and answer any questions you may have.     Your procedure is taking place at   [address] [address]     *Go to [address], 3rd floor of the Hospital*    COLONOSCOPY PREPARATION INSTRUCTIONS - SPLIT DOSE    Your prep medication(s) have been sent to the pharmacy you indicated.      7 days before the exam  Talk to your doctor: If you take blood-thinners (such as Coumadin, Plavix, Xarelto), your prescription or schedule may need to change before the test.  Stop taking fiber supplements, multi-vitamins with iron, and medicines that contain iron.   Continue taking prescribed aspirin; talk to your prescribing doctor with any concerns.  Stop eating nuts and seeds. These can stay in the colon for days.  If you have diabetes: Ask to have your exam early in the morning. Also, ask your doctor if you should change your diet or medicines.    2 days before the exam  Begin a low-fiber diet: No raw fruits or vegetables, whole wheat, seeds, nuts, popcorn or other high-fiber foods (see list on page). No bulking agents: (bran, Metamucil, Fibercon) and no Olestra (a fat substitute).  Drink at least 4 to 6 large glasses of sports drink: (not red or purple) each day.  At bedtime, take 2 Dulcolax tablets    One day before the exam  Follow the low-fiber diet until 1 p.m. After that, drink only clear liquids (see the list below.) Drink at least 8 to 10 full glasses of clear liquids during the day.   Fill the jug that contains the Golytely powder with warm water. Cover and shake until well  mixed. Use a full gallon of water. Chill for 3 hours, but do not add ice.  At 3 p.m., take 2 tablets of Dulcolax.  At 6 p.m. start drinking an 8-ounce glass every 15 minutes until the jug is half empty (about 8 glasses). Store the rest in the refrigerator  Stay near a toilet while using this medicine. Besides diarrhea (watery stools), you may have mild cramping, bloating and nausea.     Clear liquids     You may have:  Water, tea, coffee (no cream)  Soda pop, Gatorade (not red or purple)  Clear nutrition drinks (Enlive, Resource Breeze)   Jell-O, Popsicles (no milk or fruit pieces) or sorbet (not red or purple)  Fat-free soup broth or bouillon  Plain hard candy, such as clear life savers (not red or purple)  Clear juices and fruit-flavored drinks such as apple juice, white grape juice, Hi-C and Reid-Aid (not red or purple)    Do not have:  Milk or milk products such as ice cream, malts or shakes  Red or purple drinks of any kind such as cranberry juice or grape juice. Avoid red or purple Jell-O, Popsicles, Reid-Aid, sorbet and candy.   Juices with pulp such as orange, grapefruit, pineapple or tomato juice   Cream soups of any kind   Alcohol     Do not chew or swallow anything including water or gum for at least 3 hours before your colonoscopy. This is a safety issue, and we may need to cancel your exam if you do not observe this policy.    Day of exam     6 hours before your exam: Drink the other half of the jug. You should finish the prep 4 hours before the exam.  You may drink clear liquids until 3 hours before your exam.   If you must take medicine, you may take it with sips of water. Do not take diabetes medicine by mouth until after your exam.  If you have asthma: Bring your inhaler with you.    Please arrive with an adult who can take you home after the test: The medicine used will make you sleepy. If you do not have someone to take you home, we may cancel your test    Please contact RN, Care Coordinator,  [phone] as soon as possible if you are unable to fully complete your bowel preparation or if your stools fail to turn clear; your procedure may need to be delayed and an alternative bowel preparation regimen may need to be utilized in order to provide you the best chance for a successful exam.     Low Fiber Diet   You can have Do not have     Starches  White bread, rolls, biscuits, croissants, [place] toast, white flour tortillas, waffles, pancakes, Tajik toast; white rice, noodles, pasta, macaroni; cooked and peeled potatoes; plain crackers, saltines; cooked farina or cream of rice; puffed rice, corn flakes, Rice Krispies, Special K   Starches  Breads or rolls that contain nuts, seeds or fruit; whole wheat or whole grain breads that contain more than 1 gram of fiber per slice (check the nutrition facts label); cornbread; corn or whole wheat tortillas; potatoes with skin; brown rice, wild rice, kasha (buckwheat)   Vegetables  Tender cooked and canned vegetables without seeds, including carrots, asparagus tips, green beans, wax beans, spinach; vegetable broths   Vegetables  Any raw or steamed vegetables; vegetables with seeds; corn in any form   Fruits and fruit juices  Strained fruit juice, canned fruit without seeds or skin (not pineapple), applesauce, pear sauce, ripe bananas, melons (not watermelon) Fruits and fruit juices  Prunes, prune juice, raisins and other dried fruits, berries and other fruits with seeds, canned pineapple; fresh or frozen fruits not listed on the left      Milk products  Milk (plain or flavored), cheese, cottage cheese, yogurt (no berries), custard, ice cream (no nuts) Milk products  Any yogurt with nuts, seeds or berries       Proteins  Tender, well-cooked ground beef, lamb, veal, ham, pork, chicken, turkey, fish or organ meats; eggs; creamy peanut butter Proteins  Tough, fibrous meats with gristle; cooked dried beans, peas or lentils; crunchy peanut butter       Fats and  condiments  Margarine, butter, oils, mayonnaise, sour cream, salad dressing, plain gravy; spices, cooked herbs; sugar, clear jelly, honey, syrup Fats and condiments  Pickles, olives, relish, horseradish; jam, marmalade, preserves       Snacks, sweets and drinks  Pretzels, hard candy; plain cakes and cookies (no nuts or seeds); gelatin, plain pudding, sherbet, Popsicles; coffee, tea, carbonated ( fizzy ) drinks Snacks, sweets and drinks  Popcorn, nuts, seeds, granola, coconut, candies made with nuts or seeds; all desserts that contain nuts, seeds, raisins and other dried fruits, coconut, whole grains or bran     Sincerely,    Shira Kennedy LPN

## 2019-10-03 NOTE — PROGRESS NOTES
"Post Colonoscopy (10/1/19) with Dr. Cruz: Follow-up     Post procedure recommendations:   - If stent is to migrate, we would expect some fullness or pain in the rectum and the patient is to contact our team for removal.  - Othewise, plan will be to keep the stent in situ for 3m with removal by repeat colonoscopy using deep sedation.    Orders placed: Colonoscopy     Patient states is feeling OK, feels \"full\" but is able to eat and drink without difficulty. Advised patient will be sending prep for colonoscopy to be scheduled in 3 months, confirming [facility].    Nausea: No  Vomiting: No   Fever: No  Abdominal pain: No    Clinic contact and scheduling numbers verified for future questions/concerns.     KAT [address] Dr. Cruz, & Dr. Ballesteros  [facility]  [phone]    "

## 2019-10-05 ENCOUNTER — HEALTH MAINTENANCE LETTER (OUTPATIENT)
Age: 64
End: 2019-10-05

## 2019-10-07 DIAGNOSIS — B38.7 COCCIDIOIDAL GRANULOMA: ICD-10-CM

## 2019-10-07 RX ORDER — FLUCONAZOLE 200 MG/1
200 TABLET ORAL DAILY
Qty: 90 TABLET | Refills: 3 | Status: SHIPPED | OUTPATIENT
Start: 2019-10-07 | End: 2020-08-25

## 2019-10-07 NOTE — TELEPHONE ENCOUNTER
Health Call Center    Phone Message    May a detailed message be left on voicemail: yes    Reason for Call: Medication Refill Request    Has the patient contacted the pharmacy for the refill? Yes   Name of medication being requested:   fluconazole (DIFLUCAN) 200 MG tablet 90 tablet     Provider who prescribed the medication: Dr. Mccabe  Pharmacy: [id number] - [place], [place] - [address] AT [facility]   Date medication is needed: ASAP    **Patient requests a call to notify them once the Rx has been sent to pharmacy please.**     Action Taken: Message routed to:  [facility] ID

## 2019-10-25 ENCOUNTER — TELEPHONE (OUTPATIENT)
Dept: GASTROENTEROLOGY | Facility: CLINIC | Age: 64
End: 2019-10-25

## 2019-10-25 PROBLEM — K91.30 COLORECTAL ANASTOMOTIC STRICTURE: Status: ACTIVE | Noted: 2019-10-25

## 2019-10-25 NOTE — TELEPHONE ENCOUNTER
Spoke to patient in regards to scheduled procedure. Informed patient he is scheduled with Dr. Cruz on 1/8/20 per his preference. Informed patient he will need an updated pre-op physical within 30 days of his procedure. Patient stated he is going to have this done locally. Informed patient he will need a  and someone to monitor him for 24 hours after the procedure. Informed patient all scheduling details will be sent to [facility] per his request.     10/25/19 132pm

## 2019-11-20 ENCOUNTER — TRANSFERRED RECORDS (OUTPATIENT)
Dept: HEALTH INFORMATION MANAGEMENT | Facility: CLINIC | Age: 64
End: 2019-11-20

## 2019-12-13 ENCOUNTER — OFFICE VISIT (OUTPATIENT)
Dept: UROLOGY | Facility: CLINIC | Age: 64
End: 2019-12-13
Payer: COMMERCIAL

## 2019-12-13 VITALS
HEIGHT: 69 IN | SYSTOLIC BLOOD PRESSURE: 128 MMHG | WEIGHT: 153 LBS | DIASTOLIC BLOOD PRESSURE: 85 MMHG | HEART RATE: 74 BPM | BODY MASS INDEX: 22.66 KG/M2

## 2019-12-13 DIAGNOSIS — N20.0 KIDNEY STONE: Primary | ICD-10-CM

## 2019-12-13 ASSESSMENT — PAIN SCALES - GENERAL: PAINLEVEL: NO PAIN (0)

## 2019-12-13 ASSESSMENT — MIFFLIN-ST. JEOR: SCORE: 1474.38

## 2019-12-13 NOTE — LETTER
12/13/2019       RE: Trevin Gee  [address]  [facility] [address]     Dear Colleague,    Thank you for referring your patient, Trevin Gee, to the [facility] UROLOGY AND INST FOR PROSTATE AND UROLOGIC CANCERS at [facility]. Please see a copy of my visit note below.            Chief Complaint:   Kidney stone         History of Present Illness:    Trevin Gee is a very pleasant 64 year old male who presents to review his Litholink collection. He has previously seen Dr. Redmond in the urology clinic, last on 9/13/19. At that time, he had recently been found to have a distal ureteral calculus on his right, which he passed. The stone was collected and submitted for analysis, showing composition of calcium oxalate. Litholink x2 was ordered to evaluate his metabolic stone risks.     Today, his collection shows extremely low urine volume, hypomagnesuria, borderline hyperoxaluria, hypocitraturia, and high CaOx stone risk on day 1, and low urine calcium, hypomagnesuria, hyperoxaluria, mild hypocitraturia, and borderline low urine pH on day 2.          Past Medical History:     Past Medical History:   Diagnosis Date     Anemia 2007     Anxiety 2012?    much worse since July 2014     Cataract 12/2007    both eyes, too much prednisone, implants     Coccidioidomycosis      Crohn disease (H)      Crohn's disease (H) 1/13/2015     Depressive disorder 7/2014    much worse since July 2014     Fracture 1956    green fracture of leg     Gallbladder problem 2005?    much gravel, removed     GERD (gastroesophageal reflux disease)      History of blood transfusion 1988    ulcerated anastomosis term. ilium bleed     Hypertriglyceridemia 7/8/2016     Infection 2007    persistant coccidioidomycosis     Kidney stone various    both sides, all passed naturally     Mental health problem 2007    bipolar though perhaps different     Nephrolithiasis      Osteoporosis 2007     osteoporosis, too much prednisone, last dexa 1/2014     Osteoporosis      Other nervous system complications 2007    prednisone overload induced kevin     Personal history of colonic polyps     small ones found during colonoscopies     Steroid-induced psychosis     Patient extremely sensitive to regular doses of steroids.  Unclear if this is due to chronic fluconazole use or genetic issue.  In the future, use caution when prescribing steroids.     TB (tuberculosis)             Past Surgical History:     Past Surgical History:   Procedure Laterality Date     APPENDECTOMY  1974    coincidental with Crohn's surgery     BACK SURGERY  12/2007    kyphoplasty on compressed 4th??? vertebra     BIOPSY  2007, 2013    lump on arm, knee, back of hand for coccidioidomycosis cult.     CHOLECYSTECTOMY  2005    much gravel, removed laparoscopically     COLONOSCOPY  7/14/2014 last    Dr. Catherine Bowden, [facility] - many previous     COLONOSCOPY Left 9/11/2015    Procedure: COMBINED COLONOSCOPY, SINGLE OR MULTIPLE BIOPSY/POLYPECTOMY BY BIOPSY;  Surgeon: Fransisco Leach MD;  Location:  GI     COLONOSCOPY N/A 8/3/2016    Procedure: COMBINED COLONOSCOPY, SINGLE OR MULTIPLE BIOPSY/POLYPECTOMY BY BIOPSY;  Surgeon: Fransisco Leach MD;  Location:  GI     COLONOSCOPY N/A 8/16/2017    Procedure: COMBINED COLONOSCOPY, SINGLE OR MULTIPLE BIOPSY/POLYPECTOMY BY BIOPSY;;  Surgeon: Fransisco Leach MD;  Location: [facility]     COLONOSCOPY N/A 10/1/2019    Procedure: Colonoscopy With Colonic Stent Insertion;  Surgeon: Robbie Cruz MD;  [facility]: [facility] OR     ENT SURGERY  ?    upper endoscopy     ESOPHAGOSCOPY, GASTROSCOPY, DUODENOSCOPY (EGD), COMBINED N/A 11/8/2016    Procedure: COMBINED ENDOSCOPIC ULTRASOUND, ESOPHAGOSCOPY, GASTROSCOPY, DUODENOSCOPY (EGD), FINE NEEDLE ASPIRATE/BIOPSY;  Surgeon: Guru Alexsandra Ballesteros MD;  Location:  GI     ESOPHAGOSCOPY, GASTROSCOPY, DUODENOSCOPY (EGD), COMBINED  "N/A 11/8/2016    Procedure: COMBINED ESOPHAGOSCOPY, GASTROSCOPY, DUODENOSCOPY (EGD), BIOPSY SINGLE OR MULTIPLE;  Surgeon: Guru Alexsandra Ballesteros MD;  Location:  GI     ESOPHAGOSCOPY, GASTROSCOPY, DUODENOSCOPY (EGD), COMBINED N/A 8/16/2017    Procedure: COMBINED ESOPHAGOSCOPY, GASTROSCOPY, DUODENOSCOPY (EGD), BIOPSY SINGLE OR MULTIPLE;;  Surgeon: Fransisco Leach MD;  [facility]: [facility]     EYE SURGERY  12/2007    cataract surgery both sides     GI SURGERY  1974, 1986(x2), 1989    Crohn's, 1974 RO 18\" term. ilium + 9\" asc. colon all one pc     SOFT TISSUE SURGERY  3/2013    removed buildup on back of r hand, coccidioidomycosis            Medications     Current Outpatient Medications   Medication     acetaminophen (TYLENOL) 500 MG tablet     Ascorbic Acid (VITAMIN C PO)     calcium-vitamin D (CALTRATE) 600-400 MG-UNIT per tablet     Cholecalciferol (VITAMIN D3 PO)     Cyanocobalamin (VITAMIN B 12 PO)     dicyclomine (BENTYL) 10 MG capsule     diphenoxylate-atropine (LOMOTIL) 2.5-0.025 MG tablet     ferrous sulfate 325 (65 FE) MG tablet     fluconazole (DIFLUCAN) 200 MG tablet     HYDROcodone-acetaminophen (NORCO) 5-325 MG tablet     MELATONIN PO     multivitamin, therapeutic with minerals (MULTI-VITAMIN) TABS     ondansetron (ZOFRAN ODT) 4 MG ODT tab     Pyridoxine HCl (VITAMIN B6 PO)     tamsulosin (FLOMAX) 0.4 MG capsule     tiZANidine (ZANAFLEX) 4 MG tablet     traZODone (DESYREL) 50 MG tablet     No current facility-administered medications for this visit.             Family History:     Family History   Problem Relation Age of Onset     Heart Failure Father      Musculoskeletal Disorder Father         Parkinson's     Cancer - colorectal Mother      Cerebrovascular Disease Paternal Grandmother      Cancer Other      Musculoskeletal Disorder Brother         Parkinson's            Social History:     Social History     Socioeconomic History     Marital status:      Spouse name: Not on file " "    Number of children: Not on file     Years of education: Not on file     Highest education level: Not on file   Occupational History     Occupation: Reserach associate at the      Employer: [employer]   Social Needs     Financial resource strain: Not on file     Food insecurity:     Worry: Not on file     Inability: Not on file     Transportation needs:     Medical: Not on file     Non-medical: Not on file   Tobacco Use     Smoking status: Never Smoker     Smokeless tobacco: Never Used   Substance and Sexual Activity     Alcohol use: Yes     Comment: sometimes one glass of wine a week     Drug use: No     Sexual activity: Never   Lifestyle     Physical activity:     Days per week: Not on file     Minutes per session: Not on file     Stress: Not on file   Relationships     Social connections:     Talks on phone: Not on file     Gets together: Not on file     Attends Hinduism service: Not on file     Active member of club or organization: Not on file     Attends meetings of clubs or organizations: Not on file     Relationship status: Not on file     Intimate partner violence:     Fear of current or ex partner: Not on file     Emotionally abused: Not on file     Physically abused: Not on file     Forced sexual activity: Not on file   Other Topics Concern     Not on file   Social History Narrative     Not on file            Allergies:   Prednisone         Review of Systems:  From intake questionnaire   Negative 14 system review except as noted on HPI, nurse's note.         Physical Exam:   Patient is a 64 year old  male   Vitals: Blood pressure 128/85, pulse 74, height 1.753 m (5' 9\"), weight 69.4 kg (153 lb).  General Appearance Adult: Alert, no acute distress, oriented  Lungs: no respiratory distress, or pursed lip breathing  Heart: No obvious jugular venous distension present  Abdomen: soft, nontender, no organomegaly or masses, Body mass index is 22.59 kg/m .      Labs and Pathology:    I " personally reviewed all applicable laboratory data and went over findings with patient  Significant for:    CBC RESULTS:  Recent Labs   Lab Test 19  1511 19  1610 19  0550 19  0645   WBC 9.3 6.1 5.2 6.4   HGB 15.4 15.5 13.9 15.6    262 165 218        BMP RESULTS:  Recent Labs   Lab Test 19  0828 19  1511 19  1610 19  0550    138 140 140   POTASSIUM 4.5 4.0 3.4 4.0   CHLORIDE 104 104 105 110*   CO2 30 27 28 22   ANIONGAP 5 6 7 8   GLC 87 116* 120* 67*   BUN 17 16 12 18   CR 1.30* 1.88* 1.42* 1.19   GFRESTIMATED 58* 37* 52* 64   GFRESTBLACK 67 43* 60* 74   YUNIER 9.0 9.3 9.3 7.7*       UA RESULTS:   Recent Labs   Lab Test 19  1520 19  1931 16  1233   SG 1.005 1.019 1.016   URINEPH 5.0 5.5 6.0   NITRITE Negative Negative Negative   RBCU 2 1 1   WBCU <1 <1 1       CALCIUM RESULTS  Lab Results   Component Value Date    YUNIER 9.0 2019    YUNIER 9.3 2019    YUNIER 9.3 2019       PSA RESULTS  PSA   Date Value Ref Range Status   2007 1.10 0 - 4 ug/L Final          Assessment and Plan:     Assessment: 64 year old male with a history of stones, which he has passed, with primary composition of calcium oxalate. We reviewed the results of his Litholink collection today, which showed extremely low urine volume, hypomagnesuria, borderline hyperoxaluria, hypocitraturia, and high CaOx stone risk on day 1, and low urine calcium, hypomagnesuria, hyperoxaluria, mild hypocitraturia, and borderline low urine pH on day 2. We reviewed general recommendations to prevent kidney stones including the followin) Low oxalate diet. We discussed foods that are particularly high in oxalate including spinach, rhubarb, beets, nuts, nut butters, and black teas.     2) Low salt diet. Discussed common foods with high amounts of salt as well as dietary strategies to minimize sodium.     3) High fluid intake. Recommend 3 liters of fluid daily to produce goal  of 2.5L of urine.     4) Modest animal protein. Recommend limiting animal protein to one serving or less daily.     5) Normal dietary calcium.      Plan:  -Moderate intake of oxalate-containing foods.  -Increase fluid intake.  -Repeat Litholink collection x1 in 6 months. Follow up with me at that time to review results.       Yue Wilson, CNP  Department of Urology     Again, thank you for allowing me to participate in the care of your patient.      Sincerely,    Yue Wilson, SAMIA

## 2019-12-13 NOTE — PATIENT INSTRUCTIONS
UROLOGY CLINIC VISIT PATIENT INSTRUCTIONS    1) Moderate your intake of oxalate-containing foods.  2) Increase your fluid intake.   3) We will repeat your Litholink study x1 in 6 months.     If you have any issues, questions or concerns in the meantime, do not hesitate to contact us at [phone] or via Setred.     It was a pleasure meeting with you today.  Thank you for allowing me and my team the privilege of caring for you today.  YOU are the reason we are here, and I truly hope we provided you with the excellent service you deserve.  Please let us know if there is anything else we can do for you so that we can be sure you are leaving completely satisfied with your care experience.    Yue Wilson, CNP

## 2019-12-13 NOTE — PROGRESS NOTES
Chief Complaint:   Kidney stone         History of Present Illness:    Trevin Gee is a very pleasant 64 year old male who presents to review his Litholink collection. He has previously seen Dr. Redmond in the urology clinic, last on 9/13/19. At that time, he had recently been found to have a distal ureteral calculus on his right, which he passed. The stone was collected and submitted for analysis, showing composition of calcium oxalate. Litholink x2 was ordered to evaluate his metabolic stone risks.     Today, his collection shows extremely low urine volume, hypomagnesuria, borderline hyperoxaluria, hypocitraturia, and high CaOx stone risk on day 1, and low urine calcium, hypomagnesuria, hyperoxaluria, mild hypocitraturia, and borderline low urine pH on day 2.          Past Medical History:     Past Medical History:   Diagnosis Date     Anemia 2007     Anxiety 2012?    much worse since July 2014     Cataract 12/2007    both eyes, too much prednisone, implants     Coccidioidomycosis      Crohn disease (H)      Crohn's disease (H) 1/13/2015     Depressive disorder 7/2014    much worse since July 2014     Fracture 1956    green fracture of leg     Gallbladder problem 2005?    much gravel, removed     GERD (gastroesophageal reflux disease)      History of blood transfusion 1988    ulcerated anastomosis term. ilium bleed     Hypertriglyceridemia 7/8/2016     Infection 2007    persistant coccidioidomycosis     Kidney stone various    both sides, all passed naturally     Mental health problem 2007    bipolar though perhaps different     Nephrolithiasis      Osteoporosis 2007    osteoporosis, too much prednisone, last dexa 1/2014     Osteoporosis      Other nervous system complications 2007    prednisone overload induced kevin     Personal history of colonic polyps     small ones found during colonoscopies     Steroid-induced psychosis     Patient extremely sensitive to regular doses of steroids.  Unclear if  this is due to chronic fluconazole use or genetic issue.  In the future, use caution when prescribing steroids.     TB (tuberculosis)             Past Surgical History:     Past Surgical History:   Procedure Laterality Date     APPENDECTOMY  1974    coincidental with Crohn's surgery     BACK SURGERY  12/2007    kyphoplasty on compressed 4th??? vertebra     BIOPSY  2007, 2013    lump on arm, knee, back of hand for coccidioidomycosis cult.     CHOLECYSTECTOMY  2005    much gravel, removed laparoscopically     COLONOSCOPY  7/14/2014 last    Dr. Catherine Bowden, [facility] [place] - many previous     COLONOSCOPY Left 9/11/2015    Procedure: COMBINED COLONOSCOPY, SINGLE OR MULTIPLE BIOPSY/POLYPECTOMY BY BIOPSY;  Surgeon: Fransisco Leach MD;  [facility]: [facility]     COLONOSCOPY N/A 8/3/2016    Procedure: COMBINED COLONOSCOPY, SINGLE OR MULTIPLE BIOPSY/POLYPECTOMY BY BIOPSY;  Surgeon: Fransisco Leach MD;  [facility]: [facility]     COLONOSCOPY N/A 8/16/2017    Procedure: COMBINED COLONOSCOPY, SINGLE OR MULTIPLE BIOPSY/POLYPECTOMY BY BIOPSY;;  Surgeon: Fransisco Leach MD;  [facility]     COLONOSCOPY N/A 10/1/2019    Procedure: Colonoscopy With Colonic Stent Insertion;  Surgeon: Robbie Cruz MD;  [facility]     ENT SURGERY  ?    upper endoscopy     ESOPHAGOSCOPY, GASTROSCOPY, DUODENOSCOPY (EGD), COMBINED N/A 11/8/2016    Procedure: COMBINED ENDOSCOPIC ULTRASOUND, ESOPHAGOSCOPY, GASTROSCOPY, DUODENOSCOPY (EGD), FINE NEEDLE ASPIRATE/BIOPSY;  Surgeon: Guru Alexsandra Ballesteros MD;  Location:  GI     ESOPHAGOSCOPY, GASTROSCOPY, DUODENOSCOPY (EGD), COMBINED N/A 11/8/2016    Procedure: COMBINED ESOPHAGOSCOPY, GASTROSCOPY, DUODENOSCOPY (EGD), BIOPSY SINGLE OR MULTIPLE;  Surgeon: Guru Alexsandra Ballesteros MD;  Location:  GI     ESOPHAGOSCOPY, GASTROSCOPY, DUODENOSCOPY (EGD), COMBINED N/A 8/16/2017    Procedure: COMBINED ESOPHAGOSCOPY, GASTROSCOPY, DUODENOSCOPY (EGD), BIOPSY  "SINGLE OR MULTIPLE;;  Surgeon: Fransisco Leach MD;  [facility]     EYE SURGERY  12/2007    cataract surgery both sides     GI SURGERY  1974, 1986(x2), 1989    Crohn's, 1974 RO 18\" term. ilium + 9\" asc. colon all one pc     SOFT TISSUE SURGERY  3/2013    removed buildup on back of r hand, coccidioidomycosis            Medications     Current Outpatient Medications   Medication     acetaminophen (TYLENOL) 500 MG tablet     Ascorbic Acid (VITAMIN C PO)     calcium-vitamin D (CALTRATE) 600-400 MG-UNIT per tablet     Cholecalciferol (VITAMIN D3 PO)     Cyanocobalamin (VITAMIN B 12 PO)     dicyclomine (BENTYL) 10 MG capsule     diphenoxylate-atropine (LOMOTIL) 2.5-0.025 MG tablet     ferrous sulfate 325 (65 FE) MG tablet     fluconazole (DIFLUCAN) 200 MG tablet     HYDROcodone-acetaminophen (NORCO) 5-325 MG tablet     MELATONIN PO     multivitamin, therapeutic with minerals (MULTI-VITAMIN) TABS     ondansetron (ZOFRAN ODT) 4 MG ODT tab     Pyridoxine HCl (VITAMIN B6 PO)     tamsulosin (FLOMAX) 0.4 MG capsule     tiZANidine (ZANAFLEX) 4 MG tablet     traZODone (DESYREL) 50 MG tablet     No current facility-administered medications for this visit.             Family History:     Family History   Problem Relation Age of Onset     Heart Failure Father      Musculoskeletal Disorder Father         Parkinson's     Cancer - colorectal Mother      Cerebrovascular Disease Paternal Grandmother      Cancer Other      Musculoskeletal Disorder Brother         Parkinson's            Social History:     Social History     Socioeconomic History     Marital status:      Spouse name: Not on file     Number of children: Not on file     Years of education: Not on file     Highest education level: Not on file   Occupational History     Occupation: Reserach associate at the      Employer: [employer]   Social Needs     Financial resource strain: Not on file     Food insecurity:     Worry: Not on file     " "Inability: Not on file     Transportation needs:     Medical: Not on file     Non-medical: Not on file   Tobacco Use     Smoking status: Never Smoker     Smokeless tobacco: Never Used   Substance and Sexual Activity     Alcohol use: Yes     Comment: sometimes one glass of wine a week     Drug use: No     Sexual activity: Never   Lifestyle     Physical activity:     Days per week: Not on file     Minutes per session: Not on file     Stress: Not on file   Relationships     Social connections:     Talks on phone: Not on file     Gets together: Not on file     Attends Mandaeism service: Not on file     Active member of club or organization: Not on file     Attends meetings of clubs or organizations: Not on file     Relationship status: Not on file     Intimate partner violence:     Fear of current or ex partner: Not on file     Emotionally abused: Not on file     Physically abused: Not on file     Forced sexual activity: Not on file   Other Topics Concern     Not on file   Social History Narrative     Not on file            Allergies:   Prednisone         Review of Systems:  From intake questionnaire   Negative 14 system review except as noted on HPI, nurse's note.         Physical Exam:   Patient is a 64 year old  male   Vitals: Blood pressure 128/85, pulse 74, height 1.753 m (5' 9\"), weight 69.4 kg (153 lb).  General Appearance Adult: Alert, no acute distress, oriented  Lungs: no respiratory distress, or pursed lip breathing  Heart: No obvious jugular venous distension present  Abdomen: soft, nontender, no organomegaly or masses, Body mass index is 22.59 kg/m .      Labs and Pathology:    I personally reviewed all applicable laboratory data and went over findings with patient  Significant for:    CBC RESULTS:  Recent Labs   Lab Test 05/21/19  1511 04/21/19  1610 04/18/19  0550 04/17/19  0645   WBC 9.3 6.1 5.2 6.4   HGB 15.4 15.5 13.9 15.6    262 165 218        BMP RESULTS:  Recent Labs   Lab Test 08/22/19  0828 " 19  1511 19  1610 19  0550    138 140 140   POTASSIUM 4.5 4.0 3.4 4.0   CHLORIDE 104 104 105 110*   CO2 30 27 28 22   ANIONGAP 5 6 7 8   GLC 87 116* 120* 67*   BUN 17 16 12 18   CR 1.30* 1.88* 1.42* 1.19   GFRESTIMATED 58* 37* 52* 64   GFRESTBLACK 67 43* 60* 74   YUNIER 9.0 9.3 9.3 7.7*       UA RESULTS:   Recent Labs   Lab Test 19  1520 19  1931 16  1233   SG 1.005 1.019 1.016   URINEPH 5.0 5.5 6.0   NITRITE Negative Negative Negative   RBCU 2 1 1   WBCU <1 <1 1       CALCIUM RESULTS  Lab Results   Component Value Date    YUNIER 9.0 2019    YUNIER 9.3 2019    YUNIER 9.3 2019       PSA RESULTS  PSA   Date Value Ref Range Status   2007 1.10 0 - 4 ug/L Final          Assessment and Plan:     Assessment: 64 year old male with a history of stones, which he has passed, with primary composition of calcium oxalate. We reviewed the results of his Litholink collection today, which showed extremely low urine volume, hypomagnesuria, borderline hyperoxaluria, hypocitraturia, and high CaOx stone risk on day 1, and low urine calcium, hypomagnesuria, hyperoxaluria, mild hypocitraturia, and borderline low urine pH on day 2. We reviewed general recommendations to prevent kidney stones including the followin) Low oxalate diet. We discussed foods that are particularly high in oxalate including spinach, rhubarb, beets, nuts, nut butters, and black teas.     2) Low salt diet. Discussed common foods with high amounts of salt as well as dietary strategies to minimize sodium.     3) High fluid intake. Recommend 3 liters of fluid daily to produce goal of 2.5L of urine.     4) Modest animal protein. Recommend limiting animal protein to one serving or less daily.     5) Normal dietary calcium.      Plan:  -Moderate intake of oxalate-containing foods.  -Increase fluid intake.  -Repeat Litholink collection x1 in 6 months. Follow up with me at that time to review results.        Yue Wilson, CNP  Department of Urology

## 2019-12-13 NOTE — NURSING NOTE
"Chief Complaint   Patient presents with     Follow Up       Blood pressure 128/85, pulse 74, height 1.753 m (5' 9\"), weight 69.4 kg (153 lb). Body mass index is 22.59 kg/m .    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        Current Outpatient Medications   Medication Sig Dispense Refill     acetaminophen (TYLENOL) 500 MG tablet Take 500-1,000 mg by mouth every 6 hours as needed for mild pain       Ascorbic Acid (VITAMIN C PO) Take 500 mg by mouth       calcium-vitamin D (CALTRATE) 600-400 MG-UNIT per tablet Take 1 tablet by mouth 2 times daily       Cholecalciferol (VITAMIN D3 PO) Take 1,000 Units by mouth daily       Cyanocobalamin (VITAMIN B 12 PO) Take 500 mcg by mouth       dicyclomine (BENTYL) 10 MG capsule Take 1 capsule (10 mg) by mouth 2 times daily (before meals) 90 capsule 1     diphenoxylate-atropine (LOMOTIL) 2.5-0.025 MG tablet Take 1 tablet by mouth 4 times daily as needed for diarrhea 60 tablet 1     ferrous sulfate 325 (65 FE) MG tablet Take by mouth daily (with breakfast)       fluconazole (DIFLUCAN) 200 MG tablet Take 1 tablet (200 mg) by mouth daily 90 tablet 3     HYDROcodone-acetaminophen (NORCO) 5-325 MG tablet Take 1 tablet by mouth every 6 hours as needed for pain 15 tablet 0     MELATONIN PO Take 1.5 mg by mouth nightly as needed        multivitamin, therapeutic with minerals " (MULTI-VITAMIN) TABS Take 1 tablet by mouth daily       ondansetron (ZOFRAN ODT) 4 MG ODT tab Take 1 tablet (4 mg) by mouth every 8 hours as needed for nausea or vomiting 10 tablet 0     Pyridoxine HCl (VITAMIN B6 PO) Take 100 mg by mouth       tamsulosin (FLOMAX) 0.4 MG capsule Take 1 capsule (0.4 mg) by mouth daily 30 capsule 0     tiZANidine (ZANAFLEX) 4 MG tablet Take 1-2 tablets (4-8 mg) by mouth 3 times daily as needed for muscle spasms 30 tablet 0     traZODone (DESYREL) 50 MG tablet Take 1 tablet (50 mg) by mouth nightly as needed for sleep 90 tablet 1       Social History     Tobacco Use     Smoking status: Never Smoker     Smokeless tobacco: Never Used   Substance Use Topics     Alcohol use: Yes     Comment: sometimes one glass of wine a week     Drug use: No       Sydni Malone CMA, CARRIE  12/13/2019  2:08 PM

## 2020-01-06 ENCOUNTER — TELEPHONE (OUTPATIENT)
Dept: GASTROENTEROLOGY | Facility: CLINIC | Age: 65
End: 2020-01-06

## 2020-01-06 NOTE — TELEPHONE ENCOUNTER
Patient called wanting to reschedule his 1/8/2020 procedure due to having a cold. Patient wanted to push procedure out to February because he stated his colds tend to linger. Informed patient he is scheduled on 2/5/2020. Informed patient he will need an updated pre-op physical within 30 days of procedure. Patient wanted to be scheduled with PAC on 1/27/2020. Informed patient he will need a  and someone to monitor him for 24 hours after. Informed patient [facility].     1/6/2020 928am

## 2020-01-07 NOTE — TELEPHONE ENCOUNTER
FUTURE VISIT INFORMATION      SURGERY INFORMATION:    Date: 20    Location: [facility] OR    Surgeon:  Robbie Cruz    Anesthesia Type:  General    Procedure: COLONOSCOPY    Consult: OV 19- Nancy     RECORDS REQUESTED FROM:       Primary Care Provider: Siva    Most recent EKG+ [id number]

## 2020-01-27 ENCOUNTER — PRE VISIT (OUTPATIENT)
Dept: SURGERY | Facility: CLINIC | Age: 65
End: 2020-01-27

## 2020-01-27 ENCOUNTER — ANESTHESIA EVENT (OUTPATIENT)
Dept: SURGERY | Facility: CLINIC | Age: 65
End: 2020-01-27
Payer: COMMERCIAL

## 2020-01-27 ENCOUNTER — OFFICE VISIT (OUTPATIENT)
Dept: SURGERY | Facility: CLINIC | Age: 65
End: 2020-01-27
Payer: COMMERCIAL

## 2020-01-27 VITALS
HEART RATE: 87 BPM | BODY MASS INDEX: 22.45 KG/M2 | WEIGHT: 156.8 LBS | OXYGEN SATURATION: 98 % | DIASTOLIC BLOOD PRESSURE: 85 MMHG | TEMPERATURE: 98.1 F | RESPIRATION RATE: 15 BRPM | SYSTOLIC BLOOD PRESSURE: 115 MMHG | HEIGHT: 70 IN

## 2020-01-27 DIAGNOSIS — Z01.818 PRE-OP EVALUATION: ICD-10-CM

## 2020-01-27 DIAGNOSIS — Z01.818 PRE-OP EVALUATION: Primary | ICD-10-CM

## 2020-01-27 DIAGNOSIS — K91.30 ANASTOMOTIC STRICTURE OF COLORECTAL REGION: ICD-10-CM

## 2020-01-27 LAB
CREAT SERPL-MCNC: 1.29 MG/DL (ref 0.66–1.25)
GFR SERPL CREATININE-BSD FRML MDRD: 58 ML/MIN/{1.73_M2}
HGB BLD-MCNC: 15 G/DL (ref 13.3–17.7)
POTASSIUM SERPL-SCNC: 4.3 MMOL/L (ref 3.4–5.3)

## 2020-01-27 ASSESSMENT — PAIN SCALES - GENERAL: PAINLEVEL: MILD PAIN (2)

## 2020-01-27 ASSESSMENT — MIFFLIN-ST. JEOR: SCORE: 1499.55

## 2020-01-27 ASSESSMENT — LIFESTYLE VARIABLES: TOBACCO_USE: 0

## 2020-01-27 NOTE — ANESTHESIA PREPROCEDURE EVALUATION
Anesthesia Pre-Procedure Evaluation    Patient: Trevin Gee   MRN:     [id number] Gender:   male   Age:    64 year old :      1955        Preoperative Diagnosis: Colorectal anastomotic stricture [K91.30]   Procedure(s):  COLONOSCOPY     Past Medical History:   Diagnosis Date     Anemia      Anxiety ?    much worse since 2014     Cataract 2007    both eyes, too much prednisone, implants     Coccidioidomycosis      Crohn disease (H)      Crohn's disease (H) 2015     Depressive disorder 2014    much worse since 2014     Fracture 195    green fracture of leg     Gallbladder problem ?    much gravel, removed     GERD (gastroesophageal reflux disease)      History of blood transfusion     ulcerated anastomosis term. ilium bleed     Hypertriglyceridemia 2016     Infection     persistant coccidioidomycosis     Kidney stone various    both sides, all passed naturally     Mental health problem     bipolar though perhaps different     Nephrolithiasis      Osteoporosis 2007    osteoporosis, too much prednisone, last dexa 2014     Osteoporosis      Other nervous system complications 2007    prednisone overload induced kevin     Personal history of colonic polyps     small ones found during colonoscopies     Steroid-induced psychosis     Patient extremely sensitive to regular doses of steroids.  Unclear if this is due to chronic fluconazole use or genetic issue.  In the future, use caution when prescribing steroids.     TB (tuberculosis)       Past Surgical History:   Procedure Laterality Date     APPENDECTOMY  1974    coincidental with Crohn's surgery     BACK SURGERY  2007    kyphoplasty on compressed 4th??? vertebra     BIOPSY  ,     lump on arm, knee, back of hand for coccidioidomycosis cult.     CHOLECYSTECTOMY      much gravel, removed laparoscopically     COLONOSCOPY  2014 last    Dr. Catherine Bowden, [facility] [place] - many previous  "    COLONOSCOPY Left 9/11/2015    Procedure: COMBINED COLONOSCOPY, SINGLE OR MULTIPLE BIOPSY/POLYPECTOMY BY BIOPSY;  Surgeon: Fransisco Leach MD;  [facility]     COLONOSCOPY N/A 8/3/2016    Procedure: COMBINED COLONOSCOPY, SINGLE OR MULTIPLE BIOPSY/POLYPECTOMY BY BIOPSY;  Surgeon: Fransisco Leach MD;  [facility]     COLONOSCOPY N/A 8/16/2017    Procedure: COMBINED COLONOSCOPY, SINGLE OR MULTIPLE BIOPSY/POLYPECTOMY BY BIOPSY;;  Surgeon: Fransisco Leach MD;  [facility]     COLONOSCOPY N/A 10/1/2019    Procedure: Colonoscopy With Colonic Stent Insertion;  Surgeon: Robbie Cruz MD;  [facility]     ENT SURGERY  ?    upper endoscopy     ESOPHAGOSCOPY, GASTROSCOPY, DUODENOSCOPY (EGD), COMBINED N/A 11/8/2016    Procedure: COMBINED ENDOSCOPIC ULTRASOUND, ESOPHAGOSCOPY, GASTROSCOPY, DUODENOSCOPY (EGD), FINE NEEDLE ASPIRATE/BIOPSY;  Surgeon: Guru Alexsandra Ballesteros MD;  [facility]     ESOPHAGOSCOPY, GASTROSCOPY, DUODENOSCOPY (EGD), COMBINED N/A 11/8/2016    Procedure: COMBINED ESOPHAGOSCOPY, GASTROSCOPY, DUODENOSCOPY (EGD), BIOPSY SINGLE OR MULTIPLE;  Surgeon: Guru Alexsandra Ballesteros MD;  Location: [facility]     ESOPHAGOSCOPY, GASTROSCOPY, DUODENOSCOPY (EGD), COMBINED N/A 8/16/2017    Procedure: COMBINED ESOPHAGOSCOPY, GASTROSCOPY, DUODENOSCOPY (EGD), BIOPSY SINGLE OR MULTIPLE;;  Surgeon: Fransisco Leach MD;  [facility]     EYE SURGERY  12/2007    cataract surgery both sides     GI SURGERY  1974, 1986(x2), 1989    Crohn's, 1974 RO 18\" term. ilium + 9\" asc. colon all one pc     SOFT TISSUE SURGERY  3/2013    removed buildup on back of r hand, coccidioidomycosis          Anesthesia Evaluation     . Pt has had prior anesthetic. Type: MAC and General    No history of anesthetic complications          ROS/MED HX    ENT/Pulmonary:  - neg pulmonary ROS    (-) tobacco use   Neurologic:  - neg neurologic ROS     Cardiovascular:     (+) Dyslipidemia, " ----. : . . . :. . Previous cardiac testing date:results:date: results:ECG reviewed date:2016 results:NSR, normal date: results:         (-) taking anticoagulants/antiplatelets   METS/Exercise Tolerance:  4 - Raking leaves, gardening   Hematologic:     (+) History of Transfusion no previous transfusion reaction -      Musculoskeletal:   (+)  other musculoskeletal- osteoporosis      GI/Hepatic:     (+) Inflammatory bowel disease,       Renal/Genitourinary:  - ROS Renal section negative       Endo:  - neg endo ROS       Psychiatric:     (+) psychiatric history depression      Infectious Disease:   (+) TB (2001, pt reports never cultured, but it is a best guess. treated with levaquin, and then TB regimen for 7 months),       Malignancy:      - no malignancy   Other:                         PHYSICAL EXAM:   Mental Status/Neuro: A/A/O   Airway: Facies: Feasible  Mallampati: I  Mouth/Opening: Full  TM distance: > 6 cm  Neck ROM: Full   Respiratory: Auscultation: CTAB     Resp. Rate: Normal     Resp. Effort: Normal      CV: Rhythm: Regular  Heart: Normal Sounds  Edema: None  Pulses: Normal   Comments:      Dental: Details                  LABS:  CBC:   Lab Results   Component Value Date    WBC 9.3 05/21/2019    WBC 6.1 04/21/2019    HGB 15.4 05/21/2019    HGB 15.5 04/21/2019    HCT 46.1 05/21/2019    HCT 46.8 04/21/2019     05/21/2019     04/21/2019     BMP:   Lab Results   Component Value Date     08/22/2019     05/21/2019    POTASSIUM 4.5 08/22/2019    POTASSIUM 4.0 05/21/2019    CHLORIDE 104 08/22/2019    CHLORIDE 104 05/21/2019    CO2 30 08/22/2019    CO2 27 05/21/2019    BUN 17 08/22/2019    BUN 16 05/21/2019    CR 1.30 (H) 08/22/2019    CR 1.88 (H) 05/21/2019    GLC 87 08/22/2019     (H) 05/21/2019     COAGS:   Lab Results   Component Value Date    PTT 23 08/04/2007    INR 0.99 04/21/2019     POC:   Lab Results   Component Value Date    BGM 86 10/01/2019     OTHER:   Lab Results  "  Component Value Date    PH 7.43 08/04/2007    LACT 0.6 (L) 04/21/2019    A1C 5.6 08/22/2019    YUNIER 9.0 08/22/2019    PHOS 4.1 09/05/2007    MAG 1.9 09/05/2007    ALBUMIN 4.0 08/22/2019    PROTTOTAL 7.3 08/22/2019    ALT 34 08/22/2019    AST 21 08/22/2019    ALKPHOS 88 08/22/2019    BILITOTAL 0.7 08/22/2019    LIPASE 178 04/21/2019    AMYLASE 68 11/11/2016    TSH 2.21 08/22/2019    T4 0.79 08/22/2019    CRP <2.9 04/21/2019    SED 6 04/21/2019        Preop Vitals    BP Readings from Last 3 Encounters:   12/13/19 128/85   10/01/19 (!) 138/92   09/19/19 120/81    Pulse Readings from Last 3 Encounters:   12/13/19 74   10/01/19 67   09/19/19 79      Resp Readings from Last 3 Encounters:   10/01/19 15   04/21/19 16   04/19/19 20    SpO2 Readings from Last 3 Encounters:   10/01/19 99%   09/19/19 97%   08/20/19 95%      Temp Readings from Last 1 Encounters:   10/01/19 97.6  F (36.4  C) (Oral)    Ht Readings from Last 1 Encounters:   12/13/19 1.753 m (5' 9\")      Wt Readings from Last 1 Encounters:   12/13/19 69.4 kg (153 lb)    Estimated body mass index is 22.59 kg/m  as calculated from the following:    Height as of 12/13/19: 1.753 m (5' 9\").    Weight as of 12/13/19: 69.4 kg (153 lb).     LDA:  Peripheral IV 09/19/18 Right (Active)   Number of days: 495        Assessment:   ASA SCORE: 2    H&P: History and physical reviewed and following examination; no interval change.   Smoking Status:  Non-Smoker/Unknown   NPO Status: NPO Appropriate     Plan:   Anes. Type:  MAC   Pre-Medication: None   Induction:  N/a   Airway: Native Airway   Access/Monitoring: PIV   Maintenance: N/a     Postop Plan:   Postop Pain: None  Postop Sedation/Airway: Not planned  Disposition: Outpatient     PONV Management:   Adult Risk Factors:, Non-Smoker   Prevention: Ondansetron     CONSENT: Direct conversation             Comments for Plan/Consent:  ______________________________________________________________________  I discussed the risks and " benefits of MAC with moderate sedation with the patient.  Questions were sought and answered.      Max Vang MD  Attending Anesthesiologist                PAC Discussion and Assessment    ASA Classification: 3  Case is suitable for: [place]  Anesthetic techniques and relevant risks discussed: GA  Invasive monitoring and risk discussed:   Types:   Possibility and Risk of blood transfusion discussed:   NPO instructions given:   Additional anesthetic preparation and risks discussed:   Needs early admission to pre-op area:   Other:     PAC Resident/NP Anesthesia Assessment:  Trevin Gee is a 64 year old male scheduled for Colonoscopy on 2/5/20 by Dr. Cruz in treatment of Colorectal anastomotic stricture.  PAC referral for risk assessment and optimization for anesthesia with comorbid conditions of dyslipidemia, anemia, h/o blood transfusion, GERD, Crohn's, depression, osteoporosis, cataract:    Pre-operative considerations:  1.  Cardiac:  Functional status- METS 4. No cardiac history. denies cardiac symptoms.  EKG 2016 showed NSR.  low risk surgery with 0.4% (RCRI #) risk of major adverse cardiac event.   2.  Pulm:  Airway feasible.  CODI risk: low.  H/o TB in 2001- he reports never moreno to get a good sputum sample for cultures, but he was treated as positive and completed 7 months of therapy.  No symptoms since that time.  3.  GI:  Risk of PONV score = 1.  If > 2, anti-emetic intervention recommended.  H/o of crohn's disease s/p ileocolonic resection complicated by anastomotic stricture s/p stent placement.  Above procedure planned for stent removal. Denies current GERD symptoms.    4.  Psych: depression stable.    VTE risk: 1.8%    Patient is optimized and is acceptable candidate for the proposed procedure.  No further diagnostic evaluation is needed.     **For further details of assessment, testing, and physical exam please see H and P completed on same date.      Antonina Treadwell PA-C        Mid-Level  Provider/Resident:   Date:   Time:     Attending Anesthesiologist Anesthesia Assessment:        Anesthesiologist:   Date:   Time:   Pass/Fail:   Disposition:     PAC Pharmacist Assessment:        Pharmacist:   Date:   Time:    Antonina Treadwell PA-C

## 2020-01-27 NOTE — PATIENT INSTRUCTIONS
Preparing for Your Surgery      Name:  Trevin Gee   MRN:  [id number]   :  1955   Today's Date:  2020     Arriving for surgery:  Surgery date:  20  Arrival time:  7:00 am  Please come to:       [facility] [address]  [address] [place]  [address]    - ? parking is available in front of the hospital      -    Please proceed to [address] on the 3rd floor. [phone]?     - ?If you are in need of directions, wheelchair or escort please stop at the Information Desk in the lobby.  Inform the information person that you are here for surgery; a wheelchair and escort to [address] will be provided.?     What can I eat or drink?  -  Follow eating, drinking and bowel prep instructions from colo-rectal.     Which medicines can I take?  Stop Aspirin, vitamins and supplements one week prior to surgery.  Hold Ibuprofen for 24 hours and/or Naproxen for 48 hours prior to surgery.   -  Please take these medications the day of surgery:  Dicyclomine (Bentyl)  Hydrocodone-Acetaminophen (Norco)  Tizanidine (Zanaflex)    How do I prepare myself?  -  Take two showers: one the night before surgery; and one the morning of surgery.         Use Scrubcare or Hibiclens to wash from neck down, leave soap on your skin for up to one minute.  Do not get soap in your eyes or ears.  You may use your own shampoo and conditioner; no other hair products.   -  Do NOT use lotion, powder, deodorant, or antiperspirant the day of your surgery.  -  Do NOT wear any jewelry.  -  Do not bring your own medications to the hospital.  -  Bring your ID and insurance card.    -If you are scheduled to go home the Same Day as surgery you must have a responsible adult as a  and to stay with you overnight the first 24 hours after surgery.     Questions or Concerns:  -If you are scheduled on the [facility] or [facility] and have questions or concerns regarding the day of surgery, please call  Preadmission Nursing at [phone].     -If you have health changes between today and your surgery please call your surgeon. For questions after surgery please call your surgeons office.     AFTER YOUR SURGERY  Breathing exercises   Breathing exercises help you recover faster. Take deep breaths and let the air out slowly. This will:     Help you wake up after surgery.    Help prevent complications like pneumonia.  Preventing complications will help you go home sooner.   We may give you a breathing device (incentive spirometer) to encourage you to breathe deeply.   Nausea and vomiting   You may feel sick to your stomach after surgery; if so, let your nurse know.    Pain control:  After surgery, you may have pain. Our goal is to help you manage your pain. Pain medicine will help you feel comfortable enough to do activities that will help you heal.  These activities may include breathing exercises, walking and physical therapy.   To help your health care team treat your pain we will ask: 1) If you have pain  2) where it is located 3) describe your pain in your words  Methods of pain control include medications given by mouth, vein or by nerve block for some surgeries.  Sequential Compression Device (SCD):  You may need to wear SCD S (also called pneumo boots)on your legs or feet. These are wraps connected to a machine that pumps in air and releases it. The repeated pumping helps prevent blood clots from forming.

## 2020-01-27 NOTE — H&P
Pre-Operative H & P     CC:  Preoperative exam to assess for increased cardiopulmonary risk while undergoing surgery and anesthesia.    Date of Encounter: 1/27/2020  Primary Care Physician:  No Ref-Primary, Physician  Associated diagnosis: crohn's disease    HPI  Trevin Gee is a 64 year old male who presents for pre-operative H & P in preparation for colonoscopy with Dr. Cruz on 2/5/20 at [facility]. Patient is being evaluated for comorbid conditions of dyslipidemia, anemia, h/o blood transfusion, GERD, Crohn s, depression, osteoporosis, cataract    Mr. Gee has a history of Crohn s disease s/p ileocolonic resection.  He has been struggling with recurrent obstructive symptoms from a persistent anastomotic stenosis despite multiple endoscopic dilations. Colonic stent was inserted 12/2019.  He is now scheduled for stent removal.     History is obtained from the patient and chart review.      Past Medical History  Past Medical History:   Diagnosis Date     Anemia 2007     Anxiety 2012?    much worse since July 2014     Cataract 12/2007    both eyes, too much prednisone, implants     Coccidioidomycosis      Crohn disease (H)      Crohn's disease (H) 1/13/2015     Depressive disorder 7/2014    much worse since July 2014     Fracture 1956    green fracture of leg     Gallbladder problem 2005?    much gravel, removed     GERD (gastroesophageal reflux disease)      History of blood transfusion 1988    ulcerated anastomosis term. ilium bleed     Hypertriglyceridemia 7/8/2016     Infection 2007    persistant coccidioidomycosis     Kidney stone various    both sides, all passed naturally     Mental health problem 2007    bipolar though perhaps different     Nephrolithiasis      Osteoporosis 2007    osteoporosis, too much prednisone, last dexa 1/2014     Osteoporosis      Other nervous system complications 2007    prednisone overload induced kevin     Personal  history of colonic polyps     small ones found during colonoscopies     Steroid-induced psychosis     Patient extremely sensitive to regular doses of steroids.  Unclear if this is due to chronic fluconazole use or genetic issue.  In the future, use caution when prescribing steroids.     TB (tuberculosis)        Past Surgical History  Past Surgical History:   Procedure Laterality Date     APPENDECTOMY  1974    coincidental with Crohn's surgery     BACK SURGERY  12/2007    kyphoplasty on compressed 4th??? vertebra     BIOPSY  2007, 2013    lump on arm, knee, back of hand for coccidioidomycosis cult.     CHOLECYSTECTOMY  2005    much gravel, removed laparoscopically     COLONOSCOPY  7/14/2014 last    Dr. Catherine Bowden, [facility] - many previous     COLONOSCOPY Left 9/11/2015    Procedure: COMBINED COLONOSCOPY, SINGLE OR MULTIPLE BIOPSY/POLYPECTOMY BY BIOPSY;  Surgeon: Fransisco Leach MD;  Location:  GI     COLONOSCOPY N/A 8/3/2016    Procedure: COMBINED COLONOSCOPY, SINGLE OR MULTIPLE BIOPSY/POLYPECTOMY BY BIOPSY;  Surgeon: Fransisco Leach MD;  Location:  GI     COLONOSCOPY N/A 8/16/2017    Procedure: COMBINED COLONOSCOPY, SINGLE OR MULTIPLE BIOPSY/POLYPECTOMY BY BIOPSY;;  Surgeon: Fransisco Leach MD;  Location: [facility] OR     COLONOSCOPY N/A 10/1/2019    Procedure: Colonoscopy With Colonic Stent Insertion;  Surgeon: Robbie Cruz MD;  Location: [facility] OR     ENT SURGERY  ?    upper endoscopy     ESOPHAGOSCOPY, GASTROSCOPY, DUODENOSCOPY (EGD), COMBINED N/A 11/8/2016    Procedure: COMBINED ENDOSCOPIC ULTRASOUND, ESOPHAGOSCOPY, GASTROSCOPY, DUODENOSCOPY (EGD), FINE NEEDLE ASPIRATE/BIOPSY;  Surgeon: Guru Alexsandra Ballesteros MD;  Location:  GI     ESOPHAGOSCOPY, GASTROSCOPY, DUODENOSCOPY (EGD), COMBINED N/A 11/8/2016    Procedure: COMBINED ESOPHAGOSCOPY, GASTROSCOPY, DUODENOSCOPY (EGD), BIOPSY SINGLE OR MULTIPLE;  Surgeon: Guru Alexsandra Ballesteros MD;  Location:  "[facility] GI     ESOPHAGOSCOPY, GASTROSCOPY, DUODENOSCOPY (EGD), COMBINED N/A 8/16/2017    Procedure: COMBINED ESOPHAGOSCOPY, GASTROSCOPY, DUODENOSCOPY (EGD), BIOPSY SINGLE OR MULTIPLE;;  Surgeon: Fransisco Leach MD;  Location: [facility] OR     EYE SURGERY  12/2007    cataract surgery both sides     GI SURGERY  1974, 1986(x2), 1989    Crohn's, 1974 RO 18\" term. ilium + 9\" asc. colon all one pc     SOFT TISSUE SURGERY  3/2013    removed buildup on back of r hand, coccidioidomycosis       Hx of Blood transfusions/reactions: Patient has a history of transfusion, but denies reactions     Hx of abnormal bleeding or anti-platelet use: denies    Steroid use in the last year: denies    Personal or FH with difficulty with Anesthesia:  denies    Prior to Admission Medications  Current Outpatient Medications   Medication Sig Dispense Refill     calcium-vitamin D (CALTRATE) 600-400 MG-UNIT per tablet Take 1 tablet by mouth as needed (PT last dose 1.26.2020)        Cyanocobalamin (VITAMIN B 12 PO) Take 500 mcg by mouth At Bedtime        fluconazole (DIFLUCAN) 200 MG tablet Take 1 tablet (200 mg) by mouth daily (Patient taking differently: Take 200 mg by mouth At Bedtime ) 90 tablet 3     MELATONIN PO Take 1.5 mg by mouth nightly as needed (PT last dose 1.27.2020)        multivitamin, therapeutic with minerals (MULTI-VITAMIN) TABS Take 1 tablet by mouth every evening        traZODone (DESYREL) 50 MG tablet Take 1 tablet (50 mg) by mouth nightly as needed for sleep (Patient taking differently: Take 50 mg by mouth nightly as needed for sleep (PT last dose 1.27.2020) ) 90 tablet 1     dicyclomine (BENTYL) 10 MG capsule Take 1 capsule (10 mg) by mouth 2 times daily (before meals) 90 capsule 1     diphenoxylate-atropine (LOMOTIL) 2.5-0.025 MG tablet Take 1 tablet by mouth 4 times daily as needed for diarrhea 60 tablet 1     HYDROcodone-acetaminophen (NORCO) 5-325 MG tablet Take 1 tablet by mouth every 6 hours as needed for pain (Patient " taking differently: Take 1 tablet by mouth every 6 hours as needed for pain (PT last dose approx 2 months ago 1.27.2020) ) 15 tablet 0     ondansetron (ZOFRAN ODT) 4 MG ODT tab Take 1 tablet (4 mg) by mouth every 8 hours as needed for nausea or vomiting 10 tablet 0     tiZANidine (ZANAFLEX) 4 MG tablet Take 1-2 tablets (4-8 mg) by mouth 3 times daily as needed for muscle spasms 30 tablet 0       Allergies  Allergies   Allergen Reactions     Prednisone Other (See Comments)     Diana with more than 2.5mg chronic dosing of prednisone due to fluconazole interaction per patient.        Social History  Social History     Socioeconomic History     Marital status:      Spouse name: Not on file     Number of children: Not on file     Years of education: Not on file     Highest education level: Not on file   Occupational History     Occupation: Reserach associate at the      Employer: [employer]   Social Needs     Financial resource strain: Not on file     Food insecurity:     Worry: Not on file     Inability: Not on file     Transportation needs:     Medical: Not on file     Non-medical: Not on file   Tobacco Use     Smoking status: Never Smoker     Smokeless tobacco: Never Used   Substance and Sexual Activity     Alcohol use: Yes     Comment: sometimes one glass of wine a week     Drug use: No     Sexual activity: Never   Lifestyle     Physical activity:     Days per week: Not on file     Minutes per session: Not on file     Stress: Not on file   Relationships     Social connections:     Talks on phone: Not on file     Gets together: Not on file     Attends Jewish service: Not on file     Active member of club or organization: Not on file     Attends meetings of clubs or organizations: Not on file     Relationship status: Not on file     Intimate partner violence:     Fear of current or ex partner: Not on file     Emotionally abused: Not on file     Physically abused: Not on file     Forced sexual  "activity: Not on file   Other Topics Concern     Not on file   Social History Narrative     Not on file       Family History  Family History   Problem Relation Age of Onset     Heart Failure Father      Musculoskeletal Disorder Father         Parkinson's     Cancer - colorectal Mother      Cerebrovascular Disease Paternal Grandmother      Cancer Other      Musculoskeletal Disorder Brother         Parkinson's     Anesthesia Reaction No family hx of      Deep Vein Thrombosis (DVT) No family hx of          ROS/MED HX    ENT/Pulmonary:  - neg pulmonary ROS     Neurologic:  - neg neurologic ROS     Cardiovascular:     (+) Dyslipidemia, ----. : . . . :. . Previous cardiac testing date:results:date: results:ECG reviewed date:2016 results:NSR, normal date: results:         (-) taking anticoagulants/antiplatelets   METS/Exercise Tolerance:  4   Hematologic:  - neg hematologic  ROS      (-) History of Transfusion   Musculoskeletal:   (+)  other musculoskeletal- osteoporosis      GI/Hepatic:     (+) GERD Inflammatory bowel disease,       Renal/Genitourinary:  - ROS Renal section negative       Endo:  - neg endo ROS       Psychiatric:     (+) psychiatric history depression      Infectious Disease:   (+) TB- 2001. treatment completed.      Malignancy:      - no malignancy   Other:         The complete review of systems is negative other than noted in the HPI or here.   Temp: 98.1  F (36.7  C) Temp src: Oral BP: 115/85 Pulse: 87   Resp: 15 SpO2: 98 %         156 lbs 12.8 oz  5' 9.5\"   Body mass index is 22.82 kg/m .       Physical Exam  Constitutional: Awake, alert, cooperative, no apparent distress, and appears stated age.  Eyes: Pupils equal, round and reactive to light, extra ocular muscles intact, sclera clear, conjunctiva normal.  HENT: Normocephalic, oral pharynx with moist mucus membranes, good dentition. No goiter appreciated.   Respiratory: Clear to auscultation bilaterally, no crackles or wheezing.  Cardiovascular: " Regular rate and rhythm, normal S1 and S2, and no murmur noted.  Carotids +2, no bruits. No edema. Palpable pulses to radial  arteries.   GI: Normal bowel sounds, soft, non-distended, non-tender, no masses palpated, no hepatosplenomegaly  Lymph/Hematologic: No cervical lymphadenopathy and no supraclavicular lymphadenopathy.  Genitourinary:  deferred  Skin: Warm and dry.   Musculoskeletal: Full ROM of neck. There is no redness, warmth, or swelling of the exposed joints. Gross motor strength is normal.    Neurologic: Awake, alert, oriented to name, place and time. Cranial nerves II-XII are grossly intact. Gait is normal.   Neuropsychiatric: Calm, cooperative. Normal affect.     Labs: (personally reviewed)  Component      Latest Ref Rng & Units 8/22/2019   Sodium      133 - 144 mmol/L 139   Potassium      3.4 - 5.3 mmol/L 4.5   Chloride      94 - 109 mmol/L 104   Carbon Dioxide      20 - 32 mmol/L 30   Anion Gap      3 - 14 mmol/L 5   Glucose      70 - 99 mg/dL 87   Urea Nitrogen      7 - 30 mg/dL 17   Creatinine      0.66 - 1.25 mg/dL 1.30 (H)   GFR Estimate      >60 mL/min/1.73:m2 58 (L)   GFR Estimate If Black      >60 mL/min/1.73:m2 67   Calcium      8.5 - 10.1 mg/dL 9.0   Bilirubin Total      0.2 - 1.3 mg/dL 0.7   Albumin      3.4 - 5.0 g/dL 4.0   Protein Total      6.8 - 8.8 g/dL 7.3   Alkaline Phosphatase      40 - 150 U/L 88   ALT      0 - 70 U/L 34   AST      0 - 45 U/L 21   Hemoglobin A1C      0 - 5.6 % 5.6     Component      Latest Ref Rng & Units 5/21/2019   WBC      4.0 - 11.0 10e9/L 9.3   RBC Count      4.4 - 5.9 10e12/L 4.92   Hemoglobin      13.3 - 17.7 g/dL 15.4   Hematocrit      40.0 - 53.0 % 46.1   MCV      78 - 100 fl 94   MCH      26.5 - 33.0 pg 31.3   MCHC      31.5 - 36.5 g/dL 33.4   RDW      10.0 - 15.0 % 12.8   Platelet Count      150 - 450 10e9/L 195     Awaiting hgb, K+ and Cr results  EKG 2016  NSR, normal EKG    Outside records reviewed from: care everywhere    ASSESSMENT and PLAN  Trevin  Marlen is a 64 year old male scheduled for Colonoscopy on 2/5/20 by Dr. Cruz in treatment of Colorectal anastomotic stricture.  PAC referral for risk assessment and optimization for anesthesia with comorbid conditions of dyslipidemia, anemia, h/o blood transfusion, GERD, Crohn's, depression, osteoporosis, cataract:    Pre-operative considerations:  1.  Cardiac:  Functional status- METS 4. No cardiac history. denies cardiac symptoms.  EKG 2016 showed NSR.  low risk surgery with 0.4% (RCRI #) risk of major adverse cardiac event.   2.  Pulm:  Airway feasible.  CODI risk: low.  H/o TB in 2001- he reports never moreno to get a good sputum sample for cultures, but he was treated as positive and completed 7 months of therapy.  No symptoms since that time.  3.  GI:  Risk of PONV score = 1.  If > 2, anti-emetic intervention recommended.  H/o of crohn's disease s/p ileocolonic resection complicated by anastomotic stricture s/p stent placement.  Above procedure planned for stent removal. Denies current GERD symptoms.    4.  Psych: depression stable.    VTE risk: 1.8%    Patient is optimized and is acceptable candidate for the proposed procedure.  No further diagnostic evaluation is needed.     Antonina Treadwell PA-C  Preoperative Assessment Center  [facility]  [facility]  Phone: [phone]  Fax: [phone]

## 2020-02-05 ENCOUNTER — HOSPITAL ENCOUNTER (OUTPATIENT)
Facility: CLINIC | Age: 65
Discharge: HOME | End: 2020-02-05
Attending: INTERNAL MEDICINE | Admitting: INTERNAL MEDICINE
Payer: COMMERCIAL

## 2020-02-05 ENCOUNTER — ANESTHESIA (OUTPATIENT)
Dept: SURGERY | Facility: CLINIC | Age: 65
End: 2020-02-05
Payer: COMMERCIAL

## 2020-02-05 ENCOUNTER — APPOINTMENT (OUTPATIENT)
Dept: GENERAL RADIOLOGY | Facility: CLINIC | Age: 65
End: 2020-02-05
Attending: INTERNAL MEDICINE
Payer: COMMERCIAL

## 2020-02-05 VITALS
BODY MASS INDEX: 22.25 KG/M2 | TEMPERATURE: 97.7 F | WEIGHT: 155.42 LBS | RESPIRATION RATE: 16 BRPM | DIASTOLIC BLOOD PRESSURE: 84 MMHG | HEIGHT: 70 IN | HEART RATE: 70 BPM | OXYGEN SATURATION: 99 % | SYSTOLIC BLOOD PRESSURE: 128 MMHG

## 2020-02-05 DIAGNOSIS — K91.30 COLORECTAL ANASTOMOTIC STRICTURE: ICD-10-CM

## 2020-02-05 LAB
COLONOSCOPY: NORMAL
GLUCOSE BLDC GLUCOMTR-MCNC: 89 MG/DL (ref 70–99)

## 2020-02-05 PROCEDURE — 37000009 ZZH ANESTHESIA TECHNICAL FEE, EACH ADDTL 15 MIN: Performed by: INTERNAL MEDICINE

## 2020-02-05 PROCEDURE — 25000128 H RX IP 250 OP 636: Performed by: NURSE ANESTHETIST, CERTIFIED REGISTERED

## 2020-02-05 PROCEDURE — 40000170 ZZH STATISTIC PRE-PROCEDURE ASSESSMENT II: Performed by: INTERNAL MEDICINE

## 2020-02-05 PROCEDURE — 25800030 ZZH RX IP 258 OP 636: Performed by: NURSE ANESTHETIST, CERTIFIED REGISTERED

## 2020-02-05 PROCEDURE — 27210794 ZZH OR GENERAL SUPPLY STERILE: Performed by: INTERNAL MEDICINE

## 2020-02-05 PROCEDURE — 82962 GLUCOSE BLOOD TEST: CPT

## 2020-02-05 PROCEDURE — 71000027 ZZH RECOVERY PHASE 2 EACH 15 MINS: Performed by: INTERNAL MEDICINE

## 2020-02-05 PROCEDURE — 25000125 ZZHC RX 250: Performed by: INTERNAL MEDICINE

## 2020-02-05 PROCEDURE — 37000008 ZZH ANESTHESIA TECHNICAL FEE, 1ST 30 MIN: Performed by: INTERNAL MEDICINE

## 2020-02-05 PROCEDURE — 36000059 ZZH SURGERY LEVEL 3 EA 15 ADDTL MIN UMMC: Performed by: INTERNAL MEDICINE

## 2020-02-05 PROCEDURE — 40000277 XR SURGERY CARM FLUORO LESS THAN 5 MIN W STILLS: Mod: TC

## 2020-02-05 PROCEDURE — 25000125 ZZHC RX 250: Performed by: NURSE ANESTHETIST, CERTIFIED REGISTERED

## 2020-02-05 PROCEDURE — 36000057 ZZH SURGERY LEVEL 3 1ST 30 MIN - UMMC: Performed by: INTERNAL MEDICINE

## 2020-02-05 RX ORDER — PROPOFOL 10 MG/ML
INJECTION, EMULSION INTRAVENOUS CONTINUOUS PRN
Status: DISCONTINUED | OUTPATIENT
Start: 2020-02-05 | End: 2020-02-05

## 2020-02-05 RX ORDER — FENTANYL CITRATE 50 UG/ML
25-50 INJECTION, SOLUTION INTRAMUSCULAR; INTRAVENOUS
Status: DISCONTINUED | OUTPATIENT
Start: 2020-02-05 | End: 2020-02-05 | Stop reason: HOSPADM

## 2020-02-05 RX ORDER — HYDROMORPHONE HYDROCHLORIDE 1 MG/ML
.3-.5 INJECTION, SOLUTION INTRAMUSCULAR; INTRAVENOUS; SUBCUTANEOUS EVERY 10 MIN PRN
Status: DISCONTINUED | OUTPATIENT
Start: 2020-02-05 | End: 2020-02-05 | Stop reason: HOSPADM

## 2020-02-05 RX ORDER — ONDANSETRON 2 MG/ML
4 INJECTION INTRAMUSCULAR; INTRAVENOUS EVERY 30 MIN PRN
Status: DISCONTINUED | OUTPATIENT
Start: 2020-02-05 | End: 2020-02-05 | Stop reason: HOSPADM

## 2020-02-05 RX ORDER — NALOXONE HYDROCHLORIDE 0.4 MG/ML
.1-.4 INJECTION, SOLUTION INTRAMUSCULAR; INTRAVENOUS; SUBCUTANEOUS
Status: DISCONTINUED | OUTPATIENT
Start: 2020-02-05 | End: 2020-02-05 | Stop reason: HOSPADM

## 2020-02-05 RX ORDER — LIDOCAINE 40 MG/G
CREAM TOPICAL
Status: DISCONTINUED | OUTPATIENT
Start: 2020-02-05 | End: 2020-02-05 | Stop reason: HOSPADM

## 2020-02-05 RX ORDER — ONDANSETRON 2 MG/ML
4 INJECTION INTRAMUSCULAR; INTRAVENOUS
Status: DISCONTINUED | OUTPATIENT
Start: 2020-02-05 | End: 2020-02-05 | Stop reason: HOSPADM

## 2020-02-05 RX ORDER — ONDANSETRON 4 MG/1
4 TABLET, ORALLY DISINTEGRATING ORAL EVERY 30 MIN PRN
Status: DISCONTINUED | OUTPATIENT
Start: 2020-02-05 | End: 2020-02-05 | Stop reason: HOSPADM

## 2020-02-05 RX ORDER — LIDOCAINE HYDROCHLORIDE 20 MG/ML
INJECTION, SOLUTION INFILTRATION; PERINEURAL PRN
Status: DISCONTINUED | OUTPATIENT
Start: 2020-02-05 | End: 2020-02-05

## 2020-02-05 RX ORDER — MEPERIDINE HYDROCHLORIDE 50 MG/ML
12.5 INJECTION INTRAMUSCULAR; INTRAVENOUS; SUBCUTANEOUS
Status: DISCONTINUED | OUTPATIENT
Start: 2020-02-05 | End: 2020-02-05 | Stop reason: HOSPADM

## 2020-02-05 RX ORDER — ALBUTEROL SULFATE 0.83 MG/ML
2.5 SOLUTION RESPIRATORY (INHALATION) EVERY 4 HOURS PRN
Status: DISCONTINUED | OUTPATIENT
Start: 2020-02-05 | End: 2020-02-05 | Stop reason: HOSPADM

## 2020-02-05 RX ORDER — LABETALOL 20 MG/4 ML (5 MG/ML) INTRAVENOUS SYRINGE
10
Status: DISCONTINUED | OUTPATIENT
Start: 2020-02-05 | End: 2020-02-05 | Stop reason: HOSPADM

## 2020-02-05 RX ORDER — SODIUM CHLORIDE, SODIUM LACTATE, POTASSIUM CHLORIDE, CALCIUM CHLORIDE 600; 310; 30; 20 MG/100ML; MG/100ML; MG/100ML; MG/100ML
INJECTION, SOLUTION INTRAVENOUS CONTINUOUS
Status: DISCONTINUED | OUTPATIENT
Start: 2020-02-05 | End: 2020-02-05 | Stop reason: HOSPADM

## 2020-02-05 RX ORDER — PROPOFOL 10 MG/ML
INJECTION, EMULSION INTRAVENOUS PRN
Status: DISCONTINUED | OUTPATIENT
Start: 2020-02-05 | End: 2020-02-05

## 2020-02-05 RX ORDER — SODIUM CHLORIDE, SODIUM LACTATE, POTASSIUM CHLORIDE, CALCIUM CHLORIDE 600; 310; 30; 20 MG/100ML; MG/100ML; MG/100ML; MG/100ML
INJECTION, SOLUTION INTRAVENOUS CONTINUOUS PRN
Status: DISCONTINUED | OUTPATIENT
Start: 2020-02-05 | End: 2020-02-05

## 2020-02-05 RX ADMIN — PROPOFOL 120 MCG/KG/MIN: 10 INJECTION, EMULSION INTRAVENOUS at 09:15

## 2020-02-05 RX ADMIN — LIDOCAINE HYDROCHLORIDE 40 MG: 20 INJECTION, SOLUTION INFILTRATION; PERINEURAL at 09:20

## 2020-02-05 RX ADMIN — PROPOFOL 40 MG: 10 INJECTION, EMULSION INTRAVENOUS at 09:27

## 2020-02-05 RX ADMIN — PROPOFOL 70 MG: 10 INJECTION, EMULSION INTRAVENOUS at 09:20

## 2020-02-05 RX ADMIN — SODIUM CHLORIDE, POTASSIUM CHLORIDE, SODIUM LACTATE AND CALCIUM CHLORIDE: 600; 310; 30; 20 INJECTION, SOLUTION INTRAVENOUS at 09:07

## 2020-02-05 ASSESSMENT — MIFFLIN-ST. JEOR: SCORE: 1501.25

## 2020-02-05 NOTE — ANESTHESIA CARE TRANSFER NOTE
Patient: Trevin Gee    Procedure(s):  COLONOSCOPY, FOREIGN BODY REMOVAL    Diagnosis: Colorectal anastomotic stricture [K91.30]  Diagnosis Additional Information: No value filed.    Anesthesia Type:   No value filed.     Note:  Airway :Room Air  Patient transferred to:Phase II  Comments: Awake with good patent airway.  VSSHandoff Report: Identifed the Patient, Identified the Reponsible Provider, Reviewed the pertinent medical history, Discussed the surgical course, Reviewed Intra-OP anesthesia mangement and issues during anesthesia, Set expectations for post-procedure period and Allowed opportunity for questions and acknowledgement of understanding      Vitals: (Last set prior to Anesthesia Care Transfer)    CRNA VITALS  2/5/2020 0927 - 2/5/2020 0957      2/5/2020             Resp Rate (observed):  (!) 1                Electronically Signed By: MARISOL Hubbard CRNA  February 5, 2020  9:57 AM

## 2020-02-05 NOTE — OR NURSING
Patient arrived to OR suite without sacral mepilex, patient states no pain or skin issues in this area at this time. No sacral mepilex applied

## 2020-02-05 NOTE — OR NURSING
Patient has met phase II discharge criteria at 1030. Ride not available to come for a couple hours. Patient waiting in room. Vitally stable. Tolerating PO. Call light available.

## 2020-02-05 NOTE — ANESTHESIA POSTPROCEDURE EVALUATION
Anesthesia POST Procedure Evaluation    Patient: Trevin Gee   MRN:     [id number] Gender:   male   Age:    64 year old :      1955        Preoperative Diagnosis: Colorectal anastomotic stricture [K91.30]   Procedure(s):  COLONOSCOPY, FOREIGN BODY REMOVAL   Postop Comments: No value filed.       Anesthesia Type:  Not documented  No value filed.    Reportable Event: NO     PAIN: Uncomplicated   Sign Out status: Comfortable, Well controlled pain     PONV: No PONV   Sign Out status:  No Nausea or Vomiting     Neuro/Psych: Uneventful perioperative course   Sign Out Status: Preoperative baseline; Age appropriate mentation     Airway/Resp.: Uneventful perioperative course   Sign Out Status: Non labored breathing, age appropriate RR; Resp. Status within EXPECTED Parameters     CV: Uneventful perioperative course   Sign Out status: Appropriate BP and perfusion indices; Appropriate HR/Rhythm     Disposition:   Sign Out in:  Phase II  Disposition:  Home  Recovery Course: Uneventful  Follow-Up: Not required     Comments/Narrative:  No noted anesthetic complications.  Patient satisfied with anesthetic.             Last Anesthesia Record Vitals:  CRNA VITALS  2020 0927 - 2020 1027      2020             Resp Rate (observed):  (!) 5          Last PACU Vitals:  Vitals Value Taken Time   BP     Temp     Pulse     Resp     SpO2     Temp src     NIBP 118/79 2020  9:46 AM   Pulse 89 2020  9:51 AM   SpO2 98 % 2020  9:51 AM   Resp     Temp     Ht Rate 100 2020  9:48 AM   Temp 2           Electronically Signed By: Max Vang MD, 2020, 10:37 AM

## 2020-02-05 NOTE — DISCHARGE INSTRUCTIONS
REMEMBER: SAME DAY SURGERY IS NOT SAME DAY RECOVERY. TAKE IT EASY, GET PLENTY OF REST, AND HAVE SOMEONE WITH YOU FOR 24 HOURS. FOLLOW THESE INSTRUCTIONS AND PLEASE DO NOT HESITATE TO CALL WITH ANY QUESTIONS.   [facility] [place]  Same-Day Surgery   Adult Discharge Orders & Instructions   For 24 hours after surgery    1. Get plenty of rest.  A responsible adult must stay with you for at least 24 hours after you leave the hospital.   2. Do not drive or use heavy equipment.  If you have weakness or tingling, don't drive or use heavy equipment until this feeling goes away.  3. Do not drink alcohol.  4. Avoid strenuous or risky activities.  Ask for help when climbing stairs.   5. You may feel lightheaded.  IF so, sit for a few minutes before standing.  Have someone help you get up.   6. If you have nausea (feel sick to your stomach): Drink only clear liquids such as apple juice, ginger ale, broth or 7-Up.  Rest may also help.  Be sure to drink enough fluids.  Move to a regular diet as you feel able.  7. You may have a slight fever. Call the doctor if your fever is over 100 F (37.7 C) (taken under the tongue) or lasts longer than 24 hours.  8. You may have a dry mouth, a sore throat, muscle aches or trouble sleeping.  These should go away after 24 hours.  9. Do not make important or legal decisions.   Call your doctor for any of the followin.  Signs of infection (fever, growing tenderness at the surgery site, a large amount of drainage or bleeding, severe pain, foul-smelling drainage, redness, swelling).    2. It has been over 8 to 10 hours since surgery and you are still not able to urinate (pass water).  To contact a doctor, call Dr Cruz at [phone] (clinic) or:      [phone] and ask for the resident on call for Gastroenterology (answered 24 hours a day)      Emergency Department: [facility]: [phone]

## 2020-02-05 NOTE — OR NURSING
Dr. Cruz at bedside to review procedure findings with patient. Reviewed discharge instructions with Jovani, friend over the phone. He will be picking up patient. All questions answered.

## 2020-02-05 NOTE — OP NOTE
COLONOSCOPY 02/05/2020  8:53 AM [facility], [address] [place] [address] [phone]     Endoscopy Department   _______________________________________________________________________________   Patient Name: Trevin Gee        Procedure Date: 2/5/2020 8:53 AM   MRN: [id number]                       Account Number: [id number]   YOB: 1955              Admit Type: Outpatient   Age: 64                               Room: OR   Gender: Male                          Note Status: Finalized   Attending MD: Robbie Cruz MD  Pause for the Cause: pause for cause    completed   Total Sedation Time:                     _______________________________________________________________________________       Procedure:           Colonoscopy   Indications:         Foreign body removal from the colon, Therapeutic                        procedure   Providers:           Robbie Cruz MD   Patient Profile:     Mr Gee is a 65yo gentleman with Crohns status                        post distal ilectomy and right hemicolectomy complicated                        by a recalcitrant anstomotic stenosis who underwent                        Axios stent placement for management. He has done well                        in the interval and returns for stent removal and repeat                        interrogation.   Referring MD:        Jayleen Boone MD, Fransisco Leach   Medicines:           Deep sedation was administered   Complications:       No immediate complications.   _______________________________________________________________________________   Procedure:           Pre-Anesthesia Assessment:                        - Prior to the procedure, a History and Physical was                        performed, and patient medications and allergies were                        reviewed. The patient is competent. The risks and                        benefits of the  procedure and the sedation options and                        risks were discussed with the patient. All questions                        were answered and informed consent was obtained. Patient                        identification and proposed procedure were verified by                        the nurse in the pre-procedure area. Mental Status                        Examination: alert and oriented. Airway Examination:                        Mallampati Class II (the uvula but not tonsillar pillars                        visualized). Respiratory Examination: clear to                        auscultation. CV Examination: normal. ASA Grade                        Assessment: II - A patient with mild systemic disease.                        After reviewing the risks and benefits, the patient was                        deemed in satisfactory condition to undergo the                        procedure. The anesthesia plan was to use deep sedation                        / analgesia. Immediately prior to administration of                        medications, the patient was re-assessed for adequacy to                        receive sedatives. The heart rate, respiratory rate,                        oxygen saturations, blood pressure, adequacy of                        pulmonary ventilation, and response to care were                        monitored throughout the procedure. The physical status                        of the patient was re-assessed after the procedure.                        After obtaining informed consent, the colonoscope was                        passed under direct vision. Throughout the procedure,                        the patient's blood pressure, pulse, and oxygen                        saturations were monitored continuously. The colonoscope                        was introduced through the anus and advanced to the                        ileocolonic anastomosis. The colonoscopy was performed                         without difficulty. The patient tolerated the procedure                        well. The quality of the bowel preparation was adequate.                                                                                    Findings:        The patient was supine and frog legged.  films demonstrated the        Axios stent in the right upper quadrant. A pediatric colonoscope was        advanced without issue to the Axios stent which was at the ileocolonic        anastomosis. The stent was grasped and dispaced with a forceps. This        exposed the anastomosis which was more widely patent and mildly        irriated. The small bowel just beyond appeared quite healthy as did the        entire reminaing colon and rectum. The stent was again grasped and        removed in toto.                                                                                     Impression:          - Well positioned, full expanded Axios stent removed                        - Improved patency of the ileocolonic anastomosis with                        mild irritation, no evidence of active disease                        - The neoterminal ileum, remaining colon and rectum                        appeared healthy   Recommendation:      - Deep anethesia recovery with probable discharge home                        this morning                        - Follow up with the IBD team as scheduled                        - No plans for repeat therapeutic endoscopy procedure at                        this juncture, however replacement of the stent would be                        feasible in the future if clinical course dictates                        - Resume typical diet without delay                        - The findings and recommendations were discussed with                        the patient

## 2020-02-07 ENCOUNTER — TELEPHONE (OUTPATIENT)
Dept: GASTROENTEROLOGY | Facility: CLINIC | Age: 65
End: 2020-02-07

## 2020-02-07 NOTE — TELEPHONE ENCOUNTER
Spoke to patient reminding of appointment scheduled on 2/11/20 at 1120am with [facility]. Patient to arrive 15 min early. To reschedule or cancel patient to call [phone].      CARRIE Ford

## 2020-02-10 ENCOUNTER — CARE COORDINATION (OUTPATIENT)
Dept: GASTROENTEROLOGY | Facility: CLINIC | Age: 65
End: 2020-02-10

## 2020-02-10 ASSESSMENT — ENCOUNTER SYMPTOMS
NERVOUS/ANXIOUS: 1
INSOMNIA: 0
DEPRESSION: 1
DECREASED CONCENTRATION: 1
PANIC: 0

## 2020-02-10 NOTE — PROGRESS NOTES
Post Colonoscopy (2/5/2020) with Dr. Cruz: Follow-up     Post procedure recommendations:  - No plans for repeat therapeutic endoscopy procedure at   this juncture, however replacement of the stent would be feasible in the future if clinical course dictates    Orders placed: None at this time.      Patient states he is not having any problems or concerns.       Clinic contact and scheduling numbers verified for future questions/concerns.     KAT [address] Dr. Cruz, & Dr. Ballesteros  Advanced Endoscopy  [phone]

## 2020-02-11 ENCOUNTER — OFFICE VISIT (OUTPATIENT)
Dept: GASTROENTEROLOGY | Facility: CLINIC | Age: 65
End: 2020-02-11
Payer: COMMERCIAL

## 2020-02-11 VITALS
OXYGEN SATURATION: 97 % | BODY MASS INDEX: 22.73 KG/M2 | DIASTOLIC BLOOD PRESSURE: 85 MMHG | SYSTOLIC BLOOD PRESSURE: 120 MMHG | HEIGHT: 70 IN | WEIGHT: 158.8 LBS | HEART RATE: 75 BPM

## 2020-02-11 DIAGNOSIS — K50.012 CROHN'S DISEASE OF SMALL INTESTINE WITH INTESTINAL OBSTRUCTION (H): Primary | ICD-10-CM

## 2020-02-11 ASSESSMENT — MIFFLIN-ST. JEOR: SCORE: 1516.56

## 2020-02-11 ASSESSMENT — PAIN SCALES - GENERAL: PAINLEVEL: NO PAIN (0)

## 2020-02-11 NOTE — PROGRESS NOTES
IBD CLINIC     CC/REFERRING MD:  Referred Self  REASON FOR CONSULTATION: Crohn's disease       ASSESSMENT/PLAN:  64 year old male with Crohn's disease s/p ileocecectomy on no current Crohn's therapy.     1. Crohn's disease: Continues to be in endoscopic remission.  He did well with stent dilation for 3 months which is now removed.  There does seem to be a clinical benefit with the stent placement.  He has had no episodes of abdominal pain.  Most of his diarrhea is clearly traceable to dietary indiscretions, particularly ice cream and other sugary foods.  -- Routine blood work in 6 months  -- No specific Crohn's therapy at this time  -- Tentative plan for colonoscopy in 1 year (winter 2021) - earlier if new symptoms    2. Colon polyps: Well defined lesions with adenomatous changes.    -- Will continue to surveillance     3. Diarrhea: Celiac testing has been negative. Long discussion about dietary triggers for his diarrhea.  He admits to emotional eating.  We discussed about how sugar has clearly been shown to increase symptoms in people with Crohn's, particularly I think with his ileocecal resection he will be at risk for bacterial overgrowth and stasis.  He will work on decreasing sugar in his diet.    -- Avoid dietary triggers  -- prn lomotil    4. Possible acid reflux:  No current symptoms of acid reflux.      5. Pancreatic head cystic lesion: Increased from 2006, potentially representing IPMN.  MRCP with no change in cyst.  Reviewed with Dr. Cruz.  EUS with FNA normal. Stable on 6/17/19 MRE (1.4cm)  -- No further follow-up at this time needed.      6. Steroid dependency/Adrenal insufficiency:  Followed closely with endocrinology.  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      8. Depression: Unchanged.     IBD history:  Age at diagnosis: ~18  Extend of disease: Ileo-colonic  Disease phenotype: Inflammatory  Key-anal disease:  No  Current CD medications: None  Prior IBD Medications:  - Asulfazine and Asacol: 1974 to late 90s, stopped to to disease progression  - Prednisone 1986 - current.   - Mercaptopurine: Briefly in mid 90s to get him off steroids- Patient thinks he has side effect to medication  - Methotrexate: After mercaptopurine, stayed on for years, but after bleeding in 1988 and 1989, he would not taper his prednisone. MTX stopped as he could not get off the steroids.    Surgical history:  1974 - [facility] Ileocecectomy  1986 - [facility] - revision of anastomosis  1988 - massive bleeding from ulcer at anastomosis ([place]), cauterized via colonoscopy  1989 - laparoscopic  Revision of anastomosis  2005 - Lap [facility]    DRUG MONITORING  TPMT enzyme activity: none    DISEASE ASSESSMENT  Fecal calprotectin: None  CRP, ESR Results  Recent Labs   Lab Test 04/21/19  1610 04/18/19  0550  01/08/18  1345   CRP <2.9 3.2   < > <2.9   SED 6  --   --  4    < > = values in this interval not displayed.   Endoscopic assessment: Colonoscopy 8/16/2017 showed normal mucosa in entire colon. Non-patent functional end-to-end ileo-colonic anastomosis that was dilated to 12mm.  No evidence of active Crohn's disease.  Enterography (12/1/16) showed 4cm of mild thickening and enhancement in neoterminal ileum at anastomosis, decompressed sigmoid colon with questionable wall thickening and enhancement.    Enterography: No active Crohn's disease (3/20/15)  C diff: None (7/12/16)    IBD healthcare maintenance based on patients current medication:      Vaccinations:  -- Influenza (every year): Last given 2016  -- TdaP (every 10 years): Last given 2015  -- Pneumococcal Pneumonia (once then every 5 years): Has not obtained.    One time confirmation of immunity or serologies:  -- Hepatitis A (serologies or immunizations): 2005  -- Hepatitis B (serologies or immunizations): 2005  -- Zoster vaccination (>59 yo, discuss if over 50): 2005  -- MMR: Not documented.  -- HPV (all  aged 18-26): N/A  -- Meningococcal meningitis (all patients at risk for meningitis): Patient is not at risk.     Cancer Screening:  Colon cancer screening:  Since there is evidence of pancolitis histologically. Also with adenomatous polyps. Colonoscopy every 2-3 years recommended.  Dysplasia screening is recommended 2018.    Cervical cancer screening: N/A    Skin cancer screening: Since patient is not immunocompromised, this is per patient's dermatologist recommendations.    Depression Screening:  -- Over the last month, have you felt down, depressed, or hopeless? No  -- Over the last month, have you felt little interest or pleasure doing things? No    Misc:  -- Avoid tobacco use  -- Avoid NSAIDs as there is potentially a 25% chance of causing an IBD flare    RTC 6 months    Thank you for this consultation.  It was a pleasure to participate in the care of this patient; please contact us with any further questions.  A total of 30 minutes, face to face, was spent with this patient, >50% of which was counseling regarding the above delineated issues.     Fransisco Leach MD    [facility]  Inflammatory Bowel Disease Program  Division of Gastroenterology, Hepatology and Nutrition       HPI:   Had axios stent placed across stricture 10-1-19 to 2-5-20. No active disease in the ileum.     He continues to have substantial variation with his stools.  Currently reports 1 stool a day, formed.  No blood.      He endorses emotional eating habits. And will often eat large amounts of sugar, such as ice cream, as part of this. Clearly, stool variation is tied to his diet.     Will occasionally have a pain in the anorectal area. Has not occurred recently.      HBI:  Overall patient well being (prior day): 0 (Very well)  Abdominal pain (prior day): 0 (None)  Number of liquid or soft stools (prior day): 0 (1 point per stool)  Abdominal mass on exam: 0 (None)  Complications (1 point for each):   None    ROS:     No fevers or chills  No weight loss  No blurry vision, double vision or change in vision  No sore throat  No lymphadenopathy  No headache, paraesthesias, or weakness in a limb  No shortness of breath or wheezing  No chest pain or pressure  No arthralgias or myalgias  No rashes or skin changes  No odynophagia or dysphagia  No BRBPR, hematochezia, melena  No dysuria, frequency or urgency  No hot/cold intolerance or polyria  No anxiety or depression    Extra intestinal manifestations of IBD:  No uveitis/episcleritis  No aphthous ulcers   No arthritis   No erythema nodosum/pyoderma gangrenosum.     PERTINENT PAST MEDICAL HISTORY:  Past Medical History:   Diagnosis Date     Anemia 2007     Anxiety 2012?    much worse since July 2014     Cataract 12/2007    both eyes, too much prednisone, implants     Coccidioidomycosis      Crohn disease (H)      Crohn's disease (H) 1/13/2015     Depressive disorder 7/2014    much worse since July 2014     Fracture 1956    green fracture of leg     Gallbladder problem 2005?    much gravel, removed     GERD (gastroesophageal reflux disease)      History of blood transfusion 1988    ulcerated anastomosis term. ilium bleed     Hypertriglyceridemia 7/8/2016     Infection 2007    persistant coccidioidomycosis     Kidney stone various    both sides, all passed naturally     Mental health problem 2007    bipolar though perhaps different     Nephrolithiasis      Osteoporosis 2007    osteoporosis, too much prednisone, last dexa 1/2014     Osteoporosis      Other nervous system complications 2007    prednisone overload induced kevin     Personal history of colonic polyps     small ones found during colonoscopies     Steroid-induced psychosis     Patient extremely sensitive to regular doses of steroids.  Unclear if this is due to chronic fluconazole use or genetic issue.  In the future, use caution when prescribing steroids.     TB (tuberculosis)        PREVIOUS SURGERIES:  Past Surgical History:    Procedure Laterality Date     APPENDECTOMY  1974    coincidental with Crohn's surgery     BACK SURGERY  12/2007    kyphoplasty on compressed 4th??? vertebra     BIOPSY  2007, 2013    lump on arm, knee, back of hand for coccidioidomycosis cult.     CHOLECYSTECTOMY  2005    much gravel, removed laparoscopically     COLONOSCOPY  7/14/2014 last    Dr. Catherine Bowden, [facility] - many previous     COLONOSCOPY Left 9/11/2015    Procedure: COMBINED COLONOSCOPY, SINGLE OR MULTIPLE BIOPSY/POLYPECTOMY BY BIOPSY;  Surgeon: Fransisco Leach MD;  Location: [facility]     COLONOSCOPY N/A 8/3/2016    Procedure: COMBINED COLONOSCOPY, SINGLE OR MULTIPLE BIOPSY/POLYPECTOMY BY BIOPSY;  Surgeon: Fransisco Leach MD;  Location: [facility]     COLONOSCOPY N/A 8/16/2017    Procedure: COMBINED COLONOSCOPY, SINGLE OR MULTIPLE BIOPSY/POLYPECTOMY BY BIOPSY;;  Surgeon: Fransisco Leach MD;  Location: [facility]     COLONOSCOPY N/A 10/1/2019    Procedure: Colonoscopy With Colonic Stent Insertion;  Surgeon: Robbie Cruz MD;  Location: [facility]     COLONOSCOPY N/A 2/5/2020    Procedure: COLONOSCOPY, FOREIGN BODY REMOVAL;  Surgeon: Robbie Cruz MD;  Location: [facility]     ENT SURGERY  ?    upper endoscopy     ESOPHAGOSCOPY, GASTROSCOPY, DUODENOSCOPY (EGD), COMBINED N/A 11/8/2016    Procedure: COMBINED ENDOSCOPIC ULTRASOUND, ESOPHAGOSCOPY, GASTROSCOPY, DUODENOSCOPY (EGD), FINE NEEDLE ASPIRATE/BIOPSY;  Surgeon: Guru Alexsandra Ballesteros MD;  Location: [facility]     ESOPHAGOSCOPY, GASTROSCOPY, DUODENOSCOPY (EGD), COMBINED N/A 11/8/2016    Procedure: COMBINED ESOPHAGOSCOPY, GASTROSCOPY, DUODENOSCOPY (EGD), BIOPSY SINGLE OR MULTIPLE;  Surgeon: Guru Alexsandra Ballesteros MD;  Location: [facility]     ESOPHAGOSCOPY, GASTROSCOPY, DUODENOSCOPY (EGD), COMBINED N/A 8/16/2017    Procedure: COMBINED ESOPHAGOSCOPY, GASTROSCOPY, DUODENOSCOPY (EGD), BIOPSY SINGLE OR MULTIPLE;;  Surgeon: Fransisco Leach MD;  Location:  "[facility] OR     EYE SURGERY  12/2007    cataract surgery both sides     GI SURGERY  1974, 1986(x2), 1989    Crohn's, 1974 RO 18\" term. ilium + 9\" asc. colon all one pc     SOFT TISSUE SURGERY  3/2013    removed buildup on back of r hand, coccidioidomycosis       PREVIOUS ENDOSCOPY:  Summer 2014: Colonoscopy - not in out system    ALLERGIES:     Allergies   Allergen Reactions     Prednisone Other (See Comments)     Diana with more than 2.5mg chronic dosing of prednisone due to fluconazole interaction per patient.        PERTINENT MEDICATIONS:    Current Outpatient Medications:      calcium-vitamin D (CALTRATE) 600-400 MG-UNIT per tablet, Take 1 tablet by mouth as needed (PT last dose 1.26.2020) , Disp: , Rfl:      Cyanocobalamin (VITAMIN B 12 PO), Take 500 mcg by mouth At Bedtime , Disp: , Rfl:      dicyclomine (BENTYL) 10 MG capsule, Take 1 capsule (10 mg) by mouth 2 times daily (before meals), Disp: 90 capsule, Rfl: 1     diphenoxylate-atropine (LOMOTIL) 2.5-0.025 MG tablet, Take 1 tablet by mouth 4 times daily as needed for diarrhea, Disp: 60 tablet, Rfl: 1     fluconazole (DIFLUCAN) 200 MG tablet, Take 1 tablet (200 mg) by mouth daily (Patient taking differently: Take 200 mg by mouth At Bedtime ), Disp: 90 tablet, Rfl: 3     HYDROcodone-acetaminophen (NORCO) 5-325 MG tablet, Take 1 tablet by mouth every 6 hours as needed for pain (Patient taking differently: Take 1 tablet by mouth every 6 hours as needed for pain (PT last dose approx 2 months ago 1.27.2020) ), Disp: 15 tablet, Rfl: 0     MELATONIN PO, Take 1.5 mg by mouth nightly as needed (PT last dose 1.27.2020) , Disp: , Rfl:      multivitamin, therapeutic with minerals (MULTI-VITAMIN) TABS, Take 1 tablet by mouth every evening , Disp: , Rfl:      ondansetron (ZOFRAN ODT) 4 MG ODT tab, Take 1 tablet (4 mg) by mouth every 8 hours as needed for nausea or vomiting, Disp: 10 tablet, Rfl: 0     tiZANidine (ZANAFLEX) 4 MG tablet, Take 1-2 tablets (4-8 mg) by mouth 3 times " daily as needed for muscle spasms, Disp: 30 tablet, Rfl: 0     traZODone (DESYREL) 50 MG tablet, Take 1 tablet (50 mg) by mouth nightly as needed for sleep (Patient taking differently: Take 50 mg by mouth nightly as needed for sleep (PT last dose 1.27.2020) ), Disp: 90 tablet, Rfl: 1    SOCIAL HISTORY:  Social History     Socioeconomic History     Marital status:      Spouse name: Not on file     Number of children: Not on file     Years of education: Not on file     Highest education level: Not on file   Occupational History     Occupation: Reserach associate at [employer]      Employer: [employer]   Social Needs     Financial resource strain: Not on file     Food insecurity:     Worry: Not on file     Inability: Not on file     Transportation needs:     Medical: Not on file     Non-medical: Not on file   Tobacco Use     Smoking status: Never Smoker     Smokeless tobacco: Never Used   Substance and Sexual Activity     Alcohol use: Yes     Comment: sometimes one glass of wine a week     Drug use: No     Sexual activity: Never   Lifestyle     Physical activity:     Days per week: Not on file     Minutes per session: Not on file     Stress: Not on file   Relationships     Social connections:     Talks on phone: Not on file     Gets together: Not on file     Attends [employer] service: Not on file     Active member of club or organization: Not on file     Attends meetings of clubs or organizations: Not on file     Relationship status: Not on file     Intimate partner violence:     Fear of current or ex partner: Not on file     Emotionally abused: Not on file     Physically abused: Not on file     Forced sexual activity: Not on file   Other Topics Concern     Not on file   Social History Narrative     Not on file       FAMILY HISTORY:  Family History   Problem Relation Age of Onset     Heart Failure Father      Musculoskeletal Disorder Father         Parkinson's     Cancer - colorectal Mother       Cerebrovascular Disease Paternal Grandmother      Cancer Other      Musculoskeletal Disorder Brother         Parkinson's     Anesthesia Reaction No family hx of      Deep Vein Thrombosis (DVT) No family hx of      Mother with colon cancer in 80s.  No family member with Crohn's disease or Ulcerative Colitis.      Past/family/social history reviewed and no changes    PHYSICAL EXAMINATION:  Vitals reviewed, AFVSS   Wt   Wt Readings from Last 2 Encounters:   02/11/20 72 kg (158 lb 12.8 oz)   02/05/20 70.5 kg (155 lb 6.8 oz)      Gen: aaox3, cooperative, pleasant, not dyspneic/diaphoretic, nad  HEENT: ncat, neck supple, no clad, normal op w/o ulcer/exudate, anicteric, mmm  Lymph: No axillary, submandibular, supraclavicular or inguinal lymphadenopathy  Resp/CV unremarkable  Abd: Nondistended, +bs, no hepatosplenomegaly, nontender, no peritoneal signs  Ext: no c/c/e  Skin: warm, perfused, no jaundice      PERTINENT STUDIES:  Most recent CBC:  Recent Labs   Lab Test 01/27/20  1454 05/21/19  1511 04/21/19  1610   WBC  --  9.3 6.1   HGB 15.0 15.4 15.5   HCT  --  46.1 46.8   PLT  --  195 262     Most recent hepatic panel:  Recent Labs   Lab Test 08/22/19  0828 04/21/19  1610   ALT 34 23   AST 21 23     Most recent creatinine:  Recent Labs   Lab Test 01/27/20  1454 08/22/19  0828   CR 1.29* 1.30*       MRE: 3/20/15:   IMPRESSION:   1. Unremarkable small bowel other than postsurgical changes following right hemicolectomy with ileocolic anastomosis.  2. Small gastric diverticulum. 3. 1.5 x 0.8 cm pancreatic head cystic lesion minimally increased in size compared to the prior exam of 2006, but potentially representing intraductal papillary mucinous neoplasm. This can be reassessed on followup.  4. Hepatic steatosis.      Answers for HPI/ROS submitted by the patient on 2/10/2020   General Symptoms: No  Skin Symptoms: No  HENT Symptoms: No  EYE SYMPTOMS: No  HEART SYMPTOMS: No  LUNG SYMPTOMS: No  INTESTINAL SYMPTOMS: No  URINARY  SYMPTOMS: No  REPRODUCTIVE SYMPTOMS: No  SKELETAL SYMPTOMS: No  BLOOD SYMPTOMS: No  NERVOUS SYSTEM SYMPTOMS: No  MENTAL HEALTH SYMPTOMS: Yes  Nervous or Anxious: Yes  Depression: Yes  Trouble sleeping: No  Trouble thinking or concentrating: Yes  Mood changes: No  Panic attacks: No

## 2020-02-11 NOTE — NURSING NOTE
"Chief Complaint   Patient presents with     RECHECK     6 months follow up       Vitals:    02/11/20 1103   BP: 120/85   Pulse: 75   SpO2: 97%   Weight: 72 kg (158 lb 12.8 oz)   Height: 1.778 m (5' 10\")       Body mass index is 22.79 kg/m .    Judy Razo CMA    "

## 2020-02-11 NOTE — LETTER
2/11/2020       RE: Trevin Gee  [address]  [address]  [facility] [address]     Dear Colleague,    Thank you for referring your patient, Trevin Gee, to the [facility] GASTRO[facility] IBD [facility] at [facility]. Please see a copy of my visit note below.    IBD CLINIC     CC/REFERRING MD:  Referred Self  REASON FOR CONSULTATION: Crohn's disease       ASSESSMENT/PLAN:  64 year old male with Crohn's disease s/p ileocecectomy on no current Crohn's therapy.     1. Crohn's disease: Continues to be in endoscopic remission.  He did well with stent dilation for 3 months which is now removed.  There does seem to be a clinical benefit with the stent placement.  He has had no episodes of abdominal pain.  Most of his diarrhea is clearly traceable to dietary indiscretions, particularly ice cream and other sugary foods.  -- Routine blood work in 6 months  -- No specific Crohn's therapy at this time  -- Tentative plan for colonoscopy in 1 year (winter 2021) - earlier if new symptoms    2. Colon polyps: Well defined lesions with adenomatous changes.    -- Will continue to surveillance     3. Diarrhea: Celiac testing has been negative. Long discussion about dietary triggers for his diarrhea.  He admits to emotional eating.  We discussed about how sugar has clearly been shown to increase symptoms in people with Crohn's, particularly I think with his ileocecal resection he will be at risk for bacterial overgrowth and stasis.  He will work on decreasing sugar in his diet.    -- Avoid dietary triggers  -- prn lomotil    4. Possible acid reflux:  No current symptoms of acid reflux.      5. Pancreatic head cystic lesion: Increased from 2006, potentially representing IPMN.  MRCP with no change in cyst.  Reviewed with Dr. Cruz.  EUS with FNA normal. Stable on 6/17/19 MRE (1.4cm)  -- No further follow-up at this time needed.      6. Steroid dependency/Adrenal insufficiency:  Followed  closely with endocrinology.  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      8. Depression: Unchanged.     IBD history:  Age at diagnosis: ~18  Extend of disease: Ileo-colonic  Disease phenotype: Inflammatory  Key-anal disease: No  Current CD medications: None  Prior IBD Medications:  - Asulfazine and Asacol: 1974 to late 90s, stopped to to disease progression  - Prednisone 1986 - current.   - Mercaptopurine: Briefly in mid 90s to get him off steroids- Patient thinks he has side effect to medication  - Methotrexate: After mercaptopurine, stayed on for years, but after bleeding in 1988 and 1989, he would not taper his prednisone. MTX stopped as he could not get off the steroids.    Surgical history:  1974 - [facility] Ileocecectomy  1986 - [facility] - revision of anastomosis  1988 - massive bleeding from ulcer at anastomosis ([place]), cauterized via colonoscopy  1989 - laparoscopic  Revision of anastomosis  2005 - Lap CCY    DRUG MONITORING  TPMT enzyme activity: none    DISEASE ASSESSMENT  Fecal calprotectin: None  CRP, ESR Results  Recent Labs   Lab Test 04/21/19  1610 04/18/19  0550  01/08/18  1345   CRP <2.9 3.2   < > <2.9   SED 6  --   --  4    < > = values in this interval not displayed.   Endoscopic assessment: Colonoscopy 8/16/2017 showed normal mucosa in entire colon. Non-patent functional end-to-end ileo-colonic anastomosis that was dilated to 12mm.  No evidence of active Crohn's disease.  Enterography (12/1/16) showed 4cm of mild thickening and enhancement in neoterminal ileum at anastomosis, decompressed sigmoid colon with questionable wall thickening and enhancement.    Enterography: No active Crohn's disease (3/20/15)  C diff: None (7/12/16)    IBD healthcare maintenance based on patients current medication:      Vaccinations:  -- Influenza (every year): Last given 2016  -- TdaP (every 10 years): Last given 2015  -- Pneumococcal  Pneumonia (once then every 5 years): Has not obtained.    One time confirmation of immunity or serologies:  -- Hepatitis A (serologies or immunizations): 2005  -- Hepatitis B (serologies or immunizations): 2005  -- Zoster vaccination (>59 yo, discuss if over 50): 2005  -- MMR: Not documented.  -- HPV (all aged 18-26): N/A  -- Meningococcal meningitis (all patients at risk for meningitis): Patient is not at risk.     Cancer Screening:  Colon cancer screening:  Since there is evidence of pancolitis histologically. Also with adenomatous polyps. Colonoscopy every 2-3 years recommended.  Dysplasia screening is recommended 2018.    Cervical cancer screening: N/A    Skin cancer screening: Since patient is not immunocompromised, this is per patient's dermatologist recommendations.    Depression Screening:  -- Over the last month, have you felt down, depressed, or hopeless? No  -- Over the last month, have you felt little interest or pleasure doing things? No    Misc:  -- Avoid tobacco use  -- Avoid NSAIDs as there is potentially a 25% chance of causing an IBD flare    RTC 6 months    Thank you for this consultation.  It was a pleasure to participate in the care of this patient; please contact us with any further questions.  A total of 30 minutes, face to face, was spent with this patient, >50% of which was counseling regarding the above delineated issues.     Fransisco Leach MD    [facility]  Inflammatory Bowel Disease Program  Division of Gastroenterology, Hepatology and Nutrition       HPI:   Had axios stent placed across stricture 10-1-19 to 2-5-20. No active disease in the ileum.     He continues to have substantial variation with his stools.  Currently reports 1 stool a day, formed.  No blood.      He endorses emotional eating habits. And will often eat large amounts of sugar, such as ice cream, as part of this. Clearly, stool variation is tied to his diet.     Will occasionally have a  pain in the anorectal area. Has not occurred recently.      HBI:  Overall patient well being (prior day): 0 (Very well)  Abdominal pain (prior day): 0 (None)  Number of liquid or soft stools (prior day): 0 (1 point per stool)  Abdominal mass on exam: 0 (None)  Complications (1 point for each):   None    ROS:    No fevers or chills  No weight loss  No blurry vision, double vision or change in vision  No sore throat  No lymphadenopathy  No headache, paraesthesias, or weakness in a limb  No shortness of breath or wheezing  No chest pain or pressure  No arthralgias or myalgias  No rashes or skin changes  No odynophagia or dysphagia  No BRBPR, hematochezia, melena  No dysuria, frequency or urgency  No hot/cold intolerance or polyria  No anxiety or depression    Extra intestinal manifestations of IBD:  No uveitis/episcleritis  No aphthous ulcers   No arthritis   No erythema nodosum/pyoderma gangrenosum.     PERTINENT PAST MEDICAL HISTORY:  Past Medical History:   Diagnosis Date     Anemia 2007     Anxiety 2012?    much worse since July 2014     Cataract 12/2007    both eyes, too much prednisone, implants     Coccidioidomycosis      Crohn disease (H)      Crohn's disease (H) 1/13/2015     Depressive disorder 7/2014    much worse since July 2014     Fracture 1956    green fracture of leg     Gallbladder problem 2005?    much gravel, removed     GERD (gastroesophageal reflux disease)      History of blood transfusion 1988    ulcerated anastomosis term. ilium bleed     Hypertriglyceridemia 7/8/2016     Infection 2007    persistant coccidioidomycosis     Kidney stone various    both sides, all passed naturally     Mental health problem 2007    bipolar though perhaps different     Nephrolithiasis      Osteoporosis 2007    osteoporosis, too much prednisone, last dexa 1/2014     Osteoporosis      Other nervous system complications 2007    prednisone overload induced kevin     Personal history of colonic polyps     small ones  found during colonoscopies     Steroid-induced psychosis     Patient extremely sensitive to regular doses of steroids.  Unclear if this is due to chronic fluconazole use or genetic issue.  In the future, use caution when prescribing steroids.     TB (tuberculosis)        PREVIOUS SURGERIES:  Past Surgical History:   Procedure Laterality Date     APPENDECTOMY  1974    coincidental with Crohn's surgery     BACK SURGERY  12/2007    kyphoplasty on compressed 4th??? vertebra     BIOPSY  2007, 2013    lump on arm, knee, back of hand for coccidioidomycosis cult.     CHOLECYSTECTOMY  2005    much gravel, removed laparoscopically     COLONOSCOPY  7/14/2014 last    Dr. Catherine Bowden, [facility] - many previous     COLONOSCOPY Left 9/11/2015    Procedure: COMBINED COLONOSCOPY, SINGLE OR MULTIPLE BIOPSY/POLYPECTOMY BY BIOPSY;  Surgeon: Fransisco Leach MD;  Location: [facility]     COLONOSCOPY N/A 8/3/2016    Procedure: COMBINED COLONOSCOPY, SINGLE OR MULTIPLE BIOPSY/POLYPECTOMY BY BIOPSY;  Surgeon: Fransisco eLach MD;  Location: [facility]     COLONOSCOPY N/A 8/16/2017    Procedure: COMBINED COLONOSCOPY, SINGLE OR MULTIPLE BIOPSY/POLYPECTOMY BY BIOPSY;;  Surgeon: Fransisco Leach MD;  Location: [facility]     COLONOSCOPY N/A 10/1/2019    Procedure: Colonoscopy With Colonic Stent Insertion;  Surgeon: Robbie Cruz MD;  Location: [facility] OR     COLONOSCOPY N/A 2/5/2020    Procedure: COLONOSCOPY, FOREIGN BODY REMOVAL;  Surgeon: Robbie Cruz MD;  Location: [facility] OR     ENT SURGERY  ?    upper endoscopy     ESOPHAGOSCOPY, GASTROSCOPY, DUODENOSCOPY (EGD), COMBINED N/A 11/8/2016    Procedure: COMBINED ENDOSCOPIC ULTRASOUND, ESOPHAGOSCOPY, GASTROSCOPY, DUODENOSCOPY (EGD), FINE NEEDLE ASPIRATE/BIOPSY;  Surgeon: Guru Alexsandra Ballesteros MD;  Location:  GI     ESOPHAGOSCOPY, GASTROSCOPY, DUODENOSCOPY (EGD), COMBINED N/A 11/8/2016    Procedure: COMBINED ESOPHAGOSCOPY, GASTROSCOPY, DUODENOSCOPY (EGD),  "BIOPSY SINGLE OR MULTIPLE;  Surgeon: Guru Alexsandra Ballesteros MD;  Location:  GI     ESOPHAGOSCOPY, GASTROSCOPY, DUODENOSCOPY (EGD), COMBINED N/A 8/16/2017    Procedure: COMBINED ESOPHAGOSCOPY, GASTROSCOPY, DUODENOSCOPY (EGD), BIOPSY SINGLE OR MULTIPLE;;  Surgeon: Fransisco Leach MD;  Location: [facility]     EYE SURGERY  12/2007    cataract surgery both sides     GI SURGERY  1974, 1986(x2), 1989    Crohn's, 1974 RO 18\" term. ilium + 9\" asc. colon all one pc     SOFT TISSUE SURGERY  3/2013    removed buildup on back of r hand, coccidioidomycosis       PREVIOUS ENDOSCOPY:  Summer 2014: Colonoscopy - not in out system    ALLERGIES:     Allergies   Allergen Reactions     Prednisone Other (See Comments)     Diana with more than 2.5mg chronic dosing of prednisone due to fluconazole interaction per patient.        PERTINENT MEDICATIONS:    Current Outpatient Medications:      calcium-vitamin D (CALTRATE) 600-400 MG-UNIT per tablet, Take 1 tablet by mouth as needed (PT last dose 1.26.2020) , Disp: , Rfl:      Cyanocobalamin (VITAMIN B 12 PO), Take 500 mcg by mouth At Bedtime , Disp: , Rfl:      dicyclomine (BENTYL) 10 MG capsule, Take 1 capsule (10 mg) by mouth 2 times daily (before meals), Disp: 90 capsule, Rfl: 1     diphenoxylate-atropine (LOMOTIL) 2.5-0.025 MG tablet, Take 1 tablet by mouth 4 times daily as needed for diarrhea, Disp: 60 tablet, Rfl: 1     fluconazole (DIFLUCAN) 200 MG tablet, Take 1 tablet (200 mg) by mouth daily (Patient taking differently: Take 200 mg by mouth At Bedtime ), Disp: 90 tablet, Rfl: 3     HYDROcodone-acetaminophen (NORCO) 5-325 MG tablet, Take 1 tablet by mouth every 6 hours as needed for pain (Patient taking differently: Take 1 tablet by mouth every 6 hours as needed for pain (PT last dose approx 2 months ago 1.27.2020) ), Disp: 15 tablet, Rfl: 0     MELATONIN PO, Take 1.5 mg by mouth nightly as needed (PT last dose 1.27.2020) , Disp: , Rfl:      multivitamin, therapeutic " with minerals (MULTI-VITAMIN) TABS, Take 1 tablet by mouth every evening , Disp: , Rfl:      ondansetron (ZOFRAN ODT) 4 MG ODT tab, Take 1 tablet (4 mg) by mouth every 8 hours as needed for nausea or vomiting, Disp: 10 tablet, Rfl: 0     tiZANidine (ZANAFLEX) 4 MG tablet, Take 1-2 tablets (4-8 mg) by mouth 3 times daily as needed for muscle spasms, Disp: 30 tablet, Rfl: 0     traZODone (DESYREL) 50 MG tablet, Take 1 tablet (50 mg) by mouth nightly as needed for sleep (Patient taking differently: Take 50 mg by mouth nightly as needed for sleep (PT last dose 1.27.2020) ), Disp: 90 tablet, Rfl: 1    SOCIAL HISTORY:  Social History     Socioeconomic History     Marital status:      Spouse name: Not on file     Number of children: Not on file     Years of education: Not on file     Highest education level: Not on file   Occupational History     Occupation: Reserach associate at the      Employer: [employer]   Social Needs     Financial resource strain: Not on file     Food insecurity:     Worry: Not on file     Inability: Not on file     Transportation needs:     Medical: Not on file     Non-medical: Not on file   Tobacco Use     Smoking status: Never Smoker     Smokeless tobacco: Never Used   Substance and Sexual Activity     Alcohol use: Yes     Comment: sometimes one glass of wine a week     Drug use: No     Sexual activity: Never   Lifestyle     Physical activity:     Days per week: Not on file     Minutes per session: Not on file     Stress: Not on file   Relationships     Social connections:     Talks on phone: Not on file     Gets together: Not on file     Attends Sikh service: Not on file     Active member of club or organization: Not on file     Attends meetings of clubs or organizations: Not on file     Relationship status: Not on file     Intimate partner violence:     Fear of current or ex partner: Not on file     Emotionally abused: Not on file     Physically abused: Not on file      Forced sexual activity: Not on file   Other Topics Concern     Not on file   Social History Narrative     Not on file       FAMILY HISTORY:  Family History   Problem Relation Age of Onset     Heart Failure Father      Musculoskeletal Disorder Father         Parkinson's     Cancer - colorectal Mother      Cerebrovascular Disease Paternal Grandmother      Cancer Other      Musculoskeletal Disorder Brother         Parkinson's     Anesthesia Reaction No family hx of      Deep Vein Thrombosis (DVT) No family hx of      Mother with colon cancer in 80s.  No family member with Crohn's disease or Ulcerative Colitis.      Past/family/social history reviewed and no changes    PHYSICAL EXAMINATION:  Vitals reviewed, AFVSS   Wt   Wt Readings from Last 2 Encounters:   02/11/20 72 kg (158 lb 12.8 oz)   02/05/20 70.5 kg (155 lb 6.8 oz)      Gen: aaox3, cooperative, pleasant, not dyspneic/diaphoretic, nad  HEENT: ncat, neck supple, no clad, normal op w/o ulcer/exudate, anicteric, mmm  Lymph: No axillary, submandibular, supraclavicular or inguinal lymphadenopathy  Resp/CV unremarkable  Abd: Nondistended, +bs, no hepatosplenomegaly, nontender, no peritoneal signs  Ext: no c/c/e  Skin: warm, perfused, no jaundice      PERTINENT STUDIES:  Most recent CBC:  Recent Labs   Lab Test 01/27/20  1454 05/21/19  1511 04/21/19  1610   WBC  --  9.3 6.1   HGB 15.0 15.4 15.5   HCT  --  46.1 46.8   PLT  --  195 262     Most recent hepatic panel:  Recent Labs   Lab Test 08/22/19  0828 04/21/19  1610   ALT 34 23   AST 21 23     Most recent creatinine:  Recent Labs   Lab Test 01/27/20  1454 08/22/19  0828   CR 1.29* 1.30*       MRE: 3/20/15:   IMPRESSION:   1. Unremarkable small bowel other than postsurgical changes following right hemicolectomy with ileocolic anastomosis.  2. Small gastric diverticulum. 3. 1.5 x 0.8 cm pancreatic head cystic lesion minimally increased in size compared to the prior exam of 2006, but potentially representing intraductal  papillary mucinous neoplasm. This can be reassessed on followup.  4. Hepatic steatosis.      Answers for HPI/ROS submitted by the patient on 2/10/2020   General Symptoms: No  Skin Symptoms: No  HENT Symptoms: No  EYE SYMPTOMS: No  HEART SYMPTOMS: No  LUNG SYMPTOMS: No  INTESTINAL SYMPTOMS: No  URINARY SYMPTOMS: No  REPRODUCTIVE SYMPTOMS: No  SKELETAL SYMPTOMS: No  BLOOD SYMPTOMS: No  NERVOUS SYSTEM SYMPTOMS: No  MENTAL HEALTH SYMPTOMS: Yes  Nervous or Anxious: Yes  Depression: Yes  Trouble sleeping: No  Trouble thinking or concentrating: Yes  Mood changes: No  Panic attacks: No      Fransicso Leach MD

## 2020-05-12 ENCOUNTER — TELEPHONE (OUTPATIENT)
Dept: ENDOCRINOLOGY | Facility: CLINIC | Age: 65
End: 2020-05-12

## 2020-05-12 DIAGNOSIS — M81.0 OSTEOPOROSIS WITHOUT CURRENT PATHOLOGICAL FRACTURE, UNSPECIFIED OSTEOPOROSIS TYPE: Primary | ICD-10-CM

## 2020-05-12 NOTE — TELEPHONE ENCOUNTER
"Note below sent to the clinic coordinators      \"Pt to get reclast, DXA and return visit with me in approximately August, Sept 2020\"  "

## 2020-08-12 NOTE — PROGRESS NOTES
"Trevin Gee is a 65 year old male who is being evaluated via a billable telephone visit.      The patient has been notified of following:     \"This telephone visit will be conducted via a call between you and your physician/provider. We have found that certain health care needs can be provided without the need for a physical exam.  This service lets us provide the care you need with a short phone conversation.  If a prescription is necessary we can send it directly to your pharmacy.  If lab work is needed we can place an order for that and you can then stop by our lab to have the test done at a later time.    Telephone visits are billed at different rates depending on your insurance coverage. During this emergency period, for some insurers they may be billed the same as an in-person visit.  Please reach out to your insurance provider with any questions.    If during the course of the call the physician/provider feels a telephone visit is not appropriate, you will not be charged for this service.\"    Patient has given verbal consent for Telephone visit?  Yes    What phone number would you like to be contacted at? [phone]    How would you like to obtain your AVS? Mail a copy    Due to the COVID 19 pandemic this visit was converted to a telephone visit in order to help prevent spread of infection in this high risk patient and the general population .      Telephone call started at 1210: Stop at 1225, total time 15 minutes    [facility]  Endocrinology  Mag Currie MD  8/14/2020     Chief Complaint:   RECHECK     History of Present Illness:   Trevin Gee is a 64 year old male with a history of adrenal insufficiency and osteoporosis who presents for evaluation of recheck.     #1 adrenal insufficiency possibly due to chronic fluconazole use, now stopped fluconazole in June 2020    Patient was referred endocrinology for management of adrenal insufficiency in the setting of steroid taper.  The patient " has a history of Crohn's disease and has been dependent on steroids for most of his adult life.  In 2007, he was diagnosed with coccidiosis and was started on fluconazole treatment.  He had two episodes of kevin which was eventually determined to be from a drug interaction between fluconazole and prednisone, with underlying hypothesis being that fluconazole suppressed metabolism and clearance of prednisone.  He was tapered off prednisone in 2007, which has caused significant mental and on physical deconditioning including severe weakness, and was thought to have adrenal insufficiency even though there was no documented cortisol level in the chart.  Fluconazole was stopped after it was successfully treated initially, he has a relapse in 2013 with a knee infection, and was placed back on fluconazole 200 mg daily, which he is taking indefinitely.  His Crohn's disease is now controlled with low-dose prednisone 1 mg three times a week, and entercort 3 mg daily with minimal symptoms.  MRI in 2015 show that adrenal glands were normal.  May 2015 ACTH was 192, baseline cortisol was 1.6, 30 minute cortisol was high 0.8, and 60 minute cortisol was 6.9.  He does understand that he gets sick, he should take supplemental steroids and/or IM Solu-Cortef.  repeat ACTH test in July 2015 now shows adrenal insufficiency with suppresssed ACTH. The patient saw infectious disease in October 2015 and the plan is to continue with the fluconazole for now given his history of coccidiomycosis.  April 2016 cortisol level was 10.  The patient stopped his steroids as of April 2016.  August 2019 cortisol was 12.8.     Interval history: The patient stopped his fluconazole in June 2020 due to lack of insurance.  He has not yet restarted.  He currently feels quite well.    #2 history of osteoporosis  Patient had a documented low T score on DXA scan in 2005, and the clinical vertebral fracture status post kyphoplasty, confirming the diagnosis of  osteoporosis and treatment as indicated.  He did have history of kidney stone even though this occurred in the setting of his Crohn's disease and admitted chocolate dependency.  A 24 urine for calcium was low at 90 mg over 24 hours.  May 2015 DEXA scan show the lowest T score -2.4 at the lumbar spine, lower Z score of -2.0 the lumbar spine, is significant increase in his bone density in the lumbar spine compared with the previous exam in 2005 as well as the baseline exam in 2003.  There's been no significant change in bone density at the level of his hips.  The patient is currently on Fosamax 70 mg weekly.  He does take this properly. March 2015 vitamin D is 28. We looked into the price of Reclast and this was cost prohibitive for him.  The patient had a tooth removed in January 2016 and then received his first dose of   Reclast in March 2016.  He tolerated this infusion. His second Reclast infusion was in July 2017 with no complications and again in September in 2018. His DEXA scan on 7/7/2017 showed in increased in bone density at the levels of L1-L2, and in the total right and left hips.  Tooth pulled in June 2019. His August 2019 DEXA, demonstrated that he now has osteopenia. The lowest T-score is -2.4 at the level of the L1-L2 lumbar spine. The calculated changes in BMD to the previous exam (8//24/2018) are not significantly changed in all levels. The calculated changes in BMD to the basline exam (11/19/2003) are significantly increased at the level of the L1-L2 lumbar spine ( +7.4%) and at the right total hip (+4.3%).     Interval history: The patient denies any interim falls or fractures.  Has Reclast pending for today. DEXA scan done on April 2020.  The lumbar spine, this appears comparable with previous DEXA in 2019, and significantly improved compared with baseline in 2003. L1-L2 in 2019 with 0.909, L1-L2 in 2020 was 0.925 with the lowest T score of -2.3 (improved compared with previous exam). Hips appears  stable compared with previous imaging in 2019.  Overall hip bone density appears significantly improved compared with 2018 exam, and especially compared with 2003 exam.    #3 Skin itching  On 8/24/2019, the patient notes that for the past few years, he has been experiencing itchy skin in the winter and spring. He states that that itching would often start after siting in his car for long periods of time and preventing contact with the car seat did no help. He also has tried cortisone cream, showering, and changing detergents with no improvement. Mr. Gee feels that this may be a seasonal dry skin issue, as he never has noticed a rash in itchy areas and the itching changes location. He was going to follow up with dermatology for this, but cancelled the appointment due to having abdominal pain at the time.  Currently, he reports that his skin itching has resolved.    Interval history: Patient continues to use Vanicream as needed     #4 Diarrhea   The GI team believed that he had colonic overgrowth, and asked him to avoid sugar. His hemoglobin A1c today was 5.6% and his triglycerides have come down to 160 in August 2019 from 189 in August 2018. Of note, his creatinine was elevated at 1.3.    Interval history: After removal of his stent in February 2020, his diarrhea has resolved and he currently feels quite well.    Review of Systems:   Pertinent items are noted in HPI and below, remainder of complete ROS is negative.        Active Medications:   Current Outpatient Medications   Medication Sig Dispense Refill     calcium-vitamin D (CALTRATE) 600-400 MG-UNIT per tablet Take 1 tablet by mouth as needed (PT last dose 1.26.2020)        Cyanocobalamin (VITAMIN B 12 PO) Take 500 mcg by mouth At Bedtime        dicyclomine (BENTYL) 10 MG capsule Take 1 capsule (10 mg) by mouth 2 times daily (before meals) 90 capsule 1     diphenoxylate-atropine (LOMOTIL) 2.5-0.025 MG tablet Take 1 tablet by mouth 4 times daily as needed  for diarrhea 60 tablet 1     fluconazole (DIFLUCAN) 200 MG tablet Take 1 tablet (200 mg) by mouth daily (Patient taking differently: Take 200 mg by mouth At Bedtime ) 90 tablet 3     HYDROcodone-acetaminophen (NORCO) 5-325 MG tablet Take 1 tablet by mouth every 6 hours as needed for pain (Patient taking differently: Take 1 tablet by mouth every 6 hours as needed for pain (PT last dose approx 2 months ago 1.27.2020) ) 15 tablet 0     MELATONIN PO Take 1.5 mg by mouth nightly as needed (PT last dose 1.27.2020)        multivitamin, therapeutic with minerals (MULTI-VITAMIN) TABS Take 1 tablet by mouth every evening        ondansetron (ZOFRAN ODT) 4 MG ODT tab Take 1 tablet (4 mg) by mouth every 8 hours as needed for nausea or vomiting 10 tablet 0     tiZANidine (ZANAFLEX) 4 MG tablet Take 1-2 tablets (4-8 mg) by mouth 3 times daily as needed for muscle spasms 30 tablet 0     traZODone (DESYREL) 50 MG tablet Take 1 tablet (50 mg) by mouth nightly as needed for sleep (Patient taking differently: Take 50 mg by mouth nightly as needed for sleep (PT last dose 1.27.2020) ) 90 tablet 1      Allergies:   Prednisone      Past Medical History:  Anemia  Anxiety  Cataract  Coccidioidomycosis  Chron disease  Depression  Green fracture of leg  Gastroesophageal reflux disease  Hypertriglyceridemia   Kidney stone  Nephrolithiasis  Osteoporosis  Prednisone overload induced kevin  Tuberculosis   Vitamin B12 deficiency  Adrenal insufficiency  Bipolar 2 disorder  Lactose intolerance   Vertebral fracture, osteoporotic     Past Surgical History:  Appendectomy  kyphoplasty   Biopsy lump on arm  Cholecystectomy  Colonoscopy x4  Esophagoscopy, gastroscopy, duodenoscopy combined x3  Cataract surgery bilateral  GI surgery  Soft tissue surgery     Family History:   Hear failure - father  Parkinson's - father, brother  Colorectal cancer - mother  Cerebrovascular disease - paternal grandmother  Cancer - other      Social History:  This patient  presented alone.   Smoking status: never  Smokeless tobacco: never  Alcohol use: yes  Drug use: no      Physical Exam:   Vital signs and physical exam not available.  However the patient reports feeling well. Psych: Alert and oriented times 3; coherent speech, normal  rate and volume, able to articulate logical thoughts, able to abstract reason, no tangential thoughts, no hallucinations or delusions    DEXA scan done on April 2020.  The lumbar spine, this appears comparable with previous DEXA in 2019, and significantly improved compared with baseline in 2003.  L1-L2 in 2019 with 0.909, L1-L2 in 2020 was 0.925 with the lowest T score of -2.3 (improved compared with previous exam)  Hips appears stable compared with previous imaging in 2019.  Overall hip bone density appears significantly improved compared with 2018 exam, and especially compared with 2003 exam.    Labs from August 2020 are pending.  Patient receiving Reclast today (August 2020).    Assessment and Plan:  #1 adrenal insufficiency possibly due to chronic fluconazole use, now stopped fluconazole in June 2020  The patient has now stopped his fluconazole.  Discussed with patient that he should run this past ID to make sure they are comfortable with this.  A.m. cortisol is pending.  However the patient feels well and his previous cortisol levels (on fluconazole)    #2 history of osteoporosis, now with osteopenia  August 2020 bone density now shows osteopenia.  Patient to receive Reclast today.  Next DEXA scan and Reclast would be in 2022.  Patient to take 2-3 servings of dietary calcium daily    #3 Skin itching  Use Vanicream as needed.    #4 diarrhea  Resolved per patient report.  Congratulated patient.     Follow-up: Return in 2 years with DEXA and Reclast.     Due to the COVID 19 pandemic this visit was converted to a telephone visit in order to help prevent spread of infection in this high risk patient and the general population .      Telephone call  started at 1210: Stop at 1225, total time 15 minutes

## 2020-08-13 RX ORDER — HEPARIN SODIUM,PORCINE 10 UNIT/ML
5 VIAL (ML) INTRAVENOUS
Status: CANCELLED | OUTPATIENT
Start: 2020-08-13

## 2020-08-13 RX ORDER — ZOLEDRONIC ACID 5 MG/100ML
5 INJECTION, SOLUTION INTRAVENOUS ONCE
Status: CANCELLED
Start: 2020-08-13

## 2020-08-13 RX ORDER — HEPARIN SODIUM (PORCINE) LOCK FLUSH IV SOLN 100 UNIT/ML 100 UNIT/ML
5 SOLUTION INTRAVENOUS
Status: CANCELLED | OUTPATIENT
Start: 2020-08-13

## 2020-08-14 ENCOUNTER — VIRTUAL VISIT (OUTPATIENT)
Dept: ENDOCRINOLOGY | Facility: CLINIC | Age: 65
End: 2020-08-14

## 2020-08-14 ENCOUNTER — INFUSION THERAPY VISIT (OUTPATIENT)
Dept: INFUSION THERAPY | Facility: CLINIC | Age: 65
End: 2020-08-14
Attending: INTERNAL MEDICINE
Payer: COMMERCIAL

## 2020-08-14 ENCOUNTER — ANCILLARY PROCEDURE (OUTPATIENT)
Dept: BONE DENSITY | Facility: CLINIC | Age: 65
End: 2020-08-14
Attending: INTERNAL MEDICINE

## 2020-08-14 ENCOUNTER — APPOINTMENT (OUTPATIENT)
Dept: LAB | Facility: CLINIC | Age: 65
End: 2020-08-14
Attending: INTERNAL MEDICINE
Payer: COMMERCIAL

## 2020-08-14 VITALS
WEIGHT: 162 LBS | OXYGEN SATURATION: 95 % | HEART RATE: 80 BPM | BODY MASS INDEX: 23.24 KG/M2 | SYSTOLIC BLOOD PRESSURE: 118 MMHG | DIASTOLIC BLOOD PRESSURE: 79 MMHG | RESPIRATION RATE: 18 BRPM | TEMPERATURE: 98.2 F

## 2020-08-14 DIAGNOSIS — M81.0 OSTEOPOROSIS WITHOUT CURRENT PATHOLOGICAL FRACTURE, UNSPECIFIED OSTEOPOROSIS TYPE: ICD-10-CM

## 2020-08-14 DIAGNOSIS — K21.9 GASTROESOPHAGEAL REFLUX DISEASE, ESOPHAGITIS PRESENCE NOT SPECIFIED: Primary | ICD-10-CM

## 2020-08-14 DIAGNOSIS — M85.80 OSTEOPENIA, UNSPECIFIED LOCATION: Primary | ICD-10-CM

## 2020-08-14 DIAGNOSIS — Z79.52 STEROID DEPENDENT FOR ADRENAL SUPRESSION: ICD-10-CM

## 2020-08-14 DIAGNOSIS — M80.08XD FRACTURE OF VERTEBRA DUE TO OSTEOPOROSIS WITH ROUTINE HEALING, SUBSEQUENT ENCOUNTER: ICD-10-CM

## 2020-08-14 LAB
ANION GAP SERPL CALCULATED.3IONS-SCNC: 3 MMOL/L (ref 3–14)
BUN SERPL-MCNC: 13 MG/DL (ref 7–30)
CALCIUM SERPL-MCNC: 8.6 MG/DL (ref 8.5–10.1)
CALCIUM SERPL-MCNC: 8.8 MG/DL (ref 8.5–10.1)
CHLORIDE SERPL-SCNC: 107 MMOL/L (ref 94–109)
CO2 SERPL-SCNC: 29 MMOL/L (ref 20–32)
CORTIS SERPL-MCNC: 13.8 UG/DL (ref 4–22)
CREAT SERPL-MCNC: 1.31 MG/DL (ref 0.66–1.25)
CREAT SERPL-MCNC: 1.34 MG/DL (ref 0.66–1.25)
GFR SERPL CREATININE-BSD FRML MDRD: 55 ML/MIN/{1.73_M2}
GFR SERPL CREATININE-BSD FRML MDRD: 57 ML/MIN/{1.73_M2}
GLUCOSE SERPL-MCNC: 99 MG/DL (ref 70–99)
POTASSIUM SERPL-SCNC: 3.8 MMOL/L (ref 3.4–5.3)
SODIUM SERPL-SCNC: 139 MMOL/L (ref 133–144)
TSH SERPL DL<=0.005 MIU/L-ACNC: 1.13 MU/L (ref 0.4–4)

## 2020-08-14 PROCEDURE — 82565 ASSAY OF CREATININE: CPT | Mod: 91 | Performed by: INTERNAL MEDICINE

## 2020-08-14 PROCEDURE — 96365 THER/PROPH/DIAG IV INF INIT: CPT

## 2020-08-14 PROCEDURE — 84443 ASSAY THYROID STIM HORMONE: CPT | Performed by: INTERNAL MEDICINE

## 2020-08-14 PROCEDURE — 25000128 H RX IP 250 OP 636: Mod: ZF | Performed by: INTERNAL MEDICINE

## 2020-08-14 PROCEDURE — 83036 HEMOGLOBIN GLYCOSYLATED A1C: CPT | Performed by: INTERNAL MEDICINE

## 2020-08-14 PROCEDURE — 82306 VITAMIN D 25 HYDROXY: CPT | Performed by: INTERNAL MEDICINE

## 2020-08-14 PROCEDURE — 36415 COLL VENOUS BLD VENIPUNCTURE: CPT

## 2020-08-14 PROCEDURE — 80048 BASIC METABOLIC PNL TOTAL CA: CPT | Performed by: INTERNAL MEDICINE

## 2020-08-14 PROCEDURE — 82310 ASSAY OF CALCIUM: CPT | Performed by: INTERNAL MEDICINE

## 2020-08-14 PROCEDURE — 82533 TOTAL CORTISOL: CPT | Performed by: INTERNAL MEDICINE

## 2020-08-14 RX ORDER — ZOLEDRONIC ACID 5 MG/100ML
5 INJECTION, SOLUTION INTRAVENOUS ONCE
Status: COMPLETED | OUTPATIENT
Start: 2020-08-14 | End: 2020-08-14

## 2020-08-14 RX ADMIN — ZOLEDRONIC ACID 5 MG: 0.05 INJECTION, SOLUTION INTRAVENOUS at 13:02

## 2020-08-14 ASSESSMENT — PAIN SCALES - GENERAL: PAINLEVEL: NO PAIN (0)

## 2020-08-14 NOTE — LETTER
"8/14/2020       RE: Trevin Gee  [address]  [address]  [place] [place] [address]     Dear Colleague,    Thank you for referring your patient, Trevin Gee, to the [facility] ENDOCRINOLOGY at [facility]. Please see a copy of my visit note below.    Trevin Gee is a 65 year old male who is being evaluated via a billable telephone visit.      The patient has been notified of following:     \"This telephone visit will be conducted via a call between you and your physician/provider. We have found that certain health care needs can be provided without the need for a physical exam.  This service lets us provide the care you need with a short phone conversation.  If a prescription is necessary we can send it directly to your pharmacy.  If lab work is needed we can place an order for that and you can then stop by our lab to have the test done at a later time.    Telephone visits are billed at different rates depending on your insurance coverage. During this emergency period, for some insurers they may be billed the same as an in-person visit.  Please reach out to your insurance provider with any questions.    If during the course of the call the physician/provider feels a telephone visit is not appropriate, you will not be charged for this service.\"    Patient has given verbal consent for Telephone visit?  Yes    What phone number would you like to be contacted at? [phone]    How would you like to obtain your AVS? Mail a copy    Due to the COVID 19 pandemic this visit was converted to a telephone visit in order to help prevent spread of infection in this high risk patient and the general population .      Telephone call started at 1210: Stop at 1225, total time 15 minutes    [facility]  Endocrinology  Mag Currie MD  8/14/2020     Chief Complaint:   RECHECK     History of Present Illness:   Trevin Gee is a 64 year old male with a history of adrenal " insufficiency and osteoporosis who presents for evaluation of recheck.     #1 adrenal insufficiency possibly due to chronic fluconazole use, now stopped fluconazole in June 2020    Patient was referred endocrinology for management of adrenal insufficiency in the setting of steroid taper.  The patient has a history of Crohn's disease and has been dependent on steroids for most of his adult life.  In 2007, he was diagnosed with coccidiosis and was started on fluconazole treatment.  He had two episodes of kevin which was eventually determined to be from a drug interaction between fluconazole and prednisone, with underlying hypothesis being that fluconazole suppressed metabolism and clearance of prednisone.  He was tapered off prednisone in 2007, which has caused significant mental and on physical deconditioning including severe weakness, and was thought to have adrenal insufficiency even though there was no documented cortisol level in the chart.  Fluconazole was stopped after it was successfully treated initially, he has a relapse in 2013 with a knee infection, and was placed back on fluconazole 200 mg daily, which he is taking indefinitely.  His Crohn's disease is now controlled with low-dose prednisone 1 mg three times a week, and entercort 3 mg daily with minimal symptoms.  MRI in 2015 show that adrenal glands were normal.  May 2015 ACTH was 192, baseline cortisol was 1.6, 30 minute cortisol was high 0.8, and 60 minute cortisol was 6.9.  He does understand that he gets sick, he should take supplemental steroids and/or IM Solu-Cortef.  repeat ACTH test in July 2015 now shows adrenal insufficiency with suppresssed ACTH. The patient saw infectious disease in October 2015 and the plan is to continue with the fluconazole for now given his history of coccidiomycosis.  April 2016 cortisol level was 10.  The patient stopped his steroids as of April 2016.  August 2019 cortisol was 12.8.     Interval history: The patient  stopped his fluconazole in June 2020 due to lack of insurance.  He has not yet restarted.  He currently feels quite well.    #2 history of osteoporosis  Patient had a documented low T score on DXA scan in 2005, and the clinical vertebral fracture status post kyphoplasty, confirming the diagnosis of osteoporosis and treatment as indicated.  He did have history of kidney stone even though this occurred in the setting of his Crohn's disease and admitted chocolate dependency.  A 24 urine for calcium was low at 90 mg over 24 hours.  May 2015 DEXA scan show the lowest T score -2.4 at the lumbar spine, lower Z score of -2.0 the lumbar spine, is significant increase in his bone density in the lumbar spine compared with the previous exam in 2005 as well as the baseline exam in 2003.  There's been no significant change in bone density at the level of his hips.  The patient is currently on Fosamax 70 mg weekly.  He does take this properly. March 2015 vitamin D is 28. We looked into the price of Reclast and this was cost prohibitive for him.  The patient had a tooth removed in January 2016 and then received his first dose of   Reclast in March 2016.  He tolerated this infusion. His second Reclast infusion was in July 2017 with no complications and again in September in 2018. His DEXA scan on 7/7/2017 showed in increased in bone density at the levels of L1-L2, and in the total right and left hips.  Tooth pulled in June 2019. His August 2019 DEXA, demonstrated that he now has osteopenia. The lowest T-score is -2.4 at the level of the L1-L2 lumbar spine. The calculated changes in BMD to the previous exam (8//24/2018) are not significantly changed in all levels. The calculated changes in BMD to the basline exam (11/19/2003) are significantly increased at the level of the L1-L2 lumbar spine ( +7.4%) and at the right total hip (+4.3%).     Interval history: The patient denies any interim falls or fractures.  Has Reclast pending for  today. DEXA scan done on April 2020.  The lumbar spine, this appears comparable with previous DEXA in 2019, and significantly improved compared with baseline in 2003. L1-L2 in 2019 with 0.909, L1-L2 in 2020 was 0.925 with the lowest T score of -2.3 (improved compared with previous exam). Hips appears stable compared with previous imaging in 2019.  Overall hip bone density appears significantly improved compared with 2018 exam, and especially compared with 2003 exam.    #3 Skin itching  On 8/24/2019, the patient notes that for the past few years, he has been experiencing itchy skin in the winter and spring. He states that that itching would often start after siting in his car for long periods of time and preventing contact with the car seat did no help. He also has tried cortisone cream, showering, and changing detergents with no improvement. Mr. Gee feels that this may be a seasonal dry skin issue, as he never has noticed a rash in itchy areas and the itching changes location. He was going to follow up with dermatology for this, but cancelled the appointment due to having abdominal pain at the time.  Currently, he reports that his skin itching has resolved.    Interval history: Patient continues to use Vanicream as needed     #4 Diarrhea   The GI team believed that he had colonic overgrowth, and asked him to avoid sugar. His hemoglobin A1c today was 5.6% and his triglycerides have come down to 160 in August 2019 from 189 in August 2018. Of note, his creatinine was elevated at 1.3.    Interval history: After removal of his stent in February 2020, his diarrhea has resolved and he currently feels quite well.    Review of Systems:   Pertinent items are noted in HPI and below, remainder of complete ROS is negative.        Active Medications:   Current Outpatient Medications   Medication Sig Dispense Refill     calcium-vitamin D (CALTRATE) 600-400 MG-UNIT per tablet Take 1 tablet by mouth as needed (PT last dose  1.26.2020)        Cyanocobalamin (VITAMIN B 12 PO) Take 500 mcg by mouth At Bedtime        dicyclomine (BENTYL) 10 MG capsule Take 1 capsule (10 mg) by mouth 2 times daily (before meals) 90 capsule 1     diphenoxylate-atropine (LOMOTIL) 2.5-0.025 MG tablet Take 1 tablet by mouth 4 times daily as needed for diarrhea 60 tablet 1     fluconazole (DIFLUCAN) 200 MG tablet Take 1 tablet (200 mg) by mouth daily (Patient taking differently: Take 200 mg by mouth At Bedtime ) 90 tablet 3     HYDROcodone-acetaminophen (NORCO) 5-325 MG tablet Take 1 tablet by mouth every 6 hours as needed for pain (Patient taking differently: Take 1 tablet by mouth every 6 hours as needed for pain (PT last dose approx 2 months ago 1.27.2020) ) 15 tablet 0     MELATONIN PO Take 1.5 mg by mouth nightly as needed (PT last dose 1.27.2020)        multivitamin, therapeutic with minerals (MULTI-VITAMIN) TABS Take 1 tablet by mouth every evening        ondansetron (ZOFRAN ODT) 4 MG ODT tab Take 1 tablet (4 mg) by mouth every 8 hours as needed for nausea or vomiting 10 tablet 0     tiZANidine (ZANAFLEX) 4 MG tablet Take 1-2 tablets (4-8 mg) by mouth 3 times daily as needed for muscle spasms 30 tablet 0     traZODone (DESYREL) 50 MG tablet Take 1 tablet (50 mg) by mouth nightly as needed for sleep (Patient taking differently: Take 50 mg by mouth nightly as needed for sleep (PT last dose 1.27.2020) ) 90 tablet 1      Allergies:   Prednisone      Past Medical History:  Anemia  Anxiety  Cataract  Coccidioidomycosis  Chron disease  Depression  Green fracture of leg  Gastroesophageal reflux disease  Hypertriglyceridemia   Kidney stone  Nephrolithiasis  Osteoporosis  Prednisone overload induced kevin  Tuberculosis   Vitamin B12 deficiency  Adrenal insufficiency  Bipolar 2 disorder  Lactose intolerance   Vertebral fracture, osteoporotic     Past Surgical History:  Appendectomy  kyphoplasty   Biopsy lump on arm  Cholecystectomy  Colonoscopy x4  Esophagoscopy,  gastroscopy, duodenoscopy combined x3  Cataract surgery bilateral  GI surgery  Soft tissue surgery     Family History:   Hear failure - father  Parkinson's - father, brother  Colorectal cancer - mother  Cerebrovascular disease - paternal grandmother  Cancer - other      Social History:  This patient presented alone.   Smoking status: never  Smokeless tobacco: never  Alcohol use: yes  Drug use: no      Physical Exam:   Vital signs and physical exam not available.  However the patient reports feeling well. Psych: Alert and oriented times 3; coherent speech, normal  rate and volume, able to articulate logical thoughts, able to abstract reason, no tangential thoughts, no hallucinations or delusions    DEXA scan done on April 2020.  The lumbar spine, this appears comparable with previous DEXA in 2019, and significantly improved compared with baseline in 2003.  L1-L2 in 2019 with 0.909, L1-L2 in 2020 was 0.925 with the lowest T score of -2.3 (improved compared with previous exam)  Hips appears stable compared with previous imaging in 2019.  Overall hip bone density appears significantly improved compared with 2018 exam, and especially compared with 2003 exam.    Labs from August 2020 are pending.  Patient receiving Reclast today (August 2020).    Assessment and Plan:  #1 adrenal insufficiency possibly due to chronic fluconazole use, now stopped fluconazole in June 2020  The patient has now stopped his fluconazole.  Discussed with patient that he should run this past ID to make sure they are comfortable with this.  A.m. cortisol is pending.  However the patient feels well and his previous cortisol levels (on fluconazole)    #2 history of osteoporosis, now with osteopenia  August 2020 bone density now shows osteopenia.  Patient to receive Reclast today.  Next DEXA scan and Reclast would be in 2022.  Patient to take 2-3 servings of dietary calcium daily    #3 Skin itching  Use Vanicream as needed.    #4 diarrhea  Resolved  per patient report.  Congratulated patient.     Follow-up: Return in 2 years with DEXA and Reclast.     Due to the COVID 19 pandemic this visit was converted to a telephone visit in order to help prevent spread of infection in this high risk patient and the general population .      Telephone call started at 1210: Stop at 1225, total time 15 minutes      Again, thank you for allowing me to participate in the care of your patient.      Sincerely,    Mag Currie MD

## 2020-08-14 NOTE — PROGRESS NOTES
Nursing Note  Trevin Gee presents today to [facility] for:   Chief Complaint   Patient presents with     Lab Only     venipuncture, vitals checked     Infusion     zoledronic Acid (RECLAST)     During today's [facility] appointment, orders from zoledronic Acid (RECLAST)    were completed.  Frequency: once    Progress note:  Patient identification verified by name and date of birth.  Assessment completed.  Vitals recorded in Doc Flowsheets.  Patient was provided with education regarding medication/procedure and possible side effects.  Patient verbalized understanding.     present during visit today: Not Applicable.    Treatment Conditions:Labs reviewed with Dr. Wilman cesar to give today.    Premedications: were not ordered.    Drug Waste Record: No    Infusion length and rate:  infusion given over approximately 30 minutes    Labs: drawn today, prior to appointment in second floor lab.    Vascular access: peripheral IV was placed prior to appointment at [facility] on 2nd floor lab.    Post Infusion Assessment:  Patient tolerated infusion without incident.  Blood return noted pre and post infusion.  No evidence of extravasations.     Discharge Plan:   Follow up plan of care with: ongoing infusions at [facility].  Discharge instructions were reviewed with patient.  Patient/representative verbalized understanding of discharge instructions and all questions answered.  Patient discharged from [facility] in stable condition.    Edna Mcdonald RN    Administrations This Visit     zoledronic Acid (RECLAST) infusion 5 mg     Admin Date  08/14/2020 Action  New Bag Dose  5 mg Rate  200 mL/hr Route  Intravenous Administered By  Edna Mcdonald RN                /79   Pulse 80   Temp 98.2  F (36.8  C)   Resp 18   Wt 73.5 kg (162 lb)   SpO2 95%    BMI 23.24 kg/m

## 2020-08-14 NOTE — PROGRESS NOTES
Chief Complaint   Patient presents with     Lab Only     venipuncture, vitals checked   PIV placed  Noa Muñoz RN on 8/14/2020 at 11:55 AM

## 2020-08-14 NOTE — LETTER
8/14/2020         RE: Trevin Gee  [address]  [address]  [facility] [address]        Dear Colleague,    Thank you for referring your patient, Trevin Gee, to the [facility]. Please see a copy of my visit note below.    Chief Complaint   Patient presents with     Lab Only     venipuncture, vitals checked   PIV placed  Noa Muñoz RN on 8/14/2020 at 11:55 AM      Nursing Note  Trevin Gee presents today to [facility] for:   Chief Complaint   Patient presents with     Lab Only     venipuncture, vitals checked     Infusion     zoledronic Acid (RECLAST)     During [facility]'s [facility] appointment, orders from zoledronic Acid (RECLAST)    were completed.  Frequency: once    Progress note:  Patient identification verified by name and date of birth.  Assessment completed.  Vitals recorded in Doc Flowsheets.  Patient was provided with education regarding medication/procedure and possible side effects.  Patient verbalized understanding.     present during visit today: Not Applicable.    Treatment Conditions:Labs reviewed with Dr. Wilman cesar to give today.    Premedications: were not ordered.    Drug Waste Record: No    Infusion length and rate:  infusion given over approximately 30 minutes    Labs: drawn today, prior to appointment in second floor lab.    Vascular access: peripheral IV was placed prior to appointment at [facility] on 2nd floor lab.    Post Infusion Assessment:  Patient tolerated infusion without incident.  Blood return noted pre and post infusion.  No evidence of extravasations.     Discharge Plan:   Follow up plan of care with: ongoing infusions at [facility].  Discharge instructions were reviewed with patient.  Patient/representative verbalized understanding of discharge instructions and all questions  answered.  Patient discharged from [facility] in stable condition.    Edna Mcdonald RN    Administrations This Visit     zoledronic Acid (RECLAST) infusion 5 mg     Admin Date  08/14/2020 Action  New Bag Dose  5 mg Rate  200 mL/hr Route  Intravenous Administered By  Edna Mcdonald RN                /79   Pulse 80   Temp 98.2  F (36.8  C)   Resp 18   Wt 73.5 kg (162 lb)   SpO2 95%   BMI 23.24 kg/m          Again, thank you for allowing me to participate in the care of your patient.        Sincerely,        [facility]

## 2020-08-14 NOTE — PATIENT INSTRUCTIONS
Patient Education     Patient Education    Zoledronic Acid Solution for injection    Zoledronic Acid Solution for injection [Hypercalcemia of Malignancy]    Zoledronic Acid Solution for injection [Pagets Disease]  Zoledronic Acid Solution for injection  What is this medicine?  ZOLEDRONIC ACID (SUSAN le dron ik AS id) lowers the amount of calcium loss from bone. It is used to treat Paget's disease and osteoporosis in women.  This medicine may be used for other purposes; ask your health care provider or pharmacist if you have questions.  What should I tell my health care provider before I take this medicine?  They need to know if you have any of these conditions:    aspirin-sensitive asthma    cancer, especially if you are receiving medicines used to treat cancer    dental disease or wear dentures    infection    kidney disease    low levels of calcium in the blood    past surgery on the parathyroid gland or intestines    receiving corticosteroids like dexamethasone or prednisone    an unusual or allergic reaction to zoledronic acid, other medicines, foods, dyes, or preservatives    pregnant or trying to get pregnant    breast-feeding  How should I use this medicine?  This medicine is for infusion into a vein. It is given by a health care professional in a hospital or clinic setting.  Talk to your pediatrician regarding the use of this medicine in children. This medicine is not approved for use in children.  Overdosage: If you think you have taken too much of this medicine contact a poison control center or emergency room at once.  NOTE: This medicine is only for you. Do not share this medicine with others.  What if I miss a dose?  It is important not to miss your dose. Call your doctor or health care professional if you are unable to keep an appointment.  What may interact with this medicine?    certain antibiotics given by injection    NSAIDs, medicines for pain and inflammation, like ibuprofen or naproxen    some  diuretics like bumetanide, furosemide    teriparatide  This list may not describe all possible interactions. Give your health care provider a list of all the medicines, herbs, non-prescription drugs, or dietary supplements you use. Also tell them if you smoke, drink alcohol, or use illegal drugs. Some items may interact with your medicine.  What should I watch for while using this medicine?  Visit your doctor or health care professional for regular checkups. It may be some time before you see the benefit from this medicine. Do not stop taking your medicine unless your doctor tells you to. Your doctor may order blood tests or other tests to see how you are doing.  Women should inform their doctor if they wish to become pregnant or think they might be pregnant. There is a potential for serious side effects to an unborn child. Talk to your health care professional or pharmacist for more information.  You should make sure that you get enough calcium and vitamin D while you are taking this medicine. Discuss the foods you eat and the vitamins you take with your health care professional.  Some people who take this medicine have severe bone, joint, and/or muscle pain. This medicine may also increase your risk for jaw problems or a broken thigh bone. Tell your doctor right away if you have severe pain in your jaw, bones, joints, or muscles. Tell your doctor if you have any pain that does not go away or that gets worse.  Tell your dentist and dental surgeon that you are taking this medicine. You should not have major dental surgery while on this medicine. See your dentist to have a dental exam and fix any dental problems before starting this medicine. Take good care of your teeth while on this medicine. Make sure you see your dentist for regular follow-up appointments.  What side effects may I notice from receiving this medicine?  Side effects that you should report to your doctor or health care professional as soon as  possible:    allergic reactions like skin rash, itching or hives, swelling of the face, lips, or tongue    anxiety, confusion, or depression    breathing problems    changes in vision    eye pain    feeling faint or lightheaded, falls    jaw pain, especially after dental work    mouth sores    muscle cramps, stiffness, or weakness    redness, blistering, peeling or loosening of the skin, including inside the mouth    trouble passing urine or change in the amount of urine  Side effects that usually do not require medical attention (report to your doctor or health care professional if they continue or are bothersome):    bone, joint, or muscle pain    constipation    diarrhea    fever    hair loss    irritation at site where injected    loss of appetite    nausea, vomiting    stomach upset    trouble sleeping    trouble swallowing    weak or tired  This list may not describe all possible side effects. Call your doctor for medical advice about side effects. You may report side effects to FDA at 1-012-FDA-3327.  Where should I keep my medicine?  This drug is given in a hospital or clinic and will not be stored at home.  NOTE:This sheet is a summary. It may not cover all possible information. If you have questions about this medicine, talk to your doctor, pharmacist, or health care provider. Copyright  2016 Gold Standard

## 2020-08-14 NOTE — LETTER
Patient:  Trevin Gee  :   1955  MRN:     [id number]        Mr.Mark MORENITA Gee  [address]    [facility] [address]        2020    Dear Trevin    Here are your labs. Your cortisol, vitamin D, thyroid were all normal.  Your kidney function (creatinine) is still a little higher at 1.13, however this is similar to your previous values and actually improved compared to your previous creatinine at 1.8 in 2019.    Plan for repeat Reclast and return visit in 2 years.    If you have any questions, please feel free to contact my nurse at [phone] select option #3 for triage nurse  or  option #1 for scheduling related questions.    Regards    Mag Currie MD      Resulted Orders   Creatinine   Result Value Ref Range    Creatinine 1.31 (H) 0.66 - 1.25 mg/dL    GFR Estimate 57 (L) >60 mL/min/[1.73_m2]      Comment:      Non  GFR Calc  Starting 2018, serum creatinine based estimated GFR (eGFR) will be   calculated using the Chronic Kidney Disease Epidemiology Collaboration   (CKD-EPI) equation.      GFR Estimate If Black 66 >60 mL/min/[1.73_m2]      Comment:       GFR Calc  Starting 2018, serum creatinine based estimated GFR (eGFR) will be   calculated using the Chronic Kidney Disease Epidemiology Collaboration   (CKD-EPI) equation.     Calcium   Result Value Ref Range    Calcium 8.8 8.5 - 10.1 mg/dL   Cortisol   Result Value Ref Range    Cortisol Serum 13.8 4 - 22 ug/dL      Comment:      8 AM Cortisol Reference Range = 4-22 ug/dL  4 PM Cortisol Reference Range = 3-17 ug/dL     TSH   Result Value Ref Range    TSH 1.13 0.40 - 4.00 mU/L   Basic metabolic panel   Result Value Ref Range    Sodium 139 133 - 144 mmol/L    Potassium 3.8 3.4 - 5.3 mmol/L    Chloride 107 94 - 109 mmol/L    Carbon Dioxide 29 20 - 32 mmol/L    Anion Gap 3 3 - 14 mmol/L    Glucose 99 70 - 99 mg/dL    Urea Nitrogen 13 7 - 30 mg/dL    Creatinine 1.34 (H) 0.66 - 1.25 mg/dL     GFR Estimate 55 (L) >60 mL/min/[1.73_m2]      Comment:      Non  GFR Calc  Starting 12/18/2018, serum creatinine based estimated GFR (eGFR) will be   calculated using the Chronic Kidney Disease Epidemiology Collaboration   (CKD-EPI) equation.      GFR Estimate If Black 64 >60 mL/min/[1.73_m2]      Comment:       GFR Calc  Starting 12/18/2018, serum creatinine based estimated GFR (eGFR) will be   calculated using the Chronic Kidney Disease Epidemiology Collaboration   (CKD-EPI) equation.      Calcium 8.6 8.5 - 10.1 mg/dL   25 Hydroxyvitamin D2 and D3   Result Value Ref Range    25 OH Vit D2 <5 ug/L    25 OH Vit D3 27 ug/L    25 OH Vit D total <32 20 - 75 ug/L      Comment:      Season, race, dietary intake, and treatment affect the concentration of   25-hydroxy-Vitamin D. Values may decrease during winter months and increase   during summer months. Values 20-29 ug/L may indicate Vitamin D insufficiency   and values <20 ug/L may indicate Vitamin D deficiency.  This test was developed and its performance characteristics determined by the   [facility].   It has not been cleared or approved by the FDA. The laboratory is regulated   under CLIA as qualified to perform high-complexity testing. This test is used   for clinical purposes. It should not be regarded as investigational or for   research.         [facility]

## 2020-08-17 ENCOUNTER — TELEPHONE (OUTPATIENT)
Dept: ENDOCRINOLOGY | Facility: CLINIC | Age: 65
End: 2020-08-17

## 2020-08-17 NOTE — TELEPHONE ENCOUNTER
Talked about results with pt at visit last week - pt received reclast  --  Dear Trevin    Here is your official bone density report which shows no significant change compared with the 2019 exam but significant increase compared with the 2003 exam.  You now have low bone density rather than osteoporosis    If you have any questions, please feel free to contact my nurse at [phone] select option #3 for triage nurse  or  option #1 for scheduling related questions.    Regards    Mag Currie MD

## 2020-08-18 LAB
DEPRECATED CALCIDIOL+CALCIFEROL SERPL-MC: <32 UG/L (ref 20–75)
VITAMIN D2 SERPL-MCNC: <5 UG/L
VITAMIN D3 SERPL-MCNC: 27 UG/L

## 2020-08-20 ENCOUNTER — TELEPHONE (OUTPATIENT)
Dept: ENDOCRINOLOGY | Facility: CLINIC | Age: 65
End: 2020-08-20

## 2020-08-20 NOTE — TELEPHONE ENCOUNTER
Called pt - labs reviewed.    -  Dear Trevin    Here are your labs. Your cortisol, vitamin D, thyroid were all normal.  Your kidney function (creatinine) is still a little higher at 1.13, however this is similar to your previous values and actually improved compared to your previous creatinine at 1.8 in 2019.    Plan for repeat Reclast and return visit in 2 years.    If you have any questions, please feel free to contact my nurse at [phone] select option #3 for triage nurse  or  option #1 for scheduling related questions.    Regards    Mag Currie MD    Infusion Therapy Visit on 08/14/2020   Component Date Value Ref Range Status     Creatinine 08/14/2020 1.31* 0.66 - 1.25 mg/dL Final     GFR Estimate 08/14/2020 57* >60 mL/min/[1.73_m2] Final    Comment: Non  GFR Calc  Starting 12/18/2018, serum creatinine based estimated GFR (eGFR) will be   calculated using the Chronic Kidney Disease Epidemiology Collaboration   (CKD-EPI) equation.       GFR Estimate If Black 08/14/2020 66  >60 mL/min/[1.73_m2] Final    Comment:  GFR Calc  Starting 12/18/2018, serum creatinine based estimated GFR (eGFR) will be   calculated using the Chronic Kidney Disease Epidemiology Collaboration   (CKD-EPI) equation.       Calcium 08/14/2020 8.8  8.5 - 10.1 mg/dL Final     Cortisol Serum 08/14/2020 13.8  4 - 22 ug/dL Final    Comment: 8 AM Cortisol Reference Range = 4-22 ug/dL  4 PM Cortisol Reference Range = 3-17 ug/dL       TSH 08/14/2020 1.13  0.40 - 4.00 mU/L Final     Sodium 08/14/2020 139  133 - 144 mmol/L Final     Potassium 08/14/2020 3.8  3.4 - 5.3 mmol/L Final     Chloride 08/14/2020 107  94 - 109 mmol/L Final     Carbon Dioxide 08/14/2020 29  20 - 32 mmol/L Final     Anion Gap 08/14/2020 3  3 - 14 mmol/L Final     Glucose 08/14/2020 99  70 - 99 mg/dL Final     Urea Nitrogen 08/14/2020 13  7 - 30 mg/dL Final     Creatinine 08/14/2020 1.34* 0.66 - 1.25 mg/dL Final     GFR Estimate 08/14/2020 55* >60  mL/min/[1.73_m2] Final    Comment: Non  GFR Calc  Starting 12/18/2018, serum creatinine based estimated GFR (eGFR) will be   calculated using the Chronic Kidney Disease Epidemiology Collaboration   (CKD-EPI) equation.       GFR Estimate If Black 08/14/2020 64  >60 mL/min/[1.73_m2] Final    Comment:  GFR Calc  Starting 12/18/2018, serum creatinine based estimated GFR (eGFR) will be   calculated using the Chronic Kidney Disease Epidemiology Collaboration   (CKD-EPI) equation.       Calcium 08/14/2020 8.6  8.5 - 10.1 mg/dL Final     25 OH Vit D2 08/14/2020 <5  ug/L Final     25 OH Vit D3 08/14/2020 27  ug/L Final     25 OH Vit D total 08/14/2020 <32  20 - 75 ug/L Final    Comment: Season, race, dietary intake, and treatment affect the concentration of   25-hydroxy-Vitamin D. Values may decrease during winter months and increase   during summer months. Values 20-29 ug/L may indicate Vitamin D insufficiency   and values <20 ug/L may indicate Vitamin D deficiency.  This test was developed and its performance characteristics determined by the   [facility].   It has not been cleared or approved by the FDA. The laboratory is regulated   under CLIA as qualified to perform high-complexity testing. This test is used   for clinical purposes. It should not be regarded as investigational or for   research.

## 2020-08-25 DIAGNOSIS — B38.7 COCCIDIOIDAL GRANULOMA: ICD-10-CM

## 2020-08-25 RX ORDER — FLUCONAZOLE 200 MG/1
200 TABLET ORAL AT BEDTIME
Qty: 30 TABLET | Refills: 11 | Status: SHIPPED | OUTPATIENT
Start: 2020-08-25 | End: 2020-09-02

## 2020-09-02 DIAGNOSIS — B38.7 COCCIDIOIDAL GRANULOMA: Primary | ICD-10-CM

## 2020-09-02 RX ORDER — FLUCONAZOLE 200 MG/1
200 TABLET ORAL DAILY
Qty: 90 TABLET | Refills: 3 | Status: SHIPPED | OUTPATIENT
Start: 2020-09-02 | End: 2021-11-22

## 2020-09-17 DIAGNOSIS — F31.81 BIPOLAR 2 DISORDER (H): ICD-10-CM

## 2020-09-21 ENCOUNTER — TELEPHONE (OUTPATIENT)
Dept: INTERNAL MEDICINE | Facility: CLINIC | Age: 65
End: 2020-09-21

## 2020-09-21 NOTE — TELEPHONE ENCOUNTER
Spoke to patient to help schedule appointment in Primary Care. Look like Last Office Visit : 5/21/2019 Jung, Se Becker recommended ( no visit as yet.) Patient states to call back tomorrow, currently on a conference call.

## 2020-09-21 NOTE — TELEPHONE ENCOUNTER
----- Message from Reny Lazo RN sent at 9/21/2020  1:47 PM CDT -----  Regarding: appt due PCC  Last Office Visit : 5/21/2019 paramjit Feng Robinson recommended ( no visit as yet.)    Please call to schedule.    Thanks    I do not need any follow up on the scheduling of this appointment unless the patient will no longer be receiving care in our clinic.

## 2020-09-21 NOTE — TELEPHONE ENCOUNTER
TRAZODONE 50MG TABLETS   Last Written Prescription Date:  12/21/2018  Last Fill Quantity: 90,   # refills: 1  Last Office Visit : 5/21/2019 paramjit Feng Robinson recommended ( no visit as yet.)  Future Office visit:  none    Routing refill request to provider for review/approval because:  PHq9 8/2/2019=2, due. Appt due.   Scheduling has been notified to contact the pt for appointment.

## 2020-09-22 NOTE — TELEPHONE ENCOUNTER
Call patient back and schedule for in clinic to re-establish care with Lacey Feng on Friday October 7 at 1:00 PM

## 2020-09-23 RX ORDER — TRAZODONE HYDROCHLORIDE 50 MG/1
TABLET, FILM COATED ORAL
Qty: 90 TABLET | Refills: 0 | Status: SHIPPED | OUTPATIENT
Start: 2020-09-23 | End: 2020-10-07

## 2020-10-07 ENCOUNTER — OFFICE VISIT (OUTPATIENT)
Dept: INTERNAL MEDICINE | Facility: CLINIC | Age: 65
End: 2020-10-07
Payer: COMMERCIAL

## 2020-10-07 VITALS
BODY MASS INDEX: 22.81 KG/M2 | OXYGEN SATURATION: 99 % | WEIGHT: 159 LBS | SYSTOLIC BLOOD PRESSURE: 117 MMHG | HEART RATE: 75 BPM | DIASTOLIC BLOOD PRESSURE: 82 MMHG

## 2020-10-07 DIAGNOSIS — E27.40 ADRENAL INSUFFICIENCY (H): ICD-10-CM

## 2020-10-07 DIAGNOSIS — Z23 NEED FOR VACCINATION: Primary | ICD-10-CM

## 2020-10-07 DIAGNOSIS — R79.89 ELEVATED SERUM CREATININE: ICD-10-CM

## 2020-10-07 DIAGNOSIS — E53.8 VITAMIN B 12 DEFICIENCY: ICD-10-CM

## 2020-10-07 DIAGNOSIS — F31.81 BIPOLAR 2 DISORDER (H): ICD-10-CM

## 2020-10-07 LAB
ALBUMIN SERPL-MCNC: 3.9 G/DL (ref 3.4–5)
ALP SERPL-CCNC: 77 U/L (ref 40–150)
ALT SERPL W P-5'-P-CCNC: 28 U/L (ref 0–70)
ANION GAP SERPL CALCULATED.3IONS-SCNC: 7 MMOL/L (ref 3–14)
AST SERPL W P-5'-P-CCNC: 16 U/L (ref 0–45)
BILIRUB SERPL-MCNC: 0.8 MG/DL (ref 0.2–1.3)
BUN SERPL-MCNC: 18 MG/DL (ref 7–30)
CALCIUM SERPL-MCNC: 9.6 MG/DL (ref 8.5–10.1)
CHLORIDE SERPL-SCNC: 104 MMOL/L (ref 94–109)
CO2 SERPL-SCNC: 27 MMOL/L (ref 20–32)
CREAT SERPL-MCNC: 1.51 MG/DL (ref 0.66–1.25)
GFR SERPL CREATININE-BSD FRML MDRD: 48 ML/MIN/{1.73_M2}
GLUCOSE SERPL-MCNC: 93 MG/DL (ref 70–99)
POTASSIUM SERPL-SCNC: 4.2 MMOL/L (ref 3.4–5.3)
PROT SERPL-MCNC: 7.4 G/DL (ref 6.8–8.8)
SODIUM SERPL-SCNC: 138 MMOL/L (ref 133–144)
VIT B12 SERPL-MCNC: 350 PG/ML (ref 193–986)

## 2020-10-07 PROCEDURE — 90471 IMMUNIZATION ADMIN: CPT | Performed by: NURSE PRACTITIONER

## 2020-10-07 PROCEDURE — 36415 COLL VENOUS BLD VENIPUNCTURE: CPT | Performed by: PATHOLOGY

## 2020-10-07 PROCEDURE — 82607 VITAMIN B-12: CPT | Performed by: PATHOLOGY

## 2020-10-07 PROCEDURE — 80053 COMPREHEN METABOLIC PANEL: CPT | Performed by: PATHOLOGY

## 2020-10-07 PROCEDURE — 90662 IIV NO PRSV INCREASED AG IM: CPT | Performed by: NURSE PRACTITIONER

## 2020-10-07 PROCEDURE — 99214 OFFICE O/P EST MOD 30 MIN: CPT | Mod: 25 | Performed by: NURSE PRACTITIONER

## 2020-10-07 RX ORDER — TRAZODONE HYDROCHLORIDE 50 MG/1
50 TABLET, FILM COATED ORAL AT BEDTIME
Qty: 90 TABLET | Refills: 2 | Status: SHIPPED | OUTPATIENT
Start: 2020-10-07 | End: 2022-01-01

## 2020-10-07 ASSESSMENT — ANXIETY QUESTIONNAIRES
6. BECOMING EASILY ANNOYED OR IRRITABLE: NOT AT ALL
7. FEELING AFRAID AS IF SOMETHING AWFUL MIGHT HAPPEN: NOT AT ALL
GAD7 TOTAL SCORE: 0
IF YOU CHECKED OFF ANY PROBLEMS ON THIS QUESTIONNAIRE, HOW DIFFICULT HAVE THESE PROBLEMS MADE IT FOR YOU TO DO YOUR WORK, TAKE CARE OF THINGS AT HOME, OR GET ALONG WITH OTHER PEOPLE: NOT DIFFICULT AT ALL
2. NOT BEING ABLE TO STOP OR CONTROL WORRYING: NOT AT ALL
1. FEELING NERVOUS, ANXIOUS, OR ON EDGE: NOT AT ALL
5. BEING SO RESTLESS THAT IT IS HARD TO SIT STILL: NOT AT ALL
3. WORRYING TOO MUCH ABOUT DIFFERENT THINGS: NOT AT ALL

## 2020-10-07 ASSESSMENT — ACTIVITIES OF DAILY LIVING (ADL)
IN_THE_PAST_7_DAYS,_DID_YOU_NEED_HELP_FROM_OTHERS_TO_PERFORM_EVERYDAY_ACTIVITIES_SUCH_AS_EATING,_GETTING_DRESSED,_GROOMING,_BATHING,_WALKING,_OR_USING_THE_TOILET: N
IN_THE_PAST_7_DAYS,_DID_YOU_NEED_HELP_FROM_OTHERS_TO_TAKE_CARE_OF_THINGS_SUCH_AS_LAUNDRY_AND_HOUSEKEEPING,_BANKING,_SHOPPING,_USING_THE_TELEPHONE,_FOOD_PREPARATION,_TRANSPORTATION,_OR_TAKING_YOUR_OWN_MEDICATIONS?: N

## 2020-10-07 ASSESSMENT — PAIN SCALES - GENERAL: PAINLEVEL: NO PAIN (0)

## 2020-10-07 ASSESSMENT — PATIENT HEALTH QUESTIONNAIRE - PHQ9
SUM OF ALL RESPONSES TO PHQ QUESTIONS 1-9: 2
5. POOR APPETITE OR OVEREATING: NOT AT ALL

## 2020-10-07 NOTE — NURSING NOTE
Chief Complaint   Patient presents with     Establish Care     pt here to re-establish care, discuss meds       Johanthan De Paz CMA, EMT at 12:52 PM on 10/7/2020.

## 2020-10-07 NOTE — PROGRESS NOTES
"Trevin Gee is a 65 year old male who comes in for    CC: re-establish care, medication review  HPI:    Needing to re-establish care today with PCP. 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 Notes he generally sees his specialists more than primary care, but needing a refill on trazodone. Also wanting to get a Flu shot today.    Insomnia--Trazodone 25-50 mg at night, 1.5 mg melatonin, rare that he can fall asleep without anything. Feels Trazodone helps. Doesn't want to take any more. Works at home, able to set his own hours, notes that his schedule is erratic. Lives alone and limited outside contact, so if he is being productive at work he will just keep working; alternatively, can take a nap if he feels like he is sluggish during the day. Not a signficant concern for him, has been managing well with this rhythm.     Follows with Dr. Leach for Crohn's disease, Dr. 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  Reports his Crohn's is not active at this time. Previous bowel surgeries, thinks this has affected his regularity. Some foods help avoid terrible diarrhea--cooks a multi-colored carrot/mushroom soup/casserole, which seems to help manage. Reserving purple carrot water also works with mashed potatoes. Guacamole with maxine, wonders if this has helped, but overall describes that this is a \"quiet period\", for his GI symptoms.     Vanicream for itchy skin in the winter, helps.     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     Osteoporosis     Anxiety     Vitamin B 12 deficiency     History of kevin     Steroid dependent for adrenal supression     Adrenal insufficiency " (H)     Bipolar 2 disorder (H)     Hx of psychiatric care     Hypertriglyceridemia     Lactose intolerance     Vertebral fracture, osteoporotic (H)     Esophageal reflux     Abdominal pain     Diarrhea     Colorectal anastomotic stricture       Current Outpatient Medications   Medication Sig Dispense Refill     calcium-vitamin D (CALTRATE) 600-400 MG-UNIT per tablet Take 1 tablet by mouth as needed (PT last dose 1.26.2020)        Cyanocobalamin (VITAMIN B 12 PO) Take 500 mcg by mouth At Bedtime        dicyclomine (BENTYL) 10 MG capsule Take 1 capsule (10 mg) by mouth 2 times daily (before meals) 90 capsule 1     diphenoxylate-atropine (LOMOTIL) 2.5-0.025 MG tablet Take 1 tablet by mouth 4 times daily as needed for diarrhea 60 tablet 1     fluconazole (DIFLUCAN) 200 MG tablet Take 1 tablet (200 mg) by mouth daily 90 tablet 3     HYDROcodone-acetaminophen (NORCO) 5-325 MG tablet Take 1 tablet by mouth every 6 hours as needed for pain (Patient taking differently: Take 1 tablet by mouth every 6 hours as needed for pain (PT last dose approx 2 months ago 1.27.2020) ) 15 tablet 0     MELATONIN PO Take 1.5 mg by mouth nightly as needed (PT last dose 1.27.2020)        multivitamin, therapeutic with minerals (MULTI-VITAMIN) TABS Take 1 tablet by mouth every evening        ondansetron (ZOFRAN ODT) 4 MG ODT tab Take 1 tablet (4 mg) by mouth every 8 hours as needed for nausea or vomiting 10 tablet 0     tiZANidine (ZANAFLEX) 4 MG tablet Take 1-2 tablets (4-8 mg) by mouth 3 times daily as needed for muscle spasms 30 tablet 0     traZODone (DESYREL) 50 MG tablet TAKE 1 TABLET(50 MG) BY MOUTH EVERY NIGHT AS NEEDED FOR SLEEP 90 tablet 0         ALLERGIES: Prednisone    PAST MEDICAL HX:   Past Medical History:   Diagnosis Date     Anemia 2007     Anxiety 2012?    much worse since July 2014     Cataract 12/2007    both eyes, too much prednisone, implants     Coccidioidomycosis      Crohn disease (H)      Crohn's disease (H) 1/13/2015      Depressive disorder 7/2014    much worse since July 2014     Fracture 1956    green fracture of leg     Gallbladder problem 2005?    much gravel, removed     GERD (gastroesophageal reflux disease)      History of blood transfusion 1988    ulcerated anastomosis term. ilium bleed     Hypertriglyceridemia 7/8/2016     Infection 2007    persistant coccidioidomycosis     Kidney stone various    both sides, all passed naturally     Mental health problem 2007    bipolar though perhaps different     Nephrolithiasis      Osteoporosis 2007    osteoporosis, too much prednisone, last dexa 1/2014     Osteoporosis      Other nervous system complications 2007    prednisone overload induced kevin     Personal history of colonic polyps     small ones found during colonoscopies     Steroid-induced psychosis     Patient extremely sensitive to regular doses of steroids.  Unclear if this is due to chronic fluconazole use or genetic issue.  In the future, use caution when prescribing steroids.     TB (tuberculosis)        PAST SURGICAL HX:   Past Surgical History:   Procedure Laterality Date     APPENDECTOMY  1974    coincidental with Crohn's surgery     BACK SURGERY  12/2007    kyphoplasty on compressed 4th??? vertebra     BIOPSY  2007, 2013    lump on arm, knee, back of hand for coccidioidomycosis cult.     CHOLECYSTECTOMY  2005    much gravel, removed laparoscopically     COLONOSCOPY  7/14/2014 last    Dr. Catherine Bowden, [facility] [place] [facility] many previous     COLONOSCOPY Left 9/11/2015    Procedure: COMBINED COLONOSCOPY, SINGLE OR MULTIPLE BIOPSY/POLYPECTOMY BY BIOPSY;  Surgeon: Fransisco Leach MD;  Location: [facility]     COLONOSCOPY N/A 8/3/2016    Procedure: COMBINED COLONOSCOPY, SINGLE OR MULTIPLE BIOPSY/POLYPECTOMY BY BIOPSY;  Surgeon: Fransisco Leach MD;  Location: [facility]     COLONOSCOPY N/A 8/16/2017    Procedure: COMBINED COLONOSCOPY, SINGLE OR MULTIPLE BIOPSY/POLYPECTOMY BY BIOPSY;;  Surgeon: Fransisco Leach  "MD Niraj;  Location: [facility]     COLONOSCOPY N/A 10/1/2019    Procedure: Colonoscopy With Colonic Stent Insertion;  Surgeon: Robbie Cruz MD;  Location: [facility]     COLONOSCOPY N/A 2/5/2020    Procedure: COLONOSCOPY, FOREIGN BODY REMOVAL;  Surgeon: Robbie Cruz MD;  Location: [facility]     ENT SURGERY  ?    upper endoscopy     ESOPHAGOSCOPY, GASTROSCOPY, DUODENOSCOPY (EGD), COMBINED N/A 11/8/2016    Procedure: COMBINED ENDOSCOPIC ULTRASOUND, ESOPHAGOSCOPY, GASTROSCOPY, DUODENOSCOPY (EGD), FINE NEEDLE ASPIRATE/BIOPSY;  Surgeon: Guru Alexsandra Ballesteros MD;  Location: [facility]     ESOPHAGOSCOPY, GASTROSCOPY, DUODENOSCOPY (EGD), COMBINED N/A 11/8/2016    Procedure: COMBINED ESOPHAGOSCOPY, GASTROSCOPY, DUODENOSCOPY (EGD), BIOPSY SINGLE OR MULTIPLE;  Surgeon: Guru Alexsandra Ballesteros MD;  Location:  GI     ESOPHAGOSCOPY, GASTROSCOPY, DUODENOSCOPY (EGD), COMBINED N/A 8/16/2017    Procedure: COMBINED ESOPHAGOSCOPY, GASTROSCOPY, DUODENOSCOPY (EGD), BIOPSY SINGLE OR MULTIPLE;;  Surgeon: Fransisco Leach MD;  Location: [facility]     EYE SURGERY  12/2007    cataract surgery both sides     GI SURGERY  1974, 1986(x2), 1989    Crohn's, 1974 RO 18\" term. ilium + 9\" asc. colon all one pc     SOFT TISSUE SURGERY  3/2013    removed buildup on back of r hand, coccidioidomycosis       IMMUNIZATION HX:   Immunization History   Administered Date(s) Administered     Influenza (IIV3) PF 10/08/2015, 11/01/2016     Influenza Quad, Recombinant, p-free (RIV4) 04/19/2019, 12/13/2019     TDAP Vaccine (Boostrix) 10/08/2015     Tdap (Adacel,Boostrix) 10/12/2005     Twinrix A/B 10/12/2005, 11/15/2005     Typhoid IM 11/15/2005       SOCIAL HX:   Social History     Social History Narrative     Not on file       ROS:   CONSTITUTIONAL: no fatigue, no unexpected change in weight  SKIN: no worrisome rashes, no worrisome moles, no worrisome lesions  EYES: no acute vision problems or changes  ENT: no ear problems, no " mouth problems, no throat problems  RESP: no significant cough, no shortness of breath  CV: no chest pain, no palpitations, no new or worsening peripheral edema  GI: no nausea, no vomiting, no constipation, no diarrhea    OBJECTIVE:  /82 (BP Location: Right arm, Patient Position: Sitting, Cuff Size: Adult Regular)   Pulse 75   Wt 72.1 kg (159 lb)   SpO2 99%   BMI 22.81 kg/m     Wt Readings from Last 1 Encounters:   10/07/20 72.1 kg (159 lb)     Constitutional: no distress, comfortable, pleasant, well-groomed  Eyes: anicteric, conjunctiva pink, normal extra-ocular movements   Neck: supple with full range of motion, no thyromegaly.   Cardiovascular: regular rate and rhythm, normal S1 and S2, no murmurs, rubs or gallops, peripheral pulses full and symmetric  Respiratory: clear to auscultation with good air movement bilaterally, no wheezes or crackles, non-labored  Gastrointestinal: positive bowel sounds, nontender, no hepatosplenomegaly, no masses, scarred  Musculoskeletal: full range of motion, strength 5/5, no edema   Skin: no concerning lesions or rash, no jaundice, temp normal   Psychological: appropriate mood, demonstrates intact judgment and logical thought process  LYMPH: no cervical, supraclavicular, infraclavicular nodes.    ASSESSMENT/PLAN:    1. Need for vaccination  - FLU VACCINE HIGH DOSE PRESERVATIVE FREE, AGE =>65 YR    2. Vitamin B 12 deficiency  Has not been taking B12 supplements for several months (stopped himself), agrees to recheck today.  - Vitamin B12; Future    3. Elevated serum creatinine  Serum Cr gradually trending up over recent years; recommend that he meet with nephrology for consult.  - Comprehensive metabolic panel; Future    4. Bipolar 2 disorder (H)  Refill provided.  - traZODone (DESYREL) 50 MG tablet; Take 1 tablet (50 mg) by mouth At Bedtime  Dispense: 90 tablet; Refill: 2    5. Adrenal insufficiency (H)  Continue routine follow-up with Dr. Currie in  endocrinology.      FOLLOW UP: next preventive care visit or prn for any changes or concerns  Lacey Feng, APRN CNP    Answers for HPI/ROS submitted by the patient on 10/7/2020   General Symptoms: No  Skin Symptoms: No  HENT Symptoms: No  EYE SYMPTOMS: No  HEART SYMPTOMS: No  LUNG SYMPTOMS: No  INTESTINAL SYMPTOMS: No  URINARY SYMPTOMS: No  REPRODUCTIVE SYMPTOMS: No  SKELETAL SYMPTOMS: No  BLOOD SYMPTOMS: No  NERVOUS SYSTEM SYMPTOMS: No  MENTAL HEALTH SYMPTOMS: No

## 2020-10-07 NOTE — PATIENT INSTRUCTIONS
[facility] Medication Refill Request Information:  * Please contact your pharmacy regarding ANY request for medication refills.  ** [facility] Prescription Fax = [phone]  * Please allow 3 business days for routine medication refills.  * Please allow 5 business days for controlled substance medication refills.     [facility] Test Result notification information:  *You will be notified with in 7-10 days of your appointment day regarding the results of your test.  If you are on MyChart you will be notified as soon as the provider has reviewed the results and signed off on them.    [facility]: [phone]     SPECIAL THERAPY MEASURES FOR INSOMNIA  Relaxation Exercises:  Progressive Muscle Relaxation (PMR) and Deep Breathing Exercises  Stress, tension, and anxiety can be big factors contributing to insomnia.  If you can t relax your mind and body, then you won t be able to sleep.  You would likely benefit from trying some of the relaxation exercises that we ve assembled below.  These are all internet based links.  If you don t have or use a computer, you may benefit from one of the stress management books listed below that you can get at your public library or at a local bookstore for a relatively low price.    You may have to pay for some of these resources.    http://www.[employer]/progressive-muscle-relaxation-exercise.html     http://studentsupport.[facility]/counseling/resources/self-help/relaxation-and-stress-management/     http://www."[employer]/breathing-awareness.html   Meditation and Guided Imagery    www.[employer]Dr[employer]Me    www.theNetspira Networksguided-meditation-site.com    http://www.[employer]/guided-imagery-scripts.html    http://[employer]/library/woywntzhpo-dvrlvi-rrldzgi/  Sleep Restriction Therapy:  Limit time in bed for 15 minutes more than sleep   Do not set limit under 4 hours   Increase time by 15 minutes per effective sleep  trial   Effective sleep suggests >90% of bedtime asleep   Stimulus Control:  Do not lie awake for more than 30 minutes   Get out of bed and perform a quiet activity   Return to bed when sleepy   Repeat as many times per night as needed   No Napping during day   Suggested Resources:   Insomnia Treatment Books     Overcoming Insomnia by Ángel Lemon and Alix Fry (2008)    No More Sleepless Nights by Emil Jimenez and Zuly Marquez (1996)    Say Eldon to Insomnia by Bobby Mcleod (2009)    The Insomnia Workbook by Samra Morrison and Winston Marte (2009)    The Insomnia Answer by Harshad Pagan and Faisal Butler (2006)    Therapy for Sleep Problems                                                                                   Cognitive behavioral treatment for insomnia (CBT-I) is a very effective technique that involves changing behaviors and thoughts associated with sleep.

## 2020-10-08 ASSESSMENT — ANXIETY QUESTIONNAIRES: GAD7 TOTAL SCORE: 0

## 2020-10-15 NOTE — TELEPHONE ENCOUNTER
RECORDS RECEIVED FROM: Internal   DATE RECEIVED: 12.04.2020   NOTES STATUS DETAILS   OFFICE NOTE from referring provider Internal 10.07.2020 Lacey Feng, MARISOL CNP   OFFICE NOTE from other specialist  N/A    *Only VASCULITIS or LUPUS gather office notes for the following N/A    *PULMONARY   N/A    *ENT N/A    *DERMATOLOGY N/A    *RHEUMATOLOGY N/A    DISCHARGE SUMMARY from hospital N/A    DISCHARGE REPORT from the ER N/A    MEDICATION LIST Internal    IMAGING  (NEED IMAGES AND REPORTS)     KIDNEY CT SCAN N/A    KIDNEY ULTRASOUND N/A    MR ABDOMEN N/A    NUCLEAR MEDICINE RENAL N/A    LABS     CBC Internal 05.21.2019   CMP Internal 05.21.2019   BMP Internal 08.14.2020   UA Internal 05.21.2019   URINE PROTEIN Internal 05.21.2019   RENAL PANEL N/A    BIOPSY     KIDNEY BIOPSY  N/A

## 2020-10-20 ENCOUNTER — VIRTUAL VISIT (OUTPATIENT)
Dept: GASTROENTEROLOGY | Facility: CLINIC | Age: 65
End: 2020-10-20
Payer: COMMERCIAL

## 2020-10-20 VITALS — BODY MASS INDEX: 21.76 KG/M2 | HEIGHT: 70 IN | WEIGHT: 152 LBS

## 2020-10-20 DIAGNOSIS — K50.012 CROHN'S DISEASE OF SMALL INTESTINE WITH INTESTINAL OBSTRUCTION (H): Primary | ICD-10-CM

## 2020-10-20 DIAGNOSIS — R19.7 DIARRHEA, UNSPECIFIED TYPE: ICD-10-CM

## 2020-10-20 PROCEDURE — 99214 OFFICE O/P EST MOD 30 MIN: CPT | Mod: 95 | Performed by: INTERNAL MEDICINE

## 2020-10-20 RX ORDER — DIPHENOXYLATE HCL/ATROPINE 2.5-.025MG
1 TABLET ORAL 4 TIMES DAILY PRN
Qty: 60 TABLET | Refills: 1 | Status: SHIPPED | OUTPATIENT
Start: 2020-10-20 | End: 2021-04-13

## 2020-10-20 ASSESSMENT — MIFFLIN-ST. JEOR: SCORE: 1480.72

## 2020-10-20 ASSESSMENT — PAIN SCALES - GENERAL: PAINLEVEL: NO PAIN (0)

## 2020-10-20 NOTE — PROGRESS NOTES
IBD CLINIC     CC/REFERRING MD:  Referred Self  REASON FOR CONSULTATION: Crohn's disease       ASSESSMENT/PLAN:  65 year old male with Crohn's disease s/p ileocecectomy on no current Crohn's therapy.     1. Crohn's disease: Continues to be in endoscopic remission.  He did well with stent dilation for 3 months which is now removed.  There does seem to be a clinical benefit with the stent placement.  He has had no episodes of abdominal pain. Current stool pattern likely related to IBS. Recommend focusing on dietary triggers and increasing solube fiber.    -- No specific Crohn's therapy at this time  -- Tentative plan for colonoscopy in 1 year (winter 2021) - earlier if new symptoms    2. Colon polyps: Well defined lesions with adenomatous changes.    -- Will continue surveillance colonoscopy.     3. Diarrhea: Celiac testing has been negative. Crohn's not active. Suspect some IBS, partially related to diet.   -- Avoid dietary triggers  -- Trial of bentyl  -- Increase soluble fiber as able  -- prn lomotil    4. Possible acid reflux:  No current symptoms of acid reflux.      5. Pancreatic head cystic lesion: Increased from 2006, potentially representing IPMN.  MRCP with no change in cyst.  Reviewed with Dr. Cruz.  EUS with FNA normal. Stable on 6/17/19 MRE (1.4cm)  -- No further follow-up at this time needed.      6. Steroid dependency/Adrenal insufficiency:  Followed closely with endocrinology.  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      8. Depression: Unchanged.     9. Chronic renal disease: Patient has a referral to renal and I recommended he make that appointment.     IBD history:  Age at diagnosis: ~18  Extend of disease: Ileo-colonic  Disease phenotype: Inflammatory  Key-anal disease: No  Current CD medications: None  Prior IBD Medications:  - Asulfazine and Asacol: 1974 to late 90s, stopped to to disease progression  -  Prednisone 1986 - current.   - Mercaptopurine: Briefly in mid 90s to get him off steroids- Patient thinks he has side effect to medication  - Methotrexate: After mercaptopurine, stayed on for years, but after bleeding in 1988 and 1989, he would not taper his prednisone. MTX stopped as he could not get off the steroids.    Surgical history:  1974 - [place] Ileocecectomy  1986 - [facility] - revision of anastomosis  1988 - massive bleeding from ulcer at anastomosis ([place]), cauterized via colonoscopy  1989 - laparoscopic  Revision of anastomosis  2005 - Lap [facility]    DRUG MONITORING  TPMT enzyme activity: none    DISEASE ASSESSMENT  Fecal calprotectin: None  CRP, ESR Results  Recent Labs   Lab Test 04/21/19  1610 04/18/19  0550 01/08/18  1345 01/08/18  1345   CRP <2.9 3.2   < > <2.9   SED 6  --   --  4    < > = values in this interval not displayed.   Endoscopic assessment: Colonoscopy 8/16/2017 showed normal mucosa in entire colon. Non-patent functional end-to-end ileo-colonic anastomosis that was dilated to 12mm.  No evidence of active Crohn's disease.  Enterography (12/1/16) showed 4cm of mild thickening and enhancement in neoterminal ileum at anastomosis, decompressed sigmoid colon with questionable wall thickening and enhancement.    Enterography: No active Crohn's disease (3/20/15)  C diff: None (7/12/16)    IBD healthcare maintenance based on patients current medication:      Vaccinations:  -- Influenza (every year): Last given 2016  -- TdaP (every 10 years): Last given 2015  -- Pneumococcal Pneumonia (once then every 5 years): Has not obtained.    One time confirmation of immunity or serologies:  -- Hepatitis A (serologies or immunizations): 2005  -- Hepatitis B (serologies or immunizations): 2005  -- Zoster vaccination (>59 yo, discuss if over 50): 2005  -- MMR: Not documented.  -- HPV (all aged 18-26): N/A  -- Meningococcal meningitis (all patients at risk for meningitis): Patient is not at risk.     Cancer  Screening:  Colon cancer screening:  Since there is evidence of pancolitis histologically. Also with adenomatous polyps. Colonoscopy every 2-3 years recommended.  Dysplasia screening is recommended 2018.    Cervical cancer screening: N/A    Skin cancer screening: Since patient is not immunocompromised, this is per patient's dermatologist recommendations.    Depression Screening:  PHQ-2 Score:     PHQ-2 ( 1999 Pfizer) 1/27/2020   Q1: Little interest or pleasure in doing things 1   Q2: Feeling down, depressed or hopeless 1   PHQ-2 Score 2        Misc:  -- Avoid tobacco use  -- Avoid NSAIDs as there is potentially a 25% chance of causing an IBD flare    RTC 6 months    Thank you for this consultation.  It was a pleasure to participate in the care of this patient; please contact us with any further questions. =    Fransisco Leach MD    [facility]  Inflammatory Bowel Disease Program  Division of Gastroenterology, Hepatology and Nutrition       HPI:   Here today follow-up. Had axios stent removed in Feb 2020.  In general he was doing quite well.  In the last two weeks, he has had some increased GI symptoms. About 30 minutes after eating, he reports urge to use bathroom. However very little stool would come out and it was all water. He will then have two more episodes of urge to have a stool, but small volume. He does not think that he is passing sufficient stool for his intake.  He does not feel distended or bloated.  He has actually lost 4-5 lbs in the past two weeks. He has skipped a few meals, but otherwise is not sure why he is loosing weight.      He notes that certain foods will slow down his GI activity. He uses these foods to limit the amount of lomotil he needs.     ROS:    No fevers or chills  No weight loss  No blurry vision, double vision or change in vision  No sore throat  No lymphadenopathy  No headache, paraesthesias, or weakness in a limb  No shortness of breath or  wheezing  No chest pain or pressure  No arthralgias or myalgias  No rashes or skin changes  No odynophagia or dysphagia  No BRBPR, hematochezia, melena  No dysuria, frequency or urgency  No hot/cold intolerance or polyria  No anxiety or depression    Extra intestinal manifestations of IBD:  No uveitis/episcleritis  No aphthous ulcers   No arthritis   No erythema nodosum/pyoderma gangrenosum.     PERTINENT PAST MEDICAL HISTORY:  Past Medical History:   Diagnosis Date     Anemia 2007     Anxiety 2012?    much worse since July 2014     Cataract 12/2007    both eyes, too much prednisone, implants     Coccidioidomycosis      Crohn disease (H)      Crohn's disease (H) 1/13/2015     Depressive disorder 7/2014    much worse since July 2014     Fracture 1956    green fracture of leg     Gallbladder problem 2005?    much gravel, removed     GERD (gastroesophageal reflux disease)      History of blood transfusion 1988    ulcerated anastomosis term. ilium bleed     Hypertriglyceridemia 7/8/2016     Infection 2007    persistant coccidioidomycosis     Kidney stone various    both sides, all passed naturally     Mental health problem 2007    bipolar though perhaps different     Nephrolithiasis      Osteoporosis 2007    osteoporosis, too much prednisone, last dexa 1/2014     Osteoporosis      Other nervous system complications 2007    prednisone overload induced kevin     Personal history of colonic polyps     small ones found during colonoscopies     Steroid-induced psychosis     Patient extremely sensitive to regular doses of steroids.  Unclear if this is due to chronic fluconazole use or genetic issue.  In the future, use caution when prescribing steroids.     TB (tuberculosis)        PREVIOUS SURGERIES:  Past Surgical History:   Procedure Laterality Date     APPENDECTOMY  1974    coincidental with Crohn's surgery     BACK SURGERY  12/2007    kyphoplasty on compressed 4th??? vertebra     BIOPSY  2007, 2013    lump on arm, knee,  "back of hand for coccidioidomycosis cult.     CHOLECYSTECTOMY  2005    much gravel, removed laparoscopically     COLONOSCOPY  7/14/2014 last    Dr. Catherine Bowden, [facility] - many previous     COLONOSCOPY Left 9/11/2015    Procedure: COMBINED COLONOSCOPY, SINGLE OR MULTIPLE BIOPSY/POLYPECTOMY BY BIOPSY;  Surgeon: Fransisco Leach MD;  Location: [facility]     COLONOSCOPY N/A 8/3/2016    Procedure: COMBINED COLONOSCOPY, SINGLE OR MULTIPLE BIOPSY/POLYPECTOMY BY BIOPSY;  Surgeon: Fransisco Leach MD;  Location: [facility]     COLONOSCOPY N/A 8/16/2017    Procedure: COMBINED COLONOSCOPY, SINGLE OR MULTIPLE BIOPSY/POLYPECTOMY BY BIOPSY;;  Surgeon: Fransisco Leach MD;  [facility]: [facility]     COLONOSCOPY N/A 10/1/2019    Procedure: Colonoscopy With Colonic Stent Insertion;  Surgeon: Robbie Cruz MD;  Location: [facility]     COLONOSCOPY N/A 2/5/2020    Procedure: COLONOSCOPY, FOREIGN BODY REMOVAL;  Surgeon: Robbie Cruz MD;  Location: [facility]     ENT SURGERY  ?    upper endoscopy     ESOPHAGOSCOPY, GASTROSCOPY, DUODENOSCOPY (EGD), COMBINED N/A 11/8/2016    Procedure: COMBINED ENDOSCOPIC ULTRASOUND, ESOPHAGOSCOPY, GASTROSCOPY, DUODENOSCOPY (EGD), FINE NEEDLE ASPIRATE/BIOPSY;  Surgeon: Guru Alexsandra Ballesteros MD;  Location: [facility]     ESOPHAGOSCOPY, GASTROSCOPY, DUODENOSCOPY (EGD), COMBINED N/A 11/8/2016    Procedure: COMBINED ESOPHAGOSCOPY, GASTROSCOPY, DUODENOSCOPY (EGD), BIOPSY SINGLE OR MULTIPLE;  Surgeon: Guru Alexsandra Ballesteros MD;  Location: [facility]     ESOPHAGOSCOPY, GASTROSCOPY, DUODENOSCOPY (EGD), COMBINED N/A 8/16/2017    Procedure: COMBINED ESOPHAGOSCOPY, GASTROSCOPY, DUODENOSCOPY (EGD), BIOPSY SINGLE OR MULTIPLE;;  Surgeon: Fransisco Leach MD;  [facility]     EYE SURGERY  12/2007    cataract surgery both sides     GI SURGERY  1974, 1986(x2), 1989    Crohn's, 1974 RO 18\" term. ilium + 9\" asc. colon all one pc     SOFT TISSUE SURGERY  3/2013    removed " buildup on back of r hand, coccidioidomycosis       PREVIOUS ENDOSCOPY:  Summer 2014: Colonoscopy - not in out system    ALLERGIES:     Allergies   Allergen Reactions     Prednisone Other (See Comments)     Diana with more than 2.5mg chronic dosing of prednisone due to fluconazole interaction per patient.        PERTINENT MEDICATIONS:    Current Outpatient Medications:      calcium-vitamin D (CALTRATE) 600-400 MG-UNIT per tablet, Take 1 tablet by mouth as needed (PT last dose 1.26.2020) , Disp: , Rfl:      Cyanocobalamin (VITAMIN B 12 PO), Take 500 mcg by mouth At Bedtime , Disp: , Rfl:      dicyclomine (BENTYL) 10 MG capsule, Take 1 capsule (10 mg) by mouth 2 times daily (before meals), Disp: 90 capsule, Rfl: 1     diphenoxylate-atropine (LOMOTIL) 2.5-0.025 MG tablet, Take 1 tablet by mouth 4 times daily as needed for diarrhea, Disp: 60 tablet, Rfl: 1     fluconazole (DIFLUCAN) 200 MG tablet, Take 1 tablet (200 mg) by mouth daily, Disp: 90 tablet, Rfl: 3     HYDROcodone-acetaminophen (NORCO) 5-325 MG tablet, Take 1 tablet by mouth every 6 hours as needed for pain (Patient taking differently: Take 1 tablet by mouth every 6 hours as needed for pain (PT last dose approx 2 months ago 1.27.2020) ), Disp: 15 tablet, Rfl: 0     MELATONIN PO, Take 1.5 mg by mouth nightly as needed (PT last dose 1.27.2020) , Disp: , Rfl:      multivitamin, therapeutic with minerals (MULTI-VITAMIN) TABS, Take 1 tablet by mouth every evening , Disp: , Rfl:      ondansetron (ZOFRAN ODT) 4 MG ODT tab, Take 1 tablet (4 mg) by mouth every 8 hours as needed for nausea or vomiting, Disp: 10 tablet, Rfl: 0     tiZANidine (ZANAFLEX) 4 MG tablet, Take 1-2 tablets (4-8 mg) by mouth 3 times daily as needed for muscle spasms, Disp: 30 tablet, Rfl: 0     traZODone (DESYREL) 50 MG tablet, Take 1 tablet (50 mg) by mouth At Bedtime, Disp: 90 tablet, Rfl: 2    SOCIAL HISTORY:  Social History     Socioeconomic History     Marital status:      Spouse  name: Not on file     Number of children: Not on file     Years of education: Not on file     Highest education level: Not on file   Occupational History     Occupation: Reserach associate at the      Employer: [employer]   Social Needs     Financial resource strain: Not on file     Food insecurity     Worry: Not on file     Inability: Not on file     Transportation needs     Medical: Not on file     Non-medical: Not on file   Tobacco Use     Smoking status: Never Smoker     Smokeless tobacco: Never Used   Substance and Sexual Activity     Alcohol use: Yes     Comment: sometimes one glass of wine a week     Drug use: No     Sexual activity: Never   Lifestyle     Physical activity     Days per week: Not on file     Minutes per session: Not on file     Stress: Not on file   Relationships     Social connections     Talks on phone: Not on file     Gets together: Not on file     Attends [employer] service: Not on file     Active member of club or organization: Not on file     Attends meetings of clubs or organizations: Not on file     Relationship status: Not on file     Intimate partner violence     Fear of current or ex partner: Not on file     Emotionally abused: Not on file     Physically abused: Not on file     Forced sexual activity: Not on file   Other Topics Concern     Not on file   Social History Narrative    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         FAMILY HISTORY:  Family History   Problem Relation Age of Onset     Heart Failure Father      Musculoskeletal Disorder Father         Parkinson's     Cancer - colorectal Mother      Cerebrovascular Disease Paternal Grandmother      Cancer Other      Musculoskeletal Disorder Brother         Parkinson's     Anesthesia Reaction No family hx of      Deep Vein Thrombosis (DVT) No family hx of      Mother with colon cancer in 80s.  No family member with Crohn's disease or Ulcerative Colitis.      Past/family/social history  reviewed and no changes    PHYSICAL EXAMINATION:  Vitals reviewed, AFVSS   Wt   Wt Readings from Last 2 Encounters:   10/20/20 68.9 kg (152 lb)   10/07/20 72.1 kg (159 lb)      Gen: aaox3, cooperative, pleasant, not dyspneic/diaphoretic, nad  HEENT: ncat, neck supple, no clad, normal op w/o ulcer/exudate, anicteric, mmm  Lymph: No axillary, submandibular, supraclavicular or inguinal lymphadenopathy  Resp/CV unremarkable  Abd: Nondistended, +bs, no hepatosplenomegaly, nontender, no peritoneal signs  Ext: no c/c/e  Skin: warm, perfused, no jaundice      PERTINENT STUDIES:  Most recent CBC:  Recent Labs   Lab Test 01/27/20  1454 05/21/19  1511 04/21/19  1610   WBC  --  9.3 6.1   HGB 15.0 15.4 15.5   HCT  --  46.1 46.8   PLT  --  195 262     Most recent hepatic panel:  Recent Labs   Lab Test 10/07/20  1352 08/22/19  0828   ALT 28 34   AST 16 21     Most recent creatinine:  Recent Labs   Lab Test 10/07/20  1352 08/14/20  1144   CR 1.51* 1.34*       MRE: 3/20/15:   IMPRESSION:   1. Unremarkable small bowel other than postsurgical changes following right hemicolectomy with ileocolic anastomosis.  2. Small gastric diverticulum. 3. 1.5 x 0.8 cm pancreatic head cystic lesion minimally increased in size compared to the prior exam of 2006, but potentially representing intraductal papillary mucinous neoplasm. This can be reassessed on followup.  4. Hepatic steatosis.

## 2020-10-20 NOTE — NURSING NOTE
"Chief Complaint   Patient presents with     Follow Up     6 month follow up.       Vitals:    10/20/20 1107   Weight: 68.9 kg (152 lb)   Height: 1.778 m (5' 10\")       Body mass index is 21.81 kg/m .         Ginette Serna CMA    "

## 2020-10-20 NOTE — LETTER
"    10/20/2020         RE: Trevin Gee  [address]  [address] [place] [id number]        Dear Colleague,    Thank you for referring your patient, Trevin Gee, to the [facility] [place]. Please see a copy of my visit note below.    Trevin Gee is a 65 year old male who is being evaluated via a billable video visit.      The patient has been notified of following:     \"This video visit will be conducted via a call between you and your physician/provider. We have found that certain health care needs can be provided without the need for an in-person physical exam.  This service lets us provide the care you need with a video conversation.  If a prescription is necessary we can send it directly to your pharmacy.  If lab work is needed we can place an order for that and you can then stop by our lab to have the test done at a later time.    Video visits are billed at different rates depending on your insurance coverage.  Please reach out to your insurance provider with any questions.    If during the course of the call the physician/provider feels a video visit is not appropriate, you will not be charged for this service.\"    Patient has given verbal consent for Video visit? Yes  How would you like to obtain your AVS? MyChart  If you are dropped from the video visit, the video invite should be resent to: Send to e-mail at: [email or web address]  Will anyone else be joining your video visit? No        Video-Visit Details    Type of service:  Video Visit    Video Start Time: 11:51 AM  Video End Time: 12:10 PM    Originating Location (pt. Location): Home    Distant Location (provider location):  [facility] [place]     Platform used for Video Visit: Mukesh Leach MD          IBD CLINIC     CC/REFERRING MD:  Referred Self  REASON FOR CONSULTATION: Crohn's disease       ASSESSMENT/PLAN:  65 year old male with Crohn's disease " s/p ileocecectomy on no current Crohn's therapy.     1. Crohn's disease: Continues to be in endoscopic remission.  He did well with stent dilation for 3 months which is now removed.  There does seem to be a clinical benefit with the stent placement.  He has had no episodes of abdominal pain. Current stool pattern likely related to IBS. Recommend focusing on dietary triggers and increasing solube fiber.    -- No specific Crohn's therapy at this time  -- Tentative plan for colonoscopy in 1 year (winter 2021) - earlier if new symptoms    2. Colon polyps: Well defined lesions with adenomatous changes.    -- Will continue surveillance colonoscopy.     3. Diarrhea: Celiac testing has been negative. Crohn's not active. Suspect some IBS, partially related to diet.   -- Avoid dietary triggers  -- Trial of bentyl  -- Increase soluble fiber as able  -- prn lomotil    4. Possible acid reflux:  No current symptoms of acid reflux.      5. Pancreatic head cystic lesion: Increased from 2006, potentially representing IPMN.  MRCP with no change in cyst.  Reviewed with Dr. Cruz.  EUS with FNA normal. Stable on 6/17/19 MRE (1.4cm)  -- No further follow-up at this time needed.      6. Steroid dependency/Adrenal insufficiency:  Followed closely with endocrinology.  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      8. Depression: Unchanged.     9. Chronic renal disease: Patient has a referral to renal and I recommended he make that appointment.     IBD history:  Age at diagnosis: ~18  Extend of disease: Ileo-colonic  Disease phenotype: Inflammatory  Key-anal disease: No  Current CD medications: None  Prior IBD Medications:  - Asulfazine and Asacol: 1974 to late 90s, stopped to to disease progression  - Prednisone 1986 - current.   - Mercaptopurine: Briefly in mid 90s to get him off steroids- Patient thinks he has side effect to medication  - Methotrexate:  After mercaptopurine, stayed on for years, but after bleeding in 1988 and 1989, he would not taper his prednisone. MTX stopped as he could not get off the steroids.    Surgical history:  1974 - [facility] Ileocecectomy  1986 - [facility] - revision of anastomosis  1988 - massive bleeding from ulcer at anastomosis ([place]), cauterized via colonoscopy  1989 - laparoscopic  Revision of anastomosis  2005 - Lap [facility]Y    DRUG MONITORING  TPMT enzyme activity: none    DISEASE ASSESSMENT  Fecal calprotectin: None  CRP, ESR Results  Recent Labs   Lab Test 04/21/19  1610 04/18/19  0550 01/08/18  1345 01/08/18  1345   CRP <2.9 3.2   < > <2.9   SED 6  --   --  4    < > = values in this interval not displayed.   Endoscopic assessment: Colonoscopy 8/16/2017 showed normal mucosa in entire colon. Non-patent functional end-to-end ileo-colonic anastomosis that was dilated to 12mm.  No evidence of active Crohn's disease.  Enterography (12/1/16) showed 4cm of mild thickening and enhancement in neoterminal ileum at anastomosis, decompressed sigmoid colon with questionable wall thickening and enhancement.    Enterography: No active Crohn's disease (3/20/15)  C diff: None (7/12/16)    IBD healthcare maintenance based on patients current medication:      Vaccinations:  -- Influenza (every year): Last given 2016  -- TdaP (every 10 years): Last given 2015  -- Pneumococcal Pneumonia (once then every 5 years): Has not obtained.    One time confirmation of immunity or serologies:  -- Hepatitis A (serologies or immunizations): 2005  -- Hepatitis B (serologies or immunizations): 2005  -- Zoster vaccination (>61 yo, discuss if over 50): 2005  -- MMR: Not documented.  -- HPV (all aged 18-26): N/A  -- Meningococcal meningitis (all patients at risk for meningitis): Patient is not at risk.     Cancer Screening:  Colon cancer screening:  Since there is evidence of pancolitis histologically. Also with adenomatous polyps. Colonoscopy every 2-3 years recommended.   Dysplasia screening is recommended 2018.    Cervical cancer screening: N/A    Skin cancer screening: Since patient is not immunocompromised, this is per patient's dermatologist recommendations.    Depression Screening:  PHQ-2 Score:     PHQ-2 ( 1999 Pfizer) 1/27/2020   Q1: Little interest or pleasure in doing things 1   Q2: Feeling down, depressed or hopeless 1   PHQ-2 Score 2        Misc:  -- Avoid tobacco use  -- Avoid NSAIDs as there is potentially a 25% chance of causing an IBD flare    RTC 6 months    Thank you for this consultation.  It was a pleasure to participate in the care of this patient; please contact us with any further questions. =    Fransisco Leach MD    [facility]  Inflammatory Bowel Disease Program  Division of Gastroenterology, Hepatology and Nutrition       HPI:   Here today follow-up. Had axios stent removed in Feb 2020.  In general he was doing quite well.  In the last two weeks, he has had some increased GI symptoms. About 30 minutes after eating, he reports urge to use bathroom. However very little stool would come out and it was all water. He will then have two more episodes of urge to have a stool, but small volume. He does not think that he is passing sufficient stool for his intake.  He does not feel distended or bloated.  He has actually lost 4-5 lbs in the past two weeks. He has skipped a few meals, but otherwise is not sure why he is loosing weight.      He notes that certain foods will slow down his GI activity. He uses these foods to limit the amount of lomotil he needs.     ROS:    No fevers or chills  No weight loss  No blurry vision, double vision or change in vision  No sore throat  No lymphadenopathy  No headache, paraesthesias, or weakness in a limb  No shortness of breath or wheezing  No chest pain or pressure  No arthralgias or myalgias  No rashes or skin changes  No odynophagia or dysphagia  No BRBPR, hematochezia, melena  No dysuria,  frequency or urgency  No hot/cold intolerance or polyria  No anxiety or depression    Extra intestinal manifestations of IBD:  No uveitis/episcleritis  No aphthous ulcers   No arthritis   No erythema nodosum/pyoderma gangrenosum.     PERTINENT PAST MEDICAL HISTORY:  Past Medical History:   Diagnosis Date     Anemia 2007     Anxiety 2012?    much worse since July 2014     Cataract 12/2007    both eyes, too much prednisone, implants     Coccidioidomycosis      Crohn disease (H)      Crohn's disease (H) 1/13/2015     Depressive disorder 7/2014    much worse since July 2014     Fracture 1956    green fracture of leg     Gallbladder problem 2005?    much gravel, removed     GERD (gastroesophageal reflux disease)      History of blood transfusion 1988    ulcerated anastomosis term. ilium bleed     Hypertriglyceridemia 7/8/2016     Infection 2007    persistant coccidioidomycosis     Kidney stone various    both sides, all passed naturally     Mental health problem 2007    bipolar though perhaps different     Nephrolithiasis      Osteoporosis 2007    osteoporosis, too much prednisone, last dexa 1/2014     Osteoporosis      Other nervous system complications 2007    prednisone overload induced kevin     Personal history of colonic polyps     small ones found during colonoscopies     Steroid-induced psychosis     Patient extremely sensitive to regular doses of steroids.  Unclear if this is due to chronic fluconazole use or genetic issue.  In the future, use caution when prescribing steroids.     TB (tuberculosis)        PREVIOUS SURGERIES:  Past Surgical History:   Procedure Laterality Date     APPENDECTOMY  1974    coincidental with Crohn's surgery     BACK SURGERY  12/2007    kyphoplasty on compressed 4th??? vertebra     BIOPSY  2007, 2013    lump on arm, knee, back of hand for coccidioidomycosis cult.     CHOLECYSTECTOMY  2005    much gravel, removed laparoscopically     COLONOSCOPY  7/14/2014 last    Dr. Catherine Bowden,  "[facility] - many previous     COLONOSCOPY Left 9/11/2015    Procedure: COMBINED COLONOSCOPY, SINGLE OR MULTIPLE BIOPSY/POLYPECTOMY BY BIOPSY;  Surgeon: Fransisco Leach MD;  Location: [facility]     COLONOSCOPY N/A 8/3/2016    Procedure: COMBINED COLONOSCOPY, SINGLE OR MULTIPLE BIOPSY/POLYPECTOMY BY BIOPSY;  Surgeon: Fransisco Leach MD;  Location: [facility]     COLONOSCOPY N/A 8/16/2017    Procedure: COMBINED COLONOSCOPY, SINGLE OR MULTIPLE BIOPSY/POLYPECTOMY BY BIOPSY;;  Surgeon: Fransisco Leach MD;  Location: [facility]     COLONOSCOPY N/A 10/1/2019    Procedure: Colonoscopy With Colonic Stent Insertion;  Surgeon: Robbie Cruz MD;  Location: [facility]     COLONOSCOPY N/A 2/5/2020    Procedure: COLONOSCOPY, FOREIGN BODY REMOVAL;  Surgeon: Robbie Cruz MD;  Location: [facility]     ENT SURGERY  ?    upper endoscopy     ESOPHAGOSCOPY, GASTROSCOPY, DUODENOSCOPY (EGD), COMBINED N/A 11/8/2016    Procedure: COMBINED ENDOSCOPIC ULTRASOUND, ESOPHAGOSCOPY, GASTROSCOPY, DUODENOSCOPY (EGD), FINE NEEDLE ASPIRATE/BIOPSY;  Surgeon: Guru Alexsandra Ballesteros MD;  Location: [facility]     ESOPHAGOSCOPY, GASTROSCOPY, DUODENOSCOPY (EGD), COMBINED N/A 11/8/2016    Procedure: COMBINED ESOPHAGOSCOPY, GASTROSCOPY, DUODENOSCOPY (EGD), BIOPSY SINGLE OR MULTIPLE;  Surgeon: Guru Alexsandra Ballesteros MD;  Location: [facility]     ESOPHAGOSCOPY, GASTROSCOPY, DUODENOSCOPY (EGD), COMBINED N/A 8/16/2017    Procedure: COMBINED ESOPHAGOSCOPY, GASTROSCOPY, DUODENOSCOPY (EGD), BIOPSY SINGLE OR MULTIPLE;;  Surgeon: Fransisco Leach MD;  [facility]     EYE SURGERY  12/2007    cataract surgery both sides     GI SURGERY  1974, 1986(x2), 1989    Crohn's, 1974 RO 18\" term. ilium + 9\" asc. colon all one pc     SOFT TISSUE SURGERY  3/2013    removed buildup on back of r hand, coccidioidomycosis       PREVIOUS ENDOSCOPY:  Summer 2014: Colonoscopy - not in out system    ALLERGIES:     Allergies   Allergen " Reactions     Prednisone Other (See Comments)     Diana with more than 2.5mg chronic dosing of prednisone due to fluconazole interaction per patient.        PERTINENT MEDICATIONS:    Current Outpatient Medications:      calcium-vitamin D (CALTRATE) 600-400 MG-UNIT per tablet, Take 1 tablet by mouth as needed (PT last dose 1.26.2020) , Disp: , Rfl:      Cyanocobalamin (VITAMIN B 12 PO), Take 500 mcg by mouth At Bedtime , Disp: , Rfl:      dicyclomine (BENTYL) 10 MG capsule, Take 1 capsule (10 mg) by mouth 2 times daily (before meals), Disp: 90 capsule, Rfl: 1     diphenoxylate-atropine (LOMOTIL) 2.5-0.025 MG tablet, Take 1 tablet by mouth 4 times daily as needed for diarrhea, Disp: 60 tablet, Rfl: 1     fluconazole (DIFLUCAN) 200 MG tablet, Take 1 tablet (200 mg) by mouth daily, Disp: 90 tablet, Rfl: 3     HYDROcodone-acetaminophen (NORCO) 5-325 MG tablet, Take 1 tablet by mouth every 6 hours as needed for pain (Patient taking differently: Take 1 tablet by mouth every 6 hours as needed for pain (PT last dose approx 2 months ago 1.27.2020) ), Disp: 15 tablet, Rfl: 0     MELATONIN PO, Take 1.5 mg by mouth nightly as needed (PT last dose 1.27.2020) , Disp: , Rfl:      multivitamin, therapeutic with minerals (MULTI-VITAMIN) TABS, Take 1 tablet by mouth every evening , Disp: , Rfl:      ondansetron (ZOFRAN ODT) 4 MG ODT tab, Take 1 tablet (4 mg) by mouth every 8 hours as needed for nausea or vomiting, Disp: 10 tablet, Rfl: 0     tiZANidine (ZANAFLEX) 4 MG tablet, Take 1-2 tablets (4-8 mg) by mouth 3 times daily as needed for muscle spasms, Disp: 30 tablet, Rfl: 0     traZODone (DESYREL) 50 MG tablet, Take 1 tablet (50 mg) by mouth At Bedtime, Disp: 90 tablet, Rfl: 2    SOCIAL HISTORY:  Social History     Socioeconomic History     Marital status:      Spouse name: Not on file     Number of children: Not on file     Years of education: Not on file     Highest education level: Not on file   Occupational History      Occupation: Reserach associate at the      Employer: [employer]   Social Needs     Financial resource strain: Not on file     Food insecurity     Worry: Not on file     Inability: Not on file     Transportation needs     Medical: Not on file     Non-medical: Not on file   Tobacco Use     Smoking status: Never Smoker     Smokeless tobacco: Never Used   Substance and Sexual Activity     Alcohol use: Yes     Comment: sometimes one glass of wine a week     Drug use: No     Sexual activity: Never   Lifestyle     Physical activity     Days per week: Not on file     Minutes per session: Not on file     Stress: Not on file   Relationships     Social connections     Talks on phone: Not on file     Gets together: Not on file     Attends Baptism service: Not on file     Active member of club or organization: Not on file     Attends meetings of clubs or organizations: Not on file     Relationship status: Not on file     Intimate partner violence     Fear of current or ex partner: Not on file     Emotionally abused: Not on file     Physically abused: Not on file     Forced sexual activity: Not on file   Other Topics Concern     Not on file   Social History Narrative    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         FAMILY HISTORY:  Family History   Problem Relation Age of Onset     Heart Failure Father      Musculoskeletal Disorder Father         Parkinson's     Cancer - colorectal Mother      Cerebrovascular Disease Paternal Grandmother      Cancer Other      Musculoskeletal Disorder Brother         Parkinson's     Anesthesia Reaction No family hx of      Deep Vein Thrombosis (DVT) No family hx of      Mother with colon cancer in 80s.  No family member with Crohn's disease or Ulcerative Colitis.      Past/family/social history reviewed and no changes    PHYSICAL EXAMINATION:  Vitals reviewed, AFVSS   Wt   Wt Readings from Last 2 Encounters:   10/20/20 68.9 kg (152 lb)   10/07/20 72.1  kg (159 lb)      Gen: aaox3, cooperative, pleasant, not dyspneic/diaphoretic, nad  HEENT: ncat, neck supple, no clad, normal op w/o ulcer/exudate, anicteric, mmm  Lymph: No axillary, submandibular, supraclavicular or inguinal lymphadenopathy  Resp/CV unremarkable  Abd: Nondistended, +bs, no hepatosplenomegaly, nontender, no peritoneal signs  Ext: no c/c/e  Skin: warm, perfused, no jaundice      PERTINENT STUDIES:  Most recent CBC:  Recent Labs   Lab Test 01/27/20  1454 05/21/19  1511 04/21/19  1610   WBC  --  9.3 6.1   HGB 15.0 15.4 15.5   HCT  --  46.1 46.8   PLT  --  195 262     Most recent hepatic panel:  Recent Labs   Lab Test 10/07/20  1352 08/22/19  0828   ALT 28 34   AST 16 21     Most recent creatinine:  Recent Labs   Lab Test 10/07/20  1352 08/14/20  1144   CR 1.51* 1.34*       MRE: 3/20/15:   IMPRESSION:   1. Unremarkable small bowel other than postsurgical changes following right hemicolectomy with ileocolic anastomosis.  2. Small gastric diverticulum. 3. 1.5 x 0.8 cm pancreatic head cystic lesion minimally increased in size compared to the prior exam of 2006, but potentially representing intraductal papillary mucinous neoplasm. This can be reassessed on followup.  4. Hepatic steatosis.            Again, thank you for allowing me to participate in the care of your patient.        Sincerely,        Fransisco Leach MD

## 2020-10-20 NOTE — PROGRESS NOTES
"Trevin Gee is a 65 year old male who is being evaluated via a billable video visit.      The patient has been notified of following:     \"This video visit will be conducted via a call between you and your physician/provider. We have found that certain health care needs can be provided without the need for an in-person physical exam.  This service lets us provide the care you need with a video conversation.  If a prescription is necessary we can send it directly to your pharmacy.  If lab work is needed we can place an order for that and you can then stop by our lab to have the test done at a later time.    Video visits are billed at different rates depending on your insurance coverage.  Please reach out to your insurance provider with any questions.    If during the course of the call the physician/provider feels a video visit is not appropriate, you will not be charged for this service.\"    Patient has given verbal consent for Video visit? Yes  How would you like to obtain your AVS? MyChart  If you are dropped from the video visit, the video invite should be resent to: Send to e-mail at: [email or web address]  Will anyone else be joining your video visit? No        Video-Visit Details    Type of service:  Video Visit    Video Start Time: 11:51 AM  Video End Time: 12:10 PM    Originating Location (pt. Location): Home    Distant Location (provider location):  [facility] [place]     Platform used for Video Visit: Mukesh Leach MD        "

## 2020-10-20 NOTE — LETTER
10/20/2020       RE: Trevin Gee  [address]  [address]  [facility] [address]     Dear Colleague,    Thank you for referring your patient, Trevin Gee, to the [facility] [place] [facility]. Please see a copy of my visit note below.    IBD CLINIC     CC/REFERRING MD:  Referred Self  REASON FOR CONSULTATION: Crohn's disease       ASSESSMENT/PLAN:  65 year old male with Crohn's disease s/p ileocecectomy on no current Crohn's therapy.     1. Crohn's disease: Continues to be in endoscopic remission.  He did well with stent dilation for 3 months which is now removed.  There does seem to be a clinical benefit with the stent placement.  He has had no episodes of abdominal pain. Current stool pattern likely related to IBS. Recommend focusing on dietary triggers and increasing solube fiber.    -- No specific Crohn's therapy at this time  -- Tentative plan for colonoscopy in 1 year (winter 2021) - earlier if new symptoms    2. Colon polyps: Well defined lesions with adenomatous changes.    -- Will continue surveillance colonoscopy.     3. Diarrhea: Celiac testing has been negative. Crohn's not active. Suspect some IBS, partially related to diet.   -- Avoid dietary triggers  -- Trial of bentyl  -- Increase soluble fiber as able  -- prn lomotil    4. Possible acid reflux:  No current symptoms of acid reflux.      5. Pancreatic head cystic lesion: Increased from 2006, potentially representing IPMN.  MRCP with no change in cyst.  Reviewed with Dr. Cruz.  EUS with FNA normal. Stable on 6/17/19 MRE (1.4cm)  -- No further follow-up at this time needed.      6. Steroid dependency/Adrenal insufficiency:  Followed closely with endocrinology.  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      8. Depression: Unchanged.     9. Chronic renal disease: Patient has a  referral to renal and I recommended he make that appointment.     IBD history:  Age at diagnosis: ~18  Extend of disease: Ileo-colonic  Disease phenotype: Inflammatory  Key-anal disease: No  Current CD medications: None  Prior IBD Medications:  - Asulfazine and Asacol: 1974 to late 90s, stopped to to disease progression  - Prednisone 1986 - current.   - Mercaptopurine: Briefly in mid 90s to get him off steroids- Patient thinks he has side effect to medication  - Methotrexate: After mercaptopurine, stayed on for years, but after bleeding in 1988 and 1989, he would not taper his prednisone. MTX stopped as he could not get off the steroids.    Surgical history:  1974 - [facility] Ileocecectomy  1986 - [facility] - revision of anastomosis  1988 - massive bleeding from ulcer at anastomosis ([place]), cauterized via colonoscopy  1989 - laparoscopic  Revision of anastomosis  2005 - Lap CCY    DRUG MONITORING  TPMT enzyme activity: none    DISEASE ASSESSMENT  Fecal calprotectin: None  CRP, ESR Results  Recent Labs   Lab Test 04/21/19  1610 04/18/19  0550 01/08/18  1345 01/08/18  1345   CRP <2.9 3.2   < > <2.9   SED 6  --   --  4    < > = values in this interval not displayed.   Endoscopic assessment: Colonoscopy 8/16/2017 showed normal mucosa in entire colon. Non-patent functional end-to-end ileo-colonic anastomosis that was dilated to 12mm.  No evidence of active Crohn's disease.  Enterography (12/1/16) showed 4cm of mild thickening and enhancement in neoterminal ileum at anastomosis, decompressed sigmoid colon with questionable wall thickening and enhancement.    Enterography: No active Crohn's disease (3/20/15)  C diff: None (7/12/16)    IBD healthcare maintenance based on patients current medication:      Vaccinations:  -- Influenza (every year): Last given 2016  -- TdaP (every 10 years): Last given 2015  -- Pneumococcal Pneumonia (once then every 5 years): Has not obtained.    One time confirmation of immunity or  serologies:  -- Hepatitis A (serologies or immunizations): 2005  -- Hepatitis B (serologies or immunizations): 2005  -- Zoster vaccination (>61 yo, discuss if over 50): 2005  -- MMR: Not documented.  -- HPV (all aged 18-26): N/A  -- Meningococcal meningitis (all patients at risk for meningitis): Patient is not at risk.     Cancer Screening:  Colon cancer screening:  Since there is evidence of pancolitis histologically. Also with adenomatous polyps. Colonoscopy every 2-3 years recommended.  Dysplasia screening is recommended 2018.    Cervical cancer screening: N/A    Skin cancer screening: Since patient is not immunocompromised, this is per patient's dermatologist recommendations.    Depression Screening:  PHQ-2 Score:     PHQ-2 ( 1999 Pfizer) 1/27/2020   Q1: Little interest or pleasure in doing things 1   Q2: Feeling down, depressed or hopeless 1   PHQ-2 Score 2        Misc:  -- Avoid tobacco use  -- Avoid NSAIDs as there is potentially a 25% chance of causing an IBD flare    RTC 6 months    Thank you for this consultation.  It was a pleasure to participate in the care of this patient; please contact us with any further questions. =    Fransisco Leach MD    [facility]  Inflammatory Bowel Disease Program  Division of Gastroenterology, Hepatology and Nutrition       HPI:   Here today follow-up. Had axios stent removed in Feb 2020.  In general he was doing quite well.  In the last two weeks, he has had some increased GI symptoms. About 30 minutes after eating, he reports urge to use bathroom. However very little stool would come out and it was all water. He will then have two more episodes of urge to have a stool, but small volume. He does not think that he is passing sufficient stool for his intake.  He does not feel distended or bloated.  He has actually lost 4-5 lbs in the past two weeks. He has skipped a few meals, but otherwise is not sure why he is loosing weight.      He notes that  certain foods will slow down his GI activity. He uses these foods to limit the amount of lomotil he needs.     ROS:    No fevers or chills  No weight loss  No blurry vision, double vision or change in vision  No sore throat  No lymphadenopathy  No headache, paraesthesias, or weakness in a limb  No shortness of breath or wheezing  No chest pain or pressure  No arthralgias or myalgias  No rashes or skin changes  No odynophagia or dysphagia  No BRBPR, hematochezia, melena  No dysuria, frequency or urgency  No hot/cold intolerance or polyria  No anxiety or depression    Extra intestinal manifestations of IBD:  No uveitis/episcleritis  No aphthous ulcers   No arthritis   No erythema nodosum/pyoderma gangrenosum.     PERTINENT PAST MEDICAL HISTORY:  Past Medical History:   Diagnosis Date     Anemia 2007     Anxiety 2012?    much worse since July 2014     Cataract 12/2007    both eyes, too much prednisone, implants     Coccidioidomycosis      Crohn disease (H)      Crohn's disease (H) 1/13/2015     Depressive disorder 7/2014    much worse since July 2014     Fracture 1956    green fracture of leg     Gallbladder problem 2005?    much gravel, removed     GERD (gastroesophageal reflux disease)      History of blood transfusion 1988    ulcerated anastomosis term. ilium bleed     Hypertriglyceridemia 7/8/2016     Infection 2007    persistant coccidioidomycosis     Kidney stone various    both sides, all passed naturally     Mental health problem 2007    bipolar though perhaps different     Nephrolithiasis      Osteoporosis 2007    osteoporosis, too much prednisone, last dexa 1/2014     Osteoporosis      Other nervous system complications 2007    prednisone overload induced kevin     Personal history of colonic polyps     small ones found during colonoscopies     Steroid-induced psychosis     Patient extremely sensitive to regular doses of steroids.  Unclear if this is due to chronic fluconazole use or genetic issue.  In the  future, use caution when prescribing steroids.     TB (tuberculosis)        PREVIOUS SURGERIES:  Past Surgical History:   Procedure Laterality Date     APPENDECTOMY  1974    coincidental with Crohn's surgery     BACK SURGERY  12/2007    kyphoplasty on compressed 4th??? vertebra     BIOPSY  2007, 2013    lump on arm, knee, back of hand for coccidioidomycosis cult.     CHOLECYSTECTOMY  2005    much gravel, removed laparoscopically     COLONOSCOPY  7/14/2014 last    Dr. Catherine Bowden, [facility] - many previous     COLONOSCOPY Left 9/11/2015    Procedure: COMBINED COLONOSCOPY, SINGLE OR MULTIPLE BIOPSY/POLYPECTOMY BY BIOPSY;  Surgeon: Fransisco Leach MD;  Location: [facility]     COLONOSCOPY N/A 8/3/2016    Procedure: COMBINED COLONOSCOPY, SINGLE OR MULTIPLE BIOPSY/POLYPECTOMY BY BIOPSY;  Surgeon: Fransisco Leach MD;  Location: [facility]     COLONOSCOPY N/A 8/16/2017    Procedure: COMBINED COLONOSCOPY, SINGLE OR MULTIPLE BIOPSY/POLYPECTOMY BY BIOPSY;;  Surgeon: Fransisco Leach MD;  Location: [facility]     COLONOSCOPY N/A 10/1/2019    Procedure: Colonoscopy With Colonic Stent Insertion;  Surgeon: Robbie Cruz MD;  Location: [facility] OR     COLONOSCOPY N/A 2/5/2020    Procedure: COLONOSCOPY, FOREIGN BODY REMOVAL;  Surgeon: Robbie Cruz MD;  Location: [facility] OR     ENT SURGERY  ?    upper endoscopy     ESOPHAGOSCOPY, GASTROSCOPY, DUODENOSCOPY (EGD), COMBINED N/A 11/8/2016    Procedure: COMBINED ENDOSCOPIC ULTRASOUND, ESOPHAGOSCOPY, GASTROSCOPY, DUODENOSCOPY (EGD), FINE NEEDLE ASPIRATE/BIOPSY;  Surgeon: Guru Alexsandra Ballesteros MD;  [facility]     ESOPHAGOSCOPY, GASTROSCOPY, DUODENOSCOPY (EGD), COMBINED N/A 11/8/2016    Procedure: COMBINED ESOPHAGOSCOPY, GASTROSCOPY, DUODENOSCOPY (EGD), BIOPSY SINGLE OR MULTIPLE;  Surgeon: Guru Alexsandra Ballesteros MD;  Location:  GI     ESOPHAGOSCOPY, GASTROSCOPY, DUODENOSCOPY (EGD), COMBINED N/A 8/16/2017    Procedure: COMBINED  "ESOPHAGOSCOPY, GASTROSCOPY, DUODENOSCOPY (EGD), BIOPSY SINGLE OR MULTIPLE;;  Surgeon: Fransisco Leach MD;  [facility]     EYE SURGERY  12/2007    cataract surgery both sides     GI SURGERY  1974, 1986(x2), 1989    Crohn's, 1974 RO 18\" term. ilium + 9\" asc. colon all one pc     SOFT TISSUE SURGERY  3/2013    removed buildup on back of r hand, coccidioidomycosis       PREVIOUS ENDOSCOPY:  Summer 2014: Colonoscopy - not in out system    ALLERGIES:  Allergies   Allergen Reactions     Prednisone Other (See Comments)     Diana with more than 2.5mg chronic dosing of prednisone due to fluconazole interaction per patient.        PERTINENT MEDICATIONS:  Current Outpatient Medications:      calcium-vitamin D (CALTRATE) 600-400 MG-UNIT per tablet, Take 1 tablet by mouth as needed (PT last dose 1.26.2020) , Disp: , Rfl:      Cyanocobalamin (VITAMIN B 12 PO), Take 500 mcg by mouth At Bedtime , Disp: , Rfl:      dicyclomine (BENTYL) 10 MG capsule, Take 1 capsule (10 mg) by mouth 2 times daily (before meals), Disp: 90 capsule, Rfl: 1     diphenoxylate-atropine (LOMOTIL) 2.5-0.025 MG tablet, Take 1 tablet by mouth 4 times daily as needed for diarrhea, Disp: 60 tablet, Rfl: 1     fluconazole (DIFLUCAN) 200 MG tablet, Take 1 tablet (200 mg) by mouth daily, Disp: 90 tablet, Rfl: 3     HYDROcodone-acetaminophen (NORCO) 5-325 MG tablet, Take 1 tablet by mouth every 6 hours as needed for pain (Patient taking differently: Take 1 tablet by mouth every 6 hours as needed for pain (PT last dose approx 2 months ago 1.27.2020) ), Disp: 15 tablet, Rfl: 0     MELATONIN PO, Take 1.5 mg by mouth nightly as needed (PT last dose 1.27.2020) , Disp: , Rfl:      multivitamin, therapeutic with minerals (MULTI-VITAMIN) TABS, Take 1 tablet by mouth every evening , Disp: , Rfl:      ondansetron (ZOFRAN ODT) 4 MG ODT tab, Take 1 tablet (4 mg) by mouth every 8 hours as needed for nausea or vomiting, Disp: 10 tablet, Rfl: 0     tiZANidine (ZANAFLEX) " 4 MG tablet, Take 1-2 tablets (4-8 mg) by mouth 3 times daily as needed for muscle spasms, Disp: 30 tablet, Rfl: 0     traZODone (DESYREL) 50 MG tablet, Take 1 tablet (50 mg) by mouth At Bedtime, Disp: 90 tablet, Rfl: 2    SOCIAL HISTORY:  Social History     Socioeconomic History     Marital status:      Spouse name: Not on file     Number of children: Not on file     Years of education: Not on file     Highest education level: Not on file   Occupational History     Occupation: Reserach associate at [employer]     Employer: [employer]   Social Needs     Financial resource strain: Not on file     Food insecurity     Worry: Not on file     Inability: Not on file     Transportation needs     Medical: Not on file     Non-medical: Not on file   Tobacco Use     Smoking status: Never Smoker     Smokeless tobacco: Never Used   Substance and Sexual Activity     Alcohol use: Yes     Comment: sometimes one glass of wine a week     Drug use: No     Sexual activity: Never   Lifestyle     Physical activity     Days per week: Not on file     Minutes per session: Not on file     Stress: Not on file   Relationships     Social connections     Talks on phone: Not on file     Gets together: Not on file     Attends Mosque service: Not on file     Active member of club or organization: Not on file     Attends meetings of clubs or organizations: Not on file     Relationship status: Not on file     Intimate partner violence     Fear of current or ex partner: Not on file     Emotionally abused: Not on file     Physically abused: Not on file     Forced sexual activity: Not on file   Other Topics Concern     Not on file   Social History Narrative    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         FAMILY HISTORY:  Family History   Problem Relation Age of Onset     Heart Failure Father      Musculoskeletal Disorder Father         Parkinson's     Cancer - colorectal Mother      Cerebrovascular  Disease Paternal Grandmother      Cancer Other      Musculoskeletal Disorder Brother         Parkinson's     Anesthesia Reaction No family hx of      Deep Vein Thrombosis (DVT) No family hx of      Mother with colon cancer in 80s.  No family member with Crohn's disease or Ulcerative Colitis.      Past/family/social history reviewed and no changes    PHYSICAL EXAMINATION:  Vitals reviewed, AFVSS   Wt   Wt Readings from Last 2 Encounters:   10/20/20 68.9 kg (152 lb)   10/07/20 72.1 kg (159 lb)      Gen: aaox3, cooperative, pleasant, not dyspneic/diaphoretic, nad  HEENT: ncat, neck supple, no clad, normal op w/o ulcer/exudate, anicteric, mmm  Lymph: No axillary, submandibular, supraclavicular or inguinal lymphadenopathy  Resp/CV unremarkable  Abd: Nondistended, +bs, no hepatosplenomegaly, nontender, no peritoneal signs  Ext: no c/c/e  Skin: warm, perfused, no jaundice      PERTINENT STUDIES:  Most recent CBC:  Recent Labs   Lab Test 01/27/20  1454 05/21/19  1511 04/21/19  1610   WBC  --  9.3 6.1   HGB 15.0 15.4 15.5   HCT  --  46.1 46.8   PLT  --  195 262             Most recent hepatic panel:  Recent Labs   Lab Test 10/07/20  1352 08/22/19  0828   ALT 28 34   AST 16 21     Most recent creatinine:  Recent Labs   Lab Test 10/07/20  1352 08/14/20  1144   CR 1.51* 1.34*       MRE: 3/20/15:   IMPRESSION:   1. Unremarkable small bowel other than postsurgical changes following right hemicolectomy with ileocolic anastomosis.  2. Small gastric diverticulum. 3. 1.5 x 0.8 cm pancreatic head cystic lesion minimally increased in size compared to the prior exam of 2006, but potentially representing intraductal papillary mucinous neoplasm. This can be reassessed on followup.  4. Hepatic steatosis.      Again, thank you for allowing me to participate in the care of your patient.  Sincerely,    Fransisco Leach MD

## 2020-11-11 DIAGNOSIS — R19.7 DIARRHEA, UNSPECIFIED TYPE: ICD-10-CM

## 2020-11-13 RX ORDER — DICYCLOMINE HYDROCHLORIDE 10 MG/1
10 CAPSULE ORAL
Qty: 90 CAPSULE | Refills: 1 | Status: SHIPPED | OUTPATIENT
Start: 2020-11-13 | End: 2021-04-13

## 2020-11-13 NOTE — TELEPHONE ENCOUNTER
dicyclomine (BENTYL) 10 MG capsule  Last Written Prescription Date:  7/15/2019  Last Fill Quantity: 90,   # refills: 1  Last Office Visit : 10/20/2020  Future Office visit:  None  90 Capsules, 1 Refill sent to pharm 11/13/2020      Sunshine Mansfield RN  Central Triage Red Flags/Med Refills

## 2020-11-14 ENCOUNTER — HEALTH MAINTENANCE LETTER (OUTPATIENT)
Age: 65
End: 2020-11-14

## 2020-12-04 ENCOUNTER — PRE VISIT (OUTPATIENT)
Dept: NEPHROLOGY | Facility: CLINIC | Age: 65
End: 2020-12-04

## 2020-12-04 ENCOUNTER — VIRTUAL VISIT (OUTPATIENT)
Dept: NEPHROLOGY | Facility: CLINIC | Age: 65
End: 2020-12-04
Attending: INTERNAL MEDICINE
Payer: COMMERCIAL

## 2020-12-04 ENCOUNTER — TELEPHONE (OUTPATIENT)
Dept: NEPHROLOGY | Facility: CLINIC | Age: 65
End: 2020-12-04

## 2020-12-04 DIAGNOSIS — R82.992 HYPEROXALURIA: ICD-10-CM

## 2020-12-04 ASSESSMENT — PAIN SCALES - GENERAL: PAINLEVEL: NO PAIN (0)

## 2020-12-04 NOTE — TELEPHONE ENCOUNTER
Per Dr. Cuenca, request for 2 [facility] tests to be sent to patient.  Request sent to [facility].  Sent for scanning.  Violet Greenfield LPN  Nephrology  [phone]

## 2020-12-04 NOTE — LETTER
"12/4/2020       RE: Trevin Gee  [address]  [address]  [facility] [address]     Dear Colleague,    Thank you for referring your patient, Trevin Gee, to the [facility] [place] at [facility]. Please see a copy of my visit note below.    Trevin Gee is a 65 year old male who is being evaluated via a billable video visit.    The patient has been notified of following:   \"This video visit will be conducted via a call between you and your physician/provider. We have found that certain health care needs can be provided without the need for an in-person physical exam.  This service lets us provide the care you need with a video conversation.  If a prescription is necessary we can send it directly to your pharmacy.  If lab work is needed we can place an order for that and you can then stop by our lab to have the test done at a later time.    Video visits are billed at different rates depending on your insurance coverage.  Please reach out to your insurance provider with any questions.    If during the course of the call the physician/provider feels a video visit is not appropriate, you will not be charged for this service.\"    Patient has given verbal consent for Video visit? Yes  How would you like to obtain your AVS? MyChart  If you are dropped from the video visit, the video invite should be resent to: Text to cell phone: [phone]  Will anyone else be joining your video visit? No        Video-Visit Details    Type of service:  Video Visit    Video Start Time: 10:46 AM  Video End Time: 11:47 AM    Originating Location (pt. Location): Home    Distant Location (provider location):  [facility] [place]    Platform used for Video Visit: Data Expedition    Staffed with Dr. Richie Cuenca MD    December 4, 2020    Assessment/Plan:  CKD  Baseline Cr ~ 1.3 as far back as 2016.  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  Grossly GFR appears stable and will continue to follow closely for now.    CaOxalate Nephrolithiasis  Patient with history of recurrent stones with analysis last year revealing a composition of calcium oxalate.  Also obtained Litholink last year which suggested hypocitraturia, hypomagnesemia and hyperoxaluria.  This is likely in the setting of Crohn's disease and bowel resection history.  Discussed dietary modifications in detail and will reevaluate with repeat urinalysis and Litholink to see if citrate or magnesium supplementation would be of benefit.  Patient expressed understanding but admittedly stated he was unsure of how willing he would be in terms of following recommendations.  - Low oxalate diet  - Low Na diet for goal intake <2g per day  - No dietary Ca restriction and okay to resume Ca supplement  - Increase fluid intake to 3L or greater per day  - Limit animal protein intake    Orders Placed This Encounter   Procedures     Routine UA with microscopic     Litholink x2: Laboratory Miscellaneous Order     Teja Cuenca MD  Renal Fellow  [phone]    I was asked to see this patient by Dr. Feng    CC: Cr elevation    HPI: Trevin Gee is a 65 year old male who presents for evaluation of possible CKD. 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    Notes urine is of lesser volume than before, does not have to strain and there is no hesitancy. Urinates 3 or more times per day. No dysuria, hematuria, nocturia, abdominal pain, fevers/chills, chest pain, SOB, leg swelling. BMs normal, has diarrhea on and off probably from IBS but has  not had Crohn's flares for about 5 years now.    - Hx of recurrent UTIs: none  - Hx of stones: Yes, recurrent with last episode 4/2019  - Rashes/Joint pain: no rashes, has some itchy skin in winter due to dryness and uses emollient with good benefit. No joint pain/swelling/stiffness  - Family hx of kidney disease: None  - NSAID use: stopped ibuprofen 2-3 yrs ago, uses tylenol or norco on occasion  - No h/o HTN, DM, smoking, very minimal EtOH use     Allergies   Allergen Reactions     Prednisone Other (See Comments)     Diana with more than 2.5mg chronic dosing of prednisone due to fluconazole interaction per patient.             calcium-vitamin D (CALTRATE) 600-400 MG-UNIT per tablet, Take 1 tablet by mouth as needed (PT last dose 1.26.2020)        Cyanocobalamin (VITAMIN B 12 PO), Take 500 mcg by mouth At Bedtime        dicyclomine (BENTYL) 10 MG capsule, Take 1 capsule (10 mg) by mouth 2 times daily (before meals)       diphenoxylate-atropine (LOMOTIL) 2.5-0.025 MG tablet, Take 1 tablet by mouth 4 times daily as needed for diarrhea       fluconazole (DIFLUCAN) 200 MG tablet, Take 1 tablet (200 mg) by mouth daily       HYDROcodone-acetaminophen (NORCO) 5-325 MG tablet, Take 1 tablet by mouth every 6 hours as needed for pain (Patient taking differently: Take 1 tablet by mouth every 6 hours as needed for pain (PT last dose approx 2 months ago 1.27.2020) )       MELATONIN PO, Take 1.5 mg by mouth nightly as needed (PT last dose 1.27.2020)        multivitamin, therapeutic with minerals (MULTI-VITAMIN) TABS, Take 1 tablet by mouth every evening        ondansetron (ZOFRAN ODT) 4 MG ODT tab, Take 1 tablet (4 mg) by mouth every 8 hours as needed for nausea or vomiting       tiZANidine (ZANAFLEX) 4 MG tablet, Take 1-2 tablets (4-8 mg) by mouth 3 times daily as needed for muscle spasms       traZODone (DESYREL) 50 MG tablet, Take 1 tablet (50 mg) by mouth At Bedtime    No current facility-administered medications on file  prior to visit.       Past Medical History:   Diagnosis Date     Anemia 2007     Anxiety 2012?    much worse since July 2014     Cataract 12/2007    both eyes, too much prednisone, implants     Coccidioidomycosis      Crohn disease (H)      Crohn's disease (H) 1/13/2015     Depressive disorder 7/2014    much worse since July 2014     Fracture 1956    green fracture of leg     Gallbladder problem 2005?    much gravel, removed     GERD (gastroesophageal reflux disease)      History of blood transfusion 1988    ulcerated anastomosis term. ilium bleed     Hypertriglyceridemia 7/8/2016     Infection 2007    persistant coccidioidomycosis     Kidney stone various    both sides, all passed naturally     Mental health problem 2007    bipolar though perhaps different     Nephrolithiasis      Osteoporosis 2007    osteoporosis, too much prednisone, last dexa 1/2014     Osteoporosis      Other nervous system complications 2007    prednisone overload induced kevin     Personal history of colonic polyps     small ones found during colonoscopies     Steroid-induced psychosis     Patient extremely sensitive to regular doses of steroids.  Unclear if this is due to chronic fluconazole use or genetic issue.  In the future, use caution when prescribing steroids.     TB (tuberculosis)        Past Surgical History:   Procedure Laterality Date     APPENDECTOMY  1974    coincidental with Crohn's surgery     BACK SURGERY  12/2007    kyphoplasty on compressed 4th??? vertebra     BIOPSY  2007, 2013    lump on arm, knee, back of hand for coccidioidomycosis cult.     CHOLECYSTECTOMY  2005    much gravel, removed laparoscopically     COLONOSCOPY  7/14/2014 last    Dr. Catherine Bowden, [facility] - many previous     COLONOSCOPY Left 9/11/2015    Procedure: COMBINED COLONOSCOPY, SINGLE OR MULTIPLE BIOPSY/POLYPECTOMY BY BIOPSY;  Surgeon: Fransisco Leach MD;  Location:  GI     COLONOSCOPY N/A 8/3/2016    Procedure: COMBINED  "COLONOSCOPY, SINGLE OR MULTIPLE BIOPSY/POLYPECTOMY BY BIOPSY;  Surgeon: Fransisco Leach MD;  Location: [facility] GI     COLONOSCOPY N/A 8/16/2017    Procedure: COMBINED COLONOSCOPY, SINGLE OR MULTIPLE BIOPSY/POLYPECTOMY BY BIOPSY;;  Surgeon: Fransisco Leach MD;  Location: [facility] OR     COLONOSCOPY N/A 10/1/2019    Procedure: Colonoscopy With Colonic Stent Insertion;  Surgeon: Robbie Cruz MD;  Location: [facility] OR     COLONOSCOPY N/A 2/5/2020    Procedure: COLONOSCOPY, FOREIGN BODY REMOVAL;  Surgeon: Robbie Cruz MD;  Location: [facility] OR     ENT SURGERY  ?    upper endoscopy     ESOPHAGOSCOPY, GASTROSCOPY, DUODENOSCOPY (EGD), COMBINED N/A 11/8/2016    Procedure: COMBINED ENDOSCOPIC ULTRASOUND, ESOPHAGOSCOPY, GASTROSCOPY, DUODENOSCOPY (EGD), FINE NEEDLE ASPIRATE/BIOPSY;  Surgeon: Guru Alexsandra Ballesteros MD;  Location: [facility] GI     ESOPHAGOSCOPY, GASTROSCOPY, DUODENOSCOPY (EGD), COMBINED N/A 11/8/2016    Procedure: COMBINED ESOPHAGOSCOPY, GASTROSCOPY, DUODENOSCOPY (EGD), BIOPSY SINGLE OR MULTIPLE;  Surgeon: Guru Alexsandra Ballesteros MD;  Location: [facility] GI     ESOPHAGOSCOPY, GASTROSCOPY, DUODENOSCOPY (EGD), COMBINED N/A 8/16/2017    Procedure: COMBINED ESOPHAGOSCOPY, GASTROSCOPY, DUODENOSCOPY (EGD), BIOPSY SINGLE OR MULTIPLE;;  Surgeon: Fransisco Leach MD;  Location: [facility] OR     EYE SURGERY  12/2007    cataract surgery both sides     GI SURGERY  1974, 1986(x2), 1989    Crohn's, 1974 RO 18\" term. ilium + 9\" asc. colon all one pc     SOFT TISSUE SURGERY  3/2013    removed buildup on back of r hand, coccidioidomycosis       Social History     Tobacco Use     Smoking status: Never Smoker     Smokeless tobacco: Never Used   Substance Use Topics     Alcohol use: Yes     Comment: sometimes one glass of wine a week     Drug use: No       Family History   Problem Relation Age of Onset     Heart Failure Father      Musculoskeletal Disorder Father         Parkinson's     Cancer - colorectal " Mother      Cerebrovascular Disease Paternal Grandmother      Cancer Other      Musculoskeletal Disorder Brother         Parkinson's     Anesthesia Reaction No family hx of      Deep Vein Thrombosis (DVT) No family hx of        ROS: A 12 system review of systems was negative other than noted here or above.     Exam:  There were no vitals taken for this visit.    GENERAL APPEARANCE: alert and no distress  EYES: EOMI, no scleral icterus  HENT: mouth without ulcers or lesions  RESP: breathing non-labored  SKIN: no rash on face  NEURO: mentation intact and speech normal  PSYCH: affect normal/bright    Results:    No visits with results within 1 Day(s) from this visit.   Latest known visit with results is:   Orders Only on 10/07/2020   Component Date Value Ref Range Status     Sodium 10/07/2020 138  133 - 144 mmol/L Final     Potassium 10/07/2020 4.2  3.4 - 5.3 mmol/L Final     Chloride 10/07/2020 104  94 - 109 mmol/L Final     Carbon Dioxide 10/07/2020 27  20 - 32 mmol/L Final     Anion Gap 10/07/2020 7  3 - 14 mmol/L Final     Glucose 10/07/2020 93  70 - 99 mg/dL Final     Urea Nitrogen 10/07/2020 18  7 - 30 mg/dL Final     Creatinine 10/07/2020 1.51* 0.66 - 1.25 mg/dL Final     GFR Estimate 10/07/2020 48* >60 mL/min/[1.73_m2] Final    Comment: Non  GFR Calc  Starting 12/18/2018, serum creatinine based estimated GFR (eGFR) will be   calculated using the Chronic Kidney Disease Epidemiology Collaboration   (CKD-EPI) equation.       GFR Estimate If Black 10/07/2020 55* >60 mL/min/[1.73_m2] Final    Comment:  GFR Calc  Starting 12/18/2018, serum creatinine based estimated GFR (eGFR) will be   calculated using the Chronic Kidney Disease Epidemiology Collaboration   (CKD-EPI) equation.       Calcium 10/07/2020 9.6  8.5 - 10.1 mg/dL Final     Bilirubin Total 10/07/2020 0.8  0.2 - 1.3 mg/dL Final     Albumin 10/07/2020 3.9  3.4 - 5.0 g/dL Final     Protein Total 10/07/2020 7.4  6.8 - 8.8 g/dL  Final     Alkaline Phosphatase 10/07/2020 77  40 - 150 U/L Final     ALT 10/07/2020 28  0 - 70 U/L Final     AST 10/07/2020 16  0 - 45 U/L Final     Vitamin B12 10/07/2020 350  193 - 986 pg/mL Final       Attestation signed by Harshad Quiroz MD at 12/10/2020  2:18 PM:  Attending note: I have discussed with the trainee and the above note reflects my review of the history and assessment and plan.    Harshad Quiroz MD  [phone]       Again, thank you for allowing me to participate in the care of your patient.      Sincerely,    Teja Cuenca MD

## 2020-12-04 NOTE — PATIENT INSTRUCTIONS
We recommend:  - Low oxalate diet  - Low sodium diet for goal intake <2g per day  - No dietary calcium restriction and okay to resume calcium supplement  - Increase fluid intake to 3L or greater per day  - Please limit animal protein intake    Foods that are particularly high in oxalate including spinach, rhubarb, beets, nuts, nut butters, and black teas.    We will repeat a urinalysis as well as a Litholink test to see what the composition of your urine is and if further supplementation would help prevent recurrence of stones.    Will see you back in about 3 months    Pleasure talking to you today!

## 2020-12-04 NOTE — PROGRESS NOTES
"Trevin Gee is a 65 year old male who is being evaluated via a billable video visit.    The patient has been notified of following:   \"This video visit will be conducted via a call between you and your physician/provider. We have found that certain health care needs can be provided without the need for an in-person physical exam.  This service lets us provide the care you need with a video conversation.  If a prescription is necessary we can send it directly to your pharmacy.  If lab work is needed we can place an order for that and you can then stop by our lab to have the test done at a later time.    Video visits are billed at different rates depending on your insurance coverage.  Please reach out to your insurance provider with any questions.    If during the course of the call the physician/provider feels a video visit is not appropriate, you will not be charged for this service.\"    Patient has given verbal consent for Video visit? Yes  How would you like to obtain your AVS? MyChart  If you are dropped from the video visit, the video invite should be resent to: Text to cell phone: [phone]  Will anyone else be joining your video visit? No        Video-Visit Details    Type of service:  Video Visit    Video Start Time: 10:46 AM  Video End Time: 11:47 AM    Originating Location (pt. Location): Home    Distant Location (provider location):  [facility] [place]    Platform used for Video Visit: [employer]    Staffed with Dr. Richie Cuenca MD    December 4, 2020    Assessment/Plan:  CKD  Baseline Cr ~ 1.3 as far back as 2016.  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  Grossly GFR appears stable and will continue to follow closely for now.    CaOxalate Nephrolithiasis  Patient with history of recurrent stones with analysis " last year revealing a composition of calcium oxalate.  Also obtained Litholink last year which suggested hypocitraturia, hypomagnesemia and hyperoxaluria.  This is likely in the setting of Crohn's disease and bowel resection history.  Discussed dietary modifications in detail and will reevaluate with repeat urinalysis and Litholink to see if citrate or magnesium supplementation would be of benefit.  Patient expressed understanding but admittedly stated he was unsure of how willing he would be in terms of following recommendations.  - Low oxalate diet  - Low Na diet for goal intake <2g per day  - No dietary Ca restriction and okay to resume Ca supplement  - Increase fluid intake to 3L or greater per day  - Limit animal protein intake    Orders Placed This Encounter   Procedures     Routine UA with microscopic     Litholink x2: Laboratory Miscellaneous Order     Teja Cuenca MD  Renal Fellow  [phone]    I was asked to see this patient by Dr. Feng    CC: Cr elevation    HPI: Trevin Gee is a 65 year old male who presents for evaluation of possible CKD. 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    Notes urine is of lesser volume than before, does not have to strain and there is no hesitancy. Urinates 3 or more times per day. No dysuria, hematuria, nocturia, abdominal pain, fevers/chills, chest pain, SOB, leg swelling. BMs normal, has diarrhea on and off probably from IBS but has not had Crohn's flares for about 5 years now.    - Hx of recurrent UTIs: none  - Hx of stones: Yes, recurrent with last episode 4/2019  - Rashes/Joint pain: no rashes, has some itchy skin in winter due to dryness and uses emollient with good benefit. No joint pain/swelling/stiffness  - Family hx of kidney disease:  None  - NSAID use: stopped ibuprofen 2-3 yrs ago, uses tylenol or norco on occasion  - No h/o HTN, DM, smoking, very minimal EtOH use     Allergies   Allergen Reactions     Prednisone Other (See Comments)     Diaan with more than 2.5mg chronic dosing of prednisone due to fluconazole interaction per patient.             calcium-vitamin D (CALTRATE) 600-400 MG-UNIT per tablet, Take 1 tablet by mouth as needed (PT last dose 1.26.2020)        Cyanocobalamin (VITAMIN B 12 PO), Take 500 mcg by mouth At Bedtime        dicyclomine (BENTYL) 10 MG capsule, Take 1 capsule (10 mg) by mouth 2 times daily (before meals)       diphenoxylate-atropine (LOMOTIL) 2.5-0.025 MG tablet, Take 1 tablet by mouth 4 times daily as needed for diarrhea       fluconazole (DIFLUCAN) 200 MG tablet, Take 1 tablet (200 mg) by mouth daily       HYDROcodone-acetaminophen (NORCO) 5-325 MG tablet, Take 1 tablet by mouth every 6 hours as needed for pain (Patient taking differently: Take 1 tablet by mouth every 6 hours as needed for pain (PT last dose approx 2 months ago 1.27.2020) )       MELATONIN PO, Take 1.5 mg by mouth nightly as needed (PT last dose 1.27.2020)        multivitamin, therapeutic with minerals (MULTI-VITAMIN) TABS, Take 1 tablet by mouth every evening        ondansetron (ZOFRAN ODT) 4 MG ODT tab, Take 1 tablet (4 mg) by mouth every 8 hours as needed for nausea or vomiting       tiZANidine (ZANAFLEX) 4 MG tablet, Take 1-2 tablets (4-8 mg) by mouth 3 times daily as needed for muscle spasms       traZODone (DESYREL) 50 MG tablet, Take 1 tablet (50 mg) by mouth At Bedtime    No current facility-administered medications on file prior to visit.       Past Medical History:   Diagnosis Date     Anemia 2007     Anxiety 2012?    much worse since July 2014     Cataract 12/2007    both eyes, too much prednisone, implants     Coccidioidomycosis      Crohn disease (H)      Crohn's disease (H) 1/13/2015     Depressive disorder 7/2014    much worse  since July 2014     Fracture 1956    green fracture of leg     Gallbladder problem 2005?    much gravel, removed     GERD (gastroesophageal reflux disease)      History of blood transfusion 1988    ulcerated anastomosis term. ilium bleed     Hypertriglyceridemia 7/8/2016     Infection 2007    persistant coccidioidomycosis     Kidney stone various    both sides, all passed naturally     Mental health problem 2007    bipolar though perhaps different     Nephrolithiasis      Osteoporosis 2007    osteoporosis, too much prednisone, last dexa 1/2014     Osteoporosis      Other nervous system complications 2007    prednisone overload induced kevin     Personal history of colonic polyps     small ones found during colonoscopies     Steroid-induced psychosis     Patient extremely sensitive to regular doses of steroids.  Unclear if this is due to chronic fluconazole use or genetic issue.  In the future, use caution when prescribing steroids.     TB (tuberculosis)        Past Surgical History:   Procedure Laterality Date     APPENDECTOMY  1974    coincidental with Crohn's surgery     BACK SURGERY  12/2007    kyphoplasty on compressed 4th??? vertebra     BIOPSY  2007, 2013    lump on arm, knee, back of hand for coccidioidomycosis cult.     CHOLECYSTECTOMY  2005    much gravel, removed laparoscopically     COLONOSCOPY  7/14/2014 last    Dr. Catherine Bowden, [facility] - many previous     COLONOSCOPY Left 9/11/2015    Procedure: COMBINED COLONOSCOPY, SINGLE OR MULTIPLE BIOPSY/POLYPECTOMY BY BIOPSY;  Surgeon: Fransisco Leach MD;  Location: [facility]     COLONOSCOPY N/A 8/3/2016    Procedure: COMBINED COLONOSCOPY, SINGLE OR MULTIPLE BIOPSY/POLYPECTOMY BY BIOPSY;  Surgeon: Fransisco Leach MD;  Location: [facility]     COLONOSCOPY N/A 8/16/2017    Procedure: COMBINED COLONOSCOPY, SINGLE OR MULTIPLE BIOPSY/POLYPECTOMY BY BIOPSY;;  Surgeon: Fransisco Leach MD;  [facility]     COLONOSCOPY N/A 10/1/2019     "Procedure: Colonoscopy With Colonic Stent Insertion;  Surgeon: Robbie Cruz MD;  Location: [facility]     COLONOSCOPY N/A 2/5/2020    Procedure: COLONOSCOPY, FOREIGN BODY REMOVAL;  Surgeon: Robbie Cruz MD;  Location: [facility]     ENT SURGERY  ?    upper endoscopy     ESOPHAGOSCOPY, GASTROSCOPY, DUODENOSCOPY (EGD), COMBINED N/A 11/8/2016    Procedure: COMBINED ENDOSCOPIC ULTRASOUND, ESOPHAGOSCOPY, GASTROSCOPY, DUODENOSCOPY (EGD), FINE NEEDLE ASPIRATE/BIOPSY;  Surgeon: Guru Alexsandra Ballesteros MD;  Location: [facility]     ESOPHAGOSCOPY, GASTROSCOPY, DUODENOSCOPY (EGD), COMBINED N/A 11/8/2016    Procedure: COMBINED ESOPHAGOSCOPY, GASTROSCOPY, DUODENOSCOPY (EGD), BIOPSY SINGLE OR MULTIPLE;  Surgeon: Guru Alexsandra Ballesteros MD;  Location: [facility]     ESOPHAGOSCOPY, GASTROSCOPY, DUODENOSCOPY (EGD), COMBINED N/A 8/16/2017    Procedure: COMBINED ESOPHAGOSCOPY, GASTROSCOPY, DUODENOSCOPY (EGD), BIOPSY SINGLE OR MULTIPLE;;  Surgeon: Fransisco Leach MD;  [facility]     EYE SURGERY  12/2007    cataract surgery both sides     GI SURGERY  1974, 1986(x2), 1989    Crohn's, 1974 RO 18\" term. ilium + 9\" asc. colon all one pc     SOFT TISSUE SURGERY  3/2013    removed buildup on back of r hand, coccidioidomycosis       Social History     Tobacco Use     Smoking status: Never Smoker     Smokeless tobacco: Never Used   Substance Use Topics     Alcohol use: Yes     Comment: sometimes one glass of wine a week     Drug use: No       Family History   Problem Relation Age of Onset     Heart Failure Father      Musculoskeletal Disorder Father         Parkinson's     Cancer - colorectal Mother      Cerebrovascular Disease Paternal Grandmother      Cancer Other      Musculoskeletal Disorder Brother         Parkinson's     Anesthesia Reaction No family hx of      Deep Vein Thrombosis (DVT) No family hx of        ROS: A 12 system review of systems was negative other than noted here or above. "     Exam:  There were no vitals taken for this visit.    GENERAL APPEARANCE: alert and no distress  EYES: EOMI, no scleral icterus  HENT: mouth without ulcers or lesions  RESP: breathing non-labored  SKIN: no rash on face  NEURO: mentation intact and speech normal  PSYCH: affect normal/bright    Results:    No visits with results within 1 Day(s) from this visit.   Latest known visit with results is:   Orders Only on 10/07/2020   Component Date Value Ref Range Status     Sodium 10/07/2020 138  133 - 144 mmol/L Final     Potassium 10/07/2020 4.2  3.4 - 5.3 mmol/L Final     Chloride 10/07/2020 104  94 - 109 mmol/L Final     Carbon Dioxide 10/07/2020 27  20 - 32 mmol/L Final     Anion Gap 10/07/2020 7  3 - 14 mmol/L Final     Glucose 10/07/2020 93  70 - 99 mg/dL Final     Urea Nitrogen 10/07/2020 18  7 - 30 mg/dL Final     Creatinine 10/07/2020 1.51* 0.66 - 1.25 mg/dL Final     GFR Estimate 10/07/2020 48* >60 mL/min/[1.73_m2] Final    Comment: Non  GFR Calc  Starting 12/18/2018, serum creatinine based estimated GFR (eGFR) will be   calculated using the Chronic Kidney Disease Epidemiology Collaboration   (CKD-EPI) equation.       GFR Estimate If Black 10/07/2020 55* >60 mL/min/[1.73_m2] Final    Comment:  GFR Calc  Starting 12/18/2018, serum creatinine based estimated GFR (eGFR) will be   calculated using the Chronic Kidney Disease Epidemiology Collaboration   (CKD-EPI) equation.       Calcium 10/07/2020 9.6  8.5 - 10.1 mg/dL Final     Bilirubin Total 10/07/2020 0.8  0.2 - 1.3 mg/dL Final     Albumin 10/07/2020 3.9  3.4 - 5.0 g/dL Final     Protein Total 10/07/2020 7.4  6.8 - 8.8 g/dL Final     Alkaline Phosphatase 10/07/2020 77  40 - 150 U/L Final     ALT 10/07/2020 28  0 - 70 U/L Final     AST 10/07/2020 16  0 - 45 U/L Final     Vitamin B12 10/07/2020 350  193 - 986 pg/mL Final

## 2020-12-07 ENCOUNTER — VIRTUAL VISIT (OUTPATIENT)
Dept: FAMILY MEDICINE | Facility: OTHER | Age: 65
End: 2020-12-07
Payer: COMMERCIAL

## 2020-12-07 PROCEDURE — 99421 OL DIG E/M SVC 5-10 MIN: CPT | Performed by: PHYSICIAN ASSISTANT

## 2020-12-07 NOTE — PROGRESS NOTES
"Date: 2020 17:55:53  Clinician: Rudi Pang  Clinician NPI: [id number]  Patient: Trevin Gee  Patient : 1955  Patient Address: [facility] [address] [place] [id number]  Patient Phone: [phone]  Visit Protocol: URI  Patient Summary:  Trevin is a 65 year old ( : 1955 ) male who initiated a [facility] Visit for COVID-19 (Coronavirus) evaluation and screening. When asked the question \"Please sign me up to receive news, health information and promotions from [facility].\", Trevin responded \"No\".    Trevin states his symptoms started 1-2 days ago.   His symptoms consist of chills, malaise, and diarrhea. Trevin also feels feverish.   Symptom details   Temperature: His current temperature is 98.4 degrees Fahrenheit.    Trevin denies having ear pain, headache, wheezing, cough, nasal congestion, nausea, vomiting, rhinitis, facial pain or pressure, myalgias, sore throat, teeth pain, ageusia, and anosmia. He also denies taking antibiotic medication in the past month and having recent facial or sinus surgery in the past 60 days. He is not experiencing dyspnea.   Precipitating events  He has not recently been exposed to someone with influenza. Trevin has not been in close contact with any high risk individuals.   Pertinent COVID-19 (Coronavirus) information  Trevin does not work or volunteer as healthcare worker or a . In the past 14 days, Trevin has not worked or volunteered at a healthcare facility or group living setting.   In the past 14 days, he also has not lived in a congregate living setting.   Trevin has not had a close contact with a laboratory-confirmed COVID-19 patient within 14 days of symptom onset.    Since 2019, Trevin has not been tested for COVID-19 and has not had upper respiratory infection or influenza-like illness.   Pertinent medical history   He reports having immunosuppressive condition(s). Other immunosuppressive condition as reported by the patient (free text):  deep " depression for over a week just prior to getting sick  He has been told by his provider to avoid NSAIDs.   Trevin does not have diabetes. He does not have severe COPD and congestive heart failure. He does not have asthma.   Trevin does not need a return to work/school note.   Weight: 147 lbs   Trevin does not smoke or use smokeless tobacco.   Additional information as reported by the patient (free text): Though temperature is normal now, it was over 100 yesterday.  Also yesterday, chills were worse and I had a very persistent headache, possibly from dehydration.  There were periods when I was urinating every hour or two.  It was not clear that I was keeping up with oral hydration.  Today is the first I experienced diarrhea since symptoms began.   Weight: 147 lbs    MEDICATIONS: diphenoxylate-atropine oral, dicyclomine oral, trazodone oral, fluconazole oral, ALLERGIES: NKDA  Clinician Response:  Dear Trevin,   Your symptoms show that you may have coronavirus (COVID-19). This illness can cause fever, cough and trouble breathing. Many people get a mild case and get better on their own. Some people can get very sick.  What should I do?  We would like to test you for this virus.   1. Please call [phone] to schedule your visit. Explain that you were referred by [facility] to have a COVID-19 test. Be ready to share your [facility] visit ID number.  * If you need to schedule in [facility] please call [phone] or for [employer] employees please call [phone].  * If you need to schedule in the [place] area please call [phone]. [place] employees call [phone].  The following will serve as your written order for this COVID Test, ordered by me, for the indication of suspected COVID [Z20.828]: The test will be ordered in [employer], our electronic health record, after you are scheduled. It will show as ordered and authorized by Juan Jose Nelson MD.  Order: COVID-19 (Coronavirus) PCR for SYMPTOMATIC testing from [facility].   2. When it's  "time for your COVID test:  Stay at least 6 feet away from others. (If someone will drive you to your test, stay in the backseat, as far away from the  as you can.)   Cover your mouth and nose with a mask, tissue or washcloth.  Go straight to the testing site. Don't make any stops on the way there or back.      3.Starting now: Stay home and away from others (self-isolate) until:   You've had no fever---and no medicine that reduces fever---for one full day (24 hours). And...   Your other symptoms have gotten better. For example, your cough or breathing has improved. And...   At least 10 days have passed since your symptoms started.       During this time, don't leave the house except for testing or medical care.   Stay in your own room, even for meals. Use your own bathroom if you can.   Stay away from others in your home. No hugging, kissing or shaking hands. No visitors.  Don't go to work, school or anywhere else.    Clean \"high touch\" surfaces often (doorknobs, counters, handles, etc.). Use a household cleaning spray or wipes. You'll find a full list of  on the EPA website: www.epa.gov/pesticide-registration/list-n-disinfectants-use-against-sars-cov-2.   Cover your mouth and nose with a mask, tissue or washcloth to avoid spreading germs.  Wash your hands and face often. Use soap and water.  Caregivers in these groups are at risk for severe illness due to COVID-19:  o People 65 years and older  o People who live in a nursing home or long-term care facility  o People with chronic disease (lung, heart, cancer, diabetes, kidney, liver, immunologic)  o People who have a weakened immune system, including those who:   Are in cancer treatment  Take medicine that weakens the immune system, such as corticosteroids  Had a bone marrow or organ transplant  Have an immune deficiency  Have poorly controlled HIV or AIDS  Are obese (body mass index of 40 or higher)  Smoke regularly   o Caregivers should wear gloves " while washing dishes, handling laundry and cleaning bedrooms and bathrooms.  o Use caution when washing and drying laundry: Don't shake dirty laundry, and use the warmest water setting that you can.  o For more tips, go to www.cdc.gov/coronavirus/2019-ncov/downloads/10Things.pdf.    How can I take care of myself?    Get lots of rest. Drink extra fluids (unless a doctor has told you not to).   Take Tylenol (acetaminophen) for fever or pain. If you have liver or kidney problems, ask your family doctor if it's okay to take Tylenol.   Adults can take either:    650 mg (two 325 mg pills) every 4 to 6 hours, or...   1,000 mg (two 500 mg pills) every 8 hours as needed.    Note: Don't take more than 3,000 mg in one day. Acetaminophen is found in many medicines (both prescribed and over-the-counter medicines). Read all labels to be sure you don't take too much.   For children, check the Tylenol bottle for the right dose. The dose is based on the child's age or weight.    If you have other health problems (like cancer, heart failure, an organ transplant or severe kidney disease): Call your specialty clinic if you don't feel better in the next 2 days.       Know when to call 911. Emergency warning signs include:    Trouble breathing or shortness of breath Pain or pressure in the chest that doesn't go away Feeling confused like you haven't felt before, or not being able to wake up Bluish-colored lips or face.  Where can I get more information?   [facility] -- About COVID-19: [email or web address]   CDC -- What to Do If You're Sick: www.cdc.gov/coronavirus/2019-ncov/about/steps-when-sick.html   CDC -- Ending Home Isolation: www.cdc.gov/coronavirus/2019-ncov/hcp/disposition-in-home-patients.html   CDC -- Caring for Someone: www.cdc.gov/coronavirus/2019-ncov/if-you-are-sick/care-for-someone.html   [facility] -- Interim Guidance for Hospital Discharge to Home:  www.health.[facility].mn.us/diseases/coronavirus/hcp/hospdischarge.pdf   [facility] clinical trials (COVID-19 research studies): clinicalaffairs.[facility]/umn-clinical-trials    Below are the COVID-19 hotlines at the [facility] ([facility]). Interpreters are available.    For health questions: Call 286-414-5212 or 1-818.144.3450 (7 a.m. to 7 p.m.) For questions about schools and childcare: Call 912-054-9197 or 1-957.574.7310 (7 a.m. to 7 p.m.)    Diagnosis: Contact with and (suspected) exposure to other viral communicable diseases  Diagnosis ICD: Z20.828

## 2020-12-08 DIAGNOSIS — Z20.822 SUSPECTED COVID-19 VIRUS INFECTION: Primary | ICD-10-CM

## 2020-12-08 DIAGNOSIS — Z20.822 SUSPECTED COVID-19 VIRUS INFECTION: ICD-10-CM

## 2020-12-08 PROCEDURE — U0003 INFECTIOUS AGENT DETECTION BY NUCLEIC ACID (DNA OR RNA); SEVERE ACUTE RESPIRATORY SYNDROME CORONAVIRUS 2 (SARS-COV-2) (CORONAVIRUS DISEASE [COVID-19]), AMPLIFIED PROBE TECHNIQUE, MAKING USE OF HIGH THROUGHPUT TECHNOLOGIES AS DESCRIBED BY CMS-2020-01-R: HCPCS | Performed by: PHYSICIAN ASSISTANT

## 2020-12-09 ENCOUNTER — TELEPHONE (OUTPATIENT)
Dept: GASTROENTEROLOGY | Facility: CLINIC | Age: 65
End: 2020-12-09

## 2020-12-09 LAB
SARS-COV-2 RNA SPEC QL NAA+PROBE: NOT DETECTED
SPECIMEN SOURCE: NORMAL

## 2020-12-09 NOTE — TELEPHONE ENCOUNTER
[facility] Call Center    Phone Message    May a detailed message be left on voicemail: yes     Reason for Call: Symptoms or Concerns     If patient has red-flag symptoms, warm transfer to triage line    Current symptom or concern: Explosive diarrhea, dry heaves, headache over weekend, and chills.  Patient stated when he eats, he get diarrhea, and when he doesn't eat much, he passes mucus.    Symptoms have been present for:  5 day(s)    Has patient previously been seen for this? No    Are there any new or worsening symptoms? Yes: Explosive diarrhea and passing mucus.      Action Taken: Message routed to:  [facility]    Travel Screening: Not Applicable

## 2020-12-10 ENCOUNTER — HOSPITAL ENCOUNTER (EMERGENCY)
Facility: CLINIC | Age: 65
Discharge: HOME | End: 2020-12-10
Attending: EMERGENCY MEDICINE | Admitting: EMERGENCY MEDICINE
Payer: COMMERCIAL

## 2020-12-10 VITALS
WEIGHT: 152 LBS | HEIGHT: 70 IN | SYSTOLIC BLOOD PRESSURE: 104 MMHG | TEMPERATURE: 98.2 F | BODY MASS INDEX: 21.76 KG/M2 | HEART RATE: 74 BPM | DIASTOLIC BLOOD PRESSURE: 67 MMHG | RESPIRATION RATE: 16 BRPM | OXYGEN SATURATION: 97 %

## 2020-12-10 DIAGNOSIS — E86.0 DEHYDRATION: ICD-10-CM

## 2020-12-10 DIAGNOSIS — K58.8 OTHER IRRITABLE BOWEL SYNDROME: ICD-10-CM

## 2020-12-10 LAB
ALBUMIN SERPL-MCNC: 3.7 G/DL (ref 3.4–5)
ALBUMIN UR-MCNC: NEGATIVE MG/DL
ALP SERPL-CCNC: 89 U/L (ref 40–150)
ALT SERPL W P-5'-P-CCNC: 30 U/L (ref 0–70)
ANION GAP SERPL CALCULATED.3IONS-SCNC: 10 MMOL/L (ref 3–14)
APPEARANCE UR: CLEAR
AST SERPL W P-5'-P-CCNC: 33 U/L (ref 0–45)
BASOPHILS # BLD AUTO: 0 10E9/L (ref 0–0.2)
BASOPHILS NFR BLD AUTO: 0.2 %
BILIRUB SERPL-MCNC: 0.8 MG/DL (ref 0.2–1.3)
BILIRUB UR QL STRIP: NEGATIVE
BUN SERPL-MCNC: 22 MG/DL (ref 7–30)
CALCIUM SERPL-MCNC: 8.9 MG/DL (ref 8.5–10.1)
CHLORIDE SERPL-SCNC: 99 MMOL/L (ref 94–109)
CO2 SERPL-SCNC: 24 MMOL/L (ref 20–32)
COLOR UR AUTO: ABNORMAL
CREAT SERPL-MCNC: 1.59 MG/DL (ref 0.66–1.25)
DIFFERENTIAL METHOD BLD: ABNORMAL
EOSINOPHIL # BLD AUTO: 0 10E9/L (ref 0–0.7)
EOSINOPHIL NFR BLD AUTO: 0 %
ERYTHROCYTE [DISTWIDTH] IN BLOOD BY AUTOMATED COUNT: 12.6 % (ref 10–15)
GFR SERPL CREATININE-BSD FRML MDRD: 45 ML/MIN/{1.73_M2}
GLUCOSE SERPL-MCNC: 76 MG/DL (ref 70–99)
GLUCOSE UR STRIP-MCNC: NEGATIVE MG/DL
HCT VFR BLD AUTO: 46.7 % (ref 40–53)
HGB BLD-MCNC: 16.7 G/DL (ref 13.3–17.7)
HGB UR QL STRIP: NEGATIVE
IMM GRANULOCYTES # BLD: 0 10E9/L (ref 0–0.4)
IMM GRANULOCYTES NFR BLD: 0.2 %
KETONES UR STRIP-MCNC: 40 MG/DL
LEUKOCYTE ESTERASE UR QL STRIP: NEGATIVE
LIPASE SERPL-CCNC: 217 U/L (ref 73–393)
LYMPHOCYTES # BLD AUTO: 0.7 10E9/L (ref 0.8–5.3)
LYMPHOCYTES NFR BLD AUTO: 16.7 %
MCH RBC QN AUTO: 32.6 PG (ref 26.5–33)
MCHC RBC AUTO-ENTMCNC: 35.8 G/DL (ref 31.5–36.5)
MCV RBC AUTO: 91 FL (ref 78–100)
MONOCYTES # BLD AUTO: 0.7 10E9/L (ref 0–1.3)
MONOCYTES NFR BLD AUTO: 15.4 %
MUCOUS THREADS #/AREA URNS LPF: PRESENT /LPF
NEUTROPHILS # BLD AUTO: 2.9 10E9/L (ref 1.6–8.3)
NEUTROPHILS NFR BLD AUTO: 67.5 %
NITRATE UR QL: NEGATIVE
NRBC # BLD AUTO: 0 10*3/UL
NRBC BLD AUTO-RTO: 0 /100
PH UR STRIP: 5.5 PH (ref 5–7)
PLATELET # BLD AUTO: 171 10E9/L (ref 150–450)
POTASSIUM SERPL-SCNC: 3.9 MMOL/L (ref 3.4–5.3)
PROT SERPL-MCNC: 7.2 G/DL (ref 6.8–8.8)
RBC # BLD AUTO: 5.13 10E12/L (ref 4.4–5.9)
RBC #/AREA URNS AUTO: <1 /HPF (ref 0–2)
SODIUM SERPL-SCNC: 133 MMOL/L (ref 133–144)
SOURCE: ABNORMAL
SP GR UR STRIP: 1 (ref 1–1.03)
UROBILINOGEN UR STRIP-MCNC: NORMAL MG/DL (ref 0–2)
WBC # BLD AUTO: 4.4 10E9/L (ref 4–11)
WBC #/AREA URNS AUTO: <1 /HPF (ref 0–5)

## 2020-12-10 PROCEDURE — 99284 EMERGENCY DEPT VISIT MOD MDM: CPT | Mod: 25 | Performed by: EMERGENCY MEDICINE

## 2020-12-10 PROCEDURE — 258N000003 HC RX IP 258 OP 636: Performed by: EMERGENCY MEDICINE

## 2020-12-10 PROCEDURE — 80053 COMPREHEN METABOLIC PANEL: CPT | Performed by: EMERGENCY MEDICINE

## 2020-12-10 PROCEDURE — 96361 HYDRATE IV INFUSION ADD-ON: CPT | Performed by: EMERGENCY MEDICINE

## 2020-12-10 PROCEDURE — 85025 COMPLETE CBC W/AUTO DIFF WBC: CPT | Performed by: EMERGENCY MEDICINE

## 2020-12-10 PROCEDURE — 99284 EMERGENCY DEPT VISIT MOD MDM: CPT | Performed by: EMERGENCY MEDICINE

## 2020-12-10 PROCEDURE — 83690 ASSAY OF LIPASE: CPT | Performed by: EMERGENCY MEDICINE

## 2020-12-10 PROCEDURE — 81001 URINALYSIS AUTO W/SCOPE: CPT | Performed by: EMERGENCY MEDICINE

## 2020-12-10 PROCEDURE — 96360 HYDRATION IV INFUSION INIT: CPT | Performed by: EMERGENCY MEDICINE

## 2020-12-10 RX ORDER — SODIUM CHLORIDE 9 MG/ML
INJECTION, SOLUTION INTRAVENOUS CONTINUOUS
Status: DISCONTINUED | OUTPATIENT
Start: 2020-12-10 | End: 2020-12-10 | Stop reason: HOSPADM

## 2020-12-10 RX ADMIN — SODIUM CHLORIDE 1000 ML: 9 INJECTION, SOLUTION INTRAVENOUS at 09:56

## 2020-12-10 RX ADMIN — SODIUM CHLORIDE 1000 ML: 9 INJECTION, SOLUTION INTRAVENOUS at 11:05

## 2020-12-10 RX ADMIN — SODIUM CHLORIDE: 9 INJECTION, SOLUTION INTRAVENOUS at 12:17

## 2020-12-10 ASSESSMENT — MIFFLIN-ST. JEOR: SCORE: 1480.72

## 2020-12-10 ASSESSMENT — ENCOUNTER SYMPTOMS
SHORTNESS OF BREATH: 0
DIFFICULTY URINATING: 0
ARTHRALGIAS: 0
BLOOD IN STOOL: 0
FEVER: 0
CHILLS: 1
EYE REDNESS: 0
NECK STIFFNESS: 0
HEADACHES: 1
CONSTIPATION: 0
DIARRHEA: 1
ABDOMINAL PAIN: 0
COLOR CHANGE: 0
CONFUSION: 0

## 2020-12-10 NOTE — ED AVS SNAPSHOT
[facility] Emergency Department  [address] [address]  Phone: [phone]                                    Trevin Gee   MRN: [id number]    Department: [facility] Emergency Department   Date of Visit: 12/10/2020           After Visit Summary Signature Page    I have received my discharge instructions, and my questions have been answered. I have discussed any challenges I see with this plan with the nurse or doctor.    ..........................................................................................................................................  Patient/Patient Representative Signature      ..........................................................................................................................................  Patient Representative Print Name and Relationship to Patient    ..................................................               ................................................  Date                                   Time    ..........................................................................................................................................  Reviewed by Signature/Title    ...................................................              ..............................................  Date                                               Time          22EPIC Rev 08/18

## 2020-12-10 NOTE — ED TRIAGE NOTES
Trevin was BIBA today for evaluation of weakness and diarrhea.  Over the weekend he experienced HA, fever, and, chills.  Tested for COVID on Tuesday with negative results.  He has experienced mucous stools with some urgency and has had little PO intake since Monday.  Denies fever.

## 2020-12-10 NOTE — DISCHARGE INSTRUCTIONS
Please make an appointment to follow up with GI Clinic (phone: [phone]) as soon as possible, call tomorrow to check in with symptoms.    Return if worse abdominal pain, fever or unable to eat.

## 2020-12-10 NOTE — ED PROVIDER NOTES
"    [place] EMERGENCY DEPARTMENT ([facility])  12/10/20  History     Chief Complaint   Patient presents with     Chills     Diarrhea     The history is provided by the patient and medical records.     Trevin Gee is a 65 year old male with a history of Crohn's disease (s/p ileocecostomy), IBS, and colon polyps who presents to the Emergency Department via EMS with diarrhea, and generalized weakness.  Patient states that his symptoms began to develop over the past weekend to (12/5) with headache, fever, and chills, and has had his symptoms worsen over the past 2 days with worsening fatigue, and ongoing diarrhea.  Patient states that he has had several episodes of diarrhea since the weekend, and reports his last meal was on Monday (12/7).  He has since developed ongoing lightheadedness that worsens when he feels urgency to pass a bowel movement.  He states he will wake up in the morning, feel urgency to pass stool, and will feel presyncopal.  He endorses one fainting episode where he had a \"controlled fall to the floor\".  Denies hitting his head.  Patient states that his current ongoing diarrhea has been \"explosive\", and different than his usual Crohn's flareups.  Denies any abdominal pain, abdominal distention, or apparent blood in the stools.  Patient states his stools have been \"watery\" with a mucus-like consistency.  Patient states he is not currently on any medications for Crohn's.    Past Medical History  Past Medical History:   Diagnosis Date     Anemia 2007     Anxiety 2012?    much worse since July 2014     Cataract 12/2007    both eyes, too much prednisone, implants     Coccidioidomycosis      Crohn disease (H)      Crohn's disease (H) 1/13/2015     Depressive disorder 7/2014    much worse since July 2014     Fracture 1956    green fracture of leg     Gallbladder problem 2005?    much gravel, removed     GERD (gastroesophageal reflux disease)      History of blood transfusion 1988    ulcerated " anastomosis term. ilium bleed     Hypertriglyceridemia 7/8/2016     Infection 2007    persistant coccidioidomycosis     Kidney stone various    both sides, all passed naturally     Mental health problem 2007    bipolar though perhaps different     Nephrolithiasis      Osteoporosis 2007    osteoporosis, too much prednisone, last dexa 1/2014     Osteoporosis      Other nervous system complications 2007    prednisone overload induced kevin     Personal history of colonic polyps     small ones found during colonoscopies     Steroid-induced psychosis     Patient extremely sensitive to regular doses of steroids.  Unclear if this is due to chronic fluconazole use or genetic issue.  In the future, use caution when prescribing steroids.     TB (tuberculosis)      Past Surgical History:   Procedure Laterality Date     APPENDECTOMY  1974    coincidental with Crohn's surgery     BACK SURGERY  12/2007    kyphoplasty on compressed 4th??? vertebra     BIOPSY  2007, 2013    lump on arm, knee, back of hand for coccidioidomycosis cult.     CHOLECYSTECTOMY  2005    much gravel, removed laparoscopically     COLONOSCOPY  7/14/2014 last    Dr. Catherine Bowden, [facility] - many previous     COLONOSCOPY Left 9/11/2015    Procedure: COMBINED COLONOSCOPY, SINGLE OR MULTIPLE BIOPSY/POLYPECTOMY BY BIOPSY;  Surgeon: Fransisco Leach MD;  Location: [facility] GI     COLONOSCOPY N/A 8/3/2016    Procedure: COMBINED COLONOSCOPY, SINGLE OR MULTIPLE BIOPSY/POLYPECTOMY BY BIOPSY;  Surgeon: Fransisco Leach MD;  Location: [facility] GI     COLONOSCOPY N/A 8/16/2017    Procedure: COMBINED COLONOSCOPY, SINGLE OR MULTIPLE BIOPSY/POLYPECTOMY BY BIOPSY;;  Surgeon: Fransisco Leach MD;  Location:  OR     COLONOSCOPY N/A 10/1/2019    Procedure: Colonoscopy With Colonic Stent Insertion;  Surgeon: Robbie Cruz MD;  Location: [facility] OR     COLONOSCOPY N/A 2/5/2020    Procedure: COLONOSCOPY, FOREIGN BODY REMOVAL;  Surgeon: Robbie Cruz,  "MD;  Location: [facility] OR     ENT SURGERY  ?    upper endoscopy     ESOPHAGOSCOPY, GASTROSCOPY, DUODENOSCOPY (EGD), COMBINED N/A 11/8/2016    Procedure: COMBINED ENDOSCOPIC ULTRASOUND, ESOPHAGOSCOPY, GASTROSCOPY, DUODENOSCOPY (EGD), FINE NEEDLE ASPIRATE/BIOPSY;  Surgeon: Guru Alexsandra Ballesetros MD;  Location: [facility] GI     ESOPHAGOSCOPY, GASTROSCOPY, DUODENOSCOPY (EGD), COMBINED N/A 11/8/2016    Procedure: COMBINED ESOPHAGOSCOPY, GASTROSCOPY, DUODENOSCOPY (EGD), BIOPSY SINGLE OR MULTIPLE;  Surgeon: Guru Alexsandra Ballesteros MD;  Location: [facility] GI     ESOPHAGOSCOPY, GASTROSCOPY, DUODENOSCOPY (EGD), COMBINED N/A 8/16/2017    Procedure: COMBINED ESOPHAGOSCOPY, GASTROSCOPY, DUODENOSCOPY (EGD), BIOPSY SINGLE OR MULTIPLE;;  Surgeon: Fransisco Leach MD;  Location: [facility] OR     EYE SURGERY  12/2007    cataract surgery both sides     GI SURGERY  1974, 1986(x2), 1989    Crohn's, 1974 RO 18\" term. ilium + 9\" asc. colon all one pc     SOFT TISSUE SURGERY  3/2013    removed buildup on back of r hand, coccidioidomycosis          calcium-vitamin D (CALTRATE) 600-400 MG-UNIT per tablet       Cyanocobalamin (VITAMIN B 12 PO)       dicyclomine (BENTYL) 10 MG capsule       diphenoxylate-atropine (LOMOTIL) 2.5-0.025 MG tablet       fluconazole (DIFLUCAN) 200 MG tablet       HYDROcodone-acetaminophen (NORCO) 5-325 MG tablet       MELATONIN PO       multivitamin, therapeutic with minerals (MULTI-VITAMIN) TABS       ondansetron (ZOFRAN ODT) 4 MG ODT tab       tiZANidine (ZANAFLEX) 4 MG tablet       traZODone (DESYREL) 50 MG tablet      Allergies   Allergen Reactions     Prednisone Other (See Comments)     Diana with more than 2.5mg chronic dosing of prednisone due to fluconazole interaction per patient.      Family History  Family History   Problem Relation Age of Onset     Heart Failure Father      Musculoskeletal Disorder Father         Parkinson's     Cancer - colorectal Mother      Cerebrovascular Disease Paternal " "Grandmother      Cancer Other      Musculoskeletal Disorder Brother         Parkinson's     Anesthesia Reaction No family hx of      Deep Vein Thrombosis (DVT) No family hx of      Social History   Social History     Tobacco Use     Smoking status: Never Smoker     Smokeless tobacco: Never Used   Substance Use Topics     Alcohol use: Yes     Comment: sometimes one glass of wine a week     Drug use: No      Past medical history, past surgical history, medications, allergies, family history, and social history were reviewed with the patient. No additional pertinent items.       Review of Systems   Constitutional: Positive for chills. Negative for fever.   HENT: Negative for congestion.    Eyes: Negative for redness.   Respiratory: Negative for shortness of breath.    Cardiovascular: Negative for chest pain.   Gastrointestinal: Positive for diarrhea. Negative for abdominal pain, blood in stool and constipation.   Genitourinary: Negative for difficulty urinating.   Musculoskeletal: Negative for arthralgias and neck stiffness.   Skin: Negative for color change.   Neurological: Positive for headaches.   Psychiatric/Behavioral: Negative for confusion.   All other systems reviewed and are negative.        Physical Exam   BP: (!) 108/2  Pulse: 97  Temp: 98.2  F (36.8  C)  Resp: 16  Height: 177.8 cm (5' 10\")  Weight: 68.9 kg (152 lb)  SpO2: 94 %  Physical Exam  Vitals signs and nursing note reviewed.   Constitutional:       General: He is not in acute distress.  HENT:      Head: Atraumatic.      Mouth/Throat:      Mouth: Mucous membranes are dry.   Eyes:      General: No scleral icterus.     Extraocular Movements: Extraocular movements intact.   Neck:      Musculoskeletal: Neck supple.   Cardiovascular:      Rate and Rhythm: Normal rate and regular rhythm.      Heart sounds: Normal heart sounds.   Pulmonary:      Effort: Pulmonary effort is normal. No respiratory distress.   Chest:      Chest wall: No tenderness.   Abdominal: "      Palpations: Abdomen is soft.      Tenderness: There is abdominal tenderness (slight) in the right upper quadrant. There is no right CVA tenderness, left CVA tenderness, guarding or rebound.   Musculoskeletal:      Right lower leg: No edema.      Left lower leg: No edema.   Skin:     General: Skin is warm.      Coloration: Skin is not pale.   Neurological:      General: No focal deficit present.      Mental Status: He is alert and oriented to person, place, and time.         ED Course   9:40 AM  The patient was seen and examined by Daniel Parrish MD in Room ED26.     Procedures                         Results for orders placed or performed during the hospital encounter of 12/10/20   CBC with platelets differential     Status: Abnormal   Result Value Ref Range    WBC 4.4 4.0 - 11.0 10e9/L    RBC Count 5.13 4.4 - 5.9 10e12/L    Hemoglobin 16.7 13.3 - 17.7 g/dL    Hematocrit 46.7 40.0 - 53.0 %    MCV 91 78 - 100 fl    MCH 32.6 26.5 - 33.0 pg    MCHC 35.8 31.5 - 36.5 g/dL    RDW 12.6 10.0 - 15.0 %    Platelet Count 171 150 - 450 10e9/L    Diff Method Automated Method     % Neutrophils 67.5 %    % Lymphocytes 16.7 %    % Monocytes 15.4 %    % Eosinophils 0.0 %    % Basophils 0.2 %    % Immature Granulocytes 0.2 %    Nucleated RBCs 0 0 /100    Absolute Neutrophil 2.9 1.6 - 8.3 10e9/L    Absolute Lymphocytes 0.7 (L) 0.8 - 5.3 10e9/L    Absolute Monocytes 0.7 0.0 - 1.3 10e9/L    Absolute Eosinophils 0.0 0.0 - 0.7 10e9/L    Absolute Basophils 0.0 0.0 - 0.2 10e9/L    Abs Immature Granulocytes 0.0 0 - 0.4 10e9/L    Absolute Nucleated RBC 0.0    Comprehensive metabolic panel     Status: Abnormal   Result Value Ref Range    Sodium 133 133 - 144 mmol/L    Potassium 3.9 3.4 - 5.3 mmol/L    Chloride 99 94 - 109 mmol/L    Carbon Dioxide 24 20 - 32 mmol/L    Anion Gap 10 3 - 14 mmol/L    Glucose 76 70 - 99 mg/dL    Urea Nitrogen 22 7 - 30 mg/dL    Creatinine 1.59 (H) 0.66 - 1.25 mg/dL    GFR Estimate 45 (L) >60 mL/min/[1.73_m2]     GFR Estimate If Black 52 (L) >60 mL/min/[1.73_m2]    Calcium 8.9 8.5 - 10.1 mg/dL    Bilirubin Total 0.8 0.2 - 1.3 mg/dL    Albumin 3.7 3.4 - 5.0 g/dL    Protein Total 7.2 6.8 - 8.8 g/dL    Alkaline Phosphatase 89 40 - 150 U/L    ALT 30 0 - 70 U/L    AST 33 0 - 45 U/L   Lipase     Status: None   Result Value Ref Range    Lipase 217 73 - 393 U/L   UA with Microscopic     Status: Abnormal   Result Value Ref Range    Color Urine Light Yellow     Appearance Urine Clear     Glucose Urine Negative NEG^Negative mg/dL    Bilirubin Urine Negative NEG^Negative    Ketones Urine 40 (A) NEG^Negative mg/dL    Specific Gravity Urine 1.004 1.003 - 1.035    Blood Urine Negative NEG^Negative    pH Urine 5.5 5.0 - 7.0 pH    Protein Albumin Urine Negative NEG^Negative mg/dL    Urobilinogen mg/dL Normal 0.0 - 2.0 mg/dL    Nitrite Urine Negative NEG^Negative    Leukocyte Esterase Urine Negative NEG^Negative    Source Midstream Urine     WBC Urine <1 0 - 5 /HPF    RBC Urine <1 0 - 2 /HPF    Mucous Urine Present (A) NEG^Negative /LPF     Medications   0.9% sodium chloride BOLUS (0 mLs Intravenous Stopped 12/10/20 1223)     Followed by   0.9% sodium chloride BOLUS (0 mLs Intravenous Stopped 12/10/20 1056)        Assessments & Plan (with Medical Decision Making)   The patient appears significantly dehydrated after poor p.o. intake over the past 4 days.  He had a negative Covid test 2 days ago after having headache and chills on the weekend.  His symptoms now are only his gastrointestinal symptoms of stool urgency and intermittent mucus stools.  I reviewed the history again and he states that he is having only a teaspoon of stool with each episode of urgency that comes on after he tries to eat or drink.  He only had 2 stools of significant volume this week.  He has no symptoms of a GI bleed.  His abdominal exam is very benign with no focal tenderness.  His laboratory tests are normal.  After IV fluids he was feeling better and is passed  urine 3 times in the ED.  I spoke with Dr. Leach, his gastroenterologist who states that the patient primarily has irritable bowel syndrome recently but does have a possible stricture at his anastomosis and may have some bacterial overgrowth.  The patient's symptoms do not appear consistent with C. difficile.  Dr. Leach recommended conservative management with slowly increasing the patient's diet and using Imodium or Lomotil as needed for symptoms.  The patient was able to tolerate a sandwich in the ED and repeat abdominal exam was benign with no focal tenderness.  The clinic will call him tomorrow to consider whether to place him on rifaximin.  He feels comfortable going home and declined my offer to place in the observation unit tonight.  He will return if he has any worse symptoms, fever or unable to keep anything down.  We discussed whether to retest him for Covid given his symptoms over the weekend but he declined as he did not think it would change his isolation at home.    I have reviewed the nursing notes. I have reviewed the findings, diagnosis, plan and need for follow up with the patient.    Discharge Medication List as of 12/10/2020  5:10 PM          Final diagnoses:   Dehydration   Other irritable bowel syndrome   I, Ochoa Dai, am serving as a trained medical scribe to document services personally performed by Kostas Parrish MD, based on the provider's statements to me.      I, Kostas Parrish MD, was physically present and have reviewed and verified the accuracy of this note documented by Ochoa Dai.   --    [facility] EMERGENCY DEPARTMENT  12/10/2020     Kostas Parrish MD  12/10/20 2024

## 2020-12-11 ENCOUNTER — TELEPHONE (OUTPATIENT)
Dept: GASTROENTEROLOGY | Facility: CLINIC | Age: 65
End: 2020-12-11

## 2020-12-11 NOTE — TELEPHONE ENCOUNTER
[facility] Call Center    Phone Message    May a detailed message be left on voicemail: yes     Reason for Call: Other: Patient recently discharged from [facility] due to his IBS.  Scheduled follow up for Feb, but patient wanst to [facility] and stay sooner.  Please call.      Action Taken: Message routed to:  [facility] ([facility] GI    Travel Screening: Not Applicable

## 2020-12-15 NOTE — TELEPHONE ENCOUNTER
Contacted patient to discuss appointment wit Ms. Hutson.  Patient now scheduled on December 18 at 2 pm.  Video visit

## 2020-12-16 ENCOUNTER — TELEPHONE (OUTPATIENT)
Dept: NEPHROLOGY | Facility: CLINIC | Age: 65
End: 2020-12-16

## 2020-12-17 NOTE — PROGRESS NOTES
IBD CLINIC     CC/REFERRING MD:  Referred Self  REASON FOR CONSULTATION: Crohn's disease       ASSESSMENT/PLAN:  65 year old male with Crohn's disease s/p ileocecectomy on no current Crohn's therapy, here on an add-on basis for diarrhea.     # Crohn's disease  # Diarrhea  Colonoscopy 2/5/2020 with ongoing endoscopic remission, not currently on therapy. Previously with intermittent diarrhea felt to be diet-related, however recently developed low-grade fever, followed by persistent watery diarrhea associated with chills, without abdominal pain, hematochezia, or tenesmus. Work-up in the ED with CBC, CMP, lipase was unremarkable. COVID-19 PCR negative. Continues to have 3 watery stools daily, small-volume. Incomplete relief with Lomotil PRN, used 1-3 times daily. No preceding antibiotic use. Constellation of symptoms and acute onset of diarrhea are concerning for infection, though differential includes irritable bowel syndrome, SIBO. He is not having any of the symptoms consistent with previous Crohn's flares.Previous testing for celiac disease has been negative.    Plan for stool studies (C diff, enteric panel, giardia/crypto). If this is negative, and if not improving by early next week, will consider course of Rifaxmin for post-infectious IBS/IBS-D vs SIBO. In the meantime, he was counseled to seek medical attention should he become febrile or develop worsening symptoms.      Katia Hutson PA-C       HPI:   Had axios stent placed across stricture 10-1-19 to 2-5-20. No active disease in the ileum.     He continues to have substantial variation with his stools.  Currently reports 1 stool a day, formed.  No blood.      He endorses emotional eating habits. And will often eat large amounts of sugar, such as ice cream, as part of this. Clearly, stool variation is tied to his diet.     Will occasionally have a pain in the anorectal area. Has not occurred recently.      Interval history 12/18/2020  Around 12/5, developed  fever (Tmax) 100.4, headache, chills, and feeling tired. This lasted about 1-2 days, and then started to get diarrhea.  He went to the ED on 12/10/2020 with diarrhea, generalized weakness, and fever. ED provider consulted with Dr. Leach, conservative management was recommended with slowly ADAT and imodium or lomotil PRN.    He continues to feel unwell and continues to have watery stool. Initially, it was once every 24 hours. A few days ago, he tried to increase his diet and has three watery stools per day. Volume not described as large. No abdominal pain or cramping. No blood in the stool. No symptoms consistent with tenesmus. No urgency. He does not recall ever having symptoms of this nature for this long. Taking Lomotil after every loose stool, feels like this is helping. No recent antiobitics. Still having chills but no longer fevers. Compared to leaving the hospital, he does not feel worse, but not better.    No nausea, vomiting, mucositis, dysphagia, or odynophagia.    HBI:  Overall patient well being (prior day): 0 (Very well)  Abdominal pain (prior day): 0 (None)  Number of liquid or soft stools (prior day): 0 (1 point per stool)  Abdominal mass on exam: 0 (None)  Complications (1 point for each):   None    ROS:    No fevers or chills  No weight loss  No blurry vision, double vision or change in vision  No sore throat  No lymphadenopathy  No headache, paraesthesias, or weakness in a limb  No shortness of breath or wheezing  No chest pain or pressure  No arthralgias or myalgias  No rashes or skin changes  No odynophagia or dysphagia  No BRBPR, hematochezia, melena  No dysuria, frequency or urgency  No hot/cold intolerance or polyria  No anxiety or depression    Extra intestinal manifestations of IBD:  No uveitis/episcleritis  No aphthous ulcers   No arthritis   No erythema nodosum/pyoderma gangrenosum.     PERTINENT PAST MEDICAL HISTORY:  Past Medical History:   Diagnosis Date     Anemia 2007     Anxiety  2012?    much worse since July 2014     Cataract 12/2007    both eyes, too much prednisone, implants     Coccidioidomycosis      Crohn disease (H)      Crohn's disease (H) 1/13/2015     Depressive disorder 7/2014    much worse since July 2014     Fracture 1956    green fracture of leg     Gallbladder problem 2005?    much gravel, removed     GERD (gastroesophageal reflux disease)      History of blood transfusion 1988    ulcerated anastomosis term. ilium bleed     Hypertriglyceridemia 7/8/2016     Infection 2007    persistant coccidioidomycosis     Kidney stone various    both sides, all passed naturally     Mental health problem 2007    bipolar though perhaps different     Nephrolithiasis      Osteoporosis 2007    osteoporosis, too much prednisone, last dexa 1/2014     Osteoporosis      Other nervous system complications 2007    prednisone overload induced kevin     Personal history of colonic polyps     small ones found during colonoscopies     Steroid-induced psychosis     Patient extremely sensitive to regular doses of steroids.  Unclear if this is due to chronic fluconazole use or genetic issue.  In the future, use caution when prescribing steroids.     TB (tuberculosis)        PREVIOUS SURGERIES:  Past Surgical History:   Procedure Laterality Date     APPENDECTOMY  1974    coincidental with Crohn's surgery     BACK SURGERY  12/2007    kyphoplasty on compressed 4th??? vertebra     BIOPSY  2007, 2013    lump on arm, knee, back of hand for coccidioidomycosis cult.     CHOLECYSTECTOMY  2005    much gravel, removed laparoscopically     COLONOSCOPY  7/14/2014 last    Dr. Catherine Bowden, [facility] - many previous     COLONOSCOPY Left 9/11/2015    Procedure: COMBINED COLONOSCOPY, SINGLE OR MULTIPLE BIOPSY/POLYPECTOMY BY BIOPSY;  Surgeon: Fransisco Leach MD;  Location:  GI     COLONOSCOPY N/A 8/3/2016    Procedure: COMBINED COLONOSCOPY, SINGLE OR MULTIPLE BIOPSY/POLYPECTOMY BY BIOPSY;  Surgeon:  "Fransisco Leach MD;  Location: [facility] GI     COLONOSCOPY N/A 8/16/2017    Procedure: COMBINED COLONOSCOPY, SINGLE OR MULTIPLE BIOPSY/POLYPECTOMY BY BIOPSY;;  Surgeon: Fransisco Leach MD;  Location: [facility]     COLONOSCOPY N/A 10/1/2019    Procedure: Colonoscopy With Colonic Stent Insertion;  Surgeon: Robbie Cruz MD;  Location: [facility] OR     COLONOSCOPY N/A 2/5/2020    Procedure: COLONOSCOPY, FOREIGN BODY REMOVAL;  Surgeon: Robbie Cruz MD;  Location: [facility] OR     ENT SURGERY  ?    upper endoscopy     ESOPHAGOSCOPY, GASTROSCOPY, DUODENOSCOPY (EGD), COMBINED N/A 11/8/2016    Procedure: COMBINED ENDOSCOPIC ULTRASOUND, ESOPHAGOSCOPY, GASTROSCOPY, DUODENOSCOPY (EGD), FINE NEEDLE ASPIRATE/BIOPSY;  Surgeon: Guru Alexsandra Ballesteros MD;  Location: [facility] GI     ESOPHAGOSCOPY, GASTROSCOPY, DUODENOSCOPY (EGD), COMBINED N/A 11/8/2016    Procedure: COMBINED ESOPHAGOSCOPY, GASTROSCOPY, DUODENOSCOPY (EGD), BIOPSY SINGLE OR MULTIPLE;  Surgeon: Guru Alexsandra Ballesteros MD;  Location: [facility] GI     ESOPHAGOSCOPY, GASTROSCOPY, DUODENOSCOPY (EGD), COMBINED N/A 8/16/2017    Procedure: COMBINED ESOPHAGOSCOPY, GASTROSCOPY, DUODENOSCOPY (EGD), BIOPSY SINGLE OR MULTIPLE;;  Surgeon: Fransisco Leach MD;  Location: [facility] OR     EYE SURGERY  12/2007    cataract surgery both sides     GI SURGERY  1974, 1986(x2), 1989    Crohn's, 1974 RO 18\" term. ilium + 9\" asc. colon all one pc     SOFT TISSUE SURGERY  3/2013    removed buildup on back of r hand, coccidioidomycosis       PREVIOUS ENDOSCOPY:  Summer 2014: Colonoscopy - not in out system    ALLERGIES:     Allergies   Allergen Reactions     Prednisone Other (See Comments)     Diana with more than 2.5mg chronic dosing of prednisone due to fluconazole interaction per patient.        PERTINENT MEDICATIONS:    Current Outpatient Medications:      calcium-vitamin D (CALTRATE) 600-400 MG-UNIT per tablet, Take 1 tablet by mouth as needed (PT last dose 1.26.2020) , " Disp: , Rfl:      Cyanocobalamin (VITAMIN B 12 PO), Take 500 mcg by mouth At Bedtime , Disp: , Rfl:      dicyclomine (BENTYL) 10 MG capsule, Take 1 capsule (10 mg) by mouth 2 times daily (before meals), Disp: 90 capsule, Rfl: 1     diphenoxylate-atropine (LOMOTIL) 2.5-0.025 MG tablet, Take 1 tablet by mouth 4 times daily as needed for diarrhea, Disp: 60 tablet, Rfl: 1     fluconazole (DIFLUCAN) 200 MG tablet, Take 1 tablet (200 mg) by mouth daily, Disp: 90 tablet, Rfl: 3     HYDROcodone-acetaminophen (NORCO) 5-325 MG tablet, Take 1 tablet by mouth every 6 hours as needed for pain (Patient taking differently: Take 1 tablet by mouth every 6 hours as needed for pain (PT last dose approx 2 months ago 1.27.2020) ), Disp: 15 tablet, Rfl: 0     MELATONIN PO, Take 1.5 mg by mouth nightly as needed (PT last dose 1.27.2020) , Disp: , Rfl:      multivitamin, therapeutic with minerals (MULTI-VITAMIN) TABS, Take 1 tablet by mouth every evening , Disp: , Rfl:      ondansetron (ZOFRAN ODT) 4 MG ODT tab, Take 1 tablet (4 mg) by mouth every 8 hours as needed for nausea or vomiting, Disp: 10 tablet, Rfl: 0     tiZANidine (ZANAFLEX) 4 MG tablet, Take 1-2 tablets (4-8 mg) by mouth 3 times daily as needed for muscle spasms, Disp: 30 tablet, Rfl: 0     traZODone (DESYREL) 50 MG tablet, Take 1 tablet (50 mg) by mouth At Bedtime, Disp: 90 tablet, Rfl: 2    SOCIAL HISTORY:  Social History     Socioeconomic History     Marital status:      Spouse name: Not on file     Number of children: Not on file     Years of education: Not on file     Highest education level: Not on file   Occupational History     Occupation: Reserach associate at the      Employer: [employer]   Social Needs     Financial resource strain: Not on file     Food insecurity     Worry: Not on file     Inability: Not on file     Transportation needs     Medical: Not on file     Non-medical: Not on file   Tobacco Use     Smoking status: Never Smoker      Smokeless tobacco: Never Used   Substance and Sexual Activity     Alcohol use: Yes     Comment: sometimes one glass of wine a week     Drug use: No     Sexual activity: Never   Lifestyle     Physical activity     Days per week: Not on file     Minutes per session: Not on file     Stress: Not on file   Relationships     Social connections     Talks on phone: Not on file     Gets together: Not on file     Attends Yarsani service: Not on file     Active member of club or organization: Not on file     Attends meetings of clubs or organizations: Not on file     Relationship status: Not on file     Intimate partner violence     Fear of current or ex partner: Not on file     Emotionally abused: Not on file     Physically abused: Not on file     Forced sexual activity: Not on file   Other Topics Concern     Not on file   Social History Narrative    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         FAMILY HISTORY:  Family History   Problem Relation Age of Onset     Heart Failure Father      Musculoskeletal Disorder Father         Parkinson's     Cancer - colorectal Mother      Cerebrovascular Disease Paternal Grandmother      Cancer Other      Musculoskeletal Disorder Brother         Parkinson's     Anesthesia Reaction No family hx of      Deep Vein Thrombosis (DVT) No family hx of      Mother with colon cancer in 80s.  No family member with Crohn's disease or Ulcerative Colitis.      Past/family/social history reviewed and no changes    PHYSICAL EXAMINATION:  Vitals reviewed, AFVSS   Wt   Wt Readings from Last 2 Encounters:   12/10/20 68.9 kg (152 lb)   10/20/20 68.9 kg (152 lb)   Video physical exam  General: Patient appears well in no acute distress.   Skin: No visualized rash or lesions on visualized skin  Eyes: EOMI, no erythema, sclera icterus or discharge noted  Resp: Appears to be breathing comfortably without accessory muscle usage, speaking in full sentences, no cough  MSK: Appears to  have normal range of motion based on visualized movements  Neurologic: No apparent tremors, facial movements symmetric  Psych: affect normal, alert and oriented    The rest of a comprehensive physical examination is deferred due to PHE (public health emergency) video restrictions          PERTINENT STUDIES:  Most recent CBC:  Recent Labs   Lab Test 12/10/20  0955 01/27/20  1454 05/21/19  1511   WBC 4.4  --  9.3   HGB 16.7 15.0 15.4   HCT 46.7  --  46.1     --  195     Most recent hepatic panel:  Recent Labs   Lab Test 12/10/20  0955 10/07/20  1352   ALT 30 28   AST 33 16     Most recent creatinine:  Recent Labs   Lab Test 12/10/20  0955 10/07/20  1352   CR 1.59* 1.51*       MRE: 3/20/15:   IMPRESSION:   1. Unremarkable small bowel other than postsurgical changes following right hemicolectomy with ileocolic anastomosis.  2. Small gastric diverticulum. 3. 1.5 x 0.8 cm pancreatic head cystic lesion minimally increased in size compared to the prior exam of 2006, but potentially representing intraductal papillary mucinous neoplasm. This can be reassessed on followup.  4. Hepatic steatosis.

## 2020-12-18 ENCOUNTER — VIRTUAL VISIT (OUTPATIENT)
Dept: GASTROENTEROLOGY | Facility: CLINIC | Age: 65
End: 2020-12-18
Payer: COMMERCIAL

## 2020-12-18 ENCOUNTER — APPOINTMENT (OUTPATIENT)
Dept: LAB | Facility: CLINIC | Age: 65
End: 2020-12-18
Payer: COMMERCIAL

## 2020-12-18 VITALS — BODY MASS INDEX: 21.76 KG/M2 | WEIGHT: 152 LBS | HEIGHT: 70 IN

## 2020-12-18 DIAGNOSIS — R19.7 DIARRHEA, UNSPECIFIED TYPE: Primary | ICD-10-CM

## 2020-12-18 PROCEDURE — 99213 OFFICE O/P EST LOW 20 MIN: CPT | Mod: 95 | Performed by: PHYSICIAN ASSISTANT

## 2020-12-18 ASSESSMENT — MIFFLIN-ST. JEOR: SCORE: 1480.72

## 2020-12-18 NOTE — NURSING NOTE
"Chief Complaint   Patient presents with     Follow Up       Vitals:    12/18/20 1339   Weight: 68.9 kg (152 lb)   Height: 1.778 m (5' 10\")       Body mass index is 21.81 kg/m .    Judy Rasheed CMA    "

## 2020-12-18 NOTE — LETTER
12/18/2020       RE: Trevin Gee  [address]  [address]  [facility] [address]      Dear Colleague,    Thank you for referring your patient, Trevin Gee, to the [facility] [place]. Please see a copy of my visit note below.    IBD CLINIC     CC/REFERRING MD:  Referred Self  REASON FOR CONSULTATION: Crohn's disease       ASSESSMENT/PLAN:  65 year old male with Crohn's disease s/p ileocecectomy on no current Crohn's therapy, here on an add-on basis for diarrhea.     # Crohn's disease  # Diarrhea  Colonoscopy 2/5/2020 with ongoing endoscopic remission, not currently on therapy. Previously with intermittent diarrhea felt to be diet-related, however recently developed low-grade fever, followed by persistent watery diarrhea associated with chills, without abdominal pain, hematochezia, or tenesmus. Work-up in the ED with CBC, CMP, lipase was unremarkable. COVID-19 PCR negative. Continues to have 3 watery stools daily, small-volume. Incomplete relief with Lomotil PRN, used 1-3 times daily. No preceding antibiotic use. Constellation of symptoms and acute onset of diarrhea are concerning for infection, though differential includes irritable bowel syndrome, SIBO. He is not having any of the symptoms consistent with previous Crohn's flares.Previous testing for celiac disease has been negative.    Plan for stool studies (C diff, enteric panel, giardia/crypto). If this is negative, and if not improving by early next week, will consider course of Rifaxmin for post-infectious IBS/IBS-D vs SIBO. In the meantime, he was counseled to seek medical attention should he become febrile or develop worsening symptoms.      HPI:   Had axios stent placed across stricture 10-1-19 to 2-5-20. No active disease in the ileum.     He continues to have substantial variation with his stools.  Currently reports 1 stool a day, formed.  No blood.      He endorses emotional eating habits. And will often  eat large amounts of sugar, such as ice cream, as part of this. Clearly, stool variation is tied to his diet.     Will occasionally have a pain in the anorectal area. Has not occurred recently.      Interval history 12/18/2020  Around 12/5, developed fever (Tmax) 100.4, headache, chills, and feeling tired. This lasted about 1-2 days, and then started to get diarrhea.  He went to the ED on 12/10/2020 with diarrhea, generalized weakness, and fever. ED provider consulted with Dr. Leach, conservative management was recommended with slowly ADAT and imodium or lomotil PRN.    He continues to feel unwell and continues to have watery stool. Initially, it was once every 24 hours. A few days ago, he tried to increase his diet and has three watery stools per day. Volume not described as large. No abdominal pain or cramping. No blood in the stool. No symptoms consistent with tenesmus. No urgency. He does not recall ever having symptoms of this nature for this long. Taking Lomotil after every loose stool, feels like this is helping. No recent antiobitics. Still having chills but no longer fevers. Compared to leaving the hospital, he does not feel worse, but not better.    No nausea, vomiting, mucositis, dysphagia, or odynophagia.    HBI:  Overall patient well being (prior day): 0 (Very well)  Abdominal pain (prior day): 0 (None)  Number of liquid or soft stools (prior day): 0 (1 point per stool)  Abdominal mass on exam: 0 (None)  Complications (1 point for each):   None    ROS:    No fevers or chills  No weight loss  No blurry vision, double vision or change in vision  No sore throat  No lymphadenopathy  No headache, paraesthesias, or weakness in a limb  No shortness of breath or wheezing  No chest pain or pressure  No arthralgias or myalgias  No rashes or skin changes  No odynophagia or dysphagia  No BRBPR, hematochezia, melena  No dysuria, frequency or urgency  No hot/cold intolerance or polyria  No anxiety or  depression    Extra intestinal manifestations of IBD:  No uveitis/episcleritis  No aphthous ulcers   No arthritis   No erythema nodosum/pyoderma gangrenosum.     PERTINENT PAST MEDICAL HISTORY:  Past Medical History:   Diagnosis Date     Anemia 2007     Anxiety 2012?    much worse since July 2014     Cataract 12/2007    both eyes, too much prednisone, implants     Coccidioidomycosis      Crohn disease (H)      Crohn's disease (H) 1/13/2015     Depressive disorder 7/2014    much worse since July 2014     Fracture 1956    green fracture of leg     Gallbladder problem 2005?    much gravel, removed     GERD (gastroesophageal reflux disease)      History of blood transfusion 1988    ulcerated anastomosis term. ilium bleed     Hypertriglyceridemia 7/8/2016     Infection 2007    persistant coccidioidomycosis     Kidney stone various    both sides, all passed naturally     Mental health problem 2007    bipolar though perhaps different     Nephrolithiasis      Osteoporosis 2007    osteoporosis, too much prednisone, last dexa 1/2014     Osteoporosis      Other nervous system complications 2007    prednisone overload induced kevin     Personal history of colonic polyps     small ones found during colonoscopies     Steroid-induced psychosis     Patient extremely sensitive to regular doses of steroids.  Unclear if this is due to chronic fluconazole use or genetic issue.  In the future, use caution when prescribing steroids.     TB (tuberculosis)        PREVIOUS SURGERIES:  Past Surgical History:   Procedure Laterality Date     APPENDECTOMY  1974    coincidental with Crohn's surgery     BACK SURGERY  12/2007    kyphoplasty on compressed 4th??? vertebra     BIOPSY  2007, 2013    lump on arm, knee, back of hand for coccidioidomycosis cult.     CHOLECYSTECTOMY  2005    much gravel, removed laparoscopically     COLONOSCOPY  7/14/2014 last    Dr. Catherine Bowden, [facility] - many previous     COLONOSCOPY Left 9/11/2015     "Procedure: COMBINED COLONOSCOPY, SINGLE OR MULTIPLE BIOPSY/POLYPECTOMY BY BIOPSY;  Surgeon: Fransisco Leach MD;  Location: [facility]     COLONOSCOPY N/A 8/3/2016    Procedure: COMBINED COLONOSCOPY, SINGLE OR MULTIPLE BIOPSY/POLYPECTOMY BY BIOPSY;  Surgeon: Fransisco Leach MD;  Location: [facility]     COLONOSCOPY N/A 8/16/2017    Procedure: COMBINED COLONOSCOPY, SINGLE OR MULTIPLE BIOPSY/POLYPECTOMY BY BIOPSY;;  Surgeon: Fransisco Leach MD;  Location: [facility]     COLONOSCOPY N/A 10/1/2019    Procedure: Colonoscopy With Colonic Stent Insertion;  Surgeon: Robbie Cruz MD;  Location: [facility]     COLONOSCOPY N/A 2/5/2020    Procedure: COLONOSCOPY, FOREIGN BODY REMOVAL;  Surgeon: Robbie Cruz MD;  Location: [facility]     ENT SURGERY  ?    upper endoscopy     ESOPHAGOSCOPY, GASTROSCOPY, DUODENOSCOPY (EGD), COMBINED N/A 11/8/2016    Procedure: COMBINED ENDOSCOPIC ULTRASOUND, ESOPHAGOSCOPY, GASTROSCOPY, DUODENOSCOPY (EGD), FINE NEEDLE ASPIRATE/BIOPSY;  Surgeon: Guru Alexsandra Ballesteros MD;  Location: [facility]     ESOPHAGOSCOPY, GASTROSCOPY, DUODENOSCOPY (EGD), COMBINED N/A 11/8/2016    Procedure: COMBINED ESOPHAGOSCOPY, GASTROSCOPY, DUODENOSCOPY (EGD), BIOPSY SINGLE OR MULTIPLE;  Surgeon: Guru Alexsandra Ballesteros MD;  Location: [facility]     ESOPHAGOSCOPY, GASTROSCOPY, DUODENOSCOPY (EGD), COMBINED N/A 8/16/2017    Procedure: COMBINED ESOPHAGOSCOPY, GASTROSCOPY, DUODENOSCOPY (EGD), BIOPSY SINGLE OR MULTIPLE;;  Surgeon: Fransisco Leach MD;  [facility]     EYE SURGERY  12/2007    cataract surgery both sides     GI SURGERY  1974, 1986(x2), 1989    Crohn's, 1974 RO 18\" term. ilium + 9\" asc. colon all one pc     SOFT TISSUE SURGERY  3/2013    removed buildup on back of r hand, coccidioidomycosis       PREVIOUS ENDOSCOPY:  Summer 2014: Colonoscopy - not in out system    ALLERGIES:     Allergies   Allergen Reactions     Prednisone Other (See Comments)     Diana with more than 2.5mg " chronic dosing of prednisone due to fluconazole interaction per patient.        PERTINENT MEDICATIONS:    Current Outpatient Medications:      calcium-vitamin D (CALTRATE) 600-400 MG-UNIT per tablet, Take 1 tablet by mouth as needed (PT last dose 1.26.2020) , Disp: , Rfl:      Cyanocobalamin (VITAMIN B 12 PO), Take 500 mcg by mouth At Bedtime , Disp: , Rfl:      dicyclomine (BENTYL) 10 MG capsule, Take 1 capsule (10 mg) by mouth 2 times daily (before meals), Disp: 90 capsule, Rfl: 1     diphenoxylate-atropine (LOMOTIL) 2.5-0.025 MG tablet, Take 1 tablet by mouth 4 times daily as needed for diarrhea, Disp: 60 tablet, Rfl: 1     fluconazole (DIFLUCAN) 200 MG tablet, Take 1 tablet (200 mg) by mouth daily, Disp: 90 tablet, Rfl: 3     HYDROcodone-acetaminophen (NORCO) 5-325 MG tablet, Take 1 tablet by mouth every 6 hours as needed for pain (Patient taking differently: Take 1 tablet by mouth every 6 hours as needed for pain (PT last dose approx 2 months ago 1.27.2020) ), Disp: 15 tablet, Rfl: 0     MELATONIN PO, Take 1.5 mg by mouth nightly as needed (PT last dose 1.27.2020) , Disp: , Rfl:      multivitamin, therapeutic with minerals (MULTI-VITAMIN) TABS, Take 1 tablet by mouth every evening , Disp: , Rfl:      ondansetron (ZOFRAN ODT) 4 MG ODT tab, Take 1 tablet (4 mg) by mouth every 8 hours as needed for nausea or vomiting, Disp: 10 tablet, Rfl: 0     tiZANidine (ZANAFLEX) 4 MG tablet, Take 1-2 tablets (4-8 mg) by mouth 3 times daily as needed for muscle spasms, Disp: 30 tablet, Rfl: 0     traZODone (DESYREL) 50 MG tablet, Take 1 tablet (50 mg) by mouth At Bedtime, Disp: 90 tablet, Rfl: 2    SOCIAL HISTORY:  Social History     Socioeconomic History     Marital status:      Spouse name: Not on file     Number of children: Not on file     Years of education: Not on file     Highest education level: Not on file   Occupational History     Occupation: Reserach associate at the      Employer: [employer]    Social Needs     Financial resource strain: Not on file     Food insecurity     Worry: Not on file     Inability: Not on file     Transportation needs     Medical: Not on file     Non-medical: Not on file   Tobacco Use     Smoking status: Never Smoker     Smokeless tobacco: Never Used   Substance and Sexual Activity     Alcohol use: Yes     Comment: sometimes one glass of wine a week     Drug use: No     Sexual activity: Never   Lifestyle     Physical activity     Days per week: Not on file     Minutes per session: Not on file     Stress: Not on file   Relationships     Social connections     Talks on phone: Not on file     Gets together: Not on file     Attends Taoist service: Not on file     Active member of club or organization: Not on file     Attends meetings of clubs or organizations: Not on file     Relationship status: Not on file     Intimate partner violence     Fear of current or ex partner: Not on file     Emotionally abused: Not on file     Physically abused: Not on file     Forced sexual activity: Not on file   Other Topics Concern     Not on file   Social History Narrative    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         FAMILY HISTORY:  Family History   Problem Relation Age of Onset     Heart Failure Father      Musculoskeletal Disorder Father         Parkinson's     Cancer - colorectal Mother      Cerebrovascular Disease Paternal Grandmother      Cancer Other      Musculoskeletal Disorder Brother         Parkinson's     Anesthesia Reaction No family hx of      Deep Vein Thrombosis (DVT) No family hx of      Mother with colon cancer in 80s.  No family member with Crohn's disease or Ulcerative Colitis.      Past/family/social history reviewed and no changes    PHYSICAL EXAMINATION:  Vitals reviewed, AFVSS   Wt   Wt Readings from Last 2 Encounters:   12/10/20 68.9 kg (152 lb)   10/20/20 68.9 kg (152 lb)   Video physical exam  General: Patient appears well in no acute  "distress.   Skin: No visualized rash or lesions on visualized skin  Eyes: EOMI, no erythema, sclera icterus or discharge noted  Resp: Appears to be breathing comfortably without accessory muscle usage, speaking in full sentences, no cough  MSK: Appears to have normal range of motion based on visualized movements  Neurologic: No apparent tremors, facial movements symmetric  Psych: affect normal, alert and oriented    The rest of a comprehensive physical examination is deferred due to PHE (public health emergency) video restrictions          PERTINENT STUDIES:  Most recent CBC:  Recent Labs   Lab Test 12/10/20  0955 01/27/20  1454 05/21/19  1511   WBC 4.4  --  9.3   HGB 16.7 15.0 15.4   HCT 46.7  --  46.1     --  195     Most recent hepatic panel:  Recent Labs   Lab Test 12/10/20  0955 10/07/20  1352   ALT 30 28   AST 33 16     Most recent creatinine:  Recent Labs   Lab Test 12/10/20  0955 10/07/20  1352   CR 1.59* 1.51*       MRE: 3/20/15:   IMPRESSION:   1. Unremarkable small bowel other than postsurgical changes following right hemicolectomy with ileocolic anastomosis.  2. Small gastric diverticulum. 3. 1.5 x 0.8 cm pancreatic head cystic lesion minimally increased in size compared to the prior exam of 2006, but potentially representing intraductal papillary mucinous neoplasm. This can be reassessed on followup.  4. Hepatic steatosis.      Trevin Gee is a 65 year old male who is being evaluated via a billable video visit.      The patient has been notified of following:     \"This video visit will be conducted via a call between you and your physician/provider. We have found that certain health care needs can be provided without the need for an in-person physical exam.  This service lets us provide the care you need with a video conversation.  If a prescription is necessary we can send it directly to your pharmacy.  If lab work is needed we can place an order for that and you can then stop by our lab " "to have the test done at a later time.    Video visits are billed at different rates depending on your insurance coverage.  Please reach out to your insurance provider with any questions.    If during the course of the call the physician/provider feels a video visit is not appropriate, you will not be charged for this service.\"    Patient has given verbal consent for Video visit? Yes  How would you like to obtain your AVS? MyChart  If you are dropped from the video visit, the video invite should be resent to: Text to cell phone: [phone]  Will anyone else be joining your video visit? No      Video-Visit Details    Type of service:  Video Visit    Video Start Time: 205p  Video End Time: 219p  Followed by 8 min phone call    Originating Location (pt. Location): Home    Distant Location (provider location):  [facility] [place]     Platform used for Video Visit: Mukesh Hutson PA-C        "

## 2020-12-18 NOTE — PROGRESS NOTES
"Trevin Gee is a 65 year old male who is being evaluated via a billable video visit.      The patient has been notified of following:     \"This video visit will be conducted via a call between you and your physician/provider. We have found that certain health care needs can be provided without the need for an in-person physical exam.  This service lets us provide the care you need with a video conversation.  If a prescription is necessary we can send it directly to your pharmacy.  If lab work is needed we can place an order for that and you can then stop by our lab to have the test done at a later time.    Video visits are billed at different rates depending on your insurance coverage.  Please reach out to your insurance provider with any questions.    If during the course of the call the physician/provider feels a video visit is not appropriate, you will not be charged for this service.\"    Patient has given verbal consent for Video visit? Yes  How would you like to obtain your AVS? MyChart  If you are dropped from the video visit, the video invite should be resent to: Text to cell phone: [phone]  Will anyone else be joining your video visit? No      Video-Visit Details    Type of service:  Video Visit    Video Start Time: 205p  Video End Time: 219p  Followed by 8 min phone call    Originating Location (pt. Location): Home    Distant Location (provider location):  [facility] [place]     Platform used for Video Visit: Mukesh Hutson PA-C        "

## 2020-12-20 PROCEDURE — 87329 GIARDIA AG IA: CPT | Performed by: PHYSICIAN ASSISTANT

## 2020-12-20 PROCEDURE — 87493 C DIFF AMPLIFIED PROBE: CPT | Mod: 59 | Performed by: PHYSICIAN ASSISTANT

## 2020-12-20 PROCEDURE — 87506 IADNA-DNA/RNA PROBE TQ 6-11: CPT | Performed by: PHYSICIAN ASSISTANT

## 2020-12-20 PROCEDURE — 87328 CRYPTOSPORIDIUM AG IA: CPT | Performed by: PHYSICIAN ASSISTANT

## 2020-12-20 PROCEDURE — 36415 COLL VENOUS BLD VENIPUNCTURE: CPT | Performed by: PHYSICIAN ASSISTANT

## 2020-12-21 DIAGNOSIS — R19.7 DIARRHEA, UNSPECIFIED TYPE: ICD-10-CM

## 2020-12-21 LAB
C COLI+JEJUNI+LARI FUSA STL QL NAA+PROBE: NOT DETECTED
C DIFF TOX B STL QL: NEGATIVE
EC STX1 GENE STL QL NAA+PROBE: NOT DETECTED
EC STX2 GENE STL QL NAA+PROBE: NOT DETECTED
ENTERIC PATHOGEN COMMENT: NORMAL
NOROV GI+II ORF1-ORF2 JNC STL QL NAA+PR: NOT DETECTED
RVA NSP5 STL QL NAA+PROBE: NOT DETECTED
SALMONELLA SP RPOD STL QL NAA+PROBE: NOT DETECTED
SHIGELLA SP+EIEC IPAH STL QL NAA+PROBE: NOT DETECTED
SPECIMEN SOURCE: NORMAL
V CHOL+PARA RFBL+TRKH+TNAA STL QL NAA+PR: NOT DETECTED
Y ENTERO RECN STL QL NAA+PROBE: NOT DETECTED

## 2020-12-22 LAB
C PARVUM AG STL QL IA: NEGATIVE
G LAMBLIA AG STL QL IA: NEGATIVE
SPECIMEN SOURCE: NORMAL

## 2021-01-17 ENCOUNTER — HOSPITAL ENCOUNTER (INPATIENT)
Facility: CLINIC | Age: 66
LOS: 2 days | Discharge: HOME | DRG: 389 | End: 2021-01-21
Attending: EMERGENCY MEDICINE | Admitting: EMERGENCY MEDICINE
Payer: COMMERCIAL

## 2021-01-17 ENCOUNTER — APPOINTMENT (OUTPATIENT)
Dept: CT IMAGING | Facility: CLINIC | Age: 66
DRG: 389 | End: 2021-01-17
Attending: NURSE PRACTITIONER
Payer: COMMERCIAL

## 2021-01-17 DIAGNOSIS — R10.9 ABDOMINAL CRAMPING: ICD-10-CM

## 2021-01-17 DIAGNOSIS — K50.90 CROHN'S DISEASE WITHOUT COMPLICATION, UNSPECIFIED GASTROINTESTINAL TRACT LOCATION (H): ICD-10-CM

## 2021-01-17 DIAGNOSIS — Z11.52 ENCOUNTER FOR SCREENING LABORATORY TESTING FOR SEVERE ACUTE RESPIRATORY SYNDROME CORONAVIRUS 2 (SARS-COV-2): ICD-10-CM

## 2021-01-17 LAB
ALBUMIN SERPL-MCNC: 3.8 G/DL (ref 3.4–5)
ALP SERPL-CCNC: 92 U/L (ref 40–150)
ALT SERPL W P-5'-P-CCNC: 28 U/L (ref 0–70)
ANION GAP SERPL CALCULATED.3IONS-SCNC: 11 MMOL/L (ref 3–14)
AST SERPL W P-5'-P-CCNC: 22 U/L (ref 0–45)
BASOPHILS # BLD AUTO: 0.1 10E9/L (ref 0–0.2)
BASOPHILS NFR BLD AUTO: 0.5 %
BILIRUB SERPL-MCNC: 0.8 MG/DL (ref 0.2–1.3)
BUN SERPL-MCNC: 22 MG/DL (ref 7–30)
CALCIUM SERPL-MCNC: 10 MG/DL (ref 8.5–10.1)
CHLORIDE SERPL-SCNC: 104 MMOL/L (ref 94–109)
CO2 SERPL-SCNC: 24 MMOL/L (ref 20–32)
CREAT SERPL-MCNC: 1.48 MG/DL (ref 0.66–1.25)
CRP SERPL-MCNC: <2.9 MG/L (ref 0–8)
DIFFERENTIAL METHOD BLD: ABNORMAL
EOSINOPHIL # BLD AUTO: 0.1 10E9/L (ref 0–0.7)
EOSINOPHIL NFR BLD AUTO: 1.3 %
ERYTHROCYTE [DISTWIDTH] IN BLOOD BY AUTOMATED COUNT: 15.2 % (ref 10–15)
ERYTHROCYTE [SEDIMENTATION RATE] IN BLOOD BY WESTERGREN METHOD: 6 MM/H (ref 0–20)
FLUAV RNA RESP QL NAA+PROBE: NEGATIVE
FLUBV RNA RESP QL NAA+PROBE: NEGATIVE
GFR SERPL CREATININE-BSD FRML MDRD: 49 ML/MIN/{1.73_M2}
GLUCOSE SERPL-MCNC: 102 MG/DL (ref 70–99)
HCT VFR BLD AUTO: 48.2 % (ref 40–53)
HGB BLD-MCNC: 16 G/DL (ref 13.3–17.7)
IMM GRANULOCYTES # BLD: 0 10E9/L (ref 0–0.4)
IMM GRANULOCYTES NFR BLD: 0.3 %
LABORATORY COMMENT REPORT: NORMAL
LIPASE SERPL-CCNC: 192 U/L (ref 73–393)
LYMPHOCYTES # BLD AUTO: 2.4 10E9/L (ref 0.8–5.3)
LYMPHOCYTES NFR BLD AUTO: 22 %
MCH RBC QN AUTO: 31.6 PG (ref 26.5–33)
MCHC RBC AUTO-ENTMCNC: 33.2 G/DL (ref 31.5–36.5)
MCV RBC AUTO: 95 FL (ref 78–100)
MONOCYTES # BLD AUTO: 0.9 10E9/L (ref 0–1.3)
MONOCYTES NFR BLD AUTO: 8.7 %
NEUTROPHILS # BLD AUTO: 7.2 10E9/L (ref 1.6–8.3)
NEUTROPHILS NFR BLD AUTO: 67.2 %
NRBC # BLD AUTO: 0 10*3/UL
NRBC BLD AUTO-RTO: 0 /100
PLATELET # BLD AUTO: 308 10E9/L (ref 150–450)
POTASSIUM SERPL-SCNC: 4 MMOL/L (ref 3.4–5.3)
PROT SERPL-MCNC: 7.3 G/DL (ref 6.8–8.8)
RBC # BLD AUTO: 5.07 10E12/L (ref 4.4–5.9)
RSV RNA SPEC QL NAA+PROBE: NEGATIVE
SARS-COV-2 RNA RESP QL NAA+PROBE: NEGATIVE
SODIUM SERPL-SCNC: 139 MMOL/L (ref 133–144)
SPECIMEN SOURCE: NORMAL
WBC # BLD AUTO: 10.8 10E9/L (ref 4–11)

## 2021-01-17 PROCEDURE — 74176 CT ABD & PELVIS W/O CONTRAST: CPT | Mod: 26 | Performed by: RADIOLOGY

## 2021-01-17 PROCEDURE — 85652 RBC SED RATE AUTOMATED: CPT | Performed by: NURSE PRACTITIONER

## 2021-01-17 PROCEDURE — 36415 COLL VENOUS BLD VENIPUNCTURE: CPT | Performed by: NURSE PRACTITIONER

## 2021-01-17 PROCEDURE — 83690 ASSAY OF LIPASE: CPT | Performed by: NURSE PRACTITIONER

## 2021-01-17 PROCEDURE — C9803 HOPD COVID-19 SPEC COLLECT: HCPCS | Performed by: EMERGENCY MEDICINE

## 2021-01-17 PROCEDURE — 99222 1ST HOSP IP/OBS MODERATE 55: CPT | Mod: GC | Performed by: INTERNAL MEDICINE

## 2021-01-17 PROCEDURE — 258N000003 HC RX IP 258 OP 636: Performed by: NURSE PRACTITIONER

## 2021-01-17 PROCEDURE — 80053 COMPREHEN METABOLIC PANEL: CPT | Performed by: NURSE PRACTITIONER

## 2021-01-17 PROCEDURE — 74176 CT ABD & PELVIS W/O CONTRAST: CPT

## 2021-01-17 PROCEDURE — 86140 C-REACTIVE PROTEIN: CPT | Performed by: NURSE PRACTITIONER

## 2021-01-17 PROCEDURE — 99285 EMERGENCY DEPT VISIT HI MDM: CPT | Mod: 25 | Performed by: EMERGENCY MEDICINE

## 2021-01-17 PROCEDURE — 250N000013 HC RX MED GY IP 250 OP 250 PS 637: Performed by: EMERGENCY MEDICINE

## 2021-01-17 PROCEDURE — 99219 PR INITIAL OBSERVATION CARE,LEVEL II: CPT | Performed by: EMERGENCY MEDICINE

## 2021-01-17 PROCEDURE — G0378 HOSPITAL OBSERVATION PER HR: HCPCS

## 2021-01-17 PROCEDURE — 250N000011 HC RX IP 250 OP 636: Performed by: NURSE PRACTITIONER

## 2021-01-17 PROCEDURE — 87636 SARSCOV2 & INF A&B AMP PRB: CPT | Performed by: EMERGENCY MEDICINE

## 2021-01-17 PROCEDURE — 96360 HYDRATION IV INFUSION INIT: CPT | Performed by: EMERGENCY MEDICINE

## 2021-01-17 PROCEDURE — 85025 COMPLETE CBC W/AUTO DIFF WBC: CPT | Performed by: NURSE PRACTITIONER

## 2021-01-17 RX ORDER — PROCHLORPERAZINE MALEATE 5 MG
5 TABLET ORAL EVERY 6 HOURS PRN
Status: DISCONTINUED | OUTPATIENT
Start: 2021-01-17 | End: 2021-01-21 | Stop reason: HOSPADM

## 2021-01-17 RX ORDER — FAMOTIDINE 20 MG
25 TABLET ORAL DAILY
COMMUNITY

## 2021-01-17 RX ORDER — ONDANSETRON 2 MG/ML
4 INJECTION INTRAMUSCULAR; INTRAVENOUS EVERY 6 HOURS PRN
Status: DISCONTINUED | OUTPATIENT
Start: 2021-01-17 | End: 2021-01-21 | Stop reason: HOSPADM

## 2021-01-17 RX ORDER — ONDANSETRON 4 MG/1
4 TABLET, ORALLY DISINTEGRATING ORAL EVERY 6 HOURS PRN
Status: DISCONTINUED | OUTPATIENT
Start: 2021-01-17 | End: 2021-01-21 | Stop reason: HOSPADM

## 2021-01-17 RX ORDER — ACETAMINOPHEN 325 MG/1
650 TABLET ORAL EVERY 4 HOURS PRN
Status: DISCONTINUED | OUTPATIENT
Start: 2021-01-17 | End: 2021-01-21 | Stop reason: HOSPADM

## 2021-01-17 RX ORDER — FERROUS SULFATE 325(65) MG
325 TABLET, DELAYED RELEASE (ENTERIC COATED) ORAL DAILY
Status: ON HOLD | COMMUNITY
End: 2021-12-06

## 2021-01-17 RX ORDER — SODIUM CHLORIDE 9 MG/ML
INJECTION, SOLUTION INTRAVENOUS CONTINUOUS
Status: DISCONTINUED | OUTPATIENT
Start: 2021-01-17 | End: 2021-01-20

## 2021-01-17 RX ORDER — PROCHLORPERAZINE 25 MG
12.5 SUPPOSITORY, RECTAL RECTAL EVERY 12 HOURS PRN
Status: DISCONTINUED | OUTPATIENT
Start: 2021-01-17 | End: 2021-01-21 | Stop reason: HOSPADM

## 2021-01-17 RX ORDER — ACETAMINOPHEN 650 MG/1
650 SUPPOSITORY RECTAL EVERY 4 HOURS PRN
Status: DISCONTINUED | OUTPATIENT
Start: 2021-01-17 | End: 2021-01-21 | Stop reason: HOSPADM

## 2021-01-17 RX ORDER — FLUCONAZOLE 200 MG/1
200 TABLET ORAL DAILY
Status: DISCONTINUED | OUTPATIENT
Start: 2021-01-17 | End: 2021-01-21 | Stop reason: HOSPADM

## 2021-01-17 RX ORDER — TRAZODONE HYDROCHLORIDE 50 MG/1
50 TABLET, FILM COATED ORAL AT BEDTIME
Status: DISCONTINUED | OUTPATIENT
Start: 2021-01-17 | End: 2021-01-21 | Stop reason: HOSPADM

## 2021-01-17 RX ADMIN — ONDANSETRON 4 MG: 2 INJECTION INTRAMUSCULAR; INTRAVENOUS at 20:20

## 2021-01-17 RX ADMIN — SODIUM CHLORIDE 1000 ML: 9 INJECTION, SOLUTION INTRAVENOUS at 05:46

## 2021-01-17 RX ADMIN — SODIUM CHLORIDE: 9 INJECTION, SOLUTION INTRAVENOUS at 15:37

## 2021-01-17 RX ADMIN — DOCUSATE SODIUM 286 ML: 50 LIQUID ORAL at 09:30

## 2021-01-17 RX ADMIN — SODIUM CHLORIDE: 9 INJECTION, SOLUTION INTRAVENOUS at 22:57

## 2021-01-17 ASSESSMENT — ENCOUNTER SYMPTOMS
FEVER: 0
CHILLS: 0
ABDOMINAL PAIN: 1
APPETITE CHANGE: 1
VOMITING: 0
NAUSEA: 1

## 2021-01-17 ASSESSMENT — MIFFLIN-ST. JEOR: SCORE: 1433.1

## 2021-01-17 NOTE — ED NOTES
Bed: ED12  Expected date:   Expected time:   Means of arrival:   Comments:  H427   65m  Ab distention  Cramps  No covid  ETA 8 min

## 2021-01-17 NOTE — ED PROVIDER NOTES
"ED Provider Note  [facility]      History     Chief Complaint   Patient presents with     Abdominal Pain     HPI  rTevin Gee is a 65 year old male with a history of Crohn's disease (s/p ileocecostomy), IBS, and colon polyps who presents to the Emergency Department via EMS with abdominal pain and cramping. Patient states his symptoms began today just after 1pm, with cramping abdominal pain that radiates throughout his abdomen and some more localized tenderness in the right upper quadrant. He reports that he had canned corn Friday night after his dinner and he is worried that the corn has gotten stuck in his narrowed anastamosis. He slept in on Saturday until about noon and ate cereal upon waking but shortly after eating the cereal, he began to to feel \"off\". Sips of liquids made him more nauseated. Pt has tried deeply massaging the area to help whatever might be stuck to pass. He denies abdominal distention or blood in his stools. He has had a cholecystectomy and an appendectomy. He reports he has not had a true Crohns flare in several years and is not any medications for Crohns at this time.     Past Medical History  Past Medical History:   Diagnosis Date     Anemia 2007     Anxiety 2012?    much worse since July 2014     Cataract 12/2007    both eyes, too much prednisone, implants     Coccidioidomycosis      Crohn disease (H)      Crohn's disease (H) 1/13/2015     Depressive disorder 7/2014    much worse since July 2014     Fracture 1956    green fracture of leg     Gallbladder problem 2005?    much gravel, removed     GERD (gastroesophageal reflux disease)      History of blood transfusion 1988    ulcerated anastomosis term. ilium bleed     Hypertriglyceridemia 7/8/2016     Infection 2007    persistant coccidioidomycosis     Kidney stone various    both sides, all passed naturally     Mental health problem 2007    bipolar though perhaps different     Nephrolithiasis      " Osteoporosis 2007    osteoporosis, too much prednisone, last dexa 1/2014     Osteoporosis      Other nervous system complications 2007    prednisone overload induced kevin     Personal history of colonic polyps     small ones found during colonoscopies     Steroid-induced psychosis     Patient extremely sensitive to regular doses of steroids.  Unclear if this is due to chronic fluconazole use or genetic issue.  In the future, use caution when prescribing steroids.     TB (tuberculosis)      Past Surgical History:   Procedure Laterality Date     APPENDECTOMY  1974    coincidental with Crohn's surgery     BACK SURGERY  12/2007    kyphoplasty on compressed 4th??? vertebra     BIOPSY  2007, 2013    lump on arm, knee, back of hand for coccidioidomycosis cult.     CHOLECYSTECTOMY  2005    much gravel, removed laparoscopically     COLONOSCOPY  7/14/2014 last    Dr. Catherine Bowden, [facility] - many previous     COLONOSCOPY Left 9/11/2015    Procedure: COMBINED COLONOSCOPY, SINGLE OR MULTIPLE BIOPSY/POLYPECTOMY BY BIOPSY;  Surgeon: Fransisco Leach MD;  Location: [facility]     COLONOSCOPY N/A 8/3/2016    Procedure: COMBINED COLONOSCOPY, SINGLE OR MULTIPLE BIOPSY/POLYPECTOMY BY BIOPSY;  Surgeon: Fransisco Leach MD;  Location: [facility]     COLONOSCOPY N/A 8/16/2017    Procedure: COMBINED COLONOSCOPY, SINGLE OR MULTIPLE BIOPSY/POLYPECTOMY BY BIOPSY;;  Surgeon: Fransisco Leach MD;  Location: [facility]     COLONOSCOPY N/A 10/1/2019    Procedure: Colonoscopy With Colonic Stent Insertion;  Surgeon: Robbie Cruz MD;  Location: [facility]     COLONOSCOPY N/A 2/5/2020    Procedure: COLONOSCOPY, FOREIGN BODY REMOVAL;  Surgeon: Robbie Cruz MD;  Location: [facility]     ENT SURGERY  ?    upper endoscopy     ESOPHAGOSCOPY, GASTROSCOPY, DUODENOSCOPY (EGD), COMBINED N/A 11/8/2016    Procedure: COMBINED ENDOSCOPIC ULTRASOUND, ESOPHAGOSCOPY, GASTROSCOPY, DUODENOSCOPY (EGD), FINE NEEDLE ASPIRATE/BIOPSY;  Surgeon:  "Guru Alexsandra Ballesteros MD;  Location: [facility]     ESOPHAGOSCOPY, GASTROSCOPY, DUODENOSCOPY (EGD), COMBINED N/A 11/8/2016    Procedure: COMBINED ESOPHAGOSCOPY, GASTROSCOPY, DUODENOSCOPY (EGD), BIOPSY SINGLE OR MULTIPLE;  Surgeon: Guru Alexsandra Ballesteros MD;  Location: [facility]     ESOPHAGOSCOPY, GASTROSCOPY, DUODENOSCOPY (EGD), COMBINED N/A 8/16/2017    Procedure: COMBINED ESOPHAGOSCOPY, GASTROSCOPY, DUODENOSCOPY (EGD), BIOPSY SINGLE OR MULTIPLE;;  Surgeon: Fransisco Leach MD;  [facility]     EYE SURGERY  12/2007    cataract surgery both sides     GI SURGERY  1974, 1986(x2), 1989    Crohn's, 1974 RO 18\" term. ilium + 9\" asc. colon all one pc     SOFT TISSUE SURGERY  3/2013    removed buildup on back of r hand, coccidioidomycosis          calcium-vitamin D (CALTRATE) 600-400 MG-UNIT per tablet       Cyanocobalamin (VITAMIN B 12 PO)       dicyclomine (BENTYL) 10 MG capsule       diphenoxylate-atropine (LOMOTIL) 2.5-0.025 MG tablet       fluconazole (DIFLUCAN) 200 MG tablet       HYDROcodone-acetaminophen (NORCO) 5-325 MG tablet       MELATONIN PO       multivitamin, therapeutic with minerals (MULTI-VITAMIN) TABS       ondansetron (ZOFRAN ODT) 4 MG ODT tab       tiZANidine (ZANAFLEX) 4 MG tablet       traZODone (DESYREL) 50 MG tablet      Allergies   Allergen Reactions     Prednisone Other (See Comments)     Diana with more than 2.5mg chronic dosing of prednisone due to fluconazole interaction per patient.      Family History  Family History   Problem Relation Age of Onset     Heart Failure Father      Musculoskeletal Disorder Father         Parkinson's     Cancer - colorectal Mother      Cerebrovascular Disease Paternal Grandmother      Cancer Other      Musculoskeletal Disorder Brother         Parkinson's     Anesthesia Reaction No family hx of      Deep Vein Thrombosis (DVT) No family hx of      Social History   Social History     Tobacco Use     Smoking status: Never Smoker     " "Smokeless tobacco: Never Used   Substance Use Topics     Alcohol use: Yes     Comment: sometimes one glass of wine a week     Drug use: No      Past medical history, past surgical history, medications, allergies, family history, and social history were reviewed with the patient. No additional pertinent items.       Review of Systems   Constitutional: Positive for appetite change. Negative for chills and fever.   Gastrointestinal: Positive for abdominal pain and nausea. Negative for vomiting.        Abdominal pain is crampy and diffuse around abdomen.      A complete review of systems was performed with pertinent positives and negatives noted in the HPI, and all other systems negative.    Physical Exam   BP: (!) 136/93  Pulse: 102  Temp: 98.1  F (36.7  C)  Resp: 16  Height: 175.3 cm (5' 9\")  Weight: 65.8 kg (145 lb)  SpO2: 97 %  Physical Exam  Constitutional:       General: He is not in acute distress.     Appearance: He is well-developed and normal weight. He is not toxic-appearing.   HENT:      Head: Normocephalic and atraumatic.   Eyes:      Extraocular Movements: Extraocular movements intact.      Pupils: Pupils are equal, round, and reactive to light.   Cardiovascular:      Rate and Rhythm: Normal rate and regular rhythm.   Pulmonary:      Effort: Pulmonary effort is normal.      Breath sounds: Normal breath sounds.   Abdominal:      General: Abdomen is flat. A surgical scar is present. There is no distension.      Palpations: Abdomen is soft.      Tenderness: There is abdominal tenderness in the right upper quadrant. There is no guarding or rebound.   Skin:     General: Skin is warm and dry.   Neurological:      General: No focal deficit present.      Mental Status: He is alert and oriented to person, place, and time.   Psychiatric:         Mood and Affect: Mood is anxious.         Behavior: Behavior normal.       ED Course      Procedures           Results for orders placed or performed during the hospital " encounter of 01/17/21   Comprehensive metabolic panel     Status: Abnormal   Result Value Ref Range    Sodium 139 133 - 144 mmol/L    Potassium 4.0 3.4 - 5.3 mmol/L    Chloride 104 94 - 109 mmol/L    Carbon Dioxide 24 20 - 32 mmol/L    Anion Gap 11 3 - 14 mmol/L    Glucose 102 (H) 70 - 99 mg/dL    Urea Nitrogen 22 7 - 30 mg/dL    Creatinine 1.48 (H) 0.66 - 1.25 mg/dL    GFR Estimate 49 (L) >60 mL/min/[1.73_m2]    GFR Estimate If Black 56 (L) >60 mL/min/[1.73_m2]    Calcium 10.0 8.5 - 10.1 mg/dL    Bilirubin Total 0.8 0.2 - 1.3 mg/dL    Albumin 3.8 3.4 - 5.0 g/dL    Protein Total 7.3 6.8 - 8.8 g/dL    Alkaline Phosphatase 92 40 - 150 U/L    ALT 28 0 - 70 U/L    AST 22 0 - 45 U/L   Lipase     Status: None   Result Value Ref Range    Lipase 192 73 - 393 U/L   CBC with platelets differential     Status: Abnormal   Result Value Ref Range    WBC 10.8 4.0 - 11.0 10e9/L    RBC Count 5.07 4.4 - 5.9 10e12/L    Hemoglobin 16.0 13.3 - 17.7 g/dL    Hematocrit 48.2 40.0 - 53.0 %    MCV 95 78 - 100 fl    MCH 31.6 26.5 - 33.0 pg    MCHC 33.2 31.5 - 36.5 g/dL    RDW 15.2 (H) 10.0 - 15.0 %    Platelet Count 308 150 - 450 10e9/L    Diff Method Automated Method     % Neutrophils 67.2 %    % Lymphocytes 22.0 %    % Monocytes 8.7 %    % Eosinophils 1.3 %    % Basophils 0.5 %    % Immature Granulocytes 0.3 %    Nucleated RBCs 0 0 /100    Absolute Neutrophil 7.2 1.6 - 8.3 10e9/L    Absolute Lymphocytes 2.4 0.8 - 5.3 10e9/L    Absolute Monocytes 0.9 0.0 - 1.3 10e9/L    Absolute Eosinophils 0.1 0.0 - 0.7 10e9/L    Absolute Basophils 0.1 0.0 - 0.2 10e9/L    Abs Immature Granulocytes 0.0 0 - 0.4 10e9/L    Absolute Nucleated RBC 0.0      Medications   0.9% sodium chloride BOLUS (1,000 mLs Intravenous New Bag 1/17/21 0546)     Followed by   sodium chloride 0.9% infusion (has no administration in time range)        Assessments & Plan (with Medical Decision Making)   65yr old male with a history of Crohn's disease (s/p ileocecostomy), IBS, and  colon polyps who presents with resolved abdominal pain. EMS gave the patient Zofran en route to the ED. Pt reports his pain is resolved at this time. Differential includes bowel obstruction, colitis. Labs show normal lipase at 192. Normal electrolytes. Normal CBC.  Elevated creatinine (1.48), which is at his baseline (1.3-1.5) for chronic kidney disease.     On exam, the patient is non-tender to deep palpation. Dr Mendieta evaluated and examined the pt and discussed treatment plan. Patient will trial advancing his diet here in the ED. Sips of water caused mild crampy abdominal pain. Elected to do CT Abdomen Pelvis Without Contrast due to patients poor renal function. CT is currently pending.     Due to shift change, will sign the patient out to incoming provider to follow up on CT results. If CT is normal, will do another PO trial with plan for discharge to home. If CT is abnormal, would give consideration to Observation stay.     I have reviewed the nursing notes. I have reviewed the findings, diagnosis, plan and need for follow up with the patient.    New Prescriptions    No medications on file       Final diagnoses:   None     JEANNIE Fernandez  [facility] EMERGENCY DEPARTMENT  1/17/2021  --  Kristina Mendieta MD  [facility] EMERGENCY DEPARTMENT  1/17/2021     Zuleika Johnson CNP  01/17/21 0620    --    ED Attending Physician Attestation    I Kristina Mendieta MD, cared for this patient with the Advanced Practice Provider (LISA). I have performed a history and physical examination of the patient independent of the LISA. I reviewed the LISA's documentation above and agree with the documented findings and plan of care.     Summary of HPI, PE, ED Course   Patient is a 65 year old male evaluated in the emergency department for waves of crampy abdominal pain throughout the day. Exam notable for non-tender abdomen. ED course notable for cramping came back after trial of water orally. After  the completion of care in the emergency department, the patient was signed out to the oncoming MD at shift change pending CT results.     Critical Care & Procedures  Not applicable.    Medical Decision Making  The medical record was reviewed and interpreted.  Current labs reviewed and interpreted.  Previous labs reviewed and interpreted.      Kristian Mendieta MD  Emergency Medicine            Gayatri, Kristina Bean MD  01/17/21 6420

## 2021-01-17 NOTE — ED NOTES
12:23 PM  Patient was signed out to me as pending trial of liquids and abdominal CT.  Abdomen CT revealed unchanged anastomotic stricture with distal large amount of stool and proximal air-fluid levels.  Patient was given pink lady enema with subsequent reduction of distention.  Trial of clear liquids was tolerated with less symptoms but patient still complained of some distention, cramping and nausea.  The case was discussed at length with GI fellow and nurse practitioner from observation.  The patient is agreeable for admission to observation to observe clear liquid diet tolerance and obtain formal GI consult.     Rishabh Mendoza MD  01/17/21 4103

## 2021-01-17 NOTE — PLAN OF CARE
Observation Goals:  - GI consult complete- Not met, GI pending  - able to tolerate oral intake: Not met, Unable to tolerate oral liquids  - normal bowel sounds: Met, normoactive bowel sounds present  - improvement in abdominal distension/pain: In progress, pt states distension is better but still present.     Nurse to notify provider when observation goals have been met and patient is ready for discharge.

## 2021-01-17 NOTE — H&P
"[facility]   History & Physical    Trevin Gee MRN# [id number]   Age: 65 year old YOB: 1955     Date of Admission: 1/17/2021    Primary Care Provider: Lacey Feng         Chief Complaint:   \"abdominal pain\"         History of Present Illness:   Trevin Gee is a 65 year old male w/PMH significant for Crohn's disease (s/p ileocecostomy), IBS, and colon polyps who presents to the Emergency Department via EMS with abdominal pain and cramping. Per ER MD, \"Patient states his symptoms began today just after 1pm, with cramping abdominal pain that radiates throughout his abdomen and some more localized tenderness in the right upper quadrant. He reports that he had canned corn Friday night after his dinner and he is worried that the corn has gotten stuck in his narrowed anastamosis. He slept in on Saturday until about noon and ate cereal upon waking but shortly after eating the cereal, he began to to feel \"off\". Sips of liquids made him more nauseated. Pt has tried deeply massaging the area to help whatever might be stuck to pass. He denies abdominal distention or blood in his stools. He has had a cholecystectomy and an appendectomy. He reports he has not had a true Crohns flare in several years and is not any medications for Crohns at this time.\"    In the ED the patient's vital signs were stable, he was afebrile.  Labs: CMP with creatinine 1.48.  Lipase 192.  Glucose 102.  CBC with WBC 10.8. hgb 16.0.  CT A/P with contrast showed:  1. Postsurgical right hemicolectomy changes with large colonic stool  burden. Diffusely dilated small bowel. Unchanged 2.2 cm short segment  focal luminal narrowing at the ileocolonic anastomosis. Some degree of  obstruction at this level may be present though this is not clearly  representing a transition point.  2. Additional incidental findings as described in the above report  including stable cystic focus in the " pancreatic neck and bilateral  nonobstructing punctate renal stones.     He was given 1000 ml NS bolus and a pink lady enema.  He had 2 BMs in the ED.  GI was called and recommended clear liquid diet and GI to consult.  Patient admitted to the observation unit for continued management of symptoms.             Review of Systems:     All others reviewed and are negative         Past Medical History:     Past Medical History:   Diagnosis Date     Anemia 2007     Anxiety 2012?    much worse since July 2014     Cataract 12/2007    both eyes, too much prednisone, implants     Coccidioidomycosis      Crohn disease (H)      Crohn's disease (H) 1/13/2015     Depressive disorder 7/2014    much worse since July 2014     Fracture 1956    green fracture of leg     Gallbladder problem 2005?    much gravel, removed     GERD (gastroesophageal reflux disease)      History of blood transfusion 1988    ulcerated anastomosis term. ilium bleed     Hypertriglyceridemia 7/8/2016     Infection 2007    persistant coccidioidomycosis     Kidney stone various    both sides, all passed naturally     Mental health problem 2007    bipolar though perhaps different     Nephrolithiasis      Osteoporosis 2007    osteoporosis, too much prednisone, last dexa 1/2014     Osteoporosis      Other nervous system complications 2007    prednisone overload induced kevin     Personal history of colonic polyps     small ones found during colonoscopies     Steroid-induced psychosis     Patient extremely sensitive to regular doses of steroids.  Unclear if this is due to chronic fluconazole use or genetic issue.  In the future, use caution when prescribing steroids.     TB (tuberculosis)              Past Surgical History:      Past Surgical History:   Procedure Laterality Date     APPENDECTOMY  1974    coincidental with Crohn's surgery     BACK SURGERY  12/2007    kyphoplasty on compressed 4th??? vertebra     BIOPSY  2007, 2013    lump on arm, knee, back of hand  "for coccidioidomycosis cult.     CHOLECYSTECTOMY  2005    much gravel, removed laparoscopically     COLONOSCOPY  7/14/2014 last    Dr. Catherine Bowden, [facility] - many previous     COLONOSCOPY Left 9/11/2015    Procedure: COMBINED COLONOSCOPY, SINGLE OR MULTIPLE BIOPSY/POLYPECTOMY BY BIOPSY;  Surgeon: Fransisco Leach MD;  Location: [facility]     COLONOSCOPY N/A 8/3/2016    Procedure: COMBINED COLONOSCOPY, SINGLE OR MULTIPLE BIOPSY/POLYPECTOMY BY BIOPSY;  Surgeon: Fransisco Leach MD;  Location: [facility]     COLONOSCOPY N/A 8/16/2017    Procedure: COMBINED COLONOSCOPY, SINGLE OR MULTIPLE BIOPSY/POLYPECTOMY BY BIOPSY;;  Surgeon: Fransisco Leach MD;  Location: [facility]     COLONOSCOPY N/A 10/1/2019    Procedure: Colonoscopy With Colonic Stent Insertion;  Surgeon: Robbie Cruz MD;  Location: [facility]     COLONOSCOPY N/A 2/5/2020    Procedure: COLONOSCOPY, FOREIGN BODY REMOVAL;  Surgeon: Robbie Cruz MD;  Location: [facility]     ENT SURGERY  ?    upper endoscopy     ESOPHAGOSCOPY, GASTROSCOPY, DUODENOSCOPY (EGD), COMBINED N/A 11/8/2016    Procedure: COMBINED ENDOSCOPIC ULTRASOUND, ESOPHAGOSCOPY, GASTROSCOPY, DUODENOSCOPY (EGD), FINE NEEDLE ASPIRATE/BIOPSY;  Surgeon: Guru Alexsandra Ballesteros MD;  Location: [facility]     ESOPHAGOSCOPY, GASTROSCOPY, DUODENOSCOPY (EGD), COMBINED N/A 11/8/2016    Procedure: COMBINED ESOPHAGOSCOPY, GASTROSCOPY, DUODENOSCOPY (EGD), BIOPSY SINGLE OR MULTIPLE;  Surgeon: Guru Alexsandra Ballesteros MD;  Location: [facility]     ESOPHAGOSCOPY, GASTROSCOPY, DUODENOSCOPY (EGD), COMBINED N/A 8/16/2017    Procedure: COMBINED ESOPHAGOSCOPY, GASTROSCOPY, DUODENOSCOPY (EGD), BIOPSY SINGLE OR MULTIPLE;;  Surgeon: Fransisco Leach MD;  Location: [facility]     EYE SURGERY  12/2007    cataract surgery both sides     GI SURGERY  1974, 1986(x2), 1989    Crohn's, 1974 RO 18\" term. ilium + 9\" asc. colon all one pc     SOFT TISSUE SURGERY  3/2013    removed buildup on " back of r hand, coccidioidomycosis             Family History:     Family History   Problem Relation Age of Onset     Heart Failure Father      Musculoskeletal Disorder Father         Parkinson's     Cancer - colorectal Mother      Cerebrovascular Disease Paternal Grandmother      Cancer Other      Musculoskeletal Disorder Brother         Parkinson's     Anesthesia Reaction No family hx of      Deep Vein Thrombosis (DVT) No family hx of              Social History:     Social History     Tobacco Use     Smoking status: Never Smoker     Smokeless tobacco: Never Used   Substance Use Topics     Alcohol use: Yes     Comment: sometimes one glass of wine a week             Medications:   No current facility-administered medications on file prior to encounter.        calcium-vitamin D (CALTRATE) 600-400 MG-UNIT per tablet, Take 1 tablet by mouth as needed (PT last dose 1.26.2020)        Cyanocobalamin (VITAMIN B 12 PO), Take 500 mcg by mouth At Bedtime        dicyclomine (BENTYL) 10 MG capsule, Take 1 capsule (10 mg) by mouth 2 times daily (before meals)       diphenoxylate-atropine (LOMOTIL) 2.5-0.025 MG tablet, Take 1 tablet by mouth 4 times daily as needed for diarrhea       fluconazole (DIFLUCAN) 200 MG tablet, Take 1 tablet (200 mg) by mouth daily       HYDROcodone-acetaminophen (NORCO) 5-325 MG tablet, Take 1 tablet by mouth every 6 hours as needed for pain (Patient taking differently: Take 1 tablet by mouth every 6 hours as needed for pain (PT last dose approx 2 months ago 1.27.2020) )       MELATONIN PO, Take 1.5 mg by mouth nightly as needed (PT last dose 1.27.2020)        multivitamin, therapeutic with minerals (MULTI-VITAMIN) TABS, Take 1 tablet by mouth every evening        ondansetron (ZOFRAN ODT) 4 MG ODT tab, Take 1 tablet (4 mg) by mouth every 8 hours as needed for nausea or vomiting       tiZANidine (ZANAFLEX) 4 MG tablet, Take 1-2 tablets (4-8 mg) by mouth 3 times daily as needed for muscle spasms        "traZODone (DESYREL) 50 MG tablet, Take 1 tablet (50 mg) by mouth At Bedtime             Allergies:     Allergies   Allergen Reactions     Prednisone Other (See Comments)     Diana with more than 2.5mg chronic dosing of prednisone due to fluconazole interaction per patient.              Physical Exam:   /81   Pulse 97   Temp 98.1  F (36.7  C) (Oral)   Resp 16   Ht 1.753 m (5' 9\")   Wt 65.8 kg (145 lb)   SpO2 95%   BMI 21.41 kg/m     GENERAL: Alert and oriented x 3. NAD.   HEENT: Anicteric sclera. PERRL. Mucous membranes moist and without lesions.   CV: RRR. S1, S2. No murmurs appreciated.   RESPIRATORY: Effort normal. Lungs CTAB with no wheezing, rales, rhonchi.   GI: Abdomen soft and non distended with hypoactive bowel sounds present in all quadrants. No tenderness, rebound, guarding.   MUSCULOSKELETAL: No joint swelling or tenderness. Moves all extremities.   NEUROLOGICAL: No focal deficits. Strength 5/5 bilaterally in upper and lower extremities.   EXTREMITIES: No peripheral edema. Intact bilateral pedal pulses.   SKIN: No jaundice. No rashes.          Labs:   CBC:  Recent Labs   Lab Test 01/17/21  0354   WBC 10.8   RBC 5.07   HGB 16.0   HCT 48.2   MCV 95   MCH 31.6   MCHC 33.2   RDW 15.2*          CMP:  Recent Labs   Lab Test 01/17/21  0354      POTASSIUM 4.0   CHLORIDE 104   YUNIER 10.0   CO2 24   BUN 22   CR 1.48*   *   AST 22   ALT 28   BILITOTAL 0.8   ALBUMIN 3.8   PROTTOTAL 7.3   ALKPHOS 92       INR:   Recent Labs   Lab Test 04/21/19  1610   INR 0.99            Imaging:   CT of the Abdomen and Pelvis without contrast, 1/17/2021 7:28 AM.     Comparison: Abdominal MR 6/17/2019. Fluoroscopy exam 2/5/2020. CT  abdomen and pelvis 4/21/2019.     History: Bowel obstruction suspected.      Technique: Axial images of the  abdomen and pelvis were obtained  without contrast. Coronal reconstructions were provided. Images were  reviewed in bone, lung, and soft tissue windows.     Total DLP: " 594 mGy*cm.     Findings:     Abdomen and Pelvis:   Liver is normal. No focal lesion. No intra or extrahepatic biliary  duct dilation. Postsurgical cholecystectomy changes. Spleen is normal.  Stable 13 mm cystic focus in the pancreatic neck, unchanged since  abdominal MR 6/17/2019 (series 5, image 119). The main pancreatic duct  is mildly dilated measuring up to 5 mm.     Adrenal glands are normal. Bilateral renal cysts. No hydronephrosis.  Bilateral punctate nonobstructing renal stones. Bladder is normal in  appearance without wall thickening. Prostate is enlarged.     Postsurgical right hemicolectomy changes with ileocolonic anastomosis.  There is grossly unchanged 2.2 cm focal luminal narrowing at the  anastomosis (series 3, image 22), similar in appearance compared to  prior abdominal MR 6/17/2019. Small hiatal hernia. No small or large  bowel wall thickening or dilation. Large colonic stool burden with  air-fluid levels in small bowel. No free intraperitoneal air. No  intra-abdominal pelvic free fluid. No pneumatosis. No portal venous  gas.     Abdominal aorta and major branches are normal in caliber with  scattered atherosclerotic calcifications.     Prominent but not enlarged mesenteric lymph nodes, measuring up to 7  mm in the right lower quadrant (series 5, image 177).1     Bones and Soft Tissues: No suspicious osseous lesion. Degenerative  changes of the thoracolumbar spine most prominent at L3-L4. No  suspicious mass.                                                                      Impression:   1. Postsurgical right hemicolectomy changes with large colonic stool  burden. Diffusely dilated small bowel. Unchanged 2.2 cm short segment  focal luminal narrowing at the ileocolonic anastomosis. Some degree of  obstruction at this level may be present though this is not clearly  representing a transition point.  2. Additional incidental findings as described in the above report  including stable cystic focus  in the pancreatic neck and bilateral  nonobstructing punctate renal stones.     I have personally reviewed the examination and initial interpretation  and I agree with the findings.     SABA TURNER MD         Assessment and Plan:   Trevin Gee is a 65 year old male w/PMH significant for Crohn's disease (s/p ileocecostomy), IBS, and colon polyps who presents to the Emergency Department via EMS with abdominal pain and cramping.    1. Abdominal pain, crampin. Crohn's disease:  In the ED the patient's vital signs were stable, he was afebrile.  Labs: CMP with creatinine 1.48.  Lipase 192.  Glucose 102.  CBC with WBC 10.8. hgb 16.0.  CT A/P with contrast showed:  1. Postsurgical right hemicolectomy changes with large colonic stool  burden. Diffusely dilated small bowel. Unchanged 2.2 cm short segment  focal luminal narrowing at the ileocolonic anastomosis. Some degree of  obstruction at this level may be present though this is not clearly  representing a transition point.  2. Additional incidental findings as described in the above report  including stable cystic focus in the pancreatic neck and bilateral  nonobstructing punctate renal stones.     He was given 1000 ml NS bolus and a pink lady enema.  He had 2 BMs.  GI was called and recommended clear liquid diet and GI to consult.  Patient admitted to the observation unit for continued management of symptoms.     -admit to the observation unit  -clear liquids  -GI consult  -serial abdominal exams  -hold Bentyl and Lomotil    Chronic medical problems:  #insomnia: continue PTA trazodone  #coccidioidomycosis: continue PTA fluconazole            Discussed with Dr. Mendoza.     FEN: clear liquid  Prophylaxis: anticipate short stay  Code Status: Full        Cori Grey, APRN, CNP  #[id number]

## 2021-01-17 NOTE — CONSULTS
GASTROENTEROLOGY CONSULTATION    Date: 01/17/2021  Date of Admission: 1/17/2021  Reason for Consultation: abd distention  Requested by:   Gayatri  Brief Summary:   We were asked by Dr. Mendieta to evaluate this patient with abdominal pain/distension with history of Crohns      ASSESSMENT AND RECOMMENDATIONS:   Assessment:  65 year old male with a history of ileocolonic Crohn's s/p ileocecectomy in 1970s, complicated by anastomotic stricture requiring dilation, s/p axial stent with subsequent removal in 2020, currently not on therapy.    Ileocecectomy complicated by anastomotic stricture  Mildly dilated small bowel loops upon the CT abdomen with his given history concerning for small bowel obstruction although no transition point seen upon the CT. plan for conservative approach for now.  If clinically no improvement seen, we may consider further radiographic(CT enterography)/endoscopic evaluation to aid decisions in his in clinical course.  Rifaximin may be started if further clinical evaluation is negative.     Recommendations  --Continue conservative management  --Maintain n.p.o. for now  --If starts to have vomiting, place NG to low intermittent suction  --If starts having improvement, may start clear liquid tomorrow  --Maintain electrolytes within normal range  --Recommend to start IV fluid for hydration  --Recommend to check ESR and CRP  --Consider checking C. difficile if persistent loose watery stool    Gastroenterology outpatient follow up recommendations: Pending clinical course    Thank you for involving us in this patient's care. Please do not hesitate to contact the GI service with any questions or concerns.     Pt care plan discussed with Dr. Boone, GI staff physician.    This note was created with voice recognition software, and while reviewed for accuracy, typos may remain.    Perla Cam MD  GI Fellow  Pager:  [phone]  -------------------------------------------------------------------------------------------------------------------           History of Present Illness:   Trevin Gee is a 65 year old male with a history ofileocolonic Crohn's s/p ileocecectomy in 1970s, complicated by anastomotic stricture requiring dilation, s/p axial stent with subsequent removal in 2020, currently not on therapy, consulted for concerns of bowel obstruction.    Patient stated that he started having diarrhea after the Thanksgiving weekend when he felt low-grade fever and chills.  He came to the ED for evaluation on December 10 where his labs were unremarkable, Covid test was negative.  Despite that patient continued to have 3 watery bowel movements which were small in volume.  Patient was also using Lomotil as needed.  He stated that his diarrhea was resolved by the last week of December followed by formed stool which is unusual for him.  He started having formed bowel movements every 2 to 3 days.  Patient had some corns 2 days back feeling the counts might have caused obstruction.  Yesterday he only had cereal and started becoming nauseated.  He palpated his abdomen near the anastomosis which caused him severe abdominal pain resulting in cramps followed by distention.  Patient states that he did had a small bowel movement before he came into the hospital which was loose.  He complains of abdominal cramps off-and-on with some improvement with Zofran.    Denied any melena or hematochezia.  No fever or chills            Past Medical History:   Reviewed and edited as appropriate  Past Medical History:   Diagnosis Date     Anemia 2007     Anxiety 2012?    much worse since July 2014     Cataract 12/2007    both eyes, too much prednisone, implants     Coccidioidomycosis      Crohn disease (H)      Crohn's disease (H) 1/13/2015     Depressive disorder 7/2014    much worse since July 2014     Fracture 1956    green fracture of leg      Gallbladder problem 2005?    much gravel, removed     GERD (gastroesophageal reflux disease)      History of blood transfusion 1988    ulcerated anastomosis term. ilium bleed     Hypertriglyceridemia 7/8/2016     Infection 2007    persistant coccidioidomycosis     Kidney stone various    both sides, all passed naturally     Mental health problem 2007    bipolar though perhaps different     Nephrolithiasis      Osteoporosis 2007    osteoporosis, too much prednisone, last dexa 1/2014     Osteoporosis      Other nervous system complications 2007    prednisone overload induced kevin     Personal history of colonic polyps     small ones found during colonoscopies     Steroid-induced psychosis     Patient extremely sensitive to regular doses of steroids.  Unclear if this is due to chronic fluconazole use or genetic issue.  In the future, use caution when prescribing steroids.     TB (tuberculosis)             Past Surgical History:   Reviewed and edited as appropriate   Past Surgical History:   Procedure Laterality Date     APPENDECTOMY  1974    coincidental with Crohn's surgery     BACK SURGERY  12/2007    kyphoplasty on compressed 4th??? vertebra     BIOPSY  2007, 2013    lump on arm, knee, back of hand for coccidioidomycosis cult.     CHOLECYSTECTOMY  2005    much gravel, removed laparoscopically     COLONOSCOPY  7/14/2014 last    Dr. Catherine Bowden, [facility] [place] - many previous     COLONOSCOPY Left 9/11/2015    Procedure: COMBINED COLONOSCOPY, SINGLE OR MULTIPLE BIOPSY/POLYPECTOMY BY BIOPSY;  Surgeon: Fransisco Leach MD;  [facility]     COLONOSCOPY N/A 8/3/2016    Procedure: COMBINED COLONOSCOPY, SINGLE OR MULTIPLE BIOPSY/POLYPECTOMY BY BIOPSY;  Surgeon: Fransisco Leach MD;  [facility]     COLONOSCOPY N/A 8/16/2017    Procedure: COMBINED COLONOSCOPY, SINGLE OR MULTIPLE BIOPSY/POLYPECTOMY BY BIOPSY;;  Surgeon: Fransisco Leach MD;  [facility]     COLONOSCOPY N/A 10/1/2019     "Procedure: Colonoscopy With Colonic Stent Insertion;  Surgeon: Robbie Cruz MD;  [facility]     COLONOSCOPY N/A 2/5/2020    Procedure: COLONOSCOPY, FOREIGN BODY REMOVAL;  Surgeon: Robbie Cruz MD;  [facility]     ENT SURGERY  ?    upper endoscopy     ESOPHAGOSCOPY, GASTROSCOPY, DUODENOSCOPY (EGD), COMBINED N/A 11/8/2016    Procedure: COMBINED ENDOSCOPIC ULTRASOUND, ESOPHAGOSCOPY, GASTROSCOPY, DUODENOSCOPY (EGD), FINE NEEDLE ASPIRATE/BIOPSY;  Surgeon: Guru Alexsandra Ballesteros MD;  [facility]     ESOPHAGOSCOPY, GASTROSCOPY, DUODENOSCOPY (EGD), COMBINED N/A 11/8/2016    Procedure: COMBINED ESOPHAGOSCOPY, GASTROSCOPY, DUODENOSCOPY (EGD), BIOPSY SINGLE OR MULTIPLE;  Surgeon: Guru Alexsandra Ballesteros MD;  [facility]     ESOPHAGOSCOPY, GASTROSCOPY, DUODENOSCOPY (EGD), COMBINED N/A 8/16/2017    Procedure: COMBINED ESOPHAGOSCOPY, GASTROSCOPY, DUODENOSCOPY (EGD), BIOPSY SINGLE OR MULTIPLE;;  Surgeon: Fransisco Leach MD;  [facility]     EYE SURGERY  12/2007    cataract surgery both sides     GI SURGERY  1974, 1986(x2), 1989    Crohn's, 1974 RO 18\" term. ilium + 9\" asc. colon all one pc     SOFT TISSUE SURGERY  3/2013    removed buildup on back of r hand, coccidioidomycosis            Previous Endoscopy:     Results for orders placed or performed during the hospital encounter of 02/05/20   COLONOSCOPY   Result Value Ref Range    COLONOSCOPY       [address] [place] [address] [phone]     Endoscopy Department  _______________________________________________________________________________  Patient Name: Trevin Gee        Procedure Date: 2/5/2020 8:53 AM  MRN: [id number]                       Account Number: [id number]  YOB: 1955              Admit Type: Outpatient  Age: 64                               Room: OR  Gender: Male                          Note Status: Finalized  Attending MD: " Robbie Cruz MD  Pause for the Cause: pause for cause   completed  Total Sedation Time:                    _______________________________________________________________________________     Procedure:           Colonoscopy  Indications:         Foreign body removal from the colon, Therapeutic                        procedure  Providers:           Robbie Cruz MD  Patient Profile:     Mr Gee is a 65yo gentleman with Crohns status                        post dista l ilectomy and right hemicolectomy complicated                        by a recalcitrant anstomotic stenosis who underwent                        Axios stent placement for management. He has done well                        in the interval and returns for stent removal and repeat                        interrogation.  Referring MD:        Jayleen Boone MD, Fransisco Leach  Medicines:           Deep sedation was administered  Complications:       No immediate complications.  _______________________________________________________________________________  Procedure:           Pre-Anesthesia Assessment:                       - Prior to the procedure, a History and Physical was                        performed, and patient medications and allergies were                        reviewed. The patient is competent. The risks and                        benefits of the procedure and the sedation options and                        risks were discussed with the patient. All questions                         were answered and informed consent was obtained. Patient                        identification and proposed procedure were verified by                        the nurse in the pre-procedure area. Mental Status                        Examination: alert and oriented. Airway Examination:                        Mallampati Class II (the uvula but not tonsillar pillars                        visualized). Respiratory Examination: clear to                         auscultation. CV Examination: normal. ASA Grade                        Assessment: II - A patient with mild systemic disease.                        After reviewing the risks and benefits, the patient was                        deemed in satisfactory condition to undergo the                        procedure. The anesthesia plan was to use deep sedation                        / analgesia. Immediately prior to administration of                        medications, the patient was re-assessed for adequacy to                         receive sedatives. The heart rate, respiratory rate,                        oxygen saturations, blood pressure, adequacy of                        pulmonary ventilation, and response to care were                        monitored throughout the procedure. The physical status                        of the patient was re-assessed after the procedure.                        After obtaining informed consent, the colonoscope was                        passed under direct vision. Throughout the procedure,                        the patient's blood pressure, pulse, and oxygen                        saturations were monitored continuously. The colonoscope                        was introduced through the anus and advanced to the                        ileocolonic anastomosis. The colonoscopy was performed                        without difficulty. The patient tolerated the procedure                        well. The quality of the bowel preparation was adequate.                                                                                    Findings:       The patient was supine and frog legged.  films demonstrated the        Axios stent in the right upper quadrant. A pediatric colonoscope was        advanced without issue to the Axios stent which was at the ileocolonic        anastomosis. The stent was grasped and dispaced with a forceps. This        exposed the anastomosis which  was more widely patent and mildly        irriated. The small bowel just beyond appeared quite healthy as did the        entire reminaing colon and rectum. The stent was again grasped and        removed in toto.                                                                                   Impression:          - Well positioned, full expanded Axios stent removed                       - Improved patency of the ileocolonic anastomosis with                        mild irritation, no evidence of active disease                       - The neoterminal ileum, remaining colon and rectum                         appeared healthy  Recommendation:      - Deep anethesia recovery with probable discharge home                        this morning                       - Follow up with the IBD team as scheduled                       - No plans for repeat therapeutic endoscopy procedure at                        this juncture, however replacement of the stent would be                        feasible in the future if clinical course dictates                       - Resume typical diet without delay                       - The findings and recommendations were discussed with                        the patient                                                                                     electronically signed by FABI Cruz  _____________________  Robbie Cruz MD  2/5/2020 10:00:29 AM  I was physically present for the entire viewing portion of the exam.  __________________________  Signature of teaching physician  [id number] Cruz MD  Number of Addenda: 0    Note Initiat ed On: 2/5/2020 8:53 AM  Scope In:  Scope Out:              Social History:   Reviewed and edited as appropriate  Social History     Socioeconomic History     Marital status:      Spouse name: Not on file     Number of children: Not on file     Years of education: Not on file     Highest education level: Not on file   Occupational History      Occupation: Reserach associate at the      Employer: [employer]   Social Needs     Financial resource strain: Not on file     Food insecurity     Worry: Not on file     Inability: Not on file     Transportation needs     Medical: Not on file     Non-medical: Not on file   Tobacco Use     Smoking status: Never Smoker     Smokeless tobacco: Never Used   Substance and Sexual Activity     Alcohol use: Yes     Comment: sometimes one glass of wine a week     Drug use: No     Sexual activity: Never   Lifestyle     Physical activity     Days per week: Not on file     Minutes per session: Not on file     Stress: Not on file   Relationships     Social connections     Talks on phone: Not on file     Gets together: Not on file     Attends Holiness service: Not on file     Active member of club or organization: Not on file     Attends meetings of clubs or organizations: Not on file     Relationship status: Not on file     Intimate partner violence     Fear of current or ex partner: Not on file     Emotionally abused: Not on file     Physically abused: Not on file     Forced sexual activity: Not on file   Other Topics Concern     Not on file   Social History Narrative    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              Family History:   Reviewed and edited as appropriate  Family History   Problem Relation Age of Onset     Heart Failure Father      Musculoskeletal Disorder Father         Parkinson's     Cancer - colorectal Mother      Cerebrovascular Disease Paternal Grandmother      Cancer Other      Musculoskeletal Disorder Brother         Parkinson's     Anesthesia Reaction No family hx of      Deep Vein Thrombosis (DVT) No family hx of             Allergies:   Reviewed and edited as appropriate     Allergies   Allergen Reactions     Prednisone Other (See Comments)     Diana with more than 2.5mg chronic dosing of prednisone due to fluconazole interaction per patient.              "Medications:     Current Facility-Administered Medications   Medication     acetaminophen (TYLENOL) Suppository 650 mg     acetaminophen (TYLENOL) tablet 650 mg     fluconazole (DIFLUCAN) tablet 200 mg     melatonin tablet 1 mg     ondansetron (ZOFRAN-ODT) ODT tab 4 mg    Or     ondansetron (ZOFRAN) injection 4 mg     sodium chloride 0.9% infusion     traZODone (DESYREL) tablet 50 mg             Review of Systems:     A complete 10 point review of systems was performed and is negative except as noted in the HPI           Physical Exam:   BP (!) 130/94 (BP Location: Right arm)   Pulse 110   Temp 98  F (36.7  C) (Oral)   Resp 18   Ht 1.753 m (5' 9\")   Wt 65.8 kg (145 lb)   SpO2 97%   BMI 21.41 kg/m    Wt:   Wt Readings from Last 2 Encounters:   01/17/21 65.8 kg (145 lb)   12/18/20 68.9 kg (152 lb)      Constitutional: cooperative, pleasant  Eyes: Sclera anicteric  Ears/nose/mouth/throat: mucus membranes moist  Neck: supple  CV: No edema  Respiratory: Unlabored breathing  Abd:  Nondistended, +bs, nontender  Skin: warm, perfused  Neuro: AAO x 3  MSK: No gross deformities         Data:   Labs and imaging below were independently reviewed and interpreted    BMP  Recent Labs   Lab 01/17/21  0354      POTASSIUM 4.0   CHLORIDE 104   YUNIER 10.0   CO2 24   BUN 22   CR 1.48*   *     CBC  Recent Labs   Lab 01/17/21  0354   WBC 10.8   RBC 5.07   HGB 16.0   HCT 48.2   MCV 95   MCH 31.6   MCHC 33.2   RDW 15.2*        INRNo lab results found in last 7 days.  LFTs  Recent Labs   Lab 01/17/21  0354   ALKPHOS 92   AST 22   ALT 28   BILITOTAL 0.8   PROTTOTAL 7.3   ALBUMIN 3.8      PANC  Recent Labs   Lab 01/17/21  0354   LIPASE 192       Imaging:  CT ABD 1/17/21:  Impression:   1. Postsurgical right hemicolectomy changes with large colonic stool  burden. Diffusely dilated small bowel. Unchanged 2.2 cm short segment  focal luminal narrowing at the ileocolonic anastomosis. Some degree of  obstruction at this level " may be present though this is not clearly  representing a transition point.  2. Additional incidental findings as described in the above report  including stable cystic focus in the pancreatic neck and bilateral  nonobstructing punctate renal stones      Colonoscopy 2/5/20:  Impression:          - Well positioned, full expanded Axios stent removed                        - Improved patency of the ileocolonic anastomosis with                        mild irritation, no evidence of active disease                        - The neoterminal ileum, remaining colon and rectum                        appeared healthy   Recommendation:      - Deep anethesia recovery with probable discharge home                        this morning                        - Follow up with the IBD team as scheduled                        - No plans for repeat therapeutic endoscopy procedure at                        this juncture, however replacement of the stent would be                        feasible in the future if clinical course dictates                        - Resume typical diet without delay                        - The findings and recommendations were discussed with                        the patient     Colonoscopy 10/1/19:  Impression:          - Healthy appearing colon with status post removal of                        the cecum and ascending                        - Mildly stenotic ileocolonic anastomosis with mild                        ischemic erosion                        - Successful placement of a [id number] Axios stent across                        the stenosis                        - External thrombosed hemorrhoids and tags   Recommendation:      - Deep sedation recovery with probable discharge home                        this morning                        - Regular diet and activity may resume without change                        - If stent is to migrate, we would expect some fullness                        or pain in the  rectum and the patient is to contact our                        team fore removal                        - Othewise, plan will be to keep the stent in situ for                        3m with removal by repeat colonoscopy using deep sedation                        - The findings and recommendations were discussed with                        the patient and their family

## 2021-01-17 NOTE — PHARMACY-ADMISSION MEDICATION HISTORY
Admission Medication History Completed by Pharmacy    See [facility] Admission Navigator for allergy information, preferred outpatient pharmacy, prior to admission medications and immunization status.     Medication History Sources:     The patient    Sure Scripts    Care Everywhere    Changes made to PTA medication list (reason):    Added:   o Vitamin D capsule  o Ferrous sulfate 325 mg tablet    Deleted:   o Norco 5-325mg (from last year)  o Zofran 4mg ODT (from 2019)  o Tizanidine 4mg tablet (from 2018)    Changed: None    Additional Information:    The patient seems to be a reliable historian    Dicyclomine - patient reports he hasn't been taking this medication for >1 month, but took a capsule yesterday to see if it would relieve his pain.     Antimicrobial History  Medication Name: Fluconazole 200mg  Indication: Coccidioidal granuloma  Instructions: Take 1 tablet every day  Duration: Indefinitely     Prior to Admission medications    Medication Sig Last Dose Taking? Auth Provider   calcium-vitamin D (CALTRATE) 600-400 MG-UNIT per tablet Take 1 tablet by mouth daily  1/15/2021 at AM Yes Reported, Patient   Cyanocobalamin (VITAMIN B 12) 500 MCG TABS Take 500 mcg by mouth At Bedtime  1/15/2021 at PM Yes Reported, Patient   diphenoxylate-atropine (LOMOTIL) 2.5-0.025 MG tablet Take 1 tablet by mouth 4 times daily as needed for diarrhea Past Month at Unknown time Yes Fransisco Leach MD   ferrous sulfate (FE TABS) 325 (65 Fe) MG EC tablet Take 325 mg by mouth daily 1/15/2021 at AM Yes Unknown, Entered By History   fluconazole (DIFLUCAN) 200 MG tablet Take 1 tablet (200 mg) by mouth daily 1/15/2021 at AM Yes Florinda Mccabe MD   MELATONIN PO Take 1.5 mg by mouth nightly as needed (PT last dose 1.27.2020)  1/15/2021 at PM Yes Reported, Patient   multivitamin, therapeutic with minerals (MULTI-VITAMIN) TABS Take 1 tablet by mouth every evening  1/15/2021 at PM Yes Reported, Patient   traZODone (DESYREL) 50 MG  tablet Take 1 tablet (50 mg) by mouth At Bedtime 1/15/2021 at PM Yes Lacey Feng APRN CNP   Vitamin D, Cholecalciferol, 25 MCG (1000 UT) CAPS Take 25 mcg by mouth daily 1/15/2021 at AM Yes Unknown, Entered By History   dicyclomine (BENTYL) 10 MG capsule Take 1 capsule (10 mg) by mouth 2 times daily (before meals) Yesterday at PM  Fransisco Leach MD       Date completed: 01/17/21    Medication history completed by: ANDREY Ayers

## 2021-01-18 LAB
ALBUMIN SERPL-MCNC: 3 G/DL (ref 3.4–5)
ALP SERPL-CCNC: 102 U/L (ref 40–150)
ALT SERPL W P-5'-P-CCNC: 37 U/L (ref 0–70)
ANION GAP SERPL CALCULATED.3IONS-SCNC: 9 MMOL/L (ref 3–14)
AST SERPL W P-5'-P-CCNC: 25 U/L (ref 0–45)
BASOPHILS # BLD AUTO: 0 10E9/L (ref 0–0.2)
BASOPHILS NFR BLD AUTO: 0.6 %
BILIRUB SERPL-MCNC: 1.1 MG/DL (ref 0.2–1.3)
BUN SERPL-MCNC: 22 MG/DL (ref 7–30)
CALCIUM SERPL-MCNC: 7.9 MG/DL (ref 8.5–10.1)
CHLORIDE SERPL-SCNC: 114 MMOL/L (ref 94–109)
CO2 SERPL-SCNC: 20 MMOL/L (ref 20–32)
CREAT SERPL-MCNC: 1.22 MG/DL (ref 0.66–1.25)
CRP SERPL-MCNC: 8.8 MG/L (ref 0–8)
DIFFERENTIAL METHOD BLD: ABNORMAL
EOSINOPHIL # BLD AUTO: 0.1 10E9/L (ref 0–0.7)
EOSINOPHIL NFR BLD AUTO: 0.7 %
ERYTHROCYTE [DISTWIDTH] IN BLOOD BY AUTOMATED COUNT: 15.1 % (ref 10–15)
ERYTHROCYTE [SEDIMENTATION RATE] IN BLOOD BY WESTERGREN METHOD: 6 MM/H (ref 0–20)
GFR SERPL CREATININE-BSD FRML MDRD: 62 ML/MIN/{1.73_M2}
GLUCOSE SERPL-MCNC: 92 MG/DL (ref 70–99)
HCT VFR BLD AUTO: 45.9 % (ref 40–53)
HGB BLD-MCNC: 14.7 G/DL (ref 13.3–17.7)
IMM GRANULOCYTES # BLD: 0 10E9/L (ref 0–0.4)
IMM GRANULOCYTES NFR BLD: 0.4 %
LACTATE BLD-SCNC: 0.7 MMOL/L (ref 0.7–2)
LYMPHOCYTES # BLD AUTO: 0.9 10E9/L (ref 0.8–5.3)
LYMPHOCYTES NFR BLD AUTO: 12.7 %
MCH RBC QN AUTO: 31.1 PG (ref 26.5–33)
MCHC RBC AUTO-ENTMCNC: 32 G/DL (ref 31.5–36.5)
MCV RBC AUTO: 97 FL (ref 78–100)
MONOCYTES # BLD AUTO: 0.9 10E9/L (ref 0–1.3)
MONOCYTES NFR BLD AUTO: 13.1 %
NEUTROPHILS # BLD AUTO: 5.2 10E9/L (ref 1.6–8.3)
NEUTROPHILS NFR BLD AUTO: 72.5 %
NRBC # BLD AUTO: 0 10*3/UL
NRBC BLD AUTO-RTO: 0 /100
PLATELET # BLD AUTO: 247 10E9/L (ref 150–450)
POTASSIUM SERPL-SCNC: 4 MMOL/L (ref 3.4–5.3)
PROT SERPL-MCNC: 5.8 G/DL (ref 6.8–8.8)
RBC # BLD AUTO: 4.73 10E12/L (ref 4.4–5.9)
SODIUM SERPL-SCNC: 143 MMOL/L (ref 133–144)
WBC # BLD AUTO: 7.2 10E9/L (ref 4–11)

## 2021-01-18 PROCEDURE — 258N000003 HC RX IP 258 OP 636: Performed by: NURSE PRACTITIONER

## 2021-01-18 PROCEDURE — 85025 COMPLETE CBC W/AUTO DIFF WBC: CPT | Performed by: PHYSICIAN ASSISTANT

## 2021-01-18 PROCEDURE — 86140 C-REACTIVE PROTEIN: CPT | Performed by: PHYSICIAN ASSISTANT

## 2021-01-18 PROCEDURE — 99226 PR SUBSEQUENT OBSERVATION CARE,LEVEL III: CPT | Performed by: INTERNAL MEDICINE

## 2021-01-18 PROCEDURE — 36415 COLL VENOUS BLD VENIPUNCTURE: CPT | Performed by: PHYSICIAN ASSISTANT

## 2021-01-18 PROCEDURE — 99233 SBSQ HOSP IP/OBS HIGH 50: CPT | Mod: GC | Performed by: INTERNAL MEDICINE

## 2021-01-18 PROCEDURE — G0378 HOSPITAL OBSERVATION PER HR: HCPCS

## 2021-01-18 PROCEDURE — 99225 PR SUBSEQUENT OBSERVATION CARE,LEVEL II: CPT | Performed by: EMERGENCY MEDICINE

## 2021-01-18 PROCEDURE — 83605 ASSAY OF LACTIC ACID: CPT | Performed by: PHYSICIAN ASSISTANT

## 2021-01-18 PROCEDURE — 85652 RBC SED RATE AUTOMATED: CPT | Performed by: PHYSICIAN ASSISTANT

## 2021-01-18 PROCEDURE — 250N000013 HC RX MED GY IP 250 OP 250 PS 637: Performed by: NURSE PRACTITIONER

## 2021-01-18 PROCEDURE — 99207 PR CDG-CODE CATEGORY CHANGED: CPT | Performed by: INTERNAL MEDICINE

## 2021-01-18 PROCEDURE — 80053 COMPREHEN METABOLIC PANEL: CPT | Performed by: PHYSICIAN ASSISTANT

## 2021-01-18 RX ADMIN — TRAZODONE HYDROCHLORIDE 50 MG: 50 TABLET ORAL at 23:28

## 2021-01-18 RX ADMIN — SODIUM CHLORIDE: 9 INJECTION, SOLUTION INTRAVENOUS at 06:54

## 2021-01-18 RX ADMIN — SODIUM CHLORIDE: 9 INJECTION, SOLUTION INTRAVENOUS at 14:52

## 2021-01-18 RX ADMIN — SODIUM CHLORIDE: 9 INJECTION, SOLUTION INTRAVENOUS at 23:29

## 2021-01-18 RX ADMIN — TRAZODONE HYDROCHLORIDE 50 MG: 50 TABLET ORAL at 00:51

## 2021-01-18 NOTE — PLAN OF CARE
Observation Goals:   - GI consult complete: Met   - able to tolerate oral intake: not met, pt NPO. Had one episode of 500cc emesis.   - normal bowel sounds: bowel sounds hypoactive  - improvement in abdominal distension/pain: Pt denies abdominal pain after episode of emesis. Will continue to monitor.

## 2021-01-18 NOTE — PLAN OF CARE
"Observation Goals:   - GI consult complete: Met   - able to tolerate oral intake: not met, pt NPO. Had one episode of 500cc emesis x1   - normal bowel sounds: bowel sounds audile and hypoactive  - improvement in abdominal distension/pain: Pt denies abdominal pain after episode of emesis. Will continue to monitor.     /82 (BP Location: Left arm)   Pulse 120   Temp 97.6  F (36.4  C) (Oral)   Resp 18   Ht 1.753 m (5' 9\")   Wt 65.8 kg (145 lb)   SpO2 100%   BMI 21.41 kg/m        "

## 2021-01-18 NOTE — PROGRESS NOTES
"Observation Goals:   - GI consult complete: Met     - able to tolerate oral intake: not met,  Denies nausea and vomiting     - normal bowel sounds: bowel sounds audile and hypoactive  Pt placed on Clear liquid diet.Resting in bed./75 (BP Location: Left arm)   Pulse 95   Temp 98  F (36.7  C) (Oral)   Resp 12   Ht 1.753 m (5' 9\")   Wt 65.8 kg (145 lb)   SpO2 98%   BMI 21.41 kg/m      - improvement in abdominal distension/pain: Pt denies abdominal pain at this time.   Will continue to monitor.      "

## 2021-01-18 NOTE — PROGRESS NOTES
"Emergency Medicine Observation Attending note    The patient was independently seen and examined by me. The chart, vital signs, and labs were reviewed. The patient's findings were discussed with the LISA on the observation unit, and I agree with the findings of the note and the plan.    64 yo male with h/o crohn's dz and previous bowel surgery, admitted to ED OBS for monitoring after presenting to the ED with sx concerning for SBO, though CT did not show transition point. He did vomit x 1 overnight, but felt much better after, so NG was deferred in favor of further monitoring. This morning he reports feeling improved after 2 bowel movements + vomiting. No ongoing pain or nausea.     /75 (BP Location: Left arm)   Pulse 95   Temp 98  F (36.7  C) (Oral)   Resp 12   Ht 1.753 m (5' 9\")   Wt 65.8 kg (145 lb)   SpO2 98%   BMI 21.41 kg/m      Exam:  General: awake, alert, NAD  HEENT: NC/AT  Neck: supple  Lungs: CTA-B  Heart: RRR, no M/R/G  Abd: soft, ND/NT  Ext: non-tender, no edema        Assessment/plan:  1. Intermittent abd cramping - concerning for partial SBO. Feeling much improved this am after one emesis overnight + 2 x bowel movements. Will await input from GI, but likely will be able to advance to liquid diet this am.   "

## 2021-01-18 NOTE — PROGRESS NOTES
GASTROENTEROLOGY PROGRESS NOTE    Date: 01/18/2021  Admit Date: 1/17/2021       ASSESSMENT AND RECOMMENDATIONS:     ASSESSMENT:  65 year old male with a history of ileocolonic Crohn's s/p ileocecectomy in 1970s, complicated by anastomotic stricture requiring dilation, s/p axial stent with subsequent removal in 2020, currently not on therapy.     Ileocecectomy complicated by anastomotic stricture  Mildly dilated small bowel loops upon the CT abdomen with his given history concerning for small bowel obstruction although no transition point seen upon the CT. If he continues to tolerate liquid diet, plan is to get CT enterography if he does not improves for evaluation of anastomotic stricture. Based upon results may discuss further endoscopic vs surgical management depending upon the size of stricture or any active disease.    RECOMMENDATIONS:  --Continue clear liquid diet for now  --If starts to have vomiting, place NG to low intermittent suction  --Recommend to get CT enterography if clinically doesn't improve     Gastroenterology follow up recommendations: Pending clinical course.      Thank you for involving us in this patient's care. Please do not hesitate to contact the GI service with any questions or concerns.      Pt care plan discussed with Dr. Boone, GI staff physician.    This note was created with voice recognition software, and while reviewed for accuracy, typos may remain.    Perla Cam MD  GI Fellow  Pager: [phone]  _______________________________________________________________    Subjective\events within the 24 hours:     Pt was seen at bedside, tolerated clear liquid diet    4 point ROS performed and negative unless noted above.      Medications:     Current Facility-Administered Medications   Medication     acetaminophen (TYLENOL) Suppository 650 mg     acetaminophen (TYLENOL) tablet 650 mg     fluconazole (DIFLUCAN) tablet 200 mg     melatonin tablet 1 mg     ondansetron (ZOFRAN-ODT) ODT  "tab 4 mg    Or     ondansetron (ZOFRAN) injection 4 mg     prochlorperazine (COMPAZINE) injection 5 mg    Or     prochlorperazine (COMPAZINE) tablet 5 mg    Or     prochlorperazine (COMPAZINE) suppository 12.5 mg     sodium chloride 0.9% infusion     traZODone (DESYREL) tablet 50 mg       Physical Exam     Vital Signs:  /75 (BP Location: Left arm)   Pulse 95   Temp 98  F (36.7  C) (Oral)   Resp 12   Ht 1.753 m (5' 9\")   Wt 65.8 kg (145 lb)   SpO2 98%   BMI 21.41 kg/m       Gen: A&Ox3, NAD  HEENT: ncat, perrl, eomi, sclera anicteric  Neck: supple  CV:  S1, S2 heard  Lungs: CTA b/l  Abd: +bs, soft, nd/nt  Skin: no jaundice  Extremities: No edema  Neuro: non focal       Data   LABS:  BMP  Recent Labs   Lab 01/18/21  0544 01/17/21  0354    139   POTASSIUM 4.0 4.0   CHLORIDE 114* 104   YUNIER 7.9* 10.0   CO2 20 24   BUN 22 22   CR 1.22 1.48*   GLC 92 102*     CBC  Recent Labs   Lab 01/18/21  0544 01/17/21  0354   WBC 7.2 10.8   RBC 4.73 5.07   HGB 14.7 16.0   HCT 45.9 48.2   MCV 97 95   MCH 31.1 31.6   MCHC 32.0 33.2   RDW 15.1* 15.2*    308     INRNo lab results found in last 7 days.  LFTs  Recent Labs   Lab 01/18/21  0544 01/17/21  0354   ALKPHOS 102 92   AST 25 22   ALT 37 28   BILITOTAL 1.1 0.8   PROTTOTAL 5.8* 7.3   ALBUMIN 3.0* 3.8      PANC  Recent Labs   Lab 01/17/21  0354   LIPASE 192         "

## 2021-01-18 NOTE — PROGRESS NOTES
"Observation Goals:   - GI consult complete : Me    - able to tolerate oral intake: not met,  Denies nausea and vomiting, and abdominal pain. Clear liquid diet initiated this morning and also at lunch time and well tolerated.  No complaints of nausea and emesis. Pt prefers Chicken broth.     - normal bowel sounds: bowel sounds audile and hypoactive. 1 loose BM x 1 today    - improvement in abdominal distension/pain: Pt denies abdominal pain at this time. 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 No acute changes./82 (BP Location: Left arm)   Pulse 89   Temp 97.7  F (36.5  C) (Oral)   Resp 16   Ht 1.753 m (5' 9\")   Wt 65.8 kg (145 lb)   SpO2 97%   BMI 21.41 kg/m      Will continue to monitor.        "

## 2021-01-18 NOTE — UTILIZATION REVIEW
Continued stay Observation     Concurrent stay review; Secondary Review Determination         Under the authority of the Utilization Management Committee, the utilization review process indicated a secondary review on the above patient.  The review outcome is based on review of the medical records, discussions with staff, and applying clinical experience noted on the date of the review.          (x) Observation Status Appropriate - Concurrent stay review    RATIONALE FOR DETERMINATION   65-year-old male with history of Crohn's disease s/p ileocecostomy, IBS presented to [facility] on 1/17/2020 with abdominal pain.  He was admitted with concerns for small bowel obstruction.  Overall symptoms appears to be improving, currently he is on clear liquid diet.  Discussed with primary team, they are awaiting gastroenterology evaluation today, if continues to improve patient will likely discharge later today.  Observation care is appropriate at this time.  However on gastroenterology evaluation, if there is ongoing concerns that requires hospitalization or further evaluation, he should be switched to inpatient.    Patient is clinically improving and there is no clear indication to change patient's status to inpatient. The severity of illness, intensity of service provided, expected LOS and risk for adverse outcome make the care appropriate for observation.      This document was produced using voice recognition software       The information on this document is developed by the utilization review team in order for the business office to ensure compliance.  This only denotes the appropriateness of proper admission status and does not reflect the quality of care rendered.         The definitions of Inpatient Status and Observation Status used in making the determination above are those provided in the CMS Coverage Manual, Chapter 1 and Chapter 6, section 70.4.      Sincerely,     Santino Lanza MD    Utilization Review  Physician  Advisor  [facility].

## 2021-01-18 NOTE — PROGRESS NOTES
"Observation Goals:   - GI consult complete:No    - able to tolerate oral intake: not met,  Denies nausea and vomiting. Clear liquid diet initiated this morning and well tolerated.No complaints of nausea and emesis.     - normal bowel sounds: bowel sounds audile and hypoactive. 1 loose BM x 1 today    - improvement in abdominal distension/pain: Pt denies abdominal pain at this time.   Will continue to monitor.  /82 (BP Location: Left arm)   Pulse 89   Temp 97.7  F (36.5  C) (Oral)   Resp 16   Ht 1.753 m (5' 9\")   Wt 65.8 kg (145 lb)   SpO2 97%   BMI 21.41 kg/m      "

## 2021-01-18 NOTE — DISCHARGE SUMMARY
"Observation Unit Transfer Summary     Patient ID:  Trevin Gee  MRN: [id number]  65 year old  YOB: 1955    Observation Admit Date: 1/17/2021    ED Admitting Attending: Kristina Mendieta MD    Transfer Date and Time: January 17, 2021 at 9:09 PM     Transferring Observation Provider: Tania Tom PA-C  Admission Diagnoses:     1. Abdominal cramping        Transfer Diagnoses:    Ileus vs Intermittent SBO    Emergency Department and Observation Course:     Trevin Gee is a 65 year old male w/PMH significant for Crohn's disease (s/p ileocecostomy), IBS, and colon polyps who presents to the Emergency Department via EMS with abdominal pain and cramping. Per ER MD, \"Patient states his symptoms began today just after 1pm, with cramping abdominal pain that radiates throughout his abdomen and some more localized tenderness in the right upper quadrant. He reports that he had canned corn Friday night after his dinner and he is worried that the corn has gotten stuck in his narrowed anastamosis. He slept in on Saturday until about noon and ate cereal upon waking but shortly after eating the cereal, he began to to feel \"off\". Sips of liquids made him more nauseated. Pt has tried deeply massaging the area to help whatever might be stuck to pass. He denies abdominal distention or blood in his stools. He has had a cholecystectomy and an appendectomy. He reports he has not had a true Crohns flare in several years and is not any medications for Crohns at this time.\"     In the ED the patient's vital signs were stable, he was afebrile.  Labs: CMP with creatinine 1.48.  Lipase 192.  Glucose 102.  CBC with WBC 10.8. hgb 16.0.  CT A/P with contrast showed:  1. Postsurgical right hemicolectomy changes with large colonic stool burden. Diffusely dilated small bowel. Unchanged 2.2 cm short segment focal luminal narrowing at the ileocolonic anastomosis. Some degree of  obstruction at this level may be present though " "this is not clearly representing a transition point.  2. Additional incidental findings as described in the above report including stable cystic focus in the pancreatic neck and bilateral nonobstructing punctate renal stones.     In the ED, he was given 1000 ml NS bolus and a pink lady enema.  He had 2 BMs in the ED.  GI was consulted and recommended clear liquid diet (see full consult note).  Patient admitted to the observation unit for continued management of symptoms.     He was made NPO per GI recommendations after he had some clears in the ED between the hours of 1-2PM. About 8PM he had 500ml of non bloody emesis. Patient reported feeling more comfortable after emesis. Abdominal exam benign, soft, non distended. Per GI recommendations if patient starts to have emesis then place NGT to LIS.  Patient not tolerating clears this morning. He was seen by GI again. Per GI, plan to monitor tolerance of diet advancement and recommend to get CT enterography if clinically doesn't improve.      At this time the patient has failed observation management due to concern for ileus vs intermittent SBO needing NGT to LIS and will be transferred to inpatient status. Patient was discussed with Dr. Ramirez with Medicine team and has been accepted to his service.     Consults: GI    DATA:    Transfer Exam:    /83 (BP Location: Left arm)   Pulse 100   Temp 98.2  F (36.8  C) (Oral)   Resp 17   Ht 1.753 m (5' 9\")   Wt 65.8 kg (145 lb)   SpO2 100%   BMI 21.41 kg/m    Constitutional: awake, alert, cooperative, no apparent distress, and appears stated age  Eyes: Lids and lashes normal, pupils equal, round and reactive to light, extra ocular muscles intact, sclera clear, conjunctiva normal  ENT: Normocephalic, without obvious abnormality, atraumatic, sinuses nontender on palpation, external ears without lesions, oral pharynx with moist mucous membranes, tonsils without erythema or exudates, gums normal and good " dentition.  Hematologic / Lymphatic: no cervical lymphadenopathy  Respiratory: No increased work of breathing, good air exchange, clear to auscultation bilaterally, no crackles or wheezing  Cardiovascular: Normal apical impulse, regular rate and rhythm, normal S1 and S2, no S3 or S4, and no murmur noted  GI: No scars, normal bowel sounds, soft, non-distended, non-tender, no masses palpated, no hepatosplenomegally  Skin: no bruising or bleeding  Musculoskeletal: There is no redness, warmth, or swelling of the joints.  Full range of motion noted.  Motor strength is 5 out of 5 all extremities bilaterally.  Tone is normal.  Neurologic: Awake, alert, oriented to name, place and time.  Cranial nerves II-XII are grossly intact.  Motor is 5 out of 5 bilaterally.  Cerebellar finger to nose, heel to shin intact.  Sensory is intact.  Babinski down going, Romberg negative, and gait is normal.  Neuropsychiatric: General: normal, calm and normal eye contact    Current Medications:    No current outpatient medications on file.       Medications Prior to Admission:    Medications Prior to Admission   Medication Sig Dispense Refill Last Dose     calcium-vitamin D (CALTRATE) 600-400 MG-UNIT per tablet Take 1 tablet by mouth daily    1/15/2021 at AM     Cyanocobalamin (VITAMIN B 12) 500 MCG TABS Take 500 mcg by mouth At Bedtime    1/15/2021 at PM     diphenoxylate-atropine (LOMOTIL) 2.5-0.025 MG tablet Take 1 tablet by mouth 4 times daily as needed for diarrhea 60 tablet 1 Past Month at Unknown time     ferrous sulfate (FE TABS) 325 (65 Fe) MG EC tablet Take 325 mg by mouth daily   1/15/2021 at AM     fluconazole (DIFLUCAN) 200 MG tablet Take 1 tablet (200 mg) by mouth daily 90 tablet 3 1/15/2021 at AM     MELATONIN PO Take 1.5 mg by mouth nightly as needed (PT last dose 1.27.2020)    1/15/2021 at PM     multivitamin, therapeutic with minerals (MULTI-VITAMIN) TABS Take 1 tablet by mouth every evening    1/15/2021 at PM     traZODone  (DESYREL) 50 MG tablet Take 1 tablet (50 mg) by mouth At Bedtime 90 tablet 2 1/15/2021 at PM     Vitamin D, Cholecalciferol, 25 MCG (1000 UT) CAPS Take 25 mcg by mouth daily   1/15/2021 at AM     dicyclomine (BENTYL) 10 MG capsule Take 1 capsule (10 mg) by mouth 2 times daily (before meals) 90 capsule 1 Yesterday at PM       Significant Diagnostic Studies:     Results for orders placed or performed during the hospital encounter of 01/17/21   CT Abdomen Pelvis w/o Contrast     Status: None    Narrative    CT of the Abdomen and Pelvis without contrast, 1/17/2021 7:28 AM.    Comparison: Abdominal MR 6/17/2019. Fluoroscopy exam 2/5/2020. CT  abdomen and pelvis 4/21/2019.    History: Bowel obstruction suspected.     Technique: Axial images of the  abdomen and pelvis were obtained  without contrast. Coronal reconstructions were provided. Images were  reviewed in bone, lung, and soft tissue windows.    Total DLP: 594 mGy*cm.    Findings:    Abdomen and Pelvis:   Liver is normal. No focal lesion. No intra or extrahepatic biliary  duct dilation. Postsurgical cholecystectomy changes. Spleen is normal.  Stable 13 mm cystic focus in the pancreatic neck, unchanged since  abdominal MR 6/17/2019 (series 5, image 119). The main pancreatic duct  is mildly dilated measuring up to 5 mm.    Adrenal glands are normal. Bilateral renal cysts. No hydronephrosis.  Bilateral punctate nonobstructing renal stones. Bladder is normal in  appearance without wall thickening. Prostate is enlarged.    Postsurgical right hemicolectomy changes with ileocolonic anastomosis.  There is grossly unchanged 2.2 cm focal luminal narrowing at the  anastomosis (series 3, image 22), similar in appearance compared to  prior abdominal MR 6/17/2019. Small hiatal hernia. No small or large  bowel wall thickening or dilation. Large colonic stool burden with  air-fluid levels in small bowel. No free intraperitoneal air. No  intra-abdominal pelvic free fluid. No  pneumatosis. No portal venous  gas.    Abdominal aorta and major branches are normal in caliber with  scattered atherosclerotic calcifications.    Prominent but not enlarged mesenteric lymph nodes, measuring up to 7  mm in the right lower quadrant (series 5, image 177).1    Bones and Soft Tissues: No suspicious osseous lesion. Degenerative  changes of the thoracolumbar spine most prominent at L3-L4. No  suspicious mass.      Impression    Impression:   1. Postsurgical right hemicolectomy changes with large colonic stool  burden. Diffusely dilated small bowel. Unchanged 2.2 cm short segment  focal luminal narrowing at the ileocolonic anastomosis. Some degree of  obstruction at this level may be present though this is not clearly  representing a transition point.  2. Additional incidental findings as described in the above report  including stable cystic focus in the pancreatic neck and bilateral  nonobstructing punctate renal stones.    I have personally reviewed the examination and initial interpretation  and I agree with the findings.    SABA TURNER MD   Comprehensive metabolic panel     Status: Abnormal   Result Value Ref Range    Sodium 139 133 - 144 mmol/L    Potassium 4.0 3.4 - 5.3 mmol/L    Chloride 104 94 - 109 mmol/L    Carbon Dioxide 24 20 - 32 mmol/L    Anion Gap 11 3 - 14 mmol/L    Glucose 102 (H) 70 - 99 mg/dL    Urea Nitrogen 22 7 - 30 mg/dL    Creatinine 1.48 (H) 0.66 - 1.25 mg/dL    GFR Estimate 49 (L) >60 mL/min/[1.73_m2]    GFR Estimate If Black 56 (L) >60 mL/min/[1.73_m2]    Calcium 10.0 8.5 - 10.1 mg/dL    Bilirubin Total 0.8 0.2 - 1.3 mg/dL    Albumin 3.8 3.4 - 5.0 g/dL    Protein Total 7.3 6.8 - 8.8 g/dL    Alkaline Phosphatase 92 40 - 150 U/L    ALT 28 0 - 70 U/L    AST 22 0 - 45 U/L   Lipase     Status: None   Result Value Ref Range    Lipase 192 73 - 393 U/L   CBC with platelets differential     Status: Abnormal   Result Value Ref Range    WBC 10.8 4.0 - 11.0 10e9/L    RBC Count 5.07  4.4 - 5.9 10e12/L    Hemoglobin 16.0 13.3 - 17.7 g/dL    Hematocrit 48.2 40.0 - 53.0 %    MCV 95 78 - 100 fl    MCH 31.6 26.5 - 33.0 pg    MCHC 33.2 31.5 - 36.5 g/dL    RDW 15.2 (H) 10.0 - 15.0 %    Platelet Count 308 150 - 450 10e9/L    Diff Method Automated Method     % Neutrophils 67.2 %    % Lymphocytes 22.0 %    % Monocytes 8.7 %    % Eosinophils 1.3 %    % Basophils 0.5 %    % Immature Granulocytes 0.3 %    Nucleated RBCs 0 0 /100    Absolute Neutrophil 7.2 1.6 - 8.3 10e9/L    Absolute Lymphocytes 2.4 0.8 - 5.3 10e9/L    Absolute Monocytes 0.9 0.0 - 1.3 10e9/L    Absolute Eosinophils 0.1 0.0 - 0.7 10e9/L    Absolute Basophils 0.1 0.0 - 0.2 10e9/L    Abs Immature Granulocytes 0.0 0 - 0.4 10e9/L    Absolute Nucleated RBC 0.0    Asymptomatic Influenza A/B & SARS-CoV2 (COVID-19) Virus PCR Multiplex     Status: None    Specimen: Nasopharyngeal   Result Value Ref Range    Flu A/B & SARS-COV-2 PCR Source Nasopharyngeal     SARS-CoV-2 PCR Result NEGATIVE     Influenza A PCR Negative NEG^Negative    Influenza B PCR Negative NEG^Negative    Respiratory Syncytial Virus PCR Negative NEG^Negative    Flu A/B & SARS-CoV-2 PCR Comment (Note)    Erythrocyte sedimentation rate auto     Status: None   Result Value Ref Range    Sed Rate 6 0 - 20 mm/h   CRP inflammation     Status: None   Result Value Ref Range    CRP Inflammation <2.9 0.0 - 8.0 mg/L   GI LUMINAL ADULT IP CONSULT: Patient to be seen: Routine within 24 hrs; Call back #: [id number]; abdominal pain/distension with history of Crohns; Consultant may enter orders: Yes; Requesting provider? Hospitalist (if different from attending physician...     Status: None ()    Perla Sanford MD     1/17/2021  4:48 PM      GASTROENTEROLOGY CONSULTATION    Date: 01/17/2021  Date of Admission: 1/17/2021  Reason for Consultation: abd distention  Requested by:   Gayatri  Brief Summary:   We were asked by Dr. Mendieta to evaluate this patient with   abdominal pain/distension with  history of Crohns      ASSESSMENT AND RECOMMENDATIONS:   Assessment:  65 year old male with a history of ileocolonic Crohn's s/p   ileocecectomy in 1970s, complicated by anastomotic stricture   requiring dilation, s/p axial stent with subsequent removal in   2020, currently not on therapy.    Ileocecectomy complicated by anastomotic stricture  Mildly dilated small bowel loops upon the CT abdomen with his   given history concerning for small bowel obstruction although no   transition point seen upon the CT. plan for conservative approach   for now.  If clinically no improvement seen, we may consider   further radiographic(CT enterography)/endoscopic evaluation to   aid decisions in his in clinical course.  Rifaximin may be   started if further clinical evaluation is negative.     Recommendations  --Continue conservative management  --Maintain n.p.o. for now  --If starts to have vomiting, place NG to low intermittent   suction  --If starts having improvement, may start clear liquid tomorrow  --Maintain electrolytes within normal range  --Recommend to start IV fluid for hydration  --Recommend to check ESR and CRP  --Consider checking C. difficile if persistent loose watery stool    Gastroenterology outpatient follow up recommendations: Pending   clinical course    Thank you for involving us in this patient's care. Please do not   hesitate to contact the GI service with any questions or   concerns.     Pt care plan discussed with Dr. Boone, GI staff physician.    This note was created with voice recognition software, and while   reviewed for accuracy, typos may remain.    Perla Cam MD  GI Fellow  Pager: [phone]  ------------------------------------------------------------------  -------------------------------------------------           History of Present Illness:   Trevin Gee is a 65 year old male with a history   ofileocolonic Crohn's s/p ileocecectomy in 1970s, complicated by   anastomotic stricture  requiring dilation, s/p axial stent with   subsequent removal in 2020, currently not on therapy, consulted   for concerns of bowel obstruction.    Patient stated that he started having diarrhea after the   Thanksgiving weekend when he felt low-grade fever and chills.  He   came to the ED for evaluation on December 10 where his labs were   unremarkable, Covid test was negative.  Despite that patient   continued to have 3 watery bowel movements which were small in   volume.  Patient was also using Lomotil as needed.  He stated   that his diarrhea was resolved by the last week of December   followed by formed stool which is unusual for him.  He started   having formed bowel movements every 2 to 3 days.  Patient had   some corns 2 days back feeling the counts might have caused   obstruction.  Yesterday he only had cereal and started becoming   nauseated.  He palpated his abdomen near the anastomosis which   caused him severe abdominal pain resulting in cramps followed by   distention.  Patient states that he did had a small bowel   movement before he came into the hospital which was loose.  He   complains of abdominal cramps off-and-on with some improvement   with Zofran.    Denied any melena or hematochezia.  No fever or chills            Past Medical History:   Reviewed and edited as appropriate  Past Medical History:   Diagnosis Date     Anemia 2007     Anxiety 2012?    much worse since July 2014     Cataract 12/2007    both eyes, too much prednisone, implants     Coccidioidomycosis      Crohn disease (H)      Crohn's disease (H) 1/13/2015     Depressive disorder 7/2014    much worse since July 2014     Fracture 1956    green fracture of leg     Gallbladder problem 2005?    much gravel, removed     GERD (gastroesophageal reflux disease)      History of blood transfusion 1988    ulcerated anastomosis term. ilium bleed     Hypertriglyceridemia 7/8/2016     Infection 2007    persistant coccidioidomycosis     Kidney  stone various    both sides, all passed naturally     Mental health problem 2007    bipolar though perhaps different     Nephrolithiasis      Osteoporosis 2007    osteoporosis, too much prednisone, last dexa 1/2014     Osteoporosis      Other nervous system complications 2007    prednisone overload induced kevin     Personal history of colonic polyps     small ones found during colonoscopies     Steroid-induced psychosis     Patient extremely sensitive to regular doses of steroids.    Unclear if this is due to chronic fluconazole use or genetic   issue.  In the future, use caution when prescribing steroids.     TB (tuberculosis)             Past Surgical History:   Reviewed and edited as appropriate   Past Surgical History:   Procedure Laterality Date     APPENDECTOMY  1974    coincidental with Crohn's surgery     BACK SURGERY  12/2007    kyphoplasty on compressed 4th??? vertebra     BIOPSY  2007, 2013    lump on arm, knee, back of hand for coccidioidomycosis cult.     CHOLECYSTECTOMY  2005    much gravel, removed laparoscopically     COLONOSCOPY  7/14/2014 last    Dr. Catherine Bowden, [facility] - many previous     COLONOSCOPY Left 9/11/2015    Procedure: COMBINED COLONOSCOPY, SINGLE OR MULTIPLE   BIOPSY/POLYPECTOMY BY BIOPSY;  Surgeon: Fransisco Leach MD;    Location:  GI     COLONOSCOPY N/A 8/3/2016    Procedure: COMBINED COLONOSCOPY, SINGLE OR MULTIPLE   BIOPSY/POLYPECTOMY BY BIOPSY;  Surgeon: Fransisco Leach MD;    [facility]     COLONOSCOPY N/A 8/16/2017    Procedure: COMBINED COLONOSCOPY, SINGLE OR MULTIPLE   BIOPSY/POLYPECTOMY BY BIOPSY;;  Surgeon: Fransisco Leach MD;  Location: [facility] OR     COLONOSCOPY N/A 10/1/2019    Procedure: Colonoscopy With Colonic Stent Insertion;  Surgeon:   Robbie Cruz MD;  Location: [facility] OR     COLONOSCOPY N/A 2/5/2020    Procedure: COLONOSCOPY, FOREIGN BODY REMOVAL;  Surgeon: Robbie Cruz MD;  Location[facility] OR     ENT SURGERY   "?    upper endoscopy     ESOPHAGOSCOPY, GASTROSCOPY, DUODENOSCOPY (EGD), COMBINED N/A   11/8/2016    Procedure: COMBINED ENDOSCOPIC ULTRASOUND, ESOPHAGOSCOPY,   GASTROSCOPY, DUODENOSCOPY (EGD), FINE NEEDLE ASPIRATE/BIOPSY;    Surgeon: Guru Alexsandra Ballesteros MD;  [facility]: [facility]   GI     ESOPHAGOSCOPY, GASTROSCOPY, DUODENOSCOPY (EGD), COMBINED N/A   11/8/2016    Procedure: COMBINED ESOPHAGOSCOPY, GASTROSCOPY, DUODENOSCOPY   (EGD), BIOPSY SINGLE OR MULTIPLE;  Surgeon: Guru Alexsandra Ballesteros MD;  Location: [facility] GI     ESOPHAGOSCOPY, GASTROSCOPY, DUODENOSCOPY (EGD), COMBINED N/A   8/16/2017    Procedure: COMBINED ESOPHAGOSCOPY, GASTROSCOPY, DUODENOSCOPY   (EGD), BIOPSY SINGLE OR MULTIPLE;;  Surgeon: Fransisco Leach MD;  [facility]     EYE SURGERY  12/2007    cataract surgery both sides     GI SURGERY  1974, 1986(x2), 1989    Crohn's, 1974 RO 18\" term. ilium + 9\" asc. colon all one pc     SOFT TISSUE SURGERY  3/2013    removed buildup on back of r hand, coccidioidomycosis            Previous Endoscopy:     Results for orders placed or performed during the hospital   encounter of 02/05/20   COLONOSCOPY   Result Value Ref Range    COLONOSCOPY       [address] [place] [address] [phone]     Endoscopy   Department  __________________________________________________________________  _____________  Patient Name: Trevin Gee        Procedure Date: 2/5/2020   8:53 AM  MRN: [id number]                       Account Number: [id number]  YOB: 1955              Admit Type: Outpatient  Age: 64                               Room: OR  Gender: Male                          Note Status: Finalized  Attending MD: Robbie Cruz MD  Pause for the Cause: pause   for cause   completed  Total Sedation Time:                    __________________________________________________________________  _____________     Procedure:           " Colonoscopy  Indications:         Foreign body removal from the colon,   Therapeutic                        procedure  Providers:           Robbie Cruz MD  Patient Profile:     Mr Gee is a 65yo gentleman with   Crohns status                        post dista l ilectomy and right   hemicolectomy complicated                        by a recalcitrant anstomotic stenosis who   underwent                        Axios stent placement for management. He has   done well                        in the interval and returns for stent   removal and repeat                        interrogation.  Referring MD:        Jayleen Boone MD, Fransisco Leach  Medicines:           Deep sedation was administered  Complications:       No immediate complications.  __________________________________________________________________  _____________  Procedure:           Pre-Anesthesia Assessment:                       - Prior to the procedure, a History and   Physical was                        performed, and patient medications and   allergies were                        reviewed. The patient is competent. The   risks and                        benefits of the procedure and the sedation   options and                        risks were discussed with the patient. All   questions                         were answered and informed consent was   obtained. Patient                        identification and proposed procedure were   verified by                        the nurse in the pre-procedure area. Mental   Status                        Examination: alert and oriented. Airway   Examination:                        Mallampati Class II (the uvula but not   tonsillar pillars                        visualized). Respiratory Examination: clear   to                        auscultation. CV Examination: normal. ASA   Grade                        Assessment: II - A patient with mild   systemic disease.                        After reviewing  the risks and benefits, the   patient was                        deemed in satisfactory condition to undergo   the                        procedure. The anesthesia plan was to use   deep sedation                        / analgesia. Immediately prior to   administration of                        medications, the patient was re-assessed for   adequacy to                         receive sedatives. The heart rate,   respiratory rate,                        oxygen saturations, blood pressure, adequacy   of                        pulmonary ventilation, and response to care   were                        monitored throughout the procedure. The   physical status                        of the patient was re-assessed after the   procedure.                        After obtaining informed consent, the   colonoscope was                        passed under direct vision. Throughout the   procedure,                        the patient's blood pressure, pulse, and   oxygen                        saturations were monitored continuously. The   colonoscope                        was introduced through the anus and advanced   to the                        ileocolonic anastomosis. The colonoscopy was   performed                        without difficulty. The patient tolerated   the procedure                        well. The quality of the bowel preparation   was adequate.                                                                                      Findings:       The patient was supine and frog legged.  films   demonstrated the        Axios stent in the right upper quadrant. A pediatric   colonoscope was        advanced without issue to the Axios stent which was at the   ileocolonic        anastomosis. The stent was grasped and dispaced with a   forceps. This        exposed the anastomosis which was more widely patent and   mildly        irriated. The small bowel just beyond appeared quite healthy   as did the        entire  reminaing colon and rectum. The stent was again   grasped and        removed in toto.                                                                                     Impression:          - Well positioned, full expanded Axios stent   removed                       - Improved patency of the ileocolonic   anastomosis with                        mild irritation, no evidence of active   disease                       - The neoterminal ileum, remaining colon and   rectum                         appeared healthy  Recommendation:      - Deep anethesia recovery with probable   discharge home                        this morning                       - Follow up with the IBD team as scheduled                       - No plans for repeat therapeutic endoscopy   procedure at                        this juncture, however replacement of the   stent would be                        feasible in the future if clinical course   dictates                       - Resume typical diet without delay                       - The findings and recommendations were   discussed with                        the patient                                                                                       electronically signed by FABI Cruz  _____________________  Robbie Cruz MD  2/5/2020 10:00:29 AM  I was physically present for the entire viewing portion of the   exam.  __________________________  Signature of teaching physician  [id number] Cruz MD  Number of Addenda: 0    Note Initiat ed On: 2/5/2020 8:53 AM  Scope In:  Scope Out:              Social History:   Reviewed and edited as appropriate  Social History     Socioeconomic History     Marital status:      Spouse name: Not on file     Number of children: Not on file     Years of education: Not on file     Highest education level: Not on file   Occupational History     Occupation: Reserach associate at the      Employer: [employer]   Social Needs      Financial resource strain: Not on file     Food insecurity     Worry: Not on file     Inability: Not on file     Transportation needs     Medical: Not on file     Non-medical: Not on file   Tobacco Use     Smoking status: Never Smoker     Smokeless tobacco: Never Used   Substance and Sexual Activity     Alcohol use: Yes     Comment: sometimes one glass of wine a week     Drug use: No     Sexual activity: Never   Lifestyle     Physical activity     Days per week: Not on file     Minutes per session: Not on file     Stress: Not on file   Relationships     Social connections     Talks on phone: Not on file     Gets together: Not on file     Attends Restoration service: Not on file     Active member of club or organization: Not on file     Attends meetings of clubs or organizations: Not on file     Relationship status: Not on file     Intimate partner violence     Fear of current or ex partner: Not on file     Emotionally abused: Not on file     Physically abused: Not on file     Forced sexual activity: Not on file   Other Topics Concern     Not on file   Social History Narrative    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              Family History:   Reviewed and edited as appropriate  Family History   Problem Relation Age of Onset     Heart Failure Father      Musculoskeletal Disorder Father         Parkinson's     Cancer - colorectal Mother      Cerebrovascular Disease Paternal Grandmother      Cancer Other      Musculoskeletal Disorder Brother         Parkinson's     Anesthesia Reaction No family hx of      Deep Vein Thrombosis (DVT) No family hx of             Allergies:   Reviewed and edited as appropriate     Allergies   Allergen Reactions     Prednisone Other (See Comments)     Diana with more than 2.5mg chronic dosing of prednisone due to   fluconazole interaction per patient.             Medications:     Current Facility-Administered Medications   Medication     acetaminophen  "(TYLENOL) Suppository 650 mg     acetaminophen (TYLENOL) tablet 650 mg     fluconazole (DIFLUCAN) tablet 200 mg     melatonin tablet 1 mg     ondansetron (ZOFRAN-ODT) ODT tab 4 mg    Or     ondansetron (ZOFRAN) injection 4 mg     sodium chloride 0.9% infusion     traZODone (DESYREL) tablet 50 mg             Review of Systems:     A complete 10 point review of systems was performed and is   negative except as noted in the HPI           Physical Exam:   BP (!) 130/94 (BP Location: Right arm)   Pulse 110   Temp 98  F   (36.7  C) (Oral)   Resp 18   Ht 1.753 m (5' 9\")   Wt 65.8 kg   (145 lb)   SpO2 97%   BMI 21.41 kg/m    Wt:   Wt Readings from Last 2 Encounters:   01/17/21 65.8 kg (145 lb)   12/18/20 68.9 kg (152 lb)      Constitutional: cooperative, pleasant  Eyes: Sclera anicteric  Ears/nose/mouth/throat: mucus membranes moist  Neck: supple  CV: No edema  Respiratory: Unlabored breathing  Abd:  Nondistended, +bs, nontender  Skin: warm, perfused  Neuro: AAO x 3  MSK: No gross deformities         Data:   Labs and imaging below were independently reviewed and   interpreted    BMP  Recent Labs   Lab 01/17/21  0354      POTASSIUM 4.0   CHLORIDE 104   YUNIER 10.0   CO2 24   BUN 22   CR 1.48*   *     CBC  Recent Labs   Lab 01/17/21  0354   WBC 10.8   RBC 5.07   HGB 16.0   HCT 48.2   MCV 95   MCH 31.6   MCHC 33.2   RDW 15.2*        INRNo lab results found in last 7 days.  LFTs  Recent Labs   Lab 01/17/21  0354   ALKPHOS 92   AST 22   ALT 28   BILITOTAL 0.8   PROTTOTAL 7.3   ALBUMIN 3.8      PANC  Recent Labs   Lab 01/17/21  0354   LIPASE 192       Imaging:  CT ABD 1/17/21:  Impression:   1. Postsurgical right hemicolectomy changes with large colonic   stool  burden. Diffusely dilated small bowel. Unchanged 2.2 cm short   segment  focal luminal narrowing at the ileocolonic anastomosis. Some   degree of  obstruction at this level may be present though this is not   clearly  representing a transition " point.  2. Additional incidental findings as described in the above   report  including stable cystic focus in the pancreatic neck and   bilateral  nonobstructing punctate renal stones      Colonoscopy 2/5/20:  Impression:          - Well positioned, full expanded Axios stent   removed                        - Improved patency of the ileocolonic   anastomosis with                        mild irritation, no evidence of active   disease                        - The neoterminal ileum, remaining colon and   rectum                        appeared healthy   Recommendation:      - Deep anethesia recovery with probable   discharge home                        this morning                        - Follow up with the IBD team as scheduled                        - No plans for repeat therapeutic endoscopy   procedure at                        this juncture, however replacement of the   stent would be                        feasible in the future if clinical course   dictates                        - Resume typical diet without delay                        - The findings and recommendations were   discussed with                        the patient     Colonoscopy 10/1/19:  Impression:          - Healthy appearing colon with status post   removal of                        the cecum and ascending                        - Mildly stenotic ileocolonic anastomosis   with mild                        ischemic erosion                        - Successful placement of a [id number] Axios   stent across                        the stenosis                        - External thrombosed hemorrhoids and tags   Recommendation:      - Deep sedation recovery with probable   discharge home                        this morning                        - Regular diet and activity may resume   without change                        - If stent is to migrate, we would expect   some fullness                        or pain in the rectum and the patient is to    contact our                        team fore removal                        - Othewise, plan will be to keep the stent   in situ for                        3m with removal by repeat colonoscopy using   deep sedation                        - The findings and recommendations were   discussed with                        the patient and their family          Signed:  Tania Tom PA-C   January 18, 2021 at 5:46 PM

## 2021-01-18 NOTE — PROGRESS NOTES
[facility]     Medicine ED Observation Transfer Acceptance Note - Hospitalist Service, Gold Night        Date of Admission:  1/17/2021  Assessment & Plan       Trevin Gee is a 65 year old male who was admitted on 1/17/2021.  He has a past medical history of CKD stage II, depression, insomnia, adrenal insufficiency, acid reflux, coccidiomycosis, pancreatic head cystic lesion and ileocolonic Crohn's disease s/p ileocecectomy in 1970s c/b anastomotic stricture requiring dilation, s/p axial dilation with subsequent removal in 2020 who was admitted to ED observation 1/17 with abdominal pain and cramping and concern for small bowel obstruction.  Now being transferred to internal medicine for further care.     #Acute, partial small bowel obstruction  #Acute abdominal pain due to above  #Hx ileocolonic Crohn's disease s/p ileocecectomy c/b anastomotic stricture  #Chronic diarrhea   S/p ICR in 1970s that required numerous revisions (most recently ~20 years ago) c/b anastomotic stricture and underwent numerous endoscopic dilations and most recently axios stent stent placement 3/2019 which was removed 3 months after placement with improvement. Follows as OP with Dr. Leach, continued to be in endoscopic remission for Crohns.  Now p/w abd cramping and pain.  CT w/ mildly dilated small bowel loops, unchanged focal luminal narrowing at anastomosis, no clearly define transition point.  GI following, currently only tolerating CLD.   - GI following, appreciate reccs   - CLD  - If develops N/V, make NPO and place NGT   - If no clinical improvement, GI to plan CT enterography   - Consider surgery consult if worsening symptoms   - IVF: NS at 125 mL/hour   - PRN Zofran or Compazine if nausea  - PRN Tylenol pain    #Colonic polyps  Follows as OP with GI.  Per most recent scope, well defined lesions with adenomatous changes.   - Continue surveillance colonoscopy as OP      #CKD stage  II  #CaOxalate nephrolithiasis  Follows as OP with Dr. Quiroz, last seen 12/2020.  Baseline Cr ~1.3 as far back as 2016.  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  Grossly GFR appears stable, current Cr 1.22.   - BMP in AM   - Avoid nephrotoxins   - IVF as above   - Follow up with Nephrology as OP   - When diet resumed, nephrology had previously recc low Na diet and low oxalate diet     #Chronic adrenal insufficiency  Follows as OP with endocrinology, Dr. Currie last seen 8/14/2020.  Felt to be due to chronic fluconazole use, now stopped since 6/2020.  Pt stopped steroids as of April 2016, most recent cortisol level 12.8 8/2020.  No acute issues.  - Follow up as OP with Dr. Currie     #Hx osteoporosis   Seen on DXA scan 2005.  Currently on Reclast (started 2016).    - Continue OP Reclast as previously scheduled     #Pancreatic head cystic lesion  Per GI most recent note, increased from 2006, potentially representing IPMN.  MRCP without change in cyst.  Images reveiwed with Dr. Cruz s/p EUS w/ normal FNA.  Lesion stable on MRE 6/17/2019.   - Follow up as OP     #Depression  #Insomnia   Mood stable.    - Continue PTA Trazodone 50 mg at bedtime    #Hx Coccidioidomycosis infection  Previously followed by ID.  Chronically on Fluconazole.   - Continue PTA Fluconazole      Diet: Clear Liquid Diet    DVT Prophylaxis: Low Risk/Ambulatory with no VTE prophylaxis indicated and Pneumatic Compression Devices  Nicole Catheter: not present  Code Status: Full Code      Disposition Plan   Expected discharge: 2 - 3 days, recommended to prior living arrangement once adequate pain management/ tolerating PO medications.  Entered: Lavonne Hill NP 01/18/2021, 4:52 PM     The patient's care was discussed with the Attending Physician, Dr. Ramirez, Bedside Nurse and Patient.    Lavonne MEDRANO  "Sergio, NP  Hospitalist Service, [facility]   Contact information available via [facility] Paging/Directory  _____________________________________________________________________    Interval History   Doing well overall.  Is tolerating clear liquid diet for comfort but states he is \"not pushing it\".  He has mild pain in his abdomen, no current nausea/vomiting.  Denies fevers, chills, chest pain, shortness of breath, cough, dysuria.  Had a BM today.      Data reviewed today: I reviewed all medications, new labs and imaging results over the last 24 hours.    Physical Exam   Vital Signs: Temp: 97.7  F (36.5  C) Temp src: Oral BP: 111/82 Pulse: 89   Resp: 16 SpO2: 97 % O2 Device: None (Room air)    Weight: 145 lbs 0 oz  General Appearance: NAD, pleasant and interactive.   Respiratory: Normal effort on RA.  Lungs CTAB.    Cardiovascular: RRR, S1S2.  No murmurs appreciated.   GI: Abdomen soft, NTND.    Skin: No jaundice, no open wounds, no rashes.     Data   Recent Labs   Lab 01/18/21  0544 01/17/21  0354   WBC 7.2 10.8   HGB 14.7 16.0   MCV 97 95    308    139   POTASSIUM 4.0 4.0   CHLORIDE 114* 104   CO2 20 24   BUN 22 22   CR 1.22 1.48*   ANIONGAP 9 11   YUNIER 7.9* 10.0   GLC 92 102*   ALBUMIN 3.0* 3.8   PROTTOTAL 5.8* 7.3   BILITOTAL 1.1 0.8   ALKPHOS 102 92   ALT 37 28   AST 25 22   LIPASE  --  192     No results found for this or any previous visit (from the past 24 hour(s)).  Medications     sodium chloride 125 mL/hr at 01/18/21 1452       fluconazole  200 mg Oral Daily     traZODone  50 mg Oral At Bedtime       "

## 2021-01-18 NOTE — PLAN OF CARE
"Observation Goals:   - GI consult complete: Met   - able to tolerate oral intake: not met, pt NPO. Had one episode of 500cc emesis x1   - normal bowel sounds: bowel sounds audile and hypoactive  - improvement in abdominal distension/pain: Pt denies abdominal pain after episode of emesis. Will continue to monitor.     /78 (BP Location: Left arm)   Pulse 106   Temp 98  F (36.7  C) (Oral)   Resp 18   Ht 1.753 m (5' 9\")   Wt 65.8 kg (145 lb)   SpO2 98%   BMI 21.41 kg/m             "

## 2021-01-19 ENCOUNTER — APPOINTMENT (OUTPATIENT)
Dept: GENERAL RADIOLOGY | Facility: CLINIC | Age: 66
DRG: 389 | End: 2021-01-19
Attending: STUDENT IN AN ORGANIZED HEALTH CARE EDUCATION/TRAINING PROGRAM
Payer: COMMERCIAL

## 2021-01-19 PROBLEM — K56.600 PARTIAL SMALL BOWEL OBSTRUCTION (H): Status: ACTIVE | Noted: 2021-01-19

## 2021-01-19 PROCEDURE — 250N000013 HC RX MED GY IP 250 OP 250 PS 637: Performed by: NURSE PRACTITIONER

## 2021-01-19 PROCEDURE — 120N000002 HC R&B MED SURG/OB UMMC

## 2021-01-19 PROCEDURE — 258N000003 HC RX IP 258 OP 636: Performed by: NURSE PRACTITIONER

## 2021-01-19 PROCEDURE — 99232 SBSQ HOSP IP/OBS MODERATE 35: CPT | Performed by: STUDENT IN AN ORGANIZED HEALTH CARE EDUCATION/TRAINING PROGRAM

## 2021-01-19 PROCEDURE — G0378 HOSPITAL OBSERVATION PER HR: HCPCS

## 2021-01-19 PROCEDURE — 74018 RADEX ABDOMEN 1 VIEW: CPT

## 2021-01-19 PROCEDURE — 74018 RADEX ABDOMEN 1 VIEW: CPT | Mod: 26 | Performed by: RADIOLOGY

## 2021-01-19 RX ADMIN — SODIUM CHLORIDE: 9 INJECTION, SOLUTION INTRAVENOUS at 06:47

## 2021-01-19 RX ADMIN — SODIUM CHLORIDE: 9 INJECTION, SOLUTION INTRAVENOUS at 14:45

## 2021-01-19 RX ADMIN — SODIUM CHLORIDE: 9 INJECTION, SOLUTION INTRAVENOUS at 21:55

## 2021-01-19 RX ADMIN — TRAZODONE HYDROCHLORIDE 50 MG: 50 TABLET ORAL at 23:56

## 2021-01-19 ASSESSMENT — ACTIVITIES OF DAILY LIVING (ADL)
DIFFICULTY_EATING/SWALLOWING: NO
CONCENTRATING,_REMEMBERING_OR_MAKING_DECISIONS_DIFFICULTY: NO
DOING_ERRANDS_INDEPENDENTLY_DIFFICULTY: NO
DIFFICULTY_COMMUNICATING: NO
FALL_HISTORY_WITHIN_LAST_SIX_MONTHS: NO
WEAR_GLASSES_OR_BLIND: YES
DRESSING/BATHING_DIFFICULTY: NO
TOILETING_ISSUES: NO
ADLS_ACUITY_SCORE: 11
WALKING_OR_CLIMBING_STAIRS_DIFFICULTY: NO

## 2021-01-19 NOTE — PROGRESS NOTES
GASTROENTEROLOGY PROGRESS NOTE    Date: 01/19/2021  Admit Date: 1/17/2021       ASSESSMENT AND RECOMMENDATIONS:     ASSESSMENT:  65 year old male with a history of ileocolonic Crohn's s/p ileocecectomy in 1970s, complicated by anastomotic stricture requiring dilation, s/p axial stent with subsequent removal in 2020, currently not on therapy.     Ileocecectomy complicated by anastomotic stricture  Concern for partial SBO  Mildly dilated small bowel loops upon the CT abdomen with his given history concerning for small bowel obstruction although no transition point seen upon the CT. Pt is still debating to get a CTE as he thinks he might not be able to tolerate the prep. Plan is to get minimally invasive/colorectal surgery involved in next 1-2 days if no improvement.    RECOMMENDATIONS:  --Maintain NPO for now  --Recommend to get AXR today  --If starts to have vomiting, place NG to low intermittent suction  --Recommend to get minimally invasive/colorectal surgery involved in next 1-2 days if no improvement    Gastroenterology follow up recommendations: Pending clinical course.      Thank you for involving us in this patient's care. Please do not hesitate to contact the GI service with any questions or concerns.      Pt care plan discussed with Dr. Rice, GI staff physician.    This note was created with voice recognition software, and while reviewed for accuracy, typos may remain.    Perla Cam MD  GI Fellow  Pager: [phone]  _______________________________________________________________    Subjective\events within the 24 hours:     Pt was seen at bedside, started NPO again this morning    4 point ROS performed and negative unless noted above.      Medications:     Current Facility-Administered Medications   Medication     acetaminophen (TYLENOL) Suppository 650 mg     acetaminophen (TYLENOL) tablet 650 mg     fluconazole (DIFLUCAN) tablet 200 mg     melatonin tablet 1 mg     ondansetron (ZOFRAN-ODT) ODT tab 4  "mg    Or     ondansetron (ZOFRAN) injection 4 mg     prochlorperazine (COMPAZINE) injection 5 mg    Or     prochlorperazine (COMPAZINE) tablet 5 mg    Or     prochlorperazine (COMPAZINE) suppository 12.5 mg     sodium chloride 0.9% infusion     traZODone (DESYREL) tablet 50 mg       Physical Exam     Vital Signs:  BP 92/56 (BP Location: Left arm)   Pulse 83   Temp 98.2  F (36.8  C) (Oral)   Resp 15   Ht 1.753 m (5' 9\")   Wt 65.8 kg (145 lb)   SpO2 97%   BMI 21.41 kg/m       Gen: A&Ox3, NAD  HEENT: ncat, perrl, eomi, sclera anicteric  Neck: supple  CV:  S1, S2 heard  Lungs: CTA b/l  Abd: +bs, soft, nd/nt  Skin: no jaundice  Extremities: No edema  Neuro: non focal       Data   LABS:  BMP  Recent Labs   Lab 01/18/21  0544 01/17/21  0354    139   POTASSIUM 4.0 4.0   CHLORIDE 114* 104   YUNIER 7.9* 10.0   CO2 20 24   BUN 22 22   CR 1.22 1.48*   GLC 92 102*     CBC  Recent Labs   Lab 01/18/21  0544 01/17/21  0354   WBC 7.2 10.8   RBC 4.73 5.07   HGB 14.7 16.0   HCT 45.9 48.2   MCV 97 95   MCH 31.1 31.6   MCHC 32.0 33.2   RDW 15.1* 15.2*    308     INRNo lab results found in last 7 days.  LFTs  Recent Labs   Lab 01/18/21  0544 01/17/21  0354   ALKPHOS 102 92   AST 25 22   ALT 37 28   BILITOTAL 1.1 0.8   PROTTOTAL 5.8* 7.3   ALBUMIN 3.0* 3.8      PANC  Recent Labs   Lab 01/17/21  0354   LIPASE 192         "

## 2021-01-19 NOTE — PLAN OF CARE
"Observation Goals:    - GI consult complete: met   - able to tolerate oral intake: in progress, currently tolerating oral fluids  - normal bowel sounds: in progress,  Bowel sounds hypoactive  - improvement in abdominal distension/pain: in progress, pt reports decreased discomfort after ambulation    /63 (BP Location: Left arm)   Pulse 80   Temp 97.9  F (36.6  C) (Oral)   Resp 16   Ht 1.753 m (5' 9\")   Wt 65.8 kg (145 lb)   SpO2 97%   BMI 21.41 kg/m      "

## 2021-01-19 NOTE — PLAN OF CARE
GI consult complete  Yes  - able to tolerate oral intake . Yes. Patient tolerating clear liquids.  - normal bowel sounds. Hypoactive bowel sounds.  - improvement in abdominal distension/pain. Yes.

## 2021-01-19 NOTE — PLAN OF CARE
"Observation Goals:    - GI consult complete: met   - able to tolerate oral intake: in progress  - normal bowel sounds: met  - improvement in abdominal distension/pain: in progress    /76 (BP Location: Left arm)   Pulse 83   Temp 97.9  F (36.6  C) (Oral)   Resp 18   Ht 1.753 m (5' 9\")   Wt 65.8 kg (145 lb)   SpO2 99%   BMI 21.41 kg/m      Pt reports improved abdominal discomfort after ambulating, beginning to tolerate oral fluids.   Pt to be transferred to internal medicine.  "

## 2021-01-19 NOTE — UTILIZATION REVIEW
"  Concurrent stay status; Secondary Review Determination     Admission Date: 1/17/2021  3:43 AM      Under the authority of the Utilization Management Committee, the utilization review process indicated a secondary review on the above patient.  The review outcome is based on review of the medical records, discussions with staff, and applying clinical experience noted on the date of the review.        ()      Inpatient Status Appropriate - This patient's medical care is consistent with medical management for inpatient care and reasonable inpatient medical practice.      (x) Observation Status Appropriate - This patient does not meet hospital inpatient criteria and is placed in observation status. If this patient's primary payer is Medicare and was admitted as an inpatient, Condition Code 44 should be used and patient status changed to \"observation\".   () Admission Status NOT Appropriate - This patient's medical care is not consistent with medical management for Inpatient or Observation Status.          RATIONALE FOR DETERMINATION   Trevin Gee is a 65 year old male who was admitted on 1/17/2021.  He has a past medical history of CKD stage II, depression, insomnia, adrenal insufficiency, acid reflux, coccidiomycosis, pancreatic head cystic lesion and ileocolonic Crohn's disease s/p ileocecectomy in 1970s c/b anastomotic stricture requiring dilation, s/p axial dilation with subsequent removal.  He presented to emergency room with abdominal pain.  There was concern for small bowel obstruction.  He was placed in the hospital on observation status for further monitoring.  Symptoms are currently improved.  He is tolerating a clear liquid diet.  He has not required any IV pain or nausea medications today.  He is getting an abdominal x-ray.  If x-ray shows improvement, he should be able to discharge from the hospital later today.  As the patient may be able to discharge from the hospital later today, observation " status at the hospital remains appropriate at this time.      The severity of illness, intensity of service provided, expected LOS and risk for adverse outcome make the care appropriate for observation level care at the hospital.        The information on this document is developed by the utilization review team in order for the business office to ensure compliance.  This only denotes the appropriateness of proper admission status and does not reflect the quality of care rendered.         The definitions of Inpatient Status and Observation Status used in making the determination above are those provided in the CMS Coverage Manual, Chapter 1 and Chapter 6, section 70.4.      Sincerely,     Rodriguez Pelayo D.O.  Utilization Review/ Case Management  [facility].

## 2021-01-19 NOTE — PROGRESS NOTES
"Observation Goals:   - GI consult complete: met     - able to tolerate oral intake: in progress, currently tolerating oral fluids    - normal bowel sounds: in progress,  Bowel sounds hypoactive    - improvement in abdominal distension/pain: in progress, pt reports abdominal discomfort overnight, but discomfort has improved this morning. Denies pain. Refused Diflucan tablet this morning. Presently asleep and remains on Clear Liquid diet.   BP 92/56 (BP Location: Left arm)   Pulse 83   Temp 98.2  F (36.8  C) (Oral)   Resp 15   Ht 1.753 m (5' 9\")   Wt 65.8 kg (145 lb)   SpO2 97%   BMI 21.41 kg/m      "

## 2021-01-19 NOTE — PROGRESS NOTES
provider text Trevin Alvarez on Obs room 2 wondering if his GI Dr. Fransisco Leach could recommend a surgeon for the future. He would like a provider to call him for clarification

## 2021-01-20 LAB
C DIFF TOX B STL QL: NEGATIVE
CRP SERPL-MCNC: 8.4 MG/L (ref 0–8)
SPECIMEN SOURCE: NORMAL

## 2021-01-20 PROCEDURE — 120N000002 HC R&B MED SURG/OB UMMC

## 2021-01-20 PROCEDURE — 87493 C DIFF AMPLIFIED PROBE: CPT | Performed by: STUDENT IN AN ORGANIZED HEALTH CARE EDUCATION/TRAINING PROGRAM

## 2021-01-20 PROCEDURE — 99232 SBSQ HOSP IP/OBS MODERATE 35: CPT | Performed by: NURSE PRACTITIONER

## 2021-01-20 PROCEDURE — 258N000003 HC RX IP 258 OP 636: Performed by: NURSE PRACTITIONER

## 2021-01-20 PROCEDURE — 250N000013 HC RX MED GY IP 250 OP 250 PS 637: Performed by: NURSE PRACTITIONER

## 2021-01-20 PROCEDURE — 83993 ASSAY FOR CALPROTECTIN FECAL: CPT | Performed by: STUDENT IN AN ORGANIZED HEALTH CARE EDUCATION/TRAINING PROGRAM

## 2021-01-20 PROCEDURE — 86140 C-REACTIVE PROTEIN: CPT | Performed by: STUDENT IN AN ORGANIZED HEALTH CARE EDUCATION/TRAINING PROGRAM

## 2021-01-20 PROCEDURE — 99232 SBSQ HOSP IP/OBS MODERATE 35: CPT | Performed by: STUDENT IN AN ORGANIZED HEALTH CARE EDUCATION/TRAINING PROGRAM

## 2021-01-20 PROCEDURE — 36415 COLL VENOUS BLD VENIPUNCTURE: CPT | Performed by: STUDENT IN AN ORGANIZED HEALTH CARE EDUCATION/TRAINING PROGRAM

## 2021-01-20 PROCEDURE — 258N000003 HC RX IP 258 OP 636: Performed by: STUDENT IN AN ORGANIZED HEALTH CARE EDUCATION/TRAINING PROGRAM

## 2021-01-20 RX ORDER — POLYETHYLENE GLYCOL 3350 17 G/17G
17 POWDER, FOR SOLUTION ORAL 2 TIMES DAILY PRN
Status: DISCONTINUED | OUTPATIENT
Start: 2021-01-20 | End: 2021-01-21 | Stop reason: HOSPADM

## 2021-01-20 RX ORDER — SODIUM CHLORIDE, SODIUM LACTATE, POTASSIUM CHLORIDE, CALCIUM CHLORIDE 600; 310; 30; 20 MG/100ML; MG/100ML; MG/100ML; MG/100ML
INJECTION, SOLUTION INTRAVENOUS CONTINUOUS
Status: DISCONTINUED | OUTPATIENT
Start: 2021-01-20 | End: 2021-01-21

## 2021-01-20 RX ADMIN — SODIUM CHLORIDE, POTASSIUM CHLORIDE, SODIUM LACTATE AND CALCIUM CHLORIDE: 600; 310; 30; 20 INJECTION, SOLUTION INTRAVENOUS at 21:29

## 2021-01-20 RX ADMIN — SODIUM CHLORIDE: 9 INJECTION, SOLUTION INTRAVENOUS at 14:31

## 2021-01-20 RX ADMIN — FLUCONAZOLE 200 MG: 200 TABLET ORAL at 08:32

## 2021-01-20 RX ADMIN — SODIUM CHLORIDE: 9 INJECTION, SOLUTION INTRAVENOUS at 07:01

## 2021-01-20 ASSESSMENT — ACTIVITIES OF DAILY LIVING (ADL)
ADLS_ACUITY_SCORE: 11

## 2021-01-20 NOTE — PROGRESS NOTES
"Shift: [id number]     Neuro: Alert and oriented x4; able to make needs known  Cardiac: WDL   Respiratory: No cough noted; on RA lung sounds clear   BM: denies any nausea; pt diet advanced to full liquid and tolerated.  BM x 1 this morning.  : Voids spontaneously   Skin: intact   LDA: PIV with NS running at 125  Activity: Pt is up independently; pt frequently ambulates in the harman   VS: VSS  GI rounded with Pt today and placed on Enteric. Pt told to save next stool for  Testing./72 (BP Location: Left arm)   Pulse 72   Temp 97  F (36.1  C) (Oral)   Resp 16   Ht 1.753 m (5' 9\")   Wt 65.8 kg (145 lb)   SpO2 100%   BMI 21.41 kg/m    Uneventful shift.     "

## 2021-01-20 NOTE — PROGRESS NOTES
Observation Goals:   - GI consult complete: met     - able to tolerate oral intake: in progress,on clears.But Pt prefers to be npo    - normal bowel sounds: in progress,  Bowel sounds hypoactive    - improvement in abdominal distension/pain: . Denies pain.

## 2021-01-20 NOTE — PROGRESS NOTES
[facility]     Progress Note  Note - Hospitalist Service, Gold 8        Date of Admission:  1/17/2021  Assessment & Plan       Trevin Gee is a 65 year old male who was admitted on 1/17/2021.  He has a past medical history of CKD stage II, depression, insomnia, adrenal insufficiency, acid reflux, coccidiomycosis, pancreatic head cystic lesion and ileocolonic Crohn's disease s/p ileocecectomy in 1970s c/b anastomotic stricture requiring dilation, s/p axial dilation with subsequent removal in 2020 who was admitted to ED observation 1/17 with abdominal pain and cramping and concern for small bowel obstruction.  Now being transferred to internal medicine for further care. Patient has been NPO with some improvement but still obstructed. Possible involvement of minimally invasive colorectal surgery if no improvement in 1-2 days.    Acute, partial small bowel obstruction  Acute abdominal pain due to above  Hx ileocolonic Crohn's disease s/p ileocecectomy c/b anastomotic stricture  Chronic diarrhea   S/p ICR in 1970s that required numerous revisions (most recently ~20 years ago) c/b anastomotic stricture and underwent numerous endoscopic dilations and most recently axios stent stent placement 3/2019 which was removed 3 months after placement with improvement. Follows as OP with Dr. Leach, continued to be in endoscopic remission for Crohns.  Now p/w abd cramping and pain.  CT w/ mildly dilated small bowel loops, unchanged focal luminal narrowing at anastomosis, no clearly define transition point.  GI following, currently only tolerating CLD.  Discussed with GI and will possibly get MRE tomorrow if no improvement.   - GI following, appreciate reccs   - Clear liquids  - If develops N/V, make NPO and place NGT   - possible MRE tomorrow if no improvement  - IVF: NS at 125 mL/hour   - PRN Zofran or Compazine if nausea  - PRN Tylenol pain    Colonic polyps  Follows as OP with  GI.  Per most recent scope, well defined lesions with adenomatous changes.   - Continue surveillance colonoscopy as OP      CKD stage II  CaOxalate nephrolithiasis  Follows as OP with Dr. Quiroz, last seen 12/2020.  Baseline Cr ~1.3 as far back as 2016.  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  Grossly GFR appears stable, current Cr 1.22.   - BMP in AM   - Avoid nephrotoxins   - IVF as above   - Follow up with Nephrology as OP   - When diet resumed, nephrology had previously recc low Na diet and low oxalate diet     Chronic adrenal insufficiency  Follows as OP with endocrinology, Dr. Currie last seen 8/14/2020.  Felt to be due to chronic fluconazole use, now stopped since 6/2020.  Pt stopped steroids as of April 2016, most recent cortisol level 12.8 8/2020.  No acute issues.  - Follow up as OP with Dr. Currie     Hx osteoporosis   Seen on DXA scan 2005.  Currently on Reclast (started 2016).    - Continue OP Reclast as previously scheduled     Pancreatic head cystic lesion  Per GI most recent note, increased from 2006, potentially representing IPMN.  MRCP without change in cyst.  Images reveiwed with Dr. Cruz s/p EUS w/ normal FNA.  Lesion stable on MRE 6/17/2019.   - Follow up as OP     Depression  Insomnia   Mood stable.    - Continue PTA Trazodone 50 mg at bedtime    Hx Coccidioidomycosis infection  Previously followed by ID.  Chronically on Fluconazole.   - Continue PTA Fluconazole      Diet: Full Liquid Diet    DVT Prophylaxis: Low Risk/Ambulatory with no VTE prophylaxis indicated and Pneumatic Compression Devices  Nicole Catheter: not present  Code Status: Full Code      Disposition Plan   Expected discharge: 2 - 3 days, recommended to prior living arrangement once adequate pain management/ tolerating PO medications.  Entered: Max Espinoza DO 01/20/2021, 5:41 PM      The patient's care was discussed with the patient and GI consult team    Max Espinoza, DO  Hospitalist Service, [address]   Contact information available via [facility] Paging/Directory  _____________________________________________________________________    Interval History     Feeling ok today. Notes he had some fluids this AM and thought it wasn't going to sit well but now it is. Up and walking to try and make his bowels move. Hopeful that this will resolve. He has no new chest pain, SOB, LE edema.    Four point ROS completed and otherwise negative      Data reviewed today: I reviewed all medications, new labs and imaging results over the last 24 hours.    Physical Exam   Vital Signs: Temp: 97  F (36.1  C) Temp src: Oral BP: 130/72 Pulse: 72   Resp: 16 SpO2: 100 % O2 Device: None (Room air)    Weight: 145 lbs 0 oz  General Appearance: NAD, pleasant and interactive.   Respiratory: Normal effort on RA.  Lungs CTAB.    Cardiovascular: RRR, S1S2.  No murmurs appreciated.   GI: Abdomen soft, NTND.    Skin: No jaundice, no open wounds, no rashes.   Neuro: Non focal neuro exam    Data   Recent Labs   Lab 01/18/21  0544 01/17/21  0354   WBC 7.2 10.8   HGB 14.7 16.0   MCV 97 95    308    139   POTASSIUM 4.0 4.0   CHLORIDE 114* 104   CO2 20 24   BUN 22 22   CR 1.22 1.48*   ANIONGAP 9 11   YUNIER 7.9* 10.0   GLC 92 102*   ALBUMIN 3.0* 3.8   PROTTOTAL 5.8* 7.3   BILITOTAL 1.1 0.8   ALKPHOS 102 92   ALT 37 28   AST 25 22   LIPASE  --  192     No results found for this or any previous visit (from the past 24 hour(s)).  Medications     sodium chloride 125 mL/hr at 01/20/21 1431       fluconazole  200 mg Oral Daily     traZODone  50 mg Oral At Bedtime

## 2021-01-20 NOTE — PROGRESS NOTES
[facility]     Progress Note  Note - Hospitalist Service, Gold 8        Date of Admission:  1/17/2021  Assessment & Plan       Trevin Gee is a 65 year old male who was admitted on 1/17/2021.  He has a past medical history of CKD stage II, depression, insomnia, adrenal insufficiency, acid reflux, coccidiomycosis, pancreatic head cystic lesion and ileocolonic Crohn's disease s/p ileocecectomy in 1970s c/b anastomotic stricture requiring dilation, s/p axial dilation with subsequent removal in 2020 who was admitted to ED observation 1/17 with abdominal pain and cramping and concern for small bowel obstruction.  Now being transferred to internal medicine for further care. Patient has been NPO with some improvement but still obstructed. Possible involvement of minimally invasive colorectal surgery if no improvement in 1-2 days.    Acute, partial small bowel obstruction  Acute abdominal pain due to above  Hx ileocolonic Crohn's disease s/p ileocecectomy c/b anastomotic stricture  Chronic diarrhea   S/p ICR in 1970s that required numerous revisions (most recently ~20 years ago) c/b anastomotic stricture and underwent numerous endoscopic dilations and most recently axios stent stent placement 3/2019 which was removed 3 months after placement with improvement. Follows as OP with Dr. Leach, continued to be in endoscopic remission for Crohns.  Now p/w abd cramping and pain.  CT w/ mildly dilated small bowel loops, unchanged focal luminal narrowing at anastomosis, no clearly define transition point.  GI following, currently only tolerating CLD.   - GI following, appreciate reccs   - NPO  - Abdominal XR today  - If develops N/V, make NPO and place NGT   - Recommend to get minimally invasive/colorectal surgery involved in next 1-2 days if no improvement  - IVF: NS at 125 mL/hour   - PRN Zofran or Compazine if nausea  - PRN Tylenol pain    Colonic polyps  Follows as OP with GI.   Per most recent scope, well defined lesions with adenomatous changes.   - Continue surveillance colonoscopy as OP      CKD stage II  CaOxalate nephrolithiasis  Follows as OP with Dr. Quiroz, last seen 12/2020.  Baseline Cr ~1.3 as far back as 2016.  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  Grossly GFR appears stable, current Cr 1.22.   - BMP in AM   - Avoid nephrotoxins   - IVF as above   - Follow up with Nephrology as OP   - When diet resumed, nephrology had previously recc low Na diet and low oxalate diet     Chronic adrenal insufficiency  Follows as OP with endocrinology, Dr. Currie last seen 8/14/2020.  Felt to be due to chronic fluconazole use, now stopped since 6/2020.  Pt stopped steroids as of April 2016, most recent cortisol level 12.8 8/2020.  No acute issues.  - Follow up as OP with Dr. Currie     Hx osteoporosis   Seen on DXA scan 2005.  Currently on Reclast (started 2016).    - Continue OP Reclast as previously scheduled     Pancreatic head cystic lesion  Per GI most recent note, increased from 2006, potentially representing IPMN.  MRCP without change in cyst.  Images reveiwed with Dr. Cruz s/p EUS w/ normal FNA.  Lesion stable on MRE 6/17/2019.   - Follow up as OP     Depression  Insomnia   Mood stable.    - Continue PTA Trazodone 50 mg at bedtime    Hx Coccidioidomycosis infection  Previously followed by ID.  Chronically on Fluconazole.   - Continue PTA Fluconazole      Diet: NPO for Medical/Clinical Reasons Except for: Meds, Ice Chips    DVT Prophylaxis: Low Risk/Ambulatory with no VTE prophylaxis indicated and Pneumatic Compression Devices  Nicole Catheter: not present  Code Status: Full Code      Disposition Plan   Expected discharge: 2 - 3 days, recommended to prior living arrangement once adequate pain management/ tolerating PO medications.  Entered:  Max Espinoza DO 01/19/2021, 9:09 PM     The patient's care was discussed with the patient and GI consult team    Max Espinoza DO  Hospitalist Service, [address]   Contact information available via [facility] Paging/Directory  _____________________________________________________________________    Interval History     Has done alright overnight. No more N/V this morning. Has made himself NPO and feels like he is a little less backed up and he is passing some gas and maybe stool? He is leary of drinking the contrast for CT enteropgraphy as he feels it wont work or make him vomit in the CT scanner. He endorses a different area feeling obstructed than when he first came in.    Four point ROS completed and otherwise negative      Data reviewed today: I reviewed all medications, new labs and imaging results over the last 24 hours.    Physical Exam   Vital Signs: Temp: 97.5  F (36.4  C) Temp src: Oral BP: 120/77 Pulse: 76   Resp: 16 SpO2: 98 % O2 Device: None (Room air)    Weight: 145 lbs 0 oz  General Appearance: NAD, pleasant and interactive.   Respiratory: Normal effort on RA.  Lungs CTAB.    Cardiovascular: RRR, S1S2.  No murmurs appreciated.   GI: Abdomen soft, NTND.    Skin: No jaundice, no open wounds, no rashes.     Data   Recent Labs   Lab 01/18/21  0544 01/17/21  0354   WBC 7.2 10.8   HGB 14.7 16.0   MCV 97 95    308    139   POTASSIUM 4.0 4.0   CHLORIDE 114* 104   CO2 20 24   BUN 22 22   CR 1.22 1.48*   ANIONGAP 9 11   YUNIER 7.9* 10.0   GLC 92 102*   ALBUMIN 3.0* 3.8   PROTTOTAL 5.8* 7.3   BILITOTAL 1.1 0.8   ALKPHOS 102 92   ALT 37 28   AST 25 22   LIPASE  --  192     Recent Results (from the past 24 hour(s))   XR Abdomen Port 1 View    Narrative    EXAMINATION: XR ABDOMEN PORT 1 VW, 1/19/2021 4:02 PM    COMPARISON: CT 1/17/2021    HISTORY: SBO    FINDINGS: Are scattered dilated loops of small bowel in the abdomen.  Air-filled colon is borderline  distended. Cholecystectomy clips.  Degenerative changes in spine. No free air or pneumatosis.      Impression    IMPRESSION: Dilated loops of small and large bowel with nonspecific  pattern. Mild  distal small bowel obstruction may be present.    SABA TURNER MD     Medications     sodium chloride 125 mL/hr at 01/19/21 2007       fluconazole  200 mg Oral Daily     traZODone  50 mg Oral At Bedtime

## 2021-01-20 NOTE — PLAN OF CARE
Shift: 1900-0730    Neuro: Alert and oriented x4; able to make needs known  Cardiac: WDL   Respiratory: No cough noted; on RA lung sounds clear   GI: Pt reports abdominal discomfort; no BM overnight; denies any nausea; pt is NPO    : Voids spontaneously   Skin: intact   LDA: PIV with NS running at 125  Activity: Pt is up independently; pt frequently ambulates in the harman   VS: VSS

## 2021-01-20 NOTE — PROGRESS NOTES
"CLINICAL NUTRITION SERVICES - ASSESSMENT NOTE     Nutrition Prescription    RECOMMENDATIONS FOR MDs/PROVIDERS TO ORDER:  None at this time     Malnutrition Status:    Patient does not meet two of the established criteria necessary for diagnosing malnutrition but is at risk for malnutrition    Recommendations already ordered by Registered Dietitian (RD):  None at this time     Future/Additional Recommendations:  -- monitor oral intake of meals and the need for nutritional supplements      REASON FOR ASSESSMENT  Trevin Gee is a/an 65 year old male assessed by the dietitian for Admission Nutrition Risk Screen for positive    Per chart review patient with a past medical history of CKD stage II, depression, insomnia, adrenal insufficiency, acid reflux, coccidiomycosis, pancreatic head cystic lesion and ileocolonic Crohn's disease s/p ileocecectomy in 1970s c/b anastomotic stricture requiring dilation, s/p axial dilation with subsequent removal in 2020.    NUTRITION HISTORY  Per patient no appetite change PTA. He denies recent weight loss. And feels like he has started to eat a little since advanced to full liquids.     CURRENT NUTRITION ORDERS  Diet: Full liquid  Intake/Tolerance: per nursing documentation - 100% oral intake     LABS  Labs reviewed  (1/18): CRP 8.8 mg/L (H)    MEDICATIONS  Medications reviewed    ANTHROPOMETRICS  Height: 175.3 cm (5' 9\")  Most Recent Weight: 65.8 kg (145 lb)    IBW: 72.7 kg  BMI: Normal BMI  Weight History:   Wt Readings from Last 10 Encounters:   01/17/21 65.8 kg (145 lb)   12/18/20 68.9 kg (152 lb)   12/10/20 68.9 kg (152 lb)   10/20/20 68.9 kg (152 lb)   10/07/20 72.1 kg (159 lb)   08/14/20 73.5 kg (162 lb)   02/11/20 72 kg (158 lb 12.8 oz)   02/05/20 70.5 kg (155 lb 6.8 oz)   01/27/20 71.1 kg (156 lb 12.8 oz)   12/13/19 69.4 kg (153 lb)   4.5% weight loss x 1 month v 10.5% weight loss x 6 months   Dosing Weight: 66 kg (admit wt)    ASSESSED NUTRITION NEEDS  Estimated Energy " Needs: 4401-4928 kcals/day (25 - 30 kcals/kg)  Justification: Maintenance  Estimated Protein Needs: 79-99 grams protein/day (1.2 - 1.5 grams of pro/kg)  Justification: Repletion  Estimated Fluid Needs: 1 mL/kcal   Justification: Maintenance    PHYSICAL FINDINGS  See malnutrition section below.    MALNUTRITION  % Intake: Decreased intake does not meet criteria  % Weight Loss: > 10% in 6 months (severe)  Subcutaneous Fat Loss: None observed  Muscle Loss: None observed  Fluid Accumulation/Edema: None noted  Malnutrition Diagnosis: Patient does not meet two of the established criteria necessary for diagnosing malnutrition but is at risk for malnutrition    NUTRITION DIAGNOSIS  Inadequate oral intake related to N/V and diet advancement as evidenced by patient NPO/CLD and now FLD      INTERVENTIONS  Implementation  Nutrition Education: See education flowsheet     Goals  Diet adv v nutrition support within 2-3 days.     Monitoring/Evaluation  Progress toward goals will be monitored and evaluated per protocol.    Rochelle Shah, MS/RD/LD/CNSC  7A/Obs RD Pager: [phone]

## 2021-01-20 NOTE — PROGRESS NOTES
"Observation Goals:   - GI consult complete: met     - able to tolerate oral intake: in progress,on clears.But Pt prefers to be npo    - normal bowel sounds: in progress,  Bowel sounds hypoactive    - improvement in abdominal distension/pain: . Denies pain.   /68 (BP Location: Left arm)   Pulse 75   Temp 97.9  F (36.6  C) (Oral)   Resp 18   Ht 1.753 m (5' 9\")   Wt 65.8 kg (145 lb)   SpO2 98%   BMI 21.41 kg/m    Up independent on unit. Per GI recommendation, to be npo. Abd xr completed this evening. No nausea and vomiting reported.Per Pt he only had 1 small loose bm this morning and none since.  "

## 2021-01-20 NOTE — PROGRESS NOTES
GASTROENTEROLOGY PROGRESS NOTE          ASSESSMENT AND RECOMMENDATIONS:   Assessment:  65 year old male with a history of ileocolonic Crohn's s/p ileocecectomy in 1970s, complicated by anastomotic stricture requiring dilation, s/p axial stent with subsequent removal in 2020, currently not on therapy.     Ileocecectomy complicated by anastomotic stricture  Concern for partial SBO  Passing gas, small BM this morning. AXR with continued, nonspecific loops of dilated bowel and colonic stool build up. Continues without distension. CRP is mildly elevated, normal lactic acid. It does seem his symptoms could be multifactorial possibly from his anastomotic stricture(s), constipation, and even possibly even some element of functional disease or SIBO.     Spoke to patient about possibility of repeat colonoscopy or possibly MRE to further assess his anastomotic stricture seen on CT. He does not believe colonoscopy would be helpful at this time as he feels the blockage of his anastomotic ulcer has resolved based on his symptoms. Patient would be amenable to attempting to complete a prep for MRE if he cannot tolerate PO trial over the next day or two. This is a reasonable option at this time. Empirically trialing BID Miralax to address any constipation may be helpful, patient would not like to trial at this time.    RECOMMENDATIONS:  -PO trial with clear liquids and then ADAT, if this fails will consider MRE, otherwise will repeat imaging/endoscopy on outpatient basis  -Check CRP (again), C diff, and fecal calprotectin  -May defer discussion with colorectal surgery at this point    GI will continue to follow. Thank you for involving us in this patient's care. Please do not hesitate to contact the GI service with any questions or concerns.     Pt care plan discussed with Dr. Rice, GI staff physician.      [facility]  GI [facility]  1190          Interval History:   Patient NPO this morning without nausea, mild nonspecific  "abdominal pain, feeling well overall. He did have a semi loose stool early this morning. Reports that his stool has been more formed that past couple of weeks which is unusual for him, typically with multiple loose stools daily. Denies vomiting, dizziness, and distension.          Medications:     Current Facility-Administered Medications   Medication     acetaminophen (TYLENOL) Suppository 650 mg     acetaminophen (TYLENOL) tablet 650 mg     fluconazole (DIFLUCAN) tablet 200 mg     melatonin tablet 1 mg     ondansetron (ZOFRAN-ODT) ODT tab 4 mg    Or     ondansetron (ZOFRAN) injection 4 mg     prochlorperazine (COMPAZINE) injection 5 mg    Or     prochlorperazine (COMPAZINE) tablet 5 mg    Or     prochlorperazine (COMPAZINE) suppository 12.5 mg     sodium chloride 0.9% infusion     traZODone (DESYREL) tablet 50 mg        Physical Exam   Blood pressure 124/76, pulse 74, temperature 97.4  F (36.3  C), temperature source Oral, resp. rate 16, height 1.753 m (5' 9\"), weight 65.8 kg (145 lb), SpO2 100 %.  Constitutional: cooperative, pleasant, not dyspneic/diaphoretic, no acute distress  Eyes: Sclera anicteric/injected  Ears/nose/mouth/throat: Normal oropharynx without ulcers or exudate, mucus membranes moist  Neck: supple  CV: No edema  Respiratory: Unlabored breathing  Abd: Nondistended, +bs, no hepatosplenomegaly, mild generalized tenderness, no peritoneal signs  Skin: warm, perfused, no jaundice  Neuro: AAO x 3  Psych: Normal affect  MSK: No gross deformities    Data   Current Labs  CBC  Recent Labs   Lab 01/18/21  0544 01/17/21  0354   WBC 7.2 10.8   RBC 4.73 5.07   HGB 14.7 16.0   HCT 45.9 48.2   MCV 97 95   MCH 31.1 31.6   MCHC 32.0 33.2   RDW 15.1* 15.2*    308     BMP  Recent Labs   Lab 01/18/21  0544 01/17/21  0354    139   POTASSIUM 4.0 4.0   CHLORIDE 114* 104   CO2 20 24   ANIONGAP 9 11   GLC 92 102*   BUN 22 22   CR 1.22 1.48*   GFRESTIMATED 62 49*   GFRESTBLACK 71 56*   YUNIER 7.9* 10.0      INRNo " lab results found in last 7 days.  Liver panel  Recent Labs   Lab 01/18/21  0544 01/17/21  0354   PROTTOTAL 5.8* 7.3   ALBUMIN 3.0* 3.8   BILITOTAL 1.1 0.8   ALKPHOS 102 92   AST 25 22   ALT 37 28       Imaging/procedures:  Reviewed in EMR    1/19/2021 Xr abdomen     EXAMINATION: XR ABDOMEN PORT 1 VW, 1/19/2021 4:02 PM     COMPARISON: CT 1/17/2021     HISTORY: SBO     FINDINGS: Are scattered dilated loops of small bowel in the abdomen.  Air-filled colon is borderline distended. Cholecystectomy clips.  Degenerative changes in spine. No free air or pneumatosis.                                                                      IMPRESSION: Dilated loops of small and large bowel with nonspecific  pattern. Mild  distal small bowel obstruction may be present.     SABA TURNER MD

## 2021-01-21 ENCOUNTER — PATIENT OUTREACH (OUTPATIENT)
Dept: CARE COORDINATION | Facility: CLINIC | Age: 66
End: 2021-01-21

## 2021-01-21 VITALS
SYSTOLIC BLOOD PRESSURE: 130 MMHG | RESPIRATION RATE: 18 BRPM | DIASTOLIC BLOOD PRESSURE: 80 MMHG | BODY MASS INDEX: 21.48 KG/M2 | TEMPERATURE: 97.9 F | HEART RATE: 70 BPM | HEIGHT: 69 IN | WEIGHT: 145 LBS | OXYGEN SATURATION: 100 %

## 2021-01-21 PROCEDURE — 99232 SBSQ HOSP IP/OBS MODERATE 35: CPT | Performed by: NURSE PRACTITIONER

## 2021-01-21 PROCEDURE — 258N000003 HC RX IP 258 OP 636: Performed by: STUDENT IN AN ORGANIZED HEALTH CARE EDUCATION/TRAINING PROGRAM

## 2021-01-21 PROCEDURE — 250N000013 HC RX MED GY IP 250 OP 250 PS 637: Performed by: NURSE PRACTITIONER

## 2021-01-21 PROCEDURE — 99239 HOSP IP/OBS DSCHRG MGMT >30: CPT | Performed by: STUDENT IN AN ORGANIZED HEALTH CARE EDUCATION/TRAINING PROGRAM

## 2021-01-21 RX ADMIN — TRAZODONE HYDROCHLORIDE 50 MG: 50 TABLET ORAL at 00:10

## 2021-01-21 RX ADMIN — SODIUM CHLORIDE, POTASSIUM CHLORIDE, SODIUM LACTATE AND CALCIUM CHLORIDE: 600; 310; 30; 20 INJECTION, SOLUTION INTRAVENOUS at 05:17

## 2021-01-21 RX ADMIN — FLUCONAZOLE 200 MG: 200 TABLET ORAL at 08:20

## 2021-01-21 ASSESSMENT — ACTIVITIES OF DAILY LIVING (ADL)
ADLS_ACUITY_SCORE: 11

## 2021-01-21 NOTE — PROGRESS NOTES
"Shift: 6783-4352    Neuro: A&Ox4.   Cardiac: Afebrile, VSS.            Respiratory: RA    GI/: Voiding spontaneously. 2 loose BM, small amount. Denies nausea/vomiting/cramping.  Diet/appetite: Full liquid  Activity: Up ad anirudh, independent.   Pain: Denies  Skin: WDL  Lines: PIV R LR infusing at 125mL/hr    /66 (BP Location: Left arm)   Pulse 53   Temp 97.5  F (36.4  C) (Oral)   Resp 16   Ht 1.753 m (5' 9\")   Wt 65.8 kg (145 lb)   SpO2 100%   BMI 21.41 kg/m      "

## 2021-01-21 NOTE — PROGRESS NOTES
"Shift: 9139-7114  Neuro: A&Ox4.   Cardiac: Afebrile, VSS.            Respiratory: RA    GI/: Voiding spontaneously. Denies nausea/vomiting/cramping.  Diet/appetite: tolerated Full liquid (cream of wheat) this AM, advanced to Low Fiber Diet, patient tolerated roast beef sandwich.   Activity: Up ad anirudh, independent. Ambulated on unit x 3.   Pain: Denies  Skin: WDL  Lines: PIV R LR infusing at 125mL/hr    /66 (BP Location: Left arm)   Pulse 53   Temp 97.5  F (36.4  C) (Oral)   Resp 16   Ht 1.753 m (5' 9\")   Wt 65.8 kg (145 lb)   SpO2 100%   BMI 21.41 kg/m      "

## 2021-01-21 NOTE — PROGRESS NOTES
GASTROENTEROLOGY PROGRESS NOTE          ASSESSMENT AND RECOMMENDATIONS:   Assessment:  65 year old male with a history of ileocolonic Crohn's s/p ileocecectomy in 1970s, complicated by anastomotic stricture requiring dilation, s/p axial stent with subsequent removal in 2020, currently not on therapy.     Ileocecectomy complicated by anastomotic stricture  Concern for partial SBO  Tolerating regular diet, feeling well, having BMs. CRP remains mildly elevated. Fecal calprotectin pending. C diff negative. Patient favors following up with Dr. Leach (primary GI provider) prior to undergoing further investigation into his present level of stricturing with consideration for MRE vs colonoscopy. Patient not interested in repeat colonoscopy     RECOMMENDATIONS:  -Ok to discharge this pm if he continues to tolerate regular diet  -Follow up with Dr. Leach as scheduled on 2/15/2021, seek care earlier if further concern for recurrent obstruction  -Please contact GI if he develops nausea, vomiting, abdominal pain or distension    GI will sign off. Thank you for involving us in this patient's care. Please do not hesitate to contact the GI service with any questions or concerns.     Pt care plan discussed with Dr. Rice, GI staff physician.      Ga Lai CNP  [facility]  1190          Interval History:   Patient tolerating regular diet this morning. He is passing gas and has had 3 small, loose stools in the past ~12 hours. He is up ambulating frequently. He denies nausea, vomiting, and abdominal pain         Medications:     Current Facility-Administered Medications   Medication     acetaminophen (TYLENOL) Suppository 650 mg     acetaminophen (TYLENOL) tablet 650 mg     fluconazole (DIFLUCAN) tablet 200 mg     lactated ringers infusion     melatonin tablet 1 mg     ondansetron (ZOFRAN-ODT) ODT tab 4 mg    Or     ondansetron (ZOFRAN) injection 4 mg     polyethylene glycol (MIRALAX) Packet 17 g     prochlorperazine (COMPAZINE)  "injection 5 mg    Or     prochlorperazine (COMPAZINE) tablet 5 mg    Or     prochlorperazine (COMPAZINE) suppository 12.5 mg     traZODone (DESYREL) tablet 50 mg        Physical Exam   Blood pressure 121/72, pulse 75, temperature 97.8  F (36.6  C), temperature source Oral, resp. rate 18, height 1.753 m (5' 9\"), weight 65.8 kg (145 lb), SpO2 96 %.  Constitutional: cooperative, pleasant, not dyspneic/diaphoretic, no acute distress  Eyes: Sclera anicteric/injected  Ears/nose/mouth/throat: Mucus membranes moist  Neck: supple  CV: No edema  Respiratory: Unlabored breathing  Abd: Nondistended, +bs, non tender  Skin: warm, perfused, no jaundice  Neuro: AAO x 3  Psych: Normal affect  MSK: No gross deformities    Data   Current Labs  CBC  Recent Labs   Lab 01/18/21  0544 01/17/21  0354   WBC 7.2 10.8   RBC 4.73 5.07   HGB 14.7 16.0   HCT 45.9 48.2   MCV 97 95   MCH 31.1 31.6   MCHC 32.0 33.2   RDW 15.1* 15.2*    308     BMP  Recent Labs   Lab 01/18/21  0544 01/17/21  0354    139   POTASSIUM 4.0 4.0   CHLORIDE 114* 104   CO2 20 24   ANIONGAP 9 11   GLC 92 102*   BUN 22 22   CR 1.22 1.48*   GFRESTIMATED 62 49*   GFRESTBLACK 71 56*   YUNIER 7.9* 10.0      INRNo lab results found in last 7 days.  Liver panel  Recent Labs   Lab 01/18/21  0544 01/17/21  0354   PROTTOTAL 5.8* 7.3   ALBUMIN 3.0* 3.8   BILITOTAL 1.1 0.8   ALKPHOS 102 92   AST 25 22   ALT 37 28       Imaging/procedures:  Reviewed in EMR    1/19/2021 Xr abdomen     EXAMINATION: XR ABDOMEN PORT 1 VW, 1/19/2021 4:02 PM     COMPARISON: CT 1/17/2021     HISTORY: SBO     FINDINGS: Are scattered dilated loops of small bowel in the abdomen.  Air-filled colon is borderline distended. Cholecystectomy clips.  Degenerative changes in spine. No free air or pneumatosis.                                                                      IMPRESSION: Dilated loops of small and large bowel with nonspecific  pattern. Mild  distal small bowel obstruction may be " present.     SABA TURNER MD

## 2021-01-21 NOTE — PROGRESS NOTES
"Shift: [id number]  Neuro: A&Ox4.   Cardiac: Afebrile, VSS.            Respiratory: RA    GI/: Voiding spontaneously. Denies nausea/vomiting/cramping.  Diet/appetite: tolerated Full liquid (cream of wheat) this AM, advanced to Low Fiber Diet, patient tolerated roast beef sandwich.   Activity: Up ad anirudh, independent. Ambulated on unit x 3.   Pain: Denies  Skin: WDL  Lines: PIV R LR infusing at 125mL/hr    /80 (BP Location: Left arm)   Pulse 70   Temp 97.9  F (36.6  C) (Oral)   Resp 18   Ht 1.753 m (5' 9\")   Wt 65.8 kg (145 lb)   SpO2 100%   BMI 21.41 kg/m      Plan to discharge home, awaiting for ride.  "

## 2021-01-21 NOTE — DISCHARGE SUMMARY
[facility]   Hospitalist Discharge Summary      Date of Admission:  1/17/2021  Date of Discharge:  1/21/2021  Discharging Provider: Max Espinoza DO  Discharge Team: Hospitalist Service, Gold 8    Discharge Diagnoses     Assessment & Plan    Acute, partial small bowel obstruction  Acute abdominal pain due to above  Hx ileocolonic Crohn's disease s/p ileocecectomy c/b anastomotic stricture  Chronic diarrhea   Colonic polyps  CKD stage II  CaOxalate nephrolithiasis  Chronic adrenal insufficiency  Hx osteoporosis   Pancreatic head cystic lesion  Depression  Insomnia  Hx Coccidioidomycosis infection    Follow-ups Needed After Discharge     Please follow up with your GI doctor as scheduled    Unresulted Labs Ordered in the Past 30 Days of this Admission     Date and Time Order Name Status Description    1/20/2021 1421 Calprotectin Feces In process       These results will be followed up by GI    Discharge Disposition   Discharged to home  Condition at discharge: Stable    Hospital Course     Assessment & Plan         Trevin Gee is a 65 year old male who was admitted on 1/17/2021.  He has a past medical history of CKD stage II, depression, insomnia, adrenal insufficiency, acid reflux, coccidiomycosis, pancreatic head cystic lesion and ileocolonic Crohn's disease s/p ileocecectomy in 1970s c/b anastomotic stricture requiring dilation, s/p axial dilation with subsequent removal in 2020 who was admitted to ED observation 1/17 with abdominal pain and cramping and concern for small bowel obstruction.  Now being transferred to internal medicine for further care. Patient was NPO for a day and then slowly advanced his diet with no N/V. He was stable and passing mostly liquid but some solid stool and felt safe for discharge. He will follow up with his GI doctor as previously scheduled.     Acute, partial small bowel obstruction  Acute abdominal pain due to above  Hx ileocolonic  Crohn's disease s/p ileocecectomy c/b anastomotic stricture  Chronic diarrhea   S/p ICR in 1970s that required numerous revisions (most recently ~20 years ago) c/b anastomotic stricture and underwent numerous endoscopic dilations and most recently axios stent stent placement 3/2019 which was removed 3 months after placement with improvement. Follows as OP with Dr. Leach, continued to be in endoscopic remission for Crohns.  Now p/w abd cramping and pain.  CT w/ mildly dilated small bowel loops, unchanged focal luminal narrowing at anastomosis, no clearly define transition point. Patient was NPO for a day and then slowly advanced his diet with no N/V. He was stable and passing mostly liquid but some solid stool and felt safe for discharge. He will follow up with his GI doctor as previously scheduled.     Colonic polyps  Follows as OP with GI.  Per most recent scope, well defined lesions with adenomatous changes.   - Continue surveillance colonoscopy as OP      CKD stage II  CaOxalate nephrolithiasis  Follows as OP with Dr. Quiroz, last seen 12/2020.  Baseline Cr ~1.3 as far back as 2016.  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  Grossly GFR appears stable, current Cr 1.22.      Chronic adrenal insufficiency  Follows as OP with endocrinology, Dr. Currie last seen 8/14/2020.  Felt to be due to chronic fluconazole use, now stopped since 6/2020.  Pt stopped steroids as of April 2016, most recent cortisol level 12.8 8/2020.  No acute issues.  - Follow up as OP with Dr. Currie      Hx osteoporosis   Seen on DXA scan 2005.  Currently on Reclast (started 2016).    - Continue OP Reclast as previously scheduled      Pancreatic head cystic lesion  Per GI most recent note, increased from 2006, potentially representing IPMN.  MRCP without change in cyst.  Images reveiwed with   Nancy s/p EUS w/ normal FNA.  Lesion stable on MRE 6/17/2019.   - Follow up as OP     Depression  Insomnia   Mood stable.    - Continue PTA Trazodone 50 mg at bedtime     Hx Coccidioidomycosis infection  Previously followed by ID.  Chronically on Fluconazole.   - Continue PTA Fluconazole     Consultations This Hospital Stay   GI LUMINAL ADULT IP CONSULT  MEDICATION HISTORY IP PHARMACY CONSULT    Code Status   Full Code    Time Spent on this Encounter   IMax DO, personally saw the patient today and spent greater than 30 minutes discharging this patient.       Max Espinoza DO  [facility] [address] [address]  Phone: [phone]  Fax: [phone]  ______________________________________________________________________    Physical Exam   Vital Signs: Temp: 97.9  F (36.6  C) Temp src: Oral BP: 130/80 Pulse: 70   Resp: 18 SpO2: 100 % O2 Device: None (Room air)    Weight: 145 lbs 0 oz    General Appearance:   NAD, pleasant and interactive.   Respiratory: Normal effort on RA.  Lungs CTAB.    Cardiovascular: RRR, S1S2.  No murmurs appreciated.   GI: Abdomen soft, NTND.    Skin: No jaundice, no open wounds, no rashes.  MSK: No LE edema, no joint swelling, 5/5 upper and LE strength   Neuro: Non focal neuro exam       Primary Care Physician   Lacey Feng    Discharge Orders      Reason for your hospital stay    You were in the hospital with a partial small bowel obstruction     Follow Up and recommended labs and tests    Please keep your scheduled follow up with your GI doctor     Activity    Your activity upon discharge: activity as tolerated     Diet    Follow this diet upon discharge: Orders Placed This Encounter      Advance Diet as Tolerated: Regular Diet Adult       Significant Results and Procedures   Most Recent 3 CBC's:  Recent Labs   Lab Test 01/18/21  0544 01/17/21  0354 12/10/20  0955   WBC 7.2 10.8 4.4   HGB 14.7 16.0 16.7    MCV 97 95 91    308 171     Most Recent 3 BMP's:  Recent Labs   Lab Test 01/18/21  0544 01/17/21  0354 12/10/20  0955    139 133   POTASSIUM 4.0 4.0 3.9   CHLORIDE 114* 104 99   CO2 20 24 24   BUN 22 22 22   CR 1.22 1.48* 1.59*   ANIONGAP 9 11 10   YUNIER 7.9* 10.0 8.9   GLC 92 102* 76     Most Recent 2 LFT's:  Recent Labs   Lab Test 01/18/21  0544 01/17/21  0354   AST 25 22   ALT 37 28   ALKPHOS 102 92   BILITOTAL 1.1 0.8     Most Recent 3 INR's:  Recent Labs   Lab Test 04/21/19  1610 04/17/19  0645   INR 0.99 1.04   ,   Results for orders placed or performed during the hospital encounter of 01/17/21   CT Abdomen Pelvis w/o Contrast    Narrative    CT of the Abdomen and Pelvis without contrast, 1/17/2021 7:28 AM.    Comparison: Abdominal MR 6/17/2019. Fluoroscopy exam 2/5/2020. CT  abdomen and pelvis 4/21/2019.    History: Bowel obstruction suspected.     Technique: Axial images of the  abdomen and pelvis were obtained  without contrast. Coronal reconstructions were provided. Images were  reviewed in bone, lung, and soft tissue windows.    Total DLP: 594 mGy*cm.    Findings:    Abdomen and Pelvis:   Liver is normal. No focal lesion. No intra or extrahepatic biliary  duct dilation. Postsurgical cholecystectomy changes. Spleen is normal.  Stable 13 mm cystic focus in the pancreatic neck, unchanged since  abdominal MR 6/17/2019 (series 5, image 119). The main pancreatic duct  is mildly dilated measuring up to 5 mm.    Adrenal glands are normal. Bilateral renal cysts. No hydronephrosis.  Bilateral punctate nonobstructing renal stones. Bladder is normal in  appearance without wall thickening. Prostate is enlarged.    Postsurgical right hemicolectomy changes with ileocolonic anastomosis.  There is grossly unchanged 2.2 cm focal luminal narrowing at the  anastomosis (series 3, image 22), similar in appearance compared to  prior abdominal MR 6/17/2019. Small hiatal hernia. No small or large  bowel wall  thickening or dilation. Large colonic stool burden with  air-fluid levels in small bowel. No free intraperitoneal air. No  intra-abdominal pelvic free fluid. No pneumatosis. No portal venous  gas.    Abdominal aorta and major branches are normal in caliber with  scattered atherosclerotic calcifications.    Prominent but not enlarged mesenteric lymph nodes, measuring up to 7  mm in the right lower quadrant (series 5, image 177).1    Bones and Soft Tissues: No suspicious osseous lesion. Degenerative  changes of the thoracolumbar spine most prominent at L3-L4. No  suspicious mass.      Impression    Impression:   1. Postsurgical right hemicolectomy changes with large colonic stool  burden. Diffusely dilated small bowel. Unchanged 2.2 cm short segment  focal luminal narrowing at the ileocolonic anastomosis. Some degree of  obstruction at this level may be present though this is not clearly  representing a transition point.  2. Additional incidental findings as described in the above report  including stable cystic focus in the pancreatic neck and bilateral  nonobstructing punctate renal stones.    I have personally reviewed the examination and initial interpretation  and I agree with the findings.    SABA TURNER MD   XR Abdomen Port 1 View    Narrative    EXAMINATION: XR ABDOMEN PORT 1 , 1/19/2021 4:02 PM    COMPARISON: CT 1/17/2021    HISTORY: SBO    FINDINGS: Are scattered dilated loops of small bowel in the abdomen.  Air-filled colon is borderline distended. Cholecystectomy clips.  Degenerative changes in spine. No free air or pneumatosis.      Impression    IMPRESSION: Dilated loops of small and large bowel with nonspecific  pattern. Mild  distal small bowel obstruction may be present.    SABA TURNER MD       Discharge Medications   Current Discharge Medication List      CONTINUE these medications which have NOT CHANGED    Details   calcium-vitamin D (CALTRATE) 600-400 MG-UNIT per tablet Take 1  tablet by mouth daily       Cyanocobalamin (VITAMIN B 12) 500 MCG TABS Take 500 mcg by mouth At Bedtime       diphenoxylate-atropine (LOMOTIL) 2.5-0.025 MG tablet Take 1 tablet by mouth 4 times daily as needed for diarrhea  Qty: 60 tablet, Refills: 1    Associated Diagnoses: Crohn's disease of small intestine with intestinal obstruction (H)      ferrous sulfate (FE TABS) 325 (65 Fe) MG EC tablet Take 325 mg by mouth daily      fluconazole (DIFLUCAN) 200 MG tablet Take 1 tablet (200 mg) by mouth daily  Qty: 90 tablet, Refills: 3    Associated Diagnoses: Coccidioidal granuloma      MELATONIN PO Take 1.5 mg by mouth nightly as needed (PT last dose 1.27.2020)       multivitamin, therapeutic with minerals (MULTI-VITAMIN) TABS Take 1 tablet by mouth every evening       traZODone (DESYREL) 50 MG tablet Take 1 tablet (50 mg) by mouth At Bedtime  Qty: 90 tablet, Refills: 2    Associated Diagnoses: Bipolar 2 disorder (H)      Vitamin D, Cholecalciferol, 25 MCG (1000 UT) CAPS Take 25 mcg by mouth daily      dicyclomine (BENTYL) 10 MG capsule Take 1 capsule (10 mg) by mouth 2 times daily (before meals)  Qty: 90 capsule, Refills: 1    Associated Diagnoses: Diarrhea, unspecified type           Allergies   Allergies   Allergen Reactions     Prednisone Other (See Comments)     Diana with more than 2.5mg chronic dosing of prednisone due to fluconazole interaction per patient.

## 2021-01-21 NOTE — PROGRESS NOTES
Please advise of plan for discharge. Patient tolerated low fiber diet well for lunch, ambulation on unit x3.   Thanks, Kim CHRIS [phone]

## 2021-01-21 NOTE — PLAN OF CARE
"Shift: [id number]     Neuro: Alert and oriented x4; able to make needs known  Cardiac: WDL   Respiratory: No cough noted; on RA lung sounds clear   BM: denies any nausea; pt diet advanced to full liquid and tolerated.  BM x 1 this shift.  : Voids spontaneously   Skin: intact   LDA: PIV with LR running at 125  Activity: Pt is up independently; pt frequently ambulates in the harman   VS: VSS  Stool sent to lab, C Diff negative.  Blood pressure 127/76, pulse 75, temperature 97.9  F (36.6  C), temperature source Oral, resp. rate 16, height 1.753 m (5' 9\"), weight 65.8 kg (145 lb), SpO2 100 %.    "

## 2021-01-22 ENCOUNTER — NURSE TRIAGE (OUTPATIENT)
Dept: NURSING | Facility: CLINIC | Age: 66
End: 2021-01-22

## 2021-01-22 LAB — CALPROTECTIN STL-MCNT: 12.9 MG/KG (ref 0–49.9)

## 2021-01-22 NOTE — TELEPHONE ENCOUNTER
"    Additional Information    Negative: Patient sounds very sick or weak to the triager    Negative: Abdomen pain is the main symptom and adult male    Negative: Abdomen pain is the main symptom and adult female    Negative: Rectal bleeding or blood in stool is the main symptom    Negative: Constant abdominal pain lasting > 2 hours    Negative: Vomiting bile (green color)    Negative: Vomiting and abdomen looks much more swollen than usual    Negative: Rectal pain or fullness from fecal impaction (rectum full of stool) and NOT better after SITZ bath, suppository or enema    Negative: Abdomen is more swollen than usual    Negative: Last bowel movement (BM) > 4 days ago    Negative: Leaking stool    Negative: Intermittent mild abdominal pain and fever    Negative: Unable to have a bowel movement (BM) without manually removing stool (using finger to pull out stool or perform disimpaction)    Negative: Unable to have a bowel movement (BM) without using a laxative, suppository, or enema    Negative: Constipation persists > 1 week and no improvement after using CARE ADVICE    Negative: Weight loss greater than 10 pounds (5 kg) and not dieting    Negative: Pencil-like, narrow stools    Negative: Patient wants to be seen    Negative: Uses laxative (e.g., PEG / Miralax. milk of magnesia) or enema more than once a month    Negative: Constipation is a recurrent ongoing problem (i.e., < 3 BMs / week or straining > 25% of the time)    Negative: Minor bleeding from rectum (e.g., blood just on toilet paper, few drops, streaks on surface of normal formed BM) occurs more than twice    Mild constipation    Answer Assessment - Initial Assessment Questions  1. STOOL PATTERN OR FREQUENCY: \"How often do you pass bowel movements (BMs)?\"  (Normal range: tid to q 3 days)  \"When was the last BM passed?\"        Last stool liquidy 1.5-2 days ago  2. STRAINING: \"Do you have to strain to have a BM?\"       No  3. RECTAL PAIN: \"Does your rectum " "hurt when the stool comes out?\" If so, ask: \"Do you have hemorrhoids? How bad is the pain?\"  (Scale 1-10; or mild, moderate, severe)      No  4. STOOL COMPOSITION: \"Are the stools hard?\"       no  5. BLOOD ON STOOLS: \"Has there been any blood on the toilet tissue or on the surface of the BM?\" If so, ask: \"When was the last time?\"       No  6. CHRONIC CONSTIPATION: \"Is this a new problem for you?\"  If no, ask: \"How long have you had this problem?\" (days, weeks, months)       Recently hospitalized 1/17-1/21 with bowel obstruction  7. CHANGES IN DIET: \"Have there been any recent changes in your diet?\"       No  8. MEDICATIONS: \"Have you been taking any new medications?\"      Has not yet started miralax  9. LAXATIVES: \"Have you been using any laxatives or enemas?\"  If yes, ask \"What, how often, and when was the last time?\"      No  10. CAUSE: \"What do you think is causing the constipation?\"         Concerned may be stool building in colon  11. OTHER SYMPTOMS: \"Do you have any other symptoms?\" (e.g., abdominal pain, fever, vomiting)        Denies abdominal pain.  Does report a \"slow build up of discomfort at my waist when I bend over.  Feel good standing or lying down\"  Denies nausea/vomiting.  Is passing gas  12. PREGNANCY: \"Is there any chance you are pregnant?\" \"When was your last menstrual period?\"        N/A    Protocols used: CONSTIPATION-A-OH      "

## 2021-02-16 ENCOUNTER — TRANSFERRED RECORDS (OUTPATIENT)
Dept: HEALTH INFORMATION MANAGEMENT | Facility: CLINIC | Age: 66
End: 2021-02-16

## 2021-03-02 ENCOUNTER — TELEPHONE (OUTPATIENT)
Dept: ENDOCRINOLOGY | Facility: CLINIC | Age: 66
End: 2021-03-02

## 2021-03-02 NOTE — LETTER
Patient:  Trevin Gee  :   1955  MRN:     [id number]        Mr.Mark MORENITA Gee  [address]  [address] [place] [id number]        2021    Dear ,    I see that you do not have a follow-up appointment in endocrinology for 2021. I would recommend that you be evaluated by an endocrinologist to minimize complications. If you like to be seen at the [facility], please call [phone] select option #1 for an appointment.    Regards    Mag Currie MD

## 2021-03-04 ENCOUNTER — TELEPHONE (OUTPATIENT)
Dept: NEPHROLOGY | Facility: CLINIC | Age: 66
End: 2021-03-04

## 2021-03-04 NOTE — TELEPHONE ENCOUNTER
Daniel results received.  Collected 2/17/2021.  Will have scanned in chart.  Violet Greenfield LPN  Nephrology  [phone]

## 2021-03-18 PROCEDURE — 36415 COLL VENOUS BLD VENIPUNCTURE: CPT | Performed by: INTERNAL MEDICINE

## 2021-03-18 PROCEDURE — 80069 RENAL FUNCTION PANEL: CPT | Performed by: INTERNAL MEDICINE

## 2021-03-19 LAB
ALBUMIN SERPL-MCNC: 4 G/DL (ref 3.4–5)
ANION GAP SERPL CALCULATED.3IONS-SCNC: 3 MMOL/L (ref 3–14)
BUN SERPL-MCNC: 18 MG/DL (ref 7–30)
CALCIUM SERPL-MCNC: 9 MG/DL (ref 8.5–10.1)
CHLORIDE SERPL-SCNC: 108 MMOL/L (ref 94–109)
CO2 SERPL-SCNC: 28 MMOL/L (ref 20–32)
CREAT SERPL-MCNC: 1.32 MG/DL (ref 0.66–1.25)
GFR SERPL CREATININE-BSD FRML MDRD: 56 ML/MIN/{1.73_M2}
GLUCOSE SERPL-MCNC: 88 MG/DL (ref 70–99)
PHOSPHATE SERPL-MCNC: 2.7 MG/DL (ref 2.5–4.5)
POTASSIUM SERPL-SCNC: 4.8 MMOL/L (ref 3.4–5.3)
SODIUM SERPL-SCNC: 139 MMOL/L (ref 133–144)

## 2021-03-24 ENCOUNTER — VIRTUAL VISIT (OUTPATIENT)
Dept: NEPHROLOGY | Facility: CLINIC | Age: 66
End: 2021-03-24
Attending: INTERNAL MEDICINE
Payer: COMMERCIAL

## 2021-03-24 DIAGNOSIS — K50.012 CROHN'S DISEASE OF SMALL INTESTINE WITH INTESTINAL OBSTRUCTION (H): ICD-10-CM

## 2021-03-24 DIAGNOSIS — R82.992 HYPEROXALURIA: ICD-10-CM

## 2021-03-24 DIAGNOSIS — N20.0 NEPHROLITHIASIS: Primary | ICD-10-CM

## 2021-03-24 PROCEDURE — 99215 OFFICE O/P EST HI 40 MIN: CPT | Mod: 95 | Performed by: INTERNAL MEDICINE

## 2021-03-24 RX ORDER — MULTIVITAMIN WITH IRON
100 TABLET ORAL DAILY
COMMUNITY

## 2021-03-24 ASSESSMENT — PAIN SCALES - GENERAL: PAINLEVEL: NO PAIN (0)

## 2021-03-24 NOTE — LETTER
3/24/2021       RE: Trevin Gee  [address]  [address]  [facility] [address]     Dear Colleague,    Thank you for referring your patient, Trevin Gee, to the [facility] [place] at [facility]. Please see a copy of my visit note below.    Trevin is a 65 year old who is being evaluated via a billable video visit.       How would you like to obtain your AVS? Mail a copy  If the video visit is dropped, the invitation should be resent by: Send to e-mail at: [email or web address]  Will anyone else be joining your video visit? No      Video-Visit Details    Type of service:  Video Visit    Video Start Time: 12:54 PM    Video End Time:1:42 PM    Originating Location (pt. Location): Home    Distant Location (provider location):  [facility] [place]     Platform used for Video Visit: Med fusion   March 24, 2021    Assessment/Plan:  CKD  Baseline Cr ~ 1.3 as far back as 2016.  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  Recent NALLELY in the setting of diarrheal illness 12/2020 and SBO 1/2021.  Cr now returned to baseline.    CaOx Nephrolithiasis  Patient with history of recurrent stones with analysis in 2019 revealing a composition of calcium oxalate.  Also obtained Litholink which suggested hypocitraturia, hypomagnesemia and hyperoxaluria.  This is likely in the setting of Crohn's disease and bowel resection history.    Repeat Litholink 2/16/2021 revealed low urine volumes 0.9-1.2L per day, urina Ca, Mg and Na were okay, citrate ~220-240.  Discussed dietary modifications as below  - Low oxalate diet, pt admits to liking chocolate and unsure if he will be able to decrease intake  - Low Na diet for goal intake <2g per day  - No dietary Ca restriction and okay to resume Ca  supplement, also discussed considering taking TUMS with meals to further increase Ca intake  - Dairy intake with meals to add Ca to meals, pt does note taking cheese, ice cream and yogurt  - Increase fluid intake to 3L or greater per day, discussed that this will probably be the highest yield given low urine volumes  - Discussed addition of food with citrate to diet, pt does not like lemon juice but will try orange juice  - Mg supplementation probably not the best intervention given pt's GI history    Follow-up in 6 months    Discussed with Dr Jazz Cuenca MD  Renal Fellow  [phone]    HPI: Trevin Gee is a 65 year old male who presents for follow-up.     Notes he has been to the hospital twice since last visit, for diarrhea in Dec and SBO in Jan. Currently denies any concerns including SOB, chest pain, dysuria, hematuria, change in frequency, abdominal pain, leg swelling.     Notes he watches his salt intake and does not think his diet includes a lot of salt. Does admit to loving chocolate and consumes this frequently. Knows that this is high in oxalate but would rather not give this up. Thinks fluid intake is about 1L per day. He continues to work and it is hard to keep track of this.     Completed Litholink x48 hrs and urine volumes reported in the litholinks are consistent with pt's impression of his urine output.       Allergies   Allergen Reactions     Prednisone Other (See Comments)     Diana with more than 2.5mg chronic dosing of prednisone due to fluconazole interaction per patient.        calcium-vitamin D (CALTRATE) 600-400 MG-UNIT per tablet, Take 1 tablet by mouth daily   Cyanocobalamin (VITAMIN B 12) 500 MCG TABS, Take 500 mcg by mouth At Bedtime   dicyclomine (BENTYL) 10 MG capsule, Take 1 capsule (10 mg) by mouth 2 times daily (before meals)  diphenoxylate-atropine (LOMOTIL) 2.5-0.025 MG tablet, Take 1 tablet by mouth 4 times daily as needed for diarrhea  ferrous sulfate (FE  TABS) 325 (65 Fe) MG EC tablet, Take 325 mg by mouth daily  fluconazole (DIFLUCAN) 200 MG tablet, Take 1 tablet (200 mg) by mouth daily  MELATONIN PO, Take 1.5 mg by mouth nightly as needed (PT last dose 1.27.2020)   multivitamin, therapeutic with minerals (MULTI-VITAMIN) TABS, Take 1 tablet by mouth every evening   traZODone (DESYREL) 50 MG tablet, Take 1 tablet (50 mg) by mouth At Bedtime  vitamin B6 (PYRIDOXINE) 100 MG tablet, Take 100 mg by mouth daily  Vitamin D, Cholecalciferol, 25 MCG (1000 UT) CAPS, Take 25 mcg by mouth daily    No current facility-administered medications on file prior to visit.       Past Medical History:   Diagnosis Date     Anemia 2007     Anxiety 2012?    much worse since July 2014     Cataract 12/2007    both eyes, too much prednisone, implants     Coccidioidomycosis      Crohn disease (H)      Crohn's disease (H) 1/13/2015     Depressive disorder 7/2014    much worse since July 2014     Fracture 1956    green fracture of leg     Gallbladder problem 2005?    much gravel, removed     GERD (gastroesophageal reflux disease)      History of blood transfusion 1988    ulcerated anastomosis term. ilium bleed     Hypertriglyceridemia 7/8/2016     Infection 2007    persistant coccidioidomycosis     Kidney stone various    both sides, all passed naturally     Mental health problem 2007    bipolar though perhaps different     Nephrolithiasis      Osteoporosis 2007    osteoporosis, too much prednisone, last dexa 1/2014     Osteoporosis      Other nervous system complications 2007    prednisone overload induced kevin     Personal history of colonic polyps     small ones found during colonoscopies     Steroid-induced psychosis     Patient extremely sensitive to regular doses of steroids.  Unclear if this is due to chronic fluconazole use or genetic issue.  In the future, use caution when prescribing steroids.     TB (tuberculosis)        Past Surgical History:   Procedure Laterality Date      APPENDECTOMY  1974    coincidental with Crohn's surgery     BACK SURGERY  12/2007    kyphoplasty on compressed 4th??? vertebra     BIOPSY  2007, 2013    lump on arm, knee, back of hand for coccidioidomycosis cult.     CHOLECYSTECTOMY  2005    much gravel, removed laparoscopically     COLONOSCOPY  7/14/2014 last    Dr. Catherine Bowden, [facility] - many previous     COLONOSCOPY Left 9/11/2015    Procedure: COMBINED COLONOSCOPY, SINGLE OR MULTIPLE BIOPSY/POLYPECTOMY BY BIOPSY;  Surgeon: Fransisco Leach MD;  [facility]: [facility]     COLONOSCOPY N/A 8/3/2016    Procedure: COMBINED COLONOSCOPY, SINGLE OR MULTIPLE BIOPSY/POLYPECTOMY BY BIOPSY;  Surgeon: Fransisco Leach MD;  [facility]: [facility]     COLONOSCOPY N/A 8/16/2017    Procedure: COMBINED COLONOSCOPY, SINGLE OR MULTIPLE BIOPSY/POLYPECTOMY BY BIOPSY;;  Surgeon: Fransisco Leach MD;  [facility]     COLONOSCOPY N/A 10/1/2019    Procedure: Colonoscopy With Colonic Stent Insertion;  Surgeon: Robbie Cruz MD;  [facility]     COLONOSCOPY N/A 2/5/2020    Procedure: COLONOSCOPY, FOREIGN BODY REMOVAL;  Surgeon: Robbie Cruz MD;  [facility]     ENT SURGERY  ?    upper endoscopy     ESOPHAGOSCOPY, GASTROSCOPY, DUODENOSCOPY (EGD), COMBINED N/A 11/8/2016    Procedure: COMBINED ENDOSCOPIC ULTRASOUND, ESOPHAGOSCOPY, GASTROSCOPY, DUODENOSCOPY (EGD), FINE NEEDLE ASPIRATE/BIOPSY;  Surgeon: Guru Alexsandra Ballesteros MD;  [facility]     ESOPHAGOSCOPY, GASTROSCOPY, DUODENOSCOPY (EGD), COMBINED N/A 11/8/2016    Procedure: COMBINED ESOPHAGOSCOPY, GASTROSCOPY, DUODENOSCOPY (EGD), BIOPSY SINGLE OR MULTIPLE;  Surgeon: Guru Alexsandra Ballesteros MD;  [facility]     ESOPHAGOSCOPY, GASTROSCOPY, DUODENOSCOPY (EGD), COMBINED N/A 8/16/2017    Procedure: COMBINED ESOPHAGOSCOPY, GASTROSCOPY, DUODENOSCOPY (EGD), BIOPSY SINGLE OR MULTIPLE;;  Surgeon: Fransisco Leach MD;  [facility]     EYE SURGERY  12/2007  "   cataract surgery both sides     GI SURGERY  1974, 1986(x2), 1989    Crohn's, 1974 RO 18\" term. ilium + 9\" asc. colon all one pc     SOFT TISSUE SURGERY  3/2013    removed buildup on back of r hand, coccidioidomycosis       Social History     Tobacco Use     Smoking status: Never Smoker     Smokeless tobacco: Never Used   Substance Use Topics     Alcohol use: Yes     Comment: sometimes one glass of wine a week     Drug use: No       Family History   Problem Relation Age of Onset     Heart Failure Father      Musculoskeletal Disorder Father         Parkinson's     Cancer - colorectal Mother      Cerebrovascular Disease Paternal Grandmother      Cancer Other      Musculoskeletal Disorder Brother         Parkinson's     Anesthesia Reaction No family hx of      Deep Vein Thrombosis (DVT) No family hx of        ROS: A 12 system review of systems was negative other than noted here or above.     Exam:  There were no vitals taken for this visit.    GENERAL APPEARANCE: NAD  EYES:  No scleral icterus, EOMI  PULM: breathing non-labored  NEURO:  AOx3, speech normal  Psych: Affect normal    Results:    No visits with results within 1 Day(s) from this visit.   Latest known visit with results is:   Orders Only on 03/18/2021   Component Date Value Ref Range Status     Sodium 03/18/2021 139  133 - 144 mmol/L Final     Potassium 03/18/2021 4.8  3.4 - 5.3 mmol/L Final     Chloride 03/18/2021 108  94 - 109 mmol/L Final     Carbon Dioxide 03/18/2021 28  20 - 32 mmol/L Final     Anion Gap 03/18/2021 3  3 - 14 mmol/L Final     Glucose 03/18/2021 88  70 - 99 mg/dL Final    Non Fasting     Urea Nitrogen 03/18/2021 18  7 - 30 mg/dL Final     Creatinine 03/18/2021 1.32* 0.66 - 1.25 mg/dL Final     GFR Estimate 03/18/2021 56* >60 mL/min/[1.73_m2] Final    Comment: Non  GFR Calc  Starting 12/18/2018, serum creatinine based estimated GFR (eGFR) will be   calculated using the Chronic Kidney Disease Epidemiology Collaboration "   (CKD-EPI) equation.       GFR Estimate If Black 03/18/2021 65  >60 mL/min/[1.73_m2] Final    Comment:  GFR Calc  Starting 12/18/2018, serum creatinine based estimated GFR (eGFR) will be   calculated using the Chronic Kidney Disease Epidemiology Collaboration   (CKD-EPI) equation.       Calcium 03/18/2021 9.0  8.5 - 10.1 mg/dL Final     Phosphorus 03/18/2021 2.7  2.5 - 4.5 mg/dL Final     Albumin 03/18/2021 4.0  3.4 - 5.0 g/dL Final         Attestation signed by Tai Schulz MD at 4/7/2021  3:03 PM:  This patient has been evaluated by me, Tai Schulz MD, on 3/24/21.  I discussed with the fellow, Dr. Cuenca, and agree with the findings and plan in this note.  I have reviewed medications, vital signs and labs.       Tai Schulz MD       Again, thank you for allowing me to participate in the care of your patient.      Sincerely,    Teja Cuenca MD

## 2021-03-24 NOTE — PROGRESS NOTES
Trevin is a 65 year old who is being evaluated via a billable video visit.       How would you like to obtain your AVS? Mail a copy  If the video visit is dropped, the invitation should be resent by: Send to e-mail at: [email or web address]  Will anyone else be joining your video visit? No      Video-Visit Details    Type of service:  Video Visit    Video Start Time: 12:54 PM    Video End Time:1:42 PM    Originating Location (pt. Location): Home    Distant Location (provider location):  [facility] [place]     Platform used for Video Visit: Cloudcam   March 24, 2021    Assessment/Plan:  CKD  Baseline Cr ~ 1.3 as far back as 2016.  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  Recent NALLELY in the setting of diarrheal illness 12/2020 and SBO 1/2021.  Cr now returned to baseline.    CaOx Nephrolithiasis  Patient with history of recurrent stones with analysis in 2019 revealing a composition of calcium oxalate.  Also obtained Litholink which suggested hypocitraturia, hypomagnesemia and hyperoxaluria.  This is likely in the setting of Crohn's disease and bowel resection history.    Repeat Litholink 2/16/2021 revealed low urine volumes 0.9-1.2L per day, urina Ca, Mg and Na were okay, citrate ~220-240.  Discussed dietary modifications as below  - Low oxalate diet, pt admits to liking chocolate and unsure if he will be able to decrease intake  - Low Na diet for goal intake <2g per day  - No dietary Ca restriction and okay to resume Ca supplement, also discussed considering taking TUMS with meals to further increase Ca intake  - Dairy intake with meals to add Ca to meals, pt does note taking cheese, ice cream and yogurt  - Increase fluid intake to 3L or greater per day, discussed that this will probably be the highest yield given low urine volumes  - Discussed  addition of food with citrate to diet, pt does not like lemon juice but will try orange juice  - Mg supplementation probably not the best intervention given pt's GI history    Follow-up in 6 months    Discussed with Dr Jazz Cuenca MD  Renal Fellow  [phone]    HPI: Trevin Gee is a 65 year old male who presents for follow-up.     Notes he has been to the hospital twice since last visit, for diarrhea in Dec and SBO in Jan. Currently denies any concerns including SOB, chest pain, dysuria, hematuria, change in frequency, abdominal pain, leg swelling.     Notes he watches his salt intake and does not think his diet includes a lot of salt. Does admit to loving chocolate and consumes this frequently. Knows that this is high in oxalate but would rather not give this up. Thinks fluid intake is about 1L per day. He continues to work and it is hard to keep track of this.     Completed Litholink x48 hrs and urine volumes reported in the litholinks are consistent with pt's impression of his urine output.       Allergies   Allergen Reactions     Prednisone Other (See Comments)     Diana with more than 2.5mg chronic dosing of prednisone due to fluconazole interaction per patient.        calcium-vitamin D (CALTRATE) 600-400 MG-UNIT per tablet, Take 1 tablet by mouth daily   Cyanocobalamin (VITAMIN B 12) 500 MCG TABS, Take 500 mcg by mouth At Bedtime   dicyclomine (BENTYL) 10 MG capsule, Take 1 capsule (10 mg) by mouth 2 times daily (before meals)  diphenoxylate-atropine (LOMOTIL) 2.5-0.025 MG tablet, Take 1 tablet by mouth 4 times daily as needed for diarrhea  ferrous sulfate (FE TABS) 325 (65 Fe) MG EC tablet, Take 325 mg by mouth daily  fluconazole (DIFLUCAN) 200 MG tablet, Take 1 tablet (200 mg) by mouth daily  MELATONIN PO, Take 1.5 mg by mouth nightly as needed (PT last dose 1.27.2020)   multivitamin, therapeutic with minerals (MULTI-VITAMIN) TABS, Take 1 tablet by mouth every evening   traZODone  (DESYREL) 50 MG tablet, Take 1 tablet (50 mg) by mouth At Bedtime  vitamin B6 (PYRIDOXINE) 100 MG tablet, Take 100 mg by mouth daily  Vitamin D, Cholecalciferol, 25 MCG (1000 UT) CAPS, Take 25 mcg by mouth daily    No current facility-administered medications on file prior to visit.       Past Medical History:   Diagnosis Date     Anemia 2007     Anxiety 2012?    much worse since July 2014     Cataract 12/2007    both eyes, too much prednisone, implants     Coccidioidomycosis      Crohn disease (H)      Crohn's disease (H) 1/13/2015     Depressive disorder 7/2014    much worse since July 2014     Fracture 1956    green fracture of leg     Gallbladder problem 2005?    much gravel, removed     GERD (gastroesophageal reflux disease)      History of blood transfusion 1988    ulcerated anastomosis term. ilium bleed     Hypertriglyceridemia 7/8/2016     Infection 2007    persistant coccidioidomycosis     Kidney stone various    both sides, all passed naturally     Mental health problem 2007    bipolar though perhaps different     Nephrolithiasis      Osteoporosis 2007    osteoporosis, too much prednisone, last dexa 1/2014     Osteoporosis      Other nervous system complications 2007    prednisone overload induced kevin     Personal history of colonic polyps     small ones found during colonoscopies     Steroid-induced psychosis     Patient extremely sensitive to regular doses of steroids.  Unclear if this is due to chronic fluconazole use or genetic issue.  In the future, use caution when prescribing steroids.     TB (tuberculosis)        Past Surgical History:   Procedure Laterality Date     APPENDECTOMY  1974    coincidental with Crohn's surgery     BACK SURGERY  12/2007    kyphoplasty on compressed 4th??? vertebra     BIOPSY  2007, 2013    lump on arm, knee, back of hand for coccidioidomycosis cult.     CHOLECYSTECTOMY  2005    much gravel, removed laparoscopically     COLONOSCOPY  7/14/2014 last    Dr. Catherine Bowden,  "[facility] - many previous     COLONOSCOPY Left 9/11/2015    Procedure: COMBINED COLONOSCOPY, SINGLE OR MULTIPLE BIOPSY/POLYPECTOMY BY BIOPSY;  Surgeon: Fransisco Leach MD;  [facility]     COLONOSCOPY N/A 8/3/2016    Procedure: COMBINED COLONOSCOPY, SINGLE OR MULTIPLE BIOPSY/POLYPECTOMY BY BIOPSY;  Surgeon: Fransisco Leach MD;  [facility]     COLONOSCOPY N/A 8/16/2017    Procedure: COMBINED COLONOSCOPY, SINGLE OR MULTIPLE BIOPSY/POLYPECTOMY BY BIOPSY;;  Surgeon: Fransisco Leach MD;  [facility]     COLONOSCOPY N/A 10/1/2019    Procedure: Colonoscopy With Colonic Stent Insertion;  Surgeon: Robbie Cruz MD;  [facility]     COLONOSCOPY N/A 2/5/2020    Procedure: COLONOSCOPY, FOREIGN BODY REMOVAL;  Surgeon: Robbie Cruz MD;  [facility]     ENT SURGERY  ?    upper endoscopy     ESOPHAGOSCOPY, GASTROSCOPY, DUODENOSCOPY (EGD), COMBINED N/A 11/8/2016    Procedure: COMBINED ENDOSCOPIC ULTRASOUND, ESOPHAGOSCOPY, GASTROSCOPY, DUODENOSCOPY (EGD), FINE NEEDLE ASPIRATE/BIOPSY;  Surgeon: Guru Alexsandra Ballesteros MD;  [facility]     ESOPHAGOSCOPY, GASTROSCOPY, DUODENOSCOPY (EGD), COMBINED N/A 11/8/2016    Procedure: COMBINED ESOPHAGOSCOPY, GASTROSCOPY, DUODENOSCOPY (EGD), BIOPSY SINGLE OR MULTIPLE;  Surgeon: Guru Alexsandra Ballesteros MD;  [facility]     ESOPHAGOSCOPY, GASTROSCOPY, DUODENOSCOPY (EGD), COMBINED N/A 8/16/2017    Procedure: COMBINED ESOPHAGOSCOPY, GASTROSCOPY, DUODENOSCOPY (EGD), BIOPSY SINGLE OR MULTIPLE;;  Surgeon: Fransisco Leach MD;  [facility]     EYE SURGERY  12/2007    cataract surgery both sides     GI SURGERY  1974, 1986(x2), 1989    Crohn's, 1974 RO 18\" term. ilium + 9\" asc. colon all one pc     SOFT TISSUE SURGERY  3/2013    removed buildup on back of r hand, coccidioidomycosis       Social History     Tobacco Use     Smoking status: Never Smoker     Smokeless tobacco: Never Used "   Substance Use Topics     Alcohol use: Yes     Comment: sometimes one glass of wine a week     Drug use: No       Family History   Problem Relation Age of Onset     Heart Failure Father      Musculoskeletal Disorder Father         Parkinson's     Cancer - colorectal Mother      Cerebrovascular Disease Paternal Grandmother      Cancer Other      Musculoskeletal Disorder Brother         Parkinson's     Anesthesia Reaction No family hx of      Deep Vein Thrombosis (DVT) No family hx of        ROS: A 12 system review of systems was negative other than noted here or above.     Exam:  There were no vitals taken for this visit.    GENERAL APPEARANCE: NAD  EYES:  No scleral icterus, EOMI  PULM: breathing non-labored  NEURO:  AOx3, speech normal  Psych: Affect normal    Results:    No visits with results within 1 Day(s) from this visit.   Latest known visit with results is:   Orders Only on 03/18/2021   Component Date Value Ref Range Status     Sodium 03/18/2021 139  133 - 144 mmol/L Final     Potassium 03/18/2021 4.8  3.4 - 5.3 mmol/L Final     Chloride 03/18/2021 108  94 - 109 mmol/L Final     Carbon Dioxide 03/18/2021 28  20 - 32 mmol/L Final     Anion Gap 03/18/2021 3  3 - 14 mmol/L Final     Glucose 03/18/2021 88  70 - 99 mg/dL Final    Non Fasting     Urea Nitrogen 03/18/2021 18  7 - 30 mg/dL Final     Creatinine 03/18/2021 1.32* 0.66 - 1.25 mg/dL Final     GFR Estimate 03/18/2021 56* >60 mL/min/[1.73_m2] Final    Comment: Non  GFR Calc  Starting 12/18/2018, serum creatinine based estimated GFR (eGFR) will be   calculated using the Chronic Kidney Disease Epidemiology Collaboration   (CKD-EPI) equation.       GFR Estimate If Black 03/18/2021 65  >60 mL/min/[1.73_m2] Final    Comment:  GFR Calc  Starting 12/18/2018, serum creatinine based estimated GFR (eGFR) will be   calculated using the Chronic Kidney Disease Epidemiology Collaboration   (CKD-EPI) equation.       Calcium 03/18/2021 9.0   8.5 - 10.1 mg/dL Final     Phosphorus 03/18/2021 2.7  2.5 - 4.5 mg/dL Final     Albumin 03/18/2021 4.0  3.4 - 5.0 g/dL Final

## 2021-04-13 ENCOUNTER — VIRTUAL VISIT (OUTPATIENT)
Dept: GASTROENTEROLOGY | Facility: CLINIC | Age: 66
End: 2021-04-13
Payer: COMMERCIAL

## 2021-04-13 VITALS — WEIGHT: 153 LBS | HEIGHT: 69 IN | BODY MASS INDEX: 22.66 KG/M2

## 2021-04-13 DIAGNOSIS — N18.31 STAGE 3A CHRONIC KIDNEY DISEASE (H): ICD-10-CM

## 2021-04-13 DIAGNOSIS — K50.012 CROHN'S DISEASE OF SMALL INTESTINE WITH INTESTINAL OBSTRUCTION (H): Primary | ICD-10-CM

## 2021-04-13 PROBLEM — N18.30 CHRONIC KIDNEY DISEASE, STAGE 3 (H): Status: ACTIVE | Noted: 2021-04-13

## 2021-04-13 PROCEDURE — 99215 OFFICE O/P EST HI 40 MIN: CPT | Mod: 95 | Performed by: INTERNAL MEDICINE

## 2021-04-13 ASSESSMENT — MIFFLIN-ST. JEOR: SCORE: 1469.38

## 2021-04-13 NOTE — PROGRESS NOTES
IBD CLINIC     CC/REFERRING MD:  Referred Self  REASON FOR CONSULTATION: Crohn's disease       ASSESSMENT/PLAN:  65 year old male with Crohn's disease s/p ileocecectomy on no current Crohn's therapy, here on an add-on basis for diarrhea.     1. Crohn's disease  Current CD medications: None  Current clinical disease activity: Remission  Last endoscopic disease activity: Remisison (axios stent removed) 2/20    His Crohn's disease is inactive mucosally at this point.  The main issue has been dealing with a symptomatic stricture and overlap IBS.  We will continue no medication for his maintenance of remission at this time.    We had a long discussion on if his January obstruction was related to small bowel obstruction at the ileocolonic anastomosis or obstipation in the setting of IBS and increasing Lomotil and Bentyl use.  He notes he uses Lomotil sparingly before January.  As it has been a year since his last assessment of his anastomosis, will have him undergo colonoscopy with potential dilation or repeat stent placement.  Otherwise we will manage his bowel regimen with MiraLAX    -- Colonoscopy with Dr. Cruz - if stricture returned, consider dilation or repeat stent placement  -- Continue Miralax for constipation  -- Low residue diet.   -- IF symptomatic obstructions, consider small bowel followthrough  -- Patient to attempt to get surgical report from 1980s on proximal small bowel anastomosis.     2. IBD dysplasia surveillance / Colon polyps: Well defined lesions with adenomatous changes.    -- Will continue surveillance colonoscopy q1-2 years.      3. Diarrhea / IBS: Celiac testing has been negative. Crohn's not active. Suspect some IBS, partially related to diet. Evaristo with constipation (versus SBO).   -- Avoid dietary triggers  -- Miralax     4. Possible acid reflux:  No current symptoms of acid reflux.       5. Pancreatic head cystic lesion: Increased from 2006, potentially representing IPMN.  MRCP with no  change in cyst.  Reviewed with Dr. Cruz.  EUS with FNA normal. Stable on 6/17/19 MRE (1.4cm)  -- No further follow-up at this time needed.       6. Steroid dependency/Adrenal insufficiency:  Followed closely with endocrinology.  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       8. Depression: Unchanged.      9. Chronic renal disease: Follows with renal at [facility]  -- Avoid NSAIDs    Thank you for this consultation.  It was a pleasure to participate in the care of this patient; please contact us with any further questions.  A total of 46 minutes was spent with this patient in the following care activities: Chart review, patient care and documentation.      Fransisco Leach MD    [facility]  Inflammatory Bowel Disease Program  Division of Gastroenterology, Hepatology and Nutrition  Pager: [phone]    IBD history:  Age at diagnosis: ~18  Extend of disease: Ileo-colonic  Disease phenotype: Inflammatory  Key-anal disease: No  Prior IBD Medications:  - Asulfazine and Asacol: 1974 to late 90s, stopped to to disease progression  - Prednisone 1986 - current.   - Mercaptopurine: Briefly in mid 90s to get him off steroids- Patient thinks he has side effect to medication  - Methotrexate: After mercaptopurine, stayed on for years, but after bleeding in 1988 and 1989, he would not taper his prednisone. MTX stopped as he could not get off the steroids.    Surgical history:  1974 - [place] Ileocecectomy  1985 ([facility]) - side to side small bowel anastomosis to bypass area of adhesions (per patient history)  1986 - [facility] - revision of anastomosis  1988 - massive bleeding from ulcer at anastomosis ([place]), cauterized via colonoscopy  1989 - laparoscopic  Revision of anastomosis  2005 - [facility]      HPI:   Here today for routine follow-up.     Generally feels that his gut is working just fine. But he is careful to keep  a low residue diet. He would like to talk about what is driving his issues.     From his point of view in Jan - he had 2 separate obstructions.  He felt distention at his anastomosis, but even after cleared, did not feel like he went back to normal.     We reviewed his January hospitalization in detail.     He did start taking Miralax - and that does help somewhat.     He reports having 1 stool every day to every other day. No abdominal pain in the past month. He is taking the Miralax once a day.     ----  Had axios stent placed across stricture 10-1-19 to 2-5-20. No active disease in the ileum.     He continues to have substantial variation with his stools.  Currently reports 1 stool a day, formed.  No blood.      He endorses emotional eating habits. And will often eat large amounts of sugar, such as ice cream, as part of this. Clearly, stool variation is tied to his diet.     Will occasionally have a pain in the anorectal area. Has not occurred recently.      Interval history 12/18/2020  Around 12/5, developed fever (Tmax) 100.4, headache, chills, and feeling tired. This lasted about 1-2 days, and then started to get diarrhea.  He went to the ED on 12/10/2020 with diarrhea, generalized weakness, and fever. ED provider consulted with Dr. Leach, conservative management was recommended with slowly ADAT and imodium or lomotil PRN.    He continues to feel unwell and continues to have watery stool. Initially, it was once every 24 hours. A few days ago, he tried to increase his diet and has three watery stools per day. Volume not described as large. No abdominal pain or cramping. No blood in the stool. No symptoms consistent with tenesmus. No urgency. He does not recall ever having symptoms of this nature for this long. Taking Lomotil after every loose stool, feels like this is helping. No recent antiobitics. Still having chills but no longer fevers. Compared to leaving the hospital, he does not feel worse, but not  better.    No nausea, vomiting, mucositis, dysphagia, or odynophagia.    HBI:  Overall patient well being (prior day): 0 (Very well)  Abdominal pain (prior day): 0 (None)  Number of liquid or soft stools (prior day): 0 (1 point per stool)  Abdominal mass on exam: 0 (None)  Complications (1 point for each):   None      Extra intestinal manifestations of IBD:  No uveitis/episcleritis  No aphthous ulcers   No arthritis   No erythema nodosum/pyoderma gangrenosum.       ROS:    Constitutional, HEENT, cardiovascular, pulmonary, GI, , musculoskeletal, neuro, skin, endocrine and psych systems are negative, except as otherwise noted.     PERTINENT PAST MEDICAL HISTORY:  Past Medical History:   Diagnosis Date     Anemia 2007     Anxiety 2012?    much worse since July 2014     Cataract 12/2007    both eyes, too much prednisone, implants     Coccidioidomycosis      Crohn disease (H)      Crohn's disease (H) 1/13/2015     Depressive disorder 7/2014    much worse since July 2014     Fracture 1956    green fracture of leg     Gallbladder problem 2005?    much gravel, removed     GERD (gastroesophageal reflux disease)      History of blood transfusion 1988    ulcerated anastomosis term. ilium bleed     Hypertriglyceridemia 7/8/2016     Infection 2007    persistant coccidioidomycosis     Kidney stone various    both sides, all passed naturally     Mental health problem 2007    bipolar though perhaps different     Nephrolithiasis      Osteoporosis 2007    osteoporosis, too much prednisone, last dexa 1/2014     Osteoporosis      Other nervous system complications 2007    prednisone overload induced keivn     Personal history of colonic polyps     small ones found during colonoscopies     Steroid-induced psychosis     Patient extremely sensitive to regular doses of steroids.  Unclear if this is due to chronic fluconazole use or genetic issue.  In the future, use caution when prescribing steroids.     TB (tuberculosis)        PREVIOUS  SURGERIES:  Past Surgical History:   Procedure Laterality Date     APPENDECTOMY  1974    coincidental with Crohn's surgery     BACK SURGERY  12/2007    kyphoplasty on compressed 4th??? vertebra     BIOPSY  2007, 2013    lump on arm, knee, back of hand for coccidioidomycosis cult.     CHOLECYSTECTOMY  2005    much gravel, removed laparoscopically     COLONOSCOPY  7/14/2014 last    Dr. Catherine Bowden, [facility] - many previous     COLONOSCOPY Left 9/11/2015    Procedure: COMBINED COLONOSCOPY, SINGLE OR MULTIPLE BIOPSY/POLYPECTOMY BY BIOPSY;  Surgeon: Fransisco Leach MD;  Location: [facility]     COLONOSCOPY N/A 8/3/2016    Procedure: COMBINED COLONOSCOPY, SINGLE OR MULTIPLE BIOPSY/POLYPECTOMY BY BIOPSY;  Surgeon: Fransisco Leach MD;  Location: [facility]     COLONOSCOPY N/A 8/16/2017    Procedure: COMBINED COLONOSCOPY, SINGLE OR MULTIPLE BIOPSY/POLYPECTOMY BY BIOPSY;;  Surgeon: Fransisco Leach MD;  [facility]     COLONOSCOPY N/A 10/1/2019    Procedure: Colonoscopy With Colonic Stent Insertion;  Surgeon: Robbie Cruz MD;  Location: [facility]     COLONOSCOPY N/A 2/5/2020    Procedure: COLONOSCOPY, FOREIGN BODY REMOVAL;  Surgeon: Robbie Cruz MD;  Location: [facility]     ENT SURGERY  ?    upper endoscopy     ESOPHAGOSCOPY, GASTROSCOPY, DUODENOSCOPY (EGD), COMBINED N/A 11/8/2016    Procedure: COMBINED ENDOSCOPIC ULTRASOUND, ESOPHAGOSCOPY, GASTROSCOPY, DUODENOSCOPY (EGD), FINE NEEDLE ASPIRATE/BIOPSY;  Surgeon: Guru Alexsandra Ballesteros MD;  [facility]     ESOPHAGOSCOPY, GASTROSCOPY, DUODENOSCOPY (EGD), COMBINED N/A 11/8/2016    Procedure: COMBINED ESOPHAGOSCOPY, GASTROSCOPY, DUODENOSCOPY (EGD), BIOPSY SINGLE OR MULTIPLE;  Surgeon: Guru Alexsandra Ballesteros MD;  Location:  GI     ESOPHAGOSCOPY, GASTROSCOPY, DUODENOSCOPY (EGD), COMBINED N/A 8/16/2017    Procedure: COMBINED ESOPHAGOSCOPY, GASTROSCOPY, DUODENOSCOPY (EGD), BIOPSY SINGLE OR MULTIPLE;;  Surgeon:  "Fransisco Leach MD;  Location: [facility]     EYE SURGERY  12/2007    cataract surgery both sides     GI SURGERY  1974, 1986(x2), 1989    Crohn's, 1974 RO 18\" term. ilium + 9\" asc. colon all one pc     SOFT TISSUE SURGERY  3/2013    removed buildup on back of r hand, coccidioidomycosis     ALLERGIES:     Allergies   Allergen Reactions     Prednisone Other (See Comments)     Diana with more than 2.5mg chronic dosing of prednisone due to fluconazole interaction per patient.        PERTINENT MEDICATIONS:    Current Outpatient Medications:      calcium-vitamin D (CALTRATE) 600-400 MG-UNIT per tablet, Take 1 tablet by mouth daily , Disp: , Rfl:      Cyanocobalamin (VITAMIN B 12) 500 MCG TABS, Take 500 mcg by mouth At Bedtime , Disp: , Rfl:      dicyclomine (BENTYL) 10 MG capsule, Take 1 capsule (10 mg) by mouth 2 times daily (before meals), Disp: 90 capsule, Rfl: 1     diphenoxylate-atropine (LOMOTIL) 2.5-0.025 MG tablet, Take 1 tablet by mouth 4 times daily as needed for diarrhea, Disp: 60 tablet, Rfl: 1     ferrous sulfate (FE TABS) 325 (65 Fe) MG EC tablet, Take 325 mg by mouth daily, Disp: , Rfl:      fluconazole (DIFLUCAN) 200 MG tablet, Take 1 tablet (200 mg) by mouth daily, Disp: 90 tablet, Rfl: 3     MELATONIN PO, Take 1.5 mg by mouth nightly as needed (PT last dose 1.27.2020) , Disp: , Rfl:      multivitamin, therapeutic with minerals (MULTI-VITAMIN) TABS, Take 1 tablet by mouth every evening , Disp: , Rfl:      traZODone (DESYREL) 50 MG tablet, Take 1 tablet (50 mg) by mouth At Bedtime, Disp: 90 tablet, Rfl: 2     vitamin B6 (PYRIDOXINE) 100 MG tablet, Take 100 mg by mouth daily, Disp: , Rfl:      Vitamin D, Cholecalciferol, 25 MCG (1000 UT) CAPS, Take 25 mcg by mouth daily, Disp: , Rfl:     SOCIAL HISTORY:  Social History     Socioeconomic History     Marital status:      Spouse name: Not on file     Number of children: Not on file     Years of education: Not on file     Highest education level: Not " on file   Occupational History     Occupation: Reserach associate at the      Employer: [employer]   Social Needs     Financial resource strain: Not on file     Food insecurity     Worry: Not on file     Inability: Not on file     Transportation needs     Medical: Not on file     Non-medical: Not on file   Tobacco Use     Smoking status: Never Smoker     Smokeless tobacco: Never Used   Substance and Sexual Activity     Alcohol use: Yes     Comment: sometimes one glass of wine a week     Drug use: No     Sexual activity: Never   Lifestyle     Physical activity     Days per week: Not on file     Minutes per session: Not on file     Stress: Not on file   Relationships     Social connections     Talks on phone: Not on file     Gets together: Not on file     Attends Orthodox service: Not on file     Active member of club or organization: Not on file     Attends meetings of clubs or organizations: Not on file     Relationship status: Not on file     Intimate partner violence     Fear of current or ex partner: Not on file     Emotionally abused: Not on file     Physically abused: Not on file     Forced sexual activity: Not on file   Other Topics Concern     Not on file   Social History Narrative    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         FAMILY HISTORY:  Family History   Problem Relation Age of Onset     Heart Failure Father      Musculoskeletal Disorder Father         Parkinson's     Cancer - colorectal Mother      Cerebrovascular Disease Paternal Grandmother      Cancer Other      Musculoskeletal Disorder Brother         Parkinson's     Anesthesia Reaction No family hx of      Deep Vein Thrombosis (DVT) No family hx of      Mother with colon cancer in 80s.  No family member with Crohn's disease or Ulcerative Colitis.      Past/family/social history reviewed and no changes    PHYSICAL EXAMINATION:    Wt   Wt Readings from Last 2 Encounters:   04/13/21 69.4 kg (153 lb)    01/17/21 65.8 kg (145 lb)     Constitutional - general appearance is well and in no acute distress. Body habitus normal  Eyes - No redness or discharge  Respiratory - No cough, unlabored breathing  Musculoskeletal - range of motion intact: Neck and arms  Skin - No discoloration or lesions  Neurological - No tremors, headaches  Psychiatric - No anxiety, alert & oriented

## 2021-04-13 NOTE — LETTER
"    4/13/2021         RE: Trevin Gee  [address]  [address]  [facility] [address]        Dear Colleague,    Thank you for referring your patient, Trevin Gee, to the [facility] [place]. Please see a copy of my visit note below.    Trevin Gee is a 65 year old male who is being evaluated via a billable video visit.      The patient has been notified of following:     \"This video visit will be conducted via a call between you and your physician/provider. We have found that certain health care needs can be provided without the need for an in-person physical exam.  This service lets us provide the care you need with a video conversation.  If a prescription is necessary we can send it directly to your pharmacy.  If lab work is needed we can place an order for that and you can then stop by our lab to have the test done at a later time.    If during the course of the call the physician/provider feels a video visit is not appropriate, you will not be charged for this service.\"     Patient confirmed that they are in [place] for today's visit yes.    Video-Visit Details  Type of service:  Video Visit    Video Start Time: 11:40 AM  Video End Time:  11:52 AM    Originating Location (pt. Location): Home    Distant Location (provider location):  [facility] [place]     Platform used: Mukesh Leach            IBD CLINIC     CC/REFERRING MD:  Referred Self  REASON FOR CONSULTATION: Crohn's disease       ASSESSMENT/PLAN:  65 year old male with Crohn's disease s/p ileocecectomy on no current Crohn's therapy, here on an add-on basis for diarrhea.     1. Crohn's disease  Current CD medications: None  Current clinical disease activity: Remission  Last endoscopic disease activity: Remisison (axios stent removed) 2/20    His Crohn's disease is inactive mucosally at this point.  The main issue has been dealing with a symptomatic " stricture and overlap IBS.  We will continue no medication for his maintenance of remission at this time.    We had a long discussion on if his January obstruction was related to small bowel obstruction at the ileocolonic anastomosis or obstipation in the setting of IBS and increasing Lomotil and Bentyl use.  He notes he uses Lomotil sparingly before January.  As it has been a year since his last assessment of his anastomosis, will have him undergo colonoscopy with potential dilation or repeat stent placement.  Otherwise we will manage his bowel regimen with MiraLAX    -- Colonoscopy with Dr. Cruz - if stricture returned, consider dilation or repeat stent placement  -- Continue Miralax for constipation  -- Low residue diet.   -- IF symptomatic obstructions, consider small bowel followthrough  -- Patient to attempt to get surgical report from 1980s on proximal small bowel anastomosis.     2. IBD dysplasia surveillance / Colon polyps: Well defined lesions with adenomatous changes.    -- Will continue surveillance colonoscopy q1-2 years.      3. Diarrhea / IBS: Celiac testing has been negative. Crohn's not active. Suspect some IBS, partially related to diet. Evaristo with constipation (versus SBO).   -- Avoid dietary triggers  -- Miralax     4. Possible acid reflux:  No current symptoms of acid reflux.       5. Pancreatic head cystic lesion: Increased from 2006, potentially representing IPMN.  MRCP with no change in cyst.  Reviewed with Dr. Cruz.  EUS with FNA normal. Stable on 6/17/19 MRE (1.4cm)  -- No further follow-up at this time needed.       6. Steroid dependency/Adrenal insufficiency:  Followed closely with endocrinology.  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       8. Depression: Unchanged.      9. Chronic renal disease: Follows with renal at [facility]  -- Avoid NSAIDs    Thank you for this consultation.  It was a  pleasure to participate in the care of this patient; please contact us with any further questions.  A total of 46 minutes was spent with this patient in the following care activities: Chart review, patient care and documentation.      Fransisco Leach MD    [facility]  Inflammatory Bowel Disease Program  Division of Gastroenterology, Hepatology and Nutrition  Pager: [phone]    IBD history:  Age at diagnosis: ~18  Extend of disease: Ileo-colonic  Disease phenotype: Inflammatory  Key-anal disease: No  Prior IBD Medications:  - Asulfazine and Asacol: 1974 to late 90s, stopped to to disease progression  - Prednisone 1986 - current.   - Mercaptopurine: Briefly in mid 90s to get him off steroids- Patient thinks he has side effect to medication  - Methotrexate: After mercaptopurine, stayed on for years, but after bleeding in 1988 and 1989, he would not taper his prednisone. MTX stopped as he could not get off the steroids.    Surgical history:  1974 - [place] Ileocecectomy  1985 ([facility]) - side to side small bowel anastomosis to bypass area of adhesions (per patient history)  1986 - [facility] - revision of anastomosis  1988 - massive bleeding from ulcer at anastomosis ([place]), cauterized via colonoscopy  1989 - laparoscopic  Revision of anastomosis  2005 - Lap [facility]      HPI:   Here today for routine follow-up.     Generally feels that his gut is working just fine. But he is careful to keep a low residue diet. He would like to talk about what is driving his issues.     From his point of view in Jan - he had 2 separate obstructions.  He felt distention at his anastomosis, but even after cleared, did not feel like he went back to normal.     We reviewed his January hospitalization in detail.     He did start taking Miralax - and that does help somewhat.     He reports having 1 stool every day to every other day. No abdominal pain in the past month. He is taking the Miralax once a day.     ----  Had  axios stent placed across stricture 10-1-19 to 2-5-20. No active disease in the ileum.     He continues to have substantial variation with his stools.  Currently reports 1 stool a day, formed.  No blood.      He endorses emotional eating habits. And will often eat large amounts of sugar, such as ice cream, as part of this. Clearly, stool variation is tied to his diet.     Will occasionally have a pain in the anorectal area. Has not occurred recently.      Interval history 12/18/2020  Around 12/5, developed fever (Tmax) 100.4, headache, chills, and feeling tired. This lasted about 1-2 days, and then started to get diarrhea.  He went to the ED on 12/10/2020 with diarrhea, generalized weakness, and fever. ED provider consulted with Dr. Leach, conservative management was recommended with slowly ADAT and imodium or lomotil PRN.    He continues to feel unwell and continues to have watery stool. Initially, it was once every 24 hours. A few days ago, he tried to increase his diet and has three watery stools per day. Volume not described as large. No abdominal pain or cramping. No blood in the stool. No symptoms consistent with tenesmus. No urgency. He does not recall ever having symptoms of this nature for this long. Taking Lomotil after every loose stool, feels like this is helping. No recent antiobitics. Still having chills but no longer fevers. Compared to leaving the hospital, he does not feel worse, but not better.    No nausea, vomiting, mucositis, dysphagia, or odynophagia.    HBI:  Overall patient well being (prior day): 0 (Very well)  Abdominal pain (prior day): 0 (None)  Number of liquid or soft stools (prior day): 0 (1 point per stool)  Abdominal mass on exam: 0 (None)  Complications (1 point for each):   None      Extra intestinal manifestations of IBD:  No uveitis/episcleritis  No aphthous ulcers   No arthritis   No erythema nodosum/pyoderma gangrenosum.       ROS:    Constitutional, HEENT, cardiovascular,  pulmonary, GI, , musculoskeletal, neuro, skin, endocrine and psych systems are negative, except as otherwise noted.     PERTINENT PAST MEDICAL HISTORY:  Past Medical History:   Diagnosis Date     Anemia 2007     Anxiety 2012?    much worse since July 2014     Cataract 12/2007    both eyes, too much prednisone, implants     Coccidioidomycosis      Crohn disease (H)      Crohn's disease (H) 1/13/2015     Depressive disorder 7/2014    much worse since July 2014     Fracture 1956    green fracture of leg     Gallbladder problem 2005?    much gravel, removed     GERD (gastroesophageal reflux disease)      History of blood transfusion 1988    ulcerated anastomosis term. ilium bleed     Hypertriglyceridemia 7/8/2016     Infection 2007    persistant coccidioidomycosis     Kidney stone various    both sides, all passed naturally     Mental health problem 2007    bipolar though perhaps different     Nephrolithiasis      Osteoporosis 2007    osteoporosis, too much prednisone, last dexa 1/2014     Osteoporosis      Other nervous system complications 2007    prednisone overload induced kevin     Personal history of colonic polyps     small ones found during colonoscopies     Steroid-induced psychosis     Patient extremely sensitive to regular doses of steroids.  Unclear if this is due to chronic fluconazole use or genetic issue.  In the future, use caution when prescribing steroids.     TB (tuberculosis)        PREVIOUS SURGERIES:  Past Surgical History:   Procedure Laterality Date     APPENDECTOMY  1974    coincidental with Crohn's surgery     BACK SURGERY  12/2007    kyphoplasty on compressed 4th??? vertebra     BIOPSY  2007, 2013    lump on arm, knee, back of hand for coccidioidomycosis cult.     CHOLECYSTECTOMY  2005    much gravel, removed laparoscopically     COLONOSCOPY  7/14/2014 last    Dr. Catherine Bowden, [facility] - many previous     COLONOSCOPY Left 9/11/2015    Procedure: COMBINED COLONOSCOPY, SINGLE  "OR MULTIPLE BIOPSY/POLYPECTOMY BY BIOPSY;  Surgeon: Fransisco Leach MD;  Location: [facility]     COLONOSCOPY N/A 8/3/2016    Procedure: COMBINED COLONOSCOPY, SINGLE OR MULTIPLE BIOPSY/POLYPECTOMY BY BIOPSY;  Surgeon: Fransisco Leach MD;  Location: [facility]     COLONOSCOPY N/A 8/16/2017    Procedure: COMBINED COLONOSCOPY, SINGLE OR MULTIPLE BIOPSY/POLYPECTOMY BY BIOPSY;;  Surgeon: Fransisco Leach MD;  [facility]     COLONOSCOPY N/A 10/1/2019    Procedure: Colonoscopy With Colonic Stent Insertion;  Surgeon: Robbie Cruz MD;  Location: [facility]     COLONOSCOPY N/A 2/5/2020    Procedure: COLONOSCOPY, FOREIGN BODY REMOVAL;  Surgeon: Robbie Cruz MD;  Location: [facility]     ENT SURGERY  ?    upper endoscopy     ESOPHAGOSCOPY, GASTROSCOPY, DUODENOSCOPY (EGD), COMBINED N/A 11/8/2016    Procedure: COMBINED ENDOSCOPIC ULTRASOUND, ESOPHAGOSCOPY, GASTROSCOPY, DUODENOSCOPY (EGD), FINE NEEDLE ASPIRATE/BIOPSY;  Surgeon: Guru Alexsandra Ballesteros MD;  Location: [facility]     ESOPHAGOSCOPY, GASTROSCOPY, DUODENOSCOPY (EGD), COMBINED N/A 11/8/2016    Procedure: COMBINED ESOPHAGOSCOPY, GASTROSCOPY, DUODENOSCOPY (EGD), BIOPSY SINGLE OR MULTIPLE;  Surgeon: Guru Alexsandra Ballesteros MD;  Location: [facility]     ESOPHAGOSCOPY, GASTROSCOPY, DUODENOSCOPY (EGD), COMBINED N/A 8/16/2017    Procedure: COMBINED ESOPHAGOSCOPY, GASTROSCOPY, DUODENOSCOPY (EGD), BIOPSY SINGLE OR MULTIPLE;;  Surgeon: Fransisco Leach MD;  [facility]     EYE SURGERY  12/2007    cataract surgery both sides     GI SURGERY  1974, 1986(x2), 1989    Crohn's, 1974 RO 18\" term. ilium + 9\" asc. colon all one pc     SOFT TISSUE SURGERY  3/2013    removed buildup on back of r hand, coccidioidomycosis     ALLERGIES:     Allergies   Allergen Reactions     Prednisone Other (See Comments)     Diana with more than 2.5mg chronic dosing of prednisone due to fluconazole interaction per patient.        PERTINENT " MEDICATIONS:    Current Outpatient Medications:      calcium-vitamin D (CALTRATE) 600-400 MG-UNIT per tablet, Take 1 tablet by mouth daily , Disp: , Rfl:      Cyanocobalamin (VITAMIN B 12) 500 MCG TABS, Take 500 mcg by mouth At Bedtime , Disp: , Rfl:      dicyclomine (BENTYL) 10 MG capsule, Take 1 capsule (10 mg) by mouth 2 times daily (before meals), Disp: 90 capsule, Rfl: 1     diphenoxylate-atropine (LOMOTIL) 2.5-0.025 MG tablet, Take 1 tablet by mouth 4 times daily as needed for diarrhea, Disp: 60 tablet, Rfl: 1     ferrous sulfate (FE TABS) 325 (65 Fe) MG EC tablet, Take 325 mg by mouth daily, Disp: , Rfl:      fluconazole (DIFLUCAN) 200 MG tablet, Take 1 tablet (200 mg) by mouth daily, Disp: 90 tablet, Rfl: 3     MELATONIN PO, Take 1.5 mg by mouth nightly as needed (PT last dose 1.27.2020) , Disp: , Rfl:      multivitamin, therapeutic with minerals (MULTI-VITAMIN) TABS, Take 1 tablet by mouth every evening , Disp: , Rfl:      traZODone (DESYREL) 50 MG tablet, Take 1 tablet (50 mg) by mouth At Bedtime, Disp: 90 tablet, Rfl: 2     vitamin B6 (PYRIDOXINE) 100 MG tablet, Take 100 mg by mouth daily, Disp: , Rfl:      Vitamin D, Cholecalciferol, 25 MCG (1000 UT) CAPS, Take 25 mcg by mouth daily, Disp: , Rfl:     SOCIAL HISTORY:  Social History     Socioeconomic History     Marital status:      Spouse name: Not on file     Number of children: Not on file     Years of education: Not on file     Highest education level: Not on file   Occupational History     Occupation: Reserach associate at the      Employer: [employer]   Social Needs     Financial resource strain: Not on file     Food insecurity     Worry: Not on file     Inability: Not on file     Transportation needs     Medical: Not on file     Non-medical: Not on file   Tobacco Use     Smoking status: Never Smoker     Smokeless tobacco: Never Used   Substance and Sexual Activity     Alcohol use: Yes     Comment: sometimes one glass of wine a  week     Drug use: No     Sexual activity: Never   Lifestyle     Physical activity     Days per week: Not on file     Minutes per session: Not on file     Stress: Not on file   Relationships     Social connections     Talks on phone: Not on file     Gets together: Not on file     Attends Evangelical service: Not on file     Active member of club or organization: Not on file     Attends meetings of clubs or organizations: Not on file     Relationship status: Not on file     Intimate partner violence     Fear of current or ex partner: Not on file     Emotionally abused: Not on file     Physically abused: Not on file     Forced sexual activity: Not on file   Other Topics Concern     Not on file   Social History Narrative    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         FAMILY HISTORY:  Family History   Problem Relation Age of Onset     Heart Failure Father      Musculoskeletal Disorder Father         Parkinson's     Cancer - colorectal Mother      Cerebrovascular Disease Paternal Grandmother      Cancer Other      Musculoskeletal Disorder Brother         Parkinson's     Anesthesia Reaction No family hx of      Deep Vein Thrombosis (DVT) No family hx of      Mother with colon cancer in 80s.  No family member with Crohn's disease or Ulcerative Colitis.      Past/family/social history reviewed and no changes    PHYSICAL EXAMINATION:    Wt   Wt Readings from Last 2 Encounters:   04/13/21 69.4 kg (153 lb)   01/17/21 65.8 kg (145 lb)     Constitutional - general appearance is well and in no acute distress. Body habitus normal  Eyes - No redness or discharge  Respiratory - No cough, unlabored breathing  Musculoskeletal - range of motion intact: Neck and arms  Skin - No discoloration or lesions  Neurological - No tremors, headaches  Psychiatric - No anxiety, alert & oriented       Again, thank you for allowing me to participate in the care of your patient.        Sincerely,        Fransisco Healy  MD [place]

## 2021-04-13 NOTE — NURSING NOTE
"Chief Complaint   Patient presents with     Follow Up       Vitals:    04/13/21 1102   Weight: 69.4 kg (153 lb)   Height: 1.753 m (5' 9\")       Body mass index is 22.59 kg/m .    Judy Rasheed CMA    "

## 2021-04-13 NOTE — PROGRESS NOTES
"Trevin Gee is a 65 year old male who is being evaluated via a billable video visit.      The patient has been notified of following:     \"This video visit will be conducted via a call between you and your physician/provider. We have found that certain health care needs can be provided without the need for an in-person physical exam.  This service lets us provide the care you need with a video conversation.  If a prescription is necessary we can send it directly to your pharmacy.  If lab work is needed we can place an order for that and you can then stop by our lab to have the test done at a later time.    If during the course of the call the physician/provider feels a video visit is not appropriate, you will not be charged for this service.\"     Patient confirmed that they are in [place] for today's visit yes.    Video-Visit Details  Type of service:  Video Visit    Video Start Time: 11:40 AM  Video End Time:  11:52 AM    Originating Location (pt. Location): Home    Distant Location (provider location):  [facility] [place]     Platform used: Mukesh Leach          "

## 2021-04-14 ENCOUNTER — PATIENT OUTREACH (OUTPATIENT)
Dept: GASTROENTEROLOGY | Facility: CLINIC | Age: 66
End: 2021-04-14

## 2021-04-14 ENCOUNTER — PREP FOR PROCEDURE (OUTPATIENT)
Dept: GASTROENTEROLOGY | Facility: CLINIC | Age: 66
End: 2021-04-14

## 2021-04-14 DIAGNOSIS — K50.012 CROHN'S DISEASE OF SMALL INTESTINE WITH INTESTINAL OBSTRUCTION (H): Primary | ICD-10-CM

## 2021-04-14 DIAGNOSIS — K50.90 CROHN'S DISEASE (H): Primary | ICD-10-CM

## 2021-04-14 RX ORDER — BISACODYL 5 MG
TABLET, DELAYED RELEASE (ENTERIC COATED) ORAL
Qty: 4 TABLET | Refills: 0 | Status: ON HOLD | OUTPATIENT
Start: 2021-04-14 | End: 2021-05-26

## 2021-04-14 NOTE — TELEPHONE ENCOUNTER
Referred to Dr. Cruz from Dr Leach.    Per Dr. Cruz  colonoscopy (perhaps at [facility]) with stent placement as appropriate (MAC, no urgency)     Please assist in scheduling:     Procedure/Imaging/Clinic: colonoscopy w/  Possible stent placement  Physician: Dr. Cruz  Timin/12  Procedure length:50 min  Anesthesia:MAC  Dx: chrons disease  Tier:3  [facility]: [facility]    Called to discuss with patient. Explained they can expect a call from  for date and time of procedure, will need a , someone to stay with them for 24 hours and should stay in town for 24 hours (within 45 min of Hospital) post procedure    Patient needs to get pre-op physical completed. If outside [facility] system will need physical faxed to number [phone]   If you do not get a preop physical, your procedure could be cancelled, patient voiced understanding*    Preop Plan:  PCP will do    Med Review    Blood thinner -  no  ASA - no  Diabetic - no    COVID test discussed:knows to get 4 days prior    Patient Education r/t procedure:done    Does patient have any history of gastric bypass/gastric surgery/altered panc/bili anatomy?no    A pre-op nurse will call 1-2 days prior to the procedure. Is advised to be NPO/no solid food 8 hours before the procedure. Ok to drink clear liquids (Water, Apple Juice or Gatorade) up to 2 hours prior to procedure.     Other specific details/comments:none     Verbalized understanding of all instructions. All questions answered.

## 2021-04-15 ENCOUNTER — TELEPHONE (OUTPATIENT)
Dept: GASTROENTEROLOGY | Facility: CLINIC | Age: 66
End: 2021-04-15

## 2021-04-15 NOTE — TELEPHONE ENCOUNTER
Spoke to patient in regards to rescheduling procedure with Dr. Cruz. Patient stated he would like to be scheduled on 5/26/2021. Informed patient he is scheduled with Dr. Cruz on 5/26/2021. Informed patient he will need an updated pre-op physical within 30 days of him procedure and a COVID-19 test within 96-72 hours of procedure date. Patient stated he is going to have this done locally. Informed patient he will need a  and someone to monitor him for 24 hours after the procedure. Informed patient all scheduling details will be sent to [facility] per his request.

## 2021-04-15 NOTE — TELEPHONE ENCOUNTER
Patient left  yesterday stating 5/12 no longer works for his scheduled and would like to discuss new dates.

## 2021-05-12 DIAGNOSIS — Z11.59 ENCOUNTER FOR SCREENING FOR OTHER VIRAL DISEASES: ICD-10-CM

## 2021-05-12 ASSESSMENT — ENCOUNTER SYMPTOMS
SMELL DISTURBANCE: 0
TROUBLE SWALLOWING: 0
SINUS CONGESTION: 1
SINUS PAIN: 0
NECK MASS: 0
TASTE DISTURBANCE: 0
SORE THROAT: 0
HOARSE VOICE: 0

## 2021-05-14 ENCOUNTER — OFFICE VISIT (OUTPATIENT)
Dept: INTERNAL MEDICINE | Facility: CLINIC | Age: 66
End: 2021-05-14
Payer: COMMERCIAL

## 2021-05-14 VITALS
HEART RATE: 78 BPM | SYSTOLIC BLOOD PRESSURE: 126 MMHG | BODY MASS INDEX: 23.63 KG/M2 | WEIGHT: 160 LBS | OXYGEN SATURATION: 98 % | DIASTOLIC BLOOD PRESSURE: 81 MMHG

## 2021-05-14 DIAGNOSIS — E87.5 HYPERKALEMIA: ICD-10-CM

## 2021-05-14 DIAGNOSIS — Z01.818 PREOP GENERAL PHYSICAL EXAM: Primary | ICD-10-CM

## 2021-05-14 DIAGNOSIS — H60.502 ACUTE OTITIS EXTERNA OF LEFT EAR, UNSPECIFIED TYPE: ICD-10-CM

## 2021-05-14 DIAGNOSIS — L91.8 SKIN TAG: ICD-10-CM

## 2021-05-14 DIAGNOSIS — Z01.818 PREOP GENERAL PHYSICAL EXAM: ICD-10-CM

## 2021-05-14 LAB
ANION GAP SERPL CALCULATED.3IONS-SCNC: 2 MMOL/L (ref 3–14)
BUN SERPL-MCNC: 21 MG/DL (ref 7–30)
CALCIUM SERPL-MCNC: 10 MG/DL (ref 8.5–10.1)
CHLORIDE SERPL-SCNC: 108 MMOL/L (ref 94–109)
CO2 SERPL-SCNC: 32 MMOL/L (ref 20–32)
CREAT SERPL-MCNC: 1.56 MG/DL (ref 0.66–1.25)
GFR SERPL CREATININE-BSD FRML MDRD: 46 ML/MIN/{1.73_M2}
GLUCOSE SERPL-MCNC: 84 MG/DL (ref 70–99)
HGB BLD-MCNC: 15.4 G/DL (ref 13.3–17.7)
POTASSIUM SERPL-SCNC: 5.9 MMOL/L (ref 3.4–5.3)
SODIUM SERPL-SCNC: 143 MMOL/L (ref 133–144)

## 2021-05-14 PROCEDURE — 36415 COLL VENOUS BLD VENIPUNCTURE: CPT | Performed by: PATHOLOGY

## 2021-05-14 PROCEDURE — 85018 HEMOGLOBIN: CPT | Performed by: PATHOLOGY

## 2021-05-14 PROCEDURE — 80048 BASIC METABOLIC PNL TOTAL CA: CPT | Performed by: PATHOLOGY

## 2021-05-14 PROCEDURE — 99214 OFFICE O/P EST MOD 30 MIN: CPT | Mod: 25 | Performed by: NURSE PRACTITIONER

## 2021-05-14 PROCEDURE — 11200 RMVL SKIN TAGS UP TO&INC 15: CPT | Performed by: NURSE PRACTITIONER

## 2021-05-14 RX ORDER — CIPROFLOXACIN AND DEXAMETHASONE 3; 1 MG/ML; MG/ML
4 SUSPENSION/ DROPS AURICULAR (OTIC) 2 TIMES DAILY
Qty: 7.5 ML | Refills: 0 | Status: ON HOLD | OUTPATIENT
Start: 2021-05-14 | End: 2021-05-26

## 2021-05-14 ASSESSMENT — ANXIETY QUESTIONNAIRES
5. BEING SO RESTLESS THAT IT IS HARD TO SIT STILL: NOT AT ALL
3. WORRYING TOO MUCH ABOUT DIFFERENT THINGS: NOT AT ALL
GAD7 TOTAL SCORE: 0
2. NOT BEING ABLE TO STOP OR CONTROL WORRYING: NOT AT ALL
6. BECOMING EASILY ANNOYED OR IRRITABLE: NOT AT ALL
IF YOU CHECKED OFF ANY PROBLEMS ON THIS QUESTIONNAIRE, HOW DIFFICULT HAVE THESE PROBLEMS MADE IT FOR YOU TO DO YOUR WORK, TAKE CARE OF THINGS AT HOME, OR GET ALONG WITH OTHER PEOPLE: NOT DIFFICULT AT ALL
7. FEELING AFRAID AS IF SOMETHING AWFUL MIGHT HAPPEN: NOT AT ALL
1. FEELING NERVOUS, ANXIOUS, OR ON EDGE: NOT AT ALL

## 2021-05-14 ASSESSMENT — PATIENT HEALTH QUESTIONNAIRE - PHQ9
SUM OF ALL RESPONSES TO PHQ QUESTIONS 1-9: 4
5. POOR APPETITE OR OVEREATING: NOT AT ALL

## 2021-05-14 NOTE — LETTER
5/14/2021      RE: Trevin Gee  [address]  [address]  [facility] [address]       [facility] INTERNAL MEDICINE [address]  4TH FLOOR  [facility] [address]  Phone: [phone]  Fax: [phone]  Primary Provider: Lacey Feng        PREOPERATIVE EVALUATION:  Today's date: 5/14/2021    Trevin Gee is a 66 year old male who presents for a preoperative evaluation.    Surgical Information:  Surgery/Procedure: Colonoscopy with colonic stent insertion  Surgery Location: [facility]  Surgeon: Dr. Robbie Cruz  Surgery Date: 5/26/21  Time of Surgery: 0730  Where patient plans to recover: At home alone  Fax number for surgical facility: Note does not need to be faxed, will be available electronically in Epic.    Type of Anesthesia Anticipated: to be determined    Assessment & Plan     The proposed surgical procedure is considered LOW risk.    Preop general physical exam  Repeat BMP and Hgb  - Basic metabolic panel; Future  - Hemoglobin; Future    Skin tag  Pt consent obtained. Site was cleansed with betadine and wiped clean with alcohol. Skin tag was removed with sterile scissors. There was no bleeding. Clean dressing was applied. Pt tolerated procedure well. Reviewed keeping site clean and dry until fully healed.  - REMOVAL OF SKIN TAGS, FIRST 15    Acute otitis externa of left ear, unspecified type  Use ciprodex drop to L ear BID x1-2 weeks. Reviewed avoiding Qtips, do not insert anything into the ear canal.   - ciprofloxacin-dexamethasone (CIPRODEX) 0.3-0.1 % otic suspension; Place 4 drops Into the left ear 2 times daily    Medication Instructions:  Patient is to take all scheduled medications on the day of surgery    RECOMMENDATION:  APPROVAL GIVEN to proceed with proposed procedure, without further diagnostic evaluation.    35 minutes spent on the date of the encounter doing chart review, history and exam, documentation and further activities per the  note        Subjective     HPI related to upcoming procedure: In December and January, had partial obstruction in the bowel, unclear if the ileocolonic anastomosis or if there is another stenosis one further up, need to evaluate prior stent and possibly replace.     Preop Questions 5/12/2021   1. Have you ever had a heart attack or stroke? No   2. Have you ever had surgery on your heart or blood vessels, such as a stent placement, a coronary artery bypass, or surgery on an artery in your head, neck, heart, or legs? No   3. Do you have chest pain with activity? No   4. Do you have a history of  heart failure? No   5. Do you currently have a cold, bronchitis or symptoms of other infection? No   6. Do you have a cough, shortness of breath, or wheezing? No   7. Do you or anyone in your family have previous history of blood clots? No   8. Do you or does anyone in your family have a serious bleeding problem such as prolonged bleeding following surgeries or cuts? No   9. Have you ever had problems with anemia or been told to take iron pills? YES - several years ago, was treated with iron infusions, currently Hgb normal.   10. Have you had any abnormal blood loss such as black, tarry or bloody stools? No   11. Have you ever had a blood transfusion? YES - in 1980s with ulceration near anastamosis   11a. Have you ever had a transfusion reaction? No   12. Are you willing to have a blood transfusion if it is medically needed before, during, or after your surgery? Yes   13. Have you or any of your relatives ever had problems with anesthesia? No   14. Do you have sleep apnea, excessive snoring or daytime drowsiness? No   15. Do you have any artifical heart valves or other implanted medical devices like a pacemaker, defibrillator, or continuous glucose monitor? No   16. Do you have artificial joints? No   17. Are you allergic to latex? No       Health Care Directive:  Patient does not have a Health Care Directive or Living Will:  Discussed advance care planning with patient; information given to patient to review.    Preoperative Review of :   reviewed - controlled substances reflected in medication list.          Review of Systems  Answers for HPI/ROS submitted by the patient on 5/12/2021   General Symptoms: No  Skin Symptoms: No  HENT Symptoms: Yes--feels constricted in the ear canal on the left, and painful if pushes on it  EYE SYMPTOMS: No  HEART SYMPTOMS: No  LUNG SYMPTOMS: No  INTESTINAL SYMPTOMS: No  URINARY SYMPTOMS: No  REPRODUCTIVE SYMPTOMS: No  SKELETAL SYMPTOMS: No  BLOOD SYMPTOMS: No  NERVOUS SYSTEM SYMPTOMS: No  MENTAL HEALTH SYMPTOMS: No  Congestion: Yes--spring allergies   Voice hoarseness: No  Tooth pain: No  Gum tenderness: No  Bleeding gums: No  Change in taste: No  Change in sense of smell: No  Dry mouth: No  Hearing aid used: No  Neck lump: No    He does report he has discomfort in the L ear canal. Cleaned with Q-tips, now painful.  Also has a bothersome skin tag on his left lower back that occasionally snags at clothing/along belt line.     Patient Active Problem List    Diagnosis Date Noted     Chronic kidney disease, stage 3 04/13/2021     Priority: Medium     Partial small bowel obstruction (H) 01/19/2021     Priority: Medium     Abdominal cramping 01/17/2021     Priority: Medium     Colorectal anastomotic stricture 10/25/2019     Priority: Medium     Added automatically from request for surgery [id number]       Abdominal pain 04/17/2019     Priority: Medium     Esophageal reflux 08/24/2018     Priority: Medium     Vertebral fracture, osteoporotic (H) 01/13/2017     Priority: Medium     Lactose intolerance 08/22/2016     Priority: Medium     Hypertriglyceridemia 07/08/2016     Priority: Medium     Hx of psychiatric care 06/07/2016     Priority: Medium       PAST MED TRIALS   1994 started Citalopram, wasn't clearly helpful due to fluctuating life circumstances  2004 switched from citalopram escitaloram, wasn't  "clearly helpful due to fluctuating life circumstances.  2008 switched from escitalopram to venlafaxine. At some point bupropion was added and he felt concerned it was worsening anxiety, so it was stopped. Aripiprazole was also tried without good benefit. Tried coming off venlafaxine early 2014 and come off too quickly with discontinuation SE, so stayed on it until August 2015 when he tapered completely off - due to perceived inefficacy.     Clonazepam  Lamotrigine stopped due to perceived inefficacy sometime after 2007  Quetiapine (max dose 400mg) - significant daytime sedation on 400 mg, 200 mg was \"bearable\" when not working  Risperidone after 2nd hospitalization which worked well. Then stopped at some point. After trying the 2nd time --> constipation.  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    much worse since July 2014     Cataract 12/2007    both eyes, too much prednisone, implants     Coccidioidomycosis      Crohn disease (H)      Crohn's disease (H) 1/13/2015     Depressive disorder 7/2014    much worse since July 2014     Fracture 1956    green fracture of leg     Gallbladder " problem 2005?    much gravel, removed     GERD (gastroesophageal reflux disease)      History of blood transfusion 1988    ulcerated anastomosis term. ilium bleed     Hypertriglyceridemia 7/8/2016     Infection 2007    persistant coccidioidomycosis     Kidney stone various    both sides, all passed naturally     Mental health problem 2007    bipolar though perhaps different     Nephrolithiasis      Osteoporosis 2007    osteoporosis, too much prednisone, last dexa 1/2014     Osteoporosis      Other nervous system complications 2007    prednisone overload induced kevin     Personal history of colonic polyps     small ones found during colonoscopies     Steroid-induced psychosis     Patient extremely sensitive to regular doses of steroids.  Unclear if this is due to chronic fluconazole use or genetic issue.  In the future, use caution when prescribing steroids.     TB (tuberculosis)      Past Surgical History:   Procedure Laterality Date     APPENDECTOMY  1974    coincidental with Crohn's surgery     BACK SURGERY  12/2007    kyphoplasty on compressed 4th??? vertebra     BIOPSY  2007, 2013    lump on arm, knee, back of hand for coccidioidomycosis cult.     CHOLECYSTECTOMY  2005    much gravel, removed laparoscopically     COLONOSCOPY  7/14/2014 last    Dr. Catherine Bowden, [facility] - many previous     COLONOSCOPY Left 9/11/2015    Procedure: COMBINED COLONOSCOPY, SINGLE OR MULTIPLE BIOPSY/POLYPECTOMY BY BIOPSY;  Surgeon: Fransisco Leach MD;  [facility]     COLONOSCOPY N/A 8/3/2016    Procedure: COMBINED COLONOSCOPY, SINGLE OR MULTIPLE BIOPSY/POLYPECTOMY BY BIOPSY;  Surgeon: Fransisco Leach MD;  [facility]: [facility]     COLONOSCOPY N/A 8/16/2017    Procedure: COMBINED COLONOSCOPY, SINGLE OR MULTIPLE BIOPSY/POLYPECTOMY BY BIOPSY;;  Surgeon: Fransisco Leach MD;  [facility]     COLONOSCOPY N/A 10/1/2019    Procedure: Colonoscopy With Colonic Stent Insertion;  Surgeon: Robbie Cruz  "MD Rigoberto;  [facility]     COLONOSCOPY N/A 2/5/2020    Procedure: COLONOSCOPY, FOREIGN BODY REMOVAL;  Surgeon: Robbie Cruz MD;  [facility]     ENT SURGERY  ?    upper endoscopy     ESOPHAGOSCOPY, GASTROSCOPY, DUODENOSCOPY (EGD), COMBINED N/A 11/8/2016    Procedure: COMBINED ENDOSCOPIC ULTRASOUND, ESOPHAGOSCOPY, GASTROSCOPY, DUODENOSCOPY (EGD), FINE NEEDLE ASPIRATE/BIOPSY;  Surgeon: Guru Alexsandra Ballesteros MD;  Location: [facility]     ESOPHAGOSCOPY, GASTROSCOPY, DUODENOSCOPY (EGD), COMBINED N/A 11/8/2016    Procedure: COMBINED ESOPHAGOSCOPY, GASTROSCOPY, DUODENOSCOPY (EGD), BIOPSY SINGLE OR MULTIPLE;  Surgeon: Guru Alexsandra Ballesteros MD;  Location:  GI     ESOPHAGOSCOPY, GASTROSCOPY, DUODENOSCOPY (EGD), COMBINED N/A 8/16/2017    Procedure: COMBINED ESOPHAGOSCOPY, GASTROSCOPY, DUODENOSCOPY (EGD), BIOPSY SINGLE OR MULTIPLE;;  Surgeon: Fransisco Leach MD;  [facility]     EYE SURGERY  12/2007    cataract surgery both sides     GI SURGERY  1974, 1986(x2), 1989    Crohn's, 1974 RO 18\" term. ilium + 9\" asc. colon all one pc     SOFT TISSUE SURGERY  3/2013    removed buildup on back of r hand, coccidioidomycosis     Current Outpatient Medications   Medication Sig Dispense Refill     bisacodyl (DULCOLAX) 5 MG EC tablet Refer to \"Getting Ready for a Colonoscopy\" instruction handout 4 tablet 0     calcium-vitamin D (CALTRATE) 600-400 MG-UNIT per tablet Take 1 tablet by mouth daily        Cyanocobalamin (VITAMIN B 12) 500 MCG TABS Take 500 mcg by mouth At Bedtime        ferrous sulfate (FE TABS) 325 (65 Fe) MG EC tablet Take 325 mg by mouth daily       fluconazole (DIFLUCAN) 200 MG tablet Take 1 tablet (200 mg) by mouth daily 90 tablet 3     MELATONIN PO Take 1.5 mg by mouth nightly as needed (PT last dose 1.27.2020)        multivitamin, therapeutic with minerals (MULTI-VITAMIN) TABS Take 1 tablet by mouth every evening        traZODone (DESYREL) 50 MG tablet Take 1 tablet " (50 mg) by mouth At Bedtime 90 tablet 2     vitamin B6 (PYRIDOXINE) 100 MG tablet Take 100 mg by mouth daily       Vitamin D, Cholecalciferol, 25 MCG (1000 UT) CAPS Take 25 mcg by mouth daily         Allergies   Allergen Reactions     Prednisone Other (See Comments)     Diana with more than 2.5mg chronic dosing of prednisone due to fluconazole interaction per patient.         Social History     Tobacco Use     Smoking status: Never Smoker     Smokeless tobacco: Never Used   Substance Use Topics     Alcohol use: Yes     Comment: sometimes one glass of wine a week       History   Drug Use No         Objective     /81   Pulse 78   Wt 72.6 kg (160 lb)   SpO2 98%   BMI 23.63 kg/m      Physical Exam    GENERAL APPEARANCE: healthy, alert and no distress     EYES: EOMI,  PERRL     HENT: R ear canal and TMs normal, L EAC erythematous and mildly edematous, and nose and mouth without ulcers or lesions     NECK: no adenopathy, no asymmetry, masses, or scars and thyroid normal to palpation     RESP: lungs clear to auscultation - no rales, rhonchi or wheezes     CV: regular rates and rhythm, normal S1 S2, no S3 or S4 and no murmur, click or rub     ABDOMEN:  soft, nontender, no HSM or masses and bowel sounds normal     MS: extremities normal- no gross deformities noted, no evidence of inflammation in joints, FROM in all extremities.     SKIN: no suspicious lesions or rashes, skin tag on L lower back     NEURO: Normal strength and tone, sensory exam grossly normal, mentation intact and speech normal     PSYCH: mentation appears normal. and affect normal/bright     LYMPHATICS: No cervical adenopathy    Recent Labs   Lab Test 03/18/21  1319 01/18/21  0544 01/17/21  0354 08/22/19  0828 08/22/19  0828   HGB  --  14.7 16.0   < >  --    PLT  --  247 308   < >  --     143 139   < > 139   POTASSIUM 4.8 4.0 4.0   < > 4.5   CR 1.32* 1.22 1.48*   < > 1.30*   A1C  --   --   --   --  5.6    < > = values in this interval not  displayed.        Diagnostics:  Labs pending at this time.  Results will be reviewed when available.  Addendum--creatinine slightly elevated from baseline (~1.3) and K 5.9. Push fluids, avoid potassium in diet and recheck in next couple of days. If continues to be elevated will need Kayexalate.   No EKG required, no history of coronary heart disease, significant arrhythmia, peripheral arterial disease or other structural heart disease.    Revised Cardiac Risk Index (RCRI):  The patient has the following serious cardiovascular risks for perioperative complications:   - No serious cardiac risks = 0 points     RCRI Interpretation: 0 points: Class I (very low risk - 0.4% complication rate)           Signed Electronically by: MARISOL Alfaro CNP  Copy of this evaluation report is provided to requesting physician.          MARISOL Alfaro CNP

## 2021-05-14 NOTE — PATIENT INSTRUCTIONS
"Patient Education     Honoring Choices - Your Rights: Making Your Own Health Care Treatment Decisions  [place] Law:  [place] law allows you to inform others of your health care wishes. You have the right to state your wishes or appoint an agent in writing so that others will know what you want if you can't tell them because of illness or injury. The information that follows tells about health care directives and how to prepare them. It does not give every detail of the law.  What is a health care directive?  A health care directive is a written document that informs others of your wishes about health care. It allows you to name a person (\"agent\") to decide for you if you are unable to decide. It also allows you to name an agent if you want someone else to decide for you while you still have capacity. You must be at least 18 years old to make a health care directive.  Why have a health care directive?  A health care directive is important if your attending physician determines you can't communicate your health care choices (because of physical or mental incapacity). It is also important if you wish to have someone else make your health care decisions. In some circumstances, your directive may state that you want someone other than an attending physician to decide when you cannot make your own decisions.  Must I have a health care directive? What happens if I don't have one?  You don't have to have a health care directive. But, writing one helps to make sure your wishes are followed. You will still receive medical treatment if you don't have a written directive. Health care providers will listen to what people close to you say about your treatment preferences, but the best way to be sure your wishes are followed is to have a health care directive.  How do I make a health care directive?  There are forms for health care directives. You don't have to use a form, but your health care directive must meet the following " requirements to be legal:    Be in writing, dated, and state your name.    Be signed by you or someone you authorize to sign for you when you can understand and communicate your health care wishes.    Have your signature verified by a  or two witnesses (notaries and witnesses cannot also be named as agent).    Include the appointment of an agent to make health care decisions for you and/or instructions about the health care choices you wish to make.  Before you prepare or revise your directive, you should discuss your health care wishes with your doctor or other health care provider. Information about where to get health care directive forms is given at the end of this document.  What can I put in a health care directive?  You have many choices of what to put in your health care directive. For example, you may include:    The person you trust as your agent to make health care decisions for you. You can name alternate agents, in case the first agent is unavailable, or joint agents.    Your goals, values, preferences, and cultural beliefs about health care.    The types of medical treatment you would want (or not want).    How you want your agent or agents to decide.    Where you want to receive care.    Instructions about artificial nutrition and hydration.    Mental health treatments that use electroshock therapy or neuroleptic medications.    Instructions if you are pregnant.    Donation of organs, tissues and eyes.     arrangements.    Who you would like as your guardian or conservator if there is a court action.  You may be as specific or as general as you wish. You can choose which issues or treatments to deal with in your health care directive.  Are there any limits to what I can put in my health care directive?  There are some limits about what you can put in your health care directive. For instance:    Your agent must be at least 18 years of age.    Your agent cannot be your health care  provider, unless the health care provider is a family member or you give reasons for the naming of the agent in your directive.    You cannot request health care treatment that is outside of reasonable medical practice.    You cannot request assisted suicide.  How long does a health care directive last? Can I change it?  Your health care directive lasts until you change or cancel it. As long as the changes meet the health care directive requirements listed above, you may cancel your directive by any of the following:    A written statement saying you want to cancel it    Destroying it    Telling at least two other people you want to cancel it    Writing a new health care directive.  What should I do with my health care directive after I have signed it?  You should inform others of your health care directive and give people copies of it. You may wish to inform family members, your health care agent or agents, and your health care providers that you have a health care directive. You should give them a copy. It's a good idea to review and update your directive as your needs change. Keep it in a safe place where it is easily found.   We are committed to making your health care wishes known. You may give a copy of your directive to any care team member or bring or mail a copy to any of our locations, and we will keep it in your medical record.  What if I've already prepared a health care document? Is it still good?  Before August 1, 1998, [place] law provided for several other types of directives, including living gomez, durable health care breen of  and mental health declarations. The law changed so people can use one form for all their health care instructions. Forms created before August 1, 1998 are still legal if they followed the law in effect when written. They are also legal if they meet the requirements of the new law (described above). You may want to review any existing documents to make sure they  say what you want and meet all requirements.  I prepared my directive in another state. Is it still good?  Health care directives prepared in other states are legal if they meet the requirements of the other state's laws or the [place] requirements. But requests for assisted suicide will not be followed.  What if my health care provider refuses to follow my health care directive?  Your health care provider generally will follow your health care directive, or any instructions from your agent, as long as the health care follows reasonable medical practice. But, you or your agent cannot request treatment that will not help you or which the provider cannot provide. If the provider cannot follow the agent's directions about life-sustaining treatment, the provider must inform the agent. The provider must also document the notice in your medical record. The provider must allow the agency to arrange to transfer you to another provider who will follow the agent's directions.  What if I believe a health care provider has not followed health care directive requirements?  Complaints of this type can be filed with the Office of Health Facility Complaints at [phone] ([facility]) or toll free at [phone].  What if I believe a health plan has not followed health care directive requirements?  Complaints of this type can be filed with the [place] Health Information Clearinghouse at [phone] or toll free at [phone].  How to obtain more information  Ask any care team member for information, forms, or how to register for a free class on advance care planning and creating a health care directive. Or you may:   visit www.MMIT.org/choices,   email pepechoices@[email or web address] or   call [phone].  Find other health care directive forms at [place] [employer] on [employer]'s Senior LinkAge Line: www.[place]aging.netor call 1-955.116.6090.   For informational purposes only. Not to replace the advice of your health care  provider.  Copyright   2012 [facility] [place]. All rights reserved. Havsjo Delikatesser 1626 - REV 06/18.

## 2021-05-14 NOTE — NURSING NOTE
Chief Complaint   Patient presents with     Pre-Op Exam     DOS: 5/26/21 COLONOSCOPY, WITH COLONIC STENT INSERTION with Dr. Amateau Kimberly Nissen, EMT at 4:00 PM on 5/14/2021

## 2021-05-14 NOTE — PROGRESS NOTES
[facility] INTERNAL MEDICINE [address] [address]  Phone: [phone]  Fax: [phone]  Primary Provider: Lacey Feng        PREOPERATIVE EVALUATION:  Today's date: 5/14/2021    Trevin Gee is a 66 year old male who presents for a preoperative evaluation.    Surgical Information:  Surgery/Procedure: Colonoscopy with colonic stent insertion  Surgery Location: [facility]  Surgeon: Dr. Robbie Cruz  Surgery Date: 5/26/21  Time of Surgery: 0730  Where patient plans to recover: At home alone  Fax number for surgical facility: Note does not need to be faxed, will be available electronically in Epic.    Type of Anesthesia Anticipated: to be determined    Assessment & Plan     The proposed surgical procedure is considered LOW risk.    Preop general physical exam  Repeat BMP and Hgb  - Basic metabolic panel; Future  - Hemoglobin; Future    Skin tag  Pt consent obtained. Site was cleansed with betadine and wiped clean with alcohol. Skin tag was removed with sterile scissors. There was no bleeding. Clean dressing was applied. Pt tolerated procedure well. Reviewed keeping site clean and dry until fully healed.  - REMOVAL OF SKIN TAGS, FIRST 15    Acute otitis externa of left ear, unspecified type  Use ciprodex drop to L ear BID x1-2 weeks. Reviewed avoiding Qtips, do not insert anything into the ear canal.   - ciprofloxacin-dexamethasone (CIPRODEX) 0.3-0.1 % otic suspension; Place 4 drops Into the left ear 2 times daily    Medication Instructions:  Patient is to take all scheduled medications on the day of surgery    RECOMMENDATION:  APPROVAL GIVEN to proceed with proposed procedure, without further diagnostic evaluation.    35 minutes spent on the date of the encounter doing chart review, history and exam, documentation and further activities per the note        Subjective     HPI related to upcoming procedure: In December and January, had  partial obstruction in the bowel, unclear if the ileocolonic anastomosis or if there is another stenosis one further up, need to evaluate prior stent and possibly replace.     Preop Questions 5/12/2021   1. Have you ever had a heart attack or stroke? No   2. Have you ever had surgery on your heart or blood vessels, such as a stent placement, a coronary artery bypass, or surgery on an artery in your head, neck, heart, or legs? No   3. Do you have chest pain with activity? No   4. Do you have a history of  heart failure? No   5. Do you currently have a cold, bronchitis or symptoms of other infection? No   6. Do you have a cough, shortness of breath, or wheezing? No   7. Do you or anyone in your family have previous history of blood clots? No   8. Do you or does anyone in your family have a serious bleeding problem such as prolonged bleeding following surgeries or cuts? No   9. Have you ever had problems with anemia or been told to take iron pills? YES - several years ago, was treated with iron infusions, currently Hgb normal.   10. Have you had any abnormal blood loss such as black, tarry or bloody stools? No   11. Have you ever had a blood transfusion? YES - in 1980s with ulceration near anastamosis   11a. Have you ever had a transfusion reaction? No   12. Are you willing to have a blood transfusion if it is medically needed before, during, or after your surgery? Yes   13. Have you or any of your relatives ever had problems with anesthesia? No   14. Do you have sleep apnea, excessive snoring or daytime drowsiness? No   15. Do you have any artifical heart valves or other implanted medical devices like a pacemaker, defibrillator, or continuous glucose monitor? No   16. Do you have artificial joints? No   17. Are you allergic to latex? No       Health Care Directive:  Patient does not have a Health Care Directive or Living Will: Discussed advance care planning with patient; information given to patient to  review.    Preoperative Review of :   reviewed - controlled substances reflected in medication list.          Review of Systems  Answers for HPI/ROS submitted by the patient on 5/12/2021   General Symptoms: No  Skin Symptoms: No  HENT Symptoms: Yes--feels constricted in the ear canal on the left, and painful if pushes on it  EYE SYMPTOMS: No  HEART SYMPTOMS: No  LUNG SYMPTOMS: No  INTESTINAL SYMPTOMS: No  URINARY SYMPTOMS: No  REPRODUCTIVE SYMPTOMS: No  SKELETAL SYMPTOMS: No  BLOOD SYMPTOMS: No  NERVOUS SYSTEM SYMPTOMS: No  MENTAL HEALTH SYMPTOMS: No  Congestion: Yes--spring allergies   Voice hoarseness: No  Tooth pain: No  Gum tenderness: No  Bleeding gums: No  Change in taste: No  Change in sense of smell: No  Dry mouth: No  Hearing aid used: No  Neck lump: No    He does report he has discomfort in the L ear canal. Cleaned with Q-tips, now painful.  Also has a bothersome skin tag on his left lower back that occasionally snags at clothing/along belt line.     Patient Active Problem List    Diagnosis Date Noted     Chronic kidney disease, stage 3 04/13/2021     Priority: Medium     Partial small bowel obstruction (H) 01/19/2021     Priority: Medium     Abdominal cramping 01/17/2021     Priority: Medium     Colorectal anastomotic stricture 10/25/2019     Priority: Medium     Added automatically from request for surgery [id number]       Abdominal pain 04/17/2019     Priority: Medium     Esophageal reflux 08/24/2018     Priority: Medium     Vertebral fracture, osteoporotic (H) 01/13/2017     Priority: Medium     Lactose intolerance 08/22/2016     Priority: Medium     Hypertriglyceridemia 07/08/2016     Priority: Medium     Hx of psychiatric care 06/07/2016     Priority: Medium       PAST MED TRIALS   1994 started Citalopram, wasn't clearly helpful due to fluctuating life circumstances  2004 switched from citalopram escitaloram, wasn't clearly helpful due to fluctuating life circumstances.  2008 switched from  "escitalopram to venlafaxine. At some point bupropion was added and he felt concerned it was worsening anxiety, so it was stopped. Aripiprazole was also tried without good benefit. Tried coming off venlafaxine early 2014 and come off too quickly with discontinuation SE, so stayed on it until August 2015 when he tapered completely off - due to perceived inefficacy.     Clonazepam  Lamotrigine stopped due to perceived inefficacy sometime after 2007  Quetiapine (max dose 400mg) - significant daytime sedation on 400 mg, 200 mg was \"bearable\" when not working  Risperidone after 2nd hospitalization which worked well. Then stopped at some point. After trying the 2nd time --> constipation.  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    much worse since July 2014     Cataract 12/2007    both eyes, too much prednisone, implants     Coccidioidomycosis      Crohn disease (H)      Crohn's disease (H) 1/13/2015     Depressive disorder 7/2014    much worse since July 2014     Fracture 1956    green fracture of leg     Gallbladder problem 2005?    much gravel, removed     GERD (gastroesophageal reflux " disease)      History of blood transfusion 1988    ulcerated anastomosis term. ilium bleed     Hypertriglyceridemia 7/8/2016     Infection 2007    persistant coccidioidomycosis     Kidney stone various    both sides, all passed naturally     Mental health problem 2007    bipolar though perhaps different     Nephrolithiasis      Osteoporosis 2007    osteoporosis, too much prednisone, last dexa 1/2014     Osteoporosis      Other nervous system complications 2007    prednisone overload induced kevin     Personal history of colonic polyps     small ones found during colonoscopies     Steroid-induced psychosis     Patient extremely sensitive to regular doses of steroids.  Unclear if this is due to chronic fluconazole use or genetic issue.  In the future, use caution when prescribing steroids.     TB (tuberculosis)      Past Surgical History:   Procedure Laterality Date     APPENDECTOMY  1974    coincidental with Crohn's surgery     BACK SURGERY  12/2007    kyphoplasty on compressed 4th??? vertebra     BIOPSY  2007, 2013    lump on arm, knee, back of hand for coccidioidomycosis cult.     CHOLECYSTECTOMY  2005    much gravel, removed laparoscopically     COLONOSCOPY  7/14/2014 last    Dr. Catherine Bowden, [facility] - many previous     COLONOSCOPY Left 9/11/2015    Procedure: COMBINED COLONOSCOPY, SINGLE OR MULTIPLE BIOPSY/POLYPECTOMY BY BIOPSY;  Surgeon: Fransisco Leach MD;  [facility]     COLONOSCOPY N/A 8/3/2016    Procedure: COMBINED COLONOSCOPY, SINGLE OR MULTIPLE BIOPSY/POLYPECTOMY BY BIOPSY;  Surgeon: Fransisco Leach MD;  [facility]     COLONOSCOPY N/A 8/16/2017    Procedure: COMBINED COLONOSCOPY, SINGLE OR MULTIPLE BIOPSY/POLYPECTOMY BY BIOPSY;;  Surgeon: Fransisco Leach MD;  Location:  OR     COLONOSCOPY N/A 10/1/2019    Procedure: Colonoscopy With Colonic Stent Insertion;  Surgeon: Robbie Cruz MD;  [facility]     COLONOSCOPY N/A 2/5/2020    Procedure:  "COLONOSCOPY, FOREIGN BODY REMOVAL;  Surgeon: Robbie Cruz MD;  [facility]     ENT SURGERY  ?    upper endoscopy     ESOPHAGOSCOPY, GASTROSCOPY, DUODENOSCOPY (EGD), COMBINED N/A 11/8/2016    Procedure: COMBINED ENDOSCOPIC ULTRASOUND, ESOPHAGOSCOPY, GASTROSCOPY, DUODENOSCOPY (EGD), FINE NEEDLE ASPIRATE/BIOPSY;  Surgeon: Guru Alexsandra Ballesteros MD;  [facility]     ESOPHAGOSCOPY, GASTROSCOPY, DUODENOSCOPY (EGD), COMBINED N/A 11/8/2016    Procedure: COMBINED ESOPHAGOSCOPY, GASTROSCOPY, DUODENOSCOPY (EGD), BIOPSY SINGLE OR MULTIPLE;  Surgeon: Guru Alexsandra Ballesteros MD;  [facility]     ESOPHAGOSCOPY, GASTROSCOPY, DUODENOSCOPY (EGD), COMBINED N/A 8/16/2017    Procedure: COMBINED ESOPHAGOSCOPY, GASTROSCOPY, DUODENOSCOPY (EGD), BIOPSY SINGLE OR MULTIPLE;;  Surgeon: Fransisco Leach MD;  [facility]     EYE SURGERY  12/2007    cataract surgery both sides     GI SURGERY  1974, 1986(x2), 1989    Crohn's, 1974 RO 18\" term. ilium + 9\" asc. colon all one pc     SOFT TISSUE SURGERY  3/2013    removed buildup on back of r hand, coccidioidomycosis     Current Outpatient Medications   Medication Sig Dispense Refill     bisacodyl (DULCOLAX) 5 MG EC tablet Refer to \"Getting Ready for a Colonoscopy\" instruction handout 4 tablet 0     calcium-vitamin D (CALTRATE) 600-400 MG-UNIT per tablet Take 1 tablet by mouth daily        Cyanocobalamin (VITAMIN B 12) 500 MCG TABS Take 500 mcg by mouth At Bedtime        ferrous sulfate (FE TABS) 325 (65 Fe) MG EC tablet Take 325 mg by mouth daily       fluconazole (DIFLUCAN) 200 MG tablet Take 1 tablet (200 mg) by mouth daily 90 tablet 3     MELATONIN PO Take 1.5 mg by mouth nightly as needed (PT last dose 1.27.2020)        multivitamin, therapeutic with minerals (MULTI-VITAMIN) TABS Take 1 tablet by mouth every evening        traZODone (DESYREL) 50 MG tablet Take 1 tablet (50 mg) by mouth At Bedtime 90 tablet 2     vitamin B6 (PYRIDOXINE) 100 " MG tablet Take 100 mg by mouth daily       Vitamin D, Cholecalciferol, 25 MCG (1000 UT) CAPS Take 25 mcg by mouth daily         Allergies   Allergen Reactions     Prednisone Other (See Comments)     Diana with more than 2.5mg chronic dosing of prednisone due to fluconazole interaction per patient.         Social History     Tobacco Use     Smoking status: Never Smoker     Smokeless tobacco: Never Used   Substance Use Topics     Alcohol use: Yes     Comment: sometimes one glass of wine a week       History   Drug Use No         Objective     /81   Pulse 78   Wt 72.6 kg (160 lb)   SpO2 98%   BMI 23.63 kg/m      Physical Exam    GENERAL APPEARANCE: healthy, alert and no distress     EYES: EOMI,  PERRL     HENT: R ear canal and TMs normal, L EAC erythematous and mildly edematous, and nose and mouth without ulcers or lesions     NECK: no adenopathy, no asymmetry, masses, or scars and thyroid normal to palpation     RESP: lungs clear to auscultation - no rales, rhonchi or wheezes     CV: regular rates and rhythm, normal S1 S2, no S3 or S4 and no murmur, click or rub     ABDOMEN:  soft, nontender, no HSM or masses and bowel sounds normal     MS: extremities normal- no gross deformities noted, no evidence of inflammation in joints, FROM in all extremities.     SKIN: no suspicious lesions or rashes, skin tag on L lower back     NEURO: Normal strength and tone, sensory exam grossly normal, mentation intact and speech normal     PSYCH: mentation appears normal. and affect normal/bright     LYMPHATICS: No cervical adenopathy    Recent Labs   Lab Test 03/18/21  1319 01/18/21  0544 01/17/21  0354 08/22/19  0828 08/22/19  0828   HGB  --  14.7 16.0   < >  --    PLT  --  247 308   < >  --     143 139   < > 139   POTASSIUM 4.8 4.0 4.0   < > 4.5   CR 1.32* 1.22 1.48*   < > 1.30*   A1C  --   --   --   --  5.6    < > = values in this interval not displayed.        Diagnostics:  Labs pending at this time.  Results will be  reviewed when available.  Addendum--creatinine slightly elevated from baseline (~1.3) and K 5.9. Push fluids, avoid potassium in diet and recheck in next couple of days. If continues to be elevated will need Kayexalate.   No EKG required, no history of coronary heart disease, significant arrhythmia, peripheral arterial disease or other structural heart disease.    Revised Cardiac Risk Index (RCRI):  The patient has the following serious cardiovascular risks for perioperative complications:   - No serious cardiac risks = 0 points     RCRI Interpretation: 0 points: Class I (very low risk - 0.4% complication rate)           Signed Electronically by: MARISOL Alfaro CNP  Copy of this evaluation report is provided to requesting physician.

## 2021-05-15 ASSESSMENT — ANXIETY QUESTIONNAIRES: GAD7 TOTAL SCORE: 0

## 2021-05-19 DIAGNOSIS — K50.012 CROHN'S DISEASE OF SMALL INTESTINE WITH INTESTINAL OBSTRUCTION (H): ICD-10-CM

## 2021-05-19 RX ORDER — BISACODYL 5 MG
TABLET, DELAYED RELEASE (ENTERIC COATED) ORAL
Qty: 4 TABLET | Refills: 0 | Status: ON HOLD | OUTPATIENT
Start: 2021-05-19 | End: 2021-05-26

## 2021-05-20 DIAGNOSIS — E87.5 HYPERKALEMIA: ICD-10-CM

## 2021-05-20 LAB
ANION GAP SERPL CALCULATED.3IONS-SCNC: 5 MMOL/L (ref 3–14)
BUN SERPL-MCNC: 21 MG/DL (ref 7–30)
CALCIUM SERPL-MCNC: 9 MG/DL (ref 8.5–10.1)
CHLORIDE SERPL-SCNC: 108 MMOL/L (ref 94–109)
CO2 SERPL-SCNC: 25 MMOL/L (ref 20–32)
CREAT SERPL-MCNC: 1.34 MG/DL (ref 0.66–1.25)
GFR SERPL CREATININE-BSD FRML MDRD: 55 ML/MIN/{1.73_M2}
GLUCOSE SERPL-MCNC: 89 MG/DL (ref 70–99)
POTASSIUM SERPL-SCNC: 4.1 MMOL/L (ref 3.4–5.3)
SODIUM SERPL-SCNC: 138 MMOL/L (ref 133–144)

## 2021-05-20 PROCEDURE — 80048 BASIC METABOLIC PNL TOTAL CA: CPT | Performed by: NURSE PRACTITIONER

## 2021-05-20 PROCEDURE — 36415 COLL VENOUS BLD VENIPUNCTURE: CPT | Performed by: NURSE PRACTITIONER

## 2021-05-24 DIAGNOSIS — Z11.59 ENCOUNTER FOR SCREENING FOR OTHER VIRAL DISEASES: ICD-10-CM

## 2021-05-24 LAB
LABORATORY COMMENT REPORT: NORMAL
SARS-COV-2 RNA RESP QL NAA+PROBE: NEGATIVE
SARS-COV-2 RNA RESP QL NAA+PROBE: NORMAL
SPECIMEN SOURCE: NORMAL
SPECIMEN SOURCE: NORMAL

## 2021-05-24 PROCEDURE — U0005 INFEC AGEN DETEC AMPLI PROBE: HCPCS | Performed by: INTERNAL MEDICINE

## 2021-05-24 PROCEDURE — U0003 INFECTIOUS AGENT DETECTION BY NUCLEIC ACID (DNA OR RNA); SEVERE ACUTE RESPIRATORY SYNDROME CORONAVIRUS 2 (SARS-COV-2) (CORONAVIRUS DISEASE [COVID-19]), AMPLIFIED PROBE TECHNIQUE, MAKING USE OF HIGH THROUGHPUT TECHNOLOGIES AS DESCRIBED BY CMS-2020-01-R: HCPCS | Performed by: INTERNAL MEDICINE

## 2021-05-25 ENCOUNTER — ANESTHESIA EVENT (OUTPATIENT)
Dept: SURGERY | Facility: CLINIC | Age: 66
End: 2021-05-25
Payer: COMMERCIAL

## 2021-05-26 ENCOUNTER — APPOINTMENT (OUTPATIENT)
Dept: GENERAL RADIOLOGY | Facility: CLINIC | Age: 66
End: 2021-05-26
Attending: INTERNAL MEDICINE
Payer: COMMERCIAL

## 2021-05-26 ENCOUNTER — ANESTHESIA (OUTPATIENT)
Dept: SURGERY | Facility: CLINIC | Age: 66
End: 2021-05-26
Payer: COMMERCIAL

## 2021-05-26 ENCOUNTER — HOSPITAL ENCOUNTER (OUTPATIENT)
Facility: CLINIC | Age: 66
Discharge: HOME | End: 2021-05-26
Attending: INTERNAL MEDICINE | Admitting: INTERNAL MEDICINE
Payer: COMMERCIAL

## 2021-05-26 VITALS
DIASTOLIC BLOOD PRESSURE: 90 MMHG | WEIGHT: 157.19 LBS | SYSTOLIC BLOOD PRESSURE: 108 MMHG | HEIGHT: 70 IN | RESPIRATION RATE: 16 BRPM | BODY MASS INDEX: 22.5 KG/M2 | OXYGEN SATURATION: 97 % | TEMPERATURE: 97.7 F | HEART RATE: 58 BPM

## 2021-05-26 LAB
COLONOSCOPY: NORMAL
GLUCOSE BLDC GLUCOMTR-MCNC: 86 MG/DL (ref 70–99)

## 2021-05-26 PROCEDURE — 710N000012 HC RECOVERY PHASE 2, PER MINUTE: Performed by: INTERNAL MEDICINE

## 2021-05-26 PROCEDURE — 272N000001 HC OR GENERAL SUPPLY STERILE: Performed by: INTERNAL MEDICINE

## 2021-05-26 PROCEDURE — 999N000141 HC STATISTIC PRE-PROCEDURE NURSING ASSESSMENT: Performed by: INTERNAL MEDICINE

## 2021-05-26 PROCEDURE — 250N000011 HC RX IP 250 OP 636: Performed by: NURSE ANESTHETIST, CERTIFIED REGISTERED

## 2021-05-26 PROCEDURE — 258N000003 HC RX IP 258 OP 636: Performed by: NURSE ANESTHETIST, CERTIFIED REGISTERED

## 2021-05-26 PROCEDURE — 250N000009 HC RX 250: Performed by: NURSE ANESTHETIST, CERTIFIED REGISTERED

## 2021-05-26 PROCEDURE — 250N000013 HC RX MED GY IP 250 OP 250 PS 637: Performed by: INTERNAL MEDICINE

## 2021-05-26 PROCEDURE — 82962 GLUCOSE BLOOD TEST: CPT

## 2021-05-26 PROCEDURE — 999N000179 XR SURGERY CARM FLUORO LESS THAN 5 MIN W STILLS: Mod: TC

## 2021-05-26 PROCEDURE — 999N000054 HC STATISTIC EKG NON-CHARGEABLE

## 2021-05-26 PROCEDURE — 370N000017 HC ANESTHESIA TECHNICAL FEE, PER MIN: Performed by: INTERNAL MEDICINE

## 2021-05-26 PROCEDURE — C1876 STENT, NON-COA/NON-COV W/DEL: HCPCS | Performed by: INTERNAL MEDICINE

## 2021-05-26 PROCEDURE — C1769 GUIDE WIRE: HCPCS | Performed by: INTERNAL MEDICINE

## 2021-05-26 PROCEDURE — 258N000003 HC RX IP 258 OP 636: Performed by: INTERNAL MEDICINE

## 2021-05-26 PROCEDURE — 360N000075 HC SURGERY LEVEL 2, PER MIN: Performed by: INTERNAL MEDICINE

## 2021-05-26 DEVICE — STENT ESU AXIOS W/DEL SYS 20X10MM 10.8FR 138CM M00553660
Type: IMPLANTABLE DEVICE | Site: COLON | Status: NON-FUNCTIONAL
Removed: 2021-12-06

## 2021-05-26 RX ORDER — NALOXONE HYDROCHLORIDE 0.4 MG/ML
0.4 INJECTION, SOLUTION INTRAMUSCULAR; INTRAVENOUS; SUBCUTANEOUS
Status: DISCONTINUED | OUTPATIENT
Start: 2021-05-26 | End: 2021-05-26 | Stop reason: HOSPADM

## 2021-05-26 RX ORDER — ONDANSETRON 4 MG/1
4 TABLET, ORALLY DISINTEGRATING ORAL EVERY 30 MIN PRN
Status: DISCONTINUED | OUTPATIENT
Start: 2021-05-26 | End: 2021-05-26 | Stop reason: HOSPADM

## 2021-05-26 RX ORDER — ONDANSETRON 2 MG/ML
4 INJECTION INTRAMUSCULAR; INTRAVENOUS EVERY 30 MIN PRN
Status: DISCONTINUED | OUTPATIENT
Start: 2021-05-26 | End: 2021-05-26 | Stop reason: HOSPADM

## 2021-05-26 RX ORDER — LIDOCAINE 40 MG/G
CREAM TOPICAL
Status: DISCONTINUED | OUTPATIENT
Start: 2021-05-26 | End: 2021-05-26 | Stop reason: HOSPADM

## 2021-05-26 RX ORDER — SODIUM CHLORIDE, SODIUM LACTATE, POTASSIUM CHLORIDE, CALCIUM CHLORIDE 600; 310; 30; 20 MG/100ML; MG/100ML; MG/100ML; MG/100ML
INJECTION, SOLUTION INTRAVENOUS CONTINUOUS PRN
Status: DISCONTINUED | OUTPATIENT
Start: 2021-05-26 | End: 2021-05-26

## 2021-05-26 RX ORDER — MEPERIDINE HYDROCHLORIDE 25 MG/ML
12.5 INJECTION INTRAMUSCULAR; INTRAVENOUS; SUBCUTANEOUS
Status: DISCONTINUED | OUTPATIENT
Start: 2021-05-26 | End: 2021-05-26 | Stop reason: HOSPADM

## 2021-05-26 RX ORDER — PROPOFOL 10 MG/ML
INJECTION, EMULSION INTRAVENOUS PRN
Status: DISCONTINUED | OUTPATIENT
Start: 2021-05-26 | End: 2021-05-26

## 2021-05-26 RX ORDER — SODIUM CHLORIDE, SODIUM LACTATE, POTASSIUM CHLORIDE, CALCIUM CHLORIDE 600; 310; 30; 20 MG/100ML; MG/100ML; MG/100ML; MG/100ML
INJECTION, SOLUTION INTRAVENOUS CONTINUOUS
Status: DISCONTINUED | OUTPATIENT
Start: 2021-05-26 | End: 2021-05-26 | Stop reason: HOSPADM

## 2021-05-26 RX ORDER — ONDANSETRON 2 MG/ML
4 INJECTION INTRAMUSCULAR; INTRAVENOUS
Status: DISCONTINUED | OUTPATIENT
Start: 2021-05-26 | End: 2021-05-26 | Stop reason: HOSPADM

## 2021-05-26 RX ORDER — PROPOFOL 10 MG/ML
INJECTION, EMULSION INTRAVENOUS CONTINUOUS PRN
Status: DISCONTINUED | OUTPATIENT
Start: 2021-05-26 | End: 2021-05-26

## 2021-05-26 RX ORDER — ACETAMINOPHEN 325 MG/1
975 TABLET ORAL ONCE
Status: DISCONTINUED | OUTPATIENT
Start: 2021-05-26 | End: 2021-05-26 | Stop reason: HOSPADM

## 2021-05-26 RX ORDER — NALOXONE HYDROCHLORIDE 0.4 MG/ML
0.2 INJECTION, SOLUTION INTRAMUSCULAR; INTRAVENOUS; SUBCUTANEOUS
Status: DISCONTINUED | OUTPATIENT
Start: 2021-05-26 | End: 2021-05-26 | Stop reason: HOSPADM

## 2021-05-26 RX ORDER — FENTANYL CITRATE 50 UG/ML
25-50 INJECTION, SOLUTION INTRAMUSCULAR; INTRAVENOUS
Status: DISCONTINUED | OUTPATIENT
Start: 2021-05-26 | End: 2021-05-26 | Stop reason: HOSPADM

## 2021-05-26 RX ORDER — FENTANYL CITRATE 50 UG/ML
INJECTION, SOLUTION INTRAMUSCULAR; INTRAVENOUS PRN
Status: DISCONTINUED | OUTPATIENT
Start: 2021-05-26 | End: 2021-05-26

## 2021-05-26 RX ADMIN — FENTANYL CITRATE 50 MCG: 50 INJECTION, SOLUTION INTRAMUSCULAR; INTRAVENOUS at 07:31

## 2021-05-26 RX ADMIN — PROPOFOL 40 MG: 10 INJECTION, EMULSION INTRAVENOUS at 07:29

## 2021-05-26 RX ADMIN — SODIUM CHLORIDE, POTASSIUM CHLORIDE, SODIUM LACTATE AND CALCIUM CHLORIDE: 600; 310; 30; 20 INJECTION, SOLUTION INTRAVENOUS at 07:14

## 2021-05-26 RX ADMIN — PROPOFOL 150 MCG/KG/MIN: 10 INJECTION, EMULSION INTRAVENOUS at 07:18

## 2021-05-26 RX ADMIN — PROPOFOL 50 MG: 10 INJECTION, EMULSION INTRAVENOUS at 07:20

## 2021-05-26 ASSESSMENT — MIFFLIN-ST. JEOR: SCORE: 1499.25

## 2021-05-26 NOTE — DISCHARGE INSTRUCTIONS
[facility]  Same-Day Surgery   Adult Discharge Orders & Instructions     For 24 hours after surgery    1. Get plenty of rest.  A responsible adult must stay with you for at least 24 hours after you leave the hospital.   2. Do not drive or use heavy equipment.  If you have weakness or tingling, don't drive or use heavy equipment until this feeling goes away.  3. Do not drink alcohol.  4. Avoid strenuous or risky activities.  Ask for help when climbing stairs.   5. You may feel lightheaded.  IF so, sit for a few minutes before standing.  Have someone help you get up.   6. If you have nausea (feel sick to your stomach): Drink only clear liquids such as apple juice, ginger ale, broth or 7-Up.  Rest may also help.  Be sure to drink enough fluids.  Move to a regular diet as you feel able.  7. You may have a slight fever. Call the doctor if your fever is over 100 F (37.7 C) (taken under the tongue) or lasts longer than 24 hours.  8. You may have a dry mouth, a sore throat, muscle aches or trouble sleeping.  These should go away after 24 hours.  9. Do not make important or legal decisions.   Call your doctor for any of the followin.  Signs of infection (fever, growing tenderness at the surgery site, a large amount of drainage or bleeding, severe pain, foul-smelling drainage, redness, swelling).    2. It has been over 8 to 10 hours since surgery and you are still not able to urinate (pass water).    3.  Headache for over 24 hours.    To contact Doctor Cruz's clinic call [phone] or:      [phone] and ask for the resident on call for Gastroenterology (answered 24 hours a day)      Emergency Department:    [facility]: [phone]       (TTY for hearing impaired: [phone])    [facility]: [phone]       (TTY for hearing impaired: [phone])

## 2021-05-26 NOTE — ANESTHESIA PREPROCEDURE EVALUATION
Anesthesia Pre-Procedure Evaluation    Patient: Trevin Gee   MRN: [id number] : 1955        Preoperative Diagnosis: Crohn's disease (H) [K50.90]   Procedure : Procedure(s):  COLONOSCOPY, WITH COLONIC STENT INSERTION     Past Medical History:   Diagnosis Date     Anemia      Anxiety ?    much worse since 2014     Cataract 2007    both eyes, too much prednisone, implants     Coccidioidomycosis      Crohn disease (H)      Crohn's disease (H) 2015     Depressive disorder 2014    much worse since 2014     Fracture     green fracture of leg     Gallbladder problem 2005?    much gravel, removed     GERD (gastroesophageal reflux disease)      History of blood transfusion 1988    ulcerated anastomosis term. ilium bleed     Hypertriglyceridemia 2016     Infection     persistant coccidioidomycosis     Kidney stone various    both sides, all passed naturally     Mental health problem     bipolar though perhaps different     Nephrolithiasis      Osteoporosis 2007    osteoporosis, too much prednisone, last dexa 2014     Osteoporosis      Other nervous system complications 2007    prednisone overload induced kevin     Personal history of colonic polyps     small ones found during colonoscopies     Steroid-induced psychosis     Patient extremely sensitive to regular doses of steroids.  Unclear if this is due to chronic fluconazole use or genetic issue.  In the future, use caution when prescribing steroids.     TB (tuberculosis)       Past Surgical History:   Procedure Laterality Date     APPENDECTOMY  1974    coincidental with Crohn's surgery     BACK SURGERY  2007    kyphoplasty on compressed 4th??? vertebra     BIOPSY  ,     lump on arm, knee, back of hand for coccidioidomycosis cult.     CHOLECYSTECTOMY      much gravel, removed laparoscopically     COLONOSCOPY  2014 last    Dr. Catherine Bowden, [facility] - many previous     COLONOSCOPY  "Left 9/11/2015    Procedure: COMBINED COLONOSCOPY, SINGLE OR MULTIPLE BIOPSY/POLYPECTOMY BY BIOPSY;  Surgeon: Fransisco Leach MD;  [facility]     COLONOSCOPY N/A 8/3/2016    Procedure: COMBINED COLONOSCOPY, SINGLE OR MULTIPLE BIOPSY/POLYPECTOMY BY BIOPSY;  Surgeon: Fransisco Leach MD;  [facility]     COLONOSCOPY N/A 8/16/2017    Procedure: COMBINED COLONOSCOPY, SINGLE OR MULTIPLE BIOPSY/POLYPECTOMY BY BIOPSY;;  Surgeon: Fransisco Leach MD;  [facility]     COLONOSCOPY N/A 10/1/2019    Procedure: Colonoscopy With Colonic Stent Insertion;  Surgeon: Robbie Cruz MD;  [facility]     COLONOSCOPY N/A 2/5/2020    Procedure: COLONOSCOPY, FOREIGN BODY REMOVAL;  Surgeon: Robbie Cruz MD;  [facility]     ENT SURGERY  ?    upper endoscopy     ESOPHAGOSCOPY, GASTROSCOPY, DUODENOSCOPY (EGD), COMBINED N/A 11/8/2016    Procedure: COMBINED ENDOSCOPIC ULTRASOUND, ESOPHAGOSCOPY, GASTROSCOPY, DUODENOSCOPY (EGD), FINE NEEDLE ASPIRATE/BIOPSY;  Surgeon: Guru Alexsandra Ballesteros MD;  [facility]     ESOPHAGOSCOPY, GASTROSCOPY, DUODENOSCOPY (EGD), COMBINED N/A 11/8/2016    Procedure: COMBINED ESOPHAGOSCOPY, GASTROSCOPY, DUODENOSCOPY (EGD), BIOPSY SINGLE OR MULTIPLE;  Surgeon: Guru Alexsandra Ballesteros MD;  [facility]     ESOPHAGOSCOPY, GASTROSCOPY, DUODENOSCOPY (EGD), COMBINED N/A 8/16/2017    Procedure: COMBINED ESOPHAGOSCOPY, GASTROSCOPY, DUODENOSCOPY (EGD), BIOPSY SINGLE OR MULTIPLE;;  Surgeon: Fransisco Leach MD;  [facility]     EYE SURGERY  12/2007    cataract surgery both sides     GI SURGERY  1974, 1986(x2), 1989    Crohn's, 1974 RO 18\" term. ilium + 9\" asc. colon all one pc     SOFT TISSUE SURGERY  3/2013    removed buildup on back of r hand, coccidioidomycosis      Allergies   Allergen Reactions     Prednisone Other (See Comments)     Diana with more than 2.5mg chronic dosing of prednisone due to fluconazole interaction per " patient.       Social History     Tobacco Use     Smoking status: Never Smoker     Smokeless tobacco: Never Used   Substance Use Topics     Alcohol use: Yes     Comment: sometimes one glass of wine a week      Wt Readings from Last 1 Encounters:   05/26/21 71.3 kg (157 lb 3 oz)        Anesthesia Evaluation            ROS/MED HX  ENT/Pulmonary:  - neg pulmonary ROS     Neurologic:  - neg neurologic ROS     Cardiovascular:     (+) Dyslipidemia -----    METS/Exercise Tolerance:     Hematologic:       Musculoskeletal: Comment: osteoporosis      GI/Hepatic:     (+) GERD, Inflammatory bowel disease,     Renal/Genitourinary:     (+) renal disease, type: CRI,     Endo:  - neg endo ROS     Psychiatric/Substance Use:     (+) psychiatric history depression     Infectious Disease:  - neg infectious disease ROS     Malignancy:       Other:            Physical Exam    Airway         TM distance: > 3 FB   Neck ROM: full   Mouth opening: > 3 cm    Respiratory Devices and Support         Dental           Cardiovascular   cardiovascular exam normal          Pulmonary   pulmonary exam normal                OUTSIDE LABS:  CBC:   Lab Results   Component Value Date    WBC 7.2 01/18/2021    WBC 10.8 01/17/2021    HGB 15.4 05/14/2021    HGB 14.7 01/18/2021    HCT 45.9 01/18/2021    HCT 48.2 01/17/2021     01/18/2021     01/17/2021     BMP:   Lab Results   Component Value Date     05/20/2021     05/14/2021    POTASSIUM 4.1 05/20/2021    POTASSIUM 5.9 (H) 05/14/2021    CHLORIDE 108 05/20/2021    CHLORIDE 108 05/14/2021    CO2 25 05/20/2021    CO2 32 05/14/2021    BUN 21 05/20/2021    BUN 21 05/14/2021    CR 1.34 (H) 05/20/2021    CR 1.56 (H) 05/14/2021    GLC 89 05/20/2021    GLC 84 05/14/2021     COAGS:   Lab Results   Component Value Date    PTT 23 08/04/2007    INR 0.99 04/21/2019     POC:   Lab Results   Component Value Date    BGM 86 05/26/2021     HEPATIC:   Lab Results   Component Value Date    ALBUMIN 4.0  03/18/2021    PROTTOTAL 5.8 (L) 01/18/2021    ALT 37 01/18/2021    AST 25 01/18/2021    ALKPHOS 102 01/18/2021    BILITOTAL 1.1 01/18/2021     OTHER:   Lab Results   Component Value Date    PH 7.43 08/04/2007    LACT 0.7 01/18/2021    A1C 5.6 08/22/2019    YUNIER 9.0 05/20/2021    PHOS 2.7 03/18/2021    MAG 1.9 09/05/2007    LIPASE 192 01/17/2021    AMYLASE 68 11/11/2016    TSH 1.13 08/14/2020    T4 0.79 08/22/2019    CRP 8.4 (H) 01/20/2021    SED 6 01/18/2021       Anesthesia Plan    ASA Status:  2      Anesthesia Type: MAC.     - Reason for MAC: straight local not clinically adequate   Induction: Propofol.           Consents    Anesthesia Plan(s) and associated risks, benefits, and realistic alternatives discussed. Questions answered and patient/representative(s) expressed understanding.     - Discussed with:  Patient         Postoperative Care       PONV prophylaxis: Background Propofol Infusion     Comments:                Yanique Aguila MD

## 2021-05-26 NOTE — ANESTHESIA POSTPROCEDURE EVALUATION
Patient: Trevin Gee    Procedure(s):  COLONOSCOPY, WITH COLONIC STENT INSERTION    Diagnosis:Crohn's disease (H) [K50.90]  Diagnosis Additional Information: No value filed.    Anesthesia Type:  MAC    Note:  Disposition: Outpatient   Postop Pain Control: Uneventful            Sign Out: Well controlled pain   PONV: No   Neuro/Psych: Uneventful            Sign Out: Acceptable/Baseline neuro status   Airway/Respiratory: Uneventful            Sign Out: Acceptable/Baseline resp. status   CV/Hemodynamics: Uneventful            Sign Out: Acceptable CV status   Other NRE:    DID A NON-ROUTINE EVENT OCCUR? No           Last vitals:  Vitals:    05/26/21 0641 05/26/21 0913   BP: 126/89 110/87   Pulse: 91    Resp: 16 16   Temp: 36.8  C (98.2  F) 36.8  C (98.2  F)   SpO2: 98% 99%       Last vitals prior to Anesthesia Care Transfer:  CRNA VITALS  5/26/2021 0819 - 5/26/2021 0919      5/26/2021             Pulse:  71    SpO2:  100 %    Resp Rate (observed):  (!) 1          Electronically Signed By: Yanique Aguila MD  May 26, 2021  9:30 AM

## 2021-05-26 NOTE — OP NOTE
COLONOSCOPY 05/26/2021  7:07 AM [facility], [address] [place] [address] [phone]     Endoscopy Department   _______________________________________________________________________________   Patient Name: Trevin Gee        Procedure Date: 5/26/2021 7:07 AM   MRN: [id number]                       Account Number: [id number]   YOB: 1955              Admit Type: Outpatient   Age: 66                               Room: OR   Gender: Male                          Note Status: Finalized   Attending MD: Robbie Cruz MD   Total Sedation Time:   _______________________________________________________________________________       Procedure:           Colonoscopy   Indications:         Therapeutic procedure, For therapy of Crohn's disease of                        the small bowel   Providers:           Robbie Cruz MD, Saroj Morataya MD, Itzel Karimi RN   Patient Profile:     Mr Gee is a 65yo gentleman with Crohns status                        post distal ilectomy and right hemicolectomy complicated                        by a recalcitrant anstomotic stenosis who underwent                        Axios stent placement for management. He did well with                        the stent and for some time following removal, however                        with recurrent symptoms of obstruction he returns for                        consideratio of replacement pending fidnings on                        colonoscopy.   Referring MD:        Fransisco Leach   Requesting Provider: Jayleen Boone MD   Medicines:           Deep sedation was administered   Complications:       No immediate complications.   _______________________________________________________________________________   Procedure:           Pre-Anesthesia Assessment:                        - Prior to the procedure, a History and Physical was                         performed, and patient medications and allergies were                        reviewed. The patient is competent. The risks and                        benefits of the procedure and the sedation options and                        risks were discussed with the patient. All questions                        were answered and informed consent was obtained. Patient                        identification and proposed procedure were verified by                        the nurse in the pre-procedure area. Mental Status                        Examination: alert and oriented. Airway Examination:                        Mallampati Class II (the uvula but not tonsillar pillars                        visualized). Respiratory Examination: clear to                        auscultation. CV Examination: normal. ASA Grade                        Assessment: II - A patient with mild systemic disease.                        After reviewing the risks and benefits, the patient was                        deemed in satisfactory condition to undergo the                        procedure. The anesthesia plan was to use deep sedation                        / analgesia. Immediately prior to administration of                        medications, the patient was re-assessed for adequacy to                        receive sedatives. The heart rate, respiratory rate,                        oxygen saturations, blood pressure, adequacy of                        pulmonary ventilation, and response to care were                        monitored throughout the procedure. The physical status                        of the patient was re-assessed after the procedure.                        After obtaining informed consent, the colonoscope was                        passed under direct vision. Throughout the procedure,                        the patient's blood pressure, pulse, and oxygen                        saturations were monitored continuously. The  "endoscopes                        were introduced through the anus and advanced to the                        terminal ileum. The colonoscopy was performed without                        difficulty. The patient tolerated the procedure well.                        The quality of the bowel preparation was adequate.                                                                                     Findings:        We began with a 1T but exchanged to an adult colonoscope to accomplish        the procedure. Two sequential adjacent stenoses were demonstrated, one        1cm upstream of the of the ileocecal anastomosis, which itself was        mildly stenotic. Both were ultrashort and the upstream stenosis had a        caliber of approximately 7mm and the anastomosis had a caliber of at        least 12mm. A long 0.025\" Visiglide wire was passed across both stenoses        and a [id number] lumen apposing metal stent was deployed, spanning both        stenoses with excellent seating. There was mild erosion at the edge of        each. The remainder of the colon was unremarkable without inflammation        or lesion.                                                                                    Impression:          - Sequential adjacent modest stenoses with mild erosions                        involving the neoterminal ileum and ileocolonic                        anastomosis managed by placement of a [id number] Axios                        stent across both                        - Otherwise unremarkable lower endoscopy   Recommendation:      - Deep sedation recovery with probable discharge home                        this morning                        - Regular diet and activity may resume without change                        - If stent is to migrate, we would expect some                        fullnessor pain in the rectum and the patient is to                        contact our team fore removal                        - " Othewise, plan will be to keep the stent in situ for                        6m with removal by repeat colonoscopy using deep sedation                        - The findings and recommendations were discussed with                        the patient                                                                                       electronically signed by FABI Cruz

## 2021-05-28 ENCOUNTER — MYC MEDICAL ADVICE (OUTPATIENT)
Dept: INTERNAL MEDICINE | Facility: CLINIC | Age: 66
End: 2021-05-28

## 2021-06-01 ENCOUNTER — VIRTUAL VISIT (OUTPATIENT)
Dept: INTERNAL MEDICINE | Facility: CLINIC | Age: 66
End: 2021-06-01
Payer: COMMERCIAL

## 2021-06-01 DIAGNOSIS — B02.9 HERPES ZOSTER WITHOUT COMPLICATION: Primary | ICD-10-CM

## 2021-06-01 PROCEDURE — 99213 OFFICE O/P EST LOW 20 MIN: CPT | Mod: 95 | Performed by: NURSE PRACTITIONER

## 2021-06-01 RX ORDER — VALACYCLOVIR HYDROCHLORIDE 1 G/1
1000 TABLET, FILM COATED ORAL 3 TIMES DAILY
Qty: 21 TABLET | Refills: 0 | Status: SHIPPED | OUTPATIENT
Start: 2021-06-01 | End: 2021-07-06

## 2021-06-01 RX ORDER — HYDROCODONE BITARTRATE AND ACETAMINOPHEN 5; 325 MG/1; MG/1
1 TABLET ORAL 2 TIMES DAILY
COMMUNITY
Start: 2021-04-15 | End: 2021-06-01

## 2021-06-01 RX ORDER — HYDROCODONE BITARTRATE AND ACETAMINOPHEN 5; 325 MG/1; MG/1
1 TABLET ORAL 2 TIMES DAILY
Qty: 28 TABLET | Refills: 0 | Status: SHIPPED | OUTPATIENT
Start: 2021-06-01 | End: 2021-07-27

## 2021-06-01 NOTE — NURSING NOTE
"Chief Complaint   Patient presents with     Derm Problem     pt would like to discuss rash, noticed last wednesday on torso, has had \"sharp twinges\"       Johnathan De Paz CMA, EMT at 9:01 AM on 6/1/2021.   "

## 2021-06-01 NOTE — PATIENT INSTRUCTIONS
Patient Education     Shingles  Shingles is a viral infection caused by the same virus that causes chicken pox. Anyone who has had chicken pox may get shingles later in life. The virus stays in the body, but remains asleep (dormant). Shingles often occurs in older persons or persons with lowered immunity. But it can affect anyone at any age.  Shingles starts as a tingling patch of skin on one side of the body. Small, painful blisters may then appear. The rash rarely spreads to other parts of the body.  Exposure to shingles can't cause shingles. However, it can cause chicken pox in anyone who has not had chicken pox or has not been vaccinated. The contagious period ends when all blisters have crusted over, generally 1 to 2 weeks after the illness starts.  After the blisters heal, the affected skin may be sensitive or painful for weeks or months, gradually resolving over time. But, sometimes this can last longer and be permanent (called postherpetic neuralgia.)  Shingles vaccines are available. Vaccination can help prevent shingles or make it less painful. It is generally recommended for adults older than 50, even if you've had singles in the past. Talk with your healthcare provider about when to get vaccinated and which vaccine is best for you.  Home care    Medicines may be prescribed to help relieve pain. Take these medicines as directed. Ask your healthcare provider or pharmacist before using over-the-counter medicines for helping treat pain and itching.    In certain cases, antiviral medicines may be prescribed to reduce pain, shorten the illness, and prevent neuralgia. Take these medicines as directed.    Compresses made from a solution of cool water mixed with cornstarch or baking soda may help relieve pain and itching.     Gently wash skin daily with soap and water to help prevent infection. Be certain to rinse off all of the soap, which can be irritating.    Trim fingernails and try not to scratch. Scratching  the sores may leave scars.    Stay home from work or school until all blisters have formed a crust and you are no longer contagious.  Follow-up care  Follow up with your healthcare provider, or as directed.  When to seek medical advice    Fever of 100.4 F (38 C) or higher, or as directed by your healthcare provider    Affected skin is on the face or neck and any of the following occur:  ? Headache  ? Eye pain  ? Changes in vision  ? Sores near the eye  ? Weakness of facial muscles    Blisters occurring on new areas of the body    Pain, redness, or swelling of a joint    Signs of skin infection: colored drainage from the sores, warmth, increasing redness, fever, or increasing pain  [employer] last reviewed this educational content on 4/1/2018 2000-2021 [employer]. All rights reserved. This information is not intended as a substitute for professional medical care. Always follow your healthcare professional's instructions.

## 2021-06-01 NOTE — NURSING NOTE
"Chief Complaint   Patient presents with     Derm Problem     pt would like to discuss rash, noticed last wednesday on torso, has had \"sharp twinges\", possibly shingles       Johnathan De Paz CMA, EMT at 9:01 AM on 6/1/2021.   "

## 2021-06-01 NOTE — PROGRESS NOTES
"Trevin is a 66 year old who is being evaluated via a billable video visit.      How would you like to obtain your AVS? MyChart  If the video visit is dropped, the invitation should be resent by: Send to e-mail at: [email or web address]  Will anyone else be joining your video visit? No      Video Start Time: 9:31 AM    Assessment & Plan     Herpes zoster without complication  Discussed we are past the optimal treatment window (rash onset >72 hours ago), but as it is unclear if new lesions still developing, will opt to treat with Valacyclovir 1000 mg TID x7 days (CrCl 54 ml/min). Reviewed rash care and avoiding transmission, as well as warning signs if any worsening symptoms (see AVS). Will provided short course Norco for pain management. Okay to use Lidocaine, cool compress, hydrocortisone cream.  - valACYclovir (VALTREX) 1000 mg tablet; Take 1 tablet (1,000 mg) by mouth 3 times daily for 7 days  - HYDROcodone-acetaminophen (NORCO) 5-325 MG tablet; Take 1 tablet by mouth 2 times daily      28 minutes spent on the date of the encounter doing chart review, history and exam, documentation and further activities per the note         Follow-up if symptoms worsen or do not improve.    MARISOL Alfaro [facility] [place]    Subjective   Trevin is a 66 year old who presents for the following health issues     HPI     Rash--from mid-chest around side to middle of back on the right side. First noticed at time of colonoscopy, for few days prior to that, had noticed \"sharp twinges\" in the right torso, suspects this was the start of it. Not sure if the rash had already started at that point. It has spread, but not sure if it is still spreading. Has tried 1% hydrocortisone cream, lidocaine 4% cream and baking soda paste. Only helps for a short while. Only has had a few areas with broken blisters. Rash is quite painful. Has used hydrocodone for broken tooth from the past.   Serum creatinine: " 1.34 mg/dL (H) 05/20/21 1311  Estimated creatinine clearance: 54.7 mL/min (A)      Review of Systems         Objective           Vitals:  No vitals were obtained today due to virtual visit.    Physical Exam   GENERAL: Healthy, alert and no distress  EYES: Eyes grossly normal to inspection.  No discharge or erythema, or obvious scleral/conjunctival abnormalities.  RESP: No audible wheeze, cough, or visible cyanosis.  No visible retractions or increased work of breathing.    SKIN: erythematous rash with small vesicular lesions across right T4 dermatome   NEURO: Cranial nerves grossly intact.  Mentation and speech appropriate for age.  PSYCH: Mentation appears normal, affect normal/bright, judgement and insight intact, normal speech and appearance well-groomed.                Video-Visit Details    Type of service:  Video Visit    Video End Time:9:54 AM    Originating Location (pt. Location): Home    Distant Location (provider location):  [facility] INTER[facility] MEDICINE [place]     Platform used for Video Visit: [employer]

## 2021-06-02 ENCOUNTER — PATIENT OUTREACH (OUTPATIENT)
Dept: GASTROENTEROLOGY | Facility: CLINIC | Age: 66
End: 2021-06-02

## 2021-06-02 NOTE — TELEPHONE ENCOUNTER
"Post colonoscopy (5-26-21) with Dr. Cruz: Follow-up    Post procedure recommendations:   If stent is to migrate, we would expect some  fullnessor pain in the rectum and the patient is to   contact our team fore removal      - Othewise, plan will be to keep the stent in situ fo  6m with removal by repeat colonoscopy using deep sedation    Patient states: pt has shingles rash, is following with PCP- \"not convinced my gut is moving properly less, eating less than normal, feels full early\" Has passed small stools. No reported rectal pain.     Clinic contact and scheduling numbers verified for future questions/concerns.    Barbie Peña, RN Care Coordinator        "

## 2021-06-07 ENCOUNTER — TELEPHONE (OUTPATIENT)
Dept: GASTROENTEROLOGY | Facility: CLINIC | Age: 66
End: 2021-06-07

## 2021-06-07 NOTE — TELEPHONE ENCOUNTER
Health Call Center    Phone Message    May a detailed message be left on voicemail: yes     Reason for Call: Symptoms or Concerns     If patient has red-flag symptoms, warm transfer to triage line    Current symptom or concern: Constipation.    Trevin called in stating that he had gotten shingles after his colonoscopy. He states he took some old hydrocodone for the pain and now he is constipated. He said his stomach was distended over the weekend but has gone down some now. He also states that he cannot eat solid food and has only been able to drink small amounts of liquids.     Trevin said he has no localized pain and has had some watery stool yesterday and today.     Trevin would like a RN or doctor to call him back with suggestions on further treatment.     Symptoms have been present for:  1 week(s)    Has patient previously been seen for this? No    By : Robbie Cruz    Date: Has colonoscopy on 5/26/2021    Are there any new or worsening symptoms? Yes: Constipation      Action Taken: Message routed to:  [facility] ([facility]        Travel Screening: Not Applicable

## 2021-06-08 NOTE — TELEPHONE ENCOUNTER
Patient had a colonoscopy and noted after his colonoscopy the staff noted a rash on his torso  Patient sent a message to his primary care provider however provider did not see patient's my chart message until day after [facility] and confirmed shingles  Was given valtrex on June 1  Continues to have pain from shingles  Has not been eating solid foods since Saturday  Has been taking miralax one to three caps of miralax  Had taken some hydrocodone on Saturday for shingles pain  Has not taken since Saturday   Denies any vomiting   Has been having small bowel movements  Has some distension but has improved  Denies any dizziness   Discussed if symptoms and reason to go to the emergency room   Will try to increase his fluid intake   Will try soft foods               .

## 2021-06-09 ENCOUNTER — INFUSION THERAPY VISIT (OUTPATIENT)
Dept: INFUSION THERAPY | Facility: CLINIC | Age: 66
End: 2021-06-09
Attending: INTERNAL MEDICINE
Payer: COMMERCIAL

## 2021-06-09 ENCOUNTER — PATIENT OUTREACH (OUTPATIENT)
Dept: GASTROENTEROLOGY | Facility: CLINIC | Age: 66
End: 2021-06-09

## 2021-06-09 VITALS
TEMPERATURE: 98.3 F | DIASTOLIC BLOOD PRESSURE: 83 MMHG | RESPIRATION RATE: 16 BRPM | HEART RATE: 89 BPM | OXYGEN SATURATION: 97 % | SYSTOLIC BLOOD PRESSURE: 116 MMHG

## 2021-06-09 DIAGNOSIS — E86.0 DEHYDRATION: Primary | ICD-10-CM

## 2021-06-09 DIAGNOSIS — Z79.52 STEROID DEPENDENT FOR ADRENAL SUPRESSION: ICD-10-CM

## 2021-06-09 DIAGNOSIS — K50.90 CROHN'S DISEASE (H): ICD-10-CM

## 2021-06-09 DIAGNOSIS — M81.0 OSTEOPOROSIS WITHOUT CURRENT PATHOLOGICAL FRACTURE, UNSPECIFIED OSTEOPOROSIS TYPE: ICD-10-CM

## 2021-06-09 PROCEDURE — 96360 HYDRATION IV INFUSION INIT: CPT

## 2021-06-09 PROCEDURE — 96361 HYDRATE IV INFUSION ADD-ON: CPT

## 2021-06-09 PROCEDURE — 258N000003 HC RX IP 258 OP 636: Performed by: INTERNAL MEDICINE

## 2021-06-09 RX ADMIN — SODIUM CHLORIDE, POTASSIUM CHLORIDE, SODIUM LACTATE AND CALCIUM CHLORIDE 2000 ML: 600; 310; 30; 20 INJECTION, SOLUTION INTRAVENOUS at 15:46

## 2021-06-09 NOTE — TELEPHONE ENCOUNTER
Patient calls to report that he as drank over 40 ounces of water  Has been taking mirlax and fleets enema with am with little to no stool     Also thinks he is getting dehydrated  ? infusion for fluids  Call into Dr. Leach to provider further treatment.

## 2021-06-09 NOTE — PROGRESS NOTES
Therapy plan entered.    Patient to go the infusion center at [address]  asap   Updated Dr. Leach     Fluids today and abdominal xray if possible.  No times open today    Discussed with the patient that discuss that   Dr. Leach  Continue to increase oral intake   Continue miralax   Abdominal xray scheduled on Sah[address] 10 at 1120  Patient in agreement

## 2021-06-09 NOTE — LETTER
6/9/2021         RE: Trevin Gee  [address]  [address]  [place] [address]        Dear Colleague,    Thank you for referring your patient, Trevin Gee, to the [facility]. Please see a copy of my visit note below.    Nursing Note  Trevin Gee [facility] for:   Chief Complaint   Patient presents with     Infusion     IV lactated ringers     During today's [facility] appointment, orders from Dr. Leach were completed.  Frequency: once    Progress note:  Patient identification verified by name and date of birth.  Assessment completed.  Vitals recorded in Doc Flowsheets.  Patient was provided with education regarding medication/procedure and possible side effects.  Patient verbalized understanding.     present during visit today: Not Applicable.    Treatment Conditions: Give 2 liters if systolic BP less < than 100 or HR greater >100 or patient reports increase diarrhea or decreased urine output    Premedications: were not ordered.  Drug Waste Record: No  Infusion length and rate:  999 ml/hr., each liter over 1 hour  Labs: were not ordered for this appointment.  Vascular access: peripheral IV placed today.  Is the next appt scheduled? no  Asymptomatic COVID test completed? No    Care transferred to Mattie CHRIS at 1710    Post Infusion Assessment:  Patient tolerated infusion without incident.  Site patent and intact, free from redness, edema or discomfort.  No evidence of extravasations.  Access discontinued per protocol.     Discharge Plan:   Follow up plan of care with: ongoing infusions at [facility].  Discharge instructions were reviewed with patient.  Patient/representative verbalized understanding of discharge instructions and all questions answered.  Patient discharged from [facility] in stable  condition.    Terrie Sol RN    Administrations This Visit     lactated ringers BOLUS 1,000-2,000 mL     Admin Date  06/09/2021 Action  New Bag Dose  1,000 mL Rate  2,000 mL/hr Route  Intravenous Administered By  Terrie Sol RN                /79   Pulse 114   Temp 98.3  F (36.8  C) (Oral)   Resp 16   SpO2 97%         Again, thank you for allowing me to participate in the care of your patient.        Sincerely,        [facility]

## 2021-06-09 NOTE — PROGRESS NOTES
Nursing Note  Trevin Gee presents [facility] for:   Chief Complaint   Patient presents with     Infusion     IV lactated ringers     During [facility]'s [facility] appointment, orders from Dr. Leach were completed.  Frequency: once    Progress note:  Patient identification verified by name and date of birth.  Assessment completed.  Vitals recorded in Doc Flowsheets.  Patient was provided with education regarding medication/procedure and possible side effects.  Patient verbalized understanding.     present during visit today: Not Applicable.    Treatment Conditions: Give 2 liters if systolic BP less < than 100 or HR greater >100 or patient reports increase diarrhea or decreased urine output    Premedications: were not ordered.  Drug Waste Record: No  Infusion length and rate:  999 ml/hr., each liter over 1 hour  Labs: were not ordered for this appointment.  Vascular access: peripheral IV placed today.  Is the next appt scheduled? no  Asymptomatic COVID test completed? No    Care transferred to Mattie CHRIS at 1710    Post Infusion Assessment:  Patient tolerated infusion without incident.  Site patent and intact, free from redness, edema or discomfort.  No evidence of extravasations.  Access discontinued per protocol.     Discharge Plan:   Follow up plan of care with: ongoing infusions at [facility].  Discharge instructions were reviewed with patient.  Patient/representative verbalized understanding of discharge instructions and all questions answered.  Patient discharged from [facility] in stable condition.    Terrie Sol RN    Administrations This Visit     lactated ringers BOLUS 1,000-2,000 mL     Admin Date  06/09/2021 Action  New Bag Dose  1,000 mL Rate  2,000 mL/hr Route  Intravenous Administered By  Terrie Sol RN                /79   Pulse 114   Temp  98.3  F (36.8  C) (Oral)   Resp 16   SpO2 97%

## 2021-06-10 ENCOUNTER — PATIENT OUTREACH (OUTPATIENT)
Dept: GASTROENTEROLOGY | Facility: CLINIC | Age: 66
End: 2021-06-10

## 2021-06-10 ENCOUNTER — ANCILLARY PROCEDURE (OUTPATIENT)
Dept: GENERAL RADIOLOGY | Facility: CLINIC | Age: 66
End: 2021-06-10
Attending: INTERNAL MEDICINE
Payer: COMMERCIAL

## 2021-06-10 DIAGNOSIS — K50.90 CROHN'S DISEASE (H): ICD-10-CM

## 2021-06-10 PROCEDURE — 74019 RADEX ABDOMEN 2 VIEWS: CPT | Performed by: RADIOLOGY

## 2021-06-10 NOTE — PROGRESS NOTES
Discussed the results of abdominal xray with Dr. Leach   Contacted patient and informed of the results are good  Continue take fluids, eat as tolerated and continue the miralax  Discussed if patient develops pain, abdominal distension, vomiting to call clinic and after hours call the GI fellow on call.   Pt states he is concerned that he has some type obstruction further up at his upper anastomosis

## 2021-06-27 ENCOUNTER — MYC MEDICAL ADVICE (OUTPATIENT)
Dept: INTERNAL MEDICINE | Facility: CLINIC | Age: 66
End: 2021-06-27

## 2021-06-27 DIAGNOSIS — B02.29 POSTHERPETIC NEURALGIA: Primary | ICD-10-CM

## 2021-06-29 ENCOUNTER — PATIENT OUTREACH (OUTPATIENT)
Dept: GASTROENTEROLOGY | Facility: CLINIC | Age: 66
End: 2021-06-29

## 2021-06-29 RX ORDER — GABAPENTIN 300 MG/1
CAPSULE ORAL
Qty: 93 CAPSULE | Refills: 0 | Status: SHIPPED | OUTPATIENT
Start: 2021-06-29 | End: 2021-07-06

## 2021-06-29 NOTE — PROGRESS NOTES
Patient calls to report that he continues have issues with eating and drinking.     States only had take some amount so fluids and water  Pt said that Dr. Leach had mentioned a CT enterography  but would like to wait for the record review   Patient has requested his records from [facility]  Patient requesting that Doris Leach and Nancy review records as he is convinced that he has a stricture above where stomach anastomosis is located.   Will wait records.

## 2021-06-29 NOTE — TELEPHONE ENCOUNTER
Will start Gabapentin for neuralgia related to recent shingles outbreak.   CrCl 55 ml/min based on most recent values.   Follow-up for virtual visit to assess tolerance/effectivenss.   MARISOL Alfaro CNP

## 2021-07-06 ENCOUNTER — VIRTUAL VISIT (OUTPATIENT)
Dept: INTERNAL MEDICINE | Facility: CLINIC | Age: 66
End: 2021-07-06
Payer: COMMERCIAL

## 2021-07-06 DIAGNOSIS — B02.29 POSTHERPETIC NEURALGIA: ICD-10-CM

## 2021-07-06 PROCEDURE — 99214 OFFICE O/P EST MOD 30 MIN: CPT | Mod: 95 | Performed by: NURSE PRACTITIONER

## 2021-07-06 RX ORDER — GABAPENTIN 300 MG/1
CAPSULE ORAL
Qty: 120 CAPSULE | Refills: 1 | Status: SHIPPED | OUTPATIENT
Start: 2021-07-23 | End: 2021-07-27

## 2021-07-06 NOTE — PROGRESS NOTES
"Trevin is a 66 year old who is being evaluated via a billable video visit.      How would you like to obtain your AVS? MyChart  If the video visit is dropped, the invitation should be resent by: Send to e-mail at: [email or web address]  Will anyone else be joining your video visit? No      Video Start Time: 1:01 PM    Assessment & Plan     Postherpetic neuralgia  Will increase evening dose to 600 mg, continue with 300 mg AM and afternoon. May further titrate up if continues to have symptoms. He has been in touch with GI team re: concerns for anastomosis and prior GI surgeries, no sign of acute obstruction, he will await to hear from their team with further recommendations.   - gabapentin (NEURONTIN) 300 MG capsule; Take 1 capsule (300 mg) by mouth 2 times daily AND 2 capsules (600 mg) At Bedtime.    35 minutes spent on the date of the encounter doing chart review, history and exam, documentation and further activities per the note        Return if symptoms worsen or fail to improve.    MARISOL Alfaro [facility] INTERNAL MEDICINE [place]    Subjective   Trevin is a 66 year old who presents for the following health issues     HPI         Shingles follow-up. Gabapentin started about 1 week ago, sometimes seems to work well and other times not so much. Concern that he has difficulty with bowel obstruction impacting absorption. He can lay in bed in certain positions to help with obstruction. Last night thought it had moved through and took a Hydrocodone, improved a few hours later. Has been taking Milana for about 1 week, taking 300 mg TID.     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 Is hopeful that that might be reachable by endoscopy and stent to bypass again. Concerned that this has narrowed and slightly blocked again, but has been able to lay a certain position to help " "things move through. Dr. Leach had mentioned a CT enterography in the past, waiting to hear back on the plan. Has been in contact with Dr. Leach's team re: scheduling.   No SEs from gabapentin so far.   Overall feels like he is managing but some nights better than others.  ~6 weeks of the rash. Hasn't seemed to see any lessening of the rash, redness seems to cover the same area. Possibly some reduction in the middle of the back, but difficult to see. Front/back is same width. Pain extends more towards the armpit (no rash there). Generally doesn't feel pain in the back area of rash. Pain seemed to be centered along dermatome, less so now, feels more of a \"deeper nerve pain\" that isn't improved by topicals. A couple painful spots around armpit, and on back below the rash. One scab left. Takes long showers (not hot), this seems to help relieve the pain, seems to be the most effective/reliable relief. Using Sarna sensitive cream, felt it lasted longer, occasionally 4% lidocaine, less frequently the hydrocortisone.     Review of Systems   Constitutional, HEENT, cardiovascular, pulmonary, gi and gu systems are negative, except as otherwise noted.      Objective           Vitals:  No vitals were obtained today due to virtual visit.    Physical Exam   GENERAL: Healthy, alert and no distress  EYES: Eyes grossly normal to inspection.  No discharge or erythema, or obvious scleral/conjunctival abnormalities.  RESP: No audible wheeze, cough, or visible cyanosis.  No visible retractions or increased work of breathing.    SKIN: irregular erythematous rash across right T4 dermatome, no vesicular lesions or scabs noted.  NEURO: Cranial nerves grossly intact.  Mentation and speech appropriate for age.  PSYCH: Mentation appears normal, affect normal/bright, judgement and insight intact, normal speech and appearance well-groomed.                Video-Visit Details    Type of service:  Video Visit    Video End Time:1:27 " PM    Originating Location (pt. Location): Home    Distant Location (provider location):  [facility] INTERNAL MEDICINE [place]     Platform used for Video Visit: Mukesh

## 2021-07-13 ENCOUNTER — PATIENT OUTREACH (OUTPATIENT)
Dept: GASTROENTEROLOGY | Facility: CLINIC | Age: 66
End: 2021-07-13

## 2021-07-13 DIAGNOSIS — K50.012 CROHN'S DISEASE OF SMALL INTESTINE WITH INTESTINAL OBSTRUCTION (H): Primary | ICD-10-CM

## 2021-07-13 DIAGNOSIS — K91.30 COLORECTAL ANASTOMOTIC STRICTURE: ICD-10-CM

## 2021-07-13 NOTE — PROGRESS NOTES
Patient called to check if order for small bowel follow through order has been placed. Order in and patient given the number to call to schedule

## 2021-07-16 ENCOUNTER — HOSPITAL ENCOUNTER (OUTPATIENT)
Dept: GENERAL RADIOLOGY | Facility: CLINIC | Age: 66
Discharge: HOME | End: 2021-07-16
Attending: INTERNAL MEDICINE | Admitting: INTERNAL MEDICINE
Payer: COMMERCIAL

## 2021-07-16 DIAGNOSIS — K50.012 CROHN'S DISEASE OF SMALL INTESTINE WITH INTESTINAL OBSTRUCTION (H): ICD-10-CM

## 2021-07-16 DIAGNOSIS — K91.30 COLORECTAL ANASTOMOTIC STRICTURE: ICD-10-CM

## 2021-07-16 PROCEDURE — 74240 X-RAY XM UPR GI TRC 1CNTRST: CPT

## 2021-07-20 ENCOUNTER — PATIENT OUTREACH (OUTPATIENT)
Dept: GASTROENTEROLOGY | Facility: CLINIC | Age: 66
End: 2021-07-20

## 2021-07-20 NOTE — PROGRESS NOTES
Patient calls to discuss that he wants to know if he can have endoscopy, push endoscopy and or stent.   Asked him when he thinks he needs procedure.  States he has shingles and that the pain medication is not working due to the medication can not make into his bowel to be absorbed.    [facility]   Patient sent the information  Pt is very fixated on that he needs a push endoscopy and stent.

## 2021-07-23 ENCOUNTER — MYC MEDICAL ADVICE (OUTPATIENT)
Dept: INTERNAL MEDICINE | Facility: CLINIC | Age: 66
End: 2021-07-23

## 2021-07-23 DIAGNOSIS — R21 RASH: Primary | ICD-10-CM

## 2021-07-23 DIAGNOSIS — B02.29 POSTHERPETIC NEURALGIA: ICD-10-CM

## 2021-07-25 ENCOUNTER — MYC MEDICAL ADVICE (OUTPATIENT)
Dept: INTERNAL MEDICINE | Facility: CLINIC | Age: 66
End: 2021-07-25

## 2021-07-25 DIAGNOSIS — B02.9 HERPES ZOSTER WITHOUT COMPLICATION: ICD-10-CM

## 2021-07-27 ENCOUNTER — PATIENT OUTREACH (OUTPATIENT)
Dept: GASTROENTEROLOGY | Facility: CLINIC | Age: 66
End: 2021-07-27

## 2021-07-27 RX ORDER — GABAPENTIN 300 MG/1
600 CAPSULE ORAL 3 TIMES DAILY
Qty: 180 CAPSULE | Refills: 1 | Status: SHIPPED | OUTPATIENT
Start: 2021-07-27 | End: 2021-11-15

## 2021-07-27 RX ORDER — HYDROCODONE BITARTRATE AND ACETAMINOPHEN 5; 325 MG/1; MG/1
1 TABLET ORAL 2 TIMES DAILY PRN
Qty: 28 TABLET | Refills: 0 | Status: SHIPPED | OUTPATIENT
Start: 2021-07-27 | End: 2022-10-07

## 2021-07-27 NOTE — TELEPHONE ENCOUNTER
One-time refill for [place], schedule appointment to discuss medications if pain persists, would recommend increase on Gabapentin if needing long-term.  MARISOL Alfaro CNP

## 2021-07-27 NOTE — TELEPHONE ENCOUNTER
Increase Gabapentin to 600 mg TID, referral to dermatology given pt report that rash is spreading.  MARISOL Alfaro CNP

## 2021-07-27 NOTE — PROGRESS NOTES
Contacted patient to schedule in person visit with Dr. Leach   Pt is scheduled on August 16 at 1 pm

## 2021-08-03 ENCOUNTER — TELEPHONE (OUTPATIENT)
Dept: NEPHROLOGY | Facility: CLINIC | Age: 66
End: 2021-08-03

## 2021-08-03 NOTE — TELEPHONE ENCOUNTER
lvm for patient to call us back to schedule a 6 month f/u  (due in Sept)  With Dr Cuenca with labs prior

## 2021-08-16 ENCOUNTER — VIRTUAL VISIT (OUTPATIENT)
Dept: GASTROENTEROLOGY | Facility: CLINIC | Age: 66
End: 2021-08-16
Payer: COMMERCIAL

## 2021-08-16 DIAGNOSIS — K50.012 CROHN'S DISEASE OF SMALL INTESTINE WITH INTESTINAL OBSTRUCTION (H): Primary | ICD-10-CM

## 2021-08-16 PROCEDURE — 99214 OFFICE O/P EST MOD 30 MIN: CPT | Mod: 95 | Performed by: INTERNAL MEDICINE

## 2021-08-16 NOTE — NURSING NOTE
"Chief Complaint   Patient presents with     RECHECK     There were no vitals taken for this visit. Estimated body mass index is 22.55 kg/m  as calculated from the following:    Height as of 5/26/21: 1.778 m (5' 10\").    Weight as of 5/26/21: 71.3 kg (157 lb 3 oz).      CARRIE Monk  "

## 2021-08-16 NOTE — PROGRESS NOTES
"Trevin is a 66 year old who is being evaluated via a billable video visit.      How would you like to obtain your AVS? Marty  If the video visit is dropped, the invitation should be resent by: Marty  Will anyone else be joining your video visit? No      Video Start Time: 1:40 PM  Video-Visit Details    Patient unable to do video visit. Converted to telephone visit.     Patient is adamant that he has a mechanical \"kink\" causing his symptoms. We had a long discuss about the utility of an endoscopy (single balloon enteroscopy) per mouth.     In June he feels that his \"bypass\" was working, then got clogged and now food is going through his native duodenum.     In terms of his symptoms, after eating, he starts to get a right sided pain. Pain will start hurting while eating. He has avoided eating and therefore does not have nausea or vomiting. He lost 10 lbs initially, and probably another 5 lbs in the past week.     In terms of stools, he does not have a bowel movement daily. He notes he can go days without a stool. But this is also in the setting of cutting back his PO intake.     A/P:   Long discussion about his symptoms. I suspect they are related to irritable bowel +/- tethering/adhesions of the small bowel without obstruction. He has had large colonic stool burden in the past. He strongly believes that his symptoms are proximal GI in nature related to surgery in the 1980s.     -- He will try Miralax daily   -- Meet with Dr. Cruz to discuss single balloon enteroscopy by mouth to evaluate the duodenum for prior surgical sites with strictures or blockages.     Fransisco Leach MD MS    [facility]  Inflammatory Bowel Disease Program  Division of Gastroenterology, Hepatology and Nutrition  Pager: [phone]    Time on phone: 32 minutes.             "

## 2021-08-16 NOTE — PATIENT INSTRUCTIONS
"It was a pleasure taking care of you today. I've included a brief summary of our discussion and care plan from today's visit below.  Please review this information with your primary care provider.  _______________________________________________________________________    My recommendations are summarized as follows:  -- Start Miralax daily for now  -- Meet with Dr. Cruz to discuss \"single balloon enteroscopy by mouth\"     Return to GI Clinic in 6 months to review your progress.     If you need any follow-up appointments, please use the following phone numbers below.    To schedule or reschedule a follow-up GI appointment, call [phone] option 1    To schedule your endoscopy procedure, call [phone] option 2    To schedule imaging, please call [phone]     To schedule your lab appointment at [address] floor lab call [phone]. Call your [place] lab directly if it is not [facility]. If you use a non-[place] lab, please let us know where to fax your lab order (call [facility] at [phone] or [place] at [phone] for IBD patients.      _______________________________________________________________________    Please be in touch if there are any further questions that arise following today's visit.  There are multiple ways to contact your gastroenterology care team.      During business hours, you may reach your gastroenterology RN Care Coordinator. For IBD related questions, please call [place] at [phone]. For general GI questions, please call [facility] at [phone].      You can always send a secure message through SUPENTA. SUPENTA messages are answered by your nurse or doctor typically within 24 hours. Please allow extra time on weekends and holidays.     What is SUPENTA?  SUPENTA is a secure way for you to access all of your healthcare records from the [facility].  It is a web based computer program, so you can sign on to it from any location.  It also " allows you to send secure messages to your care team.  I recommend signing up for Cloopen access if you have not already done so and are comfortable with using a computer.     For urgent/emergent questions after business hours, you may reach the on-call GI Fellow by contacting the [facility]  at [phone].     How will I get the results of any tests ordered?    You will receive all of your results.  If you have signed up for TouchTent, any tests ordered at your visit will be available to you after your physician reviews them.  Typically this takes 1-2 weeks.  If there are urgent results that require a change in your care plan, your physician or nurse will call you to discuss the next steps.      Thank you for choosing [facility]!       Sincerely,    Fransisco Leach MD  [facility]  Division of Gastroenterology

## 2021-08-20 ENCOUNTER — TELEPHONE (OUTPATIENT)
Dept: GASTROENTEROLOGY | Facility: CLINIC | Age: 66
End: 2021-08-20

## 2021-08-20 NOTE — TELEPHONE ENCOUNTER
[facility] Call Center    Phone Message    May a detailed message be left on voicemail: yes     Reason for Call: Other: Patient called as he was looking for referral to consult with Dr. Cruz and the order for the endoscopy.  Please follow up with patient.  Thank you!     Action Taken: Message routed to:  [facility]    Travel Screening: Not Applicable

## 2021-08-25 ENCOUNTER — MYC MEDICAL ADVICE (OUTPATIENT)
Dept: GASTROENTEROLOGY | Facility: CLINIC | Age: 66
End: 2021-08-25

## 2021-08-30 NOTE — TELEPHONE ENCOUNTER
Per Dr Reid clinic note:  Meet with Dr. Cruz to discuss single balloon enteroscopy by mouth to evaluate the duodenum for prior surgical sites with strictures or blockages.     Called patient to discuss, video clinic scheduled 10-21, pt on wait/cancelation list.    JUANY

## 2021-08-31 NOTE — TELEPHONE ENCOUNTER
Per Dr. 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 Will keep clinic scheduled for now, does not want to schedule procedure until after he talks to Dr. Cruz in clinic.    ML

## 2021-09-03 ENCOUNTER — PREP FOR PROCEDURE (OUTPATIENT)
Dept: GASTROENTEROLOGY | Facility: CLINIC | Age: 66
End: 2021-09-03

## 2021-09-03 ENCOUNTER — VIRTUAL VISIT (OUTPATIENT)
Dept: GASTROENTEROLOGY | Facility: CLINIC | Age: 66
End: 2021-09-03
Payer: COMMERCIAL

## 2021-09-03 VITALS — HEIGHT: 70 IN | WEIGHT: 137 LBS | BODY MASS INDEX: 19.61 KG/M2

## 2021-09-03 DIAGNOSIS — K50.90 CROHN'S DISEASE (H): Primary | ICD-10-CM

## 2021-09-03 DIAGNOSIS — K50.012 CROHN'S DISEASE OF SMALL INTESTINE WITH INTESTINAL OBSTRUCTION (H): Primary | ICD-10-CM

## 2021-09-03 PROCEDURE — 99215 OFFICE O/P EST HI 40 MIN: CPT | Mod: 95 | Performed by: INTERNAL MEDICINE

## 2021-09-03 ASSESSMENT — MIFFLIN-ST. JEOR: SCORE: 1407.68

## 2021-09-03 ASSESSMENT — PAIN SCALES - GENERAL: PAINLEVEL: NO PAIN (0)

## 2021-09-03 NOTE — PATIENT INSTRUCTIONS
Follow up:    Dr. Cruz has outlined the following steps after your recent clinic visit:    Push enteroscopy on 9-22-21 with Dr. Cruz at the [facility]    You will need a covid test prior to this procedure, no more than 4 days prior. Scheduling will reach out.     Please consume liquids/clear liquids for 36 hours prior to procedure. As discussed if you're taking full liquids, that must stop at least 8 hours prior to your procedure. Clear liquids may continue until 2 hours prior to your procedure. Preop will call with your procedure time 1-2 days prior to your procedure.      Please call with any questions or concerns regarding your clinic visit today.    It is a pleasure being involved in your health care.    Contacts post-consultation depending on your need:    Schedule Clinic Appointments            [phone] # 1   M-F 7:30 - 5 pm    Barbie Peña RN Care Coordinator  [phone]    Raghu Benito LPN    [phone]     OR Procedure Scheduling                             [phone]    My Chart is available 24 hours a day and is a secure way to access your records and communicate with your care team.  I strongly recommend signing up if you haven't already done so, if you are comfortable with computers.  If you would like to inquire about this or are having problems with My Chart access, you may call [phone] or go online at [email or web address].  Please allow at least 24 hours for a response and extra time on weekends and Holidays.

## 2021-09-03 NOTE — NURSING NOTE
"Chief Complaint   Patient presents with     Consult     Referral from Dr. Leach, discuss enteroscopy.       Vitals:    09/03/21 0939   Weight: 62.1 kg (137 lb)   Height: 1.778 m (5' 10\")       Body mass index is 19.66 kg/m .                          Ilene De Paz, EMT    "

## 2021-09-03 NOTE — PROGRESS NOTES
"    [facility]    The patient has been notified of following:     \"This video visit will be conducted via a call between you and your physician/provider. We have found that certain health care needs can be provided without the need for an in-person physical exam.  This service lets us provide the care you need with a video conversation.  If a prescription is necessary we can send it directly to your pharmacy.  If lab work is needed we can place an order for that and you can then stop by our lab to have the test done at a later time.    Video visits are billed at different rates depending on your insurance coverage.  Please reach out to your insurance provider with any questions.    If during the course of the call the physician/provider feels a video visit is not appropriate, you will not be charged for this service.\"    Patient has given verbal consent for Video visit? Yes  How would you like to obtain your AVS? My Chart  If you are dropped from the video visit, the video invite should be resent to: Cell phone  Will anyone else be joining your video visit? No  {If patient encounters technical issues they should call [phone]     Video-Visit Details    Type of service:  Video Visit    Video Start Time: 1015  Video End Time: 1100    Originating Location (pt. Location): Home    Distant Location (provider location):  [facility]     Platform used for Video Visit: Netscape    Referring provider  Fransisco Leach  Query Possible proximal small bowel stenosis at site of anastomosis    HPI  Mr Gee is a delightful 65yo gentleman with Chrons status post multiple surgeries including distal ilectomy and right hemicolectomy complicated by an anastomotic stenosis in part management by serial colonoscopy with stent placement, removal and currently replacement. He is also noted to have had a more proximal side to side anastomosis to " essentially exclude a portion of this small bowel obstructed by surrounding/extrinsic adhesions (per his description of surgery dating back to the 80s). His IBD physician, Dr Leach, believes there to be a functional component to his current complaints of mild obstructive like symptoms, though there remains a concern for proximal stenosis at the anastomosis. UGI/SBFT was without definitive finding to support a proximal narrowing.    He notes that he has issues with emptying, having discomfort soon following eating. He has improvement with various body positions. He has noted no improvement with the Miralax.    Review of Systems   ROS COMP: Constitutional, HEENT, cardiovascular, pulmonary, GI, , musculoskeletal, neuro, skin, endocrine and psych systems are negative, except as otherwise noted.    Medications  Current Outpatient Medications   Medication     calcium-vitamin D (CALTRATE) 600-400 MG-UNIT per tablet     Cyanocobalamin (VITAMIN B 12) 500 MCG TABS     ferrous sulfate (FE TABS) 325 (65 Fe) MG EC tablet     fluconazole (DIFLUCAN) 200 MG tablet     gabapentin (NEURONTIN) 300 MG capsule     HYDROcodone-acetaminophen (NORCO) 5-325 MG tablet     MELATONIN PO     multivitamin, therapeutic with minerals (MULTI-VITAMIN) TABS     traZODone (DESYREL) 50 MG tablet     vitamin B6 (PYRIDOXINE) 100 MG tablet     Vitamin D, Cholecalciferol, 25 MCG (1000 UT) CAPS     No current facility-administered medications for this visit.     Past Medical  Past Medical History:   Diagnosis Date     Anemia 2007     Anxiety 2012?    much worse since July 2014     Cataract 12/2007    both eyes, too much prednisone, implants     Coccidioidomycosis      Crohn disease (H)      Crohn's disease (H) 1/13/2015     Depressive disorder 7/2014    much worse since July 2014     Fracture 1956    green fracture of leg     Gallbladder problem 2005?    much gravel, removed     GERD (gastroesophageal reflux disease)      History of blood transfusion  1988    ulcerated anastomosis term. ilium bleed     Hypertriglyceridemia 7/8/2016     Infection 2007    persistant coccidioidomycosis     Kidney stone various    both sides, all passed naturally     Mental health problem 2007    bipolar though perhaps different     Nephrolithiasis      Osteoporosis 2007    osteoporosis, too much prednisone, last dexa 1/2014     Osteoporosis      Other nervous system complications 2007    prednisone overload induced kevin     Personal history of colonic polyps     small ones found during colonoscopies     Steroid-induced psychosis     Patient extremely sensitive to regular doses of steroids.  Unclear if this is due to chronic fluconazole use or genetic issue.  In the future, use caution when prescribing steroids.     TB (tuberculosis)      Past Surgical  Past Surgical History:   Procedure Laterality Date     APPENDECTOMY  1974    coincidental with Crohn's surgery     BACK SURGERY  12/2007    kyphoplasty on compressed 4th??? vertebra     BIOPSY  2007, 2013    lump on arm, knee, back of hand for coccidioidomycosis cult.     CHOLECYSTECTOMY  2005    much gravel, removed laparoscopically     COLONOSCOPY  7/14/2014 last    Dr. Catherine Bowden, [facility] - many previous     COLONOSCOPY Left 9/11/2015    Procedure: COMBINED COLONOSCOPY, SINGLE OR MULTIPLE BIOPSY/POLYPECTOMY BY BIOPSY;  Surgeon: Fransisco Leach MD;  [facility]     COLONOSCOPY N/A 8/3/2016    Procedure: COMBINED COLONOSCOPY, SINGLE OR MULTIPLE BIOPSY/POLYPECTOMY BY BIOPSY;  Surgeon: Fransisco Leach MD;  [facility]     COLONOSCOPY N/A 8/16/2017    Procedure: COMBINED COLONOSCOPY, SINGLE OR MULTIPLE BIOPSY/POLYPECTOMY BY BIOPSY;;  Surgeon: Fransisco Leach MD;  [facility]     COLONOSCOPY N/A 10/1/2019    Procedure: Colonoscopy With Colonic Stent Insertion;  Surgeon: Robbie Cruz MD;  [facility]     COLONOSCOPY N/A 2/5/2020    Procedure: COLONOSCOPY, FOREIGN BODY REMOVAL;  " Surgeon: Robbie Cruz MD;  [facility]     COLONOSCOPY N/A 5/26/2021    Procedure: COLONOSCOPY, WITH COLONIC STENT INSERTION;  Surgeon: Robbie Cruz MD;  [facility]     ENT SURGERY  ?    upper endoscopy     ESOPHAGOSCOPY, GASTROSCOPY, DUODENOSCOPY (EGD), COMBINED N/A 11/8/2016    Procedure: COMBINED ENDOSCOPIC ULTRASOUND, ESOPHAGOSCOPY, GASTROSCOPY, DUODENOSCOPY (EGD), FINE NEEDLE ASPIRATE/BIOPSY;  Surgeon: Guru Alexsandra Ballesteros MD;  [facility]     ESOPHAGOSCOPY, GASTROSCOPY, DUODENOSCOPY (EGD), COMBINED N/A 11/8/2016    Procedure: COMBINED ESOPHAGOSCOPY, GASTROSCOPY, DUODENOSCOPY (EGD), BIOPSY SINGLE OR MULTIPLE;  Surgeon: Guru Alexsandra Ballesteros MD;  [facility]     ESOPHAGOSCOPY, GASTROSCOPY, DUODENOSCOPY (EGD), COMBINED N/A 8/16/2017    Procedure: COMBINED ESOPHAGOSCOPY, GASTROSCOPY, DUODENOSCOPY (EGD), BIOPSY SINGLE OR MULTIPLE;;  Surgeon: Fransisco Leach MD;  [facility]     EYE SURGERY  12/2007    cataract surgery both sides     GI SURGERY  1974, 1986(x2), 1989    Crohn's, 1974 RO 18\" term. ilium + 9\" asc. colon all one pc     SOFT TISSUE SURGERY  3/2013    removed buildup on back of r hand, coccidioidomycosis     Social History  Social History     Socioeconomic History     Marital status:      Spouse name: Not on file     Number of children: Not on file     Years of education: Not on file     Highest education level: Not on file   Occupational History     Occupation: Reserach associate at the      Employer: [employer]   Tobacco Use     Smoking status: Never Smoker     Smokeless tobacco: Never Used   Substance and Sexual Activity     Alcohol use: Not Currently     Comment: sometimes one glass of wine a week     Drug use: No     Sexual activity: Never   Other Topics Concern     Parent/sibling w/ CABG, MI or angioplasty before 65F 55M? 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       Social Determinants of Health     Financial Resource Strain:      Difficulty of Paying Living Expenses:    Food Insecurity:      Worried About Running Out of Food in the Last Year:      Ran Out of Food in the Last Year:    Transportation Needs:      Lack of Transportation (Medical):      Lack of Transportation (Non-Medical):    Physical Activity:      Days of Exercise per Week:      Minutes of Exercise per Session:    Stress:      Feeling of Stress :    Social Connections:      Frequency of Communication with Friends and Family:      Frequency of Social Gatherings with Friends and Family:      Attends Confucianist Services:      Active Member of Clubs or Organizations:      Attends Club or Organization Meetings:      Marital Status:    Intimate Partner Violence:      Fear of Current or Ex-Partner:      Emotionally Abused:      Physically Abused:      Sexually Abused:      Family History  Family History   Problem Relation Age of Onset     Heart Failure Father      Musculoskeletal Disorder Father         Parkinson's     Cancer - colorectal Mother      Cerebrovascular Disease Paternal Grandmother      Cancer Other      Musculoskeletal Disorder Brother         Parkinson's     Anesthesia Reaction No family hx of      Deep Vein Thrombosis (DVT) No family hx of      Objective:  Reported vitals:  There were no vitals taken for this visit.   GEN: Healthy, alert and no distress  PSYCH: Alert and oriented times 3; coherent speech, normal rate and volume, able to articulate logical thoughts, able to abstract reason, no tangential thoughts, no hallucinations or delusions, affect seems normal  RESP: No cough, no audible wheezing, able to talk in full sentences  Remainder of exam unable to be completed due to virtual visit     Outside Imaging summaries:    Assessment and plan:  Mr Gee is a 65yo gentleman with Crohns disease status post at ileocolinic resection and an apparent side to  side proximal jejunal anastomosis to bypass an adhesive obstruction. The former is complicated by a stenosis and is being managed by placement of a lumen apposing metal stent and the latter is now thought by the patient to be associated with partial obstructive-like symptoms. While the small bowel series was without definitive finding to support this concern, this may represent a false negative, and more definitive evaluation will be organized. A push enteroscopy with adjunct use of the through the scope balloon will therefore be coordinate at [facility] with general anesthesia. This could also happen at the [facility]. I will ask that he have only liquids for at least 36h before the procedure With demonstration of stenosis, biopsies will be obtained followed by careful dilation. I do not suspect a stent will be appropriate in this situation given the high risk of migration, though this will also be considered at the time of the procedure.    It was a pleasure to participate in the care of this patient; please contact us with any further questions.  A total of at least 45 minutes was spent in evaluation with this patient, >50% of which was counseling regarding the above delineated issues.    Robbie Cruz MD PhD FACG CHAD GUZMÁN  Director of Endoscopy  Associate Professor of Medicine, Surgery and Pediatrics  Interventional and Therapeutic Endoscopy    [facility]  Division of Gastroenterology and Hepatology  [facility] [address] [place], [place] [address]    New Consultations  [phone]  Procedure Scheduling [phone]  Clinical Nurse Coordinator [phone]  Clinical Fax   [phone]  Administrative   [phone]  Administrative Fax  [phone]

## 2021-09-07 DIAGNOSIS — Z11.59 ENCOUNTER FOR SCREENING FOR OTHER VIRAL DISEASES: ICD-10-CM

## 2021-09-12 ENCOUNTER — HEALTH MAINTENANCE LETTER (OUTPATIENT)
Age: 66
End: 2021-09-12

## 2021-09-17 RX ORDER — POLYETHYLENE GLYCOL 3350, SODIUM SULFATE ANHYDROUS, SODIUM BICARBONATE, SODIUM CHLORIDE, POTASSIUM CHLORIDE 236; 22.74; 6.74; 5.86; 2.97 G/4L; G/4L; G/4L; G/4L; G/4L
POWDER, FOR SOLUTION ORAL
COMMUNITY
Start: 2021-05-19 | End: 2021-09-17

## 2021-09-17 RX ORDER — MULTIVIT WITH MINERALS/LUTEIN
1000 TABLET ORAL DAILY
COMMUNITY
End: 2023-05-30

## 2021-09-20 ENCOUNTER — LAB (OUTPATIENT)
Dept: LAB | Facility: CLINIC | Age: 66
End: 2021-09-20
Attending: INTERNAL MEDICINE
Payer: COMMERCIAL

## 2021-09-20 DIAGNOSIS — Z11.59 ENCOUNTER FOR SCREENING FOR OTHER VIRAL DISEASES: ICD-10-CM

## 2021-09-20 PROCEDURE — U0005 INFEC AGEN DETEC AMPLI PROBE: HCPCS

## 2021-09-20 PROCEDURE — U0003 INFECTIOUS AGENT DETECTION BY NUCLEIC ACID (DNA OR RNA); SEVERE ACUTE RESPIRATORY SYNDROME CORONAVIRUS 2 (SARS-COV-2) (CORONAVIRUS DISEASE [COVID-19]), AMPLIFIED PROBE TECHNIQUE, MAKING USE OF HIGH THROUGHPUT TECHNOLOGIES AS DESCRIBED BY CMS-2020-01-R: HCPCS

## 2021-09-20 NOTE — OR NURSING
Update per review by Dr. Cruz:    RE: Missing H&P / Patient Questions  Received: Today  Robbie Cruz MD Dalebroux, Deborah E, RN; Barbie Peña, RN; Milagros Stewart RN  Will do             Previous Messages       ----- Message -----   From: Gladis Garcia RN   Sent: 9/17/2021   4:26 PM CDT   To: Milagros Stewart RN, Barbie Peña, DOT, *   Subject: Missing H&P / Patient Questions                   Dear Dr. Cruz & Barbie,     On chart review, this patient is missing a PreOp H&P - I saw a virtual visit with detailed history by Dr. Cruz on 9/3/21, however I wanted to make sure that you knew pt recently had Shingles and is suffering from post-herpetic neuralgia on the Right side. Can you confirm if Dr. Cruz will update the H&P including physical exam DOS?     Also, patient is very concerned about dehydration in preparation for the procedure - I advised clear liquids including Pedialyte or Ensure Clear to assist with hydration, but wanted to ask if you could further advise the patient on this concern?     Amber Garcia RN  PreAdmission Screening  [facility]

## 2021-09-21 ENCOUNTER — ANESTHESIA EVENT (OUTPATIENT)
Dept: SURGERY | Facility: CLINIC | Age: 66
End: 2021-09-21
Payer: COMMERCIAL

## 2021-09-21 LAB — SARS-COV-2 RNA RESP QL NAA+PROBE: NEGATIVE

## 2021-09-22 ENCOUNTER — ANESTHESIA (OUTPATIENT)
Dept: SURGERY | Facility: CLINIC | Age: 66
End: 2021-09-22
Payer: COMMERCIAL

## 2021-09-22 ENCOUNTER — APPOINTMENT (OUTPATIENT)
Dept: GENERAL RADIOLOGY | Facility: CLINIC | Age: 66
End: 2021-09-22
Attending: INTERNAL MEDICINE
Payer: COMMERCIAL

## 2021-09-22 ENCOUNTER — HOSPITAL ENCOUNTER (OUTPATIENT)
Facility: CLINIC | Age: 66
Discharge: HOME | End: 2021-09-22
Attending: INTERNAL MEDICINE | Admitting: INTERNAL MEDICINE
Payer: COMMERCIAL

## 2021-09-22 VITALS
BODY MASS INDEX: 21.26 KG/M2 | SYSTOLIC BLOOD PRESSURE: 121 MMHG | WEIGHT: 143.52 LBS | HEIGHT: 69 IN | DIASTOLIC BLOOD PRESSURE: 88 MMHG | RESPIRATION RATE: 15 BRPM | HEART RATE: 79 BPM | OXYGEN SATURATION: 98 % | TEMPERATURE: 97.9 F

## 2021-09-22 DIAGNOSIS — K50.90 CROHN'S DISEASE (H): ICD-10-CM

## 2021-09-22 LAB
ALBUMIN SERPL-MCNC: 3.7 G/DL (ref 3.4–5)
ALP SERPL-CCNC: 84 U/L (ref 40–150)
ALT SERPL W P-5'-P-CCNC: 30 U/L (ref 0–70)
ANION GAP SERPL CALCULATED.3IONS-SCNC: 6 MMOL/L (ref 3–14)
AST SERPL W P-5'-P-CCNC: 17 U/L (ref 0–45)
BILIRUB SERPL-MCNC: 1 MG/DL (ref 0.2–1.3)
BUN SERPL-MCNC: 17 MG/DL (ref 7–30)
CALCIUM SERPL-MCNC: 9.4 MG/DL (ref 8.5–10.1)
CHLORIDE BLD-SCNC: 104 MMOL/L (ref 94–109)
CO2 SERPL-SCNC: 30 MMOL/L (ref 20–32)
CREAT SERPL-MCNC: 1.43 MG/DL (ref 0.66–1.25)
ERYTHROCYTE [DISTWIDTH] IN BLOOD BY AUTOMATED COUNT: 12.7 % (ref 10–15)
GFR SERPL CREATININE-BSD FRML MDRD: 51 ML/MIN/1.73M2
GLUCOSE BLD-MCNC: 92 MG/DL (ref 70–99)
GLUCOSE BLDC GLUCOMTR-MCNC: 86 MG/DL (ref 70–99)
HCT VFR BLD AUTO: 45 % (ref 40–53)
HGB BLD-MCNC: 15.3 G/DL (ref 13.3–17.7)
MCH RBC QN AUTO: 33.2 PG (ref 26.5–33)
MCHC RBC AUTO-ENTMCNC: 34 G/DL (ref 31.5–36.5)
MCV RBC AUTO: 98 FL (ref 78–100)
PLATELET # BLD AUTO: 195 10E3/UL (ref 150–450)
POTASSIUM BLD-SCNC: 4.6 MMOL/L (ref 3.4–5.3)
PROT SERPL-MCNC: 7 G/DL (ref 6.8–8.8)
RBC # BLD AUTO: 4.61 10E6/UL (ref 4.4–5.9)
SMALL BOWEL ENTEROSCOPY: NORMAL
SODIUM SERPL-SCNC: 140 MMOL/L (ref 133–144)
WBC # BLD AUTO: 4.5 10E3/UL (ref 4–11)

## 2021-09-22 PROCEDURE — 250N000011 HC RX IP 250 OP 636: Performed by: NURSE ANESTHETIST, CERTIFIED REGISTERED

## 2021-09-22 PROCEDURE — 999N000179 XR SURGERY CARM FLUORO LESS THAN 5 MIN W STILLS: Mod: TC

## 2021-09-22 PROCEDURE — 710N000010 HC RECOVERY PHASE 1, LEVEL 2, PER MIN: Performed by: INTERNAL MEDICINE

## 2021-09-22 PROCEDURE — 250N000009 HC RX 250: Performed by: NURSE ANESTHETIST, CERTIFIED REGISTERED

## 2021-09-22 PROCEDURE — 85027 COMPLETE CBC AUTOMATED: CPT | Performed by: INTERNAL MEDICINE

## 2021-09-22 PROCEDURE — 360N000083 HC SURGERY LEVEL 3 W/ FLUORO, PER MIN: Performed by: INTERNAL MEDICINE

## 2021-09-22 PROCEDURE — 710N000012 HC RECOVERY PHASE 2, PER MINUTE: Performed by: INTERNAL MEDICINE

## 2021-09-22 PROCEDURE — 272N000001 HC OR GENERAL SUPPLY STERILE: Performed by: INTERNAL MEDICINE

## 2021-09-22 PROCEDURE — 250N000009 HC RX 250: Performed by: INTERNAL MEDICINE

## 2021-09-22 PROCEDURE — 258N000003 HC RX IP 258 OP 636: Performed by: NURSE ANESTHETIST, CERTIFIED REGISTERED

## 2021-09-22 PROCEDURE — 36415 COLL VENOUS BLD VENIPUNCTURE: CPT | Performed by: INTERNAL MEDICINE

## 2021-09-22 PROCEDURE — 999N000141 HC STATISTIC PRE-PROCEDURE NURSING ASSESSMENT: Performed by: INTERNAL MEDICINE

## 2021-09-22 PROCEDURE — 250N000011 HC RX IP 250 OP 636: Performed by: INTERNAL MEDICINE

## 2021-09-22 PROCEDURE — 82040 ASSAY OF SERUM ALBUMIN: CPT | Performed by: INTERNAL MEDICINE

## 2021-09-22 PROCEDURE — 250N000025 HC SEVOFLURANE, PER MIN: Performed by: INTERNAL MEDICINE

## 2021-09-22 PROCEDURE — 370N000017 HC ANESTHESIA TECHNICAL FEE, PER MIN: Performed by: INTERNAL MEDICINE

## 2021-09-22 RX ORDER — EPHEDRINE SULFATE 50 MG/ML
INJECTION, SOLUTION INTRAMUSCULAR; INTRAVENOUS; SUBCUTANEOUS PRN
Status: DISCONTINUED | OUTPATIENT
Start: 2021-09-22 | End: 2021-09-22

## 2021-09-22 RX ORDER — SODIUM CHLORIDE, SODIUM LACTATE, POTASSIUM CHLORIDE, CALCIUM CHLORIDE 600; 310; 30; 20 MG/100ML; MG/100ML; MG/100ML; MG/100ML
INJECTION, SOLUTION INTRAVENOUS CONTINUOUS
Status: DISCONTINUED | OUTPATIENT
Start: 2021-09-22 | End: 2021-09-22 | Stop reason: HOSPADM

## 2021-09-22 RX ORDER — LIDOCAINE 40 MG/G
CREAM TOPICAL
Status: DISCONTINUED | OUTPATIENT
Start: 2021-09-22 | End: 2021-09-22 | Stop reason: HOSPADM

## 2021-09-22 RX ORDER — SODIUM CHLORIDE, SODIUM LACTATE, POTASSIUM CHLORIDE, CALCIUM CHLORIDE 600; 310; 30; 20 MG/100ML; MG/100ML; MG/100ML; MG/100ML
INJECTION, SOLUTION INTRAVENOUS CONTINUOUS PRN
Status: DISCONTINUED | OUTPATIENT
Start: 2021-09-22 | End: 2021-09-22

## 2021-09-22 RX ORDER — HYDROMORPHONE HCL IN WATER/PF 6 MG/30 ML
0.2 PATIENT CONTROLLED ANALGESIA SYRINGE INTRAVENOUS EVERY 5 MIN PRN
Status: DISCONTINUED | OUTPATIENT
Start: 2021-09-22 | End: 2021-09-22 | Stop reason: HOSPADM

## 2021-09-22 RX ORDER — FENTANYL CITRATE 50 UG/ML
25 INJECTION, SOLUTION INTRAMUSCULAR; INTRAVENOUS EVERY 5 MIN PRN
Status: DISCONTINUED | OUTPATIENT
Start: 2021-09-22 | End: 2021-09-22 | Stop reason: HOSPADM

## 2021-09-22 RX ORDER — LIDOCAINE HYDROCHLORIDE 20 MG/ML
INJECTION, SOLUTION INFILTRATION; PERINEURAL PRN
Status: DISCONTINUED | OUTPATIENT
Start: 2021-09-22 | End: 2021-09-22

## 2021-09-22 RX ORDER — ONDANSETRON 2 MG/ML
4 INJECTION INTRAMUSCULAR; INTRAVENOUS EVERY 30 MIN PRN
Status: DISCONTINUED | OUTPATIENT
Start: 2021-09-22 | End: 2021-09-22 | Stop reason: HOSPADM

## 2021-09-22 RX ORDER — OXYCODONE HYDROCHLORIDE 5 MG/1
5 TABLET ORAL EVERY 4 HOURS PRN
Status: DISCONTINUED | OUTPATIENT
Start: 2021-09-22 | End: 2021-09-22 | Stop reason: HOSPADM

## 2021-09-22 RX ORDER — ONDANSETRON 4 MG/1
4 TABLET, ORALLY DISINTEGRATING ORAL EVERY 30 MIN PRN
Status: DISCONTINUED | OUTPATIENT
Start: 2021-09-22 | End: 2021-09-22 | Stop reason: HOSPADM

## 2021-09-22 RX ORDER — ONDANSETRON 2 MG/ML
4 INJECTION INTRAMUSCULAR; INTRAVENOUS
Status: COMPLETED | OUTPATIENT
Start: 2021-09-22 | End: 2021-09-22

## 2021-09-22 RX ORDER — FENTANYL CITRATE 50 UG/ML
INJECTION, SOLUTION INTRAMUSCULAR; INTRAVENOUS PRN
Status: DISCONTINUED | OUTPATIENT
Start: 2021-09-22 | End: 2021-09-22

## 2021-09-22 RX ORDER — PROPOFOL 10 MG/ML
INJECTION, EMULSION INTRAVENOUS PRN
Status: DISCONTINUED | OUTPATIENT
Start: 2021-09-22 | End: 2021-09-22

## 2021-09-22 RX ADMIN — SODIUM CHLORIDE, POTASSIUM CHLORIDE, SODIUM LACTATE AND CALCIUM CHLORIDE: 600; 310; 30; 20 INJECTION, SOLUTION INTRAVENOUS at 14:16

## 2021-09-22 RX ADMIN — ONDANSETRON 4 MG: 2 INJECTION INTRAMUSCULAR; INTRAVENOUS at 14:38

## 2021-09-22 RX ADMIN — ROCURONIUM BROMIDE 10 MG: 10 INJECTION INTRAVENOUS at 14:43

## 2021-09-22 RX ADMIN — ROCURONIUM BROMIDE 30 MG: 10 INJECTION INTRAVENOUS at 14:20

## 2021-09-22 RX ADMIN — MIDAZOLAM 2 MG: 1 INJECTION INTRAMUSCULAR; INTRAVENOUS at 14:12

## 2021-09-22 RX ADMIN — PROPOFOL 150 MG: 10 INJECTION, EMULSION INTRAVENOUS at 14:20

## 2021-09-22 RX ADMIN — FENTANYL CITRATE 50 MCG: 50 INJECTION, SOLUTION INTRAMUSCULAR; INTRAVENOUS at 14:44

## 2021-09-22 RX ADMIN — FENTANYL CITRATE 50 MCG: 50 INJECTION, SOLUTION INTRAMUSCULAR; INTRAVENOUS at 14:20

## 2021-09-22 RX ADMIN — LIDOCAINE HYDROCHLORIDE 80 MG: 20 INJECTION, SOLUTION INFILTRATION; PERINEURAL at 14:14

## 2021-09-22 RX ADMIN — SUGAMMADEX 200 MG: 100 INJECTION, SOLUTION INTRAVENOUS at 15:15

## 2021-09-22 RX ADMIN — Medication 5 MG: at 15:02

## 2021-09-22 ASSESSMENT — MIFFLIN-ST. JEOR: SCORE: 1421.38

## 2021-09-22 NOTE — ANESTHESIA PREPROCEDURE EVALUATION
Anesthesia Pre-Procedure Evaluation    Patient: Trevin Gee   MRN: [id number] : 1955        Preoperative Diagnosis: Crohn's disease (H) [K50.90]   Procedure : Procedure(s):  ENTEROSCOPY     Past Medical History:   Diagnosis Date     Anemia      Anxiety ?    much worse since 2014     Cataract 2007    both eyes, too much prednisone, implants     Coccidioidomycosis      Crohn disease (H)      Crohn's disease (H) 2015     Depressive disorder 2014    much worse since 2014     Fracture 195    green fracture of leg     Gallbladder problem ?    much gravel, removed     GERD (gastroesophageal reflux disease)      History of blood transfusion     ulcerated anastomosis term. ilium bleed     Hypertriglyceridemia 2016     Infection     persistant coccidioidomycosis     Kidney stone various    both sides, all passed naturally     Mental health problem 2007    bipolar though perhaps different     Nephrolithiasis      Noninfectious ileitis      Osteoporosis 2007    osteoporosis, too much prednisone, last dexa 2014     Osteoporosis      Other nervous system complications 2007    prednisone overload induced kevin     Personal history of colonic polyps     small ones found during colonoscopies     Steroid-induced psychosis     Patient extremely sensitive to regular doses of steroids.  Unclear if this is due to chronic fluconazole use or genetic issue.  In the future, use caution when prescribing steroids.     TB (tuberculosis)       Past Surgical History:   Procedure Laterality Date     APPENDECTOMY  1974    coincidental with Crohn's surgery     BACK SURGERY  2007    kyphoplasty on compressed 4th??? vertebra     BIOPSY  ,     lump on arm, knee, back of hand for coccidioidomycosis cult.     CHOLECYSTECTOMY      much gravel, removed laparoscopically     COLONOSCOPY  2014 last    Dr. Catherine Bowden, [facility] - many previous     COLONOSCOPY Left  "9/11/2015    Procedure: COMBINED COLONOSCOPY, SINGLE OR MULTIPLE BIOPSY/POLYPECTOMY BY BIOPSY;  Surgeon: Fransisco Leach MD;  [facility]     COLONOSCOPY N/A 8/3/2016    Procedure: COMBINED COLONOSCOPY, SINGLE OR MULTIPLE BIOPSY/POLYPECTOMY BY BIOPSY;  Surgeon: Fransisco Leach MD;  [facility]     COLONOSCOPY N/A 8/16/2017    Procedure: COMBINED COLONOSCOPY, SINGLE OR MULTIPLE BIOPSY/POLYPECTOMY BY BIOPSY;;  Surgeon: Fransisco Leach MD;  [facility]     COLONOSCOPY N/A 10/1/2019    Procedure: Colonoscopy With Colonic Stent Insertion;  Surgeon: Robbie Cruz MD;  [facility]     COLONOSCOPY N/A 2/5/2020    Procedure: COLONOSCOPY, FOREIGN BODY REMOVAL;  Surgeon: Robbie Cruz MD;  [facility]     COLONOSCOPY N/A 5/26/2021    Procedure: COLONOSCOPY, WITH COLONIC STENT INSERTION;  Surgeon: Robbie Cruz MD;  [facility]     ENT SURGERY  ?    upper endoscopy     ESOPHAGOSCOPY, GASTROSCOPY, DUODENOSCOPY (EGD), COMBINED N/A 11/8/2016    Procedure: COMBINED ENDOSCOPIC ULTRASOUND, ESOPHAGOSCOPY, GASTROSCOPY, DUODENOSCOPY (EGD), FINE NEEDLE ASPIRATE/BIOPSY;  Surgeon: Guru Alexsandra Ballesteros MD;  [facility]     ESOPHAGOSCOPY, GASTROSCOPY, DUODENOSCOPY (EGD), COMBINED N/A 11/8/2016    Procedure: COMBINED ESOPHAGOSCOPY, GASTROSCOPY, DUODENOSCOPY (EGD), BIOPSY SINGLE OR MULTIPLE;  Surgeon: Guru Alexsandra Ballesteros MD;  [facility]     ESOPHAGOSCOPY, GASTROSCOPY, DUODENOSCOPY (EGD), COMBINED N/A 8/16/2017    Procedure: COMBINED ESOPHAGOSCOPY, GASTROSCOPY, DUODENOSCOPY (EGD), BIOPSY SINGLE OR MULTIPLE;;  Surgeon: Fransisco Leach MD;  [facility]     EYE SURGERY  12/2007    cataract surgery both sides     GI SURGERY  1974, 1986(x2), 1989    Crohn's, 1974 RO 18\" term. ilium + 9\" asc. colon all one pc     SOFT TISSUE SURGERY  3/2013    removed buildup on back of r hand, coccidioidomycosis      Allergies   Allergen Reactions "     Prednisone Other (See Comments)     Diana with more than 2.5mg chronic dosing of prednisone due to fluconazole interaction per patient.       Social History     Tobacco Use     Smoking status: Never Smoker     Smokeless tobacco: Never Used   Substance Use Topics     Alcohol use: Not Currently     Comment: sometimes one glass of wine a week      Wt Readings from Last 1 Encounters:   09/22/21 65.1 kg (143 lb 8.3 oz)        Anesthesia Evaluation            ROS/MED HX  ENT/Pulmonary:  - neg pulmonary ROS     Neurologic:  - neg neurologic ROS     Cardiovascular:     (+) Dyslipidemia -----    METS/Exercise Tolerance:     Hematologic:       Musculoskeletal: Comment: osteoporosis      GI/Hepatic:     (+) GERD, Inflammatory bowel disease,     Renal/Genitourinary:     (+) renal disease, type: CRI,     Endo:  - neg endo ROS     Psychiatric/Substance Use:     (+) psychiatric history depression     Infectious Disease:  - neg infectious disease ROS     Malignancy:       Other:            Physical Exam    Airway        Mallampati: I   TM distance: > 3 FB   Neck ROM: full     Respiratory Devices and Support         Dental  no notable dental history         Cardiovascular   cardiovascular exam normal          Pulmonary   pulmonary exam normal                OUTSIDE LABS:  CBC:   Lab Results   Component Value Date    WBC 4.5 09/22/2021    WBC 7.2 01/18/2021    HGB 15.3 09/22/2021    HGB 15.4 05/14/2021    HCT 45.0 09/22/2021    HCT 45.9 01/18/2021     09/22/2021     01/18/2021     BMP:   Lab Results   Component Value Date     09/22/2021     05/20/2021    POTASSIUM 4.6 09/22/2021    POTASSIUM 4.1 05/20/2021    CHLORIDE 104 09/22/2021    CHLORIDE 108 05/20/2021    CO2 30 09/22/2021    CO2 25 05/20/2021    BUN 17 09/22/2021    BUN 21 05/20/2021    CR 1.43 (H) 09/22/2021    CR 1.34 (H) 05/20/2021    GLC 92 09/22/2021    GLC 86 09/22/2021     COAGS:   Lab Results   Component Value Date    PTT 23 08/04/2007     INR 0.99 04/21/2019     POC:   Lab Results   Component Value Date    BGM 86 05/26/2021     HEPATIC:   Lab Results   Component Value Date    ALBUMIN 3.7 09/22/2021    PROTTOTAL 7.0 09/22/2021    ALT 30 09/22/2021    AST 17 09/22/2021    ALKPHOS 84 09/22/2021    BILITOTAL 1.0 09/22/2021     OTHER:   Lab Results   Component Value Date    PH 7.43 08/04/2007    LACT 0.7 01/18/2021    A1C 5.6 08/22/2019    YUNIER 9.4 09/22/2021    PHOS 2.7 03/18/2021    MAG 1.9 09/05/2007    LIPASE 192 01/17/2021    AMYLASE 68 11/11/2016    TSH 1.13 08/14/2020    T4 0.79 08/22/2019    CRP 8.4 (H) 01/20/2021    SED 6 01/18/2021     Mr Gee is a 67yo gentleman with Crohns status post distal ilectomy and right hemicolectomy complicated by a recalcitrant anstomotic stenosis previously managed byAxios stent placement for management. He now presents with upper GI symptoms with plans for enteroscopy to evaluate proximal bowel for prior surgical sites strictures/blocakage.   Anesthesia Plan    ASA Status:  3      Anesthesia Type: General.     - Airway: ETT   Induction: Intravenous.   Maintenance: Balanced.        Consents    Anesthesia Plan(s) and associated risks, benefits, and realistic alternatives discussed. Questions answered and patient/representative(s) expressed understanding.     - Discussed with:  Patient      - Extended Intubation/Ventilatory Support Discussed: No.      - Patient is DNR/DNI Status: No    Use of blood products discussed: No .     Postoperative Care    Pain management: IV analgesics.   PONV prophylaxis: Ondansetron (or other 5HT-3), Dexamethasone or Solumedrol     Comments:                Peter Pulido MD

## 2021-09-22 NOTE — OR NURSING
KEYANA aware patient has met PACU/phase I discharge criteria KEYANA gave okay for patient to progress to phase II and will complete sign-out.

## 2021-09-22 NOTE — ANESTHESIA POSTPROCEDURE EVALUATION
Patient: Trevin Gee    Procedure(s):  ENTEROSCOPY    Diagnosis:Crohn's disease (H) [K50.90]  Diagnosis Additional Information: No value filed.    Anesthesia Type:  General    Note:  Disposition: Outpatient   Postop Pain Control: Uneventful            Sign Out: Well controlled pain   PONV: No   Neuro/Psych: Uneventful            Sign Out: Acceptable/Baseline neuro status   Airway/Respiratory: Uneventful            Sign Out: Acceptable/Baseline resp. status   CV/Hemodynamics: Uneventful            Sign Out: Acceptable CV status; No obvious hypovolemia; No obvious fluid overload   Other NRE: NONE   DID A NON-ROUTINE EVENT OCCUR? No           Last vitals:  Vitals Value Taken Time   /77 09/22/21 1630   Temp 35  C (95  F) 09/22/21 1630   Pulse 60 09/22/21 1638   Resp 16 09/22/21 1615   SpO2 99 % 09/22/21 1638   Vitals shown include unvalidated device data.    Electronically Signed By: Rosendo Milligan MD  September 22, 2021  4:40 PM

## 2021-09-22 NOTE — ANESTHESIA CARE TRANSFER NOTE
Patient: Trevin Gee    Procedure(s):  ENTEROSCOPY    Diagnosis: Crohn's disease (H) [K50.90]  Diagnosis Additional Information: No value filed.    Anesthesia Type:   General     Note:    Oropharynx: oropharynx clear of all foreign objects and spontaneously breathing  Level of Consciousness: drowsy  Oxygen Supplementation: nasal cannula  Level of Supplemental Oxygen (L/min / FiO2): 2  Independent Airway: airway patency satisfactory and stable  Dentition: dentition unchanged  Vital Signs Stable: post-procedure vital signs reviewed and stable  Report to RN Given: handoff report given  Patient transferred to: PACU    Handoff Report: Identifed the Patient, Identified the Reponsible Provider, Reviewed the pertinent medical history, Discussed the surgical course, Reviewed Intra-OP anesthesia mangement and issues during anesthesia, Set expectations for post-procedure period and Allowed opportunity for questions and acknowledgement of understanding      Vitals:  Vitals Value Taken Time   /82    Temp     Pulse 74    Resp 14    SpO2 100        Electronically Signed By: MARISOL Lihgt CRNA  September 22, 2021  3:38 PM

## 2021-09-22 NOTE — DISCHARGE INSTRUCTIONS
[facility], [place]  Same-Day Enteroscopy Procedure  Adult Discharge Orders & Instructions     You had a procedure known as an Enteroscopy.      After your procedure   1. Resume pre-procedure diet as tolerated  2. It is okay to restart your home medications as you normally taken them.   3. You should follow up with your primary care physician and general gastroenterologist.     For 24 hours after surgery  1. Get plenty of rest.  A responsible adult must stay with you for at least 24 hours after you leave the hospital.   2. Do not drive or use heavy equipment.  If you have weakness or tingling, don't drive or use heavy equipment until this feeling goes away.  3. Do not drink alcohol.  4. Avoid strenuous or risky activities (gym, yoga, cycling, etc.).  Ask for help when climbing stairs.   5. You may feel lightheaded.  IF so, sit for a few minutes before standing.  Have someone help you get up.   6. If you have nausea (feel sick to your stomach): Drink only clear liquids such as apple juice, ginger ale, broth or 7-Up.  Rest may also help.  Be sure to drink enough fluids.  Move to a regular diet as you feel able.  7. If you feel bloated or have too much gas, use a heating pad on your belly to help reduce the discomfort. This should help you feel better.   8. You may have a slight fever. This is normal for the first 24 hours.   9. You may have a dry mouth, a sore throat, muscle aches or trouble sleeping.  These are normal and will go away after 24 hours. A sore throat is most common. Use lozenges or gargle with salt water to ease the discomfort.   10. Do not make important or legal decisions.      Call your doctor for any of the followin. Chest pain, and/or shortness of breath  2. Abdominal  pain, bloating or cramping that has not improved or does not respond to pain reliving medications (Tylenol or narcotics if prescribed)   3. Difficulty swallowing or feeling as though food or liquids  are stuck in your throat   4. Sore throat lasting more than 2 days or pain that has worsened over time   5. Black or tarry stools   6. Nausea and/or vomiting that is not resolving or has not responded to anti-nausea medications prescribed to you   7. It has been over 8 to 10 hours since surgery and you are still not able to urinate (pass water)   8. Headache for over 24 hours   9. Fever over 100.5 F (38 C) lasting more than 24 hours after the procedure   10. Signs of jaundice or blockage (fever, chills, abdominal pain, yellowing of the whites of your eyes, yellowing of your skin, and/or passing darker than normal urine)     To contact a doctor, call:   [ ] Dr Cruz at [phone] (Monday thru Friday 8:00am to 4:30pm)   [ ] [phone] and ask for the GASTROENTEROLOGY resident on call (answered 24 hours a day)   [ ] Emergency Department: [facility]: [phone]   Take it easy when you get home.  Remember, same day surgery DOES NOT MEAN SAME DAY RECOVERY!  Healing is a gradual process.  You will need some time to recover - you may be more tired than you realize at first.  Rest and relax for at least the first 24 hours at home.  You'll feel better and heal faster if you take good care of yourself.

## 2021-09-22 NOTE — ANESTHESIA PROCEDURE NOTES
Airway       Patient location during procedure: OR       Procedure Start/Stop Times: 9/22/2021 2:25 PM  Staff -        Anesthesiologist:  Peter Pulido MD       Performed By: with residents       Procedure performed by resident/fellow/CRNA in presence of a teaching physician.    Consent for Airway        Urgency: elective  Indications and Patient Condition       Indications for airway management: manas-procedural         Mask difficulty assessment: 1 - vent by mask    Final Airway Details       Final airway type: endotracheal airway       Successful airway: ETT - single  Endotracheal Airway Details        ETT size (mm): 8.0       Cuffed: yes       Cuff volume (mL): 10       Successful intubation technique: video laryngoscopy       VL Blade Size: Glidescope 3 (for teaching)       Grade View of Cords: 1       Adjucts: stylet       Position: Right       Measured from: gums/teeth       Secured at (cm): 24       Bite block used: None    Post intubation assessment        Number of attempts at approach: 1       Number of other approaches attempted: 0       Secured with: pink tape       Ease of procedure: easy       Dentition: Intact and Unchanged

## 2021-09-22 NOTE — H&P
[facility] History and Physical    Trevin Gee MRN# [id number]   Age: 66 year old YOB: 1955     Date of Admission:  9/22/2021      Primary care provider: Lacey Feng          Assessment and Plan:   Assessment:    Mr Gee is a 65yo gentleman with Crohns status post distal ilectomy and right hemicolectomy complicated by a recalcitrant anstomotic stenosis previously managed by[employer] stent placement for management. He now presents with upper GI symptoms with plans for enteroscopy to evaluate proximal bowel for prior surgical sites strictures/blocakage.     Patient Active Problem List    Diagnosis Date Noted     Dehydration 06/09/2021     Priority: Medium     Chronic kidney disease, stage 3 04/13/2021     Priority: Medium     Partial small bowel obstruction (H) 01/19/2021     Priority: Medium     Abdominal cramping 01/17/2021     Priority: Medium     Colorectal anastomotic stricture 10/25/2019     Priority: Medium     Added automatically from request for surgery [id number]       Abdominal pain 04/17/2019     Priority: Medium     Esophageal reflux 08/24/2018     Priority: Medium     Vertebral fracture, osteoporotic (H) 01/13/2017     Priority: Medium     Lactose intolerance 08/22/2016     Priority: Medium     Hypertriglyceridemia 07/08/2016     Priority: Medium     Hx of psychiatric care 06/07/2016     Priority: Medium       PAST MED TRIALS   1994 started Citalopram, wasn't clearly helpful due to fluctuating life circumstances  2004 switched from citalopram escitaloram, wasn't clearly helpful due to fluctuating life circumstances.  2008 switched from escitalopram to venlafaxine. At some point bupropion was added and he felt concerned it was worsening anxiety, so it was stopped. Aripiprazole was also tried without good benefit. Tried coming off venlafaxine early 2014 and come off too quickly with discontinuation SE, so stayed on it until August 2015 when he tapered completely off -  "due to perceived inefficacy.     Clonazepam  Lamotrigine stopped due to perceived inefficacy sometime after 2007  Quetiapine (max dose 400mg) - significant daytime sedation on 400 mg, 200 mg was \"bearable\" when not working  Risperidone after 2nd hospitalization which worked well. Then stopped at some point. After trying the 2nd time --> constipation.  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    History is obtained from the patient           Past Medical History:     Past Medical History:   Diagnosis Date     Anemia 2007     Anxiety 2012?    much worse since July 2014     Cataract 12/2007    both eyes, too much prednisone, implants     Coccidioidomycosis      Crohn disease (H)      Crohn's disease (H) 1/13/2015     Depressive disorder 7/2014    much worse since July 2014     Fracture 1956    green fracture of leg     Gallbladder problem 2005?    much gravel, removed     GERD (gastroesophageal reflux disease)      History of blood transfusion 1988    " ulcerated anastomosis term. ilium bleed     Hypertriglyceridemia 7/8/2016     Infection 2007    persistant coccidioidomycosis     Kidney stone various    both sides, all passed naturally     Mental health problem 2007    bipolar though perhaps different     Nephrolithiasis      Noninfectious ileitis      Osteoporosis 2007    osteoporosis, too much prednisone, last dexa 1/2014     Osteoporosis      Other nervous system complications 2007    prednisone overload induced kevin     Personal history of colonic polyps     small ones found during colonoscopies     Steroid-induced psychosis     Patient extremely sensitive to regular doses of steroids.  Unclear if this is due to chronic fluconazole use or genetic issue.  In the future, use caution when prescribing steroids.     TB (tuberculosis)              Past Surgical History:     Past Surgical History:   Procedure Laterality Date     APPENDECTOMY  1974    coincidental with Crohn's surgery     BACK SURGERY  12/2007    kyphoplasty on compressed 4th??? vertebra     BIOPSY  2007, 2013    lump on arm, knee, back of hand for coccidioidomycosis cult.     CHOLECYSTECTOMY  2005    much gravel, removed laparoscopically     COLONOSCOPY  7/14/2014 last    Dr. Catherine Bowden, [facility] [place] - many previous     COLONOSCOPY Left 9/11/2015    Procedure: COMBINED COLONOSCOPY, SINGLE OR MULTIPLE BIOPSY/POLYPECTOMY BY BIOPSY;  Surgeon: Fransisco Leach MD;  [facility]     COLONOSCOPY N/A 8/3/2016    Procedure: COMBINED COLONOSCOPY, SINGLE OR MULTIPLE BIOPSY/POLYPECTOMY BY BIOPSY;  Surgeon: Fransisco Leach MD;  [facility]     COLONOSCOPY N/A 8/16/2017    Procedure: COMBINED COLONOSCOPY, SINGLE OR MULTIPLE BIOPSY/POLYPECTOMY BY BIOPSY;;  Surgeon: Fransisco Leach MD;  Location:  OR     COLONOSCOPY N/A 10/1/2019    Procedure: Colonoscopy With Colonic Stent Insertion;  Surgeon: Robbie Cruz MD;  [facility]     COLONOSCOPY N/A 2/5/2020     "Procedure: COLONOSCOPY, FOREIGN BODY REMOVAL;  Surgeon: Robbie Cruz MD;  [facility]     COLONOSCOPY N/A 5/26/2021    Procedure: COLONOSCOPY, WITH COLONIC STENT INSERTION;  Surgeon: Robbie Cruz MD;  [facility]     ENT SURGERY  ?    upper endoscopy     ESOPHAGOSCOPY, GASTROSCOPY, DUODENOSCOPY (EGD), COMBINED N/A 11/8/2016    Procedure: COMBINED ENDOSCOPIC ULTRASOUND, ESOPHAGOSCOPY, GASTROSCOPY, DUODENOSCOPY (EGD), FINE NEEDLE ASPIRATE/BIOPSY;  Surgeon: Guru Alexsandra Ballesteros MD;  [facility]     ESOPHAGOSCOPY, GASTROSCOPY, DUODENOSCOPY (EGD), COMBINED N/A 11/8/2016    Procedure: COMBINED ESOPHAGOSCOPY, GASTROSCOPY, DUODENOSCOPY (EGD), BIOPSY SINGLE OR MULTIPLE;  Surgeon: Guru Alexsandra Ballesteros MD;  [facility]     ESOPHAGOSCOPY, GASTROSCOPY, DUODENOSCOPY (EGD), COMBINED N/A 8/16/2017    Procedure: COMBINED ESOPHAGOSCOPY, GASTROSCOPY, DUODENOSCOPY (EGD), BIOPSY SINGLE OR MULTIPLE;;  Surgeon: Fransisco Leach MD;  [facility]     EYE SURGERY  12/2007    cataract surgery both sides     GI SURGERY  1974, 1986(x2), 1989    Crohn's, 1974 RO 18\" term. ilium + 9\" asc. colon all one pc     SOFT TISSUE SURGERY  3/2013    removed buildup on back of r hand, coccidioidomycosis                           Social History:     Social History     Tobacco Use     Smoking status: Never Smoker     Smokeless tobacco: Never Used   Substance Use Topics     Alcohol use: Not Currently     Comment: sometimes one glass of wine a week             Family History:     Family History   Problem Relation Age of Onset     Heart Failure Father      Musculoskeletal Disorder Father         Parkinson's     Cancer - colorectal Mother      Cerebrovascular Disease Paternal Grandmother      Cancer Other      Musculoskeletal Disorder Brother         Parkinson's     Anesthesia Reaction No family hx of      Deep Vein Thrombosis (DVT) No family hx of              Immunizations: " "    VACCINE/DOSE   Diptheria   DPT   DTAP   HBIG   Hepatitis A   Hepatitis B   HIB   Influenza   Measles   Meningococcal   MMR   Mumps   Pneumococcal   Polio   Rubella   Small Pox   TDAP   Varicella   Zoster            Allergies:     Allergies   Allergen Reactions     Prednisone Other (See Comments)     Diana with more than 2.5mg chronic dosing of prednisone due to fluconazole interaction per patient.              Medications:     No current facility-administered medications for this encounter.             Review of Systems:   CONSTITUTIONAL: NEGATIVE for fever, chills, change in weight  INTEGUMENTARY/SKIN: NEGATIVE for worrisome rashes, moles or lesions  EYES: NEGATIVE for vision changes or irritation  ENT/MOUTH: NEGATIVE for ear, mouth and throat problems  RESP: NEGATIVE for significant cough or SOB  BREAST: NEGATIVE for masses, tenderness or discharge  CV: NEGATIVE for chest pain, palpitations or peripheral edema  GI: NEGATIVE for nausea, abdominal pain, heartburn, or change in bowel habits  : NEGATIVE for frequency, dysuria, or hematuria  MUSCULOSKELETAL: NEGATIVE for significant arthralgias or myalgia  NEURO: NEGATIVE for weakness, dizziness or paresthesias  ENDOCRINE: NEGATIVE for temperature intolerance, skin/hair changes  HEME: NEGATIVE for bleeding problems  PSYCHIATRIC: NEGATIVE for changes in mood or affect          Physical Exam:     Patient Vitals for the past 8 hrs:   BP Temp Temp src Pulse Resp SpO2 Height Weight   09/22/21 1251 126/88 97.8  F (36.6  C) Oral 82 16 97 % 1.753 m (5' 9\") 65.1 kg (143 lb 8.3 oz)     Constitutional:   awake, alert, cooperative, no apparent distress, and appears stated age     Neck:   Supple, symmetrical, trachea midline, no adenopathy, thyroid symmetric, not enlarged and no tenderness, skin normal     Lungs:   No increased work of breathing, good air exchange, clear to auscultation bilaterally, no crackles or wheezing     Cardiovascular:   Normal apical impulse, regular " rate and rhythm, normal S1 and S2, no S3 or S4, and no murmur noted       Abdomen:   normal bowel sounds, soft, non-distended, non-tender, no masses palpated, no hepatosplenomegally            Data:     Results for orders placed or performed during the hospital encounter of 09/22/21   Glucose by meter     Status: Normal   Result Value Ref Range    GLUCOSE BY METER POCT 86 70 - 99 mg/dL       Attestation:  I have reviewed today's vital signs, notes, medications, labs and imaging.  Amount of time performed on this history and physical: 35 minutes.    Rayne Morrison MD

## 2021-09-22 NOTE — OR NURSING
Dr. Cruz at bedside, does not need to see patient again in phase II unless patient has any further questions.

## 2021-09-23 ENCOUNTER — PATIENT OUTREACH (OUTPATIENT)
Dept: GASTROENTEROLOGY | Facility: CLINIC | Age: 66
End: 2021-09-23

## 2021-11-01 LAB — INTERPRETATION ECG - MUSE: NORMAL

## 2021-11-03 ENCOUNTER — PATIENT OUTREACH (OUTPATIENT)
Dept: GASTROENTEROLOGY | Facility: CLINIC | Age: 66
End: 2021-11-03

## 2021-11-03 NOTE — TELEPHONE ENCOUNTER
Patient left a message that he thinks he is due to have his stent removed  Contacted patient to let him know received his message and will route to Ms Peña

## 2021-11-11 ENCOUNTER — MYC MEDICAL ADVICE (OUTPATIENT)
Dept: DERMATOLOGY | Facility: CLINIC | Age: 66
End: 2021-11-11
Payer: COMMERCIAL

## 2021-11-15 ENCOUNTER — VIRTUAL VISIT (OUTPATIENT)
Dept: DERMATOLOGY | Facility: CLINIC | Age: 66
End: 2021-11-15
Attending: NURSE PRACTITIONER
Payer: COMMERCIAL

## 2021-11-15 DIAGNOSIS — B02.29 POST HERPETIC NEURALGIA: Primary | ICD-10-CM

## 2021-11-15 PROCEDURE — 99203 OFFICE O/P NEW LOW 30 MIN: CPT | Mod: TEL | Performed by: DERMATOLOGY

## 2021-11-15 RX ORDER — GABAPENTIN 300 MG/1
600 CAPSULE ORAL 3 TIMES DAILY
Qty: 180 CAPSULE | Refills: 1 | Status: SHIPPED | OUTPATIENT
Start: 2021-11-15 | End: 2022-01-25

## 2021-11-15 ASSESSMENT — PAIN SCALES - GENERAL: PAINLEVEL: NO PAIN (1)

## 2021-11-15 NOTE — LETTER
11/15/2021       RE: Trevin Gee  [address]  [address]  [place] [address]     Dear Colleague,    Thank you for referring your patient, Trevin Gee, to the [facility] [place] at . Please see a copy of my visit note below.    [facility] Dermatology Note  Encounter Date: Nov 15, 2021  [employer] and Telephone ([phone]). Location of teledermatologist: [facility] [place].  Start time: 12:57. End time: 1:29.    Dermatology Problem List:  1. Post-herpetic neuralgia: T5, 5/2021 with initial episode of zoster  - gabapentin 600 mg TID  - VZV vaccination pending  ____________________________________________    Assessment & Plan:     1. Post-herpetic neuralgia: seeing some improvement with gabapentin but not complete resolution of symptoms. We discussed variable tapering schedules as tolerated, as well as long-term prognosis for PHN. I do recommend VZV vaccination; since the current vaccine is inactivated, it should not pose an issue with reactivation.  - gabapentin 600 mg TID - taper as tolerated  - VZV vaccination per PCP    Procedures Performed:    None    Follow-up: as needed    Staff:     Jose Martin Cheney MD, FAAD   of Dermatology  Department of Dermatology  [facility]    ____________________________________________    CC: Derm Problem (Trevin would like to discuss post shingles pain. He states it has been stable with gabapentin.)    HPI:  Mr. Trevin Gee is a(n) 66 year old male who presents today as a new patient for post-herpetic neuralgia    Shingles - had rash 5/26/21 - surface pain  - now pain is beneath the surface - present for several months - feels associated with nerves  - previously fairly localized, then seemed like was spreading  - eating lysine-rich diet which has been helpful   - now  having some IBS symptoms  - gabapentin not working quite as well as it had been - taking 600 mg TID - sometimes doses delayed but overall doing okay  - more on sides rather than front/back  - had tried tapering but symptoms have been prohibitive of lower doses    Patient is otherwise feeling well, without additional skin concerns.    Labs Reviewed:  N/A    Physical Exam:  Vitals: There were no vitals taken for this visit.  SKIN: Teledermatology photos were reviewed; image quality and interpretability: acceptable. Image date: 11/11/21.  - pink macules along T5 dermatome  - No other lesions of concern on areas examined.     Medications:  Current Outpatient Medications   Medication     calcium-vitamin D (CALTRATE) 600-400 MG-UNIT per tablet     Cyanocobalamin (VITAMIN B 12) 500 MCG TABS     ferrous sulfate (FE TABS) 325 (65 Fe) MG EC tablet     fluconazole (DIFLUCAN) 200 MG tablet     HYDROcodone-acetaminophen (NORCO) 5-325 MG tablet     Lysine 1000 MG TABS     MELATONIN PO     multivitamin, therapeutic with minerals (MULTI-VITAMIN) TABS     traZODone (DESYREL) 50 MG tablet     vitamin B6 (PYRIDOXINE) 100 MG tablet     vitamin C (ASCORBIC ACID) 1000 MG TABS     Vitamin D, Cholecalciferol, 25 MCG (1000 UT) CAPS     gabapentin (NEURONTIN) 300 MG capsule     No current facility-administered medications for this visit.      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    much worse since July 2014     Cataract 12/2007    both eyes, too much prednisone, implants     Coccidioidomycosis      Crohn disease (H)      Crohn's disease (H) 1/13/2015     Depressive disorder 7/2014    much worse since July 2014     Fracture 1956    green fracture of leg     Gallbladder problem 2005?    much gravel, removed     GERD (gastroesophageal reflux disease)      History of blood transfusion 1988    ulcerated anastomosis term. ilium bleed     Hypertriglyceridemia 7/8/2016     Infection 2007    persistant coccidioidomycosis     Kidney stone various    both sides, all passed naturally     Mental health problem 2007    bipolar though perhaps different     Nephrolithiasis      Noninfectious ileitis      Osteoporosis 2007    osteoporosis, too much prednisone, last dexa 1/2014     Osteoporosis      Other nervous system complications 2007    prednisone overload induced kevin     Personal history of colonic polyps     small ones found during colonoscopies     Steroid-induced psychosis     Patient extremely sensitive to regular doses of steroids.  Unclear if this is due to chronic fluconazole use or genetic issue.  In the future, use caution when prescribing steroids.     TB (tuberculosis)        CC Lacey Feng, APRN CNP  [address] [place] [address] on close of this encounter.

## 2021-11-15 NOTE — NURSING NOTE
Dermatology Rooming Note    Trevin Gee's goals for this visit include:   Chief Complaint   Patient presents with     Derm Problem     Trevin would like to discuss post shingles pain. He states it has been stable with gabapentin.     Dave Galeano, CMA

## 2021-11-15 NOTE — PROGRESS NOTES
[facility] Dermatology Note  Encounter Date: Nov 15, 2021  Store-and-Forward and Telephone ([phone]). Location of teledermatologist: [facility] [place].  Start time: 12:57. End time: 1:29.    Dermatology Problem List:  1. Post-herpetic neuralgia: T5, 5/2021 with initial episode of zoster  - gabapentin 600 mg TID  - VZV vaccination pending  ____________________________________________    Assessment & Plan:     1. Post-herpetic neuralgia: seeing some improvement with gabapentin but not complete resolution of symptoms. We discussed variable tapering schedules as tolerated, as well as long-term prognosis for PHN. I do recommend VZV vaccination; since the current vaccine is inactivated, it should not pose an issue with reactivation.  - gabapentin 600 mg TID - taper as tolerated  - VZV vaccination per PCP    Procedures Performed:    None    Follow-up: as needed    Staff:     Jose Martin Cheney MD, FAAD   of Dermatology  Department of Dermatology  [facility] [employer]    ____________________________________________    CC: Derm Problem (Trevin would like to discuss post shingles pain. He states it has been stable with gabapentin.)    HPI:  Mr. Trevin Gee is a(n) 66 year old male who presents today as a new patient for post-herpetic neuralgia    Shingles - had rash 5/26/21 - surface pain  - now pain is beneath the surface - present for several months - feels associated with nerves  - previously fairly localized, then seemed like was spreading  - eating lysine-rich diet which has been helpful   - now having some IBS symptoms  - gabapentin not working quite as well as it had been - taking 600 mg TID - sometimes doses delayed but overall doing okay  - more on sides rather than front/back  - had tried tapering but symptoms have been prohibitive of lower doses    Patient is otherwise feeling well, without additional skin  concerns.    Labs Reviewed:  N/A    Physical Exam:  Vitals: There were no vitals taken for this visit.  SKIN: Teledermatology photos were reviewed; image quality and interpretability: acceptable. Image date: 11/11/21.  - pink macules along T5 dermatome  - No other lesions of concern on areas examined.     Medications:  Current Outpatient Medications   Medication     calcium-vitamin D (CALTRATE) 600-400 MG-UNIT per tablet     Cyanocobalamin (VITAMIN B 12) 500 MCG TABS     ferrous sulfate (FE TABS) 325 (65 Fe) MG EC tablet     fluconazole (DIFLUCAN) 200 MG tablet     HYDROcodone-acetaminophen (NORCO) 5-325 MG tablet     Lysine 1000 MG TABS     MELATONIN PO     multivitamin, therapeutic with minerals (MULTI-VITAMIN) TABS     traZODone (DESYREL) 50 MG tablet     vitamin B6 (PYRIDOXINE) 100 MG tablet     vitamin C (ASCORBIC ACID) 1000 MG TABS     Vitamin D, Cholecalciferol, 25 MCG (1000 UT) CAPS     gabapentin (NEURONTIN) 300 MG capsule     No current facility-administered medications for this visit.      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    much worse since July 2014     Cataract 12/2007    both eyes, too much prednisone, implants     Coccidioidomycosis      Crohn disease (H)      Crohn's disease (H) 1/13/2015     Depressive disorder 7/2014    much  worse since July 2014     Fracture 1956    green fracture of leg     Gallbladder problem 2005?    much gravel, removed     GERD (gastroesophageal reflux disease)      History of blood transfusion 1988    ulcerated anastomosis term. ilium bleed     Hypertriglyceridemia 7/8/2016     Infection 2007    persistant coccidioidomycosis     Kidney stone various    both sides, all passed naturally     Mental health problem 2007    bipolar though perhaps different     Nephrolithiasis      Noninfectious ileitis      Osteoporosis 2007    osteoporosis, too much prednisone, last dexa 1/2014     Osteoporosis      Other nervous system complications 2007    prednisone overload induced kevin     Personal history of colonic polyps     small ones found during colonoscopies     Steroid-induced psychosis     Patient extremely sensitive to regular doses of steroids.  Unclear if this is due to chronic fluconazole use or genetic issue.  In the future, use caution when prescribing steroids.     TB (tuberculosis)        CC Lacey Feng, APRN CNP  [address] [place] [address] on close of this encounter.

## 2021-11-17 ENCOUNTER — TELEPHONE (OUTPATIENT)
Dept: ENDOCRINOLOGY | Facility: CLINIC | Age: 66
End: 2021-11-17
Payer: COMMERCIAL

## 2021-11-17 NOTE — LETTER
Patient:  Trevin Gee  :   1955  MRN:     [id number]        Mr.Mark MORENITA Gee  [address]  [address] [place] [id number]        2021    Dear ,    I see that you do not have a follow-up appointment in endocrinology. I would recommend that you be evaluated by an endocrinologist to minimize complications. If you like to be seen at the [facility], please call [phone] select option #1 for an appointment.    Regards    Mag Currie MD

## 2021-11-19 ENCOUNTER — TELEPHONE (OUTPATIENT)
Dept: INTERNAL MEDICINE | Facility: CLINIC | Age: 66
End: 2021-11-19
Payer: COMMERCIAL

## 2021-11-19 NOTE — TELEPHONE ENCOUNTER
[facility] Call Center    Phone Message    May a detailed message be left on voicemail: yes     Reason for Call: Other: Patient wondering if he can have his COVID booster shot during his 11/29/2021 nurse visit. Patient is also getting a flu shot and shingles shot at the visit as well. Patient was routed to the COVID Nurse Line to discuss further.     Action Taken: Message routed to:  [facility]    Travel Screening: Not Applicable

## 2021-11-19 NOTE — TELEPHONE ENCOUNTER
Spoke to patient.  Covid booster cannot be done at the nurse visit, however, this can be done at 1st floor pharmacy for covid booster, appt needed.   Patient was given the impression from the call center booster cannot be done.  So he scheduled with [facility] for the vaccine.       Pharmacy number given to patient if he changes his mind.      Cm Razo CMA ([facility]) at 2:51 PM on 11/19/2021

## 2021-11-22 ENCOUNTER — PATIENT OUTREACH (OUTPATIENT)
Dept: GASTROENTEROLOGY | Facility: CLINIC | Age: 66
End: 2021-11-22
Payer: COMMERCIAL

## 2021-11-22 ENCOUNTER — PREP FOR PROCEDURE (OUTPATIENT)
Dept: GASTROENTEROLOGY | Facility: CLINIC | Age: 66
End: 2021-11-22
Payer: COMMERCIAL

## 2021-11-22 DIAGNOSIS — B38.7 COCCIDIOIDAL GRANULOMA: ICD-10-CM

## 2021-11-22 DIAGNOSIS — K56.699 COLONIC STRICTURE (H): Primary | ICD-10-CM

## 2021-11-22 RX ORDER — FLUCONAZOLE 200 MG/1
200 TABLET ORAL DAILY
Qty: 90 TABLET | Refills: 0 | Status: SHIPPED | OUTPATIENT
Start: 2021-11-22 | End: 2021-12-14

## 2021-11-22 NOTE — TELEPHONE ENCOUNTER
Pt called in to schedule procedure r/t colonic stent.     Per procedure note 21  Gaye, plan will be to keep the stent in situ for  6m with removal by repeat colonoscopy using deep sedation    Please assist in scheduling:     Procedure/Imaging/Clinic: colonoscopy  Physician: Dr. Cruz  Timin/6  Procedure length:50 min  Anesthesia:MAC  Dx: colonic stricture  Tier:2  Location: [facility]       Called to discuss with patient. Explained they can expect a call from  for date and time of procedure, will need a , someone to stay with them for 24 hours and should stay in town for 24 hours (within 45 min of Hospital) post procedure    Patient needs to get pre-op physical completed. If outside [facility] system will need physical faxed to number [phone]   If you do not get a preop physical, your procedure could be cancelled, patient voiced understanding*    Preop Plan: PCP will do    Med Review    Blood thinner -  no  ASA - no  Diabetic - no    COVID test discussed: no earlier than     Patient Education r/t procedure:prep directions sent via [employer]    Does patient have any history of gastric bypass/gastric surgery/altered panc/bili anatomy?no    A pre-op nurse will call 1-2 days prior to the procedure. I    NPO/Prep: CLD prior to procedure, prep directions sent    Other specific details/comments:none     Verbalized understanding of all instructions. All questions answered.

## 2021-11-23 DIAGNOSIS — K50.012 CROHN'S DISEASE OF SMALL INTESTINE WITH INTESTINAL OBSTRUCTION (H): Primary | ICD-10-CM

## 2021-11-23 RX ORDER — BISACODYL 5 MG
15 TABLET, DELAYED RELEASE (ENTERIC COATED) ORAL ONCE
Qty: 4 TABLET | Refills: 0 | Status: SHIPPED | OUTPATIENT
Start: 2021-11-23 | End: 2021-11-23

## 2021-11-23 NOTE — TELEPHONE ENCOUNTER
FUTURE VISIT INFORMATION      SURGERY INFORMATION:    Date: 21    Location:  OR     Surgeon:  Robbie Cruz MD    Anesthesia Type:  MAC    Procedure: COLONOSCOPY    RECORDS REQUESTED FROM:        Primary Care Provider: Lacey Feng APRN [facility]    Most recent EKG+ Tracin21    Most recent PFT's: 11/3/2005

## 2021-11-29 DIAGNOSIS — Z11.59 ENCOUNTER FOR SCREENING FOR OTHER VIRAL DISEASES: ICD-10-CM

## 2021-12-02 ENCOUNTER — LAB (OUTPATIENT)
Dept: LAB | Facility: CLINIC | Age: 66
End: 2021-12-02
Payer: COMMERCIAL

## 2021-12-02 ENCOUNTER — OFFICE VISIT (OUTPATIENT)
Dept: SURGERY | Facility: CLINIC | Age: 66
End: 2021-12-02
Payer: COMMERCIAL

## 2021-12-02 ENCOUNTER — PRE VISIT (OUTPATIENT)
Dept: SURGERY | Facility: CLINIC | Age: 66
End: 2021-12-02

## 2021-12-02 ENCOUNTER — ANESTHESIA EVENT (OUTPATIENT)
Dept: SURGERY | Facility: CLINIC | Age: 66
End: 2021-12-02
Payer: COMMERCIAL

## 2021-12-02 VITALS
BODY MASS INDEX: 22.62 KG/M2 | TEMPERATURE: 97.2 F | OXYGEN SATURATION: 98 % | SYSTOLIC BLOOD PRESSURE: 121 MMHG | DIASTOLIC BLOOD PRESSURE: 75 MMHG | HEIGHT: 69 IN | WEIGHT: 152.7 LBS | RESPIRATION RATE: 16 BRPM | HEART RATE: 74 BPM

## 2021-12-02 DIAGNOSIS — Z01.818 PREOP EXAMINATION: ICD-10-CM

## 2021-12-02 DIAGNOSIS — K56.699 COLONIC STRICTURE (H): ICD-10-CM

## 2021-12-02 DIAGNOSIS — Z01.818 PREOP EXAMINATION: Primary | ICD-10-CM

## 2021-12-02 DIAGNOSIS — Z11.59 ENCOUNTER FOR SCREENING FOR OTHER VIRAL DISEASES: ICD-10-CM

## 2021-12-02 LAB
ANION GAP SERPL CALCULATED.3IONS-SCNC: 2 MMOL/L (ref 3–14)
BASOPHILS # BLD AUTO: 0 10E3/UL (ref 0–0.2)
BASOPHILS NFR BLD AUTO: 1 %
BUN SERPL-MCNC: 16 MG/DL (ref 7–30)
CALCIUM SERPL-MCNC: 9.1 MG/DL (ref 8.5–10.1)
CHLORIDE BLD-SCNC: 109 MMOL/L (ref 94–109)
CO2 SERPL-SCNC: 29 MMOL/L (ref 20–32)
CREAT SERPL-MCNC: 1.36 MG/DL (ref 0.66–1.25)
EOSINOPHIL # BLD AUTO: 0.2 10E3/UL (ref 0–0.7)
EOSINOPHIL NFR BLD AUTO: 3 %
ERYTHROCYTE [DISTWIDTH] IN BLOOD BY AUTOMATED COUNT: 12.3 % (ref 10–15)
GFR SERPL CREATININE-BSD FRML MDRD: 54 ML/MIN/1.73M2
GLUCOSE BLD-MCNC: 103 MG/DL (ref 70–99)
HCT VFR BLD AUTO: 44.6 % (ref 40–53)
HGB BLD-MCNC: 15 G/DL (ref 13.3–17.7)
IMM GRANULOCYTES # BLD: 0 10E3/UL
IMM GRANULOCYTES NFR BLD: 1 %
LYMPHOCYTES # BLD AUTO: 1.3 10E3/UL (ref 0.8–5.3)
LYMPHOCYTES NFR BLD AUTO: 24 %
MCH RBC QN AUTO: 32.8 PG (ref 26.5–33)
MCHC RBC AUTO-ENTMCNC: 33.6 G/DL (ref 31.5–36.5)
MCV RBC AUTO: 97 FL (ref 78–100)
MONOCYTES # BLD AUTO: 0.4 10E3/UL (ref 0–1.3)
MONOCYTES NFR BLD AUTO: 8 %
NEUTROPHILS # BLD AUTO: 3.3 10E3/UL (ref 1.6–8.3)
NEUTROPHILS NFR BLD AUTO: 63 %
NRBC # BLD AUTO: 0 10E3/UL
NRBC BLD AUTO-RTO: 0 /100
PLATELET # BLD AUTO: 231 10E3/UL (ref 150–450)
POTASSIUM BLD-SCNC: 4.7 MMOL/L (ref 3.4–5.3)
RBC # BLD AUTO: 4.58 10E6/UL (ref 4.4–5.9)
SARS-COV-2 RNA RESP QL NAA+PROBE: NEGATIVE
SODIUM SERPL-SCNC: 140 MMOL/L (ref 133–144)
WBC # BLD AUTO: 5.2 10E3/UL (ref 4–11)

## 2021-12-02 PROCEDURE — 36415 COLL VENOUS BLD VENIPUNCTURE: CPT | Performed by: PATHOLOGY

## 2021-12-02 PROCEDURE — 99204 OFFICE O/P NEW MOD 45 MIN: CPT | Performed by: PHYSICIAN ASSISTANT

## 2021-12-02 PROCEDURE — U0003 INFECTIOUS AGENT DETECTION BY NUCLEIC ACID (DNA OR RNA); SEVERE ACUTE RESPIRATORY SYNDROME CORONAVIRUS 2 (SARS-COV-2) (CORONAVIRUS DISEASE [COVID-19]), AMPLIFIED PROBE TECHNIQUE, MAKING USE OF HIGH THROUGHPUT TECHNOLOGIES AS DESCRIBED BY CMS-2020-01-R: HCPCS | Mod: 90 | Performed by: PATHOLOGY

## 2021-12-02 PROCEDURE — 80048 BASIC METABOLIC PNL TOTAL CA: CPT | Performed by: PATHOLOGY

## 2021-12-02 PROCEDURE — U0005 INFEC AGEN DETEC AMPLI PROBE: HCPCS | Mod: 90 | Performed by: PATHOLOGY

## 2021-12-02 PROCEDURE — 85025 COMPLETE CBC W/AUTO DIFF WBC: CPT | Performed by: PATHOLOGY

## 2021-12-02 ASSESSMENT — LIFESTYLE VARIABLES: TOBACCO_USE: 0

## 2021-12-02 ASSESSMENT — ENCOUNTER SYMPTOMS: SEIZURES: 0

## 2021-12-02 ASSESSMENT — PAIN SCALES - GENERAL: PAINLEVEL: NO PAIN (0)

## 2021-12-02 ASSESSMENT — MIFFLIN-ST. JEOR: SCORE: 1463.02

## 2021-12-02 NOTE — H&P (VIEW-ONLY)
Pre-Operative H & P     CC:  Preoperative exam to assess for increased cardiopulmonary risk while undergoing surgery and anesthesia.    Date of Encounter: 12/2/2021  Primary Care Physician:  Lacey Feng     Reason for visit:   Encounter Diagnoses   Name Primary?     Colonic stricture (H) Yes     Preop examination        HPI  Trevin Gee is a 65 y/o male who presents for pre-operative H&P in preparation for COLONOSCOPY with Robbie Cruz MD on 12/6/21 at [facility] for treatment of Colonic stricture (H). Patient is being evaluated for comorbid conditions of dyslipidemia, anemia, h/o blood transfusion, GERD, Crohn s, anxiety, depression, bipolar disorder, CKD, osteoporosis, cataract.     Mr. Gee has a history of Crohn s disease s/p multiple surgeries including distal ilectomy and right hemicolectomy complicated by an anastomotic stenosis in part management by serial colonoscopy with stent placement, removal and currently replacement. He is also noted to have had a more proximal side to side anastomosis to essentially exclude a portion of this small bowel obstructed by surrounding/extrinsic adhesions (per his description of surgery dating back to the 80s). His IBD physician, Dr Leach, believes there to be a functional component to his current complaints of mild obstructive like symptoms, though there remains a concern for proximal stenosis at the anastomosis. UGI/SBFT was without definitive finding to support a proximal narrowing. He now presents for the above procedure.    History was obtained from patient & chart review.     Hx of abnormal bleeding or anti-platelet use: denies    Menstrual history: No LMP for male patient.:      Prior to Admission Medications  Current Outpatient Medications   Medication Sig Dispense Refill     Cyanocobalamin (VITAMIN B 12) 500 MCG TABS Take 500 mcg by mouth every evening        fluconazole (DIFLUCAN) 200 MG tablet Take 1 tablet (200 mg) by  mouth daily (Patient taking differently: Take 200 mg by mouth every evening ) 90 tablet 0     gabapentin (NEURONTIN) 300 MG capsule Take 2 capsules (600 mg) by mouth 3 times daily 180 capsule 1     Lysine 1000 MG TABS Take 1,500 mg by mouth 2 times daily       multivitamin, therapeutic with minerals (MULTI-VITAMIN) TABS Take 1 tablet by mouth every evening        traZODone (DESYREL) 50 MG tablet Take 1 tablet (50 mg) by mouth At Bedtime 90 tablet 2     vitamin C (ASCORBIC ACID) 1000 MG TABS Take 1,000 mg by mouth daily       Vitamin D, Cholecalciferol, 25 MCG (1000 UT) CAPS Take 25 mcg by mouth daily       calcium-vitamin D (CALTRATE) 600-400 MG-UNIT per tablet Take 1 tablet by mouth daily  (Patient not taking: Reported on 2021)       ferrous sulfate (FE TABS) 325 (65 Fe) MG EC tablet Take 325 mg by mouth daily (Patient not taking: Reported on 2021)       HYDROcodone-acetaminophen (NORCO) 5-325 MG tablet Take 1 tablet by mouth 2 times daily as needed for severe pain (Patient not taking: Reported on 2021) 28 tablet 0     MELATONIN PO Take 1.5 mg by mouth nightly as needed (PT last dose 2020)  (Patient not taking: Reported on 2021)       vitamin B6 (PYRIDOXINE) 100 MG tablet Take 100 mg by mouth daily (Patient not taking: Reported on 2021)         Family History  Family History   Problem Relation Age of Onset     Heart Failure Father      Musculoskeletal Disorder Father         Parkinson's     Cancer - colorectal Mother      Cerebrovascular Disease Paternal Grandmother      Cancer Other      Musculoskeletal Disorder Brother         Parkinson's     Anesthesia Reaction No family hx of      Deep Vein Thrombosis (DVT) No family hx of        The complete review of systems is negative other than noted in the HPI or here.       Anesthesia Pre-Procedure Evaluation    Patient: Trevin Gee   MRN: [id number] : 1955        Preoperative Diagnosis: Colonic stricture (H) [K56.699]     Procedure : Procedure(s):  COLONOSCOPY  PAC EVALUATION       Past Medical History:   Diagnosis Date     Anemia 2007     Anxiety 2012?    much worse since July 2014     Cataract 12/2007    both eyes, too much prednisone, implants     Coccidioidomycosis      Crohn disease (H)      Crohn's disease (H) 1/13/2015     Depressive disorder 7/2014    much worse since July 2014     Fracture 1956    green fracture of leg     Gallbladder problem 2005?    much gravel, removed     GERD (gastroesophageal reflux disease)      History of blood transfusion 1988    ulcerated anastomosis term. ilium bleed     Hypertriglyceridemia 7/8/2016     Infection 2007    persistant coccidioidomycosis     Kidney stone various    both sides, all passed naturally     Mental health problem 2007    bipolar though perhaps different     Nephrolithiasis      Noninfectious ileitis      Osteoporosis 2007    osteoporosis, too much prednisone, last dexa 1/2014     Osteoporosis      Other nervous system complications 2007    prednisone overload induced kevin     Personal history of colonic polyps     small ones found during colonoscopies     Steroid-induced psychosis     Patient extremely sensitive to regular doses of steroids.  Unclear if this is due to chronic fluconazole use or genetic issue.  In the future, use caution when prescribing steroids.     TB (tuberculosis)       Past Surgical History:   Procedure Laterality Date     APPENDECTOMY  1974    coincidental with Crohn's surgery     BACK SURGERY  12/2007    kyphoplasty on compressed 4th??? vertebra     BIOPSY  2007, 2013    lump on arm, knee, back of hand for coccidioidomycosis cult.     CHOLECYSTECTOMY  2005    much gravel, removed laparoscopically     COLONOSCOPY  7/14/2014 last    Dr. Catherine Bowden, [facility] - many previous     COLONOSCOPY Left 9/11/2015    Procedure: COMBINED COLONOSCOPY, SINGLE OR MULTIPLE BIOPSY/POLYPECTOMY BY BIOPSY;  Surgeon: Fransisco Leach MD;  Location:  "[facility]     COLONOSCOPY N/A 8/3/2016    Procedure: COMBINED COLONOSCOPY, SINGLE OR MULTIPLE BIOPSY/POLYPECTOMY BY BIOPSY;  Surgeon: Fransisco Leach MD;  Location: [facility]     COLONOSCOPY N/A 8/16/2017    Procedure: COMBINED COLONOSCOPY, SINGLE OR MULTIPLE BIOPSY/POLYPECTOMY BY BIOPSY;;  Surgeon: Fransisco Leach MD;  Location: [facility]     COLONOSCOPY N/A 10/1/2019    Procedure: Colonoscopy With Colonic Stent Insertion;  Surgeon: Robbie Cruz MD;  Location: [facility]     COLONOSCOPY N/A 2/5/2020    Procedure: COLONOSCOPY, FOREIGN BODY REMOVAL;  Surgeon: Robbie Cruz MD;  Location: [facility]     COLONOSCOPY N/A 5/26/2021    Procedure: COLONOSCOPY, WITH COLONIC STENT INSERTION;  Surgeon: Robbie Cruz MD;  Location: [facility]     ENT SURGERY  ?    upper endoscopy     ENTEROSCOPY SMALL BOWEL N/A 9/22/2021    Procedure: ENTEROSCOPY;  Surgeon: Robbie Cruz MD;  Location: [facility]     ESOPHAGOSCOPY, GASTROSCOPY, DUODENOSCOPY (EGD), COMBINED N/A 11/8/2016    Procedure: COMBINED ENDOSCOPIC ULTRASOUND, ESOPHAGOSCOPY, GASTROSCOPY, DUODENOSCOPY (EGD), FINE NEEDLE ASPIRATE/BIOPSY;  Surgeon: Guru Alexsandra Ballesteros MD;  Location: [facility]     ESOPHAGOSCOPY, GASTROSCOPY, DUODENOSCOPY (EGD), COMBINED N/A 11/8/2016    Procedure: COMBINED ESOPHAGOSCOPY, GASTROSCOPY, DUODENOSCOPY (EGD), BIOPSY SINGLE OR MULTIPLE;  Surgeon: Guru Alexsandra Ballesteros MD;  Location: [facility]     ESOPHAGOSCOPY, GASTROSCOPY, DUODENOSCOPY (EGD), COMBINED N/A 8/16/2017    Procedure: COMBINED ESOPHAGOSCOPY, GASTROSCOPY, DUODENOSCOPY (EGD), BIOPSY SINGLE OR MULTIPLE;;  Surgeon: Fransisco Leach MD;  [facility]     EYE SURGERY  12/2007    cataract surgery both sides     GI SURGERY  1974, 1986(x2), 1989    Crohn's, 1974 RO 18\" term. ilium + 9\" asc. colon all one pc     SOFT TISSUE SURGERY  3/2013    removed buildup on back of r hand, coccidioidomycosis      Allergies   Allergen Reactions     Prednisone " Other (See Comments)     Diana with more than 2.5mg chronic dosing of prednisone due to fluconazole interaction per patient.       Social History     Tobacco Use     Smoking status: Never Smoker     Smokeless tobacco: Never Used   Substance Use Topics     Alcohol use: Not Currently     Comment: sometimes one glass of wine a week      Wt Readings from Last 1 Encounters:   12/02/21 69.3 kg (152 lb 11.2 oz)            ROS/MED HX  The complete review of systems is negative other than noted in the HPI or here.  Patient denies recent illness, fever and respiratory infection during past month.  Pt denies steroid use during past year.    ENT/Pulmonary:  - neg pulmonary ROS  (-) tobacco use, asthma and sleep apnea   Neurologic:     (+) peripheral neuropathy, - mild in feet.  (-) no seizures and no CVA   Cardiovascular:     (+) -----Previous cardiac testing   Echo: Date: Results:    Stress Test: Date: Results:    ECG Reviewed: Date: 5/26/21 Results:  Sinus rhythm   Cannot rule out Inferior infarct, age undetermined   Abnormal ECG   When compared with ECG of 01-NOV-2016 10:42, No significant change was found  Ventricular rate 75 bpm    Cath: Date: Results:      METS/Exercise Tolerance: 4 - Raking leaves, gardening    Hematologic:     (+) history of blood transfusion, no previous transfusion reaction,  (-) history of blood clots   Musculoskeletal: Comment: Osteoporosis        GI/Hepatic: Comment: Crohn's    s/p multiple surgeries including distal ilectomy and right hemicolectomy     Colonic stricture      (+) Inflammatory bowel disease,  (-) GERD and liver disease   Renal/Genitourinary: Comment: Creatinine 1.43, GFR 51 on 9/22/21      (+) renal disease, type: CRI,     Endo:  - neg endo ROS  (-) Type II DM   Psychiatric/Substance Use: Comment: Hx steroid induced psychosis    (+) psychiatric history anxiety, depression and bipolar     Infectious Disease: Comment: TB in 2001, s/p treatment      (+) TB,     Malignancy:  - neg  "malignancy ROS     Other:  - neg other ROS          Physical Exam    Airway        Mallampati: III   TM distance: > 3 FB   Neck ROM: full   Mouth opening: > 3 cm    Respiratory Devices and Support         Dental       (+) missing    B=Bridge, C=Chipped, L=Loose, M=Missing    Cardiovascular   cardiovascular exam normal          Pulmonary   pulmonary exam normal            CBC:   Lab Results   Component Value Date    WBC 4.5 09/22/2021    WBC 7.2 01/18/2021    HGB 15.3 09/22/2021    HGB 15.4 05/14/2021    HCT 45.0 09/22/2021    HCT 45.9 01/18/2021     09/22/2021     01/18/2021     BMP:   Lab Results   Component Value Date     09/22/2021     05/20/2021    POTASSIUM 4.6 09/22/2021    POTASSIUM 4.1 05/20/2021    CHLORIDE 104 09/22/2021    CHLORIDE 108 05/20/2021    CO2 30 09/22/2021    CO2 25 05/20/2021    BUN 17 09/22/2021    BUN 21 05/20/2021    CR 1.43 (H) 09/22/2021    CR 1.34 (H) 05/20/2021    GLC 92 09/22/2021    GLC 86 09/22/2021     COAGS:   Lab Results   Component Value Date    PTT 23 08/04/2007    INR 0.99 04/21/2019     POC:   Lab Results   Component Value Date    BGM 86 05/26/2021     HEPATIC:   Lab Results   Component Value Date    ALBUMIN 3.7 09/22/2021    PROTTOTAL 7.0 09/22/2021    ALT 30 09/22/2021    AST 17 09/22/2021    ALKPHOS 84 09/22/2021    BILITOTAL 1.0 09/22/2021     OTHER:   Lab Results   Component Value Date    PH 7.43 08/04/2007    LACT 0.7 01/18/2021    A1C 5.6 08/22/2019    YUNIER 9.4 09/22/2021    PHOS 2.7 03/18/2021    MAG 1.9 09/05/2007    LIPASE 192 01/17/2021    AMYLASE 68 11/11/2016    TSH 1.13 08/14/2020    T4 0.79 08/22/2019    CRP 8.4 (H) 01/20/2021    SED 6 01/18/2021             PAC Discussion and Assessment    ASA Classification: 3  ASC OK for Surgery: SH.      /75 (BP Location: Right arm, Patient Position: Sitting, Cuff Size: Adult Regular)   Pulse 74   Temp 97.2  F (36.2  C) (Oral)   Resp 16   Ht 1.753 m (5' 9\")   Wt 69.3 kg (152 lb 11.2 oz)   " SpO2 98%   BMI 22.55 kg/m           Physical Exam  Constitutional: Awake, alert, cooperative, no apparent distress, and appears stated age.  Eyes: Pupils equal, round and reactive to light, extra ocular muscles intact, sclera clear, conjunctiva normal.  HENT: Normocephalic, oral pharynx with moist mucus membranes, good dentition. Missing upper front right tooth & upper rear molar. No goiter appreciated. No removable dental hardware.  Respiratory: Clear to auscultation bilaterally, no crackles or wheezing. No SOB when supine.  Cardiovascular: Regular rate and rhythm, normal S1 and S2, and no murmur noted.  Carotids +2, no bruits. No edema. Palpable pulses to radial, DP and PT arteries.   GI: Normal bowel sounds, soft, non-distended, non-tender, no masses palpated. Surgical scar well healed.  Lymph/Hematologic: No cervical lymphadenopathy and no supraclavicular lymphadenopathy.  Genitourinary:  deferred  Skin: Warm and dry.  No rashes.   Musculoskeletal: full ROM of neck. There is no redness, warmth, or swelling of the joints. Gross motor strength is normal.    Neurologic: Awake, alert, oriented to name, place and time. Cranial nerves II-XII are grossly intact. Gait is normal. Ambulates from chair to exam table, seats self, lies supine and sits back up w/o assistance.  Neuropsychiatric: Calm, cooperative. Normal affect. Pleasant. Answers questions appropriately, follows commands w/o difficulty.    PRIOR LABS/DIAGNOSTIC STUDIES:    All labs and imaging personally reviewed      EKG 5/26/21  Sinus rhythm   Cannot rule out Inferior infarct, age undetermined   Abnormal ECG   When compared with ECG of 01-NOV-2016 10:42, No significant change was found  Ventricular rate 75 bpm      Colonoscopy 5/26/21  Impression:  - Sequential adjacent modest stenoses with mild erosions                        involving the neoterminal ileum and ileocolonic                        anastomosis managed by placement of a [id number] [id number]                         stent across both                        - Otherwise unremarkable lower endoscopy      The patient's records and results personally reviewed by this provider.       LABS/DIAGNOSTIC STUDIES TODAY:  BMP, CBC, COVID    WBC Count 4.0 - 11.0 10e3/uL 5.2  4.5   7.2 R  10.8 R  4.4 R       RBC Count 4.40 - 5.90 10e6/uL 4.58  4.61   4.73 R  5.07 R  5.13 R      Hemoglobin 13.3 - 17.7 g/dL 15.0  15.3  15.4  14.7  16.0  16.7  15.0     Hematocrit 40.0 - 53.0 % 44.6  45.0   45.9  48.2  46.7      MCV 78 - 100 fL 97  98   97 R  95 R  91 R      MCH 26.5 - 33.0 pg 32.8  33.2 High    31.1  31.6  32.6      MCHC 31.5 - 36.5 g/dL 33.6  34.0   32.0  33.2  35.8      RDW 10.0 - 15.0 % 12.3  12.7   15.1 High   15.2 High   12.6      Platelet Count 150 - 450 10e3/uL 231  195   247 R  308 R  171 R      % Neutrophils % 63    72.5  67.2  67.5      % Lymphocytes % 24    12.7  22.0  16.7      % Monocytes % 8    13.1  8.7  15.4      % Eosinophils % 3    0.7  1.3  0.0      % Basophils % 1    0.6  0.5  0.2      % Immature Granulocytes % 1           NRBCs per 100 WBC <1 /100 0           Absolute Neutrophils 1.6 - 8.3 10e3/uL 3.3           Absolute Lymphocytes 0.8 - 5.3 10e3/uL 1.3           Absolute Monocytes 0.0 - 1.3 10e3/uL 0.4           Absolute Eosinophils 0.0 - 0.7 10e3/uL 0.2           Absolute Basophils 0.0 - 0.2 10e3/uL 0.0           Absolute Immature Granulocytes <=0.4 10e3/uL 0.0           Absolute NRBCs 10e3/uL 0.0              Sodium 133 - 144 mmol/L 140  140   138  143  139  143      Potassium 3.4 - 5.3 mmol/L 4.7  4.6   4.1  5.9 High   4.8  4.0     Chloride 94 - 109 mmol/L 109  104   108  108  108  114 High      Carbon Dioxide (CO2) 20 - 32 mmol/L 29  30   25  32  28  20     Anion Gap 3 - 14 mmol/L 2 Low   6   5  2 Low   3  9     Urea Nitrogen 7 - 30 mg/dL 16  17   21  21  18  22     Creatinine 0.66 - 1.25 mg/dL 1.36 High   1.43 High    1.34 High   1.56 High   1.32 High   1.22     Calcium 8.5 - 10.1 mg/dL 9.1  9.4   9.0   10.0  9.0  7.9 Low      Glucose 70 - 99 mg/dL 103 High   92  86  89 CM  84  88 CM  92     GFR Estimate >60 mL/min/1.73m2 54 Low   51 Low  CM   55 Low  R, CM  46 Low  R, CM  56 Low  R, CM  62 R, CM    Comment: As of July 11, 2021, eGFR is calculated by the CKD-EPI creatinine equation, without race adjustment. eGFR can be influenced by muscle mass, exercise, and diet. The reported eGFR is an estimation only and is only applicable if the renal function is stable.         ASSESSMENT and PLAN  Trevin Gee is a 65 y/o male who presents for pre-operative H&P in preparation for COLONOSCOPY with Robbie Cruz MD on 12/6/21 at [facility] for treatment of Colonic stricture (H).    Pt has had prior anesthetic.  No history of anesthetic complications.       He has the following specific operative considerations:   # CODI 2/8 = low risk  # VTE risk: 0.5%  # Risk of PONV score = 1.  If > 2, anti-emetic intervention recommended.  # Anesthesia considerations:  Refer to PAC assessment in anesthesia records      CARDIAC: METS 4      # RCRI : No serious cardiac risks.  0.4% risk of major adverse cardiac event.       PULMONARY:     # Never smoked    # Denies asthma or inhaler use    GI:     # Crohn's    # s/p multiple surgeries including distal ilectomy and right hemicolectomy     # Colonic stricture    # Denies GERD    /RENAL:     # Creatinine 1.36, GFR 54 today.     ENDO: BMI 23    # No DM    ORTHO:     # full ROM of neck    NEURO/PSYCH:     # anxiety, depression, bipolar    # Hx steroid induced psychosis    MISC:    # Hx TB in 2001, s/p treatment      Patient is optimized and is acceptable candidate for the proposed procedure. No further diagnostic evaluation is needed.    Final plan per anesthesiologist on day of surgery.     Arrival time, NPO, shower and medication instructions provided by nursing staff today.  Preparing For Your Surgery handout given.      On the day of service:     Prep time: 14  minutes  Visit time: 25 minutes  Documentation time: 13 minutes  ------------------------------------------  Total time: 52 minutes      Nadya Leary PA-C  Preoperative Assessment Center  [facility]  [facility]  Phone: [phone]  Fax: [phone]

## 2021-12-02 NOTE — ANESTHESIA PREPROCEDURE EVALUATION
Anesthesia Pre-Procedure Evaluation    Patient: Trevin Gee   MRN: [id number] : 1955        Preoperative Diagnosis: Colonic stricture (H) [K56.699]    Procedure : Procedure(s):  COLONOSCOPY  PAC EVALUATION       Past Medical History:   Diagnosis Date     Anemia      Anxiety ?    much worse since 2014     Cataract 2007    both eyes, too much prednisone, implants     Coccidioidomycosis      Crohn disease (H)      Crohn's disease (H) 2015     Depressive disorder 2014    much worse since 2014     Fracture 195    green fracture of leg     Gallbladder problem ?    much gravel, removed     GERD (gastroesophageal reflux disease)      History of blood transfusion     ulcerated anastomosis term. ilium bleed     Hypertriglyceridemia 2016     Infection     persistant coccidioidomycosis     Kidney stone various    both sides, all passed naturally     Mental health problem 2007    bipolar though perhaps different     Nephrolithiasis      Noninfectious ileitis      Osteoporosis 2007    osteoporosis, too much prednisone, last dexa 2014     Osteoporosis      Other nervous system complications 2007    prednisone overload induced kevin     Personal history of colonic polyps     small ones found during colonoscopies     Steroid-induced psychosis     Patient extremely sensitive to regular doses of steroids.  Unclear if this is due to chronic fluconazole use or genetic issue.  In the future, use caution when prescribing steroids.     TB (tuberculosis)       Past Surgical History:   Procedure Laterality Date     APPENDECTOMY  1974    coincidental with Crohn's surgery     BACK SURGERY  2007    kyphoplasty on compressed 4th??? vertebra     BIOPSY  ,     lump on arm, knee, back of hand for coccidioidomycosis cult.     CHOLECYSTECTOMY      much gravel, removed laparoscopically     COLONOSCOPY  2014 last    Dr. Catherine Bowden, [facility] [place]  "previous     COLONOSCOPY Left 9/11/2015    Procedure: COMBINED COLONOSCOPY, SINGLE OR MULTIPLE BIOPSY/POLYPECTOMY BY BIOPSY;  Surgeon: Fransisco Leach MD;  Location: [facility]     COLONOSCOPY N/A 8/3/2016    Procedure: COMBINED COLONOSCOPY, SINGLE OR MULTIPLE BIOPSY/POLYPECTOMY BY BIOPSY;  Surgeon: Fransisco Leach MD;  Location: [facility]     COLONOSCOPY N/A 8/16/2017    Procedure: COMBINED COLONOSCOPY, SINGLE OR MULTIPLE BIOPSY/POLYPECTOMY BY BIOPSY;;  Surgeon: Fransisco Leach MD;  [facility]     COLONOSCOPY N/A 10/1/2019    Procedure: Colonoscopy With Colonic Stent Insertion;  Surgeon: Robbie Cruz MD;  Location: [facility]     COLONOSCOPY N/A 2/5/2020    Procedure: COLONOSCOPY, FOREIGN BODY REMOVAL;  Surgeon: Robbie Cruz MD;  Location: [facility]     COLONOSCOPY N/A 5/26/2021    Procedure: COLONOSCOPY, WITH COLONIC STENT INSERTION;  Surgeon: Robbie Cruz MD;  Location: [facility]     ENT SURGERY  ?    upper endoscopy     ENTEROSCOPY SMALL BOWEL N/A 9/22/2021    Procedure: ENTEROSCOPY;  Surgeon: Robbie Cruz MD;  Location: [facility]     ESOPHAGOSCOPY, GASTROSCOPY, DUODENOSCOPY (EGD), COMBINED N/A 11/8/2016    Procedure: COMBINED ENDOSCOPIC ULTRASOUND, ESOPHAGOSCOPY, GASTROSCOPY, DUODENOSCOPY (EGD), FINE NEEDLE ASPIRATE/BIOPSY;  Surgeon: Guru Alexsandra Ballesteros MD;  Location: [facility]     ESOPHAGOSCOPY, GASTROSCOPY, DUODENOSCOPY (EGD), COMBINED N/A 11/8/2016    Procedure: COMBINED ESOPHAGOSCOPY, GASTROSCOPY, DUODENOSCOPY (EGD), BIOPSY SINGLE OR MULTIPLE;  Surgeon: Guru Alexsandra Ballesteros MD;  Location: [facility]     ESOPHAGOSCOPY, GASTROSCOPY, DUODENOSCOPY (EGD), COMBINED N/A 8/16/2017    Procedure: COMBINED ESOPHAGOSCOPY, GASTROSCOPY, DUODENOSCOPY (EGD), BIOPSY SINGLE OR MULTIPLE;;  Surgeon: Fransisco Leach MD;  [facility]     EYE SURGERY  12/2007    cataract surgery both sides     GI SURGERY  1974, 1986(x2), 1989    Crohn's, 1974 RO 18\" term. " "ilium + 9\" asc. colon all one pc     SOFT TISSUE SURGERY  3/2013    removed buildup on back of r hand, coccidioidomycosis      Allergies   Allergen Reactions     Prednisone Other (See Comments)     Diana with more than 2.5mg chronic dosing of prednisone due to fluconazole interaction per patient.       Social History     Tobacco Use     Smoking status: Never Smoker     Smokeless tobacco: Never Used   Substance Use Topics     Alcohol use: Not Currently     Comment: sometimes one glass of wine a week      Wt Readings from Last 1 Encounters:   12/02/21 69.3 kg (152 lb 11.2 oz)        Anesthesia Evaluation   Pt has had prior anesthetic.     No history of anesthetic complications       ROS/MED HX  ENT/Pulmonary:  - neg pulmonary ROS  (-) tobacco use, asthma and sleep apnea   Neurologic:     (+) peripheral neuropathy, - mild in feet.  (-) no seizures and no CVA   Cardiovascular:     (+) Dyslipidemia -----Previous cardiac testing   Echo: Date: Results:    Stress Test: Date: Results:    ECG Reviewed: Date: 5/26/21 Results:  Sinus rhythm   Cannot rule out Inferior infarct, age undetermined   Abnormal ECG   When compared with ECG of 01-NOV-2016 10:42, No significant change was found  Ventricular rate 75 bpm    Cath: Date: Results:      METS/Exercise Tolerance: 4 - Raking leaves, gardening    Hematologic:     (+) history of blood transfusion, no previous transfusion reaction,  (-) history of blood clots   Musculoskeletal: Comment: Osteoporosis        GI/Hepatic: Comment: Crohn's    s/p multiple surgeries including distal ilectomy and right hemicolectomy     Colonic stricture      (+) Inflammatory bowel disease,  (-) GERD and liver disease   Renal/Genitourinary: Comment: Creatinine 1.43, GFR 51 on 9/22/21      (+) renal disease, type: CRI,     Endo:  - neg endo ROS  (-) Type II DM   Psychiatric/Substance Use: Comment: Hx steroid induced psychosis    (+) psychiatric history anxiety, depression and bipolar     Infectious Disease: " Comment: TB in 2001, s/p treatment      (+) TB,     Malignancy:  - neg malignancy ROS     Other:  - neg other ROS          Physical Exam    Airway        Mallampati: III   TM distance: > 3 FB   Neck ROM: full   Mouth opening: > 3 cm    Respiratory Devices and Support         Dental       (+) missing    B=Bridge, C=Chipped, L=Loose, M=Missing    Cardiovascular   cardiovascular exam normal          Pulmonary   pulmonary exam normal                OUTSIDE LABS:  CBC:   Lab Results   Component Value Date    WBC 4.5 09/22/2021    WBC 7.2 01/18/2021    HGB 15.3 09/22/2021    HGB 15.4 05/14/2021    HCT 45.0 09/22/2021    HCT 45.9 01/18/2021     09/22/2021     01/18/2021     BMP:   Lab Results   Component Value Date     09/22/2021     05/20/2021    POTASSIUM 4.6 09/22/2021    POTASSIUM 4.1 05/20/2021    CHLORIDE 104 09/22/2021    CHLORIDE 108 05/20/2021    CO2 30 09/22/2021    CO2 25 05/20/2021    BUN 17 09/22/2021    BUN 21 05/20/2021    CR 1.43 (H) 09/22/2021    CR 1.34 (H) 05/20/2021    GLC 92 09/22/2021    GLC 86 09/22/2021     COAGS:   Lab Results   Component Value Date    PTT 23 08/04/2007    INR 0.99 04/21/2019     POC:   Lab Results   Component Value Date    BGM 86 05/26/2021     HEPATIC:   Lab Results   Component Value Date    ALBUMIN 3.7 09/22/2021    PROTTOTAL 7.0 09/22/2021    ALT 30 09/22/2021    AST 17 09/22/2021    ALKPHOS 84 09/22/2021    BILITOTAL 1.0 09/22/2021     OTHER:   Lab Results   Component Value Date    PH 7.43 08/04/2007    LACT 0.7 01/18/2021    A1C 5.6 08/22/2019    YUNIER 9.4 09/22/2021    PHOS 2.7 03/18/2021    MAG 1.9 09/05/2007    LIPASE 192 01/17/2021    AMYLASE 68 11/11/2016    TSH 1.13 08/14/2020    T4 0.79 08/22/2019    CRP 8.4 (H) 01/20/2021    SED 6 01/18/2021       Anesthesia Plan    ASA Status:  2      Anesthesia Type: MAC.              Consents    Anesthesia Plan(s) and associated risks, benefits, and realistic alternatives discussed. Questions answered and  patient/representative(s) expressed understanding.    - Discussed:     - Discussed with:  Patient         Postoperative Care    Pain management: Multi-modal analgesia.   PONV prophylaxis: Ondansetron (or other 5HT-3)     Comments:              PAC Discussion and Assessment    ASA Classification: 3  ASC OK for Surgery: SH.                    PAC Resident/NP Anesthesia Assessment: Trevin Gee is a 65 y/o male who presents for pre-operative H&P in preparation for COLONOSCOPY with Robbie Cruz MD on 12/6/21 at [facility] for treatment of Colonic stricture (H).    Pt has had prior anesthetic.  No history of anesthetic complications.       He has the following specific operative considerations:   # CODI 2/8 = low risk  # VTE risk: 0.5%  # Risk of PONV score = 1.  If > 2, anti-emetic intervention recommended.  # Anesthesia considerations:  Refer to PAC assessment in anesthesia records      CARDIAC: METS 4      # RCRI : No serious cardiac risks.  0.4% risk of major adverse cardiac event.       PULMONARY:     # Never smoked    # Denies asthma or inhaler use    GI:     # Crohn's    # s/p multiple surgeries including distal ilectomy and right hemicolectomy     # Colonic stricture    # Denies GERD    /RENAL:     # Creatinine 1.36, GFR 54 today.     ENDO: BMI 23    # No DM    ORTHO:     # full ROM of neck    NEURO/PSYCH:     # anxiety, depression, bipolar    # Hx steroid induced psychosis    MISC:    # Hx TB in 2001, s/p treatment      Patient is optimized and is acceptable candidate for the proposed procedure. No further diagnostic evaluation is needed.    Final plan per anesthesiologist on day of surgery.       Reviewed and Signed by PAC Mid-Level Provider/Resident  Mid-Level Provider/Resident: Nadya Leary PA-C  Date: 12/2/21  Time: 1212                               Nadya Leary PA-C

## 2021-12-02 NOTE — H&P
Pre-Operative H & P     CC:  Preoperative exam to assess for increased cardiopulmonary risk while undergoing surgery and anesthesia.    Date of Encounter: 12/2/2021  Primary Care Physician:  Lacey Feng     Reason for visit:   Encounter Diagnoses   Name Primary?     Colonic stricture (H) Yes     Preop examination        HPI  Trevin Gee is a 67 y/o male who presents for pre-operative H&P in preparation for COLONOSCOPY with Robbie Cruz MD on 12/6/21 at [facility] for treatment of Colonic stricture (H). Patient is being evaluated for comorbid conditions of dyslipidemia, anemia, h/o blood transfusion, GERD, Crohn s, anxiety, depression, bipolar disorder, CKD, osteoporosis, cataract.     Mr. Gee has a history of Crohn s disease s/p multiple surgeries including distal ilectomy and right hemicolectomy complicated by an anastomotic stenosis in part management by serial colonoscopy with stent placement, removal and currently replacement. He is also noted to have had a more proximal side to side anastomosis to essentially exclude a portion of this small bowel obstructed by surrounding/extrinsic adhesions (per his description of surgery dating back to the 80s). His IBD physician, Dr Leach, believes there to be a functional component to his current complaints of mild obstructive like symptoms, though there remains a concern for proximal stenosis at the anastomosis. UGI/SBFT was without definitive finding to support a proximal narrowing. He now presents for the above procedure.    History was obtained from patient & chart review.     Hx of abnormal bleeding or anti-platelet use: denies    Menstrual history: No LMP for male patient.:      Prior to Admission Medications  Current Outpatient Medications   Medication Sig Dispense Refill     Cyanocobalamin (VITAMIN B 12) 500 MCG TABS Take 500 mcg by mouth every evening        fluconazole (DIFLUCAN) 200 MG tablet Take 1 tablet (200 mg) by  mouth daily (Patient taking differently: Take 200 mg by mouth every evening ) 90 tablet 0     gabapentin (NEURONTIN) 300 MG capsule Take 2 capsules (600 mg) by mouth 3 times daily 180 capsule 1     Lysine 1000 MG TABS Take 1,500 mg by mouth 2 times daily       multivitamin, therapeutic with minerals (MULTI-VITAMIN) TABS Take 1 tablet by mouth every evening        traZODone (DESYREL) 50 MG tablet Take 1 tablet (50 mg) by mouth At Bedtime 90 tablet 2     vitamin C (ASCORBIC ACID) 1000 MG TABS Take 1,000 mg by mouth daily       Vitamin D, Cholecalciferol, 25 MCG (1000 UT) CAPS Take 25 mcg by mouth daily       calcium-vitamin D (CALTRATE) 600-400 MG-UNIT per tablet Take 1 tablet by mouth daily  (Patient not taking: Reported on 2021)       ferrous sulfate (FE TABS) 325 (65 Fe) MG EC tablet Take 325 mg by mouth daily (Patient not taking: Reported on 2021)       HYDROcodone-acetaminophen (NORCO) 5-325 MG tablet Take 1 tablet by mouth 2 times daily as needed for severe pain (Patient not taking: Reported on 2021) 28 tablet 0     MELATONIN PO Take 1.5 mg by mouth nightly as needed (PT last dose 2020)  (Patient not taking: Reported on 2021)       vitamin B6 (PYRIDOXINE) 100 MG tablet Take 100 mg by mouth daily (Patient not taking: Reported on 2021)         Family History  Family History   Problem Relation Age of Onset     Heart Failure Father      Musculoskeletal Disorder Father         Parkinson's     Cancer - colorectal Mother      Cerebrovascular Disease Paternal Grandmother      Cancer Other      Musculoskeletal Disorder Brother         Parkinson's     Anesthesia Reaction No family hx of      Deep Vein Thrombosis (DVT) No family hx of        The complete review of systems is negative other than noted in the HPI or here.       Anesthesia Pre-Procedure Evaluation    Patient: Trevin Gee   MRN: [id number] : 1955        Preoperative Diagnosis: Colonic stricture (H) [K56.699]     Procedure : Procedure(s):  COLONOSCOPY  PAC EVALUATION       Past Medical History:   Diagnosis Date     Anemia 2007     Anxiety 2012?    much worse since July 2014     Cataract 12/2007    both eyes, too much prednisone, implants     Coccidioidomycosis      Crohn disease (H)      Crohn's disease (H) 1/13/2015     Depressive disorder 7/2014    much worse since July 2014     Fracture 1956    green fracture of leg     Gallbladder problem 2005?    much gravel, removed     GERD (gastroesophageal reflux disease)      History of blood transfusion 1988    ulcerated anastomosis term. ilium bleed     Hypertriglyceridemia 7/8/2016     Infection 2007    persistant coccidioidomycosis     Kidney stone various    both sides, all passed naturally     Mental health problem 2007    bipolar though perhaps different     Nephrolithiasis      Noninfectious ileitis      Osteoporosis 2007    osteoporosis, too much prednisone, last dexa 1/2014     Osteoporosis      Other nervous system complications 2007    prednisone overload induced kevin     Personal history of colonic polyps     small ones found during colonoscopies     Steroid-induced psychosis     Patient extremely sensitive to regular doses of steroids.  Unclear if this is due to chronic fluconazole use or genetic issue.  In the future, use caution when prescribing steroids.     TB (tuberculosis)       Past Surgical History:   Procedure Laterality Date     APPENDECTOMY  1974    coincidental with Crohn's surgery     BACK SURGERY  12/2007    kyphoplasty on compressed 4th??? vertebra     BIOPSY  2007, 2013    lump on arm, knee, back of hand for coccidioidomycosis cult.     CHOLECYSTECTOMY  2005    much gravel, removed laparoscopically     COLONOSCOPY  7/14/2014 last    Dr. Catherine Bowden, [facility] - many previous     COLONOSCOPY Left 9/11/2015    Procedure: COMBINED COLONOSCOPY, SINGLE OR MULTIPLE BIOPSY/POLYPECTOMY BY BIOPSY;  Surgeon: Fransisco Leach MD;  Location:  "[facility]     COLONOSCOPY N/A 8/3/2016    Procedure: COMBINED COLONOSCOPY, SINGLE OR MULTIPLE BIOPSY/POLYPECTOMY BY BIOPSY;  Surgeon: Fransisco Leach MD;  Location: [facility]     COLONOSCOPY N/A 8/16/2017    Procedure: COMBINED COLONOSCOPY, SINGLE OR MULTIPLE BIOPSY/POLYPECTOMY BY BIOPSY;;  Surgeon: Fransisco Leach MD;  Location: [facility]     COLONOSCOPY N/A 10/1/2019    Procedure: Colonoscopy With Colonic Stent Insertion;  Surgeon: Robbie Cruz MD;  Location: [facility]     COLONOSCOPY N/A 2/5/2020    Procedure: COLONOSCOPY, FOREIGN BODY REMOVAL;  Surgeon: Robbie Cruz MD;  Location: [facility]     COLONOSCOPY N/A 5/26/2021    Procedure: COLONOSCOPY, WITH COLONIC STENT INSERTION;  Surgeon: Robbie Cruz MD;  Location: [facility]     ENT SURGERY  ?    upper endoscopy     ENTEROSCOPY SMALL BOWEL N/A 9/22/2021    Procedure: ENTEROSCOPY;  Surgeon: Robbie Cruz MD;  Location: [facility]     ESOPHAGOSCOPY, GASTROSCOPY, DUODENOSCOPY (EGD), COMBINED N/A 11/8/2016    Procedure: COMBINED ENDOSCOPIC ULTRASOUND, ESOPHAGOSCOPY, GASTROSCOPY, DUODENOSCOPY (EGD), FINE NEEDLE ASPIRATE/BIOPSY;  Surgeon: Guru Alexsandra Ballesteros MD;  Location: [facility]     ESOPHAGOSCOPY, GASTROSCOPY, DUODENOSCOPY (EGD), COMBINED N/A 11/8/2016    Procedure: COMBINED ESOPHAGOSCOPY, GASTROSCOPY, DUODENOSCOPY (EGD), BIOPSY SINGLE OR MULTIPLE;  Surgeon: Guru Alexsandra Ballesteros MD;  Location: [facility]     ESOPHAGOSCOPY, GASTROSCOPY, DUODENOSCOPY (EGD), COMBINED N/A 8/16/2017    Procedure: COMBINED ESOPHAGOSCOPY, GASTROSCOPY, DUODENOSCOPY (EGD), BIOPSY SINGLE OR MULTIPLE;;  Surgeon: Fransisco Leach MD;  [facility]     EYE SURGERY  12/2007    cataract surgery both sides     GI SURGERY  1974, 1986(x2), 1989    Crohn's, 1974 RO 18\" term. ilium + 9\" asc. colon all one pc     SOFT TISSUE SURGERY  3/2013    removed buildup on back of r hand, coccidioidomycosis      Allergies   Allergen Reactions     Prednisone " Other (See Comments)     Diana with more than 2.5mg chronic dosing of prednisone due to fluconazole interaction per patient.       Social History     Tobacco Use     Smoking status: Never Smoker     Smokeless tobacco: Never Used   Substance Use Topics     Alcohol use: Not Currently     Comment: sometimes one glass of wine a week      Wt Readings from Last 1 Encounters:   12/02/21 69.3 kg (152 lb 11.2 oz)            ROS/MED HX  The complete review of systems is negative other than noted in the HPI or here.  Patient denies recent illness, fever and respiratory infection during past month.  Pt denies steroid use during past year.    ENT/Pulmonary:  - neg pulmonary ROS  (-) tobacco use, asthma and sleep apnea   Neurologic:     (+) peripheral neuropathy, - mild in feet.  (-) no seizures and no CVA   Cardiovascular:     (+) -----Previous cardiac testing   Echo: Date: Results:    Stress Test: Date: Results:    ECG Reviewed: Date: 5/26/21 Results:  Sinus rhythm   Cannot rule out Inferior infarct, age undetermined   Abnormal ECG   When compared with ECG of 01-NOV-2016 10:42, No significant change was found  Ventricular rate 75 bpm    Cath: Date: Results:      METS/Exercise Tolerance: 4 - Raking leaves, gardening    Hematologic:     (+) history of blood transfusion, no previous transfusion reaction,  (-) history of blood clots   Musculoskeletal: Comment: Osteoporosis        GI/Hepatic: Comment: Crohn's    s/p multiple surgeries including distal ilectomy and right hemicolectomy     Colonic stricture      (+) Inflammatory bowel disease,  (-) GERD and liver disease   Renal/Genitourinary: Comment: Creatinine 1.43, GFR 51 on 9/22/21      (+) renal disease, type: CRI,     Endo:  - neg endo ROS  (-) Type II DM   Psychiatric/Substance Use: Comment: Hx steroid induced psychosis    (+) psychiatric history anxiety, depression and bipolar     Infectious Disease: Comment: TB in 2001, s/p treatment      (+) TB,     Malignancy:  - neg  "malignancy ROS     Other:  - neg other ROS          Physical Exam    Airway        Mallampati: III   TM distance: > 3 FB   Neck ROM: full   Mouth opening: > 3 cm    Respiratory Devices and Support         Dental       (+) missing    B=Bridge, C=Chipped, L=Loose, M=Missing    Cardiovascular   cardiovascular exam normal          Pulmonary   pulmonary exam normal            CBC:   Lab Results   Component Value Date    WBC 4.5 09/22/2021    WBC 7.2 01/18/2021    HGB 15.3 09/22/2021    HGB 15.4 05/14/2021    HCT 45.0 09/22/2021    HCT 45.9 01/18/2021     09/22/2021     01/18/2021     BMP:   Lab Results   Component Value Date     09/22/2021     05/20/2021    POTASSIUM 4.6 09/22/2021    POTASSIUM 4.1 05/20/2021    CHLORIDE 104 09/22/2021    CHLORIDE 108 05/20/2021    CO2 30 09/22/2021    CO2 25 05/20/2021    BUN 17 09/22/2021    BUN 21 05/20/2021    CR 1.43 (H) 09/22/2021    CR 1.34 (H) 05/20/2021    GLC 92 09/22/2021    GLC 86 09/22/2021     COAGS:   Lab Results   Component Value Date    PTT 23 08/04/2007    INR 0.99 04/21/2019     POC:   Lab Results   Component Value Date    BGM 86 05/26/2021     HEPATIC:   Lab Results   Component Value Date    ALBUMIN 3.7 09/22/2021    PROTTOTAL 7.0 09/22/2021    ALT 30 09/22/2021    AST 17 09/22/2021    ALKPHOS 84 09/22/2021    BILITOTAL 1.0 09/22/2021     OTHER:   Lab Results   Component Value Date    PH 7.43 08/04/2007    LACT 0.7 01/18/2021    A1C 5.6 08/22/2019    YUNIER 9.4 09/22/2021    PHOS 2.7 03/18/2021    MAG 1.9 09/05/2007    LIPASE 192 01/17/2021    AMYLASE 68 11/11/2016    TSH 1.13 08/14/2020    T4 0.79 08/22/2019    CRP 8.4 (H) 01/20/2021    SED 6 01/18/2021             PAC Discussion and Assessment    ASA Classification: 3  ASC OK for Surgery: SH.      /75 (BP Location: Right arm, Patient Position: Sitting, Cuff Size: Adult Regular)   Pulse 74   Temp 97.2  F (36.2  C) (Oral)   Resp 16   Ht 1.753 m (5' 9\")   Wt 69.3 kg (152 lb 11.2 oz)   " SpO2 98%   BMI 22.55 kg/m           Physical Exam  Constitutional: Awake, alert, cooperative, no apparent distress, and appears stated age.  Eyes: Pupils equal, round and reactive to light, extra ocular muscles intact, sclera clear, conjunctiva normal.  HENT: Normocephalic, oral pharynx with moist mucus membranes, good dentition. Missing upper front right tooth & upper rear molar. No goiter appreciated. No removable dental hardware.  Respiratory: Clear to auscultation bilaterally, no crackles or wheezing. No SOB when supine.  Cardiovascular: Regular rate and rhythm, normal S1 and S2, and no murmur noted.  Carotids +2, no bruits. No edema. Palpable pulses to radial, DP and PT arteries.   GI: Normal bowel sounds, soft, non-distended, non-tender, no masses palpated. Surgical scar well healed.  Lymph/Hematologic: No cervical lymphadenopathy and no supraclavicular lymphadenopathy.  Genitourinary:  deferred  Skin: Warm and dry.  No rashes.   Musculoskeletal: full ROM of neck. There is no redness, warmth, or swelling of the joints. Gross motor strength is normal.    Neurologic: Awake, alert, oriented to name, place and time. Cranial nerves II-XII are grossly intact. Gait is normal. Ambulates from chair to exam table, seats self, lies supine and sits back up w/o assistance.  Neuropsychiatric: Calm, cooperative. Normal affect. Pleasant. Answers questions appropriately, follows commands w/o difficulty.    PRIOR LABS/DIAGNOSTIC STUDIES:    All labs and imaging personally reviewed      EKG 5/26/21  Sinus rhythm   Cannot rule out Inferior infarct, age undetermined   Abnormal ECG   When compared with ECG of 01-NOV-2016 10:42, No significant change was found  Ventricular rate 75 bpm      Colonoscopy 5/26/21  Impression:  - Sequential adjacent modest stenoses with mild erosions                        involving the neoterminal ileum and ileocolonic                        anastomosis managed by placement of a [id number] [id number]                         stent across both                        - Otherwise unremarkable lower endoscopy      The patient's records and results personally reviewed by this provider.       LABS/DIAGNOSTIC STUDIES TODAY:  BMP, CBC, COVID    WBC Count 4.0 - 11.0 10e3/uL 5.2  4.5   7.2 R  10.8 R  4.4 R       RBC Count 4.40 - 5.90 10e6/uL 4.58  4.61   4.73 R  5.07 R  5.13 R      Hemoglobin 13.3 - 17.7 g/dL 15.0  15.3  15.4  14.7  16.0  16.7  15.0     Hematocrit 40.0 - 53.0 % 44.6  45.0   45.9  48.2  46.7      MCV 78 - 100 fL 97  98   97 R  95 R  91 R      MCH 26.5 - 33.0 pg 32.8  33.2 High    31.1  31.6  32.6      MCHC 31.5 - 36.5 g/dL 33.6  34.0   32.0  33.2  35.8      RDW 10.0 - 15.0 % 12.3  12.7   15.1 High   15.2 High   12.6      Platelet Count 150 - 450 10e3/uL 231  195   247 R  308 R  171 R      % Neutrophils % 63    72.5  67.2  67.5      % Lymphocytes % 24    12.7  22.0  16.7      % Monocytes % 8    13.1  8.7  15.4      % Eosinophils % 3    0.7  1.3  0.0      % Basophils % 1    0.6  0.5  0.2      % Immature Granulocytes % 1           NRBCs per 100 WBC <1 /100 0           Absolute Neutrophils 1.6 - 8.3 10e3/uL 3.3           Absolute Lymphocytes 0.8 - 5.3 10e3/uL 1.3           Absolute Monocytes 0.0 - 1.3 10e3/uL 0.4           Absolute Eosinophils 0.0 - 0.7 10e3/uL 0.2           Absolute Basophils 0.0 - 0.2 10e3/uL 0.0           Absolute Immature Granulocytes <=0.4 10e3/uL 0.0           Absolute NRBCs 10e3/uL 0.0              Sodium 133 - 144 mmol/L 140  140   138  143  139  143      Potassium 3.4 - 5.3 mmol/L 4.7  4.6   4.1  5.9 High   4.8  4.0     Chloride 94 - 109 mmol/L 109  104   108  108  108  114 High      Carbon Dioxide (CO2) 20 - 32 mmol/L 29  30   25  32  28  20     Anion Gap 3 - 14 mmol/L 2 Low   6   5  2 Low   3  9     Urea Nitrogen 7 - 30 mg/dL 16  17   21  21  18  22     Creatinine 0.66 - 1.25 mg/dL 1.36 High   1.43 High    1.34 High   1.56 High   1.32 High   1.22     Calcium 8.5 - 10.1 mg/dL 9.1  9.4   9.0   10.0  9.0  7.9 Low      Glucose 70 - 99 mg/dL 103 High   92  86  89 CM  84  88 CM  92     GFR Estimate >60 mL/min/1.73m2 54 Low   51 Low  CM   55 Low  R, CM  46 Low  R, CM  56 Low  R, CM  62 R, CM    Comment: As of July 11, 2021, eGFR is calculated by the CKD-EPI creatinine equation, without race adjustment. eGFR can be influenced by muscle mass, exercise, and diet. The reported eGFR is an estimation only and is only applicable if the renal function is stable.         ASSESSMENT and PLAN  Trevin Gee is a 65 y/o male who presents for pre-operative H&P in preparation for COLONOSCOPY with Robbie Cruz MD on 12/6/21 at [facility] for treatment of Colonic stricture (H).    Pt has had prior anesthetic.  No history of anesthetic complications.       He has the following specific operative considerations:   # CODI 2/8 = low risk  # VTE risk: 0.5%  # Risk of PONV score = 1.  If > 2, anti-emetic intervention recommended.  # Anesthesia considerations:  Refer to PAC assessment in anesthesia records      CARDIAC: METS 4      # RCRI : No serious cardiac risks.  0.4% risk of major adverse cardiac event.       PULMONARY:     # Never smoked    # Denies asthma or inhaler use    GI:     # Crohn's    # s/p multiple surgeries including distal ilectomy and right hemicolectomy     # Colonic stricture    # Denies GERD    /RENAL:     # Creatinine 1.36, GFR 54 today.     ENDO: BMI 23    # No DM    ORTHO:     # full ROM of neck    NEURO/PSYCH:     # anxiety, depression, bipolar    # Hx steroid induced psychosis    MISC:    # Hx TB in 2001, s/p treatment      Patient is optimized and is acceptable candidate for the proposed procedure. No further diagnostic evaluation is needed.    Final plan per anesthesiologist on day of surgery.     Arrival time, NPO, shower and medication instructions provided by nursing staff today.  Preparing For Your Surgery handout given.      On the day of service:     Prep time: 14  minutes  Visit time: 25 minutes  Documentation time: 13 minutes  ------------------------------------------  Total time: 52 minutes      Nadya Leary PA-C  Preoperative Assessment Center  [facility]  Phone: [phone]  Fax: [phone]

## 2021-12-06 ENCOUNTER — ANESTHESIA (OUTPATIENT)
Dept: SURGERY | Facility: CLINIC | Age: 66
End: 2021-12-06
Payer: COMMERCIAL

## 2021-12-06 ENCOUNTER — APPOINTMENT (OUTPATIENT)
Dept: GENERAL RADIOLOGY | Facility: CLINIC | Age: 66
End: 2021-12-06
Attending: INTERNAL MEDICINE
Payer: COMMERCIAL

## 2021-12-06 ENCOUNTER — HOSPITAL ENCOUNTER (OUTPATIENT)
Facility: CLINIC | Age: 66
Discharge: HOME | End: 2021-12-06
Attending: INTERNAL MEDICINE | Admitting: INTERNAL MEDICINE
Payer: COMMERCIAL

## 2021-12-06 VITALS
SYSTOLIC BLOOD PRESSURE: 124 MMHG | TEMPERATURE: 97.7 F | RESPIRATION RATE: 14 BRPM | HEIGHT: 69 IN | OXYGEN SATURATION: 97 % | HEART RATE: 50 BPM | DIASTOLIC BLOOD PRESSURE: 77 MMHG | BODY MASS INDEX: 22.41 KG/M2 | WEIGHT: 151.3 LBS

## 2021-12-06 LAB — COLONOSCOPY: NORMAL

## 2021-12-06 PROCEDURE — 370N000017 HC ANESTHESIA TECHNICAL FEE, PER MIN: Performed by: INTERNAL MEDICINE

## 2021-12-06 PROCEDURE — 272N000001 HC OR GENERAL SUPPLY STERILE: Performed by: INTERNAL MEDICINE

## 2021-12-06 PROCEDURE — 999N000141 HC STATISTIC PRE-PROCEDURE NURSING ASSESSMENT: Performed by: INTERNAL MEDICINE

## 2021-12-06 PROCEDURE — 710N000009 HC RECOVERY PHASE 1, LEVEL 1, PER MIN: Performed by: INTERNAL MEDICINE

## 2021-12-06 PROCEDURE — 999N000179 XR SURGERY CARM FLUORO LESS THAN 5 MIN W STILLS: Mod: TC

## 2021-12-06 PROCEDURE — 250N000009 HC RX 250

## 2021-12-06 PROCEDURE — 710N000012 HC RECOVERY PHASE 2, PER MINUTE: Performed by: INTERNAL MEDICINE

## 2021-12-06 PROCEDURE — 250N000011 HC RX IP 250 OP 636

## 2021-12-06 PROCEDURE — 258N000003 HC RX IP 258 OP 636

## 2021-12-06 PROCEDURE — C1726 CATH, BAL DIL, NON-VASCULAR: HCPCS | Performed by: INTERNAL MEDICINE

## 2021-12-06 PROCEDURE — 360N000075 HC SURGERY LEVEL 2, PER MIN: Performed by: INTERNAL MEDICINE

## 2021-12-06 RX ORDER — FENTANYL CITRATE 0.05 MG/ML
25 INJECTION, SOLUTION INTRAMUSCULAR; INTRAVENOUS EVERY 5 MIN PRN
Status: DISCONTINUED | OUTPATIENT
Start: 2021-12-06 | End: 2021-12-06 | Stop reason: HOSPADM

## 2021-12-06 RX ORDER — LIDOCAINE 40 MG/G
CREAM TOPICAL
Status: DISCONTINUED | OUTPATIENT
Start: 2021-12-06 | End: 2021-12-06 | Stop reason: HOSPADM

## 2021-12-06 RX ORDER — ONDANSETRON 2 MG/ML
4 INJECTION INTRAMUSCULAR; INTRAVENOUS
Status: DISCONTINUED | OUTPATIENT
Start: 2021-12-06 | End: 2021-12-06 | Stop reason: HOSPADM

## 2021-12-06 RX ORDER — FENTANYL CITRATE 0.05 MG/ML
25 INJECTION, SOLUTION INTRAMUSCULAR; INTRAVENOUS
Status: DISCONTINUED | OUTPATIENT
Start: 2021-12-06 | End: 2021-12-06 | Stop reason: HOSPADM

## 2021-12-06 RX ORDER — OXYCODONE HYDROCHLORIDE 5 MG/1
5 TABLET ORAL EVERY 4 HOURS PRN
Status: DISCONTINUED | OUTPATIENT
Start: 2021-12-06 | End: 2021-12-06 | Stop reason: HOSPADM

## 2021-12-06 RX ORDER — HYDROMORPHONE HCL IN WATER/PF 6 MG/30 ML
0.2 PATIENT CONTROLLED ANALGESIA SYRINGE INTRAVENOUS EVERY 5 MIN PRN
Status: DISCONTINUED | OUTPATIENT
Start: 2021-12-06 | End: 2021-12-06 | Stop reason: HOSPADM

## 2021-12-06 RX ORDER — LIDOCAINE HYDROCHLORIDE 20 MG/ML
INJECTION, SOLUTION INFILTRATION; PERINEURAL PRN
Status: DISCONTINUED | OUTPATIENT
Start: 2021-12-06 | End: 2021-12-06

## 2021-12-06 RX ORDER — ONDANSETRON 4 MG/1
4 TABLET, ORALLY DISINTEGRATING ORAL EVERY 30 MIN PRN
Status: DISCONTINUED | OUTPATIENT
Start: 2021-12-06 | End: 2021-12-06 | Stop reason: HOSPADM

## 2021-12-06 RX ORDER — ONDANSETRON 2 MG/ML
4 INJECTION INTRAMUSCULAR; INTRAVENOUS EVERY 30 MIN PRN
Status: DISCONTINUED | OUTPATIENT
Start: 2021-12-06 | End: 2021-12-06 | Stop reason: HOSPADM

## 2021-12-06 RX ORDER — ONDANSETRON 2 MG/ML
INJECTION INTRAMUSCULAR; INTRAVENOUS PRN
Status: DISCONTINUED | OUTPATIENT
Start: 2021-12-06 | End: 2021-12-06

## 2021-12-06 RX ORDER — PROPOFOL 10 MG/ML
INJECTION, EMULSION INTRAVENOUS CONTINUOUS PRN
Status: DISCONTINUED | OUTPATIENT
Start: 2021-12-06 | End: 2021-12-06

## 2021-12-06 RX ORDER — SODIUM CHLORIDE, SODIUM LACTATE, POTASSIUM CHLORIDE, CALCIUM CHLORIDE 600; 310; 30; 20 MG/100ML; MG/100ML; MG/100ML; MG/100ML
INJECTION, SOLUTION INTRAVENOUS CONTINUOUS
Status: DISCONTINUED | OUTPATIENT
Start: 2021-12-06 | End: 2021-12-06 | Stop reason: HOSPADM

## 2021-12-06 RX ORDER — MEPERIDINE HYDROCHLORIDE 25 MG/ML
12.5 INJECTION INTRAMUSCULAR; INTRAVENOUS; SUBCUTANEOUS
Status: DISCONTINUED | OUTPATIENT
Start: 2021-12-06 | End: 2021-12-06 | Stop reason: HOSPADM

## 2021-12-06 RX ORDER — SODIUM CHLORIDE, SODIUM LACTATE, POTASSIUM CHLORIDE, CALCIUM CHLORIDE 600; 310; 30; 20 MG/100ML; MG/100ML; MG/100ML; MG/100ML
INJECTION, SOLUTION INTRAVENOUS CONTINUOUS PRN
Status: DISCONTINUED | OUTPATIENT
Start: 2021-12-06 | End: 2021-12-06

## 2021-12-06 RX ORDER — HYDRALAZINE HYDROCHLORIDE 20 MG/ML
2.5-5 INJECTION INTRAMUSCULAR; INTRAVENOUS
Status: DISCONTINUED | OUTPATIENT
Start: 2021-12-06 | End: 2021-12-06 | Stop reason: HOSPADM

## 2021-12-06 RX ADMIN — LIDOCAINE HYDROCHLORIDE 25 MG: 20 INJECTION, SOLUTION INFILTRATION; PERINEURAL at 12:09

## 2021-12-06 RX ADMIN — ONDANSETRON 4 MG: 2 INJECTION INTRAMUSCULAR; INTRAVENOUS at 12:09

## 2021-12-06 RX ADMIN — PROPOFOL 150 MCG/KG/MIN: 10 INJECTION, EMULSION INTRAVENOUS at 12:09

## 2021-12-06 RX ADMIN — MIDAZOLAM 2 MG: 1 INJECTION INTRAMUSCULAR; INTRAVENOUS at 12:06

## 2021-12-06 RX ADMIN — SODIUM CHLORIDE, POTASSIUM CHLORIDE, SODIUM LACTATE AND CALCIUM CHLORIDE: 600; 310; 30; 20 INJECTION, SOLUTION INTRAVENOUS at 12:05

## 2021-12-06 ASSESSMENT — MIFFLIN-ST. JEOR: SCORE: 1456.67

## 2021-12-06 NOTE — ANESTHESIA CARE TRANSFER NOTE
Patient: Trevin Gee    Procedure: Procedure(s):  COLONOSCOPY with dilation       Diagnosis: Colonic stricture (H) [K56.699]  Diagnosis Additional Information: No value filed.    Anesthesia Type:   MAC     Note:    Oropharynx: oropharynx clear of all foreign objects and spontaneously breathing  Level of Consciousness: awake  Oxygen Supplementation: face mask  Level of Supplemental Oxygen (L/min / FiO2): 6  Independent Airway: airway patency satisfactory and stable  Dentition: dentition unchanged  Vital Signs Stable: post-procedure vital signs reviewed and stable  Report to RN Given: handoff report given  Patient transferred to: PACU (SD)  Comments: VSS and spont breathing  Handoff Report: Identifed the Patient, Identified the Reponsible Provider, Reviewed the pertinent medical history, Discussed the surgical course, Reviewed Intra-OP anesthesia mangement and issues during anesthesia, Set expectations for post-procedure period and Allowed opportunity for questions and acknowledgement of understanding      Vitals:  Vitals Value Taken Time   /81 12/06/21 1245   Temp 36.9  C (98.4  F) 12/06/21 1242   Pulse 60 12/06/21 1248   Resp 12 12/06/21 1248   SpO2 100 % 12/06/21 1248   Vitals shown include unvalidated device data.    Electronically Signed By: MARISOL Plaza CRNA  December 6, 2021  12:50 PM

## 2021-12-06 NOTE — DISCHARGE INSTRUCTIONS
See additional handout    Same Day Surgery Discharge Instructions for  Sedation and General Anesthesia       It's not unusual to feel dizzy, light-headed or faint for up to 24 hours after surgery or while taking pain medication.  If you have these symptoms: sit for a few minutes before standing and have someone assist you when you get up to walk or use the bathroom.      You should rest and relax for the next 24 hours. We recommend you make arrangements to have an adult stay with you for at least 24 hours after your discharge.  Avoid hazardous and strenuous activity.      DO NOT DRIVE any vehicle or operate mechanical equipment for 24 hours following the end of your surgery.  Even though you may feel normal, your reactions may be affected by the medication you have received.      Do not drink alcoholic beverages for 24 hours following surgery.       Slowly progress to your regular diet as you feel able. It's not unusual to feel nauseated and/or vomit after receiving anesthesia.  If you develop these symptoms, drink clear liquids (apple juice, ginger ale, broth, 7-up, etc. ) until you feel better.  If your nausea and vomiting persists for 24 hours, please notify your surgeon.        All narcotic pain medications, along with inactivity and anesthesia, can cause constipation. Drinking plenty of liquids and increasing fiber intake will help.      For any questions of a medical nature, call your surgeon.      Do not make important decisions for 24 hours.      If you had general anesthesia, you may have a sore throat for a couple of days related to the breathing tube used during surgery.  You may use Cepacol lozenges to help with this discomfort.  If it worsens or if you develop a fever, contact your surgeon.       If you feel your pain is not well managed with the pain medications prescribed by your surgeon, please contact your surgeon's office to let them know so they can address your concerns.       CoVid 19  Information    We want to give you information regarding Covid. Please consult your primary care provider with any questions you might have.     Patient who have symptoms (cough, fever, or shortness of breath), need to isolate for 7 days from when symptoms started OR 72 hours after fever resolves (without fever reducing medications) AND improvement of respiratory symptoms (whichever is longer).      Isolate yourself at home (in own room/own bathroom if possible)    Do Not allow any visitors    Do Not go to work or school    Do Not go to [employer],  centers, shopping, or other public places.    Do Not shake hands.    Avoid close and intimate contact with others (hugging, kissing).    Follow CDC recommendations for household cleaning of frequently touched services.     After the initial 7 days, continue to isolate yourself from household members as much as possible. To continue decrease the risk of community spread and exposure, you and any members of your household should limit activities in public for 14 days after starting home isolation.     You can reference the following CDC link for helpful home isolation/care tips:  https://www.cdc.gov/coronavirus/2019-ncov/downloads/10Things.pdf    Protect Others:    Cover Your Mouth and Nose with a mask, disposable tissue or wash cloth to avoid spreading germs to others.    Wash your hands and face frequently with soap and water    Call Your Primary Doctor If: Breathing difficulty develops or you become worse.    For more information about COVID19 and options for caring for yourself at home, please visit the CDC website at https://www.cdc.gov/coronavirus/2019-ncov/about/steps-when-sick.html  For more options for care at [facility], please visit our website at [email or web address]    **If you have questions or concerns about your procedure,   call Dr. Curz at [phone]**

## 2021-12-06 NOTE — ANESTHESIA POSTPROCEDURE EVALUATION
Patient: Trevin Gee    Procedure: Procedure(s):  COLONOSCOPY with dilation       Diagnosis:Colonic stricture (H) [K56.699]  Diagnosis Additional Information: No value filed.    Anesthesia Type:  MAC    Note:     Postop Pain Control: Uneventful            Sign Out: Well controlled pain   PONV: No   Neuro/Psych: Uneventful            Sign Out: Acceptable/Baseline neuro status   Airway/Respiratory: Uneventful            Sign Out: Acceptable/Baseline resp. status   CV/Hemodynamics: Uneventful            Sign Out: Acceptable CV status; No obvious hypovolemia; No obvious fluid overload   Other NRE: NONE   DID A NON-ROUTINE EVENT OCCUR? No           Last vitals:  Vitals Value Taken Time   /78 12/06/21 1330   Temp 36.5  C (97.7  F) 12/06/21 1315   Pulse 49 12/06/21 1334   Resp 8 12/06/21 1334   SpO2 100 % 12/06/21 1334   Vitals shown include unvalidated device data.    Electronically Signed By: Richard Pink MD  December 6, 2021  3:23 PM

## 2021-12-06 NOTE — OP NOTE
12/06/2021 11:40 AM [facility] [address] [place]  [address]   _______________________________________________________________________________   Patient Name: Trevin Gee        Procedure Date: 12/6/2021 11:40 AM   MRN: [id number]                       Account Number: [id number]   YOB: 1955              Admit Type: Outpatient   Age: 66                               Room: OR   Note Status: Finalized                Attending MD: Robbie Cruz MD   Instrument Name: [id number] Colonoscope   _______________________________________________________________________________       Procedure:                Colonoscopy   Indications:              Therapeutic procedure, Personal history of Crohn's                             disease   Providers:                Robbie Cruz MD, Lorna Alvarez RN   Patient Profile:          Mr Gee is a 65yo gentleman with Crohns                             status post distal ilectomy and right hemicolectomy                             complicated by a recalcitrant anstomotic stenosis                             who underwent a series of Axios stent placements                             for management. He now returns without significant                             complaints and without symptoms suggestive of                             obstruction and proceeds to repeat colonoscopy for                             removal of the stent placed in May of this year as                             well as for repeat interrogation.   Referring MD:             Lacey Feng   Requesting Provider:      Fransisco Leach   Medicines:                Deep sedation was administered   Complications:            No immediate complications.   _______________________________________________________________________________   Procedure:                Pre-Anesthesia Assessment:                             - Prior to the  procedure, a History and Physical                             was performed, and patient medications and                             allergies were reviewed. The patient is competent.                             The risks and benefits of the procedure and the                             sedation options and risks were discussed with the                             patient. All questions were answered and informed                             consent was obtained. Patient identification and                             proposed procedure were verified by the nurse in                             the pre-procedure area. Mental Status Examination:                             alert and oriented. Airway Examination: Mallampati                             Class II (the uvula but not tonsillar pillars                             visualized). Respiratory Examination: clear to                             auscultation. CV Examination: normal. ASA Grade                             Assessment: II - A patient with mild systemic                             disease. After reviewing the risks and benefits,                             the patient was deemed in satisfactory condition to                             undergo the procedure. The anesthesia plan was to                             use deep sedation / analgesia. Immediately prior to                             administration of medications, the patient was                             re-assessed for adequacy to receive sedatives. The                             heart rate, respiratory rate, oxygen saturations,                             blood pressure, adequacy of pulmonary ventilation,                             and response to care were monitored throughout the                             procedure. The physical status of the patient was                             re-assessed after the procedure. After obtaining                             informed consent, the colonoscope  was passed under                             direct vision. Throughout the procedure, the                             patient's blood pressure, pulse, and oxygen                              saturations were monitored continuously. The                             pediatric colonoscope was introduced through the                             anus and advanced to the terminal ileum. The                             colonoscopy was performed without difficulty. The                             patient tolerated the procedure well. The quality                             of the bowel preparation was adequate.                                                                                     Findings:        The patient was left lateral throughout. Adjunct fluoroscopy was        utilized without  films nor demonstrating the Axios stent. Digital        examination demonstrated intact tone with an impressive skin tag and or        nonbleeding external hemorrhoids. A pediatric colonoscope was advanced        to the ileocolonic anastomosis without issue. Bowel preparation was        adequate to exclude small lesions. As with fluoroscopy, the Axios stent        appeared to have spontaneously eject per anus by endoscopy. The        anastomosis was again found stenotic with a patency of 8-9mm and without        any suggestive of active inflammation. Using fluoroscopic guidance, the        wire provided with an Elation balloon was passed across the anastomosis        and the balloon inflated to 11 then 13.5mm with appropriate effect of        creating two rents without evidence of full thickness defect. This        allowed passage of the colonoscope across the anastomsis, and just        upstream a synchronous stenosis was found, also without evidence of        active inflammation and with a baseline patency of 8-9mm. Using        fluoroscopic guidance, the wire provided with an Elation balloon was        passed across this  stenosis and the balloon inflated to 11 then 13.5mm        with appropriate effect of creating two rents without evidence of full        thickness defect. This allowed passage across this stenosis and the        distal ileum upstream appeared healthy without lesion or inflammation.        The majority of the colon remains and was without polyps, inflammation        or other lesion.                                                                                     Impression:               - Spontaneous ejection per anus of the Axios stent                             (demonstrating fluoroscopically and endoscopically)                             - Recurrent, synchronous stenosis of the distal                             ileum and ileocolonic anastomsis, with baseline                             patency of 8-9mm each without evidence of any                             active inflammation                             - Uncomplicated over the wire dilation of the                             stenoses to 13.5mm with appropriate effect                             - No evidence of active inflammation or lesion of                             the remaining colon   Recommendation:           - Deep sedation recovery with probable discharge                             home this afternoon                             - All medications, diet and activity may resume                             without delay on discharge                             - Follow up with Dr Leach as previously scheduled;                             Dr Leach will determine the timing of your next                             procedure                             - The findings and recommendations were discussed                             with the patient

## 2021-12-14 DIAGNOSIS — B38.7 COCCIDIOIDAL GRANULOMA: ICD-10-CM

## 2021-12-14 RX ORDER — FLUCONAZOLE 200 MG/1
200 TABLET ORAL DAILY
Qty: 30 TABLET | Refills: 0 | Status: SHIPPED | OUTPATIENT
Start: 2021-12-14 | End: 2021-12-29

## 2021-12-29 ENCOUNTER — PATIENT OUTREACH (OUTPATIENT)
Dept: GASTROENTEROLOGY | Facility: CLINIC | Age: 66
End: 2021-12-29
Payer: COMMERCIAL

## 2021-12-29 ENCOUNTER — VIRTUAL VISIT (OUTPATIENT)
Dept: INFECTIOUS DISEASES | Facility: CLINIC | Age: 66
End: 2021-12-29
Attending: INTERNAL MEDICINE
Payer: COMMERCIAL

## 2021-12-29 DIAGNOSIS — F31.81 BIPOLAR 2 DISORDER (H): ICD-10-CM

## 2021-12-29 DIAGNOSIS — B38.7 COCCIDIOIDAL GRANULOMA: ICD-10-CM

## 2021-12-29 PROCEDURE — G0463 HOSPITAL OUTPT CLINIC VISIT: HCPCS | Mod: PN,RTG | Performed by: INTERNAL MEDICINE

## 2021-12-29 PROCEDURE — 99204 OFFICE O/P NEW MOD 45 MIN: CPT | Mod: 95 | Performed by: INTERNAL MEDICINE

## 2021-12-29 RX ORDER — FLUCONAZOLE 200 MG/1
200 TABLET ORAL DAILY
Qty: 90 TABLET | Refills: 3 | Status: SHIPPED | OUTPATIENT
Start: 2021-12-29 | End: 2024-05-15

## 2021-12-29 ASSESSMENT — PAIN SCALES - GENERAL: PAINLEVEL: NO PAIN (0)

## 2021-12-29 NOTE — PROGRESS NOTES
ID clinic virtual visit    Assessment:  1. Coccidiomycosis:  He has been maintained on fluconazole since 2013 given recurrence of disease when fluconazole had been previously stopped (he did not have any new fungal exposures prior to his second episode).  My former colleague Dr. Mccabe previously cared for Trevin (she has now transferred her [facility] [place]) and had conferred with experts, electing to continue fluconazole life long (assuming he would always been on some immunesuppression). He is now off iatrogenic immunosuppression, but I prefer to continue secondary ppx with fluconazole for now given that his immune system may be influenced by entities other than iatrogenic IS, including suboptimal nutritional state (though note that his albumin is normal).     2. Crohns disease with confirmed ileal anastomotic stricture and patient-suspected jejunal abnormality. No evidence of active inflammation and is currently off immunosuppressive agents.     3. CKD, Cr baseline 1.3-1.5. suspect some prior Cr measurements have been influenced by hypovolemia due to GI issues.    4. Reactivated VZV with post-herpetic neuralgia.    We discussed at length the probable interaction between his nutrition and his gut microbiota and his immune system function. He feels somewhat burdened by his own expectations surrounding his nutrition. I encouraged him to embrace moderation and attempt to adhere to IBS diet and pursue a heterogeneous diet.    Plan:  - continue fluconazole 200mg daily. His CrCl is hovering around 50, which is the point at which it would be advisable to reduce to 100mg daily. Note that he previously experienced s/e likely related to coadministration of prednisone and fluconazole.  If his Cr rises to persistently >1.4 would reduce dose to 100mg daily  - RTC in 1 year    It is a pleasure to participate in Trevin's care. Visit length 45 min, >50% clinical counseling/care coordination..    HPI:    Thi sis a 67 y/o  man with IBD and coccidiomycosis that relapsed following cessation of an initial antifungal course. Please see above for further detail. He was last seen by my colleague Dr. Mccabe in ~2016. I am meeting him for the first time today.    He has chiefly been bothered by GI issues. He has previously seen Dr. Leach, considering transitioning to another provider.    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 During recent endoscopy in 11/2021, findings included a spontaneously expelled axios stent, patency of 8-9mm. Underwent dilation.    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     Osteoporosis     Anxiety     Vitamin B 12 deficiency     History of kevin     Steroid dependent for adrenal supression     Adrenal insufficiency (H)     Bipolar 2 disorder (H)     Hx of psychiatric care     Hypertriglyceridemia     Lactose intolerance     Vertebral fracture, osteoporotic (H)     Esophageal reflux     Abdominal pain     Diarrhea     Colorectal anastomotic stricture     Abdominal cramping     Partial small bowel obstruction (H)     Chronic kidney disease, stage 3 (H)     Dehydration     Current Outpatient Medications   Medication     Cyanocobalamin (VITAMIN B 12) 500 MCG TABS     fluconazole (DIFLUCAN) 200 MG tablet     gabapentin (NEURONTIN) 300 MG capsule     HYDROcodone-acetaminophen (NORCO) 5-325 MG tablet     Lysine 1000 MG TABS     MELATONIN PO     multivitamin, therapeutic with minerals (MULTI-VITAMIN) TABS     traZODone (DESYREL)  50 MG tablet     vitamin B6 (PYRIDOXINE) 100 MG tablet     vitamin C (ASCORBIC ACID) 1000 MG TABS     Vitamin D, Cholecalciferol, 25 MCG (1000 UT) CAPS     No current facility-administered medications for this visit.       Objective:    GENERAL: Healthy, alert and no distress  EYES: Eyes grossly normal to inspection.  No discharge or erythema, or obvious scleral/conjunctival abnormalities.  RESP: No audible wheeze, cough, or visible cyanosis.  No visible retractions or increased work of breathing.    SKIN: Visible skin clear. No significant rash, abnormal pigmentation or lesions.  NEURO: Cranial nerves grossly intact.  Mentation and speech appropriate for age.  PSYCH: Mentation appears normal, affect normal/bright, judgement and insight intact, normal speech and appearance well-groomed.          How would you like to obtain your AVS? MyChart  If the video visit is dropped, the invitation should be resent by: Text to cell phone: [phone]  Will anyone else be joining your video visit? No    Video Start Time: 12:15  Video-Visit Details    Type of service:  Video Visit    Video End Time:1300    Originating Location (pt. Location): Home    Distant Location (provider location):  [facility] [place]     Platform used for Video Visit: Outright

## 2021-12-29 NOTE — PROGRESS NOTES
Patient is no longer a patient of Dr Leach   Patient has appointment with Ms Malone on January 5  This was a follow up per Dr Leach  Left a voice mail message and my chart message asking patient about the status of the appointment

## 2021-12-29 NOTE — LETTER
12/29/2021       RE: Trevin Gee  [address]  [address]  [facility] [place] [address]     Dear Colleague,    Thank you for referring your patient, Trevin Gee, to the [facility] [place] at [facility]. Please see a copy of my visit note below.    ID clinic virtual visit    Assessment:  1. Coccidiomycosis:  He has been maintained on fluconazole since 2013 given recurrence of disease when fluconazole had been previously stopped (he did not have any new fungal exposures prior to his second episode).  My former colleague Dr. Mccabe previously cared for Trevin (she has now transferred her practice [facility] [place]) and had conferred with experts, electing to continue fluconazole life long (assuming he would always been on some immunesuppression). He is now off iatrogenic immunosuppression, but I prefer to continue secondary ppx with fluconazole for now given that his immune system may be influenced by entities other than iatrogenic IS, including suboptimal nutritional state (though note that his albumin is normal).     2. Crohns disease with confirmed ileal anastomotic stricture and patient-suspected jejunal abnormality. No evidence of active inflammation and is currently off immunosuppressive agents.     3. CKD, Cr baseline 1.3-1.5. suspect some prior Cr measurements have been influenced by hypovolemia due to GI issues.    4. Reactivated VZV with post-herpetic neuralgia.    We discussed at length the probable interaction between his nutrition and his gut microbiota and his immune system function. He feels somewhat burdened by his own expectations surrounding his nutrition. I encouraged him to embrace moderation and attempt to adhere to IBS diet and pursue a heterogeneous diet.    Plan:  - continue fluconazole 200mg daily. His CrCl is hovering around 50, which is the point at which it would be advisable to reduce to 100mg daily.  Note that he previously experienced s/e likely related to coadministration of prednisone and fluconazole.  If his Cr rises to persistently >1.4 would reduce dose to 100mg daily  - RTC in 1 year    It is a pleasure to participate in Trevin's care. Visit length 45 min, >50% clinical counseling/care coordination..    HPI:    Carmelita grimaldo a 67 y/o man with IBD and coccidiomycosis that relapsed following cessation of an initial antifungal course. Please see above for further detail. He was last seen by my colleague Dr. Mccabe in ~2016. I am meeting him for the first time today.    He has chiefly been bothered by GI issues. He has previously seen Dr. Leach, considering transitioning to another provider.    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 During recent endoscopy in 11/2021, findings included a spontaneously expelled axios stent, patency of 8-9mm. Underwent dilation.    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     Osteoporosis     Anxiety     Vitamin B 12 deficiency     History of kevin     Steroid dependent for adrenal supression     Adrenal insufficiency (H)     Bipolar 2 disorder (H)     Hx of psychiatric care     Hypertriglyceridemia     Lactose intolerance     Vertebral fracture, osteoporotic (H)     Esophageal reflux     Abdominal pain     Diarrhea     Colorectal anastomotic stricture     Abdominal cramping     Partial small bowel obstruction (H)     Chronic kidney disease, stage 3 (H)     Dehydration     Current Outpatient  Medications   Medication     Cyanocobalamin (VITAMIN B 12) 500 MCG TABS     fluconazole (DIFLUCAN) 200 MG tablet     gabapentin (NEURONTIN) 300 MG capsule     HYDROcodone-acetaminophen (NORCO) 5-325 MG tablet     Lysine 1000 MG TABS     MELATONIN PO     multivitamin, therapeutic with minerals (MULTI-VITAMIN) TABS     traZODone (DESYREL) 50 MG tablet     vitamin B6 (PYRIDOXINE) 100 MG tablet     vitamin C (ASCORBIC ACID) 1000 MG TABS     Vitamin D, Cholecalciferol, 25 MCG (1000 UT) CAPS     No current facility-administered medications for this visit.     Objective:    GENERAL: Healthy, alert and no distress  EYES: Eyes grossly normal to inspection.  No discharge or erythema, or obvious scleral/conjunctival abnormalities.  RESP: No audible wheeze, cough, or visible cyanosis.  No visible retractions or increased work of breathing.    SKIN: Visible skin clear. No significant rash, abnormal pigmentation or lesions.  NEURO: Cranial nerves grossly intact.  Mentation and speech appropriate for age.  PSYCH: Mentation appears normal, affect normal/bright, judgement and insight intact, normal speech and appearance well-groomed.      How would you like to obtain your AVS? MyChart  If the video visit is dropped, the invitation should be resent by: Text to cell phone: [phone]  Will anyone else be joining your video visit? No        Again, thank you for allowing me to participate in the care of your patient.      Sincerely,    Sabrina Irwin MD

## 2022-01-01 RX ORDER — TRAZODONE HYDROCHLORIDE 50 MG/1
50 TABLET, FILM COATED ORAL AT BEDTIME
Qty: 90 TABLET | Refills: 0 | Status: SHIPPED | OUTPATIENT
Start: 2022-01-01 | End: 2022-09-18

## 2022-01-01 NOTE — TELEPHONE ENCOUNTER
Last Clinic Visit: 7/6/2021  [facility] [place]    Per medication protocol, Test(s)- PHQ-9 past due.  [place] refill of Trazodone was sent for a 90 day supply and FYI to [facility] clinic.   *appointment will be due around 1/6/2022 per protocol.    PHQ-9 score:    PHQ 5/14/2021   PHQ-9 Total Score 4   Q9: Thoughts of better off dead/self-harm past 2 weeks Not at all               PHQ-9 score:    PHQ 5/14/2021   PHQ-9 Total Score 4   Q9: Thoughts of better off dead/self-harm past 2 weeks Not at all

## 2022-01-02 ENCOUNTER — HEALTH MAINTENANCE LETTER (OUTPATIENT)
Age: 67
End: 2022-01-02

## 2022-02-01 DIAGNOSIS — B02.29 POST HERPETIC NEURALGIA: ICD-10-CM

## 2022-02-01 RX ORDER — GABAPENTIN 300 MG/1
600 CAPSULE ORAL 2 TIMES DAILY
Qty: 180 CAPSULE | Refills: 0 | Status: SHIPPED | OUTPATIENT
Start: 2022-02-01 | End: 2022-04-29

## 2022-02-01 NOTE — TELEPHONE ENCOUNTER
Received refill request as MIR. Chart reviewed, refill appropriate. Patient would like prescription sent to new pharmacy. Attending Dr. Cheney CC'd as an FYI.      Barbi Flores MD MPH  PGY3 Medicine/Dermatology Resident

## 2022-03-29 ENCOUNTER — TELEPHONE (OUTPATIENT)
Dept: ENDOCRINOLOGY | Facility: CLINIC | Age: 67
End: 2022-03-29
Payer: COMMERCIAL

## 2022-03-29 NOTE — LETTER
Patient:  Trevin Gee  :   1955  MRN:     [id number]        Mr.Mark MORENITA Gee  [address]  [address] [place] [id number]        2022    Dear ,    I see that you do not have a follow-up appointment in endocrinology. I would recommend that you be evaluated by an endocrinologist to minimize complications. If you like to be seen at the [facility], please call [phone] select option #1 for an appointment.    Regards    Mag Currie MD

## 2022-04-28 ENCOUNTER — MYC MEDICAL ADVICE (OUTPATIENT)
Dept: DERMATOLOGY | Facility: CLINIC | Age: 67
End: 2022-04-28
Payer: COMMERCIAL

## 2022-04-29 ENCOUNTER — VIRTUAL VISIT (OUTPATIENT)
Dept: DERMATOLOGY | Facility: CLINIC | Age: 67
End: 2022-04-29
Payer: COMMERCIAL

## 2022-04-29 DIAGNOSIS — B02.29 POST HERPETIC NEURALGIA: Primary | ICD-10-CM

## 2022-04-29 PROCEDURE — 99213 OFFICE O/P EST LOW 20 MIN: CPT | Mod: 95 | Performed by: DERMATOLOGY

## 2022-04-29 RX ORDER — GABAPENTIN 300 MG/1
600 CAPSULE ORAL 2 TIMES DAILY
Qty: 360 CAPSULE | Refills: 3 | Status: SHIPPED | OUTPATIENT
Start: 2022-04-29 | End: 2023-02-13

## 2022-04-29 NOTE — PROGRESS NOTES
[facility] Dermatology Note  Encounter Date: Apr 29, 2022  Store-and-Forward and Telephone ([phone]). Location of teledermatologist: [facility] [place].  Start time: 10:20. End time: 10:40.    Dermatology Problem List:  1. Post-herpetic neuralgia: T5, (initial zoster episode 5/2021)  - gabapentin 600 mg BID (tapered down from TID)  - started Shingrix vaccination series (got 1st dose a few months ago, will schedule 2nd one soon)    ____________________________________________    Assessment & Plan:     1. Post-herpetic neuralgia  Overall pretty well-controlled on gabapentin. Tolerated tapering of gabapentin 600 mg from TID to BID. Does occasionally flare if he misses a dose or takes a dose late. Tingling pinpricks most noticeable under the R arm.   - Discussed that duration of symptoms can last for several years (often around 1-3 years, but possibly longer)  - Discussed option of trying extended release pregabalin since that will only be once a day dosing, however patient elects to continue with gabapentin for now  - recommend completing Shingrix vaccination series (patient to schedule soon)     Procedures Performed:    None    Follow-up: 1 year(s) virtually (telephone with photos), or earlier for new or changing lesions    Staff and Resident:     Staffed with Dr. Shravan Puente MD (PGY-2)   Dermatology Resident    Staff Physician Comments:   I evaluated any available patient photographs with the resident and I edited the assessment and plan as documented in the note. I was present on the line and participated in the entire telephone call.    Jose Martin Cheney MD   of Dermatology  Department of Dermatology  [facility]      ____________________________________________    CC: Derm Problem (Been on Gabapentin for a few months. Working on tapering down on medications)    HPI:  Mr. Trevin Gee is a(n)  "66 year old male who presents today as a return patient for post-herpetic neuralgia (last seen 11/15/21).    - Overall doing pretty well after tapering gabapentin from 600 mg TID to BID a few months ago.   - When he's on schedule with the gabapentin, he states his symptoms are pretty well-controlled, but if he's late on the dose, then it can become quite bothersome  - Under the R arm is where the symptoms are worst. Describes \"strong tingling\" sensation like tiny pinpricks. Not really burning sensation, but mildly painful.    Patient is otherwise feeling well, without additional skin concerns.    Labs Reviewed:  N/A    Physical Exam:  Vitals: There were no vitals taken for this visit.  SKIN: Teledermatology photos were reviewed; image quality and interpretability: acceptable. Image date: 4/28/22.  - Skin overall clear  - No other lesions of concern on areas examined.     Medications:  Current Outpatient Medications   Medication     Cyanocobalamin (VITAMIN B 12) 500 MCG TABS     fluconazole (DIFLUCAN) 200 MG tablet     gabapentin (NEURONTIN) 300 MG capsule     HYDROcodone-acetaminophen (NORCO) 5-325 MG tablet     Lysine 1000 MG TABS     multivitamin w/minerals (THERA-VIT-M) tablet     traZODone (DESYREL) 50 MG tablet     vitamin C (ASCORBIC ACID) 1000 MG TABS     Vitamin D, Cholecalciferol, 25 MCG (1000 UT) CAPS     MELATONIN PO     vitamin B6 (PYRIDOXINE) 100 MG tablet     No current facility-administered medications for this visit.      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    much worse since July 2014     Cataract 12/2007    both eyes, too much prednisone, implants     Coccidioidomycosis      Crohn disease (H)      Crohn's disease (H) 1/13/2015     Depressive disorder 7/2014    much worse since July 2014     Fracture 1956    green fracture of leg     Gallbladder problem 2005?    much gravel, removed     GERD (gastroesophageal reflux disease)      History of blood transfusion 1988    ulcerated anastomosis term. ilium bleed     Hypertriglyceridemia 7/8/2016     Infection 2007    persistant coccidioidomycosis     Kidney stone various    both sides, all passed naturally     Mental health problem 2007    bipolar though perhaps different     Nephrolithiasis      Noninfectious ileitis      Osteoporosis 2007    osteoporosis, too much prednisone, last dexa 1/2014     Osteoporosis      Other nervous system complications 2007    prednisone overload induced kevin     Personal history of colonic polyps     small ones found during colonoscopies     Steroid-induced psychosis     Patient extremely sensitive to regular doses of steroids.  Unclear if this is due to chronic fluconazole use or genetic issue.  In the future, use caution when prescribing steroids.     TB (tuberculosis)        CC No referring provider defined for this encounter. on close of this encounter.

## 2022-04-29 NOTE — LETTER
4/29/2022       RE: Trevin Gee  [address]  [address]  [place] [place] [facility] [address]     Dear Colleague,    Thank you for referring your patient, Trevin Gee, to the [facility] [place] [facility]. Please see a copy of my visit note below.    [facility] Dermatology Note  Encounter Date: Apr 29, 2022  Store-and-Forward and Telephone ([phone]). Location of teledermatologist: [facility].  Start time: 10:20. End time: 10:40.    Dermatology Problem List:  1. Post-herpetic neuralgia: T5, (initial zoster episode 5/2021)  - gabapentin 600 mg BID (tapered down from TID)  - started Shingrix vaccination series (got 1st dose a few months ago, will schedule 2nd one soon)    ____________________________________________    Assessment & Plan:     1. Post-herpetic neuralgia  Overall pretty well-controlled on gabapentin. Tolerated tapering of gabapentin 600 mg from TID to BID. Does occasionally flare if he misses a dose or takes a dose late. Tingling pinpricks most noticeable under the R arm.   - Discussed that duration of symptoms can last for several years (often around 1-3 years, but possibly longer)  - Discussed option of trying extended release pregabalin since that will only be once a day dosing, however patient elects to continue with gabapentin for now  - recommend completing Shingrix vaccination series (patient to schedule soon)     Procedures Performed:    None    Follow-up: 1 year(s) virtually (telephone with photos), or earlier for new or changing lesions    Staff and Resident:     Staffed with Dr. Shravan Puente MD (PGY-2)   Dermatology Resident    Staff Physician Comments:   I evaluated any available patient photographs with the resident and I edited the assessment and plan as documented in the note. I was present on the line and participated in the entire  "telephone call.    Jose Martin Cheney MD   of Dermatology  Department of Dermatology  [facility]      ____________________________________________    CC: Derm Problem (Been on Gabapentin for a few months. Working on tapering down on medications)    HPI:  Mr. Trevin Gee is a(n) 66 year old male who presents today as a return patient for post-herpetic neuralgia (last seen 11/15/21).    - Overall doing pretty well after tapering gabapentin from 600 mg TID to BID a few months ago.   - When he's on schedule with the gabapentin, he states his symptoms are pretty well-controlled, but if he's late on the dose, then it can become quite bothersome  - Under the R arm is where the symptoms are worst. Describes \"strong tingling\" sensation like tiny pinpricks. Not really burning sensation, but mildly painful.    Patient is otherwise feeling well, without additional skin concerns.    Labs Reviewed:  N/A    Physical Exam:  Vitals: There were no vitals taken for this visit.  SKIN: Teledermatology photos were reviewed; image quality and interpretability: acceptable. Image date: 4/28/22.  - Skin overall clear  - No other lesions of concern on areas examined.     Medications:  Current Outpatient Medications   Medication     Cyanocobalamin (VITAMIN B 12) 500 MCG TABS     fluconazole (DIFLUCAN) 200 MG tablet     gabapentin (NEURONTIN) 300 MG capsule     HYDROcodone-acetaminophen (NORCO) 5-325 MG tablet     Lysine 1000 MG TABS     multivitamin w/minerals (THERA-VIT-M) tablet     traZODone (DESYREL) 50 MG tablet     vitamin C (ASCORBIC ACID) 1000 MG TABS     Vitamin D, Cholecalciferol, 25 MCG (1000 UT) CAPS     MELATONIN PO     vitamin B6 (PYRIDOXINE) 100 MG tablet     No current facility-administered medications for this visit.      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    much worse since July 2014     Cataract 12/2007    both eyes, too much prednisone, implants     Coccidioidomycosis      Crohn disease (H)      Crohn's disease (H) 1/13/2015     Depressive disorder 7/2014    much worse since July 2014     Fracture 1956    green fracture of leg     Gallbladder problem 2005?    much gravel, removed     GERD (gastroesophageal reflux disease)      History of blood transfusion 1988    ulcerated anastomosis term. ilium bleed     Hypertriglyceridemia 7/8/2016     Infection 2007    persistant coccidioidomycosis     Kidney stone various    both sides, all passed naturally     Mental health problem 2007    bipolar though perhaps different     Nephrolithiasis      Noninfectious ileitis      Osteoporosis 2007    osteoporosis, too much prednisone, last dexa 1/2014     Osteoporosis      Other nervous system complications 2007    prednisone overload induced kevin     Personal history of colonic polyps     small ones found during colonoscopies     Steroid-induced psychosis     Patient extremely sensitive to regular doses of steroids.  Unclear if this is due to chronic fluconazole use or genetic issue.  In the future, use caution when prescribing steroids.     TB (tuberculosis)        CC No referring provider defined for this encounter. on close of this encounter.

## 2022-04-29 NOTE — NURSING NOTE
Chief Complaint   Patient presents with     Derm Problem     Been on Gabapentin for a few months. Working on tapering down on medications     Teledermatology Nurse Call Patients:     Are you in the [facility] at the time of the encounter? yes    Today's visit will be billed to you and your insurance.    A teledermatology visit is not as thorough as an in-person visit and the quality of the photograph sent may not be of the same quality as that taken by the dermatology clinic.    Lamotil and dicyclomine 10mg patient is also taking.    Stormy Yee, VVF      Stormy Yee, MARY LOUF

## 2022-09-18 ENCOUNTER — MYC REFILL (OUTPATIENT)
Dept: INTERNAL MEDICINE | Facility: CLINIC | Age: 67
End: 2022-09-18

## 2022-09-18 DIAGNOSIS — F31.81 BIPOLAR 2 DISORDER (H): ICD-10-CM

## 2022-09-20 RX ORDER — TRAZODONE HYDROCHLORIDE 50 MG/1
50 TABLET, FILM COATED ORAL AT BEDTIME
Qty: 90 TABLET | Refills: 0 | Status: SHIPPED | OUTPATIENT
Start: 2022-09-20 | End: 2022-10-07

## 2022-09-20 RX ORDER — TRAZODONE HYDROCHLORIDE 50 MG/1
TABLET, FILM COATED ORAL
Qty: 90 TABLET | Refills: 3 | OUTPATIENT
Start: 2022-09-20

## 2022-10-07 ENCOUNTER — LAB (OUTPATIENT)
Dept: LAB | Facility: CLINIC | Age: 67
End: 2022-10-07
Payer: COMMERCIAL

## 2022-10-07 ENCOUNTER — OFFICE VISIT (OUTPATIENT)
Dept: INTERNAL MEDICINE | Facility: CLINIC | Age: 67
End: 2022-10-07
Payer: COMMERCIAL

## 2022-10-07 VITALS
HEIGHT: 70 IN | BODY MASS INDEX: 22.38 KG/M2 | HEART RATE: 72 BPM | DIASTOLIC BLOOD PRESSURE: 89 MMHG | WEIGHT: 156.3 LBS | RESPIRATION RATE: 16 BRPM | SYSTOLIC BLOOD PRESSURE: 131 MMHG | OXYGEN SATURATION: 99 %

## 2022-10-07 DIAGNOSIS — F31.81 BIPOLAR 2 DISORDER (H): ICD-10-CM

## 2022-10-07 DIAGNOSIS — R82.998 DARK URINE: Primary | ICD-10-CM

## 2022-10-07 DIAGNOSIS — Z00.00 ROUTINE HISTORY AND PHYSICAL EXAMINATION OF ADULT: ICD-10-CM

## 2022-10-07 DIAGNOSIS — N18.31 STAGE 3A CHRONIC KIDNEY DISEASE (H): ICD-10-CM

## 2022-10-07 DIAGNOSIS — L98.9 SKIN LESION: ICD-10-CM

## 2022-10-07 DIAGNOSIS — R82.998 DARK URINE: ICD-10-CM

## 2022-10-07 DIAGNOSIS — Z13.220 LIPID SCREENING: ICD-10-CM

## 2022-10-07 LAB
ALBUMIN SERPL BCG-MCNC: 4.6 G/DL (ref 3.5–5.2)
ALBUMIN UR-MCNC: NEGATIVE MG/DL
ALP SERPL-CCNC: 97 U/L (ref 40–129)
ALT SERPL W P-5'-P-CCNC: 18 U/L (ref 10–50)
ANION GAP SERPL CALCULATED.3IONS-SCNC: 10 MMOL/L (ref 7–15)
APPEARANCE UR: CLEAR
AST SERPL W P-5'-P-CCNC: 28 U/L (ref 10–50)
BILIRUB SERPL-MCNC: 0.7 MG/DL
BILIRUB UR QL STRIP: NEGATIVE
BUN SERPL-MCNC: 13.6 MG/DL (ref 8–23)
CALCIUM SERPL-MCNC: 9.9 MG/DL (ref 8.8–10.2)
CHLORIDE SERPL-SCNC: 102 MMOL/L (ref 98–107)
CHOLEST SERPL-MCNC: 120 MG/DL
COLOR UR AUTO: YELLOW
CREAT SERPL-MCNC: 1.35 MG/DL (ref 0.67–1.17)
CREAT UR-MCNC: 209 MG/DL
DEPRECATED HCO3 PLAS-SCNC: 29 MMOL/L (ref 22–29)
ERYTHROCYTE [DISTWIDTH] IN BLOOD BY AUTOMATED COUNT: 12.9 % (ref 10–15)
GFR SERPL CREATININE-BSD FRML MDRD: 58 ML/MIN/1.73M2
GLUCOSE SERPL-MCNC: 92 MG/DL (ref 70–99)
GLUCOSE UR STRIP-MCNC: NEGATIVE MG/DL
HCT VFR BLD AUTO: 46.9 % (ref 40–53)
HDLC SERPL-MCNC: 53 MG/DL
HGB BLD-MCNC: 16.2 G/DL (ref 13.3–17.7)
HGB UR QL STRIP: NEGATIVE
KETONES UR STRIP-MCNC: NEGATIVE MG/DL
LDLC SERPL CALC-MCNC: 42 MG/DL
LEUKOCYTE ESTERASE UR QL STRIP: NEGATIVE
MCH RBC QN AUTO: 32 PG (ref 26.5–33)
MCHC RBC AUTO-ENTMCNC: 34.5 G/DL (ref 31.5–36.5)
MCV RBC AUTO: 93 FL (ref 78–100)
MICROALBUMIN UR-MCNC: <12 MG/L
MICROALBUMIN/CREAT UR: NORMAL MG/G{CREAT}
MUCOUS THREADS #/AREA URNS LPF: PRESENT /LPF
NITRATE UR QL: NEGATIVE
NONHDLC SERPL-MCNC: 67 MG/DL
PH UR STRIP: 6 [PH] (ref 5–7)
PLATELET # BLD AUTO: 245 10E3/UL (ref 150–450)
POTASSIUM SERPL-SCNC: 4.3 MMOL/L (ref 3.4–5.3)
PROT SERPL-MCNC: 7.2 G/DL (ref 6.4–8.3)
RBC # BLD AUTO: 5.07 10E6/UL (ref 4.4–5.9)
RBC URINE: 1 /HPF
SODIUM SERPL-SCNC: 141 MMOL/L (ref 136–145)
SP GR UR STRIP: 1.02 (ref 1–1.03)
TRIGL SERPL-MCNC: 125 MG/DL
UROBILINOGEN UR STRIP-MCNC: NORMAL MG/DL
WBC # BLD AUTO: 5 10E3/UL (ref 4–11)
WBC URINE: 1 /HPF

## 2022-10-07 PROCEDURE — 81001 URINALYSIS AUTO W/SCOPE: CPT | Performed by: PATHOLOGY

## 2022-10-07 PROCEDURE — 82043 UR ALBUMIN QUANTITATIVE: CPT | Mod: 90 | Performed by: PATHOLOGY

## 2022-10-07 PROCEDURE — 91312 COVID-19,PF,PFIZER BOOSTER BIVALENT: CPT | Performed by: NURSE PRACTITIONER

## 2022-10-07 PROCEDURE — 85027 COMPLETE CBC AUTOMATED: CPT | Performed by: PATHOLOGY

## 2022-10-07 PROCEDURE — 80061 LIPID PANEL: CPT | Mod: 90 | Performed by: PATHOLOGY

## 2022-10-07 PROCEDURE — 80053 COMPREHEN METABOLIC PANEL: CPT | Performed by: PATHOLOGY

## 2022-10-07 PROCEDURE — 0124A COVID-19,PF,PFIZER BOOSTER BIVALENT: CPT | Performed by: NURSE PRACTITIONER

## 2022-10-07 PROCEDURE — 99397 PER PM REEVAL EST PAT 65+ YR: CPT | Mod: 25 | Performed by: NURSE PRACTITIONER

## 2022-10-07 PROCEDURE — 99000 SPECIMEN HANDLING OFFICE-LAB: CPT | Performed by: PATHOLOGY

## 2022-10-07 PROCEDURE — 90662 IIV NO PRSV INCREASED AG IM: CPT | Performed by: NURSE PRACTITIONER

## 2022-10-07 PROCEDURE — G0008 ADMIN INFLUENZA VIRUS VAC: HCPCS | Performed by: NURSE PRACTITIONER

## 2022-10-07 PROCEDURE — 36415 COLL VENOUS BLD VENIPUNCTURE: CPT | Performed by: PATHOLOGY

## 2022-10-07 RX ORDER — TRAZODONE HYDROCHLORIDE 50 MG/1
50 TABLET, FILM COATED ORAL AT BEDTIME
Qty: 90 TABLET | Refills: 3 | Status: SHIPPED | OUTPATIENT
Start: 2022-10-07 | End: 2023-12-29

## 2022-10-07 ASSESSMENT — ANXIETY QUESTIONNAIRES
6. BECOMING EASILY ANNOYED OR IRRITABLE: NOT AT ALL
7. FEELING AFRAID AS IF SOMETHING AWFUL MIGHT HAPPEN: NOT AT ALL
IF YOU CHECKED OFF ANY PROBLEMS ON THIS QUESTIONNAIRE, HOW DIFFICULT HAVE THESE PROBLEMS MADE IT FOR YOU TO DO YOUR WORK, TAKE CARE OF THINGS AT HOME, OR GET ALONG WITH OTHER PEOPLE: SOMEWHAT DIFFICULT
5. BEING SO RESTLESS THAT IT IS HARD TO SIT STILL: NOT AT ALL
1. FEELING NERVOUS, ANXIOUS, OR ON EDGE: NOT AT ALL
GAD7 TOTAL SCORE: 1
2. NOT BEING ABLE TO STOP OR CONTROL WORRYING: NOT AT ALL
GAD7 TOTAL SCORE: 1
3. WORRYING TOO MUCH ABOUT DIFFERENT THINGS: SEVERAL DAYS

## 2022-10-07 ASSESSMENT — PATIENT HEALTH QUESTIONNAIRE - PHQ9
SUM OF ALL RESPONSES TO PHQ QUESTIONS 1-9: 0
5. POOR APPETITE OR OVEREATING: NOT AT ALL

## 2022-10-07 NOTE — PROGRESS NOTES
SUBJECTIVE:  Trevin Gee is a 67 year old male with pmh of   Past Medical History:   Diagnosis Date     Anemia 2007     Anxiety 2012?    much worse since July 2014     Cataract 12/2007    both eyes, too much prednisone, implants     Coccidioidomycosis      Crohn disease (H)      Crohn's disease (H) 1/13/2015     Depressive disorder 7/2014    much worse since July 2014     Fracture 1956    green fracture of leg     Gallbladder problem 2005?    much gravel, removed     GERD (gastroesophageal reflux disease)      History of blood transfusion 1988    ulcerated anastomosis term. ilium bleed     Hypertriglyceridemia 7/8/2016     Infection 2007    persistant coccidioidomycosis     Kidney stone various    both sides, all passed naturally     Mental health problem 2007    bipolar though perhaps different     Nephrolithiasis      Noninfectious ileitis      Osteoporosis 2007    osteoporosis, too much prednisone, last dexa 1/2014     Osteoporosis      Other nervous system complications 2007    prednisone overload induced kevin     Personal history of colonic polyps     small ones found during colonoscopies     Steroid-induced psychosis     Patient extremely sensitive to regular doses of steroids.  Unclear if this is due to chronic fluconazole use or genetic issue.  In the future, use caution when prescribing steroids.     TB (tuberculosis)      Who comes in for preventive examination today.     Diagnosed with IBS over the last year. Fluctates between constiaption/diarrhea. Tries to stick to low-FODMAP diet. What works one week won't work the next week. Currently struggling more with constipation; uses fiber when he's not afraid to put it in his system. Working with GI through [facility]. Plans to meet with a dietician. No abdominal pain. Last colonoscopy with dilation in 12/6/21 for colonic stricture.     Urination concern--noted dark urine over the last several days.  No increased frequency or dysuria.     Continues  on gabapentin for post-herpatic neuralgia. Feels this is generally decreasing, sometimes misses a dose.     Has a spot on his back he'd like checked out. Can't reach it, not sure what he's feeling. Thought he had a raised sore at one point but then didn't find it again.     Feels his back isn't as strong as it used to be. Has to keep up with PT/exercises/ stretching to help maintain.    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     Medications and allergies were reviewed by me today.     Family History   Problem Relation Age of Onset     Heart Failure Father      Musculoskeletal Disorder Father         Parkinson's     Cancer - colorectal Mother      Cerebrovascular Disease Paternal Grandmother      Cancer Other      Musculoskeletal Disorder Brother         Parkinson's     Anesthesia Reaction No family hx of      Deep Vein Thrombosis (DVT) No family hx of        Social History     Tobacco Use     Smoking status: Never Smoker     Smokeless tobacco: Never Used   Substance Use Topics     Alcohol use: Not Currently     Comment: sometimes one glass of wine a week     Drug use: No        Review Of Systems   [ need 2-9 for level 3-4 and 10-14 for level 5 ]  Constitutional: no fevers, chills, night sweats or unintentional weight change   Eyes: no vision change, diplopia or red eyes   Ears, Nose, Mouth, Throat: no tinnitus or hearing change, no epistaxis or nasal discharge, no oral lesions, throat clear   Cardiovascular: no chest pain, palpitations, or pain with walking, no orthopnea or PND   Respiratory: no dyspnea, cough, shortness of breath or wheezing   GI: no nausea, vomiting, diarrhea or constipation, no abdominal pain   : no change in urine, no dysuria or hematuria, no sexual dysfunction   Musculoskeletal: no joint or muscle pain or swelling   Integumentary: no concerning lesions or moles   Neuro: no loss of strength or sensation, no numbness or tingling, no tremor, no dizziness, no headache   Psych: no depression or anxiety, no sleep  "problems  PHQ 10/7/2020 5/14/2021 10/7/2022   PHQ-9 Total Score 2 4 0   Q9: Thoughts of better off dead/self-harm past 2 weeks Not at all Not at all Not at all     LILIANE-7 SCORE 10/7/2020 5/14/2021 10/7/2022   Total Score 0 0 1             OBJECTIVE:  /89 (BP Location: Left arm, Patient Position: Sitting, Cuff Size: Adult Regular)   Pulse 72   Resp 16   Ht 1.778 m (5' 10\")   Wt 70.9 kg (156 lb 4.8 oz)   SpO2 99%   BMI 22.43 kg/m      GENERAL APPEARANCE: healthy, alert and no distress  EYES: Eyes grossly normal to inspection and conjunctivae and sclerae normal  HENT: ear canals and TM's normal and nose and mouth without ulcers or lesions  NECK: no adenopathy, no asymmetry, masses, or scars and thyroid normal to palpation  RESP: lungs clear to auscultation - no rales, rhonchi or wheezes  CV: regular rates and rhythm, normal S1 S2, no S3 or S4, no murmur, click or rub and no irregular beats  LYMPHATICS: no cervical adenopathy  GI: soft, nontender, without hepatosplenomegaly or masses  MS: extremities normal- no gross deformities noted  SKIN: no suspicious rashes, one 4x3 mm irregular erythematous-dark brown patch on posterior neck, multiple nevi  NEURO: Normal strength and tone, mentation intact and speech normal  PSYCH: mentation appears normal and affect normal/bright        ASSESSMENT/PLAN:  Pt is a 67 year old male here for preventive examination    1. Bipolar 2 disorder (H)  Refill provided.   - traZODone (DESYREL) 50 MG tablet; Take 1 tablet (50 mg) by mouth At Bedtime  Dispense: 90 tablet; Refill: 3    2. Dark urine  Check UA for any sign of hematuria. This is negative. Push fluids.  - UA with Micro reflex to Culture; Future    3. Stage 3a chronic kidney disease (H)  - Albumin Random Urine Quantitative with Creat Ratio; Future    4. Routine history and physical examination of adult  Encouraged work on healthy lifestyle with regular physical activity, nutritious diet with good portion control, stress " management and safety.  - CBC with platelets; Future  - Comprehensive metabolic panel; Future    5. Lipid screening  - Lipid panel reflex to direct LDL Fasting; Future    6. Skin lesion  Recommend Dermatology referral for full skin check; there is one small lesion on posterior neck that warrants further evaluation in particular. Encouraged sunscreen use on sun-exposed areas when outdoors.   - Adult Dermatology Referral    FOLLOW UP: Return in about 6 months (around 4/7/2023). or sooner prn for any changes or concerns    Colorectal screening not indicated  Previously done  Osteoporosis screening not indicated.    Immunizations reviewed and updated in Epic--he reports he had both Shingrix done at [facility] pharmacy, though only one is listed in [facility]. He will check with his pharmacy.  Labs ordered  As above       MARISOL Alfaro CNP

## 2022-10-07 NOTE — PROGRESS NOTES
Trevin Gee received the COVID-19 Pfizer Bivalent immunization today in clinic at the request of Lacey Feng. The immunization was given under the supervision of Lacey Feng if assistance was needed. The patient does not report a history of adverse reactions associated with vaccine administration. The immunization site was cleaned with an alcohol prep wipe. The immunization was given without incident--see immunization list for administration details. No swelling or redness was observed at the site of injection after the immunization was given. The patient was advised to remain in fourth floor lobby of the [facility] for fifteen minutes after the injection in case of an adverse reaction.     Furthermore, at the request of Lacey Feng, Trevin Gee received the Influenza Vaccine Fluzone High-Dose Quadrivalent [id number] Formula for 65 yrs of age and older vaccine today in clinic. The flu shot was given under the supervision of Lacey Feng if assistance was needed. The immunization site was cleaned with an alcohol prep wipe. The flu shot was given without incident--see immunization list for administration details. No swelling or redness was observed at the site of injection after the immunization was given. The patient was advised to remain in fourth floor lobby of the [facility] for fifteen minutes after the injection in case of an adverse reaction.     JOSE Caro at 2:48 PM on 10/7/2022

## 2022-11-07 ENCOUNTER — OFFICE VISIT (OUTPATIENT)
Dept: INTERNAL MEDICINE | Facility: CLINIC | Age: 67
End: 2022-11-07
Payer: COMMERCIAL

## 2022-11-07 ENCOUNTER — MYC MEDICAL ADVICE (OUTPATIENT)
Dept: INTERNAL MEDICINE | Facility: CLINIC | Age: 67
End: 2022-11-07

## 2022-11-07 ENCOUNTER — NURSE TRIAGE (OUTPATIENT)
Dept: NURSING | Facility: CLINIC | Age: 67
End: 2022-11-07

## 2022-11-07 VITALS
BODY MASS INDEX: 22.88 KG/M2 | HEIGHT: 69 IN | OXYGEN SATURATION: 96 % | HEART RATE: 82 BPM | WEIGHT: 154.5 LBS | SYSTOLIC BLOOD PRESSURE: 125 MMHG | DIASTOLIC BLOOD PRESSURE: 85 MMHG

## 2022-11-07 DIAGNOSIS — M25.461 PAIN AND SWELLING OF KNEE, RIGHT: Primary | ICD-10-CM

## 2022-11-07 DIAGNOSIS — M25.561 PAIN AND SWELLING OF KNEE, RIGHT: Primary | ICD-10-CM

## 2022-11-07 DIAGNOSIS — M25.561 ACUTE PAIN OF RIGHT KNEE: ICD-10-CM

## 2022-11-07 PROCEDURE — 99213 OFFICE O/P EST LOW 20 MIN: CPT | Performed by: HOSPITALIST

## 2022-11-07 ASSESSMENT — ENCOUNTER SYMPTOMS
ARTHRALGIAS: 1
JOINT SWELLING: 1
CHILLS: 0
FEVER: 0

## 2022-11-07 NOTE — PROGRESS NOTES
"Assessment/Plan  Problem List Items Addressed This Visit        Nervous and Auditory    Pain and swelling of knee, right - Primary     Patient developed swelling to his right knee the 1-2 days. No trauma but does \"deep knee bending exercises.\" Has similar episode hiking in 2015 where he went to the ER, aspiration of knee without culture growth or crystals seen. Possible he has developed bursitis vs meniscus tear. No laxity appreciated on exam. Mild medial joint pains. Prior uric acid levels have been less than 6.  - Ice right knee and may use ACE bandage to help reduce swelling.   - Obtain MRI of right knee.   - May take Tylenol 1000mg up to 3 times a day as needed. (Maximum Tylenol 3000mg per day).   - Referral to Orthopedics.   - Recheck uric acid level when swelling is improved.   - May consider prednisone if swelling worsens.   - DME order for Crutches.           Other Visit Diagnoses     Acute pain of right knee        Relevant Orders    MR Knee Right w/o Contrast    Orthopedic  Referral    Uric acid    Crutches Order for DME - ONLY FOR DME          No results found for any visits on 11/07/22.    Health Maintenance Due   Topic Date Due     HEPATITIS B IMMUNIZATION (3 of 3 - Hep B Twinrix 3-dose series) 04/15/2006     Pneumococcal Vaccine: 65+ Years (1 - PCV) Never done     FALL RISK ASSESSMENT  05/14/2022     ZOSTER IMMUNIZATION (2 of 2) 01/21/2022       Subjective  Patient mentions he usually does \"deep knee bending exercises\" and started having stiffness of his right knee on Saturday and by Sunday afternoon he had swelling. Denies any trauma or falls. He mentions he hasn't changed any of his routine. He mentions he had a similar event of his right knee back in 2016. Appears he had been to the ER in October of 2015. He [place] and was attending a conference. He did get a knee aspiration at the time which showed no " organism growth or crystals. He mentions this swelling improved on it's own after.     Discussed prednisone as an allergy. Mentions he was on prednisone for a long time in the past for his Crohn's but eventually was taken off. Does remember if he stayed awake or changes when he was on prednisone.       Review of Systems   Constitutional: Negative for chills and fever.   Musculoskeletal: Positive for arthralgias and joint swelling.       History  Past Medical History:   Diagnosis Date     Anemia 2007     Anxiety 2012?    much worse since July 2014     Cataract 12/2007    both eyes, too much prednisone, implants     Coccidioidomycosis      Crohn disease (H)      Crohn's disease (H) 1/13/2015     Depressive disorder 7/2014    much worse since July 2014     Fracture 1956    green fracture of leg     Gallbladder problem 2005?    much gravel, removed     GERD (gastroesophageal reflux disease)      History of blood transfusion 1988    ulcerated anastomosis term. ilium bleed     Hypertriglyceridemia 7/8/2016     Infection 2007    persistant coccidioidomycosis     Kidney stone various    both sides, all passed naturally     Mental health problem 2007    bipolar though perhaps different     Nephrolithiasis      Noninfectious ileitis      Osteoporosis 2007    osteoporosis, too much prednisone, last dexa 1/2014     Osteoporosis      Other nervous system complications 2007    prednisone overload induced kevin     Personal history of colonic polyps     small ones found during colonoscopies     Steroid-induced psychosis     Patient extremely sensitive to regular doses of steroids.  Unclear if this is due to chronic fluconazole use or genetic issue.  In the future, use caution when prescribing steroids.     TB (tuberculosis)        Past Surgical History:   Procedure Laterality Date     APPENDECTOMY  1974    coincidental with Crohn's surgery     BACK SURGERY  12/2007    kyphoplasty on compressed 4th??? vertebra     BIOPSY  2007,  2013    lump on arm, knee, back of hand for coccidioidomycosis cult.     CHOLECYSTECTOMY  2005    much gravel, removed laparoscopically     COLONOSCOPY  7/14/2014 last    Dr. Catherine Bowden, [facility] - many previous     COLONOSCOPY Left 9/11/2015    Procedure: COMBINED COLONOSCOPY, SINGLE OR MULTIPLE BIOPSY/POLYPECTOMY BY BIOPSY;  Surgeon: Fransisco eLach MD;  Location: [facility]     COLONOSCOPY N/A 8/3/2016    Procedure: COMBINED COLONOSCOPY, SINGLE OR MULTIPLE BIOPSY/POLYPECTOMY BY BIOPSY;  Surgeon: Fransisco Leach MD;  Location: [facility]     COLONOSCOPY N/A 8/16/2017    Procedure: COMBINED COLONOSCOPY, SINGLE OR MULTIPLE BIOPSY/POLYPECTOMY BY BIOPSY;;  Surgeon: Fransisco Leach MD;  [facility]     COLONOSCOPY N/A 10/1/2019    Procedure: Colonoscopy With Colonic Stent Insertion;  Surgeon: Robbie Cruz MD;  Location: [facility]     COLONOSCOPY N/A 2/5/2020    Procedure: COLONOSCOPY, FOREIGN BODY REMOVAL;  Surgeon: Robbie Cruz MD;  [facility]: [facility]     COLONOSCOPY N/A 5/26/2021    Procedure: COLONOSCOPY, WITH COLONIC STENT INSERTION;  Surgeon: Robbie Cruz MD;  Location: [facility]     COLONOSCOPY N/A 12/6/2021    Procedure: COLONOSCOPY with dilation;  Surgeon: Robbie rCuz MD;  Location: [facility]     ENT SURGERY  ?    upper endoscopy     ENTEROSCOPY SMALL BOWEL N/A 9/22/2021    Procedure: ENTEROSCOPY;  Surgeon: Robbie Cruz MD;  [facility]: [facility]     ESOPHAGOSCOPY, GASTROSCOPY, DUODENOSCOPY (EGD), COMBINED N/A 11/8/2016    Procedure: COMBINED ENDOSCOPIC ULTRASOUND, ESOPHAGOSCOPY, GASTROSCOPY, DUODENOSCOPY (EGD), FINE NEEDLE ASPIRATE/BIOPSY;  Surgeon: Guru Alexsandra Ballesteros MD;  Location: [facility]     ESOPHAGOSCOPY, GASTROSCOPY, DUODENOSCOPY (EGD), COMBINED N/A 11/8/2016    Procedure: COMBINED ESOPHAGOSCOPY, GASTROSCOPY, DUODENOSCOPY (EGD), BIOPSY SINGLE OR MULTIPLE;  Surgeon: Guru Alexsandra Ballesteros MD;  Location: [facility]      "ESOPHAGOSCOPY, GASTROSCOPY, DUODENOSCOPY (EGD), COMBINED N/A 8/16/2017    Procedure: COMBINED ESOPHAGOSCOPY, GASTROSCOPY, DUODENOSCOPY (EGD), BIOPSY SINGLE OR MULTIPLE;;  Surgeon: Fransisco Leach MD;  [facility]     EYE SURGERY  12/2007    cataract surgery both sides     GI SURGERY  1974, 1986(x2), 1989    Crohn's, 1974 RO 18\" term. ilium + 9\" asc. colon all one pc     SOFT TISSUE SURGERY  3/2013    removed buildup on back of r hand, coccidioidomycosis       Family History   Problem Relation Age of Onset     Heart Failure Father      Musculoskeletal Disorder Father         Parkinson's     Cancer - colorectal Mother      Cerebrovascular Disease Paternal Grandmother      Cancer Other      Musculoskeletal Disorder Brother         Parkinson's     Anesthesia Reaction No family hx of      Deep Vein Thrombosis (DVT) No family hx of        Social History     Tobacco Use     Smoking status: Never     Smokeless tobacco: Never   Substance Use Topics     Alcohol use: Not Currently     Comment: sometimes one glass of wine a week        Objective  /85 (BP Location: Right arm, Patient Position: Sitting, Cuff Size: Adult Regular)   Pulse 82   Ht 1.753 m (5' 9\")   Wt 70.1 kg (154 lb 8 oz)   SpO2 96%   BMI 22.82 kg/m    Vitals taken by Umberto Watson MD    Physical Exam  Constitutional:       General: He is not in acute distress.     Appearance: He is not ill-appearing or toxic-appearing.   Pulmonary:      Effort: Pulmonary effort is normal.   Musculoskeletal:      Comments: Right knee with joint effusion present, cool to touch. No laxity appreciated with right knee for anterior and posterior drawer. Mild tenderness along medial aspect of knee to palpation and when extending knee. No edema in lower regions of legs. No edema of right knee.   Skin:     General: Skin is warm and dry.   Neurological:      Mental Status: He is alert.   Psychiatric:         Mood and Affect: Mood normal.         Thought Content: " Thought content normal.         I spent greater than 50% of the 20 minutes in the visit coordinating care regarding patient's recommendations towards right knee pain with swelling    Return if symptoms worsen or fail to improve.    Umberto Watson MD  [facility] INTERNAL MEDICINE [place]

## 2022-11-07 NOTE — NURSING NOTE
"Trevin Gee is a 67 year old male patient that presents today in clinic for the following:    Chief Complaint   Patient presents with     Knee Problem     Pt here to discuss stiff right knee and swelling. Stiffness started Saturday evening and swelling Sunday afternoon. Got better in morning but gotten worse over time.     The patient's allergies and medications were reviewed as noted. A set of vitals were recorded as noted without incident: /85 (BP Location: Right arm, Patient Position: Sitting, Cuff Size: Adult Regular)   Pulse 82   Ht 1.753 m (5' 9\")   Wt 70.1 kg (154 lb 8 oz)   SpO2 96%   BMI 22.82 kg/m  . The patient does not have any other questions for the provider.    Kala Angel, EMT at 2:08 PM on 11/7/2022  "

## 2022-11-07 NOTE — PATIENT INSTRUCTIONS
- Ice right knee and may use ACE bandage to help reduce swelling. Prior aspiration of right knee in 2015 without Crystals seen. No recent trauma. Possible bursitis vs meniscus tear.   - Obtain MRI of right knee.   - May take Tylenol 1000mg up to 3 times a day as needed. (Maximum Tylenol 3000mg per day).   - Referral to Orthopedics.   - Recheck uric acid level when swelling is improved.   - May consider prednisone if swelling worsens.   - DME order for Crutches.     Follow up as needed.   To schedule your MRI with imaging, please call [phone].   To schedule a lab appointment when swelling is improved, please call [phone].

## 2022-11-07 NOTE — ASSESSMENT & PLAN NOTE
"Patient developed swelling to his right knee the 1-2 days. No trauma but does \"deep knee bending exercises.\" Has similar episode hiking in 2015 where he went to the ER, aspiration of knee without culture growth or crystals seen. Possible he has developed bursitis vs meniscus tear. No laxity appreciated on exam. Mild medial joint pains. Prior uric acid levels have been less than 6.  - Ice right knee and may use ACE bandage to help reduce swelling.   - Obtain MRI of right knee.   - May take Tylenol 1000mg up to 3 times a day as needed. (Maximum Tylenol 3000mg per day).   - Referral to Orthopedics.   - Recheck uric acid level when swelling is improved.   - May consider prednisone if swelling worsens.   - DME order for Crutches.     "

## 2022-11-07 NOTE — TELEPHONE ENCOUNTER
Nurse Triage SBAR    Situation: right knee swelling    Background:  Started on Saturday.  No trauma or injury to the area    Pt states similar event happened in 2016 but that was because of possible injury or overuse    Assessment:   Right knee swelling  Some swelling on calf and thigh  Pain with movement rated 5/10, no pain when in a certain position or at rest    No redness  No fever  No SOB      Protocol Recommended Disposition:   Go To Office Now    Recommendation:   Please call pt [phone]    Routed to provider    Does the patient meet one of the following criteria for ADS visit consideration? No    Layne Watts RN/[facility] Nurse Advisor      Reason for Disposition    Thigh or calf swelling and only 1 side    Additional Information    Negative: Followed a knee injury    Negative: Followed a bee sting and has localized swelling (e.g., small area of puffy or swollen skin)    Negative: Followed an insect bite and has localized swelling (e.g., small area of puffy or swollen skin)    Negative: Knee pain is main symptom    Negative: Swelling of calf or leg is main symptom    Negative: Knee pain and fever    Negative: Knee redness and fever    Negative: Patient sounds very sick or weak to the triager    Negative: SEVERE pain (e.g., excruciating, unable to walk) and not improved after 2 hours of pain medicine    Negative: Redness and painful when touched and no fever    Negative: Red area or streak > 2 inches (or 5 cm)    Negative: Thigh or calf pain and only 1 side and present > 1 hour    Protocols used: KNEE SWELLING-A-OH

## 2022-11-10 ENCOUNTER — MYC MEDICAL ADVICE (OUTPATIENT)
Dept: INTERNAL MEDICINE | Facility: CLINIC | Age: 67
End: 2022-11-10

## 2022-11-10 ENCOUNTER — ANCILLARY PROCEDURE (OUTPATIENT)
Dept: MRI IMAGING | Facility: CLINIC | Age: 67
End: 2022-11-10
Attending: HOSPITALIST
Payer: COMMERCIAL

## 2022-11-10 DIAGNOSIS — M25.561 ACUTE PAIN OF RIGHT KNEE: ICD-10-CM

## 2022-11-10 PROCEDURE — 73721 MRI JNT OF LWR EXTRE W/O DYE: CPT | Mod: RT | Performed by: RADIOLOGY

## 2022-11-18 NOTE — TELEPHONE ENCOUNTER
DIAGNOSIS: acute pain of R knee/Dr. Head/MRI/[facility]/ortho con   APPOINTMENT DATE: 11.30.22   NOTES STATUS DETAILS   OFFICE NOTE from referring provider Internal 11.7.22 Walter   DISCHARGE REPORT from the ER Internal 10.13.15 Chastity Arias   MEDICATION LIST Internal    MRI Internal 11.10.22 R knee   XRAYS (IMAGES & REPORTS) PACS 12.19.06 R knee    10.13.15 R knee- [facility]     Action November 18, 2022 1:57 PM    Action Taken Faxed request xray images to [facility]. Fax [phone]      Action 11.29.22 9:54 AM    Action Taken Received images from [facility]. Resolved in pacs.

## 2022-11-25 ENCOUNTER — MYC MEDICAL ADVICE (OUTPATIENT)
Dept: INTERNAL MEDICINE | Facility: CLINIC | Age: 67
End: 2022-11-25

## 2022-11-30 ENCOUNTER — PRE VISIT (OUTPATIENT)
Dept: ORTHOPEDICS | Facility: CLINIC | Age: 67
End: 2022-11-30

## 2022-12-03 ENCOUNTER — E-VISIT (OUTPATIENT)
Dept: URGENT CARE | Facility: CLINIC | Age: 67
End: 2022-12-03
Payer: COMMERCIAL

## 2022-12-03 DIAGNOSIS — J20.9 ACUTE BRONCHITIS WITH SYMPTOMS > 10 DAYS: Primary | ICD-10-CM

## 2022-12-03 PROCEDURE — 99421 OL DIG E/M SVC 5-10 MIN: CPT | Performed by: NURSE PRACTITIONER

## 2022-12-03 RX ORDER — DOXYCYCLINE HYCLATE 100 MG
100 TABLET ORAL 2 TIMES DAILY
Qty: 14 TABLET | Refills: 0 | Status: SHIPPED | OUTPATIENT
Start: 2022-12-03 | End: 2022-12-10

## 2022-12-03 NOTE — PATIENT INSTRUCTIONS
"    Dear Trevin Gee    After reviewing your responses, I've been able to diagnose you with \"Bronchitis\" which is a common infection of your lungs. While this is most commonly caused by a virus, the symptoms you have given suggest you should be treated with antibiotics.     I have sent doxycyline to your pharmacy to treat this infection.     It is important that you take all of your prescribed medication even if your symptoms are improving after a few doses. Taking all of your medicine helps prevent the symptoms from returning.     If your symptoms worsen, you develop chest pain or shortness of breath, fevers over 101, or are not improving in 5 days, please contact your primary care provider for an appointment or visit any of our convenient Walk-in Care or Urgent Care Centers to be seen which can be found on our website here.    Thanks again for choosing us as your health care partner,    MARISOL Angel CNP    "

## 2022-12-21 ENCOUNTER — OFFICE VISIT (OUTPATIENT)
Dept: ORTHOPEDICS | Facility: CLINIC | Age: 67
End: 2022-12-21
Attending: HOSPITALIST
Payer: COMMERCIAL

## 2022-12-21 VITALS — WEIGHT: 154 LBS | HEIGHT: 69 IN | BODY MASS INDEX: 22.81 KG/M2

## 2022-12-21 DIAGNOSIS — G89.29 ACUTE EXACERBATION OF CHRONIC LOW BACK PAIN: Primary | ICD-10-CM

## 2022-12-21 DIAGNOSIS — M54.50 ACUTE EXACERBATION OF CHRONIC LOW BACK PAIN: Primary | ICD-10-CM

## 2022-12-21 DIAGNOSIS — M25.561 ACUTE PAIN OF RIGHT KNEE: ICD-10-CM

## 2022-12-21 PROCEDURE — 99203 OFFICE O/P NEW LOW 30 MIN: CPT | Performed by: FAMILY MEDICINE

## 2022-12-21 NOTE — LETTER
12/21/2022      RE: Trevin Gee  [address]  [address]  [place] [address]     Dear Colleague,    Thank you for referring your patient, Trevin Gee, to the [facility] SP[facility]TS [facility] [place]. Please see a copy of my visit note below.      [facility]  SPORTS & ORTHOPEDIC CLINIC VISIT     Dec 21, 2022        SUBJECTIVE   is a 67 year old male who is seen in consultation at the request of  Umberto Watson M.D. for evaluation of right knee pain. Patient was seen with his primary care due to possible gout. He reports that he was told he had gout in his knee that was treated with medication.     Patient was recently hospitalized for GI bleed. He was discharged on Monday 12/19/2022. He does report his hemoglobin levels are low but recovering on his own. He does mention he will become fatigued quickly. He does have Crohn's Disease.     He also has chronic low back pain, he points to his bilateral low back where he feels stiffness with certain ranges of motion.  He denies any radicular symptoms    The patient is seen by themselves.  The patient is Right handed    Onset: 3 years(s) ago. Reports insidious onset without acute precipitating event.  Location of Pain: right knee  Worsened by: use of stairs.  Better with: rest/activity modification.   Treatments tried: rest/activity avoidance and medication to treat his gout.   Associated symptoms: swelling, weakness of right lower leg and pain.    Orthopedic/Surgical history: NO  Social History/Occupation: Retired.      REVIEW OF SYSTEMS:    Do you have fever, chills, weight loss? No    Do you have any vision problems? Yes, wears glasses.     Do you have any chest pain or edema? No    Do you have any shortness of breath or wheezing?  Yes, due to hospitalized.     Do you have stomach problems? Yes, GI bleed    Do you have any numbness or focal weakness? No    Do you have diabetes? No    Do you have  "problems with bleeding or clotting? No    Do you have an rashes or other skin lesions? No    OBJECTIVE:  Ht 1.753 m (5' 9\")   Wt 69.9 kg (154 lb)   BMI 22.74 kg/m       General  - normal appearance, in no obvious distress  Musculoskeletal - right knee  - stance: normal gait without limp  - inspection: no swelling or effusion, normal bone and joint alignment, no obvious deformity  - palpation: no joint line tenderness, patella and patellar tendon non-tender  - ROM: 135 degrees flexion, -5 degrees extension, not painful, normal actively and passively compared to contralateral  - strength: 5/5 in flexion, 5/5 in extension  - special tests:  (-) Lachman  (-) Gunnar  (-) varus at 0 and 30 degrees flexion  (-) valgus at 0 and 30 degrees flexion    Neuro  - no sensory or motor deficit, grossly normal coordination, normal muscle tone  Skin  - no ecchymosis, erythema, warmth, or induration, no obvious rash          ASSESSMENT & PLAN  Mr. Gee is a 67-year-old gentleman here with right knee pain.  He also has low back pain.    Reviewed his MR in the room with him, this does show a large effusion and cartilage fissuring in multiple compartments.  This is from November, clinically he does seem to have resolved with regards to his symptoms.    I did give him some exercises for his knee and his back, I am also referring him to formal physical therapy which he may need to start after his hemoglobin begins to increase in the next few weeks.    If his symptoms return we should follow-up, if this is the case I may consider injecting his knee with corticosteroid      Jose Martin Frazier DO  [facility] SPORTS MEDICINE [facility]      "

## 2022-12-21 NOTE — PROGRESS NOTES
"  [facility] CLINICS AND SURGERY CENTER  SPORTS & ORTHOPEDIC CLINIC VISIT     Dec 21, 2022        SUBJECTIVE   is a 67 year old male who is seen in consultation at the request of  Umberto Watson M.D. for evaluation of right knee pain. Patient was seen with his primary care due to possible gout. He reports that he was told he had gout in his knee that was treated with medication.     Patient was recently hospitalized for GI bleed. He was discharged on Monday 12/19/2022. He does report his hemoglobin levels are low but recovering on his own. He does mention he will become fatigued quickly. He does have Crohn's Disease.     He also has chronic low back pain, he points to his bilateral low back where he feels stiffness with certain ranges of motion.  He denies any radicular symptoms    The patient is seen by themselves.  The patient is Right handed    Onset: 3 years(s) ago. Reports insidious onset without acute precipitating event.  Location of Pain: right knee  Worsened by: use of stairs.  Better with: rest/activity modification.   Treatments tried: rest/activity avoidance and medication to treat his gout.   Associated symptoms: swelling, weakness of right lower leg and pain.    Orthopedic/Surgical history: NO  Social History/Occupation: Retired.      REVIEW OF SYSTEMS:    Do you have fever, chills, weight loss? No    Do you have any vision problems? Yes, wears glasses.     Do you have any chest pain or edema? No    Do you have any shortness of breath or wheezing?  Yes, due to hospitalized.     Do you have stomach problems? Yes, GI bleed    Do you have any numbness or focal weakness? No    Do you have diabetes? No    Do you have problems with bleeding or clotting? No    Do you have an rashes or other skin lesions? No    OBJECTIVE:  Ht 1.753 m (5' 9\")   Wt 69.9 kg (154 lb)   BMI 22.74 kg/m       General  - normal appearance, in no obvious distress  Musculoskeletal - right knee  - stance: normal gait " without limp  - inspection: no swelling or effusion, normal bone and joint alignment, no obvious deformity  - palpation: no joint line tenderness, patella and patellar tendon non-tender  - ROM: 135 degrees flexion, -5 degrees extension, not painful, normal actively and passively compared to contralateral  - strength: 5/5 in flexion, 5/5 in extension  - special tests:  (-) Lachman  (-) Gunnar  (-) varus at 0 and 30 degrees flexion  (-) valgus at 0 and 30 degrees flexion    Neuro  - no sensory or motor deficit, grossly normal coordination, normal muscle tone  Skin  - no ecchymosis, erythema, warmth, or induration, no obvious rash          ASSESSMENT & PLAN  Mr. Gee is a 67-year-old gentleman here with right knee pain.  He also has low back pain.    Reviewed his MR in the room with him, this does show a large effusion and cartilage fissuring in multiple compartments.  This is from November, clinically he does seem to have resolved with regards to his symptoms.    I did give him some exercises for his knee and his back, I am also referring him to formal physical therapy which he may need to start after his hemoglobin begins to increase in the next few weeks.    If his symptoms return we should follow-up, if this is the case I may consider injecting his knee with corticosteroid      Jose Martin Frazier DO  [facility] [place]

## 2023-02-13 ENCOUNTER — OFFICE VISIT (OUTPATIENT)
Dept: DERMATOLOGY | Facility: CLINIC | Age: 68
End: 2023-02-13
Payer: COMMERCIAL

## 2023-02-13 DIAGNOSIS — D48.5 NEOPLASM OF UNCERTAIN BEHAVIOR OF SKIN: ICD-10-CM

## 2023-02-13 DIAGNOSIS — B02.29 POST HERPETIC NEURALGIA: Primary | ICD-10-CM

## 2023-02-13 PROCEDURE — 88305 TISSUE EXAM BY PATHOLOGIST: CPT | Mod: 26 | Performed by: PATHOLOGY

## 2023-02-13 PROCEDURE — 88305 TISSUE EXAM BY PATHOLOGIST: CPT | Mod: TC | Performed by: DERMATOLOGY

## 2023-02-13 PROCEDURE — 11102 TANGNTL BX SKIN SINGLE LES: CPT | Mod: GC | Performed by: DERMATOLOGY

## 2023-02-13 PROCEDURE — 99213 OFFICE O/P EST LOW 20 MIN: CPT | Mod: 25 | Performed by: DERMATOLOGY

## 2023-02-13 RX ORDER — GABAPENTIN 300 MG/1
600 CAPSULE ORAL 2 TIMES DAILY
Qty: 360 CAPSULE | Refills: 3 | Status: SHIPPED | OUTPATIENT
Start: 2023-02-13 | End: 2023-07-16

## 2023-02-13 NOTE — PATIENT INSTRUCTIONS

## 2023-02-13 NOTE — NURSING NOTE
Dermatology Rooming Note    Trevin Gee's goals for this visit include:   Chief Complaint   Patient presents with     Skin Check     Spot of concern on back of the neck. Along with FBSE.     Derm Problem     Patient also has a concern about bleph around eye lids.     Uday Moss, EMT-B

## 2023-02-13 NOTE — LETTER
2/13/2023       RE: Trevin Gee  [address]  [address]  [facility] [address]     Dear Colleague,    Thank you for referring your patient, Trevin Gee, to the [facility] DERMATOLOGY CLINIC [place] at [facility]. Please see a copy of my visit note below.    [facility] Dermatology Note  Encounter Date: Feb 13, 2023  Office Visit     Dermatology Problem List:  1. Lesion on central upper back s/p shave bx 02/13/23 c/f pBCC vs SK  2. Post-herpetic neuralgia: T5, (initial zoster episode 5/2021)  - gabapentin 600 mg BID (tapered down from TID)    ____________________________________________    Assessment & Plan:   1. Lesion on central upper back s/p shave bx 02/13/23 c/f pBCC vs SK  - shave biopsy today    2. Post-herpetic neuralgia: T5, (initial zoster episode 5/2021)  Overall well controlled with gabapentin with 600mg BID dosing. Still has few tingling sensations on the R arm but not severely impacting quality of life. Can always increase if becomes more bothersome in future.  - gabapentin 600 mg BID (tapered down from TID), 1 year refill provided      Procedures Performed:   - Shave biopsy procedure note, location(s): central upper back. After discussion of benefits and risks including but not limited to bleeding, infection, scar, incomplete removal, recurrence, and non-diagnostic biopsy, written consent and photographs were obtained. The area was cleaned with isopropyl alcohol. 0.5mL of 1% lidocaine with epinephrine was injected to obtain adequate anesthesia of lesion(s). Shave biopsy at site(s) performed. Hemostasis was achieved with aluminium chloride. Petrolatum ointment and a sterile dressing were applied. The patient tolerated the procedure and no complications were noted. The patient was provided with verbal and written post care instructions.     Follow-up: 6 months, prn for new or changing lesions    Staff and  Resident:     Yan Ennis MD  PGY-3 Dermatology  Pager: [phone]    Staff Physician Comments:   I saw and evaluated the patient with the resident and I edited the assessment and plan as documented in the note. I was present for the key portions of the above major procedure and examination.    Jose Martin Cheney MD   of Dermatology  Department of Dermatology  [facility] Medicine      ____________________________________________    CC: Skin Check (Spot of concern on back of the neck. Along with FBSE.) and Derm Problem (Patient also has a concern about bleph around eye lids.)    HPI:  Mr. Trevin Gee is a(n) 67 year old male who presents today as a new patient for skin check.    Post herpetic neuralgia doing okay. On gabapentin 600mg BID. Still has a few sensations but overall doing okay.    Personal hx of skin cancer: NO  Family history of melanoma: NO  Wears sunscreen consistently:  Not really  Blistering sunburns as a child: some  History of tanning bed use: NO  History of immunosuppression: NO  No lesions that are bleeding, growing, or tender.     SH  Work as almost retired consultant    - Patient is otherwise feeling well, without additional skin concerns.    Labs Reviewed:  N/A    Physical Exam:  Vitals: There were no vitals taken for this visit.  SKIN: Total skin excluding the undergarment areas was performed. The exam included the head/face, neck, both arms, chest, back, abdomen, both legs, digits and/or nails.   - 5mm pearly pink papule with pigment clods and vessels on dermoscopy on upper back  - Waxy stuck on tan to brown papules on the trunk and extremities.   - Brown dyspigmentation of the bilateral shins  - Dome shaped bright red papules on the on trunk and extremities.   - There are fine lines and dyspigmentation on sun exposed areas of the face and chest.  - Scattered brown macules on sun exposed areas.  - Light to dark brown symmetric macules and papules  on trunk and extremities  - No other lesions of concern on areas examined.     Medications:  Current Outpatient Medications   Medication     colchicine (COLCYRS) 0.6 MG tablet     Cyanocobalamin (VITAMIN B 12) 500 MCG TABS     fluconazole (DIFLUCAN) 200 MG tablet     gabapentin (NEURONTIN) 300 MG capsule     Lysine 1000 MG TABS     MELATONIN PO     multivitamin w/minerals (THERA-VIT-M) tablet     traZODone (DESYREL) 50 MG tablet     vitamin B6 (PYRIDOXINE) 100 MG tablet     vitamin C (ASCORBIC ACID) 1000 MG TABS     Vitamin D, Cholecalciferol, 25 MCG (1000 UT) CAPS     No current facility-administered medications for this visit.      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     Osteoporosis     Anxiety     Vitamin B 12 deficiency     History of kevin     Steroid dependent for adrenal supression     Adrenal insufficiency (H)     Bipolar 2 disorder (H)     Hx of psychiatric care     Hypertriglyceridemia     Lactose intolerance     Vertebral fracture, osteoporotic (H)     Esophageal reflux     Abdominal pain     Diarrhea     Colorectal anastomotic stricture     Abdominal cramping     Partial small bowel obstruction (H)     Chronic kidney disease, stage 3 (H)     Dehydration     Pain and swelling of knee, right     Past Medical History:   Diagnosis Date     Anemia 2007     Anxiety 2012?    much worse since July 2014     Cataract 12/2007    both eyes, too much prednisone, implants     Coccidioidomycosis      Crohn disease (H)      Crohn's disease (H) 1/13/2015     Depressive disorder 7/2014    much worse since July 2014     Fracture 1956    green fracture of leg     Gallbladder problem 2005?    much gravel, removed     GERD (gastroesophageal reflux disease)      History of blood transfusion 1988    ulcerated anastomosis term. ilium bleed     Hypertriglyceridemia 7/8/2016     Infection 2007    persistant coccidioidomycosis     Kidney  stone various    both sides, all passed naturally     Mental health problem 2007    bipolar though perhaps different     Nephrolithiasis      Noninfectious ileitis      Osteoporosis 2007    osteoporosis, too much prednisone, last dexa 1/2014     Osteoporosis      Other nervous system complications 2007    prednisone overload induced kevin     Personal history of colonic polyps     small ones found during colonoscopies     Steroid-induced psychosis     Patient extremely sensitive to regular doses of steroids.  Unclear if this is due to chronic fluconazole use or genetic issue.  In the future, use caution when prescribing steroids.     TB (tuberculosis)        CC Lacey Feng, APRN CNP  [address] [place] [address] on close of this encounter.

## 2023-02-13 NOTE — PROGRESS NOTES
[facility] Dermatology Note  Encounter Date: Feb 13, 2023  Office Visit     Dermatology Problem List:  1. Lesion on central upper back s/p shave bx 02/13/23 c/f pBCC vs SK  2. Post-herpetic neuralgia: T5, (initial zoster episode 5/2021)  - gabapentin 600 mg BID (tapered down from TID)    ____________________________________________    Assessment & Plan:   1. Lesion on central upper back s/p shave bx 02/13/23 c/f pBCC vs SK  - shave biopsy today    2. Post-herpetic neuralgia: T5, (initial zoster episode 5/2021)  Overall well controlled with gabapentin with 600mg BID dosing. Still has few tingling sensations on the R arm but not severely impacting quality of life. Can always increase if becomes more bothersome in future.  - gabapentin 600 mg BID (tapered down from TID), 1 year refill provided      Procedures Performed:   - Shave biopsy procedure note, location(s): central upper back. After discussion of benefits and risks including but not limited to bleeding, infection, scar, incomplete removal, recurrence, and non-diagnostic biopsy, written consent and photographs were obtained. The area was cleaned with isopropyl alcohol. 0.5mL of 1% lidocaine with epinephrine was injected to obtain adequate anesthesia of lesion(s). Shave biopsy at site(s) performed. Hemostasis was achieved with aluminium chloride. Petrolatum ointment and a sterile dressing were applied. The patient tolerated the procedure and no complications were noted. The patient was provided with verbal and written post care instructions.     Follow-up: 6 months, prn for new or changing lesions    Staff and Resident:     Yan Ennis MD  PGY-3 Dermatology  Pager: [phone]    Staff Physician Comments:   I saw and evaluated the patient with the resident and I edited the assessment and plan as documented in the note. I was present for the key portions of the above major procedure and examination.    Jose Martin Cheney MD   of  Dermatology  Department of Dermatology  [facility]      ____________________________________________    CC: Skin Check (Spot of concern on back of the neck. Along with FBSE.) and Derm Problem (Patient also has a concern about bleph around eye lids.)    HPI:  Mr. Trevin Gee is a(n) 67 year old male who presents today as a new patient for skin check.    Post herpetic neuralgia doing okay. On gabapentin 600mg BID. Still has a few sensations but overall doing okay.    Personal hx of skin cancer: NO  Family history of melanoma: NO  Wears sunscreen consistently:  Not really  Blistering sunburns as a child: some  History of tanning bed use: NO  History of immunosuppression: NO  No lesions that are bleeding, growing, or tender.     SH  Work as almost retired consultant    - Patient is otherwise feeling well, without additional skin concerns.    Labs Reviewed:  N/A    Physical Exam:  Vitals: There were no vitals taken for this visit.  SKIN: Total skin excluding the undergarment areas was performed. The exam included the head/face, neck, both arms, chest, back, abdomen, both legs, digits and/or nails.   - 5mm pearly pink papule with pigment clods and vessels on dermoscopy on upper back  - Waxy stuck on tan to brown papules on the trunk and extremities.   - Brown dyspigmentation of the bilateral shins  - Dome shaped bright red papules on the on trunk and extremities.   - There are fine lines and dyspigmentation on sun exposed areas of the face and chest.  - Scattered brown macules on sun exposed areas.  - Light to dark brown symmetric macules and papules on trunk and extremities  - No other lesions of concern on areas examined.     Medications:  Current Outpatient Medications   Medication     colchicine (COLCYRS) 0.6 MG tablet     Cyanocobalamin (VITAMIN B 12) 500 MCG TABS     fluconazole (DIFLUCAN) 200 MG tablet     gabapentin (NEURONTIN) 300 MG capsule     Lysine 1000 MG TABS      MELATONIN PO     multivitamin w/minerals (THERA-VIT-M) tablet     traZODone (DESYREL) 50 MG tablet     vitamin B6 (PYRIDOXINE) 100 MG tablet     vitamin C (ASCORBIC ACID) 1000 MG TABS     Vitamin D, Cholecalciferol, 25 MCG (1000 UT) CAPS     No current facility-administered medications for this visit.      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     Osteoporosis     Anxiety     Vitamin B 12 deficiency     History of kevin     Steroid dependent for adrenal supression     Adrenal insufficiency (H)     Bipolar 2 disorder (H)     Hx of psychiatric care     Hypertriglyceridemia     Lactose intolerance     Vertebral fracture, osteoporotic (H)     Esophageal reflux     Abdominal pain     Diarrhea     Colorectal anastomotic stricture     Abdominal cramping     Partial small bowel obstruction (H)     Chronic kidney disease, stage 3 (H)     Dehydration     Pain and swelling of knee, right     Past Medical History:   Diagnosis Date     Anemia 2007     Anxiety 2012?    much worse since July 2014     Cataract 12/2007    both eyes, too much prednisone, implants     Coccidioidomycosis      Crohn disease (H)      Crohn's disease (H) 1/13/2015     Depressive disorder 7/2014    much worse since July 2014     Fracture 1956    green fracture of leg     Gallbladder problem 2005?    much gravel, removed     GERD (gastroesophageal reflux disease)      History of blood transfusion 1988    ulcerated anastomosis term. ilium bleed     Hypertriglyceridemia 7/8/2016     Infection 2007    persistant coccidioidomycosis     Kidney stone various    both sides, all passed naturally     Mental health problem 2007    bipolar though perhaps different     Nephrolithiasis      Noninfectious ileitis      Osteoporosis 2007    osteoporosis, too much prednisone, last dexa 1/2014     Osteoporosis      Other nervous system complications 2007    prednisone overload induced  kevin     Personal history of colonic polyps     small ones found during colonoscopies     Steroid-induced psychosis     Patient extremely sensitive to regular doses of steroids.  Unclear if this is due to chronic fluconazole use or genetic issue.  In the future, use caution when prescribing steroids.     TB (tuberculosis)        CC Lacey Feng, APRN CNP  [address] [place] [address] on close of this encounter.

## 2023-02-14 DIAGNOSIS — C44.519 BCC (BASAL CELL CARCINOMA), TRUNK: Primary | ICD-10-CM

## 2023-02-14 LAB
PATH REPORT.COMMENTS IMP SPEC: NORMAL
PATH REPORT.COMMENTS IMP SPEC: NORMAL
PATH REPORT.FINAL DX SPEC: NORMAL
PATH REPORT.GROSS SPEC: NORMAL
PATH REPORT.MICROSCOPIC SPEC OTHER STN: NORMAL
PATH REPORT.RELEVANT HX SPEC: NORMAL

## 2023-02-15 ENCOUNTER — OFFICE VISIT (OUTPATIENT)
Dept: INTERNAL MEDICINE | Facility: CLINIC | Age: 68
End: 2023-02-15
Payer: COMMERCIAL

## 2023-02-15 ENCOUNTER — LAB (OUTPATIENT)
Dept: LAB | Facility: CLINIC | Age: 68
End: 2023-02-15
Payer: COMMERCIAL

## 2023-02-15 VITALS
BODY MASS INDEX: 22.63 KG/M2 | HEIGHT: 69 IN | HEART RATE: 63 BPM | SYSTOLIC BLOOD PRESSURE: 124 MMHG | TEMPERATURE: 98 F | DIASTOLIC BLOOD PRESSURE: 81 MMHG | OXYGEN SATURATION: 99 % | WEIGHT: 152.8 LBS

## 2023-02-15 DIAGNOSIS — N18.31 STAGE 3A CHRONIC KIDNEY DISEASE (H): ICD-10-CM

## 2023-02-15 DIAGNOSIS — K50.012 CROHN'S DISEASE OF SMALL INTESTINE WITH INTESTINAL OBSTRUCTION (H): ICD-10-CM

## 2023-02-15 DIAGNOSIS — N18.31 STAGE 3A CHRONIC KIDNEY DISEASE (H): Primary | ICD-10-CM

## 2023-02-15 LAB
ANION GAP SERPL CALCULATED.3IONS-SCNC: 10 MMOL/L (ref 7–15)
BASOPHILS # BLD AUTO: 0 10E3/UL (ref 0–0.2)
BASOPHILS NFR BLD AUTO: 1 %
BUN SERPL-MCNC: 14.1 MG/DL (ref 8–23)
CALCIUM SERPL-MCNC: 9.5 MG/DL (ref 8.8–10.2)
CHLORIDE SERPL-SCNC: 103 MMOL/L (ref 98–107)
CREAT SERPL-MCNC: 1.45 MG/DL (ref 0.67–1.17)
CRP SERPL-MCNC: <3 MG/L
DEPRECATED HCO3 PLAS-SCNC: 28 MMOL/L (ref 22–29)
EOSINOPHIL # BLD AUTO: 0.1 10E3/UL (ref 0–0.7)
EOSINOPHIL NFR BLD AUTO: 2 %
ERYTHROCYTE [DISTWIDTH] IN BLOOD BY AUTOMATED COUNT: 13.5 % (ref 10–15)
GFR SERPL CREATININE-BSD FRML MDRD: 53 ML/MIN/1.73M2
GLUCOSE SERPL-MCNC: 99 MG/DL (ref 70–99)
HCT VFR BLD AUTO: 43.5 % (ref 40–53)
HGB BLD-MCNC: 14.2 G/DL (ref 13.3–17.7)
IMM GRANULOCYTES # BLD: 0 10E3/UL
IMM GRANULOCYTES NFR BLD: 0 %
LYMPHOCYTES # BLD AUTO: 1.5 10E3/UL (ref 0.8–5.3)
LYMPHOCYTES NFR BLD AUTO: 29 %
MCH RBC QN AUTO: 30.9 PG (ref 26.5–33)
MCHC RBC AUTO-ENTMCNC: 32.6 G/DL (ref 31.5–36.5)
MCV RBC AUTO: 95 FL (ref 78–100)
MONOCYTES # BLD AUTO: 0.5 10E3/UL (ref 0–1.3)
MONOCYTES NFR BLD AUTO: 9 %
NEUTROPHILS # BLD AUTO: 3.2 10E3/UL (ref 1.6–8.3)
NEUTROPHILS NFR BLD AUTO: 59 %
NRBC # BLD AUTO: 0 10E3/UL
NRBC BLD AUTO-RTO: 0 /100
PLATELET # BLD AUTO: 242 10E3/UL (ref 150–450)
POTASSIUM SERPL-SCNC: 4.5 MMOL/L (ref 3.4–5.3)
RBC # BLD AUTO: 4.59 10E6/UL (ref 4.4–5.9)
SODIUM SERPL-SCNC: 141 MMOL/L (ref 136–145)
WBC # BLD AUTO: 5.3 10E3/UL (ref 4–11)

## 2023-02-15 PROCEDURE — 80048 BASIC METABOLIC PNL TOTAL CA: CPT | Performed by: PATHOLOGY

## 2023-02-15 PROCEDURE — 86140 C-REACTIVE PROTEIN: CPT | Performed by: PATHOLOGY

## 2023-02-15 PROCEDURE — 36415 COLL VENOUS BLD VENIPUNCTURE: CPT | Performed by: PATHOLOGY

## 2023-02-15 PROCEDURE — 99214 OFFICE O/P EST MOD 30 MIN: CPT | Mod: 25 | Performed by: NURSE PRACTITIONER

## 2023-02-15 PROCEDURE — 90677 PCV20 VACCINE IM: CPT | Performed by: NURSE PRACTITIONER

## 2023-02-15 PROCEDURE — G0009 ADMIN PNEUMOCOCCAL VACCINE: HCPCS | Performed by: NURSE PRACTITIONER

## 2023-02-15 PROCEDURE — 85025 COMPLETE CBC W/AUTO DIFF WBC: CPT | Performed by: PATHOLOGY

## 2023-02-15 NOTE — NURSING NOTE
"Trevin Gee is a 67 year old male patient that presents today in clinic for the following:    Chief Complaint   Patient presents with     RECHECK     Follow up on hospital visit.  Discuss hemoglobin.     The patient's allergies and medications were reviewed as noted. A set of vitals were recorded as noted without incident: /81 (BP Location: Right arm, Patient Position: Sitting, Cuff Size: Adult Regular)   Pulse 63   Temp 98  F (36.7  C) (Oral)   Ht 1.753 m (5' 9\")   Wt 69.3 kg (152 lb 12.8 oz)   SpO2 99%   BMI 22.56 kg/m  . The patient does not have any other questions for the provider.    Sonia Dupont, EMT at 12:28 PM on 2/15/2023.  Primary care clinic: [phone]  "

## 2023-02-15 NOTE — PROGRESS NOTES
Assessment & Plan     Stage 3a chronic kidney disease (H)  Due to recheck labs today; he last saw Nephrology >1 year ago, encouraged scheduling follow-up. He agrees with the plan.  - Basic metabolic panel  (Ca, Cl, CO2, Creat, Gluc, K, Na, BUN); Future  - Adult Nephrology  Referral; Future    Crohn's disease of small intestine with intestinal obstruction (H)  Repeat CRP, CBC. He was anemic after his bleed in December, now back WNL. Continue with regular follow-up with GI.  - CRP, inflammation; Future  - CBC with platelets and differential; Future      31 minutes spent on the date of the encounter doing chart review, history and exam, documentation and further activities per the note       Return in about 6 months (around 8/15/2023). or sooner prn for any changes or concerns    MARISOL Alfaro CNP  [facility] INTERNAL MEDICINE [place]    Angela Zhong is a 67 year old, presenting for the following health issues:  RECHECK (Follow up on hospital visit./Discuss hemoglobin.)      HPI     Hospitalized in Dec 2022 at [facility], for stricture dilation 12/13 with colonoscopy, complicated by subsequent bleed. Follows with GI through [facility], Dr. David.   Thought his gut was doing better after the expansion in the past, would last 9-12 months. His way to measure is being able to eat a large salad and no issues getting through, but 5 days ago ate a large salad and had a difficult time passing; eventually bowels move again but took longer this time. Thinks is now clear.  Doesn't feel like constipation (doesn't feel like in the colon) but not sure if he'd notice the difference.  Not sure if he's assuming it's the anastomosis; wants to see how things go. Will cut back on large salads.   Saw Sports Med. Doesn't think the MRI got a good picture, not a true injury, was told just needs strengthening. Can notice climbing stairs, aminata if carrying groceries. Doesn't feel strengthening is  "the answer, but willing to go to PT for a while     Saw Shravan in Dermatology, biopsy revealed BCC, plan for surgery, needing to schedule.     Reports has had 2nd Shingrix at [facility].    Review of Systems   Constitutional, HEENT, cardiovascular, pulmonary, gi and gu systems are negative, except as otherwise noted.      Objective    /81 (BP Location: Right arm, Patient Position: Sitting, Cuff Size: Adult Regular)   Pulse 63   Temp 98  F (36.7  C) (Oral)   Ht 1.753 m (5' 9\")   Wt 69.3 kg (152 lb 12.8 oz)   SpO2 99%   BMI 22.56 kg/m    Body mass index is 22.56 kg/m .  Physical Exam   GENERAL: healthy, alert and no distress  HENT: ear canals and TM's normal, nose and mouth without ulcers or lesions  NECK: no adenopathy, no asymmetry, masses, or scars and thyroid normal to palpation  RESP: lungs clear to auscultation - no rales, rhonchi or wheezes  CV: regular rate and rhythm, normal S1 S2, no S3 or S4, no murmur, click or rub, no peripheral edema and peripheral pulses strong  ABDOMEN: soft, nontender, no hepatosplenomegaly, no masses and bowel sounds normal, scarred  MS: no gross musculoskeletal defects noted, no edema  PSYCH: mentation appears normal, affect normal/bright                    "

## 2023-02-16 ENCOUNTER — TELEPHONE (OUTPATIENT)
Dept: DERMATOLOGY | Facility: CLINIC | Age: 68
End: 2023-02-16
Payer: COMMERCIAL

## 2023-02-16 NOTE — TELEPHONE ENCOUNTER
Called patient to schedule surgery with Dr. Scott    Date of Surgery: TBD    Surgery type: TBD    Consult scheduled: Yes    Has patient had mohs with us before? No    Additional comments: desiree oLmbardo on 2/16/2023 at 11:38 AM

## 2023-02-21 NOTE — TELEPHONE ENCOUNTER
FUTURE VISIT INFORMATION      FUTURE VISIT INFORMATION:    Date: 3.27.23    Time: 2:15    Location: [facility]  REFERRAL INFORMATION:    Referring provider:  Shravan    Referring providers clinic:  Derm    Reason for visit/diagnosis  BCC on upper back needs definitive treatment. excision versus mohs.    RECORDS REQUESTED FROM:       Clinic name Comments Records Status Imaging Status   Shravan 2.13.23  Path # [id number] Epic Epic

## 2023-02-27 ENCOUNTER — MYC MEDICAL ADVICE (OUTPATIENT)
Dept: INFECTIOUS DISEASES | Facility: CLINIC | Age: 68
End: 2023-02-27
Payer: COMMERCIAL

## 2023-02-27 DIAGNOSIS — R79.89 ELEVATED SERUM CREATININE: Primary | ICD-10-CM

## 2023-02-27 DIAGNOSIS — B38.7 COCCIDIOIDAL GRANULOMA: ICD-10-CM

## 2023-02-27 NOTE — TELEPHONE ENCOUNTER
Trevin has a history of IBD and coccidioidomycosis previously treated by a colleague. He has been on chronic fluconazole for secondary prophylaxis. While he is off immunosuppression and possibly at lower risk of reactivated fungal infection, I see he was in the hospital recently for rectal bleeding, so I can't exclude the future possibility of resumed immunosuppression.    Of note his Cr was recently 1.45 and he has CKD. If his Cr goes persistently above 1.4, his fluconazole dose should be lowered to 100/day.    Also of note, I don't see a link between recent rectal bleeding and fluconazole (notably, not on warfarin).    I will write an order for Cr level, which I will need to review before refilling fluconazole. If his Cr continues to fluctuate, he will need q3-4mo checks to see if flucon needs adjustment.    He was last seen in 12/2021, I prefer to see him at minimum every 2 years, so next in 12/2023, sooner if questions arise.    Sabrina Irwin MD   of Medicine, Division of Infectious Diseases  Contact me on the [employer] shani or console  [facility] [phone]

## 2023-02-28 ENCOUNTER — LAB (OUTPATIENT)
Dept: LAB | Facility: CLINIC | Age: 68
End: 2023-02-28
Payer: COMMERCIAL

## 2023-02-28 DIAGNOSIS — R79.89 ELEVATED SERUM CREATININE: ICD-10-CM

## 2023-02-28 PROCEDURE — 82565 ASSAY OF CREATININE: CPT

## 2023-02-28 PROCEDURE — 36415 COLL VENOUS BLD VENIPUNCTURE: CPT

## 2023-02-28 NOTE — TELEPHONE ENCOUNTER
Re-checking Trevin's creatinine is necessary. His most recent value was 1.45, and that value would require me to reduce his dose. However, his most recent value may be an outlier, since prior values have been <1.4    If the recheck is < or = 1.4, he will continue the same dose. If the recheck is >1.4, I will reduce the dose.    Either way, it is important that he follow with a kidney specialist as recommended by Lacey Feng on 2/15. The kidney specialist can provide insight into if/how he can keep his kidney function from getting worse.    Sabrina Irwin MD   of Medicine, Division of Infectious Diseases  Contact me on the [employer] shani or console  [facility] [phone]

## 2023-03-01 LAB
CREAT SERPL-MCNC: 1.39 MG/DL (ref 0.67–1.17)
GFR SERPL CREATININE-BSD FRML MDRD: 56 ML/MIN/1.73M2

## 2023-03-01 RX ORDER — FLUCONAZOLE 200 MG/1
200 TABLET ORAL DAILY
Qty: 90 TABLET | Refills: 3 | Status: SHIPPED | OUTPATIENT
Start: 2023-03-01 | End: 2023-05-30

## 2023-03-01 NOTE — TELEPHONE ENCOUNTER
Cr re-check is 1.39. I will re-order fluconazole at previous dose of 200mg/day. I recommend f/u with nephrology and quarterly (at minimum) Cr assessment. If Cr value is >1.4 on more than 1 consecutive occasion, would reduce dose to 100/day.    Sabrina Irwin MD   of Medicine, Division of Infectious Diseases  Contact me on the [employer] shani or console  [facility] [phone]

## 2023-03-09 ENCOUNTER — TELEPHONE (OUTPATIENT)
Dept: NEPHROLOGY | Facility: CLINIC | Age: 68
End: 2023-03-09
Payer: COMMERCIAL

## 2023-03-09 NOTE — TELEPHONE ENCOUNTER
Sent [employer]t (2nd Attempt) for the patient to call back and schedule the following:    Appointment type: Referral appt  Provider: Gen fofana  Return date: next available  Specialty phone number: [phone]  Additional appointment(s) needed: lab  Additonal Notes: n/a

## 2023-03-09 NOTE — TELEPHONE ENCOUNTER
Pt is calling back, writer was going to schedule a follow up with a provider, however pt wanted to know if needs to complete a litholink kit before, writer saw only labs in the below message, but he's wondering about the 24 hour urine kit- please call yemi, thank you

## 2023-03-27 ENCOUNTER — OFFICE VISIT (OUTPATIENT)
Dept: DERMATOLOGY | Facility: CLINIC | Age: 68
End: 2023-03-27
Payer: COMMERCIAL

## 2023-03-27 ENCOUNTER — PRE VISIT (OUTPATIENT)
Dept: DERMATOLOGY | Facility: CLINIC | Age: 68
End: 2023-03-27

## 2023-03-27 DIAGNOSIS — C44.519 BCC (BASAL CELL CARCINOMA), TRUNK: Primary | ICD-10-CM

## 2023-03-27 PROCEDURE — 99213 OFFICE O/P EST LOW 20 MIN: CPT | Performed by: DERMATOLOGY

## 2023-03-27 ASSESSMENT — PAIN SCALES - GENERAL: PAINLEVEL: NO PAIN (0)

## 2023-03-27 NOTE — LETTER
3/27/2023       RE: Trevin Gee  [address]  [address]  [place] [facility] [address]     Dear Colleague,    Thank you for referring your patient, Trevin Gee, to the [facility] DERMATOLOGIC SURGERY CLINIC [place] at [facility]. Please see a copy of my visit note below.    Mohs Micrographic Surgery Consult Note    Mar 27, 2023    Dermatology Problem List:  1. BCC, central upper back s/p shave bx 02/13/23   2. Post-herpetic neuralgia: T5, (initial zoster episode 5/2021)  - gabapentin 600 mg BID (tapered down from TID)    Subjective: The patient is a 67 year old man who presents today for Mohs micrographic surgery consultation for a recent diagnosis of skin cancer.    Skin cancer(s): BCC  Location(s): central upper back (technically lower neck)  Associated symptoms: pruritus, tenderness to touch  Onset: within last 1 year    No other associated symptoms, modifying factors, or prior treatments, except when noted above. The patient denies any constitutional symptoms, lymphadenopathy, unintentional weight loss or decreased appetite. No other skin concerns today.    Objective:   Gen: This is a well appearing individual in no acute distress. The patient is alert and oriented x 3.  An exam of the back was performed today and visualized the following:  - 1 cm depressed papules just superior to the C7 vertebral process on the lower neck.    Assessment and Plan:     1. Plan for Mohs micrographic surgery for skin cancer(s) above:  - As tumor is just above C7 vertebral process it is technically on the neck and in a M risk location.  - We discussed the nature of the diagnosis/condition above. We discussed the treatment options, including the risks benefits and expectations of these options. We recommend micrographic surgery as the most effective and most tissue sparing option for treatment, and the patient agrees to proceed with this.  The patient is aware of the  risks, benefits and expectations of this procedure. The patient will be scheduled for this procedure, if not already done so.  - We anticipate the following closure type: Linear closure, such as complex linear closure (CLC)    The patient was discussed with and evaluated by attending physician, Horacio Scott MD.    Scribe Disclosure:  I, HENRIQUE CARR, am serving as a scribe to document services personally performed by Horacio Scott MD based on data collection and the provider's statements to me.     Attending Attestation  I attest that the Scribe recorded the interview and exam that I personally performed.  I have reviewed the note and edited it as necessary.    Horacio Scott M.D.  Professor  Director of Dermatologic Surgery  Department of Dermatology  [facility]

## 2023-03-27 NOTE — PROGRESS NOTES
Mohs Micrographic Surgery Consult Note    Mar 27, 2023    Dermatology Problem List:  1. BCC, central upper back s/p shave bx 02/13/23   2. Post-herpetic neuralgia: T5, (initial zoster episode 5/2021)  - gabapentin 600 mg BID (tapered down from TID)    Subjective: The patient is a 67 year old man who presents today for Mohs micrographic surgery consultation for a recent diagnosis of skin cancer.    Skin cancer(s): BCC  Location(s): central upper back (technically lower neck)  Associated symptoms: pruritus, tenderness to touch  Onset: within last 1 year    No other associated symptoms, modifying factors, or prior treatments, except when noted above. The patient denies any constitutional symptoms, lymphadenopathy, unintentional weight loss or decreased appetite. No other skin concerns today.    Objective:   Gen: This is a well appearing individual in no acute distress. The patient is alert and oriented x 3.  An exam of the back was performed today and visualized the following:  - 1 cm depressed papules just superior to the C7 vertebral process on the lower neck.    Assessment and Plan:     1. Plan for Mohs micrographic surgery for skin cancer(s) above:  - As tumor is just above C7 vertebral process it is technically on the neck and in a M risk location.  - We discussed the nature of the diagnosis/condition above. We discussed the treatment options, including the risks benefits and expectations of these options. We recommend micrographic surgery as the most effective and most tissue sparing option for treatment, and the patient agrees to proceed with this.  The patient is aware of the risks, benefits and expectations of this procedure. The patient will be scheduled for this procedure, if not already done so.  - We anticipate the following closure type: Linear closure, such as complex linear closure (CLC)    The patient was discussed with and evaluated by attending physician, Horacio Scott MD.    Scribe Disclosure:  I,  HENRIQUE CARR, am serving as a scribe to document services personally performed by Horacio Scott MD based on data collection and the provider's statements to me.     Attending Attestation  I attest that the Scribe recorded the interview and exam that I personally performed.  I have reviewed the note and edited it as necessary.    Horacio Scott M.D.  Professor  Director of Dermatologic Surgery  Department of Dermatology  [facility]

## 2023-03-27 NOTE — NURSING NOTE
Chief Complaint   Patient presents with     Consult     Patient is here today for a consult.      3/27/2023

## 2023-04-17 ENCOUNTER — OFFICE VISIT (OUTPATIENT)
Dept: DERMATOLOGY | Facility: CLINIC | Age: 68
End: 2023-04-17
Payer: COMMERCIAL

## 2023-04-17 VITALS — SYSTOLIC BLOOD PRESSURE: 127 MMHG | HEART RATE: 83 BPM | DIASTOLIC BLOOD PRESSURE: 66 MMHG

## 2023-04-17 DIAGNOSIS — C44.41 BASAL CELL CARCINOMA (BCC) OF NECK: Primary | ICD-10-CM

## 2023-04-17 PROCEDURE — 17313 MOHS 1 STAGE T/A/L: CPT | Mod: GC | Performed by: DERMATOLOGY

## 2023-04-17 PROCEDURE — 13101 CMPLX RPR TRUNK 2.6-7.5 CM: CPT | Mod: GC | Performed by: DERMATOLOGY

## 2023-04-17 ASSESSMENT — PAIN SCALES - GENERAL: PAINLEVEL: NO PAIN (0)

## 2023-04-17 NOTE — PATIENT INSTRUCTIONS
Wound care    I will experience scar, bleeding, swelling, pain, crusting and redness. I may experience incomplete removal or recurrence. Risks are bleeding, bruising, swelling, infection, nerve damage, & a large wound. A second procedure may be recommended to obtain the best cosmetic or functional result.       A three month office visit with your Surgeon is recommended for scar evaluation. Please reach out sooner if you have concerns about you surgical site/wound.    Caring for your skin after surgery    After your surgery, a pressure bandage will be placed over the area that has stitches. This is important to prevent bleeding. Please follow these instructions over the next 1 to 2 weeks. Following this regimen will help to prevent complications as your wound heals.     For the first 48 hours after your surgery:    Leave the pressure dressing on and keep it dry. If it should come loose, you may re-tape it, but do not take it off.  Relax and take it easy. Do not do any vigorous exercise or heavy lifting. This could cause the wound to bleed.  Post-Operative pain is usually mild. If you are able to take tylenol, You may take plain or extra-strength Tylenol (acetaminophen) As directed on the bottle (do not take more than 4,000mg in one day). If you are able to take ibuprofen, you can alternate the tylenol and ibuprofen.   Avoid alcohol as this may increase your tendency to bleed.   You may put an ice pack around the bandaged area for 20 minutes at a time as needed. This may help reduce swelling, bruising, and pain. Make sure the ice pack is waterproof so that the pressure bandage doesn t get wet.  If the wound is on the face try to sleep with your head elevated. Either in a recliner or propped up in bed, this will decrease swelling around the eyes.   You may see a small amount of drainage or blood on your pressure bandage. This is normal. However:  If drainage or bleeding continues or saturates the bandage, you will  "need to apply firm pressure over the bandage with a piece of gauze for 15 minutes.  If bleeding continues after applying pressure for 15 minutes, apply an ice pack to the bandaged area for 15 minutes.  If bleeding still continues, call our office or go to the nearest emergency room.    Remove pressure dressing 48 hours after surgery:    Carefully remove the pressure bandage. If it seems sticky or too difficult to get off, you may need to soak it off in the shower.  After the pressure dressing is removed, you may shower and get the wound wet. However, Do Not let the forceful stream of the shower hit the wound directly.  Follow these wound care and dressing change instructions:    You have steri strips, skin glue (purplish/clear), and tegaderm (clear film) over your incision line, you can shower.   You may allow water to run over the site. Do not soak.  Do Not rub or scrub the site.  Pat dry after the shower or bath.  Avoid topical medications, lotions, creams, ointment,or oils.  Do not use tanning lamps or expose the site to sun.   Check wound appearance daily, some swelling and redness is normal after a procedure but should go away as your would is healing. If the swelling and redness or pain increases or if any other signs of infection occur listed below please send in a photo via my chart and or call us to let us know.  The clear film should start peeling off approximately around 2 weeks. By this time your wound should be sufficiently healed. If it still looks to be healing when the glue comes off you can clean the wound with soapy warm water daily in the shower and apply Vaseline to the incision line.   Once the clear film starts peeling off to the point where water is getting trapped under the clear film, please gently peel off.        If you are able to take acetaminophen (\"Tylenol,\" etc.) and ibuprofen (\"Advil\" or \"Motrin,\" etc.), then you may STAGGER these medications by taking 400 mg of ibuprofen (usually " two tabs) every 8 hours and 1,000 mg of acetaminophen (e.g., two tabs of extra-strength Tylenol) every 8 hours.    This means, for example, that you could take the followin,000 mg of Tylenol, followed 4 hours later by 400 mg Ibuprofen, followed 4 hours later with 1,000 mg of Tylenol, followed 4 hours later by 400 mg Ibuprofen, followed 4 hours later with 1,000 mg of Tylenol, and so forth.     Essentially, you can take either 1,000 mg of Tylenol or 400 mg of ibuprofen in alternating fashion EVERY FOUR HOURS.    Do NOT exceed more than 4,000 mg of Tylenol or 3,200 mg of ibuprofen per 24 hours. If you are not able to take Tylenol or ibuprofen as above due to other health issues (or a physician has told you directly that you are not allowed to take one of them, say due to pre-existing severe liver or kidney issues), then disregard the above directions.    Scientific evidence supports that this combination/schedule of pain medications is just as effective, if not more effective, than taking a narcotic pain medicine.       Follow up will be a 3 month scar evaluation either in person or via a telephone visit with you sending in a photo via orderTopia. Unless you have been told to follow up sooner or if you have concerns and would like to be see sooner. Please call or send us in a orderTopia message if possible and attach a photo.        What to expect:    The first couple of days your wound may be tender and may bleed slightly when doing wound care.  There may be swelling and bruising around the wound, especially if it is near the eyes. For your comfort, you may apply ice or cold compresses to the bruises after your have removed the pressure bandage.  The area around your wound may be numb for several weeks or even months.  You may experience periodic sharp pain or mild itching around the wound as it heals.   The suture line will look dark for a while but will lighten over time.       When to call us:    You have bleeding  that will not stop after applying pressure and ice.  You have pain that is not controlled with Tylenol (acetaminophen.)  You have signs or symptoms of an infection such as:  Fever over 100 degrees Fahrenheit  Redness, warmth or foul-smelling drainage from the wound  If you have any questions, or are not sure how to take care of the wound.    Phone numbers:    During business hours (M-F 8:00-4:30 p.m.)  [facility]-  [phone] Option 1 appt. desk  [phone]  Option 3 nurse triage line  ---------------------------------------------------------  Evenings/Weekends/Holidays  Hospital - [phone]   TTY for hearing hujuedwf-[phone]  *Ask  to page dermatologist on-call  Emergency Dekk-[phone]  TTY for hearing impaired- [phone]

## 2023-04-17 NOTE — PROGRESS NOTES
[facility] Mohs Surgery Procedure Note    Case #: 1  Date of Service:  Apr 17, 2023  Surgery: Mohs micrographic surgery (MMS)  Staff surgeon: Horacio Scott MD  Fellow surgeon: Cristopher Llanes MD  Resident surgeon: None  Nurse: Octavia Jimenez CMA    Tumor Type: BCC  Location: central upper back  Derm-Path Accession #: [id number]    Mohs Accession #:   Pre-Op Size: 1.4 cm x 0.8 cm  Final Defect Size: 1.8 cm x 1.6 cm  Number of Mohs stages: 1  Level of Defect: Fat  Local anesthetic: 6 mL 1% lidocaine with epinephrine  Repair Type: Complex linear  Repair Size: 4.1 cm  Suture Material: 4-0 Monocryl; 5-0 fast absorbing gut    Procedure:    Stage I  We discussed the principles of treatment and most likely complications including scarring, bleeding, infection, swelling, pain, crusting, nerve damage, large wound,  incomplete excision, wound dehiscence,  nerve damage, recurrence, and a second procedure may be recommended to obtain the best cosmetic or functional result.    Informed consent was obtained and the patient underwent the procedure as follows:  The patient was placed supine on the operating table.  The cancer was identified, outlined with a marker, and verified by the patient.  The entire surgical field was prepped with chlorhexidine.  The surgical site was anesthetized using lidocaine with epinephrine.    The area of clinically apparent tumor was debulked. The layer of tissue was then surgically excised using a #15 blade and was then transferred onto a specimen sheet maintaining the orientation of the specimen. Hemostasis was obtained using electrocoagulation. The wound site was then covered with a dressing while the tissue samples were processed for examination.    The excised tissue was transported to the Mohs histology laboratory maintaining the tissue orientation.  The tissue specimen was relaxed so that the entire surgical margin was in a a single horizontal plane for sectioning and inked  for precise mapping.  A precise reference map was drawn to reflect the sectioning of the specimen, colored inking of the margins, and orientation on the patient.  The tissue was processed using horizontal sectioning of the base and continuous peripheral margins.  The histopathologic sections were reviewed in conjunction with the reference map.    Total blocks: 1  Total slides: 2    There were no cancer cells visualized on examination, therefore Mohs surgery was complete.    REPAIR:     Due to one or more of the following factors, complex linear closure was required/indicated: Extensive undermining (equal to or greater than the maximum perpendicular width of the defect), exposure of underlying structure (bone, cartilage, tendon, named neurovascular), free margin involvement (helical rim, vermillion border, nostril rim), and/or placement of retention sutures.    After the excision of the tumor, the area was extensively and carefully undermined using tissue scissors. Hemostasis was obtained with spot electrocautery and ligation of vessels where necessary. Initial deep plication sutures of 4-0 Monocryl sutures were placed in the deep, subcutaneous, and fascial planes to appose the lateral margins. The subcutaneous and dermal layers were then closed with 4-0 Monocryl sutures. The epidermis was then carefully approximated along the length of the wound using 5-0 fast absorbing gut running sutures.     Estimated blood loss was less than 10 ml for all surgical sites. A sterile pressure dressing was applied and wound care instructions, with a written handout, were given. The patient was discharged from the [facility] alert and ambulatory.    Horacio Scott MD staffed the patient and was present for the key portions of the above procedure.      Dr. Cristopher Llanes (Mohs micrographic surgery fellow) performed the Mohs micrographic surgery and reconstruction under the direct supervision of Horacio Scott MD, who was  present for the entire micrographic surgery and key portions of the reconstruction, and always immediately available.    Cristopher Llanes MD  Dermatology, PGY-5  Mohs surgery fellow    Scribe Disclosure:  I, Lucio Ablerts, am serving as a scribe to document services personally performed by Horacio Scott MD based on data collection and the provider's statements to me.       Attending attestation:  I was present for key elements of the procedure and immediately available for all other portions of the procedure.  I have reviewed the note and edited it as necessary.    Horacio Scott M.D.  Professor  Director of Dermatologic Surgery  Department of Dermatology  [facility]    Dermatology Surgery Clinic  [facility]  [address] [place], [place] [id number]

## 2023-04-17 NOTE — LETTER
4/17/2023       RE: Trevin Gee  [address]  [address]  [place] [address]     Dear Colleague,    Thank you for referring your patient, Trevin Gee, to the [facility] [place] [facility]. Please see a copy of my visit note below.    [facility] Mohs Surgery Procedure Note    Case #: 1  Date of Service:  Apr 17, 2023  Surgery: Mohs micrographic surgery (MMS)  Staff surgeon: Horacio Scott MD  Fellow surgeon: Cristopher Llanes MD  Resident surgeon: None  Nurse: Octavia Jimenez CMA    Tumor Type: BCC  Location: central upper back  Derm-Path Accession #: [id number]    Mohs Accession #:   Pre-Op Size: 1.4 cm x 0.8 cm  Final Defect Size: 1.8 cm x 1.6 cm  Number of Mohs stages: 1  Level of Defect: Fat  Local anesthetic: 6 mL 1% lidocaine with epinephrine  Repair Type: Complex linear  Repair Size: 4.1 cm  Suture Material: 4-0 Monocryl; 5-0 fast absorbing gut    Procedure:    Stage I  We discussed the principles of treatment and most likely complications including scarring, bleeding, infection, swelling, pain, crusting, nerve damage, large wound,  incomplete excision, wound dehiscence,  nerve damage, recurrence, and a second procedure may be recommended to obtain the best cosmetic or functional result.    Informed consent was obtained and the patient underwent the procedure as follows:  The patient was placed supine on the operating table.  The cancer was identified, outlined with a marker, and verified by the patient.  The entire surgical field was prepped with chlorhexidine.  The surgical site was anesthetized using lidocaine with epinephrine.    The area of clinically apparent tumor was debulked. The layer of tissue was then surgically excised using a #15 blade and was then transferred onto a specimen sheet maintaining the orientation of the specimen. Hemostasis was obtained using electrocoagulation.  The wound site was then covered with a dressing while the tissue samples were processed for examination.    The excised tissue was transported to the Mohs histology laboratory maintaining the tissue orientation.  The tissue specimen was relaxed so that the entire surgical margin was in a a single horizontal plane for sectioning and inked for precise mapping.  A precise reference map was drawn to reflect the sectioning of the specimen, colored inking of the margins, and orientation on the patient.  The tissue was processed using horizontal sectioning of the base and continuous peripheral margins.  The histopathologic sections were reviewed in conjunction with the reference map.    Total blocks: 1  Total slides: 2    There were no cancer cells visualized on examination, therefore Mohs surgery was complete.    REPAIR:     Due to one or more of the following factors, complex linear closure was required/indicated: Extensive undermining (equal to or greater than the maximum perpendicular width of the defect), exposure of underlying structure (bone, cartilage, tendon, named neurovascular), free margin involvement (helical rim, vermillion border, nostril rim), and/or placement of retention sutures.    After the excision of the tumor, the area was extensively and carefully undermined using tissue scissors. Hemostasis was obtained with spot electrocautery and ligation of vessels where necessary. Initial deep plication sutures of 4-0 Monocryl sutures were placed in the deep, subcutaneous, and fascial planes to appose the lateral margins. The subcutaneous and dermal layers were then closed with 4-0 Monocryl sutures. The epidermis was then carefully approximated along the length of the wound using 5-0 fast absorbing gut running sutures.     Estimated blood loss was less than 10 ml for all surgical sites. A sterile pressure dressing was applied and wound care instructions, with a written handout, were given. The patient was  discharged from the [facility] Surgery Center alert and ambulatory.    Horacio Scott MD staffed the patient and was present for the key portions of the above procedure.      Dr. Cristopher Llanes (Mohs micrographic surgery fellow) performed the Mohs micrographic surgery and reconstruction under the direct supervision of Horacio Scott MD, who was present for the entire micrographic surgery and key portions of the reconstruction, and always immediately available.    Cristopher Llanes MD  Dermatology, PGY-5  Mohs surgery fellow    Scribe Disclosure:  I, Lucio Alberts, am serving as a scribe to document services personally performed by Horacio Scott MD based on data collection and the provider's statements to me.       Attending attestation:  I was present for key elements of the procedure and immediately available for all other portions of the procedure.  I have reviewed the note and edited it as necessary.    Horacio Scott M.D.  Professor  Director of Dermatologic Surgery  Department of Dermatology  [facility]    Dermatology Surgery Clinic  [facility] [address] [address]

## 2023-04-18 ENCOUNTER — TELEPHONE (OUTPATIENT)
Dept: DERMATOLOGY | Facility: CLINIC | Age: 68
End: 2023-04-18
Payer: COMMERCIAL

## 2023-04-18 NOTE — CONFIDENTIAL NOTE
Follow up call attempted following Mohs procedure with Dr. Scott       Are you having pain?   Are you taking pain medication?   Are you applying ice?    Have you had any noticeable bleeding through the bandage?    Do you have any other concerns?          Please call [phone] option 3 if you have any questions or concerns.

## 2023-04-24 DIAGNOSIS — N18.31 STAGE 3A CHRONIC KIDNEY DISEASE (H): Primary | ICD-10-CM

## 2023-04-28 ENCOUNTER — LAB (OUTPATIENT)
Dept: LAB | Facility: CLINIC | Age: 68
End: 2023-04-28
Payer: COMMERCIAL

## 2023-04-28 ENCOUNTER — OFFICE VISIT (OUTPATIENT)
Dept: NEPHROLOGY | Facility: CLINIC | Age: 68
End: 2023-04-28
Attending: INTERNAL MEDICINE
Payer: COMMERCIAL

## 2023-04-28 VITALS
OXYGEN SATURATION: 96 % | SYSTOLIC BLOOD PRESSURE: 131 MMHG | HEART RATE: 69 BPM | BODY MASS INDEX: 23.24 KG/M2 | WEIGHT: 157.4 LBS | TEMPERATURE: 97.8 F | DIASTOLIC BLOOD PRESSURE: 82 MMHG

## 2023-04-28 DIAGNOSIS — R31.9 HEMATURIA, UNSPECIFIED TYPE: ICD-10-CM

## 2023-04-28 DIAGNOSIS — N20.0 NEPHROLITHIASIS: Primary | ICD-10-CM

## 2023-04-28 DIAGNOSIS — R79.89 ELEVATED SERUM CREATININE: ICD-10-CM

## 2023-04-28 DIAGNOSIS — N18.31 STAGE 3A CHRONIC KIDNEY DISEASE (H): ICD-10-CM

## 2023-04-28 DIAGNOSIS — N25.81 SECONDARY HYPERPARATHYROIDISM (H): ICD-10-CM

## 2023-04-28 LAB
ALBUMIN MFR UR ELPH: 22 MG/DL (ref 1–14)
ALBUMIN SERPL BCG-MCNC: 4.5 G/DL (ref 3.5–5.2)
ALBUMIN UR-MCNC: 10 MG/DL
ANION GAP SERPL CALCULATED.3IONS-SCNC: 7 MMOL/L (ref 7–15)
APPEARANCE UR: CLEAR
BILIRUB UR QL STRIP: NEGATIVE
BUN SERPL-MCNC: 18.1 MG/DL (ref 8–23)
CALCIUM SERPL-MCNC: 10 MG/DL (ref 8.8–10.2)
CHLORIDE SERPL-SCNC: 102 MMOL/L (ref 98–107)
COLOR UR AUTO: YELLOW
CREAT SERPL-MCNC: 1.47 MG/DL (ref 0.67–1.17)
CREAT UR-MCNC: 226 MG/DL
DEPRECATED CALCIDIOL+CALCIFEROL SERPL-MC: 30 UG/L (ref 20–75)
DEPRECATED HCO3 PLAS-SCNC: 29 MMOL/L (ref 22–29)
ERYTHROCYTE [DISTWIDTH] IN BLOOD BY AUTOMATED COUNT: 13.8 % (ref 10–15)
GFR SERPL CREATININE-BSD FRML MDRD: 52 ML/MIN/1.73M2
GLUCOSE SERPL-MCNC: 97 MG/DL (ref 70–99)
GLUCOSE UR STRIP-MCNC: NEGATIVE MG/DL
HCT VFR BLD AUTO: 46 % (ref 40–53)
HGB BLD-MCNC: 15.4 G/DL (ref 13.3–17.7)
HGB UR QL STRIP: NEGATIVE
KETONES UR STRIP-MCNC: NEGATIVE MG/DL
LEUKOCYTE ESTERASE UR QL STRIP: NEGATIVE
MCH RBC QN AUTO: 30.8 PG (ref 26.5–33)
MCHC RBC AUTO-ENTMCNC: 33.5 G/DL (ref 31.5–36.5)
MCV RBC AUTO: 92 FL (ref 78–100)
MUCOUS THREADS #/AREA URNS LPF: PRESENT /LPF
NITRATE UR QL: NEGATIVE
PH UR STRIP: 5.5 [PH] (ref 5–7)
PHOSPHATE SERPL-MCNC: 3.7 MG/DL (ref 2.5–4.5)
PLATELET # BLD AUTO: 215 10E3/UL (ref 150–450)
POTASSIUM SERPL-SCNC: 4.7 MMOL/L (ref 3.4–5.3)
PROT/CREAT 24H UR: 0.1 MG/MG CR (ref 0–0.2)
PTH-INTACT SERPL-MCNC: 97 PG/ML (ref 15–65)
RBC # BLD AUTO: 5 10E6/UL (ref 4.4–5.9)
RBC URINE: 5 /HPF
SODIUM SERPL-SCNC: 138 MMOL/L (ref 136–145)
SP GR UR STRIP: 1.02 (ref 1–1.03)
UROBILINOGEN UR STRIP-MCNC: NORMAL MG/DL
WBC # BLD AUTO: 6.2 10E3/UL (ref 4–11)
WBC URINE: 1 /HPF

## 2023-04-28 PROCEDURE — 85027 COMPLETE CBC AUTOMATED: CPT | Performed by: PATHOLOGY

## 2023-04-28 PROCEDURE — 80069 RENAL FUNCTION PANEL: CPT | Performed by: PATHOLOGY

## 2023-04-28 PROCEDURE — G0463 HOSPITAL OUTPT CLINIC VISIT: HCPCS | Performed by: STUDENT IN AN ORGANIZED HEALTH CARE EDUCATION/TRAINING PROGRAM

## 2023-04-28 PROCEDURE — 99214 OFFICE O/P EST MOD 30 MIN: CPT | Mod: GC | Performed by: STUDENT IN AN ORGANIZED HEALTH CARE EDUCATION/TRAINING PROGRAM

## 2023-04-28 PROCEDURE — 81001 URINALYSIS AUTO W/SCOPE: CPT | Performed by: PATHOLOGY

## 2023-04-28 PROCEDURE — 84156 ASSAY OF PROTEIN URINE: CPT | Performed by: PATHOLOGY

## 2023-04-28 PROCEDURE — 36415 COLL VENOUS BLD VENIPUNCTURE: CPT | Performed by: PATHOLOGY

## 2023-04-28 PROCEDURE — 99000 SPECIMEN HANDLING OFFICE-LAB: CPT | Performed by: PATHOLOGY

## 2023-04-28 PROCEDURE — 82306 VITAMIN D 25 HYDROXY: CPT | Mod: 90 | Performed by: PATHOLOGY

## 2023-04-28 PROCEDURE — 83970 ASSAY OF PARATHORMONE: CPT | Performed by: PATHOLOGY

## 2023-04-28 RX ORDER — POTASSIUM CITRATE 10 MEQ/1
10 TABLET, EXTENDED RELEASE ORAL DAILY
Status: DISCONTINUED | OUTPATIENT
Start: 2023-04-28 | End: 2023-04-28

## 2023-04-28 RX ORDER — POTASSIUM CITRATE 10 MEQ/1
10 TABLET, EXTENDED RELEASE ORAL DAILY
Qty: 90 TABLET | Refills: 1 | Status: SHIPPED | OUTPATIENT
Start: 2023-04-28 | End: 2023-12-27

## 2023-04-28 ASSESSMENT — PAIN SCALES - GENERAL: PAINLEVEL: NO PAIN (0)

## 2023-04-28 NOTE — PATIENT INSTRUCTIONS
-Eat half a cup of yogurt with meal  -Please take on potassium citrate pill with the largest meal  -Please F/U in 6 month  -Please drink atleast 3 liter of water of daily  -Please take low salt sodium<2400 mg  -Please avoid food high in oxalate  -Please cut down on your vitamin C

## 2023-04-28 NOTE — LETTER
4/28/2023       RE: Trevin Gee  [address]  [address]  [facility] [address]     Dear Colleague,    Thank you for referring your patient, Trevin Gee, to the [facility] [place] at [facility]. Please see a copy of my visit note below.    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  Patient had 5 RBC in his urine and if his creatinine is to go up we would repeat a CT scan abdomen pelvis to rule out stones obstruction but for now the creatinine is stable so there is no concern for obstruction right now     CaOx Nephrolithiasis  Patient with history of recurrent stones with analysis in 2019 revealing a composition of calcium oxalate.  Also obtained Lith[facility] which suggested hypocitraturia, hypomagnesemia and hyperoxaluria.  This is likely in the setting of Crohn's disease and bowel resection history.    Repeat Litholink 2/16/2021 revealed low urine volumes 0.9-1.2L per day, urina Ca, Mg and Na were okay, citrate ~220-240.  Discussed dietary modifications but has been non compliant with them.      Plan:  - Low oxalate diet, reports taking half of the food mention on table for high oxalate food. Still loves eating chocolate  - Low Na diet for goal intake <2g per day  - No dietary Ca restriction and advised to eat a cup of yogurt with each meal  - Dairy intake with meals to add Ca to meals, pt does note taking cheese, ice cream and yogurt  - Increase fluid intake to 3L or greater per day, discussed that this will probably be the highest yield given low urine volumes but patient reports he drink around 2 liter of water and he does  not remember to drink more water than this and is not sure if he will be able to increase it,  - Discuss about stopping vitamin C supplement which he refused as he is concern that it is going to make his immunity weaker. I advised him to cut down on dose as it increases production of oxalate. h is agreeable to take 500 mg every other day  -Will start him on potassium citrate 10 dioni daily and montior his potassium in 2 weeks as in the past he was hyperkalemic and has a history of recurrence of danielle which can increase potassium in blood advised to hold the pills if he develop diarrhea and vomiting or is dehydrated.      # Electrolytes:   - Potassium; level: Normal  - Bicarbonate; level: Normal    # Mineral Bone Disorder:   - Secondary renal hyperparathyroidism; PTH level is: 97  - Vitamin D; level is: Not checked recently will check one today continue with the vitamin D tablet for now  - Calcium; level is:  Normal  - Phosphorus; level is: Normal      Assessment and plan was discussed with patient and he voiced his understanding and agreement.    Plan of care discussed and seen with Dr. Sánchez who is in agreement with the plan changes and addendum to follow      Consult:  Trevin Gee was seen in consultation at the request of Lacey Feng NP for routine follow up.    Reason for Visit:  Trevin Gee is a 67 year old male with CKD from nephrolithiasis , who presents for Follow up  HPI:  Patient present for follow up.  He has a history of recurrent nephrolithiasis and has been noncompliant with our recommendation in the past.  He drinks around 2 L of water and reports that he cannot drink more because of his work as he does not remember to drink more water.  He is still eats a lot of chocolates and on showing the foods that are high in oxalate he said he takes half of them usually.  He is also on vitamin C tablet and I explained to him that increased vitamin C intake can increase the amount of  oxalate.  He was agreeable to cut it down to 500 mg every other day but says that he is taking it because of the COVID and to make his immune system stronger.      He is interested in potassium citrate to help increase his citrate.  In the past [place] [facility] he had high oxalate low citrate and low amount of urine volume which was around 900 mL to 1.2 L.  He says that his Crohn's disease is in remission and has not have had a flare for a long time.  He does continue a lot of dairy products.      No peripheral edema,nausea, vomiting, fever, chills, headache, numbness, tingling, cough, dyspnea, chest pain, abdominal pain, diarrhea, constipation,NSAID use, dysuria, and rash. Normal appetite, and no weight gain.    Renal History:   Kidney Disease and Medical Hx:  h/o Kidney Stones:Yes     ROS:   A comprehensive review of systems was obtained and negative, except as noted in the HPI or PMH.    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    Osteoporosis    Anxiety    Vitamin B 12 deficiency    History of kevin    Steroid dependent for adrenal supression    Adrenal insufficiency (H)    Bipolar 2 disorder (H)    Hx of psychiatric care    Hypertriglyceridemia    Lactose intolerance    Vertebral fracture, osteoporotic (H)    Esophageal reflux    Abdominal pain    Diarrhea    Colorectal anastomotic stricture    Abdominal cramping    Partial small bowel obstruction (H)    Chronic kidney disease, stage 3 (H)    Dehydration    Pain and swelling of knee, right     PMH:   Medical record was reviewed and PMH was discussed with patient and noted below.  Past Medical History:   Diagnosis Date    Anemia 2007    Anxiety 2012?    much worse since July 2014    Cataract 12/2007    both eyes, too much prednisone, implants    Coccidioidomycosis     Crohn disease (H)     Crohn's disease (H) 1/13/2015    Depressive disorder 7/2014    much worse since July 2014     Fracture 1956    green fracture of leg    Gallbladder problem 2005?    much gravel, removed    GERD (gastroesophageal reflux disease)     History of blood transfusion 1988    ulcerated anastomosis term. ilium bleed    Hypertriglyceridemia 7/8/2016    Infection 2007    persistant coccidioidomycosis    Kidney stone various    both sides, all passed naturally    Mental health problem 2007    bipolar though perhaps different    Nephrolithiasis     Noninfectious ileitis     Osteoporosis 2007    osteoporosis, too much prednisone, last dexa 1/2014    Osteoporosis     Other nervous system complications 2007    prednisone overload induced kevin    Personal history of colonic polyps     small ones found during colonoscopies    Steroid-induced psychosis     Patient extremely sensitive to regular doses of steroids.  Unclear if this is due to chronic fluconazole use or genetic issue.  In the future, use caution when prescribing steroids.    TB (tuberculosis)      PSH:   Past Surgical History:   Procedure Laterality Date    APPENDECTOMY  1974    coincidental with Crohn's surgery    BACK SURGERY  12/2007    kyphoplasty on compressed 4th??? vertebra    BIOPSY  2007, 2013    lump on arm, knee, back of hand for coccidioidomycosis cult.    CHOLECYSTECTOMY  2005    much gravel, removed laparoscopically    COLONOSCOPY  7/14/2014 last    Dr. Catherine Bowden, [facility] - many previous    COLONOSCOPY Left 9/11/2015    Procedure: COMBINED COLONOSCOPY, SINGLE OR MULTIPLE BIOPSY/POLYPECTOMY BY BIOPSY;  Surgeon: Fransisco Leach MD;  Location: [facility]    COLONOSCOPY N/A 8/3/2016    Procedure: COMBINED COLONOSCOPY, SINGLE OR MULTIPLE BIOPSY/POLYPECTOMY BY BIOPSY;  Surgeon: Fransisco Leach MD;  [facility]    COLONOSCOPY N/A 8/16/2017    Procedure: COMBINED COLONOSCOPY, SINGLE OR MULTIPLE BIOPSY/POLYPECTOMY BY BIOPSY;;  Surgeon: Fransisco Leach MD;  [facility]    COLONOSCOPY N/A 10/1/2019    Procedure:  "Colonoscopy With Colonic Stent Insertion;  Surgeon: Robbie Cruz MD;  Location: [facility]    COLONOSCOPY N/A 2/5/2020    Procedure: COLONOSCOPY, FOREIGN BODY REMOVAL;  Surgeon: Robbie Cruz MD;  Location: [facility]    COLONOSCOPY N/A 5/26/2021    Procedure: COLONOSCOPY, WITH COLONIC STENT INSERTION;  Surgeon: Robbie Cruz MD;  Location: [facility]    COLONOSCOPY N/A 12/6/2021    Procedure: COLONOSCOPY with dilation;  Surgeon: Robbie Cruz MD;  Location: [facility]    ENT SURGERY  ?    upper endoscopy    ENTEROSCOPY SMALL BOWEL N/A 9/22/2021    Procedure: ENTEROSCOPY;  Surgeon: Robbie Cruz MD;  Location: [facility]    ESOPHAGOSCOPY, GASTROSCOPY, DUODENOSCOPY (EGD), COMBINED N/A 11/8/2016    Procedure: COMBINED ENDOSCOPIC ULTRASOUND, ESOPHAGOSCOPY, GASTROSCOPY, DUODENOSCOPY (EGD), FINE NEEDLE ASPIRATE/BIOPSY;  Surgeon: Guru Alexsandra Ballesteros MD;  Location: [facility]    ESOPHAGOSCOPY, GASTROSCOPY, DUODENOSCOPY (EGD), COMBINED N/A 11/8/2016    Procedure: COMBINED ESOPHAGOSCOPY, GASTROSCOPY, DUODENOSCOPY (EGD), BIOPSY SINGLE OR MULTIPLE;  Surgeon: Guru Alexsandra Ballesteros MD;  [facility]    ESOPHAGOSCOPY, GASTROSCOPY, DUODENOSCOPY (EGD), COMBINED N/A 8/16/2017    Procedure: COMBINED ESOPHAGOSCOPY, GASTROSCOPY, DUODENOSCOPY (EGD), BIOPSY SINGLE OR MULTIPLE;;  Surgeon: Fransisco Leach MD;  [facility]    EYE SURGERY  12/2007    cataract surgery both sides    GI SURGERY  1974, 1986(x2), 1989    Crohn's, 1974 RO 18\" term. ilium + 9\" asc. colon all one pc    SOFT TISSUE SURGERY  3/2013    removed buildup on back of r hand, coccidioidomycosis       Family Hx:   Family History   Problem Relation Age of Onset    Heart Failure Father     Musculoskeletal Disorder Father         Parkinson's    Cancer - colorectal Mother     Cerebrovascular Disease Paternal Grandmother     Cancer Other     Musculoskeletal Disorder Brother         Parkinson's    Anesthesia Reaction No " family hx of     Deep Vein Thrombosis (DVT) No family hx of      Personal Hx:   Social History     Tobacco Use    Smoking status: Never    Smokeless tobacco: Never   Vaping Use    Vaping status: Not on file   Substance Use Topics    Alcohol use: Not Currently     Comment: sometimes one glass of wine a week       Allergies:  Allergies   Allergen Reactions    Prednisone Other (See Comments)     Diana with more than 2.5mg chronic dosing of prednisone due to fluconazole interaction per patient.        Medications:  Current Outpatient Medications   Medication Sig    colchicine (COLCYRS) 0.6 MG tablet Take 1 tablet (0.6 mg) by mouth daily as needed for moderate pain    Cyanocobalamin (VITAMIN B 12) 500 MCG TABS Take 500 mcg by mouth every evening     fluconazole (DIFLUCAN) 200 MG tablet Take 1 tablet (200 mg) by mouth daily for 90 days    fluconazole (DIFLUCAN) 200 MG tablet Take 1 tablet (200 mg) by mouth daily    gabapentin (NEURONTIN) 300 MG capsule Take 2 capsules (600 mg) by mouth 2 times daily    Lysine 1000 MG TABS Take 1,500 mg by mouth 2 times daily    MELATONIN PO Take 1.5 mg by mouth nightly as needed (PT last dose 1.27.2020)    multivitamin w/minerals (THERA-VIT-M) tablet Take 1 tablet by mouth every evening     traZODone (DESYREL) 50 MG tablet Take 1 tablet (50 mg) by mouth At Bedtime    vitamin B6 (PYRIDOXINE) 100 MG tablet Take 100 mg by mouth daily    vitamin C (ASCORBIC ACID) 1000 MG TABS Take 1,000 mg by mouth daily    Vitamin D, Cholecalciferol, 25 MCG (1000 UT) CAPS Take 25 mcg by mouth daily     No current facility-administered medications for this visit.      Vitals:  /82   Pulse 69   Temp 97.8  F (36.6  C) (Oral)   Wt 71.4 kg (157 lb 6.4 oz)   SpO2 96%   BMI 23.24 kg/m      Exam:  GENERAL APPEARANCE: alert and no distress  HENT: mouth without ulcers or lesions  LYMPHATICS: no cervical or supraclavicular nodes  RESP: lungs clear to auscultation - no rales, rhonchi or wheezes  CV: regular  rhythm, normal rate, no rub, no murmur  EDEMA: no LE edema bilaterally  ABDOMEN: soft, nondistended, nontender, bowel sounds normal  MS: extremities normal - no gross deformities noted, no evidence of inflammation in joints, no muscle tenderness  SKIN: no rash    LABS:   CMP  Recent Labs   Lab Test 04/28/23  0919 02/28/23  1655 02/15/23  1325 10/07/22  1445 12/02/21  1016 09/22/21  1253 05/20/21  1311 05/14/21  1656 03/18/21  1319 01/18/21  0544     --  141 141 140   < > 138 143 139 143   POTASSIUM 4.7  --  4.5 4.3 4.7   < > 4.1 5.9* 4.8 4.0   CHLORIDE 102  --  103 102 109   < > 108 108 108 114*   CO2 29  --  28 29 29   < > 25 32 28 20   ANIONGAP 7  --  10 10 2*   < > 5 2* 3 9   GLC 97  --  99 92 103*   < > 89 84 88 92   BUN 18.1  --  14.1 13.6 16   < > 21 21 18 22   CR 1.47* 1.39* 1.45* 1.35* 1.36*   < > 1.34* 1.56* 1.32* 1.22   GFRESTIMATED 52* 56* 53* 58* 54*   < > 55* 46* 56* 62   GFRESTBLACK  --   --   --   --   --   --  64 53* 65 71   YUNIER 10.0  --  9.5 9.9 9.1   < > 9.0 10.0 9.0 7.9*    < > = values in this interval not displayed.     Recent Labs   Lab Test 10/07/22  1445 09/22/21  1325 01/18/21  0544 01/17/21  0354   BILITOTAL 0.7 1.0 1.1 0.8   ALKPHOS 97 84 102 92   ALT 18 30 37 28   AST 28 17 25 22     CBC  Recent Labs   Lab Test 04/28/23  0919 02/15/23  1325 10/07/22  1445 12/02/21  1016   HGB 15.4 14.2 16.2 15.0   WBC 6.2 5.3 5.0 5.2   RBC 5.00 4.59 5.07 4.58   HCT 46.0 43.5 46.9 44.6   MCV 92 95 93 97   MCH 30.8 30.9 32.0 32.8   MCHC 33.5 32.6 34.5 33.6   RDW 13.8 13.5 12.9 12.3    242 245 231     URINE STUDIES  Recent Labs   Lab Test 04/28/23  0940 10/07/22  1453 12/10/20  1232 05/21/19  1520   COLOR Yellow Yellow Light Yellow Straw   APPEARANCE Clear Clear Clear Clear   URINEGLC Negative Negative Negative Negative   URINEBILI Negative Negative Negative Negative   URINEKETONE Negative Negative 40* Negative   SG 1.023 1.025 1.004 1.005   UBLD Negative Negative Negative Moderate*   URINEPH  5.5 6.0 5.5 5.0   PROTEIN 10* Negative Negative Negative   NITRITE Negative Negative Negative Negative   LEUKEST Negative Negative Negative Negative   RBCU 5* 1 <1 2   WBCU 1 1 <1 <1     No lab results found.  PTH  No lab results found.  IRON STUDIES  No lab results found.      Nimesh Hoffman MD    Attestation signed by Tabitha Sánchez MD at 5/13/2023  9:43 AM:  I was present with the resident during the history and exam.  I discussed the case with the resident and agree with the findings as documented in the assessment and plan.    Tabitha Sánchez MD

## 2023-04-28 NOTE — PROGRESS NOTES
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  Patient had 5 RBC in his urine and if his creatinine is to go up we would repeat a CT scan abdomen pelvis to rule out stones obstruction but for now the creatinine is stable so there is no concern for obstruction right now     CaOx Nephrolithiasis  Patient with history of recurrent stones with analysis in 2019 revealing a composition of calcium oxalate.  Also obtained Litholink which suggested hypocitraturia, hypomagnesemia and hyperoxaluria.  This is likely in the setting of Crohn's disease and bowel resection history.    Repeat Litholink 2/16/2021 revealed low urine volumes 0.9-1.2L per day, urina Ca, Mg and Na were okay, citrate ~220-240.  Discussed dietary modifications but has been non compliant with them.      Plan:  - Low oxalate diet, reports taking half of the food mention on table for high oxalate food. Still loves eating chocolate  - Low Na diet for goal intake <2g per day  - No dietary Ca restriction and advised to eat a cup of yogurt with each meal  - Dairy intake with meals to add Ca to meals, pt does note taking cheese, ice cream and yogurt  - Increase fluid intake to 3L or greater per day, discussed that this will probably be the highest yield given low urine volumes but patient reports he drink around 2 liter of water and he does not remember to drink more water than this and is not sure if he will be able to increase it,  - Discuss about stopping vitamin C supplement which he refused as he is concern that it is going to make his immunity weaker. I advised him to cut down on dose as it increases production of oxalate. h is agreeable to take 500 mg every  other day  -Will start him on potassium citrate 10 dioni daily and montior his potassium in 2 weeks as in the past he was hyperkalemic and has a history of recurrence of danielle which can increase potassium in blood advised to hold the pills if he develop diarrhea and vomiting or is dehydrated.      # Electrolytes:   - Potassium; level: Normal  - Bicarbonate; level: Normal    # Mineral Bone Disorder:   - Secondary renal hyperparathyroidism; PTH level is: 97  - Vitamin D; level is: Not checked recently will check one today continue with the vitamin D tablet for now  - Calcium; level is:  Normal  - Phosphorus; level is: Normal      Assessment and plan was discussed with patient and he voiced his understanding and agreement.    Plan of care discussed and seen with Dr. Sánchez who is in agreement with the plan changes and addendum to follow      Consult:  Trevin Gee was seen in consultation at the request of Lacey Feng NP for routine follow up.    Reason for Visit:  Trevin Gee is a 67 year old male with CKD from nephrolithiasis , who presents for Follow up  HPI:  Patient present for follow up.  He has a history of recurrent nephrolithiasis and has been noncompliant with our recommendation in the past.  He drinks around 2 L of water and reports that he cannot drink more because of his work as he does not remember to drink more water.  He is still eats a lot of chocolates and on showing the foods that are high in oxalate he said he takes half of them usually.  He is also on vitamin C tablet and I explained to him that increased vitamin C intake can increase the amount of oxalate.  He was agreeable to cut it down to 500 mg every other day but says that he is taking it because of the COVID and to make his immune system stronger.      He is interested in potassium citrate to help increase his citrate.  In the past in the [facility] he had high oxalate low citrate and low amount of urine volume which was  around 900 mL to 1.2 L.  He says that his Crohn's disease is in remission and has not have had a flare for a long time.  He does continue a lot of dairy products.      No peripheral edema,nausea, vomiting, fever, chills, headache, numbness, tingling, cough, dyspnea, chest pain, abdominal pain, diarrhea, constipation,NSAID use, dysuria, and rash. Normal appetite, and no weight gain.    Renal History:   Kidney Disease and Medical Hx:  h/o Kidney Stones:Yes     ROS:   A comprehensive review of systems was obtained and negative, except as noted in the HPI or PMH.    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     Osteoporosis     Anxiety     Vitamin B 12 deficiency     History of kevin     Steroid dependent for adrenal supression     Adrenal insufficiency (H)     Bipolar 2 disorder (H)     Hx of psychiatric care     Hypertriglyceridemia     Lactose intolerance     Vertebral fracture, osteoporotic (H)     Esophageal reflux     Abdominal pain     Diarrhea     Colorectal anastomotic stricture     Abdominal cramping     Partial small bowel obstruction (H)     Chronic kidney disease, stage 3 (H)     Dehydration     Pain and swelling of knee, right     PMH:   Medical record was reviewed and PMH was discussed with patient and noted below.  Past Medical History:   Diagnosis Date     Anemia 2007     Anxiety 2012?    much worse since July 2014     Cataract 12/2007    both eyes, too much prednisone, implants     Coccidioidomycosis      Crohn disease (H)      Crohn's disease (H) 1/13/2015     Depressive disorder 7/2014    much worse since July 2014     Fracture 1956    green fracture of leg     Gallbladder problem 2005?    much gravel, removed     GERD (gastroesophageal reflux disease)      History of blood transfusion 1988    ulcerated anastomosis term. ilium bleed     Hypertriglyceridemia 7/8/2016     Infection 2007    persistant  coccidioidomycosis     Kidney stone various    both sides, all passed naturally     Mental health problem 2007    bipolar though perhaps different     Nephrolithiasis      Noninfectious ileitis      Osteoporosis 2007    osteoporosis, too much prednisone, last dexa 1/2014     Osteoporosis      Other nervous system complications 2007    prednisone overload induced kevin     Personal history of colonic polyps     small ones found during colonoscopies     Steroid-induced psychosis     Patient extremely sensitive to regular doses of steroids.  Unclear if this is due to chronic fluconazole use or genetic issue.  In the future, use caution when prescribing steroids.     TB (tuberculosis)      PSH:   Past Surgical History:   Procedure Laterality Date     APPENDECTOMY  1974    coincidental with Crohn's surgery     BACK SURGERY  12/2007    kyphoplasty on compressed 4th??? vertebra     BIOPSY  2007, 2013    lump on arm, knee, back of hand for coccidioidomycosis cult.     CHOLECYSTECTOMY  2005    much gravel, removed laparoscopically     COLONOSCOPY  7/14/2014 last    Dr. Catherine Bowden, [facility] - many previous     COLONOSCOPY Left 9/11/2015    Procedure: COMBINED COLONOSCOPY, SINGLE OR MULTIPLE BIOPSY/POLYPECTOMY BY BIOPSY;  Surgeon: Fransisco Leach MD;  Location: [facility]     COLONOSCOPY N/A 8/3/2016    Procedure: COMBINED COLONOSCOPY, SINGLE OR MULTIPLE BIOPSY/POLYPECTOMY BY BIOPSY;  Surgeon: Fransisco Leach MD;  Location: [facility]     COLONOSCOPY N/A 8/16/2017    Procedure: COMBINED COLONOSCOPY, SINGLE OR MULTIPLE BIOPSY/POLYPECTOMY BY BIOPSY;;  Surgeon: Fransisco Leach MD;  [facility]: [facility]     COLONOSCOPY N/A 10/1/2019    Procedure: Colonoscopy With Colonic Stent Insertion;  Surgeon: Robbie Cruz MD;  Location: [facility]     COLONOSCOPY N/A 2/5/2020    Procedure: COLONOSCOPY, FOREIGN BODY REMOVAL;  Surgeon: Robbie Cruz MD;  Location: [facility]     COLONOSCOPY N/A 5/26/2021     "Procedure: COLONOSCOPY, WITH COLONIC STENT INSERTION;  Surgeon: Robbie Cruz MD;  Location: [facility]     COLONOSCOPY N/A 12/6/2021    Procedure: COLONOSCOPY with dilation;  Surgeon: Robbie Cruz MD;  Location:  OR     ENT SURGERY  ?    upper endoscopy     ENTEROSCOPY SMALL BOWEL N/A 9/22/2021    Procedure: ENTEROSCOPY;  Surgeon: Robbie Cruz MD;  Location: [facility]     ESOPHAGOSCOPY, GASTROSCOPY, DUODENOSCOPY (EGD), COMBINED N/A 11/8/2016    Procedure: COMBINED ENDOSCOPIC ULTRASOUND, ESOPHAGOSCOPY, GASTROSCOPY, DUODENOSCOPY (EGD), FINE NEEDLE ASPIRATE/BIOPSY;  Surgeon: Guru Alexsandra Ballesteros MD;  Location: [facility]     ESOPHAGOSCOPY, GASTROSCOPY, DUODENOSCOPY (EGD), COMBINED N/A 11/8/2016    Procedure: COMBINED ESOPHAGOSCOPY, GASTROSCOPY, DUODENOSCOPY (EGD), BIOPSY SINGLE OR MULTIPLE;  Surgeon: Guru Alexsandra Ballesteros MD;  Location: [facility]     ESOPHAGOSCOPY, GASTROSCOPY, DUODENOSCOPY (EGD), COMBINED N/A 8/16/2017    Procedure: COMBINED ESOPHAGOSCOPY, GASTROSCOPY, DUODENOSCOPY (EGD), BIOPSY SINGLE OR MULTIPLE;;  Surgeon: Fransisco Leach MD;  Location:  OR     EYE SURGERY  12/2007    cataract surgery both sides     GI SURGERY  1974, 1986(x2), 1989    Crohn's, 1974 RO 18\" term. ilium + 9\" asc. colon all one pc     SOFT TISSUE SURGERY  3/2013    removed buildup on back of r hand, coccidioidomycosis       Family Hx:   Family History   Problem Relation Age of Onset     Heart Failure Father      Musculoskeletal Disorder Father         Parkinson's     Cancer - colorectal Mother      Cerebrovascular Disease Paternal Grandmother      Cancer Other      Musculoskeletal Disorder Brother         Parkinson's     Anesthesia Reaction No family hx of      Deep Vein Thrombosis (DVT) No family hx of      Personal Hx:   Social History     Tobacco Use     Smoking status: Never     Smokeless tobacco: Never   Vaping Use     Vaping status: Not on file   Substance Use Topics     " Alcohol use: Not Currently     Comment: sometimes one glass of wine a week       Allergies:  Allergies   Allergen Reactions     Prednisone Other (See Comments)     Diana with more than 2.5mg chronic dosing of prednisone due to fluconazole interaction per patient.        Medications:  Current Outpatient Medications   Medication Sig     colchicine (COLCYRS) 0.6 MG tablet Take 1 tablet (0.6 mg) by mouth daily as needed for moderate pain     Cyanocobalamin (VITAMIN B 12) 500 MCG TABS Take 500 mcg by mouth every evening      fluconazole (DIFLUCAN) 200 MG tablet Take 1 tablet (200 mg) by mouth daily for 90 days     fluconazole (DIFLUCAN) 200 MG tablet Take 1 tablet (200 mg) by mouth daily     gabapentin (NEURONTIN) 300 MG capsule Take 2 capsules (600 mg) by mouth 2 times daily     Lysine 1000 MG TABS Take 1,500 mg by mouth 2 times daily     MELATONIN PO Take 1.5 mg by mouth nightly as needed (PT last dose 1.27.2020)     multivitamin w/minerals (THERA-VIT-M) tablet Take 1 tablet by mouth every evening      traZODone (DESYREL) 50 MG tablet Take 1 tablet (50 mg) by mouth At Bedtime     vitamin B6 (PYRIDOXINE) 100 MG tablet Take 100 mg by mouth daily     vitamin C (ASCORBIC ACID) 1000 MG TABS Take 1,000 mg by mouth daily     Vitamin D, Cholecalciferol, 25 MCG (1000 UT) CAPS Take 25 mcg by mouth daily     No current facility-administered medications for this visit.      Vitals:  /82   Pulse 69   Temp 97.8  F (36.6  C) (Oral)   Wt 71.4 kg (157 lb 6.4 oz)   SpO2 96%   BMI 23.24 kg/m      Exam:  GENERAL APPEARANCE: alert and no distress  HENT: mouth without ulcers or lesions  LYMPHATICS: no cervical or supraclavicular nodes  RESP: lungs clear to auscultation - no rales, rhonchi or wheezes  CV: regular rhythm, normal rate, no rub, no murmur  EDEMA: no LE edema bilaterally  ABDOMEN: soft, nondistended, nontender, bowel sounds normal  MS: extremities normal - no gross deformities noted, no evidence of inflammation in  joints, no muscle tenderness  SKIN: no rash    LABS:   CMP  Recent Labs   Lab Test 04/28/23  0919 02/28/23  1655 02/15/23  1325 10/07/22  1445 12/02/21  1016 09/22/21  1253 05/20/21  1311 05/14/21  1656 03/18/21  1319 01/18/21  0544     --  141 141 140   < > 138 143 139 143   POTASSIUM 4.7  --  4.5 4.3 4.7   < > 4.1 5.9* 4.8 4.0   CHLORIDE 102  --  103 102 109   < > 108 108 108 114*   CO2 29  --  28 29 29   < > 25 32 28 20   ANIONGAP 7  --  10 10 2*   < > 5 2* 3 9   GLC 97  --  99 92 103*   < > 89 84 88 92   BUN 18.1  --  14.1 13.6 16   < > 21 21 18 22   CR 1.47* 1.39* 1.45* 1.35* 1.36*   < > 1.34* 1.56* 1.32* 1.22   GFRESTIMATED 52* 56* 53* 58* 54*   < > 55* 46* 56* 62   GFRESTBLACK  --   --   --   --   --   --  64 53* 65 71   YUNIER 10.0  --  9.5 9.9 9.1   < > 9.0 10.0 9.0 7.9*    < > = values in this interval not displayed.     Recent Labs   Lab Test 10/07/22  1445 09/22/21  1325 01/18/21  0544 01/17/21  0354   BILITOTAL 0.7 1.0 1.1 0.8   ALKPHOS 97 84 102 92   ALT 18 30 37 28   AST 28 17 25 22     CBC  Recent Labs   Lab Test 04/28/23  0919 02/15/23  1325 10/07/22  1445 12/02/21  1016   HGB 15.4 14.2 16.2 15.0   WBC 6.2 5.3 5.0 5.2   RBC 5.00 4.59 5.07 4.58   HCT 46.0 43.5 46.9 44.6   MCV 92 95 93 97   MCH 30.8 30.9 32.0 32.8   MCHC 33.5 32.6 34.5 33.6   RDW 13.8 13.5 12.9 12.3    242 245 231     URINE STUDIES  Recent Labs   Lab Test 04/28/23  0940 10/07/22  1453 12/10/20  1232 05/21/19  1520   COLOR Yellow Yellow Light Yellow Straw   APPEARANCE Clear Clear Clear Clear   URINEGLC Negative Negative Negative Negative   URINEBILI Negative Negative Negative Negative   URINEKETONE Negative Negative 40* Negative   SG 1.023 1.025 1.004 1.005   UBLD Negative Negative Negative Moderate*   URINEPH 5.5 6.0 5.5 5.0   PROTEIN 10* Negative Negative Negative   NITRITE Negative Negative Negative Negative   LEUKEST Negative Negative Negative Negative   RBCU 5* 1 <1 2   WBCU 1 1 <1 <1     No lab results found.  PTH  No  lab results found.  IRON STUDIES  No lab results found.      Nimesh Hoffman MD

## 2023-04-28 NOTE — NURSING NOTE
Chief Complaint   Patient presents with     RECHECK       /82   Pulse 69   Temp 97.8  F (36.6  C) (Oral)   Wt 71.4 kg (157 lb 6.4 oz)   SpO2 96%   BMI 23.24 kg/m      Ajay Wyatt on 4/28/2023 at 10:08 AM

## 2023-04-29 LAB — DEPRECATED CALCIDIOL+CALCIFEROL SERPL-MC: 31 UG/L (ref 20–75)

## 2023-05-11 ENCOUNTER — LAB (OUTPATIENT)
Dept: LAB | Facility: CLINIC | Age: 68
End: 2023-05-11
Payer: COMMERCIAL

## 2023-05-11 DIAGNOSIS — N20.0 NEPHROLITHIASIS: ICD-10-CM

## 2023-05-11 PROCEDURE — 80048 BASIC METABOLIC PNL TOTAL CA: CPT

## 2023-05-11 PROCEDURE — 36415 COLL VENOUS BLD VENIPUNCTURE: CPT

## 2023-05-12 LAB
ANION GAP SERPL CALCULATED.3IONS-SCNC: 14 MMOL/L (ref 7–15)
BUN SERPL-MCNC: 16.6 MG/DL (ref 8–23)
CALCIUM SERPL-MCNC: 9.2 MG/DL (ref 8.8–10.2)
CHLORIDE SERPL-SCNC: 105 MMOL/L (ref 98–107)
CREAT SERPL-MCNC: 1.43 MG/DL (ref 0.67–1.17)
DEPRECATED HCO3 PLAS-SCNC: 25 MMOL/L (ref 22–29)
GFR SERPL CREATININE-BSD FRML MDRD: 54 ML/MIN/1.73M2
GLUCOSE SERPL-MCNC: 111 MG/DL (ref 70–99)
POTASSIUM SERPL-SCNC: 5 MMOL/L (ref 3.4–5.3)
SODIUM SERPL-SCNC: 144 MMOL/L (ref 136–145)

## 2023-05-16 VITALS
OXYGEN SATURATION: 98 % | RESPIRATION RATE: 18 BRPM | BODY MASS INDEX: 21.47 KG/M2 | SYSTOLIC BLOOD PRESSURE: 147 MMHG | DIASTOLIC BLOOD PRESSURE: 83 MMHG | WEIGHT: 150 LBS | HEART RATE: 71 BPM | TEMPERATURE: 97.8 F | HEIGHT: 70 IN

## 2023-05-16 PROCEDURE — 76775 US EXAM ABDO BACK WALL LIM: CPT | Mod: 26 | Performed by: EMERGENCY MEDICINE

## 2023-05-16 PROCEDURE — 99285 EMERGENCY DEPT VISIT HI MDM: CPT | Mod: 25 | Performed by: EMERGENCY MEDICINE

## 2023-05-16 PROCEDURE — 76775 US EXAM ABDO BACK WALL LIM: CPT | Performed by: EMERGENCY MEDICINE

## 2023-05-17 ENCOUNTER — HOSPITAL ENCOUNTER (EMERGENCY)
Facility: CLINIC | Age: 68
Discharge: HOME | End: 2023-05-17
Attending: EMERGENCY MEDICINE | Admitting: EMERGENCY MEDICINE
Payer: COMMERCIAL

## 2023-05-17 ENCOUNTER — APPOINTMENT (OUTPATIENT)
Dept: CT IMAGING | Facility: CLINIC | Age: 68
End: 2023-05-17
Attending: EMERGENCY MEDICINE
Payer: COMMERCIAL

## 2023-05-17 ENCOUNTER — TELEPHONE (OUTPATIENT)
Dept: UROLOGY | Facility: CLINIC | Age: 68
End: 2023-05-17

## 2023-05-17 ENCOUNTER — ANCILLARY PROCEDURE (OUTPATIENT)
Dept: ULTRASOUND IMAGING | Facility: CLINIC | Age: 68
End: 2023-05-17
Attending: EMERGENCY MEDICINE
Payer: COMMERCIAL

## 2023-05-17 DIAGNOSIS — R30.9 PAINFUL URINATION: ICD-10-CM

## 2023-05-17 DIAGNOSIS — N20.1 URETEROLITHIASIS: ICD-10-CM

## 2023-05-17 LAB
ALBUMIN UR-MCNC: 20 MG/DL
ANION GAP SERPL CALCULATED.3IONS-SCNC: 12 MMOL/L (ref 7–15)
APPEARANCE UR: CLEAR
BASOPHILS # BLD AUTO: 0.1 10E3/UL (ref 0–0.2)
BASOPHILS NFR BLD AUTO: 1 %
BILIRUB UR QL STRIP: NEGATIVE
BUN SERPL-MCNC: 16.3 MG/DL (ref 8–23)
CALCIUM SERPL-MCNC: 9.4 MG/DL (ref 8.8–10.2)
CHLORIDE SERPL-SCNC: 105 MMOL/L (ref 98–107)
COLOR UR AUTO: YELLOW
CREAT SERPL-MCNC: 1.34 MG/DL (ref 0.67–1.17)
DEPRECATED HCO3 PLAS-SCNC: 24 MMOL/L (ref 22–29)
EOSINOPHIL # BLD AUTO: 0.1 10E3/UL (ref 0–0.7)
EOSINOPHIL NFR BLD AUTO: 1 %
ERYTHROCYTE [DISTWIDTH] IN BLOOD BY AUTOMATED COUNT: 13.6 % (ref 10–15)
GFR SERPL CREATININE-BSD FRML MDRD: 58 ML/MIN/1.73M2
GLUCOSE SERPL-MCNC: 92 MG/DL (ref 70–99)
GLUCOSE UR STRIP-MCNC: NEGATIVE MG/DL
HCT VFR BLD AUTO: 45.3 % (ref 40–53)
HGB BLD-MCNC: 15 G/DL (ref 13.3–17.7)
HGB UR QL STRIP: ABNORMAL
IMM GRANULOCYTES # BLD: 0 10E3/UL
IMM GRANULOCYTES NFR BLD: 0 %
KETONES UR STRIP-MCNC: NEGATIVE MG/DL
LEUKOCYTE ESTERASE UR QL STRIP: NEGATIVE
LYMPHOCYTES # BLD AUTO: 1.8 10E3/UL (ref 0.8–5.3)
LYMPHOCYTES NFR BLD AUTO: 24 %
MCH RBC QN AUTO: 31.2 PG (ref 26.5–33)
MCHC RBC AUTO-ENTMCNC: 33.1 G/DL (ref 31.5–36.5)
MCV RBC AUTO: 94 FL (ref 78–100)
MONOCYTES # BLD AUTO: 0.7 10E3/UL (ref 0–1.3)
MONOCYTES NFR BLD AUTO: 9 %
MUCOUS THREADS #/AREA URNS LPF: PRESENT /LPF
NEUTROPHILS # BLD AUTO: 4.9 10E3/UL (ref 1.6–8.3)
NEUTROPHILS NFR BLD AUTO: 65 %
NITRATE UR QL: NEGATIVE
NRBC # BLD AUTO: 0 10E3/UL
NRBC BLD AUTO-RTO: 0 /100
PH UR STRIP: 5.5 [PH] (ref 5–7)
PLATELET # BLD AUTO: 219 10E3/UL (ref 150–450)
POTASSIUM SERPL-SCNC: 4.5 MMOL/L (ref 3.4–5.3)
RBC # BLD AUTO: 4.81 10E6/UL (ref 4.4–5.9)
RBC URINE: 71 /HPF
SODIUM SERPL-SCNC: 141 MMOL/L (ref 136–145)
SP GR UR STRIP: 1.02 (ref 1–1.03)
UROBILINOGEN UR STRIP-MCNC: NORMAL MG/DL
WBC # BLD AUTO: 7.6 10E3/UL (ref 4–11)
WBC URINE: 11 /HPF

## 2023-05-17 PROCEDURE — 87086 URINE CULTURE/COLONY COUNT: CPT | Performed by: EMERGENCY MEDICINE

## 2023-05-17 PROCEDURE — 36415 COLL VENOUS BLD VENIPUNCTURE: CPT | Performed by: EMERGENCY MEDICINE

## 2023-05-17 PROCEDURE — 81001 URINALYSIS AUTO W/SCOPE: CPT | Performed by: EMERGENCY MEDICINE

## 2023-05-17 PROCEDURE — 80048 BASIC METABOLIC PNL TOTAL CA: CPT | Performed by: EMERGENCY MEDICINE

## 2023-05-17 PROCEDURE — 74176 CT ABD & PELVIS W/O CONTRAST: CPT

## 2023-05-17 PROCEDURE — 85025 COMPLETE CBC W/AUTO DIFF WBC: CPT | Performed by: EMERGENCY MEDICINE

## 2023-05-17 PROCEDURE — 74176 CT ABD & PELVIS W/O CONTRAST: CPT | Mod: 26 | Performed by: RADIOLOGY

## 2023-05-17 RX ORDER — TAMSULOSIN HYDROCHLORIDE 0.4 MG/1
0.4 CAPSULE ORAL DAILY
Qty: 10 CAPSULE | Refills: 0 | Status: SHIPPED | OUTPATIENT
Start: 2023-05-17 | End: 2023-05-27

## 2023-05-17 ASSESSMENT — ENCOUNTER SYMPTOMS
DYSURIA: 1
FEVER: 0
ABDOMINAL PAIN: 0
DIFFICULTY URINATING: 1

## 2023-05-17 ASSESSMENT — ACTIVITIES OF DAILY LIVING (ADL)
ADLS_ACUITY_SCORE: 33

## 2023-05-17 NOTE — TELEPHONE ENCOUNTER
[facility] Call Center    Phone Message    May a detailed message be left on voicemail: yes     Reason for Call: Other: .  Pt has referral to be seen for kidney stones. Writer unable to schedule due to it not being triage accepted as of yet. Please call pt     Action Taken: Message routed to:  Other: [facility]    Travel Screening: Not Applicable

## 2023-05-17 NOTE — DISCHARGE INSTRUCTIONS
Instructions from your doctor today:  Emergency Department (ED) testing is focused on the potential causes of your symptoms that are the most dangerous possibilities, and cannot cover every possibility. Based on the evaluation, it was deemed sufficiently safe to discharge and continue management through the clinics. Thus, follow-up is very important to assess for improvement/worsening, potential further testing, and potential treatment adjustments. If you were given opioid pain medications or other medications that can make you drowsy while in the ED, you should not drive for at least several hours and not until you feel completely back to normal.     Please make an appointment to follow up with:  - Urology Clinic (phone: [phone]) in 4-7 days. A referral has been placed and they should call you to schedule. If you do not get a call by this coming afternoon then call the above number.   - If you do not have a primary care provider, you can be seen in follow-up and establish care by calling any of the clinics below:     - [facility] (phone: [phone])     - Primary Care / [facility] (phone: [phone])   - Have your clinic provider review the results from today's visit with you again, including any potential follow-up or additional testing that may be needed based on the results. Occasionally, incidental findings are found on later review by radiologists that may need follow-up.     Return to the Emergency Department immediately if you have fever, worsening symptoms, or any other urgent health concerns.

## 2023-05-17 NOTE — ED PROVIDER NOTES
History     Chief Complaint   Patient presents with     Urinary Retention     HPI  Trevin Gee is a 68 year old male with PMH notable for Crohn's disease, Bipolar 2, CKD3 disease who presents to the ED with concern for urinary retention. Pt presents to ED with difficulty urinating since 05/13. He notes temporary blockage that cleared saturday. However, yesterday he developed a burning sensation in his penis and believed to be a UTI. To relieve sx he began taking a supplement Uralive, approximately 3 dosages, which he is unsure has helped with sx. This morning he developed a sharp pain, unsure if related to a residual stone in his urethra. To help pass this possible stone he increased his water intake. He reports no visible deformities in his penis. He also tried tylenol at 10AM to help with pain, which has provided mild relief. Has urinated/elimated a couple 100ml of urine throughout the whole day. He last urinated at 8PM. Denies fever and abd pain.     Past Medical History  Past Medical History:   Diagnosis Date     Anemia 2007     Anxiety 2012?    much worse since July 2014     Cataract 12/2007    both eyes, too much prednisone, implants     Coccidioidomycosis      Crohn disease (H)      Crohn's disease (H) 1/13/2015     Depressive disorder 7/2014    much worse since July 2014     Fracture 1956    green fracture of leg     Gallbladder problem 2005?    much gravel, removed     GERD (gastroesophageal reflux disease)      History of blood transfusion 1988    ulcerated anastomosis term. ilium bleed     Hypertriglyceridemia 7/8/2016     Infection 2007    persistant coccidioidomycosis     Kidney stone various    both sides, all passed naturally     Mental health problem 2007    bipolar though perhaps different     Nephrolithiasis      Noninfectious ileitis      Osteoporosis 2007    osteoporosis, too much prednisone, last dexa 1/2014     Osteoporosis      Other nervous system complications 2007    prednisone  overload induced kevin     Personal history of colonic polyps     small ones found during colonoscopies     Steroid-induced psychosis     Patient extremely sensitive to regular doses of steroids.  Unclear if this is due to chronic fluconazole use or genetic issue.  In the future, use caution when prescribing steroids.     TB (tuberculosis)      Past Surgical History:   Procedure Laterality Date     APPENDECTOMY  1974    coincidental with Crohn's surgery     BACK SURGERY  12/2007    kyphoplasty on compressed 4th??? vertebra     BIOPSY  2007, 2013    lump on arm, knee, back of hand for coccidioidomycosis cult.     CHOLECYSTECTOMY  2005    much gravel, removed laparoscopically     COLONOSCOPY  7/14/2014 last    Dr. Catherine Bowden, [facility] - many previous     COLONOSCOPY Left 9/11/2015    Procedure: COMBINED COLONOSCOPY, SINGLE OR MULTIPLE BIOPSY/POLYPECTOMY BY BIOPSY;  Surgeon: Fransisco Leach MD;  [facility]: [facility]     COLONOSCOPY N/A 8/3/2016    Procedure: COMBINED COLONOSCOPY, SINGLE OR MULTIPLE BIOPSY/POLYPECTOMY BY BIOPSY;  Surgeon: Fransisco Leach MD;  Location: [facility]     COLONOSCOPY N/A 8/16/2017    Procedure: COMBINED COLONOSCOPY, SINGLE OR MULTIPLE BIOPSY/POLYPECTOMY BY BIOPSY;;  Surgeon: Fransisco Leach MD;  [facility]     COLONOSCOPY N/A 10/1/2019    Procedure: Colonoscopy With Colonic Stent Insertion;  Surgeon: Robbie Cruz MD;  [facility]     COLONOSCOPY N/A 2/5/2020    Procedure: COLONOSCOPY, FOREIGN BODY REMOVAL;  Surgeon: Robbie Cruz MD;  [facility]     COLONOSCOPY N/A 5/26/2021    Procedure: COLONOSCOPY, WITH COLONIC STENT INSERTION;  Surgeon: Robbie Cruz MD;  [facility]     COLONOSCOPY N/A 12/6/2021    Procedure: COLONOSCOPY with dilation;  Surgeon: Robbie Cruz MD;  Location: [facility]     ENT SURGERY  ?    upper endoscopy     ENTEROSCOPY SMALL BOWEL N/A 9/22/2021    Procedure: ENTEROSCOPY;  Surgeon: Nancy  "Robbie Franklin MD;  [facility]     ESOPHAGOSCOPY, GASTROSCOPY, DUODENOSCOPY (EGD), COMBINED N/A 11/8/2016    Procedure: COMBINED ENDOSCOPIC ULTRASOUND, ESOPHAGOSCOPY, GASTROSCOPY, DUODENOSCOPY (EGD), FINE NEEDLE ASPIRATE/BIOPSY;  Surgeon: Guru Alexsandra Ballesteros MD;  [facility]: [facility]     ESOPHAGOSCOPY, GASTROSCOPY, DUODENOSCOPY (EGD), COMBINED N/A 11/8/2016    Procedure: COMBINED ESOPHAGOSCOPY, GASTROSCOPY, DUODENOSCOPY (EGD), BIOPSY SINGLE OR MULTIPLE;  Surgeon: Guru Alexsandra Ballesteros MD;  [facility]: [facility]     ESOPHAGOSCOPY, GASTROSCOPY, DUODENOSCOPY (EGD), COMBINED N/A 8/16/2017    Procedure: COMBINED ESOPHAGOSCOPY, GASTROSCOPY, DUODENOSCOPY (EGD), BIOPSY SINGLE OR MULTIPLE;;  Surgeon: Fransisco Leach MD;  [facility]     EYE SURGERY  12/2007    cataract surgery both sides     GI SURGERY  1974, 1986(x2), 1989    Crohn's, 1974 RO 18\" term. ilium + 9\" asc. colon all one pc     SOFT TISSUE SURGERY  3/2013    removed buildup on back of r hand, coccidioidomycosis     tamsulosin (FLOMAX) 0.4 MG capsule  colchicine (COLCYRS) 0.6 MG tablet  Cyanocobalamin (VITAMIN B 12) 500 MCG TABS  fluconazole (DIFLUCAN) 200 MG tablet  fluconazole (DIFLUCAN) 200 MG tablet  gabapentin (NEURONTIN) 300 MG capsule  Lysine 1000 MG TABS  MELATONIN PO  multivitamin w/minerals (THERA-VIT-M) tablet  potassium citrate (UROCIT-K) 10 MEQ (1080 MG) CR tablet  traZODone (DESYREL) 50 MG tablet  vitamin B6 (PYRIDOXINE) 100 MG tablet  vitamin C (ASCORBIC ACID) 1000 MG TABS  Vitamin D, Cholecalciferol, 25 MCG (1000 UT) CAPS      Allergies   Allergen Reactions     Prednisone Other (See Comments)     Diana with more than 2.5mg chronic dosing of prednisone due to fluconazole interaction per patient.      Family History  Family History   Problem Relation Age of Onset     Heart Failure Father      Musculoskeletal Disorder Father         Parkinson's     Cancer - colorectal Mother      Cerebrovascular Disease Paternal " "Grandmother      Cancer Other      Musculoskeletal Disorder Brother         Parkinson's     Anesthesia Reaction No family hx of      Deep Vein Thrombosis (DVT) No family hx of      Social History   Social History     Tobacco Use     Smoking status: Never     Smokeless tobacco: Never   Substance Use Topics     Alcohol use: Not Currently     Comment: sometimes one glass of wine a week     Drug use: No      Past medical history, past surgical history, medications, allergies, family history, and social history were reviewed with the patient. No additional pertinent items.      Review of Systems   Constitutional: Negative for fever.   Gastrointestinal: Negative for abdominal pain.   Genitourinary: Positive for difficulty urinating and dysuria.       Physical Exam   BP: (!) 147/83  Pulse: 71  Temp: 97.8  F (36.6  C)  Resp: 18  Height: 177.8 cm (5' 10\")  Weight: 68 kg (150 lb)  SpO2: 98 %    Physical Exam  General: no acute distress. Appears stated age.   HENT:  MMM, no oropharyngeal lesions  Eyes: PERRL, normal sclerae  Cardio: regular rate. Regular rhythm. Extremities well perfused  Resp: Normal work of breathing, normal respiratory rate.  Chest/Back: no visual signs of trauma, no CVA tenderness  Abdomen: no abd tenderness, non-distended, no rebound, no guarding  Neuro: alert and fully oriented. CN II-XII grossly intact. Grossly normal strength and sensation in all extremities.   MSK: no deformities. Grossly normal ROM in extremities.   Integumentary/Skin: no rash visualized, normal color  Psych: normal affect, normal behavior    ED Course      Procedures  Results for orders placed during the hospital encounter of 05/17/23    POC US RETROPERITONEAL LIMITED    Impression  Limited Bedside ED Renal & Bladder Ultrasound:  PERFORMED BY: Dr. Stevan Marcum MD  INDICATIONS:  Inability to void  PROBE: Low frequency convex probe  BODY LOCATION:  Abdomen (retroperitoneum and bladder)  FINDINGS:  The ultrasound was performed " with longitudinal and transverse views of the kidneys and bladder.  Right Kidney: mild hydronephrosis. No visualized cysts.  Left Kidney: No hydronephrosis. No visualized cysts.  Bladder: pre-void size 90 ml with small amount of sediment. Post-void 10 ml. Bladder wall grossly mildly thickened.  INTERPRETATION:  Mild right hydronephrosis. No left hydronephrosis. No visualized renal cysts. Bladder size grossly normal with thickened wall, no evidence of retention in the bladder.  IMAGE DOCUMENTATION: Images were archived to PACs system.          Labs Ordered and Resulted from Time of ED Arrival to Time of ED Departure   ROUTINE UA WITH MICROSCOPIC REFLEX TO CULTURE - Abnormal       Result Value    Color Urine Yellow      Appearance Urine Clear      Glucose Urine Negative      Bilirubin Urine Negative      Ketones Urine Negative      Specific Gravity Urine 1.024      Blood Urine Moderate (*)     pH Urine 5.5      Protein Albumin Urine 20 (*)     Urobilinogen Urine Normal      Nitrite Urine Negative      Leukocyte Esterase Urine Negative      Mucus Urine Present (*)     RBC Urine 71 (*)     WBC Urine 11 (*)    BASIC METABOLIC PANEL - Abnormal    Sodium 141      Potassium 4.5      Chloride 105      Carbon Dioxide (CO2) 24      Anion Gap 12      Urea Nitrogen 16.3      Creatinine 1.34 (*)     Calcium 9.4      Glucose 92      GFR Estimate 58 (*)    CBC WITH PLATELETS AND DIFFERENTIAL    WBC Count 7.6      RBC Count 4.81      Hemoglobin 15.0      Hematocrit 45.3      MCV 94      MCH 31.2      MCHC 33.1      RDW 13.6      Platelet Count 219      % Neutrophils 65      % Lymphocytes 24      % Monocytes 9      % Eosinophils 1      % Basophils 1      % Immature Granulocytes 0      NRBCs per 100 WBC 0      Absolute Neutrophils 4.9      Absolute Lymphocytes 1.8      Absolute Monocytes 0.7      Absolute Eosinophils 0.1      Absolute Basophils 0.1      Absolute Immature Granulocytes 0.0      Absolute NRBCs 0.0     URINE CULTURE      Abd/pelvis CT no contrast - Stone Protocol   Final Result   IMPRESSION:    1.  Mild to moderate right hydronephrosis secondary to an 8 mm right ureterovesicular junction stone.      2.  Multiple calcified intrarenal stones bilaterally.         POC US RETROPERITONEAL LIMITED   Final Result   Limited Bedside ED Renal & Bladder Ultrasound:   PERFORMED BY: Dr. Stevan Marcum MD   INDICATIONS:  Inability to void   PROBE: Low frequency convex probe   BODY LOCATION:  Abdomen (retroperitoneum and bladder)   FINDINGS:  The ultrasound was performed with longitudinal and transverse views of the kidneys and bladder.    Right Kidney: mild hydronephrosis. No visualized cysts.     Left Kidney: No hydronephrosis. No visualized cysts.    Bladder: pre-void size 90 ml with small amount of sediment. Post-void 10 ml. Bladder wall grossly mildly thickened.    INTERPRETATION:  Mild right hydronephrosis. No left hydronephrosis. No visualized renal cysts. Bladder size grossly normal with thickened wall, no evidence of retention in the bladder.    IMAGE DOCUMENTATION: Images were archived to PACs system.                  Medical Decision Making  The patient's presentation was of high complexity (a chronic illness severe exacerbation, progression, or side effect of treatment).    The patient's evaluation involved:  review of 3+ test result(s) ordered prior to this encounter (see separate area of note for details)  independent interpretation of testing performed by another health professional (CT)    The patient's management necessitated moderate risk (prescription drug management including medications given in the ED).      Assessments & Plan (with Medical Decision Making)   Patient presenting with urinary pain and reported decreased urination. Vitals in the ED unremarkable. Nursing notes reviewed.     Creatinine 1.34, at baseline.  UA without evidence of UTI.  Bedside renal/bladder ultrasound showed no evidence of urinary retention  in the bladder, did show unilateral hydronephrosis on the right.  CT this performed.  CT demonstrated 8 mm stone at the right UVJ.  Patient with minimal pain while in the ED.    The complete clinical picture is most consistent with ureterolithiasis with resulting mild right hydronephrosis. After counseling on the diagnosis, work-up, and treatment plan, the patient was discharged to home.  Tamsulosin prescribed, recommended as needed Norco which patient has at home and recommended straining urine. The patient was advised to follow-up with urology in a few days, referral placed. The patient was advised to return to the ED if worsening symptoms, fever, or any urgent health concerns.     Final diagnoses:   Ureterolithiasis   Painful urination     Discharge Medication List as of 5/17/2023  6:52 AM      START taking these medications    Details   tamsulosin (FLOMAX) 0.4 MG capsule Take 1 capsule (0.4 mg) by mouth daily for 10 doses, Disp-10 capsule, R-0, E-Prescribe           Scribe Disclosure:   FRANCES FORBES, am serving as a scribe; to document services personally performed by No name on file -based on data collection and the provider's statements to me.     Provider Disclosure:  I agree with above History, Review of Systems, Physical exam and Plan.  I have reviewed the content of the documentation and have edited it as needed. I have personally performed the services documented here and the documentation accurately represents those services and the decisions I have made.      Electronically signed by:  Stevan Marcum MD   Emergency Medicine   [facility] EMERGENCY DEPARTMENT  5/16/2023     Stevan Marcum MD  05/17/23 0811

## 2023-05-17 NOTE — ED TRIAGE NOTES
Patient presents to the ER with complaints of retaining urine. He stated that throughout the day he has very little output with moderate amount of intake and that he has been unable to urinate the last 3 hours.      Triage Assessment     Row Name 05/16/23 7914       Triage Assessment (Adult)    Airway WDL WDL       Respiratory WDL    Respiratory WDL WDL       Skin Circulation/Temperature WDL    Skin Circulation/Temperature WDL WDL       Cardiac WDL    Cardiac WDL WDL       Peripheral/Neurovascular WDL    Peripheral Neurovascular WDL WDL       Cognitive/Neuro/Behavioral WDL    Cognitive/Neuro/Behavioral WDL WDL

## 2023-05-18 ENCOUNTER — VIRTUAL VISIT (OUTPATIENT)
Dept: UROLOGY | Facility: CLINIC | Age: 68
End: 2023-05-18
Payer: COMMERCIAL

## 2023-05-18 ENCOUNTER — HOSPITAL ENCOUNTER (OUTPATIENT)
Facility: AMBULATORY SURGERY CENTER | Age: 68
End: 2023-05-18
Attending: UROLOGY
Payer: COMMERCIAL

## 2023-05-18 ENCOUNTER — TELEPHONE (OUTPATIENT)
Dept: UROLOGY | Facility: CLINIC | Age: 68
End: 2023-05-18

## 2023-05-18 ENCOUNTER — PRE VISIT (OUTPATIENT)
Dept: UROLOGY | Facility: CLINIC | Age: 68
End: 2023-05-18

## 2023-05-18 DIAGNOSIS — N20.2 CALCULUS OF KIDNEY WITH CALCULUS OF URETER: Primary | ICD-10-CM

## 2023-05-18 DIAGNOSIS — N20.2 CALCULUS OF KIDNEY WITH CALCULUS OF URETER: ICD-10-CM

## 2023-05-18 DIAGNOSIS — N20.1 URETEROLITHIASIS: ICD-10-CM

## 2023-05-18 DIAGNOSIS — R30.9 PAINFUL URINATION: ICD-10-CM

## 2023-05-18 LAB — BACTERIA UR CULT: NO GROWTH

## 2023-05-18 PROCEDURE — 99204 OFFICE O/P NEW MOD 45 MIN: CPT | Mod: VID | Performed by: STUDENT IN AN ORGANIZED HEALTH CARE EDUCATION/TRAINING PROGRAM

## 2023-05-18 NOTE — RESULT ENCOUNTER NOTE
"Final urine culture report shows \"NO GROWTH\" and is NEGATIVE.  [facility] Emergency Dept discharge antibiotic: None  Recommendations in treatment per [facility] ED Lab result Urine culture protocol.  "

## 2023-05-18 NOTE — LETTER
5/18/2023       RE: Trevin Gee  [address]  [address]  [facility] [facility] [place] [facility] [address]     Dear Colleague,    Thank you for referring your patient, Trevin Gee, to the [facility] [place] [facility]. Please see a copy of my visit note below.    Patient states that they are in [place] at the time of their visit.  Patient is aware telehealth visit is billable and agrees to proceed.  Nadya Koo RN            UROLOGY OUTPATIENT VISIT      Chief Complaint:   kidney stone      Synopsis   Trevin Gee is a very pleasant AGE: 68 year old year old person with history of CaOx stones. Hx Crohn's disease and bowel resection history.      Per review nephrology notes  [facility] 2/16/2021 revealed low urine volumes 0.9-1.2L per day, urina Ca, Mg and Na were okay, citrate ~220-240    He was started on potassium citrate which he has taken about 4 doses of.    5/17 presented to the ER due to burning sensation. Was diagnosed with 8 mm right distal stone and discharged on trial of passage.     He has passed stone before. It was 90% CaOx monohydrate.  Never required surgery for stone.  Pain controlled currently  No fevers    Imaging   I personally reviewed the CT scan and by my interpretation it demonstrates 8 mm right UVJ stone, 4 mm and 5 mm renal stones on the right. Moderate R hydro.    He has left sided renal stones too. 3 stones about 3 mm each in different portion of kidney. No hydronephrosis.     The following distinct labs were reviewed   Most Recent 3 CBC's:Recent Labs   Lab Test 05/17/23  0146 04/28/23  0919 02/15/23  1325   WBC 7.6 6.2 5.3   HGB 15.0 15.4 14.2   MCV 94 92 95    215 242     Most Recent 3 BMP's:Recent Labs   Lab Test 05/17/23  0526 05/11/23  1804 04/28/23  0919    144 138   POTASSIUM 4.5 5.0 4.7   CHLORIDE 105 105 102   CO2 24 25 29   BUN 16.3 16.6 18.1   CR 1.34* 1.43* 1.47*   ANIONGAP 12 14 7   YUNIER 9.4  9.2 10.0   GLC 92 111* 97     Most Recent Urinalysis:Recent Labs   Lab Test 05/17/23  0124   COLOR Yellow   APPEARANCE Clear   URINEGLC Negative   URINEBILI Negative   URINEKETONE Negative   SG 1.024   UBLD Moderate*   URINEPH 5.5   PROTEIN 20*   NITRITE Negative   LEUKEST Negative   RBCU 71*   WBCU 11*       Medical Comorbidities      Past Medical History:   Diagnosis Date    Anemia 2007    Anxiety 2012?    much worse since July 2014    Cataract 12/2007    both eyes, too much prednisone, implants    Coccidioidomycosis     Crohn disease (H)     Crohn's disease (H) 1/13/2015    Depressive disorder 7/2014    much worse since July 2014    Fracture 1956    green fracture of leg    Gallbladder problem 2005?    much gravel, removed    GERD (gastroesophageal reflux disease)     History of blood transfusion 1988    ulcerated anastomosis term. ilium bleed    Hypertriglyceridemia 7/8/2016    Infection 2007    persistant coccidioidomycosis    Kidney stone various    both sides, all passed naturally    Mental health problem 2007    bipolar though perhaps different    Nephrolithiasis     Noninfectious ileitis     Osteoporosis 2007    osteoporosis, too much prednisone, last dexa 1/2014    Osteoporosis     Other nervous system complications 2007    prednisone overload induced kevin    Personal history of colonic polyps     small ones found during colonoscopies    Steroid-induced psychosis     Patient extremely sensitive to regular doses of steroids.  Unclear if this is due to chronic fluconazole use or genetic issue.  In the future, use caution when prescribing steroids.    TB (tuberculosis)                Medications     Current Outpatient Medications   Medication    colchicine (COLCYRS) 0.6 MG tablet    Cyanocobalamin (VITAMIN B 12) 500 MCG TABS    fluconazole (DIFLUCAN) 200 MG tablet    fluconazole (DIFLUCAN) 200 MG tablet    gabapentin (NEURONTIN) 300 MG capsule    Lysine 1000 MG TABS    MELATONIN PO    multivitamin w/minerals  (THERA-VIT-M) tablet    potassium citrate (UROCIT-K) 10 MEQ (1080 MG) CR tablet    tamsulosin (FLOMAX) 0.4 MG capsule    traZODone (DESYREL) 50 MG tablet    vitamin B6 (PYRIDOXINE) 100 MG tablet    vitamin C (ASCORBIC ACID) 1000 MG TABS    Vitamin D, Cholecalciferol, 25 MCG (1000 UT) CAPS     No current facility-administered medications for this visit.            Assessment/Plan    68 year old year old person with history of CaOx stones. Hx Crohn's disease and bowel resection history.      1. Right Ureter Stone - has large right distal ureter stone 8x11 mm which I reviewed with him on CT. He also has some renal stones on same side. He has smaller stones in the left kidney. We discussed options of trying to pass this vs proceeding with surgery. I discussed that low chance of passage with this size and typically would wait at most 4 weeks to try and pass due to obstruction of kidney. We discussed treatment with ureteroscopy, laser lithotripsy, stent placement on the right side. We discussed risks of bleeding, infection, damage to ureter, side effects of stent    He would like to schedule for right ureteroscopy and if he passes stone then can cancel.       CC:  Lacey Feng    50 minutes spent on the clinical encounter date doing chart review, history and documentation and further activities as noted above  Video-Visit Details    Type of service:  Video Visit   Start time 11:49 pm  End time 12:36 pm  46 minutes    Originating Location (pt. Location): Home    Distant Location (provider location):  On-site  Platform used for Video Visit: Rosie      Sincerely,    Sebastián Coe MD

## 2023-05-18 NOTE — TELEPHONE ENCOUNTER
MEDICAL RECORDS REQUEST   [facility]  Urology Clinic  [address] [place] [address]  PHONE: [phone]  Fax: [phone]        FUTURE VISIT INFORMATION                                                   Trevin Gee, : 1955 scheduled for future visit at [facility]    APPOINTMENT INFORMATION:    Date: 23    Provider:  Carolin   Reason for Visit/Diagnosis:Ureterolithiasis        RECORDS REQUESTED FOR VISIT                                                     NOTES  STATUS/DETAILS   OFFICE NOTE from other specialist  yes [facility] ov 19, 19   DISCHARGE REPORT from the ER  yes ED visit 23   MEDICATION LIST  yes   LABS     URINALYSIS (UA)  yes 23, 23   KIDNEY STONE     CT STONE  yes CT abd pelvis 23   IMAGING (IMAGES & REPORT)  yes XR Abd 12/15/22     PRE-VISIT CHECKLIST      Record collection complete Yes

## 2023-05-18 NOTE — TELEPHONE ENCOUNTER
Spoke with: Patient    Date of surgery: Friday June 2 2023      Location: [facility]      Informed patient they will need a adult : YES      Pre op with provider: Patient will [place] [facility] [place]      H&P Scheduled in PAC- NA        Pre procedure covid : Not required       Additional imaging:  NA        Surgery Packet : Sent to patient via [employer]      Additional comments:Please call with surgery teaching

## 2023-05-18 NOTE — PROGRESS NOTES
UROLOGY OUTPATIENT VISIT      Chief Complaint:   kidney stone      Synopsis   Trevin Gee is a very pleasant AGE: 68 year old year old person with history of CaOx stones. Hx Crohn's disease and bowel resection history.      Per review nephrology notes  Litholink 2/16/2021 revealed low urine volumes 0.9-1.2L per day, urina Ca, Mg and Na were okay, citrate ~220-240    He was started on potassium citrate which he has taken about 4 doses of.    5/17 presented to the ER due to burning sensation. Was diagnosed with 8 mm right distal stone and discharged on trial of passage.     He has passed stone before. It was 90% CaOx monohydrate.  Never required surgery for stone.  Pain controlled currently  No fevers    Imaging   I personally reviewed the CT scan and by my interpretation it demonstrates 8 mm right UVJ stone, 4 mm and 5 mm renal stones on the right. Moderate R hydro.    He has left sided renal stones too. 3 stones about 3 mm each in different portion of kidney. No hydronephrosis.     The following distinct labs were reviewed   Most Recent 3 CBC's:Recent Labs   Lab Test 05/17/23  0146 04/28/23  0919 02/15/23  1325   WBC 7.6 6.2 5.3   HGB 15.0 15.4 14.2   MCV 94 92 95    215 242     Most Recent 3 BMP's:Recent Labs   Lab Test 05/17/23  0526 05/11/23  1804 04/28/23  0919    144 138   POTASSIUM 4.5 5.0 4.7   CHLORIDE 105 105 102   CO2 24 25 29   BUN 16.3 16.6 18.1   CR 1.34* 1.43* 1.47*   ANIONGAP 12 14 7   YUNIER 9.4 9.2 10.0   GLC 92 111* 97     Most Recent Urinalysis:Recent Labs   Lab Test 05/17/23  0124   COLOR Yellow   APPEARANCE Clear   URINEGLC Negative   URINEBILI Negative   URINEKETONE Negative   SG 1.024   UBLD Moderate*   URINEPH 5.5   PROTEIN 20*   NITRITE Negative   LEUKEST Negative   RBCU 71*   WBCU 11*       Medical Comorbidities      Past Medical History:   Diagnosis Date     Anemia 2007     Anxiety 2012?    much worse since July 2014     Cataract 12/2007    both eyes, too much  prednisone, implants     Coccidioidomycosis      Crohn disease (H)      Crohn's disease (H) 1/13/2015     Depressive disorder 7/2014    much worse since July 2014     Fracture 1956    green fracture of leg     Gallbladder problem 2005?    much gravel, removed     GERD (gastroesophageal reflux disease)      History of blood transfusion 1988    ulcerated anastomosis term. ilium bleed     Hypertriglyceridemia 7/8/2016     Infection 2007    persistant coccidioidomycosis     Kidney stone various    both sides, all passed naturally     Mental health problem 2007    bipolar though perhaps different     Nephrolithiasis      Noninfectious ileitis      Osteoporosis 2007    osteoporosis, too much prednisone, last dexa 1/2014     Osteoporosis      Other nervous system complications 2007    prednisone overload induced kevin     Personal history of colonic polyps     small ones found during colonoscopies     Steroid-induced psychosis     Patient extremely sensitive to regular doses of steroids.  Unclear if this is due to chronic fluconazole use or genetic issue.  In the future, use caution when prescribing steroids.     TB (tuberculosis)                Medications     Current Outpatient Medications   Medication     colchicine (COLCYRS) 0.6 MG tablet     Cyanocobalamin (VITAMIN B 12) 500 MCG TABS     fluconazole (DIFLUCAN) 200 MG tablet     fluconazole (DIFLUCAN) 200 MG tablet     gabapentin (NEURONTIN) 300 MG capsule     Lysine 1000 MG TABS     MELATONIN PO     multivitamin w/minerals (THERA-VIT-M) tablet     potassium citrate (UROCIT-K) 10 MEQ (1080 MG) CR tablet     tamsulosin (FLOMAX) 0.4 MG capsule     traZODone (DESYREL) 50 MG tablet     vitamin B6 (PYRIDOXINE) 100 MG tablet     vitamin C (ASCORBIC ACID) 1000 MG TABS     Vitamin D, Cholecalciferol, 25 MCG (1000 UT) CAPS     No current facility-administered medications for this visit.            Assessment/Plan    68 year old year old person with history of CaOx stones. Hx  Crohn's disease and bowel resection history.      1. Right Ureter Stone - has large right distal ureter stone 8x11 mm which I reviewed with him on CT. He also has some renal stones on same side. He has smaller stones in the left kidney. We discussed options of trying to pass this vs proceeding with surgery. I discussed that low chance of passage with this size and typically would wait at most 4 weeks to try and pass due to obstruction of kidney. We discussed treatment with ureteroscopy, laser lithotripsy, stent placement on the right side. We discussed risks of bleeding, infection, damage to ureter, side effects of stent    He would like to schedule for right ureteroscopy and if he passes stone then can cancel.       CC:  Lacey Feng    50 minutes spent on the clinical encounter date doing chart review, history and documentation and further activities as noted above  Video-Visit Details    Type of service:  Video Visit   Start time 11:49 pm  End time 12:36 pm  46 minutes    Originating Location (pt. Location): Home    Distant Location (provider location):  On-site  Platform used for Video Visit: Rosie

## 2023-05-18 NOTE — PROGRESS NOTES
Patient states that they are in [place] at the time of their visit.  Patient is aware telehealth visit is billable and agrees to proceed.  Nadya Koo RN

## 2023-05-21 PROCEDURE — 99000 SPECIMEN HANDLING OFFICE-LAB: CPT

## 2023-05-21 PROCEDURE — 82365 CALCULUS SPECTROSCOPY: CPT | Mod: 90

## 2023-05-22 ENCOUNTER — TELEPHONE (OUTPATIENT)
Dept: UROLOGY | Facility: CLINIC | Age: 68
End: 2023-05-22
Payer: COMMERCIAL

## 2023-05-22 DIAGNOSIS — N20.2 CALCULUS OF KIDNEY WITH CALCULUS OF URETER: Primary | ICD-10-CM

## 2023-05-22 NOTE — TELEPHONE ENCOUNTER
FUTURE VISIT INFORMATION      SURGERY INFORMATION:    Date: 23    Location: [facility] OR    Surgeon:  Madi Saldaña MD    Anesthesia Type:  choice    Procedure: Cystoscopy, Right Ureteroscopy, Right Holmium Laser Lithotripsy, Right Retrograde Pyelogram, Possible Right Ureteral Stent Placement    Consult: virtual visit 23    RECORDS REQUESTED FROM:       Primary Care Provider: Lacey Feng APRN [facility]    Most recent EKG+ Tracin23

## 2023-05-22 NOTE — CONFIDENTIAL NOTE
Pt passed stone  Will need to monitor renal stones with US/KUB in 6 months  Sees nephrology for stone prevention and has litholink and follow-up with Dr Sánchez, on K citrate already.

## 2023-05-23 ENCOUNTER — TELEPHONE (OUTPATIENT)
Dept: UROLOGY | Facility: CLINIC | Age: 68
End: 2023-05-23
Payer: COMMERCIAL

## 2023-05-23 NOTE — TELEPHONE ENCOUNTER
Left voicemail per yaron-nurse to schedule 6 month virtual with richardson mendez with imaging prior please to monitor stone size   Reason for visit stone prevention

## 2023-05-25 ENCOUNTER — PRE VISIT (OUTPATIENT)
Dept: SURGERY | Facility: CLINIC | Age: 68
End: 2023-05-25

## 2023-05-30 ENCOUNTER — LAB (OUTPATIENT)
Dept: LAB | Facility: CLINIC | Age: 68
End: 2023-05-30
Payer: COMMERCIAL

## 2023-05-30 ENCOUNTER — VIRTUAL VISIT (OUTPATIENT)
Dept: UROLOGY | Facility: CLINIC | Age: 68
End: 2023-05-30
Payer: COMMERCIAL

## 2023-05-30 DIAGNOSIS — N20.0 NEPHROLITHIASIS: Primary | ICD-10-CM

## 2023-05-30 DIAGNOSIS — N20.2 CALCULUS OF KIDNEY WITH CALCULUS OF URETER: ICD-10-CM

## 2023-05-30 PROCEDURE — 99207 PR NO CHARGE LOS: CPT | Performed by: STUDENT IN AN ORGANIZED HEALTH CARE EDUCATION/TRAINING PROGRAM

## 2023-05-30 NOTE — PROGRESS NOTES
Spoke to patient via phone  He dropped off the stone today for analysis  He is having lot of GI symptoms   I discussed that he has follow-up in Nov with renal US prior to monitor the renal stone

## 2023-06-01 LAB
APPEARANCE STONE: NORMAL
COMPN STONE: NORMAL
SPECIMEN WT: 259 MG

## 2023-06-30 NOTE — LETTER
9/3/2021         RE: Trevin Gee  [address]  [address]  [facility] [address]        Dear Colleague,    Thank you for referring your patient, Trevin Gee, to the [facility] [place]. Please see a copy of my visit note below.    [facility]    Referring provider  Fransisco Leach  Query Possible proximal small bowel stenosis at site of anastomosis    HPI  Mr Gee is a delightful 67yo gentleman with Chrons status post multiple surgeries including distal ilectomy and right hemicolectomy complicated by an anastomotic stenosis in part management by serial colonoscopy with stent placement, removal and currently replacement. He is also noted to have had a more proximal side to side anastomosis to essentially exclude a portion of this small bowel obstructed by surrounding/extrinsic adhesions (per his description of surgery dating back to the 80s). His IBD physician, Dr Leach, believes there to be a functional component to his current complaints of mild obstructive like symptoms, though there remains a concern for proximal stenosis at the anastomosis. UGI/SBFT was without definitive finding to support a proximal narrowing.    He notes that he has issues with emptying, having discomfort soon following eating. He has improvement with various body positions. He has noted no improvement with the Miralax.    Review of Systems   ROS COMP: Constitutional, HEENT, cardiovascular, pulmonary, GI, , musculoskeletal, neuro, skin, endocrine and psych systems are negative, except as otherwise noted.    Medications  Current Outpatient Medications   Medication     calcium-vitamin D (CALTRATE) 600-400 MG-UNIT per tablet     Cyanocobalamin (VITAMIN B 12) 500 MCG TABS     ferrous sulfate (FE TABS) 325 (65 Fe) MG EC tablet     fluconazole (DIFLUCAN) 200 MG tablet     gabapentin (NEURONTIN) 300 MG capsule     HYDROcodone-acetaminophen (NORCO)  5-325 MG tablet     MELATONIN PO     multivitamin, therapeutic with minerals (MULTI-VITAMIN) TABS     traZODone (DESYREL) 50 MG tablet     vitamin B6 (PYRIDOXINE) 100 MG tablet     Vitamin D, Cholecalciferol, 25 MCG (1000 UT) CAPS     No current facility-administered medications for this visit.     Past Medical  Past Medical History:   Diagnosis Date     Anemia 2007     Anxiety 2012?    much worse since July 2014     Cataract 12/2007    both eyes, too much prednisone, implants     Coccidioidomycosis      Crohn disease (H)      Crohn's disease (H) 1/13/2015     Depressive disorder 7/2014    much worse since July 2014     Fracture 1956    green fracture of leg     Gallbladder problem 2005?    much gravel, removed     GERD (gastroesophageal reflux disease)      History of blood transfusion 1988    ulcerated anastomosis term. ilium bleed     Hypertriglyceridemia 7/8/2016     Infection 2007    persistant coccidioidomycosis     Kidney stone various    both sides, all passed naturally     Mental health problem 2007    bipolar though perhaps different     Nephrolithiasis      Osteoporosis 2007    osteoporosis, too much prednisone, last dexa 1/2014     Osteoporosis      Other nervous system complications 2007    prednisone overload induced kevin     Personal history of colonic polyps     small ones found during colonoscopies     Steroid-induced psychosis     Patient extremely sensitive to regular doses of steroids.  Unclear if this is due to chronic fluconazole use or genetic issue.  In the future, use caution when prescribing steroids.     TB (tuberculosis)      Past Surgical  Past Surgical History:   Procedure Laterality Date     APPENDECTOMY  1974    coincidental with Crohn's surgery     BACK SURGERY  12/2007    kyphoplasty on compressed 4th??? vertebra     BIOPSY  2007, 2013    lump on arm, knee, back of hand for coccidioidomycosis cult.     CHOLECYSTECTOMY  2005    much gravel, removed laparoscopically     COLONOSCOPY   "7/14/2014 last    Dr. Catherine Bowden, [facility] - many previous     COLONOSCOPY Left 9/11/2015    Procedure: COMBINED COLONOSCOPY, SINGLE OR MULTIPLE BIOPSY/POLYPECTOMY BY BIOPSY;  Surgeon: Fransisco Leach MD;  [facility]     COLONOSCOPY N/A 8/3/2016    Procedure: COMBINED COLONOSCOPY, SINGLE OR MULTIPLE BIOPSY/POLYPECTOMY BY BIOPSY;  Surgeon: Fransisco Leach MD;  Location: [facility]     COLONOSCOPY N/A 8/16/2017    Procedure: COMBINED COLONOSCOPY, SINGLE OR MULTIPLE BIOPSY/POLYPECTOMY BY BIOPSY;;  Surgeon: Fransisco Leach MD;  [facility]     COLONOSCOPY N/A 10/1/2019    Procedure: Colonoscopy With Colonic Stent Insertion;  Surgeon: Robbie Cruz MD;  [facility]     COLONOSCOPY N/A 2/5/2020    Procedure: COLONOSCOPY, FOREIGN BODY REMOVAL;  Surgeon: Robbie Cruz MD;  [facility]     COLONOSCOPY N/A 5/26/2021    Procedure: COLONOSCOPY, WITH COLONIC STENT INSERTION;  Surgeon: Robbie Cruz MD;  [facility]     ENT SURGERY  ?    upper endoscopy     ESOPHAGOSCOPY, GASTROSCOPY, DUODENOSCOPY (EGD), COMBINED N/A 11/8/2016    Procedure: COMBINED ENDOSCOPIC ULTRASOUND, ESOPHAGOSCOPY, GASTROSCOPY, DUODENOSCOPY (EGD), FINE NEEDLE ASPIRATE/BIOPSY;  Surgeon: Guru Alexsandra Ballesteros MD;  Location: [facility]     ESOPHAGOSCOPY, GASTROSCOPY, DUODENOSCOPY (EGD), COMBINED N/A 11/8/2016    Procedure: COMBINED ESOPHAGOSCOPY, GASTROSCOPY, DUODENOSCOPY (EGD), BIOPSY SINGLE OR MULTIPLE;  Surgeon: Guru Alexsandra Ballesteros MD;  [facility]: [facility]     ESOPHAGOSCOPY, GASTROSCOPY, DUODENOSCOPY (EGD), COMBINED N/A 8/16/2017    Procedure: COMBINED ESOPHAGOSCOPY, GASTROSCOPY, DUODENOSCOPY (EGD), BIOPSY SINGLE OR MULTIPLE;;  Surgeon: Fransisco Leach MD;  [facility]     EYE SURGERY  12/2007    cataract surgery both sides     GI SURGERY  1974, 1986(x2), 1989    Crohn's, 1974 RO 18\" term. ilium + 9\" asc. colon all one pc     SOFT TISSUE SURGERY  " 3/2013    removed buildup on back of r hand, coccidioidomycosis     Social History  Social History     Socioeconomic History     Marital status:      Spouse name: Not on file     Number of children: Not on file     Years of education: Not on file     Highest education level: Not on file   Occupational History     Occupation: Reserach associate at the      Employer: [employer]   Tobacco Use     Smoking status: Never Smoker     Smokeless tobacco: Never Used   Substance and Sexual Activity     Alcohol use: Not Currently     Comment: sometimes one glass of wine a week     Drug use: No     Sexual activity: Never   Other Topics Concern     Parent/sibling w/ CABG, MI or angioplasty before 65F 55M? 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       Social Determinants of Health     Financial Resource Strain:      Difficulty of Paying Living Expenses:    Food Insecurity:      Worried About Running Out of Food in the Last Year:      Ran Out of Food in the Last Year:    Transportation Needs:      Lack of Transportation (Medical):      Lack of Transportation (Non-Medical):    Physical Activity:      Days of Exercise per Week:      Minutes of Exercise per Session:    Stress:      Feeling of Stress :    Social Connections:      Frequency of Communication with Friends and Family:      Frequency of Social Gatherings with Friends and Family:      Attends L[employer] day Services:      Active Member of Clubs or Organizations:      Attends Club or Organization Meetings:      Marital Status:    Intimate Partner Violence:      Fear of Current or Ex-Partner:      Emotionally Abused:      Physically Abused:      Sexually Abused:      Family History  Family History   Problem Relation Age of Onset     Heart Failure Father      Musculoskeletal Disorder Father         Parkinson's     Cancer - colorectal Mother      Cerebrovascular Disease Paternal Grandmother       Cancer Other      Musculoskeletal Disorder Brother         Parkinson's     Anesthesia Reaction No family hx of      Deep Vein Thrombosis (DVT) No family hx of      Objective:  Reported vitals:  There were no vitals taken for this visit.   GEN: Healthy, alert and no distress  PSYCH: Alert and oriented times 3; coherent speech, normal rate and volume, able to articulate logical thoughts, able to abstract reason, no tangential thoughts, no hallucinations or delusions, affect seems normal  RESP: No cough, no audible wheezing, able to talk in full sentences  Remainder of exam unable to be completed due to virtual visit     Outside Imaging summaries:    Assessment and plan:  Mr Gee is a 67yo gentleman with Crohns disease status post at ileocolinic resection and an apparent side to side proximal jejunal anastomosis to bypass an adhesive obstruction. The former is complicated by a stenosis and is being managed by placement of a lumen apposing metal stent and the latter is now thought by the patient to be associated with partial obstructive-like symptoms. While the small bowel series was without definitive finding to support this concern, this may represent a false negative, and more definitive evaluation will be organized. A push enteroscopy with adjunct use of the through the scope balloon will therefore be coordinate at [facility] with general anesthesia. This could also happen at the [place]. I will ask that he have only liquids for at least 36h before the procedure With demonstration of stenosis, biopsies will be obtained followed by careful dilation. I do not suspect a stent will be appropriate in this situation given the high risk of migration, though this will also be considered at the time of the procedure.    It was a pleasure to participate in the care of this patient; please contact us with any further questions.  A total of at least 45 minutes was spent in evaluation with this patient, >50% of which was  counseling regarding the above delineated issues.    Robbie Cruz MD PhD FACG CHAD GUZMÁN  Director of Endoscopy  Associate Professor of Medicine, Surgery and Pediatrics  Interventional and Therapeutic Endoscopy    [facility]  Division of Gastroenterology and Hepatology  [facility] [address], [place] [address]    New Consultations  [phone]  Procedure Scheduling [phone]  Clinical Nurse Coordinator [phone]  Clinical Fax   [phone]  Administrative   [phone]  Administrative Fax  [phone]      Again, thank you for allowing me to participate in the care of your patient.      Sincerely,    Robbie Cruz MD     Home

## 2023-07-11 ENCOUNTER — LAB (OUTPATIENT)
Dept: LAB | Facility: CLINIC | Age: 68
End: 2023-07-11
Payer: COMMERCIAL

## 2023-07-11 DIAGNOSIS — N20.0 NEPHROLITHIASIS: Primary | ICD-10-CM

## 2023-07-11 DIAGNOSIS — R79.89 ELEVATED SERUM CREATININE: ICD-10-CM

## 2023-07-11 DIAGNOSIS — N20.0 NEPHROLITHIASIS: ICD-10-CM

## 2023-07-11 LAB
CREAT SERPL-MCNC: 1.48 MG/DL (ref 0.67–1.17)
GFR SERPL CREATININE-BSD FRML MDRD: 51 ML/MIN/1.73M2
HOLD SPECIMEN: NORMAL
POTASSIUM SERPL-SCNC: 4.5 MMOL/L (ref 3.4–5.3)
PTH-INTACT SERPL-MCNC: 21 PG/ML (ref 15–65)

## 2023-07-11 PROCEDURE — 83970 ASSAY OF PARATHORMONE: CPT

## 2023-07-11 PROCEDURE — 36415 COLL VENOUS BLD VENIPUNCTURE: CPT

## 2023-07-11 PROCEDURE — 82565 ASSAY OF CREATININE: CPT

## 2023-07-11 PROCEDURE — 84132 ASSAY OF SERUM POTASSIUM: CPT

## 2023-07-13 ENCOUNTER — MYC MEDICAL ADVICE (OUTPATIENT)
Dept: DERMATOLOGY | Facility: CLINIC | Age: 68
End: 2023-07-13
Payer: COMMERCIAL

## 2023-07-13 DIAGNOSIS — B02.29 POST HERPETIC NEURALGIA: ICD-10-CM

## 2023-07-16 RX ORDER — GABAPENTIN 300 MG/1
600 CAPSULE ORAL 2 TIMES DAILY
Qty: 360 CAPSULE | Refills: 3 | Status: SHIPPED | OUTPATIENT
Start: 2023-07-16 | End: 2024-09-12

## 2023-07-24 ENCOUNTER — TRANSFERRED RECORDS (OUTPATIENT)
Dept: HEALTH INFORMATION MANAGEMENT | Facility: CLINIC | Age: 68
End: 2023-07-24
Payer: COMMERCIAL

## 2023-08-14 ENCOUNTER — OFFICE VISIT (OUTPATIENT)
Dept: DERMATOLOGY | Facility: CLINIC | Age: 68
End: 2023-08-14
Payer: COMMERCIAL

## 2023-08-14 DIAGNOSIS — L81.4 SOLAR LENTIGO: ICD-10-CM

## 2023-08-14 DIAGNOSIS — B02.29 POST HERPETIC NEURALGIA: ICD-10-CM

## 2023-08-14 DIAGNOSIS — D22.9 MULTIPLE MELANOCYTIC NEVI: ICD-10-CM

## 2023-08-14 DIAGNOSIS — Z85.828 HISTORY OF SKIN CANCER: Primary | ICD-10-CM

## 2023-08-14 DIAGNOSIS — L82.1 SEBORRHEIC KERATOSIS: ICD-10-CM

## 2023-08-14 DIAGNOSIS — D18.01 CHERRY ANGIOMA: ICD-10-CM

## 2023-08-14 PROCEDURE — 99213 OFFICE O/P EST LOW 20 MIN: CPT | Performed by: DERMATOLOGY

## 2023-08-14 ASSESSMENT — PAIN SCALES - GENERAL: PAINLEVEL: NO PAIN (0)

## 2023-08-14 NOTE — LETTER
8/14/2023       RE: Trevin Gee  [address]  [address]  [facility] [address]     Dear Colleague,    Thank you for referring your patient, Trevin Gee, to the [facility] DERMATOLOGY [facility] [place] at [facility]. Please see a copy of my visit note below.    [facility] Dermatology Note    Encounter Date: Aug 14, 2023    Dermatology Problem List:  1. BCC on central upper back s/p Mohs 3/2023  2. Post-herpetic neuralgia: T5, (initial zoster episode 5/2021)  - gabapentin 600 mg BID (tapered down from TID)       ______________________________________    Impression/Plan:  1. History of skin cancer: no evidence of recurrence. Discussed sun protective behaviors including OTC spf 30+ sunscreen use and sun avoidance strategies.    2. Reassurance provided for benign lesions not treated today including cherry angiomata, solar lentigines, seborrheic keratoses, and banal-appearing melanocytic nevi.    3. Postherpetic neuralgia: gradually improving; gabapentin PRN      Follow-up in 6 months.       Staff Involved:  Staff Only    Jose Martin Cheney MD   of Dermatology  Department of Dermatology  [facility] [employer]      CC:   Chief Complaint   Patient presents with    Skin Check     Trevin is here today for a skin check and does not have any areas of concern        History of Present Illness:  Mr. Trevin Gee is a 68 year old male who presents as a return patient.    No lesions of concern    PNH is gradually improving    Labs:  N/A    Physical exam:  Vitals: There were no vitals taken for this visit.  GEN: This is a well developed, well-nourished male in no acute distress, in a pleasant mood.    SKIN: Del Valle phototype I  - Full skin, which includes the head/face, both arms, chest, back, abdomen,both legs, genitalia and/or groin buttocks, digits and/or nails, was examined.  - There are dome  shaped bright red papules on the head/neck, trunk, extremities.   - Multiple regular brown pigmented macules and papules are identified on the head/neck, trunk, extremities.   - Scattered brown macules on sun exposed areas.  - There are waxy stuck on tan to brown papules on the head/neck, trunk, extremities.  - No other lesions of concern on areas examined.     Past Medical History:   Past Medical History:   Diagnosis Date    Anemia 2007    Anxiety 2012?    much worse since July 2014    Cataract 12/2007    both eyes, too much prednisone, implants    Coccidioidomycosis     Crohn disease (H)     Crohn's disease (H) 1/13/2015    Depressive disorder 7/2014    much worse since July 2014    Fracture 1956    green fracture of leg    Gallbladder problem 2005?    much gravel, removed    GERD (gastroesophageal reflux disease)     History of blood transfusion 1988    ulcerated anastomosis term. ilium bleed    Hypertriglyceridemia 7/8/2016    Infection 2007    persistant coccidioidomycosis    Kidney stone various    both sides, all passed naturally    Mental health problem 2007    bipolar though perhaps different    Nephrolithiasis     Noninfectious ileitis     Osteoporosis 2007    osteoporosis, too much prednisone, last dexa 1/2014    Osteoporosis     Other nervous system complications 2007    prednisone overload induced kevin    Personal history of colonic polyps     small ones found during colonoscopies    Steroid-induced psychosis     Patient extremely sensitive to regular doses of steroids.  Unclear if this is due to chronic fluconazole use or genetic issue.  In the future, use caution when prescribing steroids.    TB (tuberculosis)      Past Surgical History:   Procedure Laterality Date    APPENDECTOMY  1974    coincidental with Crohn's surgery    BACK SURGERY  12/2007    kyphoplasty on compressed 4th??? vertebra    BIOPSY  2007, 2013    lump on arm, knee, back of hand for coccidioidomycosis cult.    CHOLECYSTECTOMY  2005     much gravel, removed laparoscopically    COLONOSCOPY  7/14/2014 last    Dr. Catherine Bowden, [facility] - many previous    COLONOSCOPY Left 9/11/2015    Procedure: COMBINED COLONOSCOPY, SINGLE OR MULTIPLE BIOPSY/POLYPECTOMY BY BIOPSY;  Surgeon: Fransisco Leach MD;  [facility]    COLONOSCOPY N/A 8/3/2016    Procedure: COMBINED COLONOSCOPY, SINGLE OR MULTIPLE BIOPSY/POLYPECTOMY BY BIOPSY;  Surgeon: Franssico Leach MD;  Location: [facility]    COLONOSCOPY N/A 8/16/2017    Procedure: COMBINED COLONOSCOPY, SINGLE OR MULTIPLE BIOPSY/POLYPECTOMY BY BIOPSY;;  Surgeon: Fransisco Leach MD;  [facility]    COLONOSCOPY N/A 10/1/2019    Procedure: Colonoscopy With Colonic Stent Insertion;  Surgeon: Robbie Cruz MD;  [facility]    COLONOSCOPY N/A 2/5/2020    Procedure: COLONOSCOPY, FOREIGN BODY REMOVAL;  Surgeon: Robbie Cruz MD;  [facility]    COLONOSCOPY N/A 5/26/2021    Procedure: COLONOSCOPY, WITH COLONIC STENT INSERTION;  Surgeon: Robbie Cruz MD;  [facility]    COLONOSCOPY N/A 12/6/2021    Procedure: COLONOSCOPY with dilation;  Surgeon: Robbie Cruz MD;  [facility]    ENT SURGERY  ?    upper endoscopy    ENTEROSCOPY SMALL BOWEL N/A 9/22/2021    Procedure: ENTEROSCOPY;  Surgeon: Robbie Cruz MD;  [facility]    ESOPHAGOSCOPY, GASTROSCOPY, DUODENOSCOPY (EGD), COMBINED N/A 11/8/2016    Procedure: COMBINED ENDOSCOPIC ULTRASOUND, ESOPHAGOSCOPY, GASTROSCOPY, DUODENOSCOPY (EGD), FINE NEEDLE ASPIRATE/BIOPSY;  Surgeon: Guru Alexsandra Ballesteros MD;  [facility]: [facility]    ESOPHAGOSCOPY, GASTROSCOPY, DUODENOSCOPY (EGD), COMBINED N/A 11/8/2016    Procedure: COMBINED ESOPHAGOSCOPY, GASTROSCOPY, DUODENOSCOPY (EGD), BIOPSY SINGLE OR MULTIPLE;  Surgeon: Guru Alexsandra Ballesteros MD;  [facility]: [facility]    ESOPHAGOSCOPY, GASTROSCOPY, DUODENOSCOPY (EGD), COMBINED N/A 8/16/2017    Procedure: COMBINED ESOPHAGOSCOPY,  "GASTROSCOPY, DUODENOSCOPY (EGD), BIOPSY SINGLE OR MULTIPLE;;  Surgeon: Fransisco Leach MD;  [facility]    EYE SURGERY  12/2007    cataract surgery both sides    GI SURGERY  1974, 1986(x2), 1989    Crohn's, 1974 RO 18\" term. ilium + 9\" asc. colon all one pc    SOFT TISSUE SURGERY  3/2013    removed buildup on back of r hand, coccidioidomycosis       Social History:   reports that he has never smoked. He has never used smokeless tobacco. He reports that he does not currently use alcohol. He reports that he does not use drugs.    Family History:  Family History   Problem Relation Age of Onset    Cancer - colorectal Mother     Heart Failure Father     Musculoskeletal Disorder Father         Parkinson's    Cerebrovascular Disease Paternal Grandmother     Musculoskeletal Disorder Brother         Parkinson's    Cancer Other     Anesthesia Reaction No family hx of     Deep Vein Thrombosis (DVT) No family hx of     Melanoma No family hx of     Skin Cancer No family hx of        Medications:  Current Outpatient Medications   Medication Sig Dispense Refill    colchicine (COLCYRS) 0.6 MG tablet Take 1 tablet (0.6 mg) by mouth daily as needed for moderate pain 3 tablet 0    Cyanocobalamin (VITAMIN B 12) 500 MCG TABS Take 500 mcg by mouth every evening       fluconazole (DIFLUCAN) 200 MG tablet Take 1 tablet (200 mg) by mouth daily 90 tablet 3    gabapentin (NEURONTIN) 300 MG capsule Take 2 capsules (600 mg) by mouth 2 times daily 360 capsule 3    Lysine 1000 MG TABS Take 1,500 mg by mouth 2 times daily      MELATONIN PO Take 1.5 mg by mouth nightly as needed (PT last dose 1.27.2020)      multivitamin w/minerals (THERA-VIT-M) tablet Take 1 tablet by mouth every evening       potassium citrate (UROCIT-K) 10 MEQ (1080 MG) CR tablet Take 1 tablet (10 mEq) by mouth daily Take it with the largest meal 90 tablet 1    traZODone (DESYREL) 50 MG tablet Take 1 tablet (50 mg) by mouth At Bedtime 90 tablet 3    vitamin B6 " (PYRIDOXINE) 100 MG tablet Take 100 mg by mouth daily      Vitamin D, Cholecalciferol, 25 MCG (1000 UT) CAPS Take 25 mcg by mouth daily       Allergies   Allergen Reactions    Prednisone Other (See Comments)     Diana with more than 2.5mg chronic dosing of prednisone due to fluconazole interaction per patient.

## 2023-08-14 NOTE — PROGRESS NOTES
[facility] Dermatology Note    Encounter Date: Aug 14, 2023    Dermatology Problem List:  1. BCC on central upper back s/p Mohs 3/2023  2. Post-herpetic neuralgia: T5, (initial zoster episode 5/2021)  - gabapentin 600 mg BID (tapered down from TID)       ______________________________________    Impression/Plan:  1. History of skin cancer: no evidence of recurrence. Discussed sun protective behaviors including OTC spf 30+ sunscreen use and sun avoidance strategies.    2. Reassurance provided for benign lesions not treated today including cherry angiomata, solar lentigines, seborrheic keratoses, and banal-appearing melanocytic nevi.    3. Postherpetic neuralgia: gradually improving; gabapentin PRN      Follow-up in 6 months.       Staff Involved:  Staff Only    Jose Martin Cheney MD   of Dermatology  Department of Dermatology  [facility] [employer]      CC:   Chief Complaint   Patient presents with    Skin Check     Trevin is here today for a skin check and does not have any areas of concern        History of Present Illness:  Mr. Trevin Gee is a 68 year old male who presents as a return patient.    No lesions of concern    PNH is gradually improving    Labs:  N/A    Physical exam:  Vitals: There were no vitals taken for this visit.  GEN: This is a well developed, well-nourished male in no acute distress, in a pleasant mood.    SKIN: Del Valle phototype I  - Full skin, which includes the head/face, both arms, chest, back, abdomen,both legs, genitalia and/or groin buttocks, digits and/or nails, was examined.  - There are dome shaped bright red papules on the head/neck, trunk, extremities.   - Multiple regular brown pigmented macules and papules are identified on the head/neck, trunk, extremities.   - Scattered brown macules on sun exposed areas.  - There are waxy stuck on tan to brown papules on the head/neck, trunk, extremities.  - No other lesions  of concern on areas examined.     Past Medical History:   Past Medical History:   Diagnosis Date    Anemia 2007    Anxiety 2012?    much worse since July 2014    Cataract 12/2007    both eyes, too much prednisone, implants    Coccidioidomycosis     Crohn disease (H)     Crohn's disease (H) 1/13/2015    Depressive disorder 7/2014    much worse since July 2014    Fracture 1956    green fracture of leg    Gallbladder problem 2005?    much gravel, removed    GERD (gastroesophageal reflux disease)     History of blood transfusion 1988    ulcerated anastomosis term. ilium bleed    Hypertriglyceridemia 7/8/2016    Infection 2007    persistant coccidioidomycosis    Kidney stone various    both sides, all passed naturally    Mental health problem 2007    bipolar though perhaps different    Nephrolithiasis     Noninfectious ileitis     Osteoporosis 2007    osteoporosis, too much prednisone, last dexa 1/2014    Osteoporosis     Other nervous system complications 2007    prednisone overload induced kevin    Personal history of colonic polyps     small ones found during colonoscopies    Steroid-induced psychosis     Patient extremely sensitive to regular doses of steroids.  Unclear if this is due to chronic fluconazole use or genetic issue.  In the future, use caution when prescribing steroids.    TB (tuberculosis)      Past Surgical History:   Procedure Laterality Date    APPENDECTOMY  1974    coincidental with Crohn's surgery    BACK SURGERY  12/2007    kyphoplasty on compressed 4th??? vertebra    BIOPSY  2007, 2013    lump on arm, knee, back of hand for coccidioidomycosis cult.    CHOLECYSTECTOMY  2005    much gravel, removed laparoscopically    COLONOSCOPY  7/14/2014 last    Dr. Catherine Bowden, [facility] - many previous    COLONOSCOPY Left 9/11/2015    Procedure: COMBINED COLONOSCOPY, SINGLE OR MULTIPLE BIOPSY/POLYPECTOMY BY BIOPSY;  Surgeon: Fransisco Leach MD;  Location:  GI    COLONOSCOPY N/A 8/3/2016  "   Procedure: COMBINED COLONOSCOPY, SINGLE OR MULTIPLE BIOPSY/POLYPECTOMY BY BIOPSY;  Surgeon: Fransisco Leach MD;  [facility]    COLONOSCOPY N/A 8/16/2017    Procedure: COMBINED COLONOSCOPY, SINGLE OR MULTIPLE BIOPSY/POLYPECTOMY BY BIOPSY;;  Surgeon: Fransisco Leach MD;  [facility]    COLONOSCOPY N/A 10/1/2019    Procedure: Colonoscopy With Colonic Stent Insertion;  Surgeon: Robbie Cruz MD;  [facility]    COLONOSCOPY N/A 2/5/2020    Procedure: COLONOSCOPY, FOREIGN BODY REMOVAL;  Surgeon: Robbie Cruz MD;  [facility]    COLONOSCOPY N/A 5/26/2021    Procedure: COLONOSCOPY, WITH COLONIC STENT INSERTION;  Surgeon: Robbie Cruz MD;  [facility]    COLONOSCOPY N/A 12/6/2021    Procedure: COLONOSCOPY with dilation;  Surgeon: Robbie Cruz MD;  [facility]    ENT SURGERY  ?    upper endoscopy    ENTEROSCOPY SMALL BOWEL N/A 9/22/2021    Procedure: ENTEROSCOPY;  Surgeon: Robbie Cruz MD;  [facility]    ESOPHAGOSCOPY, GASTROSCOPY, DUODENOSCOPY (EGD), COMBINED N/A 11/8/2016    Procedure: COMBINED ENDOSCOPIC ULTRASOUND, ESOPHAGOSCOPY, GASTROSCOPY, DUODENOSCOPY (EGD), FINE NEEDLE ASPIRATE/BIOPSY;  Surgeon: Guru Alexsandra Ballesteros MD;  [facility]    ESOPHAGOSCOPY, GASTROSCOPY, DUODENOSCOPY (EGD), COMBINED N/A 11/8/2016    Procedure: COMBINED ESOPHAGOSCOPY, GASTROSCOPY, DUODENOSCOPY (EGD), BIOPSY SINGLE OR MULTIPLE;  Surgeon: Guru Alexsandra Ballesteros MD;  Location: [facility]    ESOPHAGOSCOPY, GASTROSCOPY, DUODENOSCOPY (EGD), COMBINED N/A 8/16/2017    Procedure: COMBINED ESOPHAGOSCOPY, GASTROSCOPY, DUODENOSCOPY (EGD), BIOPSY SINGLE OR MULTIPLE;;  Surgeon: Fransisco Leach MD;  [facility]    EYE SURGERY  12/2007    cataract surgery both sides    GI SURGERY  1974, 1986(x2), 1989    Crohn's, 1974 RO 18\" term. ilium + 9\" asc. colon all one pc    SOFT TISSUE SURGERY  3/2013    removed buildup on back of r " hand, coccidioidomycosis       Social History:   reports that he has never smoked. He has never used smokeless tobacco. He reports that he does not currently use alcohol. He reports that he does not use drugs.    Family History:  Family History   Problem Relation Age of Onset    Cancer - colorectal Mother     Heart Failure Father     Musculoskeletal Disorder Father         Parkinson's    Cerebrovascular Disease Paternal Grandmother     Musculoskeletal Disorder Brother         Parkinson's    Cancer Other     Anesthesia Reaction No family hx of     Deep Vein Thrombosis (DVT) No family hx of     Melanoma No family hx of     Skin Cancer No family hx of        Medications:  Current Outpatient Medications   Medication Sig Dispense Refill    colchicine (COLCYRS) 0.6 MG tablet Take 1 tablet (0.6 mg) by mouth daily as needed for moderate pain 3 tablet 0    Cyanocobalamin (VITAMIN B 12) 500 MCG TABS Take 500 mcg by mouth every evening       fluconazole (DIFLUCAN) 200 MG tablet Take 1 tablet (200 mg) by mouth daily 90 tablet 3    gabapentin (NEURONTIN) 300 MG capsule Take 2 capsules (600 mg) by mouth 2 times daily 360 capsule 3    Lysine 1000 MG TABS Take 1,500 mg by mouth 2 times daily      MELATONIN PO Take 1.5 mg by mouth nightly as needed (PT last dose 1.27.2020)      multivitamin w/minerals (THERA-VIT-M) tablet Take 1 tablet by mouth every evening       potassium citrate (UROCIT-K) 10 MEQ (1080 MG) CR tablet Take 1 tablet (10 mEq) by mouth daily Take it with the largest meal 90 tablet 1    traZODone (DESYREL) 50 MG tablet Take 1 tablet (50 mg) by mouth At Bedtime 90 tablet 3    vitamin B6 (PYRIDOXINE) 100 MG tablet Take 100 mg by mouth daily      Vitamin D, Cholecalciferol, 25 MCG (1000 UT) CAPS Take 25 mcg by mouth daily       Allergies   Allergen Reactions    Prednisone Other (See Comments)     Diana with more than 2.5mg chronic dosing of prednisone due to fluconazole interaction per patient.

## 2023-08-14 NOTE — NURSING NOTE
Dermatology Rooming Note    Trevin Gee's goals for this visit include:   Chief Complaint   Patient presents with    Skin Check     Trevin is here today for a skin check and does not have any areas of concern      Dave DUNLAP CMA

## 2023-08-16 ENCOUNTER — OFFICE VISIT (OUTPATIENT)
Dept: INTERNAL MEDICINE | Facility: CLINIC | Age: 68
End: 2023-08-16
Payer: COMMERCIAL

## 2023-08-16 VITALS
HEART RATE: 78 BPM | OXYGEN SATURATION: 98 % | DIASTOLIC BLOOD PRESSURE: 82 MMHG | WEIGHT: 151 LBS | SYSTOLIC BLOOD PRESSURE: 124 MMHG | BODY MASS INDEX: 21.62 KG/M2 | HEIGHT: 70 IN

## 2023-08-16 DIAGNOSIS — F31.81 BIPOLAR 2 DISORDER (H): ICD-10-CM

## 2023-08-16 DIAGNOSIS — K50.012 CROHN'S DISEASE OF SMALL INTESTINE WITH INTESTINAL OBSTRUCTION (H): ICD-10-CM

## 2023-08-16 DIAGNOSIS — N18.31 STAGE 3A CHRONIC KIDNEY DISEASE (H): Primary | ICD-10-CM

## 2023-08-16 PROCEDURE — 99213 OFFICE O/P EST LOW 20 MIN: CPT | Mod: 25 | Performed by: NURSE PRACTITIONER

## 2023-08-16 PROCEDURE — 91312 COVID-19 BIVALENT 12+ (PFIZER): CPT | Performed by: NURSE PRACTITIONER

## 2023-08-16 PROCEDURE — 0124A COVID-19 BIVALENT 12+ (PFIZER): CPT | Performed by: NURSE PRACTITIONER

## 2023-08-16 NOTE — PROGRESS NOTES
"  Assessment & Plan     Stage 3a chronic kidney disease (H)  Continue q6 m follow-up with Nephrology. BP is within goal range.    Crohn's disease of small intestine with intestinal obstruction (H)  Following with GI at [facility]; Crohn's felt to be in remission. He anticipates that he will need a repeat dilation of his anastomosis in the next few months.      Bipolar 2 disorder (H)  Mood appears stable, no concerns expressed today.      22 minutes spent by me on the date of the encounter doing chart review, history and exam, documentation and further activities per the note      Return in about 4 months (around 12/16/2023) for Routine preventive.    Lacey Feng, APRN CNP  [facility] INTERNAL MEDICINE LESLIE Zhong is a 68 year old, presenting for the following health issues:  RECHECK (Follow up./Covid booster.)    HPI     Concerned his anastomosis will be acting up soon, last dilation Dec 2022. Traditionally has gone once a year for dilations. Last week was \"blocked\" and held food intake. Sometimes has to slow fluids down as well, but sometimes concerned he'll vomit if too much. Gut started moving again. Crohn's felt to be in remission off therapy, aim for 3 L water daily, soluble fibers. Using Citrucel, prefers this over miralax.     Virtual visit 7/25/23 with GI at [facility].     Dermatology--looks good, will follow-up once yearly for a couple years.     Nephrolithiasis--calcium oxalate, trying to be careful about avoiding oxalates in the diet. Lemon juice on his salads. Vitamin C down to 500 mg from 1000 mg. Followup with Yue Wilson in November.  Follow-up with Dr. Sánchez nephrology in October.    [place] lightheaded for a couple days in June, was concerned that he had excess potassium, was checked 5 days later. Stopped the potassium citrate briefly. Now taking.    Weight has been stable.   50th high school reunion in [place] in Sept.     Review of Systems " "  Constitutional, HEENT, cardiovascular, pulmonary, gi and gu systems are negative, except as otherwise noted.      Objective    /82 (BP Location: Right arm, Patient Position: Sitting, Cuff Size: Adult Regular)   Pulse 78   Ht 1.778 m (5' 10\")   Wt 68.5 kg (151 lb)   SpO2 98%   BMI 21.67 kg/m    Body mass index is 21.67 kg/m .  Physical Exam   GENERAL: healthy, alert and no distress  NECK: no adenopathy, no asymmetry, masses, or scars and thyroid normal to palpation  RESP: lungs clear to auscultation - no rales, rhonchi or wheezes  CV: regular rate and rhythm, normal S1 S2, no S3 or S4, no murmur, click or rub, no peripheral edema and peripheral pulses strong  ABDOMEN: soft, nontender, no hepatosplenomegaly, scarred, no masses and bowel sounds normal  MS: no gross musculoskeletal defects noted, no edema  PSYCH: mentation appears normal, affect normal/bright                    "

## 2023-08-16 NOTE — NURSING NOTE
"Trevin Gee is a 68 year old male patient that presents today in clinic for the following:    Chief Complaint   Patient presents with    RECHECK     Follow up.  Covid booster.     The patient's allergies and medications were reviewed as noted. A set of vitals were recorded as noted without incident: /82 (BP Location: Right arm, Patient Position: Sitting, Cuff Size: Adult Regular)   Pulse 78   Ht 1.778 m (5' 10\")   Wt 68.5 kg (151 lb)   SpO2 98%   BMI 21.67 kg/m  . The patient does not have any other questions for the provider.    Sonia Dupont, EMT at 12:33 PM on 8/16/2023.  Primary care clinic: [phone]    "

## 2023-08-18 ENCOUNTER — TELEPHONE (OUTPATIENT)
Dept: INTERNAL MEDICINE | Facility: CLINIC | Age: 68
End: 2023-08-18
Payer: COMMERCIAL

## 2023-10-17 DIAGNOSIS — N18.31 STAGE 3A CHRONIC KIDNEY DISEASE (H): Primary | ICD-10-CM

## 2023-10-24 ENCOUNTER — OFFICE VISIT (OUTPATIENT)
Dept: TRANSPLANT | Facility: CLINIC | Age: 68
End: 2023-10-24
Attending: INTERNAL MEDICINE
Payer: COMMERCIAL

## 2023-10-24 ENCOUNTER — LAB (OUTPATIENT)
Dept: LAB | Facility: CLINIC | Age: 68
End: 2023-10-24
Payer: COMMERCIAL

## 2023-10-24 VITALS
SYSTOLIC BLOOD PRESSURE: 126 MMHG | DIASTOLIC BLOOD PRESSURE: 78 MMHG | HEART RATE: 72 BPM | TEMPERATURE: 97.7 F | OXYGEN SATURATION: 98 % | WEIGHT: 161.2 LBS | BODY MASS INDEX: 23.13 KG/M2

## 2023-10-24 DIAGNOSIS — N18.31 STAGE 3A CHRONIC KIDNEY DISEASE (H): ICD-10-CM

## 2023-10-24 DIAGNOSIS — E53.8 VITAMIN B 12 DEFICIENCY: ICD-10-CM

## 2023-10-24 DIAGNOSIS — N20.0 NEPHROLITHIASIS: ICD-10-CM

## 2023-10-24 DIAGNOSIS — K91.30 COLORECTAL ANASTOMOTIC STRICTURE: ICD-10-CM

## 2023-10-24 DIAGNOSIS — N20.0 NEPHROLITHIASIS: Primary | ICD-10-CM

## 2023-10-24 DIAGNOSIS — R79.89 ELEVATED SERUM CREATININE: ICD-10-CM

## 2023-10-24 DIAGNOSIS — N20.2 CALCULUS OF KIDNEY WITH CALCULUS OF URETER: ICD-10-CM

## 2023-10-24 DIAGNOSIS — N17.9 AKI (ACUTE KIDNEY INJURY) (H): ICD-10-CM

## 2023-10-24 DIAGNOSIS — K50.012 CROHN'S DISEASE OF SMALL INTESTINE WITH INTESTINAL OBSTRUCTION (H): ICD-10-CM

## 2023-10-24 DIAGNOSIS — R19.7 DIARRHEA, UNSPECIFIED TYPE: ICD-10-CM

## 2023-10-24 LAB
ALBUMIN MFR UR ELPH: 6.4 MG/DL
ALBUMIN SERPL BCG-MCNC: 4.3 G/DL (ref 3.5–5.2)
ALBUMIN UR-MCNC: NEGATIVE MG/DL
ANION GAP SERPL CALCULATED.3IONS-SCNC: 10 MMOL/L (ref 7–15)
APPEARANCE UR: CLEAR
BILIRUB UR QL STRIP: NEGATIVE
BUN SERPL-MCNC: 18 MG/DL (ref 8–23)
CALCIUM SERPL-MCNC: 10 MG/DL (ref 8.8–10.2)
CHLORIDE SERPL-SCNC: 103 MMOL/L (ref 98–107)
COLOR UR AUTO: ABNORMAL
CREAT SERPL-MCNC: 1.51 MG/DL (ref 0.67–1.17)
CREAT UR-MCNC: 107 MG/DL
DEPRECATED HCO3 PLAS-SCNC: 28 MMOL/L (ref 22–29)
EGFRCR SERPLBLD CKD-EPI 2021: 50 ML/MIN/1.73M2
GLUCOSE SERPL-MCNC: 101 MG/DL (ref 70–99)
GLUCOSE UR STRIP-MCNC: NEGATIVE MG/DL
HGB BLD-MCNC: 16.1 G/DL (ref 13.3–17.7)
HGB UR QL STRIP: NEGATIVE
KETONES UR STRIP-MCNC: NEGATIVE MG/DL
LEUKOCYTE ESTERASE UR QL STRIP: NEGATIVE
MUCOUS THREADS #/AREA URNS LPF: PRESENT /LPF
NITRATE UR QL: NEGATIVE
PH UR STRIP: 5.5 [PH] (ref 5–7)
PHOSPHATE SERPL-MCNC: 4.2 MG/DL (ref 2.5–4.5)
POTASSIUM SERPL-SCNC: 5.1 MMOL/L (ref 3.4–5.3)
PROT/CREAT 24H UR: 0.06 MG/MG CR (ref 0–0.2)
RBC URINE: 1 /HPF
SODIUM SERPL-SCNC: 141 MMOL/L (ref 135–145)
SP GR UR STRIP: 1.01 (ref 1–1.03)
UROBILINOGEN UR STRIP-MCNC: NORMAL MG/DL
WBC URINE: 1 /HPF

## 2023-10-24 PROCEDURE — 80069 RENAL FUNCTION PANEL: CPT | Performed by: PATHOLOGY

## 2023-10-24 PROCEDURE — 81001 URINALYSIS AUTO W/SCOPE: CPT | Performed by: PATHOLOGY

## 2023-10-24 PROCEDURE — 85018 HEMOGLOBIN: CPT | Performed by: PATHOLOGY

## 2023-10-24 PROCEDURE — 99215 OFFICE O/P EST HI 40 MIN: CPT | Performed by: INTERNAL MEDICINE

## 2023-10-24 PROCEDURE — 84156 ASSAY OF PROTEIN URINE: CPT | Performed by: PATHOLOGY

## 2023-10-24 PROCEDURE — 36415 COLL VENOUS BLD VENIPUNCTURE: CPT | Performed by: PATHOLOGY

## 2023-10-24 PROCEDURE — G0463 HOSPITAL OUTPT CLINIC VISIT: HCPCS | Performed by: INTERNAL MEDICINE

## 2023-10-24 ASSESSMENT — PAIN SCALES - GENERAL: PAINLEVEL: NO PAIN (0)

## 2023-10-24 NOTE — NURSING NOTE
Chief Complaint   Patient presents with    RECHECK   /78 (BP Location: Right arm, Patient Position: Sitting, Cuff Size: Adult Regular)   Pulse 72   Temp 97.7  F (36.5  C) (Oral)   Wt 73.1 kg (161 lb 3.2 oz)   SpO2 98%   BMI 23.13 kg/m  Molly Jones on 10/24/2023 at 10:14 AM

## 2023-10-24 NOTE — LETTER
10/24/2023         RE: Trevin Gee  [address]  [facility] [address]        Dear Colleague,    Thank you for referring your patient, Trevin Gee, to the [facility]. Please see a copy of my visit note below.      Nephrology Clinic Note  October 24, 2023    I was asked to see this patient by Dr. Owen    CC: CKD and nephrolithiasis    HPI: Trevin Gee is a 68 year old male with PMH significant for Chron's disease s/p colectomies, gout, nephrolithiasis, CKD who presents for evaluation and management of CKD and nephrolithiasis.     Pt endorsed feeling fairly stable. No new symptoms today. Have had obstructive stone episode in may and was able to pass the renal stone. H/o recurrent nephrolithiasis personally and in his brother he know of. He also had chron's disease with recurrent diarrhea and underwent colectomy. He had colon stricture now experiencing low transition constipation. Endorsed that he tried to be hydrated well enough but he often lost in work and forgets about fluid intake. Denied other systemic symptoms today including recurrent fevers/chills, nausea,vomiting, diarrhea, abdominal pain, chest pain or SOB    Endorsed that he had increased frequency and urgency of urination yesterday, improved with good hydration, denied associated dysuria or hematuria.    Last stone passed was in may, none he knows since then.    - History of urinary symptoms: yes as mentioned above  - History of Hematuria: no  - Swelling: no  - Hx of UTIs: no  - Hx of stones: yes, last one in may 2023  - Rashes/Joint pain: no  - Family hx of kidney disease: older brother with kidney stone disease  - NSAID use: no       Allergies   Allergen Reactions    Prednisone Other (See Comments)     Diana with more than 2.5mg chronic dosing of prednisone due to fluconazole interaction per patient.        colchicine (COLCYRS) 0.6 MG tablet, Take 1 tablet (0.6 mg) by mouth daily as needed  for moderate pain  Cyanocobalamin (VITAMIN B 12) 500 MCG TABS, Take 500 mcg by mouth every evening   fluconazole (DIFLUCAN) 200 MG tablet, Take 1 tablet (200 mg) by mouth daily  gabapentin (NEURONTIN) 300 MG capsule, Take 2 capsules (600 mg) by mouth 2 times daily  Lysine 1000 MG TABS, Take 1,500 mg by mouth 2 times daily  MELATONIN PO, Take 1.5 mg by mouth nightly as needed (PT last dose 1.27.2020)  multivitamin w/minerals (THERA-VIT-M) tablet, Take 1 tablet by mouth every evening   potassium citrate (UROCIT-K) 10 MEQ (1080 MG) CR tablet, Take 1 tablet (10 mEq) by mouth daily Take it with the largest meal  traZODone (DESYREL) 50 MG tablet, Take 1 tablet (50 mg) by mouth At Bedtime  vitamin B6 (PYRIDOXINE) 100 MG tablet, Take 100 mg by mouth daily  Vitamin D, Cholecalciferol, 25 MCG (1000 UT) CAPS, Take 25 mcg by mouth daily    No current facility-administered medications on file prior to visit.      Past Medical History:   Diagnosis Date    Anemia 2007    Anxiety 2012?    much worse since July 2014    Cataract 12/2007    both eyes, too much prednisone, implants    Coccidioidomycosis     Crohn disease (H)     Crohn's disease (H) 1/13/2015    Depressive disorder 7/2014    much worse since July 2014    Fracture 1956    green fracture of leg    Gallbladder problem 2005?    much gravel, removed    GERD (gastroesophageal reflux disease)     History of blood transfusion 1988    ulcerated anastomosis term. ilium bleed    Hypertriglyceridemia 7/8/2016    Infection 2007    persistant coccidioidomycosis    Kidney stone various    both sides, all passed naturally    Mental health problem 2007    bipolar though perhaps different    Nephrolithiasis     Noninfectious ileitis     Osteoporosis 2007    osteoporosis, too much prednisone, last dexa 1/2014    Osteoporosis     Other nervous system complications 2007    prednisone overload induced kevin    Personal history of colonic polyps     small ones found during colonoscopies     Steroid-induced psychosis     Patient extremely sensitive to regular doses of steroids.  Unclear if this is due to chronic fluconazole use or genetic issue.  In the future, use caution when prescribing steroids.    TB (tuberculosis)        Past Surgical History:   Procedure Laterality Date    APPENDECTOMY  1974    coincidental with Crohn's surgery    BACK SURGERY  12/2007    kyphoplasty on compressed 4th??? vertebra    BIOPSY  2007, 2013    lump on arm, knee, back of hand for coccidioidomycosis cult.    CHOLECYSTECTOMY  2005    much gravel, removed laparoscopically    COLONOSCOPY  7/14/2014 last    Dr. Catherine Bowden, [facility] - many previous    COLONOSCOPY Left 9/11/2015    Procedure: COMBINED COLONOSCOPY, SINGLE OR MULTIPLE BIOPSY/POLYPECTOMY BY BIOPSY;  Surgeon: Fransisco Leach MD;  [facility]: [facility]    COLONOSCOPY N/A 8/3/2016    Procedure: COMBINED COLONOSCOPY, SINGLE OR MULTIPLE BIOPSY/POLYPECTOMY BY BIOPSY;  Surgeon: Fransisco Leach MD;  [facility]: [facility]    COLONOSCOPY N/A 8/16/2017    Procedure: COMBINED COLONOSCOPY, SINGLE OR MULTIPLE BIOPSY/POLYPECTOMY BY BIOPSY;;  Surgeon: Fransisco Leach MD;  [facility]    COLONOSCOPY N/A 10/1/2019    Procedure: Colonoscopy With Colonic Stent Insertion;  Surgeon: Robbie Cruz MD;  [facility]    COLONOSCOPY N/A 2/5/2020    Procedure: COLONOSCOPY, FOREIGN BODY REMOVAL;  Surgeon: Robbie Cruz MD;  [facility]    COLONOSCOPY N/A 5/26/2021    Procedure: COLONOSCOPY, WITH COLONIC STENT INSERTION;  Surgeon: Robbie Cruz MD;  [facility]    COLONOSCOPY N/A 12/6/2021    Procedure: COLONOSCOPY with dilation;  Surgeon: Robbie Cruz MD;  Location:  OR    ENT SURGERY  ?    upper endoscopy    ENTEROSCOPY SMALL BOWEL N/A 9/22/2021    Procedure: ENTEROSCOPY;  Surgeon: Robbie Cruz MD;  [facility]    ESOPHAGOSCOPY, GASTROSCOPY, DUODENOSCOPY (EGD), COMBINED N/A 11/8/2016    Procedure:  "COMBINED ENDOSCOPIC ULTRASOUND, ESOPHAGOSCOPY, GASTROSCOPY, DUODENOSCOPY (EGD), FINE NEEDLE ASPIRATE/BIOPSY;  Surgeon: Guru Alexsandra Ballesteros MD;  Location:  GI    ESOPHAGOSCOPY, GASTROSCOPY, DUODENOSCOPY (EGD), COMBINED N/A 11/8/2016    Procedure: COMBINED ESOPHAGOSCOPY, GASTROSCOPY, DUODENOSCOPY (EGD), BIOPSY SINGLE OR MULTIPLE;  Surgeon: Guru Alexsandra Ballesteros MD;  Location:  GI    ESOPHAGOSCOPY, GASTROSCOPY, DUODENOSCOPY (EGD), COMBINED N/A 8/16/2017    Procedure: COMBINED ESOPHAGOSCOPY, GASTROSCOPY, DUODENOSCOPY (EGD), BIOPSY SINGLE OR MULTIPLE;;  Surgeon: Fransisco Leach MD;  [facility]    EYE SURGERY  12/2007    cataract surgery both sides    GI SURGERY  1974, 1986(x2), 1989    Crohn's, 1974 RO 18\" term. ilium + 9\" asc. colon all one pc    SOFT TISSUE SURGERY  3/2013    removed buildup on back of r hand, coccidioidomycosis       Social History     Tobacco Use    Smoking status: Never    Smokeless tobacco: Never   Substance Use Topics    Alcohol use: Not Currently     Comment: sometimes one glass of wine a week    Drug use: No       Family History   Problem Relation Age of Onset    Cancer - colorectal Mother     Heart Failure Father     Musculoskeletal Disorder Father         Parkinson's    Cerebrovascular Disease Paternal Grandmother     Musculoskeletal Disorder Brother         Parkinson's    Cancer Other     Anesthesia Reaction No family hx of     Deep Vein Thrombosis (DVT) No family hx of     Melanoma No family hx of     Skin Cancer No family hx of        ROS: A 12 system review of systems was negative other than noted here or above.     Exam:  /78 (BP Location: Right arm, Patient Position: Sitting, Cuff Size: Adult Regular)   Pulse 72   Temp 97.7  F (36.5  C) (Oral)   Wt 73.1 kg (161 lb 3.2 oz)   SpO2 98%   BMI 23.13 kg/m      GENERAL APPEARANCE: alert and no distress  EYES:  no scleral icterus  HENT: no gross abnormalities noted  NECK: supple, no " adenopathy  RESP: lungs clear to auscultation   CV: regular rhythm, normal rate, no rub  Extremities: no clubbing, cyanosis, or edema  SKIN: no rash  NEURO: mentation intact and speech normal  PSYCH: affect normal/bright    Results:    No visits with results within 1 Day(s) from this visit.   Latest known visit with results is:   Lab on 07/11/2023   Component Date Value Ref Range Status    Creatinine 07/11/2023 1.48 (H)  0.67 - 1.17 mg/dL Final    GFR Estimate 07/11/2023 51 (L)  >60 mL/min/1.73m2 Final    Potassium 07/11/2023 4.5  3.4 - 5.3 mmol/L Final    Hold Specimen 07/11/2023 JIC   Final    Parathyroid Hormone Intact 07/11/2023 21  15 - 65 pg/mL Final       Assessment/Plan:     CKD stage IIIa  Calcium oxalate nephrolithiasis  Recurrent NALLELY episodes   Mild to moderate right sided hydronephrosis in may 2023 because of stone obstruction.  Pt with baseline creatinine of 1.3-1.4 with eGFR in 50's which put him in CKD stage IIIa. UA with out RBC and UPCR was WNL. Renal imaging in may 2023 showed mild to moderate hydronephrosis 2/2 obstructed stone and he passed at that time. Continue to have b/l small stones. Continued to be on K-Citrate. Repeat creatinine today was at 1.5 higher than baseline, could be another episode of acute injury vs CKD progression.  Underlying CKD is related to renovascular disease and or nephrolithiasis.  - continue good hydration to make up to 2.5L  of urine to prevent further stone formation   - continue on current dose of potassium citrate for wendy-oxalate stones   - encouraged low salt diet   - increase in fresh fruits and vegetables at this point.   - renal US to rule out ongoing hydronephrosis and assess stone burden   - repeat labs in a month, orders placed. If creatinine is not stable/ up trending, will consider renal biopsy.    Hypertension :  BP been stable, not on meds, continue to monitor    Electrolytes :  Stable, but high normal potassium noted while being on K-citrate.    BMD  :  Jordon was WNL, on jordon and vitamin D supplementation  Encouraged to take tums with foods if possible, okay to take diary products with high calcium with meals.    Metabolic acidosis :  Bicarb is WNL    Anemia :  Hb is WNL    Other problems  Chron's disease   H/o Small bowel obstruction  Bowel stricture with low transient constipation   No new nausea/vomiting, abdominal pain or diarrhea. Will have BM once every 2 days.  Continue to follow up with PCP/ GI team.  H/o B12 def, on supplementation    Total time time spent on the day of clinic visit was 50 min including 30 min of face to face time with pt, labs and image review, medication review, ordering labs and imaging, documentation as above.      Tabitha Sánchez  Dept of Nephrology and Hypertension  [facility]

## 2023-10-24 NOTE — PROGRESS NOTES
Nephrology Clinic Note  October 24, 2023    I was asked to see this patient by Dr. Owen    CC: CKD and nephrolithiasis    HPI: Trevin Gee is a 68 year old male with PMH significant for Chron's disease s/p colectomies, gout, nephrolithiasis, CKD who presents for evaluation and management of CKD and nephrolithiasis.     Pt endorsed feeling fairly stable. No new symptoms today. Have had obstructive stone episode in may and was able to pass the renal stone. H/o recurrent nephrolithiasis personally and in his brother he know of. He also had chron's disease with recurrent diarrhea and underwent colectomy. He had colon stricture now experiencing low transition constipation. Endorsed that he tried to be hydrated well enough but he often lost in work and forgets about fluid intake. Denied other systemic symptoms today including recurrent fevers/chills, nausea,vomiting, diarrhea, abdominal pain, chest pain or SOB    Endorsed that he had increased frequency and urgency of urination yesterday, improved with good hydration, denied associated dysuria or hematuria.    Last stone passed was in may, none he knows since then.    - History of urinary symptoms: yes as mentioned above  - History of Hematuria: no  - Swelling: no  - Hx of UTIs: no  - Hx of stones: yes, last one in may 2023  - Rashes/Joint pain: no  - Family hx of kidney disease: older brother with kidney stone disease  - NSAID use: no       Allergies   Allergen Reactions    Prednisone Other (See Comments)     Diana with more than 2.5mg chronic dosing of prednisone due to fluconazole interaction per patient.        colchicine (COLCYRS) 0.6 MG tablet, Take 1 tablet (0.6 mg) by mouth daily as needed for moderate pain  Cyanocobalamin (VITAMIN B 12) 500 MCG TABS, Take 500 mcg by mouth every evening   fluconazole (DIFLUCAN) 200 MG tablet, Take 1 tablet (200 mg) by mouth daily  gabapentin (NEURONTIN) 300 MG capsule, Take 2 capsules (600 mg) by mouth 2 times  daily  Lysine 1000 MG TABS, Take 1,500 mg by mouth 2 times daily  MELATONIN PO, Take 1.5 mg by mouth nightly as needed (PT last dose 1.27.2020)  multivitamin w/minerals (THERA-VIT-M) tablet, Take 1 tablet by mouth every evening   potassium citrate (UROCIT-K) 10 MEQ (1080 MG) CR tablet, Take 1 tablet (10 mEq) by mouth daily Take it with the largest meal  traZODone (DESYREL) 50 MG tablet, Take 1 tablet (50 mg) by mouth At Bedtime  vitamin B6 (PYRIDOXINE) 100 MG tablet, Take 100 mg by mouth daily  Vitamin D, Cholecalciferol, 25 MCG (1000 UT) CAPS, Take 25 mcg by mouth daily    No current facility-administered medications on file prior to visit.      Past Medical History:   Diagnosis Date    Anemia 2007    Anxiety 2012?    much worse since July 2014    Cataract 12/2007    both eyes, too much prednisone, implants    Coccidioidomycosis     Crohn disease (H)     Crohn's disease (H) 1/13/2015    Depressive disorder 7/2014    much worse since July 2014    Fracture 1956    green fracture of leg    Gallbladder problem 2005?    much gravel, removed    GERD (gastroesophageal reflux disease)     History of blood transfusion 1988    ulcerated anastomosis term. ilium bleed    Hypertriglyceridemia 7/8/2016    Infection 2007    persistant coccidioidomycosis    Kidney stone various    both sides, all passed naturally    Mental health problem 2007    bipolar though perhaps different    Nephrolithiasis     Noninfectious ileitis     Osteoporosis 2007    osteoporosis, too much prednisone, last dexa 1/2014    Osteoporosis     Other nervous system complications 2007    prednisone overload induced kevin    Personal history of colonic polyps     small ones found during colonoscopies    Steroid-induced psychosis     Patient extremely sensitive to regular doses of steroids.  Unclear if this is due to chronic fluconazole use or genetic issue.  In the future, use caution when prescribing steroids.    TB (tuberculosis)        Past Surgical  History:   Procedure Laterality Date    APPENDECTOMY  1974    coincidental with Crohn's surgery    BACK SURGERY  12/2007    kyphoplasty on compressed 4th??? vertebra    BIOPSY  2007, 2013    lump on arm, knee, back of hand for coccidioidomycosis cult.    CHOLECYSTECTOMY  2005    much gravel, removed laparoscopically    COLONOSCOPY  7/14/2014 last    Dr. Catherine Bowden, [facility] - many previous    COLONOSCOPY Left 9/11/2015    Procedure: COMBINED COLONOSCOPY, SINGLE OR MULTIPLE BIOPSY/POLYPECTOMY BY BIOPSY;  Surgeon: Fransisco Leach MD;  [facility]: [facility]    COLONOSCOPY N/A 8/3/2016    Procedure: COMBINED COLONOSCOPY, SINGLE OR MULTIPLE BIOPSY/POLYPECTOMY BY BIOPSY;  Surgeon: Fransisco Leach MD;  [facility]    COLONOSCOPY N/A 8/16/2017    Procedure: COMBINED COLONOSCOPY, SINGLE OR MULTIPLE BIOPSY/POLYPECTOMY BY BIOPSY;;  Surgeon: Fransisco Leach MD;  [facility]    COLONOSCOPY N/A 10/1/2019    Procedure: Colonoscopy With Colonic Stent Insertion;  Surgeon: Robbie Cruz MD;  [facility]    COLONOSCOPY N/A 2/5/2020    Procedure: COLONOSCOPY, FOREIGN BODY REMOVAL;  Surgeon: Robbie Cruz MD;  [facility]    COLONOSCOPY N/A 5/26/2021    Procedure: COLONOSCOPY, WITH COLONIC STENT INSERTION;  Surgeon: Robbie Cruz MD;  [facility]    COLONOSCOPY N/A 12/6/2021    Procedure: COLONOSCOPY with dilation;  Surgeon: Robbie Cruz MD;  Location[facility]    ENT SURGERY  ?    upper endoscopy    ENTEROSCOPY SMALL BOWEL N/A 9/22/2021    Procedure: ENTEROSCOPY;  Surgeon: Robbie Cruz MD;  [facility]    ESOPHAGOSCOPY, GASTROSCOPY, DUODENOSCOPY (EGD), COMBINED N/A 11/8/2016    Procedure: COMBINED ENDOSCOPIC ULTRASOUND, ESOPHAGOSCOPY, GASTROSCOPY, DUODENOSCOPY (EGD), FINE NEEDLE ASPIRATE/BIOPSY;  Surgeon: Guru Alexsandra Ballesteros MD;  [facility]    ESOPHAGOSCOPY, GASTROSCOPY, DUODENOSCOPY (EGD), COMBINED N/A 11/8/2016     "Procedure: COMBINED ESOPHAGOSCOPY, GASTROSCOPY, DUODENOSCOPY (EGD), BIOPSY SINGLE OR MULTIPLE;  Surgeon: Guru Alexsandra Ballesteros MD;  Location:  GI    ESOPHAGOSCOPY, GASTROSCOPY, DUODENOSCOPY (EGD), COMBINED N/A 8/16/2017    Procedure: COMBINED ESOPHAGOSCOPY, GASTROSCOPY, DUODENOSCOPY (EGD), BIOPSY SINGLE OR MULTIPLE;;  Surgeon: Fransisco Leach MD;  [facility]    EYE SURGERY  12/2007    cataract surgery both sides    GI SURGERY  1974, 1986(x2), 1989    Crohn's, 1974 RO 18\" term. ilium + 9\" asc. colon all one pc    SOFT TISSUE SURGERY  3/2013    removed buildup on back of r hand, coccidioidomycosis       Social History     Tobacco Use    Smoking status: Never    Smokeless tobacco: Never   Substance Use Topics    Alcohol use: Not Currently     Comment: sometimes one glass of wine a week    Drug use: No       Family History   Problem Relation Age of Onset    Cancer - colorectal Mother     Heart Failure Father     Musculoskeletal Disorder Father         Parkinson's    Cerebrovascular Disease Paternal Grandmother     Musculoskeletal Disorder Brother         Parkinson's    Cancer Other     Anesthesia Reaction No family hx of     Deep Vein Thrombosis (DVT) No family hx of     Melanoma No family hx of     Skin Cancer No family hx of        ROS: A 12 system review of systems was negative other than noted here or above.     Exam:  /78 (BP Location: Right arm, Patient Position: Sitting, Cuff Size: Adult Regular)   Pulse 72   Temp 97.7  F (36.5  C) (Oral)   Wt 73.1 kg (161 lb 3.2 oz)   SpO2 98%   BMI 23.13 kg/m      GENERAL APPEARANCE: alert and no distress  EYES:  no scleral icterus  HENT: no gross abnormalities noted  NECK: supple, no adenopathy  RESP: lungs clear to auscultation   CV: regular rhythm, normal rate, no rub  Extremities: no clubbing, cyanosis, or edema  SKIN: no rash  NEURO: mentation intact and speech normal  PSYCH: affect normal/bright    Results:    No visits with results " within 1 Day(s) from this visit.   Latest known visit with results is:   Lab on 07/11/2023   Component Date Value Ref Range Status    Creatinine 07/11/2023 1.48 (H)  0.67 - 1.17 mg/dL Final    GFR Estimate 07/11/2023 51 (L)  >60 mL/min/1.73m2 Final    Potassium 07/11/2023 4.5  3.4 - 5.3 mmol/L Final    Hold Specimen 07/11/2023 JI   Final    Parathyroid Hormone Intact 07/11/2023 21  15 - 65 pg/mL Final       Assessment/Plan:     CKD stage IIIa  Calcium oxalate nephrolithiasis  Recurrent NALLELY episodes   Mild to moderate right sided hydronephrosis in may 2023 because of stone obstruction.  Pt with baseline creatinine of 1.3-1.4 with eGFR in 50's which put him in CKD stage IIIa. UA with out RBC and UPCR was WNL. Renal imaging in may 2023 showed mild to moderate hydronephrosis 2/2 obstructed stone and he passed at that time. Continue to have b/l small stones. Continued to be on K-Citrate. Repeat creatinine today was at 1.5 higher than baseline, could be another episode of acute injury vs CKD progression.  Underlying CKD is related to renovascular disease and or nephrolithiasis.  - continue good hydration to make up to 2.5L  of urine to prevent further stone formation   - continue on current dose of potassium citrate for jordon-oxalate stones   - encouraged low salt diet   - increase in fresh fruits and vegetables at this point.   - renal US to rule out ongoing hydronephrosis and assess stone burden   - repeat labs in a month, orders placed. If creatinine is not stable/ up trending, will consider renal biopsy.    Hypertension :  BP been stable, not on meds, continue to monitor    Electrolytes :  Stable, but high normal potassium noted while being on K-citrate.    BMD :  Jordon was WNL, on jordon and vitamin D supplementation  Encouraged to take tums with foods if possible, okay to take diary products with high calcium with meals.    Metabolic acidosis :  Bicarb is WNL    Anemia :  Hb is WNL    Other problems  Chron's disease   H/o  Small bowel obstruction  Bowel stricture with low transient constipation   No new nausea/vomiting, abdominal pain or diarrhea. Will have BM once every 2 days.  Continue to follow up with PCP/ GI team.  H/o B12 def, on supplementation    Total time time spent on the day of clinic visit was 50 min including 30 min of face to face time with pt, labs and image review, medication review, ordering labs and imaging, documentation as above.      Tabitha Sánchez  Dept of Nephrology and Hypertension  [facility]

## 2023-11-04 ENCOUNTER — PRE VISIT (OUTPATIENT)
Dept: UROLOGY | Facility: CLINIC | Age: 68
End: 2023-11-04
Payer: COMMERCIAL

## 2023-11-08 ENCOUNTER — ANCILLARY PROCEDURE (OUTPATIENT)
Dept: ULTRASOUND IMAGING | Facility: CLINIC | Age: 68
End: 2023-11-08
Attending: STUDENT IN AN ORGANIZED HEALTH CARE EDUCATION/TRAINING PROGRAM
Payer: COMMERCIAL

## 2023-11-08 DIAGNOSIS — N20.0 NEPHROLITHIASIS: ICD-10-CM

## 2023-11-08 PROCEDURE — 76770 US EXAM ABDO BACK WALL COMP: CPT | Mod: GC | Performed by: RADIOLOGY

## 2023-11-17 ENCOUNTER — VIRTUAL VISIT (OUTPATIENT)
Dept: UROLOGY | Facility: CLINIC | Age: 68
End: 2023-11-17
Payer: COMMERCIAL

## 2023-11-17 VITALS — WEIGHT: 152 LBS | BODY MASS INDEX: 21.81 KG/M2

## 2023-11-17 DIAGNOSIS — N20.0 KIDNEY STONE: Primary | ICD-10-CM

## 2023-11-17 PROCEDURE — 99213 OFFICE O/P EST LOW 20 MIN: CPT | Mod: 95 | Performed by: NURSE PRACTITIONER

## 2023-11-17 NOTE — PATIENT INSTRUCTIONS
UROLOGY CLINIC VISIT PATIENT INSTRUCTIONS    -Continue the potassium citrate.  -I have ordered the [employer] 24-hour urine collection study to be done again now. This kit will be sent to your home address. Your at-home kit will include everything you need to complete your 24-hour urine collection(s). Detailed instructions, collection supplies, return shipping box, and a pre-paid Fed-Ex label are being sent directly to you.   -Follow up with me in about 2-3 months to review results of this test.     If you have any issues, questions or concerns in the meantime, do not hesitate to contact us at [phone] or via [email or web address].     Yue Wilson, CNP  Department of Urology

## 2023-11-17 NOTE — PROGRESS NOTES
Virtual Visit Details    Type of service:  Video Visit   Video Start Time: 11:02 AM  Video End Time: 11:21 AM    Originating Location (pt. Location): Home  Distant Location (provider location):  Off-site  Platform used for Video Visit: Mukesh    CC: Kidney stones    Assessment/Plan:  68 year old male with a history of Crohn's s/p bowel resection who has had CaOx kidney stones, including a recently-passed one that measured 8 mm. URS from last week showing multiple stones bilaterally. He asks about dissolving the stones he has, and we discussed that calcium oxalate stones are generally thought to be insoluble. Uric acid stones can be dissolved with alkalinizing agents, like the potassium citrate that he takes now, though he has never had uric acid stone so far. Advised that he focus on optimizing hydration, reducing oxalates in the diet, and continuing to consume dairy products.   -Continue the potassium citrate  -Will pursue an updated Litholink x1, as it has been a few years since he's done this. Follow up with me to review these results.     Yue Wilson, CNP  Department of Urology      Subjective:   68 year old male with a history of Crohn's s/p bowel resection and CaOx kidney stones who presents for virtual follow up. I saw him last in 2019, at which time stone-prevention recommendations were reviewed. Was lost to follow up. More recently, was started on potassium citrate by nephrology. Saw Dr. Coe in May after an 8 mm right distal stone was diagnosed in the ED. URS scheduled but he was able to pass his stone, which was CaOx (consistent with previous).      Follow up JEN obtained via nephrology last week, showing multiple stones bilaterally, though with no evidence of obstruction. Also noted to have mild bladder wall thickening, possibly 2/2 chronic CRABTREE. Recent UA negative for blood.     Today, he states that he has felt fine since he passed his stone. He notes that he did not have pain with this stone,  but rather noticed his urine volume decrease. He has continued to take the potassium citrate once daily, as prescribed, though he admits that he occasionally forgets about it. Asks about the ability to dissolve his stones.     Regarding JEN results showing bladder wall thickening, he denies any significant LUTS. Does not feel he needs Flomax at this time.    Objective:     Exam:   Patient is a 68 year old male   No vital signs obtained due to virtual visit.  General Appearance: Well groomed, hygenic  Respiratory: No cough, no respiratory distress or labored breathing  Musculoskeletal: Grossly normal with no gross deficits  Skin: No discoloration or apparent rashes  Neurologic: No tremors  Psychiatric: Alert and oriented  Further examination is deferred due to the nature of our visit.

## 2023-11-17 NOTE — NURSING NOTE
Is the patient currently in the state of [place]? YES    Visit mode:VIDEO    If the visit is dropped, the patient can be reconnected by: VIDEO VISIT: Text to cell phone:   Telephone Information:   Mobile [phone]       Will anyone else be joining the visit? NO  (If patient encounters technical issues they should call [phone]    How would you like to obtain your AVS? MyChart    Are changes needed to the allergy or medication list? Pt stated no changes to allergies and Pt stated no med changes    Reason for visit: ARSALAN BUSTAMANTE

## 2023-11-17 NOTE — LETTER
11/17/2023       RE: Trevin Gee  [address]  [facility] [address]     Dear Colleague,    Thank you for referring your patient, Trevin Gee, to the [facility] UROLOGY CLINIC [place] at [facility]. Please see a copy of my visit note below.    Virtual Visit Details    Type of service:  Video Visit   Video Start Time: 11:02 AM  Video End Time: 11:21 AM    Originating Location (pt. Location): Home  Distant Location (provider location):  Off-site  Platform used for Video Visit: Mukesh    CC: Kidney stones    Assessment/Plan:  68 year old male with a history of Crohn's s/p bowel resection who has had CaOx kidney stones, including a recently-passed one that measured 8 mm. URS from last week showing multiple stones bilaterally. He asks about dissolving the stones he has, and we discussed that calcium oxalate stones are generally thought to be insoluble. Uric acid stones can be dissolved with alkalinizing agents, like the potassium citrate that he takes now, though he has never had uric acid stone so far. Advised that he focus on optimizing hydration, reducing oxalates in the diet, and continuing to consume dairy products.   -Continue the potassium citrate  -Will pursue an updated Litholink x1, as it has been a few years since he's done this. Follow up with me to review these results.     Yue Wilson, CNP  Department of Urology      Subjective:   68 year old male with a history of Crohn's s/p bowel resection and CaOx kidney stones who presents for virtual follow up. I saw him last in 2019, at which time stone-prevention recommendations were reviewed. Was lost to follow up. More recently, was started on potassium citrate by nephrology. Saw Dr. Coe in May after an 8 mm right distal stone was diagnosed in the ED. URS scheduled but he was able to pass his stone, which was CaOx (consistent with previous).      Follow up JEN obtained via  nephrology last week, showing multiple stones bilaterally, though with no evidence of obstruction. Also noted to have mild bladder wall thickening, possibly 2/2 chronic CRABTREE. Recent UA negative for blood.     Today, he states that he has felt fine since he passed his stone. He notes that he did not have pain with this stone, but rather noticed his urine volume decrease. He has continued to take the potassium citrate once daily, as prescribed, though he admits that he occasionally forgets about it. Asks about the ability to dissolve his stones.     Regarding JEN results showing bladder wall thickening, he denies any significant LUTS. Does not feel he needs Flomax at this time.    Objective:     Exam:   Patient is a 68 year old male   No vital signs obtained due to virtual visit.  General Appearance: Well groomed, hygenic  Respiratory: No cough, no respiratory distress or labored breathing  Musculoskeletal: Grossly normal with no gross deficits  Skin: No discoloration or apparent rashes  Neurologic: No tremors  Psychiatric: Alert and oriented  Further examination is deferred due to the nature of our visit.

## 2023-11-18 ENCOUNTER — HEALTH MAINTENANCE LETTER (OUTPATIENT)
Age: 68
End: 2023-11-18

## 2023-11-20 ENCOUNTER — MEDICAL CORRESPONDENCE (OUTPATIENT)
Dept: HEALTH INFORMATION MANAGEMENT | Facility: CLINIC | Age: 68
End: 2023-11-20
Payer: COMMERCIAL

## 2023-11-26 NOTE — NURSING NOTE
"Trevin Gee is a 67 year old male patient that presents today in clinic for the following:    Chief Complaint   Patient presents with     Annual Visit     Medication refills     Follow Up     Hematuria     The patient reports darker urine in the last week. He is not sure why this is the case. He denies pain with urination.     Derm Problem     The patient reports something on his back that he'd like checked out.      GI Problem     The patient's allergies and medications were reviewed as noted. A set of vitals were recorded as noted without incident: /89 (BP Location: Left arm, Patient Position: Sitting, Cuff Size: Adult Regular)   Pulse 72   Resp 16   Ht 1.778 m (5' 10\")   Wt 70.9 kg (156 lb 4.8 oz)   SpO2 99%   BMI 22.43 kg/m  . The patient does not have any other questions for the provider.    Miguel A Anton, EMT at 1:40 PM on 10/7/2022  " Ambulatory

## 2023-12-08 ENCOUNTER — TRANSFERRED RECORDS (OUTPATIENT)
Dept: HEALTH INFORMATION MANAGEMENT | Facility: CLINIC | Age: 68
End: 2023-12-08
Payer: COMMERCIAL

## 2023-12-19 ENCOUNTER — PRE VISIT (OUTPATIENT)
Dept: UROLOGY | Facility: CLINIC | Age: 68
End: 2023-12-19
Payer: COMMERCIAL

## 2023-12-23 DIAGNOSIS — F31.81 BIPOLAR 2 DISORDER (H): ICD-10-CM

## 2023-12-27 ENCOUNTER — MYC REFILL (OUTPATIENT)
Dept: NEPHROLOGY | Facility: CLINIC | Age: 68
End: 2023-12-27
Payer: COMMERCIAL

## 2023-12-27 DIAGNOSIS — N20.0 NEPHROLITHIASIS: ICD-10-CM

## 2023-12-28 RX ORDER — POTASSIUM CITRATE 10 MEQ/1
10 TABLET, EXTENDED RELEASE ORAL DAILY
Qty: 90 TABLET | Refills: 1 | Status: SHIPPED | OUTPATIENT
Start: 2023-12-28 | End: 2024-09-12

## 2023-12-28 NOTE — TELEPHONE ENCOUNTER
traZODone (DESYREL) 50 MG tablet 90 tablet 3 10/7/2022  Last Office Visit : 8/16/2023  [facility] Internal Medicine [place]     Lacey Feng APRN CNP     Future Office visit:  0    Routing refill request to provider for review/approval because:  Dx is not on refill protocol: Bipolar 2 disorder

## 2023-12-29 RX ORDER — TRAZODONE HYDROCHLORIDE 50 MG/1
50 TABLET, FILM COATED ORAL AT BEDTIME
Qty: 30 TABLET | Refills: 0 | Status: SHIPPED | OUTPATIENT
Start: 2023-12-29 | End: 2024-03-22

## 2024-01-03 ENCOUNTER — TELEPHONE (OUTPATIENT)
Dept: NEPHROLOGY | Facility: CLINIC | Age: 69
End: 2024-01-03
Payer: COMMERCIAL

## 2024-01-03 NOTE — TELEPHONE ENCOUNTER
Spoke to patient to schedule 4 (5) month follow up with Dr. Sánchez with labs prior per in basket message // 01.03.2024 [facility]

## 2024-03-01 DIAGNOSIS — N18.31 STAGE 3A CHRONIC KIDNEY DISEASE (H): Primary | ICD-10-CM

## 2024-03-04 ENCOUNTER — TELEPHONE (OUTPATIENT)
Dept: GASTROENTEROLOGY | Facility: CLINIC | Age: 69
End: 2024-03-04
Payer: COMMERCIAL

## 2024-03-04 NOTE — TELEPHONE ENCOUNTER
"Returned call to patient.   Last colonoscopy 2021 with Dr. Cruz  Procedure:                Colonoscopy   Indications:              Therapeutic procedure, Personal history of Crohn's                             disease     Per patient he had a balloon dilation about a week ago, per patient \"I can get nearly nothing through, thinks its time for a stent again\"  Pt cannot drink much liquid at once, doesn't get nauseated, stops before that happens.     Encouraged patient to follow up with [facility] providers, pt states his usual GI doc Dr David is out of town, knows he needs a stent and needs one ASAP. Told patient if that if he has emergent issues the best plan would be to come to our ER as we're limited to how quickly we can schedule for outpatient procedures, once they are approved.        Attending MD: Magdi Flores , follows with Dr Frankie David, Yan LOCKETT MD    Colonoscopy   Findings:       Two benign-appearing, intrinsic moderate stenosis,        (small ulcers noted on both strictures) both        measuring around 1 cm (in length) x 1 cm (inner        diameter) was found at the ileocolonic anastomosis        and was non-traversed. A TTS dilator was passed        through the scope. Dilation with a 12-13.5-15 mm and        a 12 mm colonic balloon dilator was performed.        The entire examined colon appeared normal.   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 Dilated.                          - The entire examined colon is                          normal.                          - No specimens collected.   Recommendation:        - Discharge patient to home.                          - Low residue diet for 1 week.                          - Continue present medications.                          - Repeat colonoscopy in 1 year for                          retreatment, sooner if symptoms do                          not improve.   Procedure Code(s):     --- " Professional

## 2024-03-08 ENCOUNTER — OFFICE VISIT (OUTPATIENT)
Dept: NEPHROLOGY | Facility: CLINIC | Age: 69
End: 2024-03-08
Attending: INTERNAL MEDICINE
Payer: COMMERCIAL

## 2024-03-08 ENCOUNTER — LAB (OUTPATIENT)
Dept: LAB | Facility: CLINIC | Age: 69
End: 2024-03-08
Attending: INTERNAL MEDICINE
Payer: COMMERCIAL

## 2024-03-08 ENCOUNTER — TELEPHONE (OUTPATIENT)
Dept: GASTROENTEROLOGY | Facility: CLINIC | Age: 69
End: 2024-03-08

## 2024-03-08 VITALS
TEMPERATURE: 97.7 F | HEART RATE: 73 BPM | WEIGHT: 156 LBS | OXYGEN SATURATION: 99 % | BODY MASS INDEX: 22.38 KG/M2 | SYSTOLIC BLOOD PRESSURE: 127 MMHG | DIASTOLIC BLOOD PRESSURE: 81 MMHG

## 2024-03-08 DIAGNOSIS — K50.012 CROHN'S DISEASE OF SMALL INTESTINE WITH INTESTINAL OBSTRUCTION (H): Primary | ICD-10-CM

## 2024-03-08 DIAGNOSIS — N18.31 STAGE 3A CHRONIC KIDNEY DISEASE (H): ICD-10-CM

## 2024-03-08 DIAGNOSIS — N20.0 NEPHROLITHIASIS: ICD-10-CM

## 2024-03-08 DIAGNOSIS — K56.600 PARTIAL SMALL BOWEL OBSTRUCTION (H): Primary | ICD-10-CM

## 2024-03-08 DIAGNOSIS — K91.30 COLORECTAL ANASTOMOTIC STRICTURE: ICD-10-CM

## 2024-03-08 DIAGNOSIS — K50.012 CROHN'S DISEASE OF SMALL INTESTINE WITH INTESTINAL OBSTRUCTION (H): ICD-10-CM

## 2024-03-08 DIAGNOSIS — N17.9 AKI (ACUTE KIDNEY INJURY) (H): ICD-10-CM

## 2024-03-08 DIAGNOSIS — N20.2 CALCULUS OF KIDNEY WITH CALCULUS OF URETER: ICD-10-CM

## 2024-03-08 LAB
ALBUMIN MFR UR ELPH: 17.3 MG/DL
ANION GAP SERPL CALCULATED.3IONS-SCNC: 10 MMOL/L (ref 7–15)
BUN SERPL-MCNC: 21.1 MG/DL (ref 8–23)
CALCIUM SERPL-MCNC: 9.7 MG/DL (ref 8.8–10.2)
CHLORIDE SERPL-SCNC: 105 MMOL/L (ref 98–107)
CREAT SERPL-MCNC: 1.53 MG/DL (ref 0.67–1.17)
CREAT UR-MCNC: 218 MG/DL
DEPRECATED HCO3 PLAS-SCNC: 28 MMOL/L (ref 22–29)
EGFRCR SERPLBLD CKD-EPI 2021: 49 ML/MIN/1.73M2
ERYTHROCYTE [DISTWIDTH] IN BLOOD BY AUTOMATED COUNT: 13 % (ref 10–15)
GLUCOSE SERPL-MCNC: 85 MG/DL (ref 70–99)
HCT VFR BLD AUTO: 47.6 % (ref 40–53)
HGB BLD-MCNC: 16.2 G/DL (ref 13.3–17.7)
MCH RBC QN AUTO: 31.8 PG (ref 26.5–33)
MCHC RBC AUTO-ENTMCNC: 34 G/DL (ref 31.5–36.5)
MCV RBC AUTO: 94 FL (ref 78–100)
PLATELET # BLD AUTO: 255 10E3/UL (ref 150–450)
POTASSIUM SERPL-SCNC: 4.9 MMOL/L (ref 3.4–5.3)
PROT/CREAT 24H UR: 0.08 MG/MG CR (ref 0–0.2)
RBC # BLD AUTO: 5.09 10E6/UL (ref 4.4–5.9)
SODIUM SERPL-SCNC: 143 MMOL/L (ref 135–145)
WBC # BLD AUTO: 5.7 10E3/UL (ref 4–11)

## 2024-03-08 PROCEDURE — 85027 COMPLETE CBC AUTOMATED: CPT | Performed by: PATHOLOGY

## 2024-03-08 PROCEDURE — 36415 COLL VENOUS BLD VENIPUNCTURE: CPT | Performed by: PATHOLOGY

## 2024-03-08 PROCEDURE — 99214 OFFICE O/P EST MOD 30 MIN: CPT | Performed by: INTERNAL MEDICINE

## 2024-03-08 PROCEDURE — G0463 HOSPITAL OUTPT CLINIC VISIT: HCPCS | Performed by: INTERNAL MEDICINE

## 2024-03-08 PROCEDURE — 84156 ASSAY OF PROTEIN URINE: CPT | Performed by: PATHOLOGY

## 2024-03-08 PROCEDURE — 80048 BASIC METABOLIC PNL TOTAL CA: CPT | Performed by: PATHOLOGY

## 2024-03-08 ASSESSMENT — PAIN SCALES - GENERAL: PAINLEVEL: NO PAIN (0)

## 2024-03-08 NOTE — TELEPHONE ENCOUNTER
[facility] Call Center    Phone Message    May a detailed message be left on voicemail: yes     Reason for Call: Other: Pt is calling in looking to speak with Dr. Cruz's team regarding being seen for an appointment as soon as possible. Writer notes comment not to schedule Pt without speaking with clinic leadership, but was unable to reach back line at time of call. Please call back as soon as possible to discuss.     Action Taken: Message routed to:  [facility] Panc-Bili    Travel Screening: Not Applicable

## 2024-03-08 NOTE — NURSING NOTE
"Chief Complaint   Patient presents with    RECHECK     RETURN NEPHROLOGY - four month follow up       /81 (BP Location: Left arm, Patient Position: Sitting, Cuff Size: Adult Regular)   Pulse 73   Temp 97.7  F (36.5  C) (Oral)   Wt 70.8 kg (156 lb)   SpO2 99%   BMI 22.38 kg/m      PT said that some days he feels \"stuffed\" and struggles to take all his medications, but is still taking his medications.    Cyrus Hollis CMA on 3/8/2024 at 3:29 PM    "

## 2024-03-08 NOTE — LETTER
3/8/2024       RE: Trevin Gee  [address]  [facility] [address]     Dear Colleague,    Thank you for referring your patient, Trevin Gee, to the [facility] NEPHROLOGY [facility]INIC [place] at [facility]. Please see a copy of my visit note below.      Nephrology Clinic Note    I was asked to see this patient by Dr. Owen    CC: CKD and nephrolithiasis    HPI: Trevin Gee is a 68 year old male with PMH significant for Chron's disease s/p colectomies, gout, nephrolithiasis, CKD who presents for evaluation and management of CKD and nephrolithiasis.     Pt endorsed feeling fairly stable. No new symptoms today. Have had obstructive stone episode in may and was able to pass the renal stone. H/o recurrent nephrolithiasis personally and in his brother he know of. He also had chron's disease with recurrent diarrhea and underwent colectomy. He had colon stricture now experiencing low transition constipation. Endorsed that he tried to be hydrated well enough but he often lost in work and forgets about fluid intake. Denied other systemic symptoms today including recurrent fevers/chills, nausea,vomiting, diarrhea, abdominal pain, chest pain or SOB    Endorsed that he had increased frequency and urgency of urination yesterday, improved with good hydration, denied associated dysuria or hematuria.    Last stone passed was in may, none he knows since then.    3/8/2024  Pt presented to nephrology clinic for CKD follow up. Pt endorsed that his GI issues were aggravated in the past several weeks. He is been in contact with his GI team to further evaluate any possible strictures and possible management if they find some anatomical abnormalities. He was having significant nausea and intermittent vomiting, now improving slowly. He is able to drink some fluids better than before. H/o recurrent nephrolithiasis but no new stone episodes since I last saw  him. Denied chest pain or SOB. No report of recurrent fevers/chills.    - History of urinary symptoms: improved.  - History of Hematuria: no  - Swelling: no  - Hx of UTIs: no  - Hx of stones: yes, last one in may 2023  - Rashes/Joint pain: no  - Family hx of kidney disease: older brother with kidney stone disease  - NSAID use: no       Allergies   Allergen Reactions    Prednisone Other (See Comments)     Daina with more than 2.5mg chronic dosing of prednisone due to fluconazole interaction per patient.        Cyanocobalamin (VITAMIN B 12) 500 MCG TABS, Take 500 mcg by mouth every evening   fluconazole (DIFLUCAN) 200 MG tablet, Take 1 tablet (200 mg) by mouth daily  gabapentin (NEURONTIN) 300 MG capsule, Take 2 capsules (600 mg) by mouth 2 times daily  Lysine 1000 MG TABS, Take 1,500 mg by mouth 2 times daily  MELATONIN PO, Take 1.5 mg by mouth nightly as needed (PT last dose 1.27.2020)  multivitamin w/minerals (THERA-VIT-M) tablet, Take 1 tablet by mouth every evening   potassium citrate (UROCIT-K) 10 MEQ (1080 MG) CR tablet, Take 1 tablet (10 mEq) by mouth daily Take it with the largest meal  traZODone (DESYREL) 50 MG tablet, Take 1 tablet (50 mg) by mouth at bedtime Needs f/u with Lacey Feng for further refills.  vitamin B6 (PYRIDOXINE) 100 MG tablet, Take 100 mg by mouth daily  Vitamin D, Cholecalciferol, 25 MCG (1000 UT) CAPS, Take 25 mcg by mouth daily  colchicine (COLCYRS) 0.6 MG tablet, Take 1 tablet (0.6 mg) by mouth daily as needed for moderate pain (Patient not taking: Reported on 10/24/2023)    No current facility-administered medications on file prior to visit.      Past Medical History:   Diagnosis Date    Anemia 2007    Anxiety 2012?    much worse since July 2014    Cataract 12/2007    both eyes, too much prednisone, implants    Coccidioidomycosis     Crohn disease (H)     Crohn's disease (H) 1/13/2015    Depressive disorder 7/2014    much worse since July 2014    Fracture 1956    green fracture  of leg    Gallbladder problem 2005?    much gravel, removed    GERD (gastroesophageal reflux disease)     History of blood transfusion 1988    ulcerated anastomosis term. ilium bleed    Hypertriglyceridemia 7/8/2016    Infection 2007    persistant coccidioidomycosis    Kidney stone various    both sides, all passed naturally    Mental health problem 2007    bipolar though perhaps different    Nephrolithiasis     Noninfectious ileitis     Osteoporosis 2007    osteoporosis, too much prednisone, last dexa 1/2014    Osteoporosis     Other nervous system complications 2007    prednisone overload induced kevin    Personal history of colonic polyps     small ones found during colonoscopies    Steroid-induced psychosis     Patient extremely sensitive to regular doses of steroids.  Unclear if this is due to chronic fluconazole use or genetic issue.  In the future, use caution when prescribing steroids.    TB (tuberculosis)        Past Surgical History:   Procedure Laterality Date    APPENDECTOMY  1974    coincidental with Crohn's surgery    BACK SURGERY  12/2007    kyphoplasty on compressed 4th??? vertebra    BIOPSY  2007, 2013    lump on arm, knee, back of hand for coccidioidomycosis cult.    CHOLECYSTECTOMY  2005    much gravel, removed laparoscopically    COLONOSCOPY  7/14/2014 last    Dr. Catherine Bowden, [facility] - many previous    COLONOSCOPY Left 9/11/2015    Procedure: COMBINED COLONOSCOPY, SINGLE OR MULTIPLE BIOPSY/POLYPECTOMY BY BIOPSY;  Surgeon: Fransisco Leach MD;  Location: [facility]    COLONOSCOPY N/A 8/3/2016    Procedure: COMBINED COLONOSCOPY, SINGLE OR MULTIPLE BIOPSY/POLYPECTOMY BY BIOPSY;  Surgeon: Fransisco Leach MD;  Location: [facility]    COLONOSCOPY N/A 8/16/2017    Procedure: COMBINED COLONOSCOPY, SINGLE OR MULTIPLE BIOPSY/POLYPECTOMY BY BIOPSY;;  Surgeon: Fransisco Leach MD;  [facility]    COLONOSCOPY N/A 10/1/2019    Procedure: Colonoscopy With Colonic Stent Insertion;   "Surgeon: Robbie Cruz MD;  [facility]    COLONOSCOPY N/A 2/5/2020    Procedure: COLONOSCOPY, FOREIGN BODY REMOVAL;  Surgeon: Robbie Cruz MD;  [facility]    COLONOSCOPY N/A 5/26/2021    Procedure: COLONOSCOPY, WITH COLONIC STENT INSERTION;  Surgeon: Robbie Cruz MD;  [facility]    COLONOSCOPY N/A 12/6/2021    Procedure: COLONOSCOPY with dilation;  Surgeon: Robbie Cruz MD;  Location: [facility] OR    ENT SURGERY  ?    upper endoscopy    ENTEROSCOPY SMALL BOWEL N/A 9/22/2021    Procedure: ENTEROSCOPY;  Surgeon: Robbie Cruz MD;  [facility]    ESOPHAGOSCOPY, GASTROSCOPY, DUODENOSCOPY (EGD), COMBINED N/A 11/8/2016    Procedure: COMBINED ENDOSCOPIC ULTRASOUND, ESOPHAGOSCOPY, GASTROSCOPY, DUODENOSCOPY (EGD), FINE NEEDLE ASPIRATE/BIOPSY;  Surgeon: Guru Alexsandra Ballesteros MD;  Location: [facility]    ESOPHAGOSCOPY, GASTROSCOPY, DUODENOSCOPY (EGD), COMBINED N/A 11/8/2016    Procedure: COMBINED ESOPHAGOSCOPY, GASTROSCOPY, DUODENOSCOPY (EGD), BIOPSY SINGLE OR MULTIPLE;  Surgeon: Guru Alexsandra Ballesteros MD;  Location: [facility]    ESOPHAGOSCOPY, GASTROSCOPY, DUODENOSCOPY (EGD), COMBINED N/A 8/16/2017    Procedure: COMBINED ESOPHAGOSCOPY, GASTROSCOPY, DUODENOSCOPY (EGD), BIOPSY SINGLE OR MULTIPLE;;  Surgeon: Fransisco Leach MD;  [facility]    EYE SURGERY  12/2007    cataract surgery both sides    GI SURGERY  1974, 1986(x2), 1989    Crohn's, 1974 RO 18\" term. ilium + 9\" asc. colon all one pc    SOFT TISSUE SURGERY  3/2013    removed buildup on back of r hand, coccidioidomycosis       Social History     Tobacco Use    Smoking status: Never    Smokeless tobacco: Never   Vaping Use    Vaping Use: Never used   Substance Use Topics    Alcohol use: Not Currently     Comment: sometimes one glass of wine a week    Drug use: No       Family History   Problem Relation Age of Onset    Cancer - colorectal Mother     Heart Failure Father     " Musculoskeletal Disorder Father         Parkinson's    Cerebrovascular Disease Paternal Grandmother     Musculoskeletal Disorder Brother         Parkinson's    Cancer Other     Anesthesia Reaction No family hx of     Deep Vein Thrombosis (DVT) No family hx of     Melanoma No family hx of     Skin Cancer No family hx of        ROS: A 12 system review of systems was negative other than noted here or above.     Exam:  /81 (BP Location: Left arm, Patient Position: Sitting, Cuff Size: Adult Regular)   Pulse 73   Temp 97.7  F (36.5  C) (Oral)   Wt 70.8 kg (156 lb)   SpO2 99%   BMI 22.38 kg/m      GENERAL APPEARANCE: alert and no distress  EYES:  no scleral icterus  HENT: no gross abnormalities noted  NECK: supple, no adenopathy  RESP: lungs clear to auscultation   CV: regular rhythm, normal rate, no rub  Extremities: no clubbing, cyanosis, or edema  SKIN: no rash  NEURO: mentation intact and speech normal  PSYCH: affect normal/bright    Results:    No visits with results within 1 Day(s) from this visit.   Latest known visit with results is:   Lab on 07/11/2023   Component Date Value Ref Range Status    Creatinine 07/11/2023 1.48 (H)  0.67 - 1.17 mg/dL Final    GFR Estimate 07/11/2023 51 (L)  >60 mL/min/1.73m2 Final    Potassium 07/11/2023 4.5  3.4 - 5.3 mmol/L Final    Hold Specimen 07/11/2023 [facility]   Final    Parathyroid Hormone Intact 07/11/2023 21  15 - 65 pg/mL Final       Assessment/Plan:     CKD stage IIIa  Calcium oxalate nephrolithiasis  Recurrent NALLELY episodes   Mild to moderate right sided hydronephrosis in may 2023 because of stone obstruction, improved in latest imaging.  Pt with baseline creatinine of 1.3-1.4 with eGFR in 50's which put him in CKD stage IIIa. His repeat creatinine today is at 1.5 with eGFR of 49 likely 2/2 recent poor oral fluid intake and on going GI issues. UA with out RBC and UPCR was WNL. Renal imaging in may 2023 showed mild to moderate hydronephrosis 2/2 obstructed stone and he  passed at that time which improved on recent imaging. Continue to have b/l small stones. Continued to be on K-Citrate.   Underlying CKD is related to renovascular disease and or nephrolithiasis.  - continue good hydration to make up to 2.5L  of urine to prevent further stone formation once your GI issues improve   - continue on current dose of potassium citrate for jordon-oxalate stones   - encouraged low salt diet   - increase in fresh fruits and vegetables at this point.     Hypertension :  BP been stable, not on meds, continue to monitor    Electrolytes :  Stable, but high normal potassium noted while being on K-citrate.    BMD :  Jordon was WNL, on jordon and vitamin D supplementation  Encouraged to take tums with foods if possible, okay to take diary products with high calcium with meals.    Metabolic acidosis :  Bicarb is WNL    Anemia :  Hb is WNL    Other problems  Chron's disease   H/o Small bowel obstruction  Bowel stricture with low transient constipation   No new nausea/vomiting, abdominal pain or diarrhea. Will have BM once every 2 days.  Continue to follow up with PCP/ GI team.  H/o B12 def, on supplementation    Tabitha Sánchez  Dept of Nephrology and Hypertension  [facility]

## 2024-03-08 NOTE — PROGRESS NOTES
Nephrology Clinic Note    I was asked to see this patient by Dr. Owen    CC: CKD and nephrolithiasis    HPI: Trevin Gee is a 68 year old male with PMH significant for Chron's disease s/p colectomies, gout, nephrolithiasis, CKD who presents for evaluation and management of CKD and nephrolithiasis.     Pt endorsed feeling fairly stable. No new symptoms today. Have had obstructive stone episode in may and was able to pass the renal stone. H/o recurrent nephrolithiasis personally and in his brother he know of. He also had chron's disease with recurrent diarrhea and underwent colectomy. He had colon stricture now experiencing low transition constipation. Endorsed that he tried to be hydrated well enough but he often lost in work and forgets about fluid intake. Denied other systemic symptoms today including recurrent fevers/chills, nausea,vomiting, diarrhea, abdominal pain, chest pain or SOB    Endorsed that he had increased frequency and urgency of urination yesterday, improved with good hydration, denied associated dysuria or hematuria.    Last stone passed was in may, none he knows since then.    3/8/2024  Pt presented to nephrology clinic for CKD follow up. Pt endorsed that his GI issues were aggravated in the past several weeks. He is been in contact with his GI team to further evaluate any possible strictures and possible management if they find some anatomical abnormalities. He was having significant nausea and intermittent vomiting, now improving slowly. He is able to drink some fluids better than before. H/o recurrent nephrolithiasis but no new stone episodes since I last saw him. Denied chest pain or SOB. No report of recurrent fevers/chills.    - History of urinary symptoms: improved.  - History of Hematuria: no  - Swelling: no  - Hx of UTIs: no  - Hx of stones: yes, last one in may 2023  - Rashes/Joint pain: no  - Family hx of kidney disease: older brother with kidney stone disease  - NSAID use:  no       Allergies   Allergen Reactions     Prednisone Other (See Comments)     Diana with more than 2.5mg chronic dosing of prednisone due to fluconazole interaction per patient.        Cyanocobalamin (VITAMIN B 12) 500 MCG TABS, Take 500 mcg by mouth every evening   fluconazole (DIFLUCAN) 200 MG tablet, Take 1 tablet (200 mg) by mouth daily  gabapentin (NEURONTIN) 300 MG capsule, Take 2 capsules (600 mg) by mouth 2 times daily  Lysine 1000 MG TABS, Take 1,500 mg by mouth 2 times daily  MELATONIN PO, Take 1.5 mg by mouth nightly as needed (PT last dose 1.27.2020)  multivitamin w/minerals (THERA-VIT-M) tablet, Take 1 tablet by mouth every evening   potassium citrate (UROCIT-K) 10 MEQ (1080 MG) CR tablet, Take 1 tablet (10 mEq) by mouth daily Take it with the largest meal  traZODone (DESYREL) 50 MG tablet, Take 1 tablet (50 mg) by mouth at bedtime Needs f/u with Lacey Feng for further refills.  vitamin B6 (PYRIDOXINE) 100 MG tablet, Take 100 mg by mouth daily  Vitamin D, Cholecalciferol, 25 MCG (1000 UT) CAPS, Take 25 mcg by mouth daily  colchicine (COLCYRS) 0.6 MG tablet, Take 1 tablet (0.6 mg) by mouth daily as needed for moderate pain (Patient not taking: Reported on 10/24/2023)    No current facility-administered medications on file prior to visit.      Past Medical History:   Diagnosis Date     Anemia 2007     Anxiety 2012?    much worse since July 2014     Cataract 12/2007    both eyes, too much prednisone, implants     Coccidioidomycosis      Crohn disease (H)      Crohn's disease (H) 1/13/2015     Depressive disorder 7/2014    much worse since July 2014     Fracture 1956    green fracture of leg     Gallbladder problem 2005?    much gravel, removed     GERD (gastroesophageal reflux disease)      History of blood transfusion 1988    ulcerated anastomosis term. ilium bleed     Hypertriglyceridemia 7/8/2016     Infection 2007    persistant coccidioidomycosis     Kidney stone various    both sides, all  passed naturally     Mental health problem 2007    bipolar though perhaps different     Nephrolithiasis      Noninfectious ileitis      Osteoporosis 2007    osteoporosis, too much prednisone, last dexa 1/2014     Osteoporosis      Other nervous system complications 2007    prednisone overload induced kevin     Personal history of colonic polyps     small ones found during colonoscopies     Steroid-induced psychosis     Patient extremely sensitive to regular doses of steroids.  Unclear if this is due to chronic fluconazole use or genetic issue.  In the future, use caution when prescribing steroids.     TB (tuberculosis)        Past Surgical History:   Procedure Laterality Date     APPENDECTOMY  1974    coincidental with Crohn's surgery     BACK SURGERY  12/2007    kyphoplasty on compressed 4th??? vertebra     BIOPSY  2007, 2013    lump on arm, knee, back of hand for coccidioidomycosis cult.     CHOLECYSTECTOMY  2005    much gravel, removed laparoscopically     COLONOSCOPY  7/14/2014 last    Dr. Catherine Bowden, [facility] - many previous     COLONOSCOPY Left 9/11/2015    Procedure: COMBINED COLONOSCOPY, SINGLE OR MULTIPLE BIOPSY/POLYPECTOMY BY BIOPSY;  Surgeon: Fransisco Leach MD;  [facility]     COLONOSCOPY N/A 8/3/2016    Procedure: COMBINED COLONOSCOPY, SINGLE OR MULTIPLE BIOPSY/POLYPECTOMY BY BIOPSY;  Surgeon: Fransisco Leach MD;  [facility]: [facility]     COLONOSCOPY N/A 8/16/2017    Procedure: COMBINED COLONOSCOPY, SINGLE OR MULTIPLE BIOPSY/POLYPECTOMY BY BIOPSY;;  Surgeon: Fransisco Leach MD;  [facility]     COLONOSCOPY N/A 10/1/2019    Procedure: Colonoscopy With Colonic Stent Insertion;  Surgeon: Robbie Cruz MD;  [facility]     COLONOSCOPY N/A 2/5/2020    Procedure: COLONOSCOPY, FOREIGN BODY REMOVAL;  Surgeon: Robbie Cruz MD;  [facility]     COLONOSCOPY N/A 5/26/2021    Procedure: COLONOSCOPY, WITH COLONIC STENT INSERTION;  Surgeon: Nancy  "Robbie Franklin MD;  [facility]     COLONOSCOPY N/A 12/6/2021    Procedure: COLONOSCOPY with dilation;  Surgeon: Robbie Cruz MD;  Location: [facility]     ENT SURGERY  ?    upper endoscopy     ENTEROSCOPY SMALL BOWEL N/A 9/22/2021    Procedure: ENTEROSCOPY;  Surgeon: Robbie Cruz MD;  [facility]     ESOPHAGOSCOPY, GASTROSCOPY, DUODENOSCOPY (EGD), COMBINED N/A 11/8/2016    Procedure: COMBINED ENDOSCOPIC ULTRASOUND, ESOPHAGOSCOPY, GASTROSCOPY, DUODENOSCOPY (EGD), FINE NEEDLE ASPIRATE/BIOPSY;  Surgeon: Guru Alexsandra Ballesteros MD;  [facility]     ESOPHAGOSCOPY, GASTROSCOPY, DUODENOSCOPY (EGD), COMBINED N/A 11/8/2016    Procedure: COMBINED ESOPHAGOSCOPY, GASTROSCOPY, DUODENOSCOPY (EGD), BIOPSY SINGLE OR MULTIPLE;  Surgeon: Guru Alexsandra Ballesteros MD;  [facility]     ESOPHAGOSCOPY, GASTROSCOPY, DUODENOSCOPY (EGD), COMBINED N/A 8/16/2017    Procedure: COMBINED ESOPHAGOSCOPY, GASTROSCOPY, DUODENOSCOPY (EGD), BIOPSY SINGLE OR MULTIPLE;;  Surgeon: Fransisco Leach MD;  Location:  OR     EYE SURGERY  12/2007    cataract surgery both sides     GI SURGERY  1974, 1986(x2), 1989    Crohn's, 1974 RO 18\" term. ilium + 9\" asc. colon all one pc     SOFT TISSUE SURGERY  3/2013    removed buildup on back of r hand, coccidioidomycosis       Social History     Tobacco Use     Smoking status: Never     Smokeless tobacco: Never   Vaping Use     Vaping Use: Never used   Substance Use Topics     Alcohol use: Not Currently     Comment: sometimes one glass of wine a week     Drug use: No       Family History   Problem Relation Age of Onset     Cancer - colorectal Mother      Heart Failure Father      Musculoskeletal Disorder Father         Parkinson's     Cerebrovascular Disease Paternal Grandmother      Musculoskeletal Disorder Brother         Parkinson's     Cancer Other      Anesthesia Reaction No family hx of      Deep Vein Thrombosis (DVT) No family hx of      Melanoma No " family hx of      Skin Cancer No family hx of        ROS: A 12 system review of systems was negative other than noted here or above.     Exam:  /81 (BP Location: Left arm, Patient Position: Sitting, Cuff Size: Adult Regular)   Pulse 73   Temp 97.7  F (36.5  C) (Oral)   Wt 70.8 kg (156 lb)   SpO2 99%   BMI 22.38 kg/m      GENERAL APPEARANCE: alert and no distress  EYES:  no scleral icterus  HENT: no gross abnormalities noted  NECK: supple, no adenopathy  RESP: lungs clear to auscultation   CV: regular rhythm, normal rate, no rub  Extremities: no clubbing, cyanosis, or edema  SKIN: no rash  NEURO: mentation intact and speech normal  PSYCH: affect normal/bright    Results:    No visits with results within 1 Day(s) from this visit.   Latest known visit with results is:   Lab on 07/11/2023   Component Date Value Ref Range Status     Creatinine 07/11/2023 1.48 (H)  0.67 - 1.17 mg/dL Final     GFR Estimate 07/11/2023 51 (L)  >60 mL/min/1.73m2 Final     Potassium 07/11/2023 4.5  3.4 - 5.3 mmol/L Final     Hold Specimen 07/11/2023 JI   Final     Parathyroid Hormone Intact 07/11/2023 21  15 - 65 pg/mL Final       Assessment/Plan:     CKD stage IIIa  Calcium oxalate nephrolithiasis  Recurrent NALLELY episodes   Mild to moderate right sided hydronephrosis in may 2023 because of stone obstruction, improved in latest imaging.  Pt with baseline creatinine of 1.3-1.4 with eGFR in 50's which put him in CKD stage IIIa. His repeat creatinine today is at 1.5 with eGFR of 49 likely 2/2 recent poor oral fluid intake and on going GI issues. UA with out RBC and UPCR was WNL. Renal imaging in may 2023 showed mild to moderate hydronephrosis 2/2 obstructed stone and he passed at that time which improved on recent imaging. Continue to have b/l small stones. Continued to be on K-Citrate.   Underlying CKD is related to renovascular disease and or nephrolithiasis.  - continue good hydration to make up to 2.5L  of urine to prevent further  stone formation once your GI issues improve   - continue on current dose of potassium citrate for jordon-oxalate stones   - encouraged low salt diet   - increase in fresh fruits and vegetables at this point.     Hypertension :  BP been stable, not on meds, continue to monitor    Electrolytes :  Stable, but high normal potassium noted while being on K-citrate.    BMD :  Jordon was WNL, on jordon and vitamin D supplementation  Encouraged to take tums with foods if possible, okay to take diary products with high calcium with meals.    Metabolic acidosis :  Bicarb is WNL    Anemia :  Hb is WNL    Other problems  Chron's disease   H/o Small bowel obstruction  Bowel stricture with low transient constipation   No new nausea/vomiting, abdominal pain or diarrhea. Will have BM once every 2 days.  Continue to follow up with PCP/ GI team.  H/o B12 def, on supplementation    Tabitha Sánchez  Dept of Nephrology and Hypertension  [facility]

## 2024-03-11 ENCOUNTER — PREP FOR PROCEDURE (OUTPATIENT)
Dept: GASTROENTEROLOGY | Facility: CLINIC | Age: 69
End: 2024-03-11
Payer: COMMERCIAL

## 2024-03-11 ENCOUNTER — TELEPHONE (OUTPATIENT)
Dept: NEPHROLOGY | Facility: CLINIC | Age: 69
End: 2024-03-11
Payer: COMMERCIAL

## 2024-03-11 ENCOUNTER — TRANSCRIBE ORDERS (OUTPATIENT)
Dept: OTHER | Age: 69
End: 2024-03-11

## 2024-03-11 DIAGNOSIS — K50.90 CROHN'S DISEASE (H): Primary | ICD-10-CM

## 2024-03-11 DIAGNOSIS — K91.89 ANASTOMOTIC STRICTURE OF SMALL INTESTINE: Primary | ICD-10-CM

## 2024-03-11 DIAGNOSIS — K91.30 ANASTOMOTIC STRICTURE OF SMALL INTESTINE: Primary | ICD-10-CM

## 2024-03-11 DIAGNOSIS — K50.012 CROHN'S DISEASE OF SMALL INTESTINE WITH INTESTINAL OBSTRUCTION (H): ICD-10-CM

## 2024-03-11 RX ORDER — BISACODYL 5 MG/1
TABLET, DELAYED RELEASE ORAL
Qty: 4 TABLET | Refills: 0 | Status: SHIPPED | OUTPATIENT
Start: 2024-03-11 | End: 2024-09-12

## 2024-03-11 NOTE — TELEPHONE ENCOUNTER
Scheduled follow up with Dr. Greenfield// pt previously saw Dr. Sánchez// sent valentín with clinic phone # at pt's request// 3.11.24 KET

## 2024-03-11 NOTE — TELEPHONE ENCOUNTER
Per Dr. Cruz: lets do pearson, colonoscopy with fluoroscopy and stent placement     Called patient to discuss:  Please assist in scheduling:     Procedure/Imaging/Clinic: Colonoscopy with fluoroscopy and stent placement  Physician: Nancy  Timing: 3/18  Scope time needed:provider average  Anesthesia:MAC  Dx: colonic stricture  Tier:Tier 3 -   Surgeries/procedures that can be delayed  between 30 to 90 days with no significant  morbidity/mortality to patient or impact on  patient/disease outcome.   Location: [facility]  Header of letter for pt communication:  Colonoscopy with fluoroscopy and stent placement  Prep: Golytely prep  Preop: Dr Cruz to update note from Dr David on 12/5/23 DOS    Called to discuss with patient.     Explained they can expect a call from  for date of procedure, OR will call 1-2 days prior to procedure date with arrival time, will need a , someone to stay with them for 24 hours and should stay in town for 24 hours (within 45 min of Hospital) post procedure    Patient needs to get pre-op physical completed. If outside  health system will need physical faxed to number [phone]     If you do not get a preop physical, your procedure could be cancelled, patient voiced understanding*    Preop Plan: Dr. Cruz can update H&P from Dr David, [facility], from 12/5/2023 OVN    Does patient have any history of gastric bypass/gastric surgery/altered panc/bili anatomy?no    Any recent Covid symptoms or positive covid test?no    Does patient have [employer] insurance?:[facility]    Med Review    Blood thinner -  no  ASA - no  Diabetic - no  Any meds by injection or mouth for weight loss or diabetes-none    Patient Education r/t procedure:done    A pre-op nurse will call 1-2 days prior to the procedure.    NPO/Prep:   Adults and Children of all ages may consume solids up to 8 hours prior to arrival time - may consume clear liquids up to 1 hour prior to arrival time.    Other specific  details/comments:none     Verbalized understanding of all instructions. All questions answered.     Procedure order placed, message routed to OR / Endo

## 2024-03-18 ENCOUNTER — ANESTHESIA EVENT (OUTPATIENT)
Dept: SURGERY | Facility: CLINIC | Age: 69
End: 2024-03-18
Payer: COMMERCIAL

## 2024-03-18 ENCOUNTER — HOSPITAL ENCOUNTER (OUTPATIENT)
Facility: CLINIC | Age: 69
Discharge: HOME | End: 2024-03-18
Attending: INTERNAL MEDICINE | Admitting: INTERNAL MEDICINE
Payer: COMMERCIAL

## 2024-03-18 ENCOUNTER — APPOINTMENT (OUTPATIENT)
Dept: GENERAL RADIOLOGY | Facility: CLINIC | Age: 69
End: 2024-03-18
Attending: INTERNAL MEDICINE
Payer: COMMERCIAL

## 2024-03-18 ENCOUNTER — ANESTHESIA (OUTPATIENT)
Dept: SURGERY | Facility: CLINIC | Age: 69
End: 2024-03-18
Payer: COMMERCIAL

## 2024-03-18 VITALS
BODY MASS INDEX: 22.07 KG/M2 | DIASTOLIC BLOOD PRESSURE: 87 MMHG | OXYGEN SATURATION: 100 % | WEIGHT: 154.2 LBS | TEMPERATURE: 96.9 F | HEIGHT: 70 IN | RESPIRATION RATE: 29 BRPM | SYSTOLIC BLOOD PRESSURE: 140 MMHG | HEART RATE: 56 BPM

## 2024-03-18 LAB — COLONOSCOPY: NORMAL

## 2024-03-18 PROCEDURE — 250N000009 HC RX 250: Performed by: NURSE ANESTHETIST, CERTIFIED REGISTERED

## 2024-03-18 PROCEDURE — 710N000012 HC RECOVERY PHASE 2, PER MINUTE: Performed by: INTERNAL MEDICINE

## 2024-03-18 PROCEDURE — 250N000011 HC RX IP 250 OP 636: Performed by: NURSE ANESTHETIST, CERTIFIED REGISTERED

## 2024-03-18 PROCEDURE — C1726 CATH, BAL DIL, NON-VASCULAR: HCPCS | Performed by: INTERNAL MEDICINE

## 2024-03-18 PROCEDURE — 370N000017 HC ANESTHESIA TECHNICAL FEE, PER MIN: Performed by: INTERNAL MEDICINE

## 2024-03-18 PROCEDURE — 45386 COLONOSCOPY W/BALLOON DILAT: CPT | Performed by: NURSE ANESTHETIST, CERTIFIED REGISTERED

## 2024-03-18 PROCEDURE — 710N000009 HC RECOVERY PHASE 1, LEVEL 1, PER MIN: Performed by: INTERNAL MEDICINE

## 2024-03-18 PROCEDURE — 360N000082 HC SURGERY LEVEL 2 W/ FLUORO, PER MIN: Performed by: INTERNAL MEDICINE

## 2024-03-18 PROCEDURE — 999N000179 XR SURGERY CARM FLUORO LESS THAN 5 MIN W STILLS: Mod: TC

## 2024-03-18 PROCEDURE — 272N000001 HC OR GENERAL SUPPLY STERILE: Performed by: INTERNAL MEDICINE

## 2024-03-18 PROCEDURE — 258N000003 HC RX IP 258 OP 636: Performed by: ANESTHESIOLOGY

## 2024-03-18 PROCEDURE — 999N000141 HC STATISTIC PRE-PROCEDURE NURSING ASSESSMENT: Performed by: INTERNAL MEDICINE

## 2024-03-18 PROCEDURE — 45386 COLONOSCOPY W/BALLOON DILAT: CPT | Performed by: ANESTHESIOLOGY

## 2024-03-18 PROCEDURE — C1769 GUIDE WIRE: HCPCS | Performed by: INTERNAL MEDICINE

## 2024-03-18 RX ORDER — LIDOCAINE 40 MG/G
CREAM TOPICAL
Status: DISCONTINUED | OUTPATIENT
Start: 2024-03-18 | End: 2024-03-18 | Stop reason: HOSPADM

## 2024-03-18 RX ORDER — LIDOCAINE HYDROCHLORIDE 20 MG/ML
INJECTION, SOLUTION INFILTRATION; PERINEURAL PRN
Status: DISCONTINUED | OUTPATIENT
Start: 2024-03-18 | End: 2024-03-18

## 2024-03-18 RX ORDER — ONDANSETRON 2 MG/ML
4 INJECTION INTRAMUSCULAR; INTRAVENOUS
Status: DISCONTINUED | OUTPATIENT
Start: 2024-03-18 | End: 2024-03-18 | Stop reason: HOSPADM

## 2024-03-18 RX ORDER — HYDROMORPHONE HCL IN WATER/PF 6 MG/30 ML
0.4 PATIENT CONTROLLED ANALGESIA SYRINGE INTRAVENOUS EVERY 5 MIN PRN
Status: DISCONTINUED | OUTPATIENT
Start: 2024-03-18 | End: 2024-03-18 | Stop reason: HOSPADM

## 2024-03-18 RX ORDER — SODIUM CHLORIDE, SODIUM LACTATE, POTASSIUM CHLORIDE, CALCIUM CHLORIDE 600; 310; 30; 20 MG/100ML; MG/100ML; MG/100ML; MG/100ML
INJECTION, SOLUTION INTRAVENOUS CONTINUOUS
Status: DISCONTINUED | OUTPATIENT
Start: 2024-03-18 | End: 2024-03-18 | Stop reason: HOSPADM

## 2024-03-18 RX ORDER — NALOXONE HYDROCHLORIDE 0.4 MG/ML
0.1 INJECTION, SOLUTION INTRAMUSCULAR; INTRAVENOUS; SUBCUTANEOUS
Status: DISCONTINUED | OUTPATIENT
Start: 2024-03-18 | End: 2024-03-18 | Stop reason: HOSPADM

## 2024-03-18 RX ORDER — HYDRALAZINE HYDROCHLORIDE 20 MG/ML
2.5-5 INJECTION INTRAMUSCULAR; INTRAVENOUS EVERY 10 MIN PRN
Status: DISCONTINUED | OUTPATIENT
Start: 2024-03-18 | End: 2024-03-18 | Stop reason: HOSPADM

## 2024-03-18 RX ORDER — ONDANSETRON 4 MG/1
4 TABLET, ORALLY DISINTEGRATING ORAL EVERY 30 MIN PRN
Status: DISCONTINUED | OUTPATIENT
Start: 2024-03-18 | End: 2024-03-18 | Stop reason: HOSPADM

## 2024-03-18 RX ORDER — PROPOFOL 10 MG/ML
INJECTION, EMULSION INTRAVENOUS CONTINUOUS PRN
Status: DISCONTINUED | OUTPATIENT
Start: 2024-03-18 | End: 2024-03-18

## 2024-03-18 RX ORDER — FENTANYL CITRATE 0.05 MG/ML
25 INJECTION, SOLUTION INTRAMUSCULAR; INTRAVENOUS EVERY 5 MIN PRN
Status: DISCONTINUED | OUTPATIENT
Start: 2024-03-18 | End: 2024-03-18 | Stop reason: HOSPADM

## 2024-03-18 RX ORDER — PROPOFOL 10 MG/ML
INJECTION, EMULSION INTRAVENOUS PRN
Status: DISCONTINUED | OUTPATIENT
Start: 2024-03-18 | End: 2024-03-18

## 2024-03-18 RX ORDER — ONDANSETRON 2 MG/ML
INJECTION INTRAMUSCULAR; INTRAVENOUS PRN
Status: DISCONTINUED | OUTPATIENT
Start: 2024-03-18 | End: 2024-03-18

## 2024-03-18 RX ORDER — FENTANYL CITRATE 0.05 MG/ML
50 INJECTION, SOLUTION INTRAMUSCULAR; INTRAVENOUS EVERY 5 MIN PRN
Status: DISCONTINUED | OUTPATIENT
Start: 2024-03-18 | End: 2024-03-18 | Stop reason: HOSPADM

## 2024-03-18 RX ORDER — HYDROMORPHONE HCL IN WATER/PF 6 MG/30 ML
0.2 PATIENT CONTROLLED ANALGESIA SYRINGE INTRAVENOUS EVERY 5 MIN PRN
Status: DISCONTINUED | OUTPATIENT
Start: 2024-03-18 | End: 2024-03-18 | Stop reason: HOSPADM

## 2024-03-18 RX ORDER — ONDANSETRON 2 MG/ML
4 INJECTION INTRAMUSCULAR; INTRAVENOUS EVERY 30 MIN PRN
Status: DISCONTINUED | OUTPATIENT
Start: 2024-03-18 | End: 2024-03-18 | Stop reason: HOSPADM

## 2024-03-18 RX ADMIN — LIDOCAINE HYDROCHLORIDE 60 MG: 20 INJECTION, SOLUTION INFILTRATION; PERINEURAL at 15:26

## 2024-03-18 RX ADMIN — PROPOFOL 150 MCG/KG/MIN: 10 INJECTION, EMULSION INTRAVENOUS at 15:26

## 2024-03-18 RX ADMIN — PROPOFOL 30 MG: 10 INJECTION, EMULSION INTRAVENOUS at 15:37

## 2024-03-18 RX ADMIN — PROPOFOL 30 MG: 10 INJECTION, EMULSION INTRAVENOUS at 15:30

## 2024-03-18 RX ADMIN — ONDANSETRON 4 MG: 2 INJECTION INTRAMUSCULAR; INTRAVENOUS at 15:30

## 2024-03-18 RX ADMIN — SODIUM CHLORIDE, POTASSIUM CHLORIDE, SODIUM LACTATE AND CALCIUM CHLORIDE: 600; 310; 30; 20 INJECTION, SOLUTION INTRAVENOUS at 12:58

## 2024-03-18 ASSESSMENT — ENCOUNTER SYMPTOMS: SEIZURES: 0

## 2024-03-18 ASSESSMENT — ACTIVITIES OF DAILY LIVING (ADL)
ADLS_ACUITY_SCORE: 37

## 2024-03-18 ASSESSMENT — LIFESTYLE VARIABLES: TOBACCO_USE: 0

## 2024-03-18 NOTE — ANESTHESIA PREPROCEDURE EVALUATION
Anesthesia Pre-Procedure Evaluation    Patient: Trevin Gee   MRN: [id number] : 1955        Procedure : Procedure(s):  COLONOSCOPY with fluoroscopy and stent placement          Past Medical History:   Diagnosis Date    Anemia     Anxiety ?    much worse since 2014    Cataract 2007    both eyes, too much prednisone, implants    Coccidioidomycosis     Crohn disease (H)     Crohn's disease (H) 2015    Depressive disorder 2014    much worse since 2014    Fracture 195    green fracture of leg    Gallbladder problem 2005?    much gravel, removed    GERD (gastroesophageal reflux disease)     History of blood transfusion 1988    ulcerated anastomosis term. ilium bleed    Hypertriglyceridemia 2016    Infection 2007    persistant coccidioidomycosis    Kidney stone various    both sides, all passed naturally    Mental health problem     bipolar though perhaps different    Nephrolithiasis     Noninfectious ileitis     Osteoporosis 2007    osteoporosis, too much prednisone, last dexa 2014    Osteoporosis     Other nervous system complications 2007    prednisone overload induced kevin    Personal history of colonic polyps     small ones found during colonoscopies    Steroid-induced psychosis     Patient extremely sensitive to regular doses of steroids.  Unclear if this is due to chronic fluconazole use or genetic issue.  In the future, use caution when prescribing steroids.    TB (tuberculosis)       Past Surgical History:   Procedure Laterality Date    APPENDECTOMY  1974    coincidental with Crohn's surgery    BACK SURGERY  2007    kyphoplasty on compressed 4th??? vertebra    BIOPSY  ,     lump on arm, knee, back of hand for coccidioidomycosis cult.    CHOLECYSTECTOMY  2005    much gravel, removed laparoscopically    COLONOSCOPY  2014 last    Dr. Catherine Bowden, [facility] - many previous    COLONOSCOPY Left 2015    Procedure: COMBINED  COLONOSCOPY, SINGLE OR MULTIPLE BIOPSY/POLYPECTOMY BY BIOPSY;  Surgeon: Fransisco Leach MD;  [facility]    COLONOSCOPY N/A 8/3/2016    Procedure: COMBINED COLONOSCOPY, SINGLE OR MULTIPLE BIOPSY/POLYPECTOMY BY BIOPSY;  Surgeon: Fransisco Leach MD;  [facility]    COLONOSCOPY N/A 8/16/2017    Procedure: COMBINED COLONOSCOPY, SINGLE OR MULTIPLE BIOPSY/POLYPECTOMY BY BIOPSY;;  Surgeon: Fransisco Leach MD;  [facility]    COLONOSCOPY N/A 10/1/2019    Procedure: Colonoscopy With Colonic Stent Insertion;  Surgeon: Robbie Cruz MD;  [facility]    COLONOSCOPY N/A 2/5/2020    Procedure: COLONOSCOPY, FOREIGN BODY REMOVAL;  Surgeon: Robbie Cruz MD;  [facility]    COLONOSCOPY N/A 5/26/2021    Procedure: COLONOSCOPY, WITH COLONIC STENT INSERTION;  Surgeon: Robbie Cruz MD;  [facility]    COLONOSCOPY N/A 12/6/2021    Procedure: COLONOSCOPY with dilation;  Surgeon: Robbie Cruz MD;  [facility]    ENT SURGERY  ?    upper endoscopy    ENTEROSCOPY SMALL BOWEL N/A 9/22/2021    Procedure: ENTEROSCOPY;  Surgeon: Robbie Cruz MD;  [facility]    ESOPHAGOSCOPY, GASTROSCOPY, DUODENOSCOPY (EGD), COMBINED N/A 11/8/2016    Procedure: COMBINED ENDOSCOPIC ULTRASOUND, ESOPHAGOSCOPY, GASTROSCOPY, DUODENOSCOPY (EGD), FINE NEEDLE ASPIRATE/BIOPSY;  Surgeon: Guru Alexsandra Ballesteros MD;  [facility]    ESOPHAGOSCOPY, GASTROSCOPY, DUODENOSCOPY (EGD), COMBINED N/A 11/8/2016    Procedure: COMBINED ESOPHAGOSCOPY, GASTROSCOPY, DUODENOSCOPY (EGD), BIOPSY SINGLE OR MULTIPLE;  Surgeon: Guru Alexsandra Ballesteros MD;  [facility]    ESOPHAGOSCOPY, GASTROSCOPY, DUODENOSCOPY (EGD), COMBINED N/A 8/16/2017    Procedure: COMBINED ESOPHAGOSCOPY, GASTROSCOPY, DUODENOSCOPY (EGD), BIOPSY SINGLE OR MULTIPLE;;  Surgeon: Fransisco Leach MD;  [facility]    EYE SURGERY  12/2007    cataract surgery both sides    GI SURGERY  1974,  "1986(x2), 1989    Crohn's, 1974 RO 18\" term. ilium + 9\" asc. colon all one pc    SOFT TISSUE SURGERY  3/2013    removed buildup on back of r hand, coccidioidomycosis      Allergies   Allergen Reactions    Prednisone Other (See Comments)     Diana with more than 2.5mg chronic dosing of prednisone due to fluconazole interaction per patient.       Social History     Tobacco Use    Smoking status: Never    Smokeless tobacco: Never   Substance Use Topics    Alcohol use: Not Currently     Comment: sometimes one glass of wine a week      Wt Readings from Last 1 Encounters:   03/08/24 70.8 kg (156 lb)        Anesthesia Evaluation   Pt has had prior anesthetic.     No history of anesthetic complications       ROS/MED HX  ENT/Pulmonary:  - neg pulmonary ROS  (-) tobacco use, asthma and sleep apnea   Neurologic:     (+)    peripheral neuropathy, - mild in feet.                        (-) no seizures and no CVA   Cardiovascular:     (+) Dyslipidemia - -   -  - -                                 Previous cardiac testing   Echo: Date: Results:    Stress Test:  Date: Results:    ECG Reviewed:  Date: 5/26/21 Results:  Sinus rhythm   Cannot rule out Inferior infarct, age undetermined   Abnormal ECG   When compared with ECG of 01-NOV-2016 10:42, No significant change was found  Ventricular rate 75 bpm    Cath:  Date: Results:      METS/Exercise Tolerance: 4 - Raking leaves, gardening    Hematologic:     (+)       history of blood transfusion, no previous transfusion reaction,     (-) history of blood clots   Musculoskeletal: Comment: Osteoporosis        GI/Hepatic: Comment: Crohn's    s/p multiple surgeries including distal ilectomy and right hemicolectomy     Colonic stricture      (+)       Inflammatory bowel disease,          (-) GERD and liver disease   Renal/Genitourinary: Comment: Creatinine 1.43, GFR 51 on 9/22/21      (+) renal disease, type: CRI,            Endo:  - neg endo ROS  (-) Type II DM   Psychiatric/Substance Use: " Comment: Hx steroid induced psychosis    (+) psychiatric history anxiety, depression and bipolar       Infectious Disease: Comment: TB in 2001, s/p treatment      (+)      TB,      Malignancy:  - neg malignancy ROS     Other:  - neg other ROS          Physical Exam    Airway        Mallampati: II   TM distance: > 3 FB   Neck ROM: full     Respiratory Devices and Support         Dental       (+) Minor Abnormalities - some fillings, tiny chips      Cardiovascular   cardiovascular exam normal          Pulmonary   pulmonary exam normal                OUTSIDE LABS:  CBC:   Lab Results   Component Value Date    WBC 5.7 03/08/2024    WBC 7.6 05/17/2023    HGB 16.2 03/08/2024    HGB 16.1 10/24/2023    HCT 47.6 03/08/2024    HCT 45.3 05/17/2023     03/08/2024     05/17/2023     BMP:   Lab Results   Component Value Date     03/08/2024     10/24/2023    POTASSIUM 4.9 03/08/2024    POTASSIUM 5.1 10/24/2023    CHLORIDE 105 03/08/2024    CHLORIDE 103 10/24/2023    CO2 28 03/08/2024    CO2 28 10/24/2023    BUN 21.1 03/08/2024    BUN 18.0 10/24/2023    CR 1.53 (H) 03/08/2024    CR 1.51 (H) 10/24/2023    GLC 85 03/08/2024     (H) 10/24/2023     COAGS:   Lab Results   Component Value Date    PTT 23 08/04/2007    INR 0.99 04/21/2019     POC:   Lab Results   Component Value Date    BGM 86 05/26/2021     HEPATIC:   Lab Results   Component Value Date    ALBUMIN 4.3 10/24/2023    PROTTOTAL 7.2 10/07/2022    ALT 18 10/07/2022    AST 28 10/07/2022    ALKPHOS 97 10/07/2022    BILITOTAL 0.7 10/07/2022     OTHER:   Lab Results   Component Value Date    PH 7.43 08/04/2007    LACT 0.7 01/18/2021    A1C 5.6 08/22/2019    YUNIER 9.7 03/08/2024    PHOS 4.2 10/24/2023    MAG 1.9 09/05/2007    LIPASE 192 01/17/2021    AMYLASE 68 11/11/2016    TSH 1.13 08/14/2020    T4 0.79 08/22/2019    CRP 8.4 (H) 01/20/2021    SED 6 01/18/2021       Anesthesia Plan    ASA Status:  2    NPO Status:  NPO Appropriate    Anesthesia Type:  MAC.     - Reason for MAC: immobility needed              Consents    Anesthesia Plan(s) and associated risks, benefits, and realistic alternatives discussed. Questions answered and patient/representative(s) expressed understanding.     - Discussed:     - Discussed with:  Patient            Postoperative Care    Pain management: IV analgesics.   PONV prophylaxis: Ondansetron (or other 5HT-3), Background Propofol Infusion     Comments:               Carlee Maravilla MD    I have reviewed the pertinent notes and labs in the chart from the past 30 days and (re)examined the patient.  Any updates or changes from those notes are reflected in this note.

## 2024-03-18 NOTE — ANESTHESIA CARE TRANSFER NOTE
Patient: Trevin Gee    Procedure: Procedure(s):  COLONOSCOPY with fluoroscopy dilation       Diagnosis: Crohn's disease (H) [K50.90]  Diagnosis Additional Information: No value filed.    Anesthesia Type:   MAC     Note:    Oropharynx: oropharynx clear of all foreign objects  Level of Consciousness: awake  Oxygen Supplementation: room air    Independent Airway: airway patency satisfactory and stable    Vital Signs Stable: post-procedure vital signs reviewed and stable  Report to RN Given: handoff report given  Patient transferred to: PACU    Handoff Report: Identifed the Patient, Identified the Reponsible Provider, Reviewed the pertinent medical history, Discussed the surgical course, Reviewed Intra-OP anesthesia mangement and issues during anesthesia, Set expectations for post-procedure period and Allowed opportunity for questions and acknowledgement of understanding          Electronically Signed By: MARISOL Castañeda CRNA  March 18, 2024  4:17 PM

## 2024-03-18 NOTE — ANESTHESIA POSTPROCEDURE EVALUATION
Patient: Trevin Gee    Procedure: Procedure(s):  COLONOSCOPY with fluoroscopy dilation       Anesthesia Type:  MAC    Note:     Postop Pain Control: Uneventful            Sign Out: Well controlled pain   PONV: No   Neuro/Psych: Uneventful            Sign Out: Acceptable/Baseline neuro status   Airway/Respiratory: Uneventful            Sign Out: Acceptable/Baseline resp. status   CV/Hemodynamics: Uneventful            Sign Out: Acceptable CV status; No obvious hypovolemia; No obvious fluid overload   Other NRE: NONE   DID A NON-ROUTINE EVENT OCCUR? No           Last vitals:  Vitals Value Taken Time   /71 03/18/24 1620   Temp 35.9  C (96.7  F) 03/18/24 1620   Pulse 77 03/18/24 1630   Resp 16 03/18/24 1630   SpO2 89 % 03/18/24 1630   Vitals shown include unfiled device data.    Electronically Signed By: Carlee Maravilla MD  March 18, 2024  4:32 PM

## 2024-03-18 NOTE — DISCHARGE INSTRUCTIONS
** See Procedure Report for additional information **      Same Day Surgery Discharge Instructions for  Sedation and General Anesthesia     It's not unusual to feel dizzy, light-headed or faint for up to 24 hours after surgery or while taking pain medication.  If you have these symptoms: sit for a few minutes before standing and have someone assist you when you get up to walk or use the bathroom.    You should rest and relax for the next 24 hours. We recommend you make arrangements to have an adult stay with you for at least 24 hours after your discharge.  Avoid hazardous and strenuous activity.    DO NOT DRIVE any vehicle or operate mechanical equipment for 24 hours following the end of your surgery.  Even though you may feel normal, your reactions may be affected by the medication you have received.    Do not drink alcoholic beverages for 24 hours following surgery.     Slowly progress to your regular diet as you feel able. It's not unusual to feel nauseated and/or vomit after receiving anesthesia.  If you develop these symptoms, drink clear liquids (apple juice, ginger ale, broth, 7-up, etc. ) until you feel better.  If your nausea and vomiting persists for 24 hours, please notify your surgeon.      All narcotic pain medications, along with inactivity and anesthesia, can cause constipation. Drinking plenty of liquids and increasing fiber intake will help.    For any questions of a medical nature, call your surgeon.    Do not make important decisions for 24 hours.    If you had general anesthesia, you may have a sore throat for a couple of days related to the breathing tube used during surgery.  You may use Cepacol lozenges to help with this discomfort.  If it worsens or if you develop a fever, contact your surgeon.     If you feel your pain is not well managed with the pain medications prescribed by your surgeon, please contact your surgeon's office to let them know so they can address your concerns.            ** If you have questions or concerns about your procedure,   call Dr. Cruz at [phone] **

## 2024-03-18 NOTE — OP NOTE
Component Collected Lab   COLONOSCOPY 03/18/2024  3:14 PM [address] [place] [place]  [address]  _______________________________________________________________________________  Patient Name: Trevin Gee        Procedure Date: 3/18/2024 3:14 PM  MRN: [id number]                       Account Number: [id number]  YOB: 1955              Admit Type: Outpatient  Age: 68                               Room: [place] [address]  Note Status: Finalized                Attending MD: DEONTE VALVERDE MD,  Instrument Name: [id number] Gastroscope  _______________________________________________________________________________     Procedure:                Colonoscopy  Indications:              Follow-up of Crohn's disease  Providers:                DEONTE VALVERDE MD, Lenore Louis RN  Patient Profile:          Mr Gee is a 69yo gentleman with Crohns                            status post distal ilectomy and right hemicolectomy                            complicated by a recalcitrant anstomotic stenosis                            who underwent a series of Axios stent placements                            for management. He now returns with recurrent                            complaints and requests repeat interrogation by                            colonoscopy. Of note, he has transitioned his IBD                            care to [facility].  Referring MD:             LORRAINE POLLACK MD  Medicines:                Deep sedation was administered  Complications:            No immediate complications.  _______________________________________________________________________________  Procedure:                Pre-Anesthesia Assessment:                            - Prior to the procedure, a History and Physical                            was performed, and patient medications and                            allergies were reviewed. The patient is competent.                             The risks and benefits of the procedure and the                            sedation options and risks were discussed with the                            patient. All questions were answered and informed                            consent was obtained. Patient identification and                            proposed procedure were verified by the nurse in                            the pre-procedure area. Mental Status Examination:                            alert and oriented. Airway Examination: Mallampati                            Class II (the uvula but not tonsillar pillars                            visualized). Respiratory Examination: clear to                            auscultation. CV Examination: normal. ASA Grade                            Assessment: III - A patient with severe systemic                            disease. After reviewing the risks and benefits,                            the patient was deemed in satisfactory condition to                            undergo the procedure. The anesthesia plan was to                            use deep sedation / analgesia. Immediately prior to                            administration of medications, the patient was                            re-assessed for adequacy to receive sedatives. The                            heart rate, respiratory rate, oxygen saturations,                            blood pressure, adequacy of pulmonary ventilation,                            and response to care were monitored throughout the                            procedure. The physical status of the patient was                            re-assessed after the procedure. After obtaining                            informed consent, the colonoscope was passed under                            direct vision. Throughout the procedure, the                            patient's blood pressure, pulse, and oxygen                            saturations were  monitored continuously. The                            endoscope 505 was introduced through the anus and                            advanced to the ileocolonic anastomosis. The                            colonoscopy was performed without difficulty. The                            patient tolerated the procedure well. The quality                            of the bowel preparation was adequate.                                                                                   Findings:        films demonstrated surgical clips/staples. The patient was left       lateral and then turned supine. Both a 1T and a pediatric colonoscope       were used. Digital exam revealed external hemorrhoids and tone was       intact. Attempts to reach the ileocolonic anastomosis in the region of       the hepatic flexure with the 1T was not successful despite positional       changes. This obviated stent placement. We then exchanged for a       pediatric colonoscope and easily reached the anastomosis. Here a benign       appearing stenosis was found at the anastomosis with patetency of       14-15mm and a short length. A similar stenosis was found just upstream.       Both could be traverrsed with the colonoscope before dilation. These       stensoses were serially dilated using a Hurricane balloon under       fluoroscopy from 17-18.5-20mm with deep rent formation of each, neither       with suggestion of full thickness defect. There was no evidence of       active inflammation involving the neoterminal ileum, colon or rectum.       Completion films were without evidence of extraluminal gas.                                                                                   Impression:               - The 1T could not reach the anastomosis therefore                            obviating deployment of a LAMS                            - Recalcitrant, mild residual terminal ileum and                            ileocolonic anastomotic  and stenoses, both serially                            balloon dilated to 20mm with formation appropriate                            rent without evidence of full thickness defect                            - No evidence of inflammation or lesion within the                            neoterminal ileum, colon or rectum  Recommendation:           - Deep sedation recovery with probable discharge                            home this evening                            - All medicaitons, diet and activity may resume                            without delay                            - Patient to follow up with Dr David as scheduled                            - The findings and recommendations were discussed                            with the patient and their family

## 2024-03-18 NOTE — ANESTHESIA POSTPROCEDURE EVALUATION
Patient: Trevin Gee    Procedure: Procedure(s):  COLONOSCOPY with fluoroscopy dilation       Anesthesia Type:  MAC    Note:  Disposition: Outpatient   Postop Pain Control: Uneventful            Sign Out: Well controlled pain   PONV: No   Neuro/Psych: Uneventful            Sign Out: Acceptable/Baseline neuro status   Airway/Respiratory: Uneventful            Sign Out: Acceptable/Baseline resp. status   CV/Hemodynamics: Uneventful            Sign Out: Acceptable CV status; No obvious hypovolemia; No obvious fluid overload   Other NRE: NONE   DID A NON-ROUTINE EVENT OCCUR? No           Last vitals:  Vitals Value Taken Time   /84 03/18/24 1645   Temp 35.9  C (96.7  F) 03/18/24 1620   Pulse 58 03/18/24 1700   Resp 18 03/18/24 1700   SpO2 98 % 03/18/24 1700   Vitals shown include unfiled device data.    Electronically Signed By: Sara Pickard MD  March 18, 2024  5:01 PM

## 2024-03-22 ENCOUNTER — OFFICE VISIT (OUTPATIENT)
Dept: INTERNAL MEDICINE | Facility: CLINIC | Age: 69
End: 2024-03-22
Payer: COMMERCIAL

## 2024-03-22 VITALS
BODY MASS INDEX: 22.48 KG/M2 | WEIGHT: 157 LBS | HEIGHT: 70 IN | SYSTOLIC BLOOD PRESSURE: 128 MMHG | HEART RATE: 92 BPM | DIASTOLIC BLOOD PRESSURE: 83 MMHG | OXYGEN SATURATION: 98 %

## 2024-03-22 DIAGNOSIS — Z23 NEED FOR COVID-19 VACCINE: ICD-10-CM

## 2024-03-22 DIAGNOSIS — F31.81 BIPOLAR 2 DISORDER (H): ICD-10-CM

## 2024-03-22 DIAGNOSIS — Z29.11 NEED FOR VACCINATION AGAINST RESPIRATORY SYNCYTIAL VIRUS: ICD-10-CM

## 2024-03-22 DIAGNOSIS — G47.00 INSOMNIA, UNSPECIFIED TYPE: Primary | ICD-10-CM

## 2024-03-22 PROCEDURE — 99213 OFFICE O/P EST LOW 20 MIN: CPT | Mod: 25

## 2024-03-22 PROCEDURE — 91320 SARSCV2 VAC 30MCG TRS-SUC IM: CPT

## 2024-03-22 PROCEDURE — 90480 ADMN SARSCOV2 VAC 1/ONLY CMP: CPT

## 2024-03-22 RX ORDER — RESPIRATORY SYNCYTIAL VIRUS VACCINE 120MCG/0.5
0.5 KIT INTRAMUSCULAR ONCE
Qty: 1 EACH | Refills: 0 | Status: CANCELLED | OUTPATIENT
Start: 2024-03-22 | End: 2024-03-22

## 2024-03-22 RX ORDER — TRAZODONE HYDROCHLORIDE 50 MG/1
50 TABLET, FILM COATED ORAL AT BEDTIME
Qty: 15 TABLET | Refills: 0 | Status: SHIPPED | OUTPATIENT
Start: 2024-03-22 | End: 2024-05-15

## 2024-03-22 RX ORDER — TRAZODONE HYDROCHLORIDE 50 MG/1
50 TABLET, FILM COATED ORAL AT BEDTIME
Qty: 90 TABLET | Refills: 1 | Status: SHIPPED | OUTPATIENT
Start: 2024-03-22

## 2024-03-22 ASSESSMENT — PAIN SCALES - GENERAL: PAINLEVEL: NO PAIN (0)

## 2024-03-22 ASSESSMENT — PATIENT HEALTH QUESTIONNAIRE - PHQ9
SUM OF ALL RESPONSES TO PHQ QUESTIONS 1-9: 0
SUM OF ALL RESPONSES TO PHQ QUESTIONS 1-9: 0
10. IF YOU CHECKED OFF ANY PROBLEMS, HOW DIFFICULT HAVE THESE PROBLEMS MADE IT FOR YOU TO DO YOUR WORK, TAKE CARE OF THINGS AT HOME, OR GET ALONG WITH OTHER PEOPLE: NOT DIFFICULT AT ALL

## 2024-03-22 NOTE — PROGRESS NOTES
Assessment & Plan     Insomnia, unspecified type  Bipolar 2 disorder (H)  He has been tolerating trazodone, manages insomnia well. He will be out of trazodone soon, will be out before mail order can be received. Short supply sent to local pharmacy, with additional refill sent to his mail order pharmacy.   - traZODone (DESYREL) 50 MG tablet  Dispense: 15 tablet; Refill: 0  - traZODone (DESYREL) 50 MG tablet  Dispense: 90 tablet; Refill: 1    Need for COVID-19 vaccine  Due for covid vaccination. He is agreeable to receiving this today.  - COVID-19 12+ (2023-24) (PFIZER)    Need for vaccination against respiratory syncytial virus  Due for RSV vaccine. Discussed obtaining this at his local pharmacy.      Return in about 2 months (around 5/22/2024) for Routine preventive.    Subjective   Trevin is a 68 year old, presenting for the following health issues:  Medication Refill (Trazodone; pt is quite low and would like to discuss maybe sending it to different pharmacy but not entire amount of Rx. )        3/22/2024    12:05 PM   Additional Questions   Roomed by MVO, EMT     History of Present Illness       Reason for visit:  Vaccine  &    He eats 2-3 servings of fruits and vegetables daily.He consumes 0 sweetened beverage(s) daily.He exercises with enough effort to increase his heart rate 30 to 60 minutes per day.  He exercises with enough effort to increase his heart rate 7 days per week. He is missing 5 dose(s) of medications per week.  He is not taking prescribed medications regularly due to side effects and other.       Trevin presents to the clinic today for a refill of trazodone. He typically takes half of a tablet in the evening to aid in sleep. Reports he would previously have difficulty initiating and staying asleep prior to using trazodone. He tolerates this well without concerns.     He reports a slightly irritated throat yesterday and today. No fever, no chills, no dyspnea. No new nasal congestion. He does have  "seasonal allergies.         Objective    /83 (BP Location: Left arm, Patient Position: Sitting, Cuff Size: Adult Regular)   Pulse 92   Ht 1.778 m (5' 10\")   Wt 71.2 kg (157 lb)   SpO2 98%   BMI 22.53 kg/m    Body mass index is 22.53 kg/m .  Physical Exam   GENERAL: alert and no distress  HENT: oral mucosa intact without erythema or lesion, no rhinorrhea   RESP: lungs clear to auscultation - no rales, rhonchi or wheezes  CV: regular rate and rhythm, normal S1 S2, no S3 or S4, no murmur, click or rub, no peripheral edema  PSYCH: mentation appears normal, affect flat        Signed Electronically by: Moon Guthrie NP    "

## 2024-05-14 SDOH — HEALTH STABILITY: PHYSICAL HEALTH: ON AVERAGE, HOW MANY MINUTES DO YOU ENGAGE IN EXERCISE AT THIS LEVEL?: 40 MIN

## 2024-05-14 SDOH — HEALTH STABILITY: PHYSICAL HEALTH: ON AVERAGE, HOW MANY DAYS PER WEEK DO YOU ENGAGE IN MODERATE TO STRENUOUS EXERCISE (LIKE A BRISK WALK)?: 3 DAYS

## 2024-05-14 ASSESSMENT — SOCIAL DETERMINANTS OF HEALTH (SDOH): HOW OFTEN DO YOU GET TOGETHER WITH FRIENDS OR RELATIVES?: THREE TIMES A WEEK

## 2024-05-15 ENCOUNTER — OFFICE VISIT (OUTPATIENT)
Dept: INTERNAL MEDICINE | Facility: CLINIC | Age: 69
End: 2024-05-15
Payer: COMMERCIAL

## 2024-05-15 VITALS
WEIGHT: 151.3 LBS | RESPIRATION RATE: 14 BRPM | SYSTOLIC BLOOD PRESSURE: 129 MMHG | DIASTOLIC BLOOD PRESSURE: 76 MMHG | HEIGHT: 69 IN | BODY MASS INDEX: 22.41 KG/M2 | HEART RATE: 91 BPM | OXYGEN SATURATION: 98 %

## 2024-05-15 DIAGNOSIS — B38.7 COCCIDIOIDAL GRANULOMA: ICD-10-CM

## 2024-05-15 DIAGNOSIS — N18.30 STAGE 3 CHRONIC KIDNEY DISEASE, UNSPECIFIED WHETHER STAGE 3A OR 3B CKD (H): ICD-10-CM

## 2024-05-15 DIAGNOSIS — Z00.00 ENCOUNTER FOR MEDICARE ANNUAL WELLNESS EXAM: Primary | ICD-10-CM

## 2024-05-15 DIAGNOSIS — K50.019 CROHN'S DISEASE OF SMALL INTESTINE WITH COMPLICATION (H): ICD-10-CM

## 2024-05-15 DIAGNOSIS — Z13.220 LIPID SCREENING: ICD-10-CM

## 2024-05-15 PROCEDURE — G0439 PPPS, SUBSEQ VISIT: HCPCS | Performed by: NURSE PRACTITIONER

## 2024-05-15 RX ORDER — FLUCONAZOLE 100 MG/1
100 TABLET ORAL DAILY
Qty: 90 TABLET | Refills: 0 | Status: SHIPPED | OUTPATIENT
Start: 2024-05-15

## 2024-05-15 RX ORDER — DIPHENOXYLATE HCL/ATROPINE 2.5-.025MG
1 TABLET ORAL PRN
COMMUNITY

## 2024-05-15 RX ORDER — DICYCLOMINE HYDROCHLORIDE 10 MG/1
10 CAPSULE ORAL PRN
COMMUNITY

## 2024-05-15 NOTE — PROGRESS NOTES
Preventive Care Visit  [facility]  MARISOL Alfaro CNP, Internal Medicine  May 15, 2024      Assessment & Plan     Encounter for Medicare annual wellness exam  Encouraged pt to work on healthy lifestyle with regular physical activity, nutritious diet, stress management and safety.    Coccidioidal granuloma  Reviewed Dr. Irwin' notes, had advised dose reduction if Cr remains >1.4.  Will reduce fluconazole to 100 mg and recommended scheduling follow-up with ID, as has been >2 years since last seen.   - fluconazole (DIFLUCAN) 100 MG tablet; Take 1 tablet (100 mg) by mouth daily  - Hepatic panel (Albumin, ALT, AST, Bili, Alk Phos, TP); Future    Stage 3 chronic kidney disease  Keep q 6 month follow-up with Nephrology as scheduled.    Crohn's disease of small intestine (H)  Stable. Continue regular follow-up with external GI.    Lipid screening  Check future fasting lipid panel.   - Lipid panel reflex to direct LDL Fasting; Future      Counseling  Appropriate preventive services were discussed with this patient, including applicable screening as appropriate for fall prevention, nutrition, physical activity, Tobacco-use cessation, weight loss and cognition.  Checklist reviewing preventive services available has been given to the patient.  Reviewed patient's diet, addressing concerns and/or questions.   He is at risk for lack of exercise and has been provided with information to increase physical activity for the benefit of his well-being.   He is at risk for psychosocial distress and has been provided with information to reduce risk.     Follow-up in 1 year for annual wellness visit, sooner prn for any changes or concerns    Subjective   Trevin is a 69 year old, presenting for the following:  Wellness Visit        5/15/2024    11:29 AM   Additional Questions   Roomed by ms emt         Health Care Directive  Patient does not have a Health Care Directive or Living Will:  Discussed advance care planning with patient; however, patient declined at this time.    HPI    Gut doing better after last dilation in March with Dr. Cruz.  Wasn't able to have a stent placed this time.  Had some dysmotility, GI recommended resuming normal diet since anastomosis was opened, has been going okay so far.  Trying to space these out d/t concern that the dilations will be less effective over time.    Sunday AM woke up with spasm in R trapezius, heating pad helped.    Hx coccidioidal granuloma, on chronic fluconazole given recurrence when treatment has been stopped in the past.  Last saw ID in 2021, due to follow-up.  Has ~2 weeks left.   Following with Urology, monitoring oxalate intake to help prevent stones.         5/14/2024   General Health   How would you rate your overall physical health? Good   Feel stress (tense, anxious, or unable to sleep) Only a little   (!) STRESS CONCERN      5/14/2024   Nutrition   Diet: Other   If other, please elaborate: Low oxalate         5/14/2024   Exercise   Days per week of moderate/strenous exercise 3 days   Average minutes spent exercising at this level 40 min         5/14/2024   Social Factors   Frequency of gathering with friends or relatives Three times a week   Worry food won't last until get money to buy more No   Food not last or not have enough money for food? No   Do you have housing?  Yes   Are you worried about losing your housing? No   Lack of transportation? No   Unable to get utilities (heat,electricity)? No         5/14/2024   Fall Risk   Fallen 2 or more times in the past year? No   Trouble with walking or balance? No          5/14/2024   Activities of Daily Living- Home Safety   Needs help with the following daily activites None of the above   Safety concerns in the home None of the above         5/14/2024   Dental   Dentist two times every year? Yes         5/14/2024   Hearing Screening   Hearing concerns? None of the above         5/14/2024    Driving Risk Screening   Patient/family members have concerns about driving No         5/14/2024   General Alertness/Fatigue Screening   Have you been more tired than usual lately? No         5/14/2024   Urinary Incontinence Screening   Bothered by leaking urine in past 6 months No         5/14/2024   TB Screening   Were you born outside of the [place]? No           Today's PHQ-2 Score:       3/8/2024     3:26 PM   PHQ-2 ( 1999 Pf[employer])   Q1: Little interest or pleasure in doing things 0   Q2: Feeling down, depressed or hopeless 0   PHQ-2 Score 0         5/14/2024   Substance Use   Alcohol more than 3/day or more than 7/wk No   Do you have a current opioid prescription? No   How severe/bad is pain from 1 to 10? 0/10 (No Pain)   Do you use any other substances recreationally? No     Social History     Tobacco Use    Smoking status: Never    Smokeless tobacco: Never   Vaping Use    Vaping status: Never Used   Substance Use Topics    Alcohol use: Yes     Comment: rarely these days    Drug use: No           5/14/2024   AAA Screening   Family history of Abdominal Aortic Aneurysm (AAA)? No   Last PSA:   PSA   Date Value Ref Range Status   02/13/2007 1.10 0 - 4 ug/L Final     ASCVD Risk   The ASCVD Risk score ([place] DK, et al., 2019) failed to calculate for the following reasons:    The valid total cholesterol range is 130 to 320 mg/dL            Reviewed and updated as needed this visit by Provider                    Current Outpatient Medications   Medication Sig Dispense Refill    Cyanocobalamin (VITAMIN B 12) 500 MCG TABS Take 500 mcg by mouth every evening       fluconazole (DIFLUCAN) 100 MG tablet Take 1 tablet (100 mg) by mouth daily 90 tablet 0    gabapentin (NEURONTIN) 300 MG capsule Take 2 capsules (600 mg) by mouth 2 times daily (Patient taking differently: Take 600 mg by mouth as needed) 360 capsule 3    Iron Combinations (IRON COMPLEX PO) Take 1 tablet by mouth daily      Lysine 1000 MG TABS Take 1,500  "mg by mouth 2 times daily      multivitamin w/minerals (THERA-VIT-M) tablet Take 1 tablet by mouth every evening       potassium citrate (UROCIT-K) 10 MEQ (1080 MG) CR tablet Take 1 tablet (10 mEq) by mouth daily Take it with the largest meal 90 tablet 1    traZODone (DESYREL) 50 MG tablet Take 1 tablet (50 mg) by mouth at bedtime 90 tablet 1    vitamin B6 (PYRIDOXINE) 100 MG tablet Take 100 mg by mouth daily      Vitamin D, Cholecalciferol, 25 MCG (1000 UT) CAPS Take 25 mcg by mouth daily      bisacodyl (DULCOLAX) 5 MG EC tablet Refer to \"Getting Ready for a Colonoscopy\" instruction handout (Patient not taking: Reported on 5/15/2024) 4 tablet 0    dicyclomine (BENTYL) 10 MG capsule Take 10 mg by mouth (Patient not taking: Reported on 5/15/2024)      diphenoxylate-atropine (LOMOTIL) 2.5-0.025 MG tablet Take 1 tablet by mouth as needed for diarrhea (Patient not taking: Reported on 5/15/2024)      MELATONIN PO Take 1.5 mg by mouth nightly as needed (PT last dose 1.27.2020) (Patient not taking: Reported on 5/15/2024)       Current providers sharing in care for this patient include:  Patient Care Team:  Lacey Feng APRN CNP as PCP - General (Nurse Practitioner - Family)  Mag Currie MD as MD (INTERNAL MEDICINE - ENDOCRINOLOGY, DIABETES & METABOLISM)  Cece Cullen PA-C as Physician Assistant (Physician Assistant)  Ilene Redmond MD as MD (Urology)  Yue Wilson CNP as Nurse Practitioner (Nurse Practitioner)  Robbie Cruz MD as MD (Gastroenterology)  Lianna Rodriguez APRN CNP as Nurse Practitioner (Anesthesiology)  Jose Martin Cheney MD as Assigned Surgical Provider  Tabitha Sánchez MD as Assigned Nephrology Provider  Moon Guthrie NP as Nurse Practitioner    The following health maintenance items are reviewed in Epic and correct as of today:  Health Maintenance   Topic Date Due    ANNUAL REVIEW OF HM ORDERS  Never done    RSV VACCINE (Pregnancy & 60+) (1 - 1-dose " "60+ series) Never done    MEDICARE ANNUAL WELLNESS VISIT  10/07/2023    LIPID  10/07/2023    MICROALBUMIN  10/07/2023    COVID-19 Vaccine (7 - 2023-24 season) 07/22/2024    PHQ-9  09/22/2024    BMP  03/08/2025    HEMOGLOBIN  03/08/2025    FALL RISK ASSESSMENT  05/15/2025    DTAP/TDAP/TD IMMUNIZATION (3 - Td or Tdap) 10/08/2025    ADVANCE CARE PLANNING  05/14/2026    GLUCOSE  03/08/2027    COLORECTAL CANCER SCREENING  03/18/2027    HEPATITIS C SCREENING  Completed    PHQ-2 (once per calendar year)  Completed    INFLUENZA VACCINE  Completed    Pneumococcal Vaccine: 65+ Years  Completed    URINALYSIS  Completed    ZOSTER IMMUNIZATION  Completed    IPV IMMUNIZATION  Aged Out    HPV IMMUNIZATION  Aged Out    MENINGITIS IMMUNIZATION  Aged Out    RSV MONOCLONAL ANTIBODY  Aged Out         Review of Systems  Constitutional, HEENT, cardiovascular, pulmonary, gi and gu systems are negative, except as otherwise noted.     Objective    Exam  /76 (BP Location: Right arm, Patient Position: Sitting, Cuff Size: Adult Regular)   Pulse 91   Resp 14   Ht 1.753 m (5' 9\")   Wt 68.6 kg (151 lb 4.8 oz)   SpO2 98%   BMI 22.34 kg/m     Estimated body mass index is 22.34 kg/m  as calculated from the following:    Height as of this encounter: 1.753 m (5' 9\").    Weight as of this encounter: 68.6 kg (151 lb 4.8 oz).    Physical Exam  GENERAL: alert and no distress  EYES: Eyes grossly normal to inspection, and conjunctivae and sclerae normal  HENT: ear canals and TM's normal, nose and mouth without ulcers or lesions  NECK: no adenopathy, no asymmetry, masses, or scars  RESP: lungs clear to auscultation - no rales, rhonchi or wheezes  CV: regular rate and rhythm, normal S1 S2, no S3 or S4, no murmur, click or rub, no peripheral edema  ABDOMEN: soft, nontender, no hepatosplenomegaly, no masses and bowel sounds normal, scarred  MS: no gross musculoskeletal defects noted, no edema  SKIN: no suspicious lesions or rashes  NEURO: Normal " strength and tone, mentation intact and speech normal  PSYCH: mentation appears normal, affect normal/bright        5/15/2024   Mini Cog   Clock Draw Score 2 Normal   3 Item Recall 3 objects recalled   Mini Cog Total Score 5              Signed Electronically by: MARISOL Alfaro CNP

## 2024-05-24 ENCOUNTER — VIRTUAL VISIT (OUTPATIENT)
Dept: UROLOGY | Facility: CLINIC | Age: 69
End: 2024-05-24
Payer: COMMERCIAL

## 2024-05-24 DIAGNOSIS — N20.0 KIDNEY STONE: Primary | ICD-10-CM

## 2024-05-24 PROCEDURE — 99213 OFFICE O/P EST LOW 20 MIN: CPT | Mod: 95 | Performed by: NURSE PRACTITIONER

## 2024-05-24 RX ORDER — POTASSIUM CITRATE 15 MEQ/1
15 TABLET, EXTENDED RELEASE ORAL 2 TIMES DAILY WITH MEALS
Qty: 180 TABLET | Refills: 3 | Status: SHIPPED | OUTPATIENT
Start: 2024-05-24

## 2024-05-24 ASSESSMENT — PAIN SCALES - GENERAL: PAINLEVEL: NO PAIN (0)

## 2024-05-24 NOTE — NURSING NOTE
Is the patient currently in the state of [place]? YES    Visit mode:TELEPHONE    If the visit is dropped, the patient can be reconnected by: VIDEO VISIT: Text to cell phone:   Telephone Information:   Mobile [phone]       Will anyone else be joining the visit? NO  (If patient encounters technical issues they should call [phone]    How would you like to obtain your AVS? MyChart    Are changes needed to the allergy or medication list? No    Are refills needed on medications prescribed by this physician? NO    Reason for visit: RECHECK (2-3 month follow-up )    Shira BUSTAMANTE

## 2024-05-24 NOTE — PROGRESS NOTES
Virtual Visit Details    Type of service:  Video Visit   Video Start Time: 1:30 PM  Video End Time: 2:03 PM    Originating Location (pt. Location): Home  Distant Location (provider location):  Off-site  Platform used for Video Visit: Mukesh    CC: Kidney stones    Assessment/Plan:  69 year old male with a history of Crohn's s/p bowel resection and CaOx kidney stones in the setting of hypocitraturia/low urine pH on potassium citrate. Litholink results from December reviewed today, showing ongoing hypocitraturia/low urine pH. He has had a hard time staying consistent with his medications due to GI issues. Ultimately, recommend we go up on his potassium citrate dose now to 15 mEq twice daily with meals. He will try to be more consistent with taking this. He just refilled the 10 mEq dose, so will have him finish this supply first by taking 10 mEq twice daily with meals for two weeks and then go up to 20 mEq in the morning and 10 mEq in the evening. Once this bottle is finished, should start the new prescription of 15 mEq twice daily. Will check his potassium after two weeks and if this is borderline or elevated, will check again two weeks later. He has had symptoms suggesting hyperkalemia in the past and therefore requests a standing order for potassium, which I am happy to place today (will place 5).   -Increase potassium citrate dose (per above)  -Potassium check after two weeks  -Repeat 24-hour collection in about one month and follow up with me to review results.     Yue Wilson, CNP  Department of Urology      Subjective:   69 year old male with a history of Crohn's s/p bowel resection and CaOx kidney stones who presents for virtual follow up. Our last visit was about 6 months ago. Multiple stones bilaterally on JEN from November. Has been managed on potassium citrate 10 mEq daily. Planned for him to complete a Litholink as it has been a few years since he last did this.     He completed this way back in  December, with results showing low urine volume, hyperoxaluria, hypocitraturia and low urine pH.         He notes that he has had a difficult time taking his medications consistently due to GI issues. He has a history of Crohn's disease with bowel resection in the past. GI symptoms are varied.     Objective:     Exam:   Patient is a 69 year old male   No vital signs obtained due to virtual visit.  General Appearance: Well groomed, hygenic  Respiratory: No cough, no respiratory distress or labored breathing  Musculoskeletal: Grossly normal with no gross deficits  Skin: No discoloration or apparent rashes  Neurologic: No tremors  Psychiatric: Alert and oriented  Further examination is deferred due to the nature of our visit.

## 2024-05-24 NOTE — LETTER
5/24/2024       RE: Trevin Gee  [address]  [facility] [address]     Dear Colleague,    Thank you for referring your patient, Trevin Gee, to the [facility] [place] at [facility]. Please see a copy of my visit note below.    Virtual Visit Details    Type of service:  Video Visit   Video Start Time: 1:30 PM  Video End Time: 2:03 PM    Originating Location (pt. Location): Home  Distant Location (provider location):  Off-site  Platform used for Video Visit: Mukesh    CC: Kidney stones    Assessment/Plan:  69 year old male with a history of Crohn's s/p bowel resection and CaOx kidney stones in the setting of hypocitraturia/low urine pH on potassium citrate. Litholink results from December reviewed today, showing ongoing hypocitraturia/low urine pH. He has had a hard time staying consistent with his medications due to GI issues. Ultimately, recommend we go up on his potassium citrate dose now to 15 mEq twice daily with meals. He will try to be more consistent with taking this. He just refilled the 10 mEq dose, so will have him finish this supply first by taking 10 mEq twice daily with meals for two weeks and then go up to 20 mEq in the morning and 10 mEq in the evening. Once this bottle is finished, should start the new prescription of 15 mEq twice daily. Will check his potassium after two weeks and if this is borderline or elevated, will check again two weeks later. He has had symptoms suggesting hyperkalemia in the past and therefore requests a standing order for potassium, which I am happy to place today (will place 5).   -Increase potassium citrate dose (per above)  -Potassium check after two weeks  -Repeat 24-hour collection in about one month and follow up with me to review results.     Yue Wilson, CNP  Department of Urology      Subjective:   69 year old male with a history of Crohn's s/p bowel resection and  CaOx kidney stones who presents for virtual follow up. Our last visit was about 6 months ago. Multiple stones bilaterally on JEN from November. Has been managed on potassium citrate 10 mEq daily. Planned for him to complete a Litholink as it has been a few years since he last did this.     He completed this way back in December, with results showing low urine volume, hyperoxaluria, hypocitraturia and low urine pH.         He notes that he has had a difficult time taking his medications consistently due to GI issues. He has a history of Crohn's disease with bowel resection in the past. GI symptoms are varied.     Objective:     Exam:   Patient is a 69 year old male   No vital signs obtained due to virtual visit.  General Appearance: Well groomed, hygenic  Respiratory: No cough, no respiratory distress or labored breathing  Musculoskeletal: Grossly normal with no gross deficits  Skin: No discoloration or apparent rashes  Neurologic: No tremors  Psychiatric: Alert and oriented  Further examination is deferred due to the nature of our visit.

## 2024-05-24 NOTE — PATIENT INSTRUCTIONS
UROLOGY CLINIC VISIT PATIENT INSTRUCTIONS    -Increase your potassium citrate dose to 10 mEq twice daily for two weeks. Then, increase to two tabs in the morning and one in the evening.   -Will check your potassium level after two weeks of this higher dose.   -Repeat the 24-hour collection in about one month and follow up with me to review results.     If you have any issues, questions or concerns in the meantime, do not hesitate to contact us at [phone] or via [employer].     Yue Wilson, CNP  Department of Urology

## 2024-05-28 ENCOUNTER — TELEPHONE (OUTPATIENT)
Dept: UROLOGY | Facility: CLINIC | Age: 69
End: 2024-05-28
Payer: COMMERCIAL

## 2024-05-28 NOTE — TELEPHONE ENCOUNTER
Left Voicemail (1st Attempt) for the patient to call back and schedule the following:    Appointment type: RTN VV  Provider: Steve  Return date: 3 months   Specialty phone number: [phone]  Additional appointment(s) needed: LithoLink and labs prior  Additonal Notes: NA

## 2024-06-10 ENCOUNTER — LAB (OUTPATIENT)
Dept: LAB | Facility: CLINIC | Age: 69
End: 2024-06-10
Payer: COMMERCIAL

## 2024-06-10 DIAGNOSIS — B38.7 COCCIDIOIDAL GRANULOMA: ICD-10-CM

## 2024-06-10 DIAGNOSIS — Z13.220 LIPID SCREENING: ICD-10-CM

## 2024-06-10 DIAGNOSIS — N20.0 KIDNEY STONE: ICD-10-CM

## 2024-06-10 LAB
ALBUMIN SERPL BCG-MCNC: 4.5 G/DL (ref 3.5–5.2)
ALP SERPL-CCNC: 78 U/L (ref 40–150)
ALT SERPL W P-5'-P-CCNC: 23 U/L (ref 0–70)
AST SERPL W P-5'-P-CCNC: 25 U/L (ref 0–45)
BILIRUB DIRECT SERPL-MCNC: 0.32 MG/DL (ref 0–0.3)
BILIRUB SERPL-MCNC: 1.1 MG/DL
CHOLEST SERPL-MCNC: 137 MG/DL
FASTING STATUS PATIENT QL REPORTED: YES
HDLC SERPL-MCNC: 70 MG/DL
LDLC SERPL CALC-MCNC: 49 MG/DL
NONHDLC SERPL-MCNC: 67 MG/DL
POTASSIUM SERPL-SCNC: 5 MMOL/L (ref 3.4–5.3)
PROT SERPL-MCNC: 7 G/DL (ref 6.4–8.3)
TRIGL SERPL-MCNC: 91 MG/DL

## 2024-06-10 PROCEDURE — 80076 HEPATIC FUNCTION PANEL: CPT | Performed by: PATHOLOGY

## 2024-06-10 PROCEDURE — 84132 ASSAY OF SERUM POTASSIUM: CPT | Performed by: PATHOLOGY

## 2024-06-10 PROCEDURE — 36415 COLL VENOUS BLD VENIPUNCTURE: CPT | Performed by: PATHOLOGY

## 2024-06-10 PROCEDURE — 80061 LIPID PANEL: CPT | Performed by: PATHOLOGY

## 2024-07-11 ENCOUNTER — OFFICE VISIT (OUTPATIENT)
Dept: DERMATOLOGY | Facility: CLINIC | Age: 69
End: 2024-07-11
Payer: COMMERCIAL

## 2024-07-11 ENCOUNTER — LAB (OUTPATIENT)
Dept: LAB | Facility: CLINIC | Age: 69
End: 2024-07-11
Payer: COMMERCIAL

## 2024-07-11 DIAGNOSIS — L81.4 SOLAR LENTIGO: ICD-10-CM

## 2024-07-11 DIAGNOSIS — N20.0 KIDNEY STONE: ICD-10-CM

## 2024-07-11 DIAGNOSIS — D22.9 MULTIPLE MELANOCYTIC NEVI: ICD-10-CM

## 2024-07-11 DIAGNOSIS — B02.29 POST HERPETIC NEURALGIA: ICD-10-CM

## 2024-07-11 DIAGNOSIS — L82.1 SEBORRHEIC KERATOSIS: ICD-10-CM

## 2024-07-11 DIAGNOSIS — D18.01 CHERRY ANGIOMA: ICD-10-CM

## 2024-07-11 DIAGNOSIS — Z85.828 HISTORY OF SKIN CANCER: Primary | ICD-10-CM

## 2024-07-11 LAB — POTASSIUM SERPL-SCNC: 5.1 MMOL/L (ref 3.4–5.3)

## 2024-07-11 PROCEDURE — 99213 OFFICE O/P EST LOW 20 MIN: CPT | Performed by: DERMATOLOGY

## 2024-07-11 PROCEDURE — 36415 COLL VENOUS BLD VENIPUNCTURE: CPT | Performed by: PATHOLOGY

## 2024-07-11 PROCEDURE — 84132 ASSAY OF SERUM POTASSIUM: CPT | Performed by: PATHOLOGY

## 2024-07-11 ASSESSMENT — PAIN SCALES - GENERAL: PAINLEVEL: NO PAIN (0)

## 2024-07-11 NOTE — PROGRESS NOTES
[facility] Dermatology Note  Encounter Date: Jul 11, 2024    Dermatology Problem List:  1. BCC on central upper back s/p Mohs 3/2023  2. Post-herpetic neuralgia: T5, (initial zoster episode 5/2021)  - gabapentin 600 mg BID (tapered down from TID)       ______________________________________    Impression/Plan:  1. History of skin cancer: no evidence of recurrence. Discussed sun protective behaviors including OTC spf 30+ sunscreen use and sun avoidance strategies.    2. Reassurance provided for benign lesions not treated today including cherry angiomata, solar lentigines, seborrheic keratoses, and banal-appearing melanocytic nevi.    3. Postherpetic neuralgia: gradually improving; gabapentin PRN      Follow-up in 6 months.       Staff Involved:  Scribe Disclosure:   By signing my name below, I, Rebekah Price, attest that this documentation has been prepared under the direction and in the presence of Jose Martin Cheney MD.  - Electronically Signed: Rebekah Price 07/11/24       Provider Disclosure:   The documentation recorded by the scribe accurately reflects the services I personally performed and the decisions made by me.    Jose Martin Cheney MD   of Dermatology  Department of Dermatology  [facility]          CC:   Skin Check (Trevin is here today for a skin check and does not have any areas of concern.)      History of Present Illness:  Mr. Trevin Gee is a 69 year old male who presents as a return patient.    Patient reports his post herpetic neuralgia is mildly unstable as it will occasionally flare, but ultimately he is doing well with Gabapentin. Patient notes some scalp dryness and dandruff.    Labs:  N/A    Physical exam:  Vitals: There were no vitals taken for this visit.  GEN: This is a well developed, well-nourished male in no acute distress, in a pleasant mood.    SKIN: [place] Valle phototype I  - Full skin, which includes the  head/face, both arms, chest, back, abdomen,both legs, genitalia and/or groin buttocks, digits and/or nails, was examined.  - There are dome shaped bright red papules on the head/neck, trunk, extremities.   - Multiple regular brown pigmented macules and papules are identified on the head/neck, trunk, extremities.   - Scattered brown macules on sun exposed areas.  - There are waxy stuck on tan to brown papules on the head/neck, trunk, extremities.  - No other lesions of concern on areas examined.       Past Medical History:   Past Medical History:   Diagnosis Date    Anemia 2007    Anxiety 2012?    much worse since July 2014    Cataract 12/2007    both eyes, too much prednisone, implants    Coccidioidomycosis     Crohn disease (H)     Crohn's disease (H) 01/13/2015    Depressive disorder 07/2014    much worse since July 2014    Fracture 1956    green fracture of leg    Gallbladder problem 2005?    much gravel, removed    GERD (gastroesophageal reflux disease)     History of blood transfusion 1988    ulcerated anastomosis term. ilium bleed    Hypertriglyceridemia 07/08/2016    Infection 2007    persistant coccidioidomycosis    Kidney stone various    both sides, all passed naturally    Mental health problem 2007    bipolar though perhaps different    Nephrolithiasis     Noninfectious ileitis     Osteoporosis 2007    osteoporosis, too much prednisone, last dexa 1/2014    Osteoporosis     Other nervous system complications 2007    prednisone overload induced kevin    Personal history of colonic polyps     small ones found during colonoscopies    Steroid-induced psychosis     Patient extremely sensitive to regular doses of steroids.  Unclear if this is due to chronic fluconazole use or genetic issue.  In the future, use caution when prescribing steroids.    TB (tuberculosis)      Past Surgical History:   Procedure Laterality Date    ABDOMEN SURGERY  1974, 1986, 1989, 2005    open surgeries except last laparoscopic     APPENDECTOMY  01/01/1974    coincidental with Crohn's surgery    BACK SURGERY  12/01/2007    kyphoplasty on compressed 4th??? vertebra    BIOPSY  2007, 2013    lump on arm, knee, back of hand for coccidioidomycosis cult.    CHOLECYSTECTOMY  01/01/2005    much gravel, removed laparoscopically    COLONOSCOPY  7/14/2014 last    Dr. Catherine Bowden, [facility] - many previous    COLONOSCOPY Left 09/11/2015    Procedure: COMBINED COLONOSCOPY, SINGLE OR MULTIPLE BIOPSY/POLYPECTOMY BY BIOPSY;  Surgeon: Fransisco Leach MD;  Location: [facility]    COLONOSCOPY N/A 08/03/2016    Procedure: COMBINED COLONOSCOPY, SINGLE OR MULTIPLE BIOPSY/POLYPECTOMY BY BIOPSY;  Surgeon: Fransisco Leach MD;  Location: [facility]    COLONOSCOPY N/A 08/16/2017    Procedure: COMBINED COLONOSCOPY, SINGLE OR MULTIPLE BIOPSY/POLYPECTOMY BY BIOPSY;;  Surgeon: Fransisco Leach MD;  [facility]: [facility]    COLONOSCOPY N/A 10/01/2019    Procedure: Colonoscopy With Colonic Stent Insertion;  Surgeon: Robbie Cruz MD;  Location: [facility]    COLONOSCOPY N/A 02/05/2020    Procedure: COLONOSCOPY, FOREIGN BODY REMOVAL;  Surgeon: Robbie Cruz MD;  [facility]: [facility]    COLONOSCOPY N/A 05/26/2021    Procedure: COLONOSCOPY, WITH COLONIC STENT INSERTION;  Surgeon: Robbie Cruz MD;  Location: [facility]    COLONOSCOPY N/A 12/06/2021    Procedure: COLONOSCOPY with dilation;  Surgeon: Robbie Cruz MD;  Location:  OR    COLONOSCOPY N/A 03/18/2024    Procedure: COLONOSCOPY with fluoroscopy dilation;  Surgeon: Robbie Cruz MD;  Location:  OR    ENT SURGERY  ?    upper endoscopy    ENTEROSCOPY SMALL BOWEL N/A 09/22/2021    Procedure: ENTEROSCOPY;  Surgeon: Robbie Cruz MD;  Location: [facility]    ESOPHAGOSCOPY, GASTROSCOPY, DUODENOSCOPY (EGD), COMBINED N/A 11/08/2016    Procedure: COMBINED ENDOSCOPIC ULTRASOUND, ESOPHAGOSCOPY, GASTROSCOPY, DUODENOSCOPY (EGD), FINE NEEDLE ASPIRATE/BIOPSY;  Surgeon: Lesli  "Guru Alexsandra Hutchins MD;  Location:  GI    ESOPHAGOSCOPY, GASTROSCOPY, DUODENOSCOPY (EGD), COMBINED N/A 11/08/2016    Procedure: COMBINED ESOPHAGOSCOPY, GASTROSCOPY, DUODENOSCOPY (EGD), BIOPSY SINGLE OR MULTIPLE;  Surgeon: Guru Alexsandra Ballesteros MD;  Location:  GI    ESOPHAGOSCOPY, GASTROSCOPY, DUODENOSCOPY (EGD), COMBINED N/A 08/16/2017    Procedure: COMBINED ESOPHAGOSCOPY, GASTROSCOPY, DUODENOSCOPY (EGD), BIOPSY SINGLE OR MULTIPLE;;  Surgeon: Fransisco Leach MD;  [facility]    EYE SURGERY  12/01/2007    cataract surgery both sides    GI SURGERY  1974, 1986(x2), 1989    Crohn's, 1974 RO 18\" term. ilium + 9\" asc. colon all one pc    SOFT TISSUE SURGERY  03/01/2013    removed buildup on back of r hand, coccidioidomycosis       Social History:   reports that he has never smoked. He has never used smokeless tobacco. He reports current alcohol use. He reports that he does not use drugs.    Family History:  Family History   Problem Relation Age of Onset    Cancer - colorectal Mother     Colon Cancer Mother     Heart Failure Father     Musculoskeletal Disorder Father         Parkinson's    Cerebrovascular Disease Paternal Grandmother     Musculoskeletal Disorder Brother         Parkinson's    Cancer Other     Other Cancer Other     Anesthesia Reaction No family hx of     Deep Vein Thrombosis (DVT) No family hx of     Melanoma No family hx of     Skin Cancer No family hx of        Medications:  Current Outpatient Medications   Medication Sig Dispense Refill    Cyanocobalamin (VITAMIN B 12) 500 MCG TABS Take 500 mcg by mouth every evening       fluconazole (DIFLUCAN) 100 MG tablet Take 1 tablet (100 mg) by mouth daily 90 tablet 0    Iron Combinations (IRON COMPLEX PO) Take 1 tablet by mouth daily      multivitamin w/minerals (THERA-VIT-M) tablet Take 1 tablet by mouth every evening       potassium citrate (UROCIT-K) 10 MEQ (1080 MG) CR tablet Take 1 tablet (10 mEq) by mouth daily Take it with " "the largest meal 90 tablet 1    potassium citrate 15 MEQ (1620 MG) TBCR Take 15 mEq by mouth 2 times daily (with meals) 180 tablet 3    traZODone (DESYREL) 50 MG tablet Take 1 tablet (50 mg) by mouth at bedtime 90 tablet 1    vitamin B6 (PYRIDOXINE) 100 MG tablet Take 100 mg by mouth daily      Vitamin D, Cholecalciferol, 25 MCG (1000 UT) CAPS Take 25 mcg by mouth daily      bisacodyl (DULCOLAX) 5 MG EC tablet Refer to \"Getting Ready for a Colonoscopy\" instruction handout (Patient not taking: Reported on 5/15/2024) 4 tablet 0    dicyclomine (BENTYL) 10 MG capsule Take 10 mg by mouth (Patient not taking: Reported on 5/15/2024)      diphenoxylate-atropine (LOMOTIL) 2.5-0.025 MG tablet Take 1 tablet by mouth as needed for diarrhea (Patient not taking: Reported on 5/15/2024)      gabapentin (NEURONTIN) 300 MG capsule Take 2 capsules (600 mg) by mouth 2 times daily (Patient not taking: Reported on 7/11/2024) 360 capsule 3    Lysine 1000 MG TABS Take 1,500 mg by mouth 2 times daily (Patient not taking: Reported on 7/11/2024)      MELATONIN PO Take 1.5 mg by mouth nightly as needed (PT last dose 1.27.2020) (Patient not taking: Reported on 5/15/2024)       Allergies   Allergen Reactions    Prednisone Other (See Comments)     Diana with more than 2.5mg chronic dosing of prednisone due to fluconazole interaction per patient.                  "

## 2024-07-11 NOTE — NURSING NOTE
Dermatology Rooming Note    Trevin Gee's goals for this visit include:   Chief Complaint   Patient presents with    Skin Check     Trevin is here today for a skin check and does not have any areas of concern.     Dave DUNLAP CMA

## 2024-07-11 NOTE — PATIENT INSTRUCTIONS

## 2024-07-11 NOTE — PROGRESS NOTES
"[facility] Dermatology Note  Encounter Date: Jul 11, 2024  Office Visit     Dermatology Problem List:  1. BCC on central upper back s/p Mohs 3/2023  2. Post-herpetic neuralgia: T5, (initial zoster episode 5/2021)  - gabapentin 600 mg BID (tapered down from TID)    ____________________________________________    Assessment & Plan:    # History of nonmelanoma skin cancer, ***no clincial evidence of recurrence***.   {kkplans:768197}   - ***     # Benign lesions: ***.   {kkplans:315516}   - ***     # Postherpetic neuralgia: ***; gabapentin PRN   - gabapentin PRN ***      Procedures Performed:   {bsproceduresnotes:099652}  {kkproceduremedstudent:405299}    Follow-up: {kkfollowup:780466}    Staff and Medical Student:     ***    ***  ____________________________________________    CC: No chief complaint on file.    HPI:  Mr. Trevin Gee is a(n) 69 year old male who presents today {kknew/return:991864} for ***    Patient is otherwise feeling well, without additional skin concerns.    Labs:  {kkreviewedlabs:847141} reviewed.    Physical Exam:  Vitals: There were no vitals taken for this visit.  SKIN: {kkSkinExam:190225}  - ***  - ***  - ***  - No other lesions of concern on areas examined.     Medications:  Current Outpatient Medications   Medication Sig Dispense Refill    bisacodyl (DULCOLAX) 5 MG EC tablet Refer to \"Getting Ready for a Colonoscopy\" instruction handout (Patient not taking: Reported on 5/15/2024) 4 tablet 0    Cyanocobalamin (VITAMIN B 12) 500 MCG TABS Take 500 mcg by mouth every evening       dicyclomine (BENTYL) 10 MG capsule Take 10 mg by mouth (Patient not taking: Reported on 5/15/2024)      diphenoxylate-atropine (LOMOTIL) 2.5-0.025 MG tablet Take 1 tablet by mouth as needed for diarrhea (Patient not taking: Reported on 5/15/2024)      fluconazole (DIFLUCAN) 100 MG tablet Take 1 tablet (100 mg) by mouth daily 90 tablet 0    gabapentin (NEURONTIN) 300 MG capsule Take 2 capsules " (600 mg) by mouth 2 times daily (Patient not taking: Reported on 7/11/2024) 360 capsule 3    Iron Combinations (IRON COMPLEX PO) Take 1 tablet by mouth daily      Lysine 1000 MG TABS Take 1,500 mg by mouth 2 times daily (Patient not taking: Reported on 7/11/2024)      MELATONIN PO Take 1.5 mg by mouth nightly as needed (PT last dose 1.27.2020) (Patient not taking: Reported on 5/15/2024)      multivitamin w/minerals (THERA-VIT-M) tablet Take 1 tablet by mouth every evening       potassium citrate (UROCIT-K) 10 MEQ (1080 MG) CR tablet Take 1 tablet (10 mEq) by mouth daily Take it with the largest meal 90 tablet 1    potassium citrate 15 MEQ (1620 MG) TBCR Take 15 mEq by mouth 2 times daily (with meals) 180 tablet 3    traZODone (DESYREL) 50 MG tablet Take 1 tablet (50 mg) by mouth at bedtime 90 tablet 1    vitamin B6 (PYRIDOXINE) 100 MG tablet Take 100 mg by mouth daily      Vitamin D, Cholecalciferol, 25 MCG (1000 UT) CAPS Take 25 mcg by mouth daily       No current facility-administered medications for this visit.      Past Medical History:   Patient Active Problem List   Diagnosis    Crohn's disease (H)    Infection by Coccidioides immitis    Osteoporosis    Anxiety    Vitamin B 12 deficiency    History of kevin    Steroid dependent for adrenal supression    Adrenal insufficiency (H24)    Bipolar 2 disorder (H)    Hx of psychiatric care    Hypertriglyceridemia    Lactose intolerance    Vertebral fracture, osteoporotic (H)    Esophageal reflux    Abdominal pain    Diarrhea    Colorectal anastomotic stricture    Abdominal cramping    Partial small bowel obstruction (H)    Chronic kidney disease, stage 3 (H)    Dehydration    Pain and swelling of knee, right    Calculus of kidney with calculus of ureter     Past Medical History:   Diagnosis Date    Anemia 2007    Anxiety 2012?    much worse since July 2014    Cataract 12/2007    both eyes, too much prednisone, implants    Coccidioidomycosis     Crohn disease (H)      Crohn's disease (H) 01/13/2015    Depressive disorder 07/2014    much worse since July 2014    Fracture 1956    green fracture of leg    Gallbladder problem 2005?    much gravel, removed    GERD (gastroesophageal reflux disease)     History of blood transfusion 1988    ulcerated anastomosis term. ilium bleed    Hypertriglyceridemia 07/08/2016    Infection 2007    persistant coccidioidomycosis    Kidney stone various    both sides, all passed naturally    Mental health problem 2007    bipolar though perhaps different    Nephrolithiasis     Noninfectious ileitis     Osteoporosis 2007    osteoporosis, too much prednisone, last dexa 1/2014    Osteoporosis     Other nervous system complications 2007    prednisone overload induced kevin    Personal history of colonic polyps     small ones found during colonoscopies    Steroid-induced psychosis     Patient extremely sensitive to regular doses of steroids.  Unclear if this is due to chronic fluconazole use or genetic issue.  In the future, use caution when prescribing steroids.    TB (tuberculosis)         CC Lacey Feng, APRN CNP  [address] [place] [address] on close of this encounter.

## 2024-07-11 NOTE — LETTER
7/11/2024       RE: Trevin Gee  [address]  [facility] [address]     Dear Colleague,    Thank you for referring your patient, Trevin Gee, to the [facility] DERMATOLOGY CLINIC [place] at [facility]. Please see a copy of my visit note below.    [facility] Dermatology Note  Encounter Date: Jul 11, 2024    Dermatology Problem List:  1. BCC on central upper back s/p Mohs 3/2023  2. Post-herpetic neuralgia: T5, (initial zoster episode 5/2021)  - gabapentin 600 mg BID (tapered down from TID)       ______________________________________    Impression/Plan:  1. History of skin cancer: no evidence of recurrence. Discussed sun protective behaviors including OTC spf 30+ sunscreen use and sun avoidance strategies.    2. Reassurance provided for benign lesions not treated today including cherry angiomata, solar lentigines, seborrheic keratoses, and banal-appearing melanocytic nevi.    3. Postherpetic neuralgia: gradually improving; gabapentin PRN      Follow-up in 6 months.       Staff Involved:  Scribe Disclosure:   By signing my name below, I, Rebekah Bacapalomodarlin, attest that this documentation has been prepared under the direction and in the presence of Jose Martin Cheney MD.  - Electronically Signed: Rebekah Price 07/11/24       Provider Disclosure:   The documentation recorded by the scribe accurately reflects the services I personally performed and the decisions made by me.    Jose Martin Cheney MD   of Dermatology  Department of Dermatology  [facility] [employer] [facility]          CC:   Skin Check (Trevin is here today for a skin check and does not have any areas of concern.)      History of Present Illness:  Mr. Trevin Gee is a 69 year old male who presents as a return patient.    Patient reports his post herpetic neuralgia is mildly unstable as it will occasionally flare, but ultimately  he is doing well with Gabapentin. Patient notes some scalp dryness and dandruff.    Labs:  N/A    Physical exam:  Vitals: There were no vitals taken for this visit.  GEN: This is a well developed, well-nourished male in no acute distress, in a pleasant mood.    SKIN: [place]le phototype I  - Full skin, which includes the head/face, both arms, chest, back, abdomen,both legs, genitalia and/or groin buttocks, digits and/or nails, was examined.  - There are dome shaped bright red papules on the head/neck, trunk, extremities.   - Multiple regular brown pigmented macules and papules are identified on the head/neck, trunk, extremities.   - Scattered brown macules on sun exposed areas.  - There are waxy stuck on tan to brown papules on the head/neck, trunk, extremities.  - No other lesions of concern on areas examined.       Past Medical History:   Past Medical History:   Diagnosis Date     Anemia 2007     Anxiety 2012?    much worse since July 2014     Cataract 12/2007    both eyes, too much prednisone, implants     Coccidioidomycosis      Crohn disease (H)      Crohn's disease (H) 01/13/2015     Depressive disorder 07/2014    much worse since July 2014     Fracture 1956    green fracture of leg     Gallbladder problem 2005?    much gravel, removed     GERD (gastroesophageal reflux disease)      History of blood transfusion 1988    ulcerated anastomosis term. ilium bleed     Hypertriglyceridemia 07/08/2016     Infection 2007    persistant coccidioidomycosis     Kidney stone various    both sides, all passed naturally     Mental health problem 2007    bipolar though perhaps different     Nephrolithiasis      Noninfectious ileitis      Osteoporosis 2007    osteoporosis, too much prednisone, last dexa 1/2014     Osteoporosis      Other nervous system complications 2007    prednisone overload induced kevin     Personal history of colonic polyps     small ones found during colonoscopies     Steroid-induced psychosis      Patient extremely sensitive to regular doses of steroids.  Unclear if this is due to chronic fluconazole use or genetic issue.  In the future, use caution when prescribing steroids.     TB (tuberculosis)      Past Surgical History:   Procedure Laterality Date     ABDOMEN SURGERY  1974, 1986, 1989, 2005    open surgeries except last laparoscopic     APPENDECTOMY  01/01/1974    coincidental with Crohn's surgery     BACK SURGERY  12/01/2007    kyphoplasty on compressed 4th??? vertebra     BIOPSY  2007, 2013    lump on arm, knee, back of hand for coccidioidomycosis cult.     CHOLECYSTECTOMY  01/01/2005    much gravel, removed laparoscopically     COLONOSCOPY  7/14/2014 last    Dr. Catherine Bowden, [facility] [place] - many previous     COLONOSCOPY Left 09/11/2015    Procedure: COMBINED COLONOSCOPY, SINGLE OR MULTIPLE BIOPSY/POLYPECTOMY BY BIOPSY;  Surgeon: Fransisco Leach MD;  Location: [facility]     COLONOSCOPY N/A 08/03/2016    Procedure: COMBINED COLONOSCOPY, SINGLE OR MULTIPLE BIOPSY/POLYPECTOMY BY BIOPSY;  Surgeon: Fransisco Leach MD;  Location: [facility]     COLONOSCOPY N/A 08/16/2017    Procedure: COMBINED COLONOSCOPY, SINGLE OR MULTIPLE BIOPSY/POLYPECTOMY BY BIOPSY;;  Surgeon: Fransisco Leach MD;  [facility]     COLONOSCOPY N/A 10/01/2019    Procedure: Colonoscopy With Colonic Stent Insertion;  Surgeon: Robbie Cruz MD;  Location: [facility]     COLONOSCOPY N/A 02/05/2020    Procedure: COLONOSCOPY, FOREIGN BODY REMOVAL;  Surgeon: Robbie Cruz MD;  [facility]: [facility]     COLONOSCOPY N/A 05/26/2021    Procedure: COLONOSCOPY, WITH COLONIC STENT INSERTION;  Surgeon: Robbie Cruz MD;  [facility]: [facility]     COLONOSCOPY N/A 12/06/2021    Procedure: COLONOSCOPY with dilation;  Surgeon: Robbie Cruz MD;  Location: [facility]     COLONOSCOPY N/A 03/18/2024    Procedure: COLONOSCOPY with fluoroscopy dilation;  Surgeon: Robbie Cruz MD;  Location:  OR     ENT SURGERY  ?  "   upper endoscopy     ENTEROSCOPY SMALL BOWEL N/A 09/22/2021    Procedure: ENTEROSCOPY;  Surgeon: Robbie Cruz MD;  [facility]: [facility]     ESOPHAGOSCOPY, GASTROSCOPY, DUODENOSCOPY (EGD), COMBINED N/A 11/08/2016    Procedure: COMBINED ENDOSCOPIC ULTRASOUND, ESOPHAGOSCOPY, GASTROSCOPY, DUODENOSCOPY (EGD), FINE NEEDLE ASPIRATE/BIOPSY;  Surgeon: Guru Alexsandra Ballesteros MD;  Location: [facility]     ESOPHAGOSCOPY, GASTROSCOPY, DUODENOSCOPY (EGD), COMBINED N/A 11/08/2016    Procedure: COMBINED ESOPHAGOSCOPY, GASTROSCOPY, DUODENOSCOPY (EGD), BIOPSY SINGLE OR MULTIPLE;  Surgeon: Guru Alexsandra Ballesteros MD;  Location: [facility]     ESOPHAGOSCOPY, GASTROSCOPY, DUODENOSCOPY (EGD), COMBINED N/A 08/16/2017    Procedure: COMBINED ESOPHAGOSCOPY, GASTROSCOPY, DUODENOSCOPY (EGD), BIOPSY SINGLE OR MULTIPLE;;  Surgeon: Fransisco Leach MD;  [facility]     EYE SURGERY  12/01/2007    cataract surgery both sides     GI SURGERY  1974, 1986(x2), 1989    Crohn's, 1974 RO 18\" term. ilium + 9\" asc. colon all one pc     SOFT TISSUE SURGERY  03/01/2013    removed buildup on back of r hand, coccidioidomycosis       Social History:   reports that he has never smoked. He has never used smokeless tobacco. He reports current alcohol use. He reports that he does not use drugs.    Family History:  Family History   Problem Relation Age of Onset     Cancer - colorectal Mother      Colon Cancer Mother      Heart Failure Father      Musculoskeletal Disorder Father         Parkinson's     Cerebrovascular Disease Paternal Grandmother      Musculoskeletal Disorder Brother         Parkinson's     Cancer Other      Other Cancer Other      Anesthesia Reaction No family hx of      Deep Vein Thrombosis (DVT) No family hx of      Melanoma No family hx of      Skin Cancer No family hx of        Medications:  Current Outpatient Medications   Medication Sig Dispense Refill     Cyanocobalamin (VITAMIN B 12) 500 MCG TABS Take 500 mcg " "by mouth every evening        fluconazole (DIFLUCAN) 100 MG tablet Take 1 tablet (100 mg) by mouth daily 90 tablet 0     Iron Combinations (IRON COMPLEX PO) Take 1 tablet by mouth daily       multivitamin w/minerals (THERA-VIT-M) tablet Take 1 tablet by mouth every evening        potassium citrate (UROCIT-K) 10 MEQ (1080 MG) CR tablet Take 1 tablet (10 mEq) by mouth daily Take it with the largest meal 90 tablet 1     potassium citrate 15 MEQ (1620 MG) TBCR Take 15 mEq by mouth 2 times daily (with meals) 180 tablet 3     traZODone (DESYREL) 50 MG tablet Take 1 tablet (50 mg) by mouth at bedtime 90 tablet 1     vitamin B6 (PYRIDOXINE) 100 MG tablet Take 100 mg by mouth daily       Vitamin D, Cholecalciferol, 25 MCG (1000 UT) CAPS Take 25 mcg by mouth daily       bisacodyl (DULCOLAX) 5 MG EC tablet Refer to \"Getting Ready for a Colonoscopy\" instruction handout (Patient not taking: Reported on 5/15/2024) 4 tablet 0     dicyclomine (BENTYL) 10 MG capsule Take 10 mg by mouth (Patient not taking: Reported on 5/15/2024)       diphenoxylate-atropine (LOMOTIL) 2.5-0.025 MG tablet Take 1 tablet by mouth as needed for diarrhea (Patient not taking: Reported on 5/15/2024)       gabapentin (NEURONTIN) 300 MG capsule Take 2 capsules (600 mg) by mouth 2 times daily (Patient not taking: Reported on 7/11/2024) 360 capsule 3     Lysine 1000 MG TABS Take 1,500 mg by mouth 2 times daily (Patient not taking: Reported on 7/11/2024)       MELATONIN PO Take 1.5 mg by mouth nightly as needed (PT last dose 1.27.2020) (Patient not taking: Reported on 5/15/2024)       Allergies   Allergen Reactions     Prednisone Other (See Comments)     Diana with more than 2.5mg chronic dosing of prednisone due to fluconazole interaction per patient.                  Again, thank you for allowing me to participate in the care of your patient.      Sincerely,    Jose Martin Cheney MD    "

## 2024-09-06 DIAGNOSIS — N18.31 STAGE 3A CHRONIC KIDNEY DISEASE (H): ICD-10-CM

## 2024-09-06 DIAGNOSIS — N20.2 CALCULUS OF KIDNEY WITH CALCULUS OF URETER: Primary | ICD-10-CM

## 2024-09-12 ENCOUNTER — LAB (OUTPATIENT)
Dept: LAB | Facility: CLINIC | Age: 69
End: 2024-09-12
Attending: INTERNAL MEDICINE
Payer: COMMERCIAL

## 2024-09-12 ENCOUNTER — OFFICE VISIT (OUTPATIENT)
Dept: NEPHROLOGY | Facility: CLINIC | Age: 69
End: 2024-09-12
Attending: INTERNAL MEDICINE
Payer: COMMERCIAL

## 2024-09-12 VITALS
TEMPERATURE: 98.1 F | BODY MASS INDEX: 24.01 KG/M2 | HEART RATE: 75 BPM | OXYGEN SATURATION: 98 % | SYSTOLIC BLOOD PRESSURE: 126 MMHG | WEIGHT: 162.6 LBS | DIASTOLIC BLOOD PRESSURE: 83 MMHG

## 2024-09-12 DIAGNOSIS — N20.0 KIDNEY STONE: ICD-10-CM

## 2024-09-12 DIAGNOSIS — N20.2 CALCULUS OF KIDNEY WITH CALCULUS OF URETER: ICD-10-CM

## 2024-09-12 DIAGNOSIS — N18.31 CKD STAGE 3A, GFR 45-59 ML/MIN (H): ICD-10-CM

## 2024-09-12 DIAGNOSIS — N18.31 STAGE 3A CHRONIC KIDNEY DISEASE (H): ICD-10-CM

## 2024-09-12 DIAGNOSIS — N18.31 CKD STAGE 3A, GFR 45-59 ML/MIN (H): Primary | ICD-10-CM

## 2024-09-12 LAB
ALBUMIN MFR UR ELPH: 9.7 MG/DL
ALBUMIN SERPL BCG-MCNC: 4.4 G/DL (ref 3.5–5.2)
ALBUMIN UR-MCNC: NEGATIVE MG/DL
ANION GAP SERPL CALCULATED.3IONS-SCNC: 10 MMOL/L (ref 7–15)
APPEARANCE UR: CLEAR
BILIRUB UR QL STRIP: NEGATIVE
BUN SERPL-MCNC: 16.3 MG/DL (ref 8–23)
CALCIUM SERPL-MCNC: 9.8 MG/DL (ref 8.8–10.4)
CHLORIDE SERPL-SCNC: 103 MMOL/L (ref 98–107)
COLOR UR AUTO: ABNORMAL
CREAT SERPL-MCNC: 1.55 MG/DL (ref 0.67–1.17)
CREAT UR-MCNC: 114 MG/DL
CYSTATIN C (ROCHE): 1.5 MG/L (ref 0.6–1)
EGFRCR SERPLBLD CKD-EPI 2021: 48 ML/MIN/1.73M2
ERYTHROCYTE [DISTWIDTH] IN BLOOD BY AUTOMATED COUNT: 12.9 % (ref 10–15)
GFR/BSA.PRED SERPLBLD CYS-BASED-ARV: 44 ML/MIN/1.73M2
GLUCOSE SERPL-MCNC: 99 MG/DL (ref 70–99)
GLUCOSE UR STRIP-MCNC: NEGATIVE MG/DL
HCO3 SERPL-SCNC: 27 MMOL/L (ref 22–29)
HCT VFR BLD AUTO: 47.2 % (ref 40–53)
HGB BLD-MCNC: 16.3 G/DL (ref 13.3–17.7)
HGB UR QL STRIP: NEGATIVE
KETONES UR STRIP-MCNC: NEGATIVE MG/DL
LEUKOCYTE ESTERASE UR QL STRIP: NEGATIVE
MAGNESIUM SERPL-MCNC: 1.9 MG/DL (ref 1.7–2.3)
MCH RBC QN AUTO: 32.1 PG (ref 26.5–33)
MCHC RBC AUTO-ENTMCNC: 34.5 G/DL (ref 31.5–36.5)
MCV RBC AUTO: 93 FL (ref 78–100)
MUCOUS THREADS #/AREA URNS LPF: PRESENT /LPF
NITRATE UR QL: NEGATIVE
PH UR STRIP: 5.5 [PH] (ref 5–7)
PHOSPHATE SERPL-MCNC: 3.3 MG/DL (ref 2.5–4.5)
PLATELET # BLD AUTO: 236 10E3/UL (ref 150–450)
POTASSIUM SERPL-SCNC: 5.3 MMOL/L (ref 3.4–5.3)
PROT/CREAT 24H UR: 0.09 MG/MG CR (ref 0–0.2)
RBC # BLD AUTO: 5.07 10E6/UL (ref 4.4–5.9)
RBC URINE: 1 /HPF
SODIUM SERPL-SCNC: 140 MMOL/L (ref 135–145)
SP GR UR STRIP: 1.01 (ref 1–1.03)
UROBILINOGEN UR STRIP-MCNC: NORMAL MG/DL
WBC # BLD AUTO: 6.8 10E3/UL (ref 4–11)
WBC URINE: <1 /HPF

## 2024-09-12 PROCEDURE — 84156 ASSAY OF PROTEIN URINE: CPT | Performed by: PATHOLOGY

## 2024-09-12 PROCEDURE — 85027 COMPLETE CBC AUTOMATED: CPT | Performed by: PATHOLOGY

## 2024-09-12 PROCEDURE — 83735 ASSAY OF MAGNESIUM: CPT | Performed by: PATHOLOGY

## 2024-09-12 PROCEDURE — 99000 SPECIMEN HANDLING OFFICE-LAB: CPT | Performed by: PATHOLOGY

## 2024-09-12 PROCEDURE — 81001 URINALYSIS AUTO W/SCOPE: CPT | Performed by: PATHOLOGY

## 2024-09-12 PROCEDURE — 99214 OFFICE O/P EST MOD 30 MIN: CPT | Performed by: INTERNAL MEDICINE

## 2024-09-12 PROCEDURE — 82610 CYSTATIN C: CPT | Performed by: INTERNAL MEDICINE

## 2024-09-12 PROCEDURE — 36415 COLL VENOUS BLD VENIPUNCTURE: CPT | Performed by: PATHOLOGY

## 2024-09-12 PROCEDURE — G0463 HOSPITAL OUTPT CLINIC VISIT: HCPCS | Performed by: INTERNAL MEDICINE

## 2024-09-12 PROCEDURE — 80069 RENAL FUNCTION PANEL: CPT | Performed by: PATHOLOGY

## 2024-09-12 RX ORDER — MULTIVIT-MIN/IRON/FOLIC ACID/K 18-600-40
500 CAPSULE ORAL DAILY
COMMUNITY
Start: 2023-05-01

## 2024-09-12 ASSESSMENT — PAIN SCALES - GENERAL: PAINLEVEL: NO PAIN (0)

## 2024-09-12 NOTE — PROGRESS NOTES
Nephrology Outpatient Visit Note (09/12/2024)    Chief Complain/Reason for Visit  CKD and nephrolithiasis     History of Present Illness  This is a 69 year old male who returns for a follow up appointment. He was previously seen by Dr. Sánchez.Please refer to her note 3/8/2024 for details.Briefly, his PMH significant for Chron's disease s/p colectomies, gout, nephrolithiasis, and chronic kidney disease.       His serum creatinine baseline is between 1.3 to 1.5 mg/dL.  His most recent kidney ultrasound was in November 2023.  This was notable for multiple subcentimeter nonobstructing kidney stones without evidence of hydronephrosis or obstruction.    His most recent evaluation was performed this morning.  His creatinine is 1.5 mg/dL with an estimated GFR of 48 mL/min.  His serum sodium, potassium, chloride, bicarbonate, calcium, phosphorus were normal.  Serum albumin is normal.  Glucose is 99.  Hemoglobin is 16.3.  Urinalysis is normal and is negative for proteinuria, hematuria, pyuria.    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     Most recent kidney stone event was in May of 2023. He was able to pass the renal stone. The kidney stone was composed predominately of calcium oxalate.     The following portions of the patient's history were reviewed and updated as appropriate: allergies, current medications, past family history, past medical history, past social history, past surgical history, and problem list.    Subjective   Review of Systems  A comprehensive review of systems was performed. Pertinent positives and negatives are described above.    Social and Family History  Patient reports that he has never smoked. He has never used smokeless tobacco. He reports current alcohol use. He reports that he does not use  drugs.  family history includes Cancer in an other family member; Cancer - colorectal in his mother; Cerebrovascular Disease in his paternal grandmother; Colon Cancer in his mother; Heart Failure in his father; Musculoskeletal Disorder in his brother and father; Other Cancer in an other family member.     Examination  Blood pressure 126/83, pulse 75, temperature 98.1  F (36.7  C), temperature source Oral, weight 73.8 kg (162 lb 9.6 oz), SpO2 98%. Body mass index is 24.01 kg/m .  General:  Well appearing, well nourished in no distress.  Oriented x 3, normal mood and affect.  Head:  normocephalic, atraumatic    Eyes: conjunctiva clear, EOM intact, PERRL.   Throat:  No erythema, exudates or lesions.   Neck:  neck supple, no masses  Heart:  RRR, no murmur   Lungs:  CTA bilaterally, no wheezes, rhonchi, rales.  Breathing unlabored.   Abdomen:  Soft, NT/ND, no HSM, no masses.   Extremities: No deformities, clubbing, cyanosis, or edema.   Neurologic: A/O x 3. No focal deficits.     Objective   Pertinent Laboratory and Imaging Results  Most Recent Serum Chemistries  Lab Results   Component Value Date    WBC 6.8 09/12/2024    HGB 16.3 09/12/2024    HCT 47.2 09/12/2024     09/12/2024     09/12/2024    POTASSIUM 5.3 09/12/2024    CHLORIDE 103 09/12/2024    CO2 27 09/12/2024    BUN 16.3 09/12/2024    CR 1.55 (H) 09/12/2024    GLC 99 09/12/2024    SED 6 01/18/2021    TROPONIN <0.04 08/04/2007    AST 25 06/10/2024    ALT 23 06/10/2024    ALKPHOS 78 06/10/2024    INR 0.99 04/21/2019     Most Recent Urinalysis  Lab Results   Component Value Date    COLOR Light Yellow 09/12/2024    APPEARANCE Clear 09/12/2024    URINEGLC Negative 09/12/2024    URINEBILI Negative 09/12/2024    URINEKETONE Negative 09/12/2024    SG 1.014 09/12/2024    URINEPH 5.5 09/12/2024    PROTEIN Negative 09/12/2024    NITRITE Negative 09/12/2024    LEUKEST Negative 09/12/2024     Current Outpatient Medications   Medication Sig Dispense Refill     Ascorbic Acid (VITAMIN C) 500 MG CAPS Take 500 mg by mouth daily.      Cyanocobalamin (VITAMIN B 12) 500 MCG TABS Take 500 mcg by mouth every evening       dicyclomine (BENTYL) 10 MG capsule Take 10 mg by mouth as needed.      diphenoxylate-atropine (LOMOTIL) 2.5-0.025 MG tablet Take 1 tablet by mouth as needed for diarrhea.      fluconazole (DIFLUCAN) 100 MG tablet Take 1 tablet (100 mg) by mouth daily 90 tablet 0    Iron Combinations (IRON COMPLEX PO) Take 1 tablet by mouth daily      multivitamin w/minerals (THERA-VIT-M) tablet Take 1 tablet by mouth every evening       potassium citrate 15 MEQ (1620 MG) TBCR Take 15 mEq by mouth 2 times daily (with meals) 180 tablet 3    traZODone (DESYREL) 50 MG tablet Take 1 tablet (50 mg) by mouth at bedtime 90 tablet 1    vitamin B6 (PYRIDOXINE) 100 MG tablet Take 100 mg by mouth daily      Vitamin D, Cholecalciferol, 25 MCG (1000 UT) CAPS Take 25 mcg by mouth daily       No current facility-administered medications for this visit.        Assessment & Plan   # CKD stage IIIa  # Calcium oxalate nephrolithiasis  # Hypertension    Overall, the patient is doing well.  Baseline creatinine of 11.5 with eGFR in 50's which put him in CKD stage IIIa. His urinalysis is normal.  His most recent kidney ultrasound was in November 2023.  This was notable for multiple subcentimeter nonobstructing kidney stones without evidence of hydronephrosis or obstruction. His kidney function is fairly stable.  He has not passed the kidney stone for more than a year now.    However, discussing his diet, the patient is likely consuming a fair amount of oxalate as he likes salad and milk chocolate. I educated him with regards to his diet. He is not interested in meeting with the dietician at this point. I think it is reasonable to check a 24 urine for supersaturation profile.  I will make further recommendations after seeing the results.    For chronic kidney disease perspective, his kidney function is  fairly stable.  His blood pressure is normal. I instructed him to continue his current regimen.    My recommendations are the following:  - Check a 24 hour urine collection for supersaturation profile. I will let you know when I get the results.  - Return to Nephrology Clinic in 6 months to review your progress. Check a CKD panel prior to the appointment.    Total time was of this encounter on the date of service was 30 minutes. All questions were answered to the patient's satisfaction.  Chart documentation was completed, in part, with [employer] voice-recognition software. Even though reviewed, some grammatical, spelling, and word errors may remain.    Orders placed today:  Orders Placed This Encounter   Procedures    Cystatin C with GFR    Supersaturation Profile 24 Hour Urine     Pedro Greenfield MD  Division of Nephrology and Hypertension  [employer] ([email or web address])   Vocera Web Console ([employer])

## 2024-09-12 NOTE — NURSING NOTE
Chief Complaint   Patient presents with    RECHECK     RETURN NEPHROLOGY - 6 month follow up       /83 (BP Location: Right arm, Patient Position: Sitting, Cuff Size: Adult Regular)   Pulse 75   Temp 98.1  F (36.7  C) (Oral)   Wt 73.8 kg (162 lb 9.6 oz)   SpO2 98%   BMI 24.01 kg/m      Cyrus Hollis CMA on 9/12/2024 at 11:39 AM

## 2024-09-12 NOTE — LETTER
9/12/2024       RE: Trevin Gee  [address]  [facility] [address]     Dear Colleague,    Thank you for referring your patient, Trevin Gee, to the [facility] NEPHROLOGY [facility]IC [facility]. Please see a copy of my visit note below.        Nephrology Outpatient Visit Note (09/12/2024)    Chief Complain/Reason for Visit  CKD and nephrolithiasis     History of Present Illness  This is a 69 year old male who returns for a follow up appointment. He was previously seen by Dr. Sánchez.Please refer to her note 3/8/2024 for details.Briefly, his PMH significant for Chron's disease s/p colectomies, gout, nephrolithiasis, and chronic kidney disease.       His serum creatinine baseline is between 1.3 to 1.5 mg/dL.  His most recent kidney ultrasound was in November 2023.  This was notable for multiple subcentimeter nonobstructing kidney stones without evidence of hydronephrosis or obstruction.    His most recent evaluation was performed this morning.  His creatinine is 1.5 mg/dL with an estimated GFR of 48 mL/min.  His serum sodium, potassium, chloride, bicarbonate, calcium, phosphorus were normal.  Serum albumin is normal.  Glucose is 99.  Hemoglobin is 16.3.  Urinalysis is normal and is negative for proteinuria, hematuria, pyuria.    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     Most recent kidney stone event was in May of 2023. He was able to pass the renal stone. The kidney stone was composed predominately of calcium oxalate.     The following portions of the patient's history were reviewed and updated as appropriate: allergies, current medications, past family history, past medical history, past social history, past surgical history, and  problem list.    Subjective  Review of Systems  A comprehensive review of systems was performed. Pertinent positives and negatives are described above.    Social and Family History  Patient reports that he has never smoked. He has never used smokeless tobacco. He reports current alcohol use. He reports that he does not use drugs.  family history includes Cancer in an other family member; Cancer - colorectal in his mother; Cerebrovascular Disease in his paternal grandmother; Colon Cancer in his mother; Heart Failure in his father; Musculoskeletal Disorder in his brother and father; Other Cancer in an other family member.     Examination  Blood pressure 126/83, pulse 75, temperature 98.1  F (36.7  C), temperature source Oral, weight 73.8 kg (162 lb 9.6 oz), SpO2 98%. Body mass index is 24.01 kg/m .  General:  Well appearing, well nourished in no distress.  Oriented x 3, normal mood and affect.  Head:  normocephalic, atraumatic    Eyes: conjunctiva clear, EOM intact, PERRL.   Throat:  No erythema, exudates or lesions.   Neck:  neck supple, no masses  Heart:  RRR, no murmur   Lungs:  CTA bilaterally, no wheezes, rhonchi, rales.  Breathing unlabored.   Abdomen:  Soft, NT/ND, no HSM, no masses.   Extremities: No deformities, clubbing, cyanosis, or edema.   Neurologic: A/O x 3. No focal deficits.     Objective  Pertinent Laboratory and Imaging Results  Most Recent Serum Chemistries  Lab Results   Component Value Date    WBC 6.8 09/12/2024    HGB 16.3 09/12/2024    HCT 47.2 09/12/2024     09/12/2024     09/12/2024    POTASSIUM 5.3 09/12/2024    CHLORIDE 103 09/12/2024    CO2 27 09/12/2024    BUN 16.3 09/12/2024    CR 1.55 (H) 09/12/2024    GLC 99 09/12/2024    SED 6 01/18/2021    TROPONIN <0.04 08/04/2007    AST 25 06/10/2024    ALT 23 06/10/2024    ALKPHOS 78 06/10/2024    INR 0.99 04/21/2019     Most Recent Urinalysis  Lab Results   Component Value Date    COLOR Light Yellow 09/12/2024    APPEARANCE Clear  09/12/2024    URINEGLC Negative 09/12/2024    URINEBILI Negative 09/12/2024    URINEKETONE Negative 09/12/2024    SG 1.014 09/12/2024    URINEPH 5.5 09/12/2024    PROTEIN Negative 09/12/2024    NITRITE Negative 09/12/2024    LEUKEST Negative 09/12/2024     Current Outpatient Medications   Medication Sig Dispense Refill     Ascorbic Acid (VITAMIN C) 500 MG CAPS Take 500 mg by mouth daily.       Cyanocobalamin (VITAMIN B 12) 500 MCG TABS Take 500 mcg by mouth every evening        dicyclomine (BENTYL) 10 MG capsule Take 10 mg by mouth as needed.       diphenoxylate-atropine (LOMOTIL) 2.5-0.025 MG tablet Take 1 tablet by mouth as needed for diarrhea.       fluconazole (DIFLUCAN) 100 MG tablet Take 1 tablet (100 mg) by mouth daily 90 tablet 0     Iron Combinations (IRON COMPLEX PO) Take 1 tablet by mouth daily       multivitamin w/minerals (THERA-VIT-M) tablet Take 1 tablet by mouth every evening        potassium citrate 15 MEQ (1620 MG) TBCR Take 15 mEq by mouth 2 times daily (with meals) 180 tablet 3     traZODone (DESYREL) 50 MG tablet Take 1 tablet (50 mg) by mouth at bedtime 90 tablet 1     vitamin B6 (PYRIDOXINE) 100 MG tablet Take 100 mg by mouth daily       Vitamin D, Cholecalciferol, 25 MCG (1000 UT) CAPS Take 25 mcg by mouth daily       No current facility-administered medications for this visit.        Assessment & Plan  # CKD stage IIIa  # Calcium oxalate nephrolithiasis  # Hypertension    Overall, the patient is doing well.  Baseline creatinine of 11.5 with eGFR in 50's which put him in CKD stage IIIa. His urinalysis is normal.  His most recent kidney ultrasound was in November 2023.  This was notable for multiple subcentimeter nonobstructing kidney stones without evidence of hydronephrosis or obstruction. His kidney function is fairly stable.  He has not passed the kidney stone for more than a year now.    However, discussing his diet, the patient is likely consuming a fair amount of oxalate as he likes  salad and milk chocolate. I educated him with regards to his diet. He is not interested in meeting with the dietician at this point. I think it is reasonable to check a 24 urine for supersaturation profile.  I will make further recommendations after seeing the results.    For chronic kidney disease perspective, his kidney function is fairly stable.  His blood pressure is normal. I instructed him to continue his current regimen.    My recommendations are the following:  - Check a 24 hour urine collection for supersaturation profile. I will let you know when I get the results.  - Return to Nephrology Clinic in 6 months to review your progress. Check a CKD panel prior to the appointment.    Total time was of this encounter on the date of service was 30 minutes. All questions were answered to the patient's satisfaction.  Chart documentation was completed, in part, with [employer] voice-recognition software. Even though reviewed, some grammatical, spelling, and word errors may remain.    Orders placed today:  Orders Placed This Encounter   Procedures     Cystatin C with GFR     Supersaturation Profile 24 Hour Urine     Pedro Greenfield MD  Division of Nephrology and Hypertension  [employer] ([email or web address])   Vocera Web Console ([email or web address])       Again, thank you for allowing me to participate in the care of your patient.      Sincerely,    Pedro Greenfield MD

## 2024-09-12 NOTE — PATIENT INSTRUCTIONS
It was a pleasure taking care of you today.  I've included a brief summary of our discussion and care plan from today's visit below.  Please review this information with your primary care provider.    My recommendations are summarized as follows:  - Check a 24 hour urine collection for supersaturation profile. I will let you know when I get the results.  - Return to Nephrology Clinic in 6 months to review your progress. Check a CKD panel prior to the appointment.    Who do I call with any questions after my visit?  Please be in touch if there are any further questions that arise following today's visit.  There are multiple ways to contact your nephrology care team.      You can always send a secure message through [employer] or call [phone]. [employer] messages are answered by your nurse or doctor typically within 24-48 hours. Please allow extra time on weekends and holidays. For urgent/emergent questions after business hours, you may reach the on-call Nephrology Fellow by contacting the [facility]  at [phone].    How do I schedule appointments?  During business hours, you may reach your Nephrology Care Team or schedule or reschedule an appointment or lab at [phone]. If you need to schedule imaging, please call [phone].      How will I get the results of any tests ordered?    You will receive all of your results.  If you have signed up for [facility], any tests ordered at your visit will be available to you once resulted on MyChart. Typically the physician reviews them and may or may not make further recommendations. If there are urgent results that require a change in your care plan, your physician or nurse will call you to discuss the next steps. If you are not on MyChart, a letter may be generated and mailed to you with your results.    Sincerely,    Pedro Greenfield MD  [facility]  Division of Nephrology and Hypertension

## 2024-10-11 ENCOUNTER — ALLIED HEALTH/NURSE VISIT (OUTPATIENT)
Dept: INTERNAL MEDICINE | Facility: CLINIC | Age: 69
End: 2024-10-11
Payer: COMMERCIAL

## 2024-10-11 DIAGNOSIS — Z23 NEED FOR VACCINATION: Primary | ICD-10-CM

## 2024-10-11 PROCEDURE — G0008 ADMIN INFLUENZA VIRUS VAC: HCPCS

## 2024-10-11 PROCEDURE — 91320 SARSCV2 VAC 30MCG TRS-SUC IM: CPT

## 2024-10-11 PROCEDURE — 99207 PR NO CHARGE NURSE ONLY: CPT

## 2024-10-11 PROCEDURE — 90662 IIV NO PRSV INCREASED AG IM: CPT

## 2024-10-11 PROCEDURE — 90480 ADMN SARSCOV2 VAC 1/ONLY CMP: CPT

## 2024-10-11 NOTE — PROGRESS NOTES
Trevin Gee received the Covid and Flu vaccines today in clinic at the request of Lacey Feng CNP. The immunization was given under the supervision of Dr. Rebollar if assistance was needed. The patient does not report a history of adverse reactions associated with vaccine administration. The immunization site was cleaned with an alcohol prep wipe. The immunization was given without incident--see immunization list for administration details. No swelling or redness was observed at the site of injection after the immunization was given. The patient was advised to remain in fourth floor lobby of [facility] for fifteen minutes after the injection in case of an adverse reaction.     Nichole Haider, EMT at 1:15 PM on 10/11/2024

## 2024-10-11 NOTE — TELEPHONE ENCOUNTER
Physical Therapy Discharge Instructions    [facility]  [address] [address]  Phone: [phone]   Fax: [phone]      Patient: Dontrell Helm  : 1951    Continue Home Exercise Program 2 times per day for 8 weeks, then decrease to 4 times per week      Continue with    [] Ice  as needed      [x] Heat           Follow up with MD:     [] Upon completion of therapy     [x] As needed      Recommendations:     [x]   Return to activity with home program    []   Return to activity with the following modifications:       []Post Rehab Program    []Join Independent aquatic program     []Return to/join local gym      Additional Comments: Please contact clinic at above phone number if you have any questions regarding Home Exercise Program or self care instructions.     gabapentin (NEURONTIN) 300 MG capsule  Please send new order to updated pharmacy for Pt care  Med not on refill protocol.        Sunshine Mansfield RN  Central Triage Red Flags/Med Refills

## 2024-11-22 ENCOUNTER — MYC REFILL (OUTPATIENT)
Dept: INTERNAL MEDICINE | Facility: CLINIC | Age: 69
End: 2024-11-22
Payer: COMMERCIAL

## 2024-11-22 DIAGNOSIS — B38.7 COCCIDIOIDAL GRANULOMA: ICD-10-CM

## 2024-11-25 RX ORDER — FLUCONAZOLE 100 MG/1
100 TABLET ORAL DAILY
Qty: 90 TABLET | Refills: 0 | Status: SHIPPED | OUTPATIENT
Start: 2024-11-25

## 2024-11-25 NOTE — TELEPHONE ENCOUNTER
Patient is overdue to follow-up with Infectious Disease--this was discussed at last visit in May.  Will bridge Fluconazole for 90 days.  New referral provided to ID to help facilitate scheduling.  MARISOL Alfaro CNP

## 2024-11-25 NOTE — TELEPHONE ENCOUNTER
Antifungal Agents Failed    Rerun Protocol (11/22/2024 2:05 PM)    Medication indicated for associated diagnosis    Medication is associated with one or more of the following diagnoses:                Tinea pedis  Tinea cruris  Tinea corporis  Tinea capitis  Tinea barbae  Tinea faciei  Tinea manuum  Tinea nigra  Onychomycosis  Cutaneous candidias  Pityriasis versicolor    Medication is active on med list      Recent (12 mo) or future (90 days) visit within the authorizing provider's specialty    The patient must have completed an in-person or virtual visit within the past 12 months or has a future visit scheduled within the next 90 days with the authorizing provider s specialty.  Urgent care and e-visits do not qualify as an office visit for this protocol.     Azoles (Oral) Failed    Rerun Protocol (11/22/2024 2:05 PM)    Recent (3 months) or future (90 days) visit with authorizing provider s specialty      Most recent CrCl is >50 ml/min if drug is Fluconazole      Medication indicated for associated diagnosis    Medication is associated with one or more of the following diagnoses:               Candidiasis (non-vaginal)  Aspergillosis  Cryptococcosis  Histoplasmosis  Blastomycosis  Coccidioidomycosis    Most recent ALT is within normal limits      Most recent AST is within normal limits      Medication is active on med list      Liver Function Panel within the past 6 months

## 2024-11-26 NOTE — CONFIDENTIAL NOTE
DIAGNOSIS:   Coccidioidal granuloma    DATE RECEIVED:  12.05.2024     NOTES (Gather within 2 years) STATUS DETAILS   OFFICE NOTE from referring provider   Internal 11.22.2024  Lacey Feng APRN CNP    LABS (any labs) Internal    MEDICATION LIST Internal

## 2024-12-05 ENCOUNTER — PRE VISIT (OUTPATIENT)
Dept: INFECTIOUS DISEASES | Facility: CLINIC | Age: 69
End: 2024-12-05
Payer: COMMERCIAL

## 2024-12-05 ENCOUNTER — VIRTUAL VISIT (OUTPATIENT)
Dept: INFECTIOUS DISEASES | Facility: CLINIC | Age: 69
End: 2024-12-05
Attending: STUDENT IN AN ORGANIZED HEALTH CARE EDUCATION/TRAINING PROGRAM
Payer: COMMERCIAL

## 2024-12-05 VITALS — WEIGHT: 150 LBS | BODY MASS INDEX: 21.47 KG/M2 | HEIGHT: 70 IN

## 2024-12-05 DIAGNOSIS — Z51.81 THERAPEUTIC DRUG MONITORING: Primary | ICD-10-CM

## 2024-12-05 DIAGNOSIS — B38.7 COCCIDIOIDAL GRANULOMA: ICD-10-CM

## 2024-12-05 PROCEDURE — 99204 OFFICE O/P NEW MOD 45 MIN: CPT | Mod: 95 | Performed by: STUDENT IN AN ORGANIZED HEALTH CARE EDUCATION/TRAINING PROGRAM

## 2024-12-05 RX ORDER — FLUCONAZOLE 100 MG/1
100 TABLET ORAL DAILY
Qty: 90 TABLET | Refills: 0 | Status: SHIPPED | OUTPATIENT
Start: 2024-12-05 | End: 2024-12-05

## 2024-12-05 RX ORDER — FLUCONAZOLE 100 MG/1
100 TABLET ORAL DAILY
Qty: 90 TABLET | Refills: 3 | Status: SHIPPED | OUTPATIENT
Start: 2024-12-05 | End: 2024-12-05

## 2024-12-05 ASSESSMENT — PAIN SCALES - GENERAL: PAINLEVEL_OUTOF10: NO PAIN (0)

## 2024-12-05 NOTE — PATIENT INSTRUCTIONS
- Continue Fluconazole 100 mg daily (have canceled new prescription sent to [facility], please call when refills are due)  - Reach out if jock itch continues to be an issues, can seek Dermatology input too  - If sinus issues remain, reach out and we can consider an ENT referral for fungal cultures  - Follow up in 1 year

## 2024-12-05 NOTE — LETTER
12/5/2024       RE: Trevin Gee  [address]  [facility] [address]     Dear Colleague,    Thank you for referring your patient, Trevin Gee, to the [facility] [place] at [facility]. Please see a copy of my visit note below.    Video visit via Am[facility].    Pt location: Home  Provider location: Off site  Video start time; 245 pm  Video stop time: Approx 307 pm    [facility]  Infectious Disease Clinic Note:  New Patient     Patient:  Trevin Gee, Date of birth 1955, Medical record number [id number]  Date of Visit:  12/09/2024  Consult requested by  Lacey Feng APRN CNP   For follow up of coccidioidal granuloma.           Assessment and Recommendations:       ID clinic virtual visit    Patient previously followed by my ID colleagues Chad Mccabe, and Dc. Last seen by Dr Irwin in 12/2021    Assessment:  1. Coccidiomycosis, right wrist and right knee  Trevin has been maintained on fluconazole since 2013 given recurrence of disease when fluconazole had been previously stopped (he did not have any new fungal exposures prior to his second episode). Dr. Mccabe had conferred with experts at the time electing to continue fluconazole life long (assuming he would always been on some immunesuppression). He is now off iatrogenic immunosuppression, but Dr Irwin at last visit preferred to continue secondary ppx with fluconazole for now given that his immune system may be influenced by entities other than iatrogenic IS, including suboptimal nutritional state (though noted that his albumin is normal). In the setting of elevated Cr his dose noted reduced to 100 mg daily from 200 mg daily in 5/2024. Trevin is doing well. We will continue Fluconazole at 100 mg daily. Initially sent prescription to [facility] for refills, but just so insurance does not get confused given  other recent refill Trevin requested that he will call when refills needed, so I have called [facility] and canceled. He got a 90 day supply from Lacey DAMON CNP in 11/2024.     2. Chronic Sinusitis  Trevin has about 3 weeks of sinus symptoms, offered 5-7 days of antibiotics but they hat may have begun get better. If it persists, we can re-evaluate, consider ENT referral and requests cultures. He prefers to reach out  as needed    3. Presumed tinea cruris  Trevin reports jock itch, with no erythema/discharge, worsened ever since going down to the 100 of Fluconazole. He is using some topicals. He is careful to keep groin folds dry, Given lack of erythema an exam in person could be helpful to definitively cinch diagnosis. Differentials include seborrheic dermatitis, contact dermatitis. No scaling visualized per pt.  Usual dose of Tinea is 150 to 200 mg once weekly for 2 to 4 weeks so he should be covered. He prefers to reach out as needed for this too. Derm input can be helpful as well, he follows with Dr mcneil already.     4. Crohn's disease with confirmed ileal anastomotic stricture and patient-suspected jejunal abnormality. Is currently off immunosuppressive agents.     5. CKD, Cr baseline 1.3-1.5. previous suspicion that some prior Cr measurements have been influenced by hypovolemia due to GI issues.    6. Reactivated VZV with post-herpetic neuralgia.    Recommendations:  - Continue Fluconazole 100 mg daily (have canceled new prescription sent to [facility] per pt request, advised to call when refills are due)  - Reach out if presumed tinea cruris continues to be an issue, can seek Dermatology input too  - If sinus issues remain, reach out and we can consider an ENT referral for fungal cultures  - Follow up in 1 year    I have reviewed vitals labs, images, cultures and antibiotics    Christiano Wilder  Staff Physician  Division of Infectious Diseases           History of the Infectious Disease lllness:  "      Trevin is a 68 y/o man with IBD and coccidiomycosis that relapsed following cessation of an initial antifungal course.  He was last seen by my colleague Dr. Mccabe in ~2015, and Dr Irwin in 12/2021. I am meeting him for the first time today.    Per 4/30/2015 note from Chad Lora/Estelle:  \"7 years ago he took a trip with his wife to the [facility], about 2 weeks after he noted R sided bumps on his arm. The lesions persisted and eventually were biopsied.  He did feel somewhat sick in general at the time (mostly weakness) but didn't remember any respiratory symptoms, HA or joint pains.  Culture from the biopsy grew coccidiomycoses and he was started on 400 mg fluconazole.  He took this x 1 year and then stopped fluconazole.  He was then well until March 2013 when he noticed a lesion on the dorsum of his R wrist.  He initially thought the lesion was due to playing to many computer games but noted that it kept growing over time, he then noted his rt knee was swelling.  He didn't have any issues with fevers, chills, night sweats, HA, neck stiffness, additional rash or joint pains, cough, dyspnea, or chest pain.  He had an R knee arthrocentesis and culture of the fluid grew coccidiomycoses.  He was started on 400 mg fluconazole (this was in [place] and he didn't have an additional exposure in the interim after his first illness) and took this x about 1 year and has been taking 200mg fluconazole since.  He did have surgery on the R wrist to remove the lesion though there is some residual scar tissue. In October 2014 he had no complaints and both the R wrist and knee both functioned well.  He does have a h/o Crohn's disease and takes 1 mg MWF of prednisone as well as daily budesonide.  He is happy on that low dose and is nervous to switch to any other agent (has tried MTX in the past and didn't tolerate).  He's not had any issues tolerating the fluconazole.\"    Per Dr Mccabe 10/2015:  \"When performing a " "ROS, he reported that he developed signficant right knee swelling 3 weeks ago after a long, strenuous hike. The knee was not painful, red or warm. He was seen in the [facility] ED where an arthrocentesis was performed. He was told that the fluid was benign and cultures have remained negative. After the fluid was removed, the swellling did not recur and he has no pain or other symptoms. \"    In the setting of elevated Cr his dose noted reduced to 100 mg daily from 200 mg daily in 5/2024. Trevin is doing well in terms of his wrist, no other unsuual joint pain.     Trevin has about 3 weeks of sinus symptoms. Has post nasal drip, positional variation.     Also reports worsened jock itch since reducing Fluconazole dose. No erythema/discharge, just itching. No flakiness.    Denies other complaints.      Past Medical History:   Diagnosis Date     Anemia 2007     Anxiety 2012?    much worse since July 2014     Cataract 12/2007    both eyes, too much prednisone, implants     Coccidioidomycosis      Crohn disease (H)      Crohn's disease (H) 01/13/2015     Depressive disorder 07/2014    much worse since July 2014     Fracture 1956    green fracture of leg     Gallbladder problem 2005?    much gravel, removed     GERD (gastroesophageal reflux disease)      History of blood transfusion 1988    ulcerated anastomosis term. ilium bleed     Hypertriglyceridemia 07/08/2016     Infection 2007    persistant coccidioidomycosis     Kidney stone various    both sides, all passed naturally     Mental health problem 2007    bipolar though perhaps different     Nephrolithiasis      Noninfectious ileitis      Osteoporosis 2007    osteoporosis, too much prednisone, last dexa 1/2014     Osteoporosis      Other nervous system complications 2007    prednisone overload induced kevin     Personal history of colonic polyps     small ones found during colonoscopies     Steroid-induced psychosis     Patient extremely sensitive to regular doses of steroids.  " Unclear if this is due to chronic fluconazole use or genetic issue.  In the future, use caution when prescribing steroids.     TB (tuberculosis)        Past Surgical History:   Procedure Laterality Date     ABDOMEN SURGERY  1974, 1986, 1989, 2005    open surgeries except last laparoscopic     APPENDECTOMY  01/01/1974    coincidental with Crohn's surgery     BACK SURGERY  12/01/2007    kyphoplasty on compressed 4th??? vertebra     BIOPSY  2007, 2013    lump on arm, knee, back of hand for coccidioidomycosis cult.     CHOLECYSTECTOMY  01/01/2005    much gravel, removed laparoscopically     COLONOSCOPY  7/14/2014 last    Dr. Catherine Bowden, [facility] - many previous     COLONOSCOPY Left 09/11/2015    Procedure: COMBINED COLONOSCOPY, SINGLE OR MULTIPLE BIOPSY/POLYPECTOMY BY BIOPSY;  Surgeon: Fransisco Leach MD;  Location: [facility]     COLONOSCOPY N/A 08/03/2016    Procedure: COMBINED COLONOSCOPY, SINGLE OR MULTIPLE BIOPSY/POLYPECTOMY BY BIOPSY;  Surgeon: Fransisco Leach MD;  Location: [facility]     COLONOSCOPY N/A 08/16/2017    Procedure: COMBINED COLONOSCOPY, SINGLE OR MULTIPLE BIOPSY/POLYPECTOMY BY BIOPSY;;  Surgeon: Fransisco Leach MD;  [facility]     COLONOSCOPY N/A 10/01/2019    Procedure: Colonoscopy With Colonic Stent Insertion;  Surgeon: Robbie Cruz MD;  Location: [facility]     COLONOSCOPY N/A 02/05/2020    Procedure: COLONOSCOPY, FOREIGN BODY REMOVAL;  Surgeon: Robbie Cruz MD;  [facility]: [facility]     COLONOSCOPY N/A 05/26/2021    Procedure: COLONOSCOPY, WITH COLONIC STENT INSERTION;  Surgeon: Robbie Cruz MD;  [facility]: [facility]     COLONOSCOPY N/A 12/06/2021    Procedure: COLONOSCOPY with dilation;  Surgeon: Robbie Cruz MD;  Location: [facility]     COLONOSCOPY N/A 03/18/2024    Procedure: COLONOSCOPY with fluoroscopy dilation;  Surgeon: Robbie Cruz MD;  Location:  OR     ENT SURGERY  ?    upper endoscopy     ENTEROSCOPY SMALL BOWEL N/A  "09/22/2021    Procedure: ENTEROSCOPY;  Surgeon: Robbie Cruz MD;  Location[facility]     ESOPHAGOSCOPY, GASTROSCOPY, DUODENOSCOPY (EGD), COMBINED N/A 11/08/2016    Procedure: COMBINED ENDOSCOPIC ULTRASOUND, ESOPHAGOSCOPY, GASTROSCOPY, DUODENOSCOPY (EGD), FINE NEEDLE ASPIRATE/BIOPSY;  Surgeon: Guru Alexsandra Ballesteros MD;  Location: [facility]     ESOPHAGOSCOPY, GASTROSCOPY, DUODENOSCOPY (EGD), COMBINED N/A 11/08/2016    Procedure: COMBINED ESOPHAGOSCOPY, GASTROSCOPY, DUODENOSCOPY (EGD), BIOPSY SINGLE OR MULTIPLE;  Surgeon: Guru Alexsandra Ballesteros MD;  Location: [facility]     ESOPHAGOSCOPY, GASTROSCOPY, DUODENOSCOPY (EGD), COMBINED N/A 08/16/2017    Procedure: COMBINED ESOPHAGOSCOPY, GASTROSCOPY, DUODENOSCOPY (EGD), BIOPSY SINGLE OR MULTIPLE;;  Surgeon: Fransisco Leach MD;  [facility]     EYE SURGERY  12/01/2007    cataract surgery both sides     GI SURGERY  1974, 1986(x2), 1989    Crohn's, 1974 RO 18\" term. ilium + 9\" asc. colon all one pc     SOFT TISSUE SURGERY  03/01/2013    removed buildup on back of r hand, coccidioidomycosis       Family History   Problem Relation Age of Onset     Cancer - colorectal Mother      Colon Cancer Mother      Heart Failure Father      Musculoskeletal Disorder Father         Parkinson's     Cerebrovascular Disease Paternal Grandmother      Musculoskeletal Disorder Brother         Parkinson's     Cancer Other      Other Cancer Other      Anesthesia Reaction No family hx of      Deep Vein Thrombosis (DVT) No family hx of      Melanoma No family hx of      Skin Cancer No family hx of        Social History     Social History Jocelin    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       Social History     Tobacco Use     Smoking status: Never     Smokeless tobacco: Never   Vaping Use     Vaping status: Never Used   Substance Use Topics     Alcohol use: Yes     Comment: rarely these days     Drug use: No       Immunization History "   Administered Date(s) Administered     COVID-19 12+ (Pfizer) 03/22/2024, 10/11/2024     COVID-19 Bivalent 12+ (Pfizer) 10/07/2022, 08/16/2023     COVID-19 MONOVALENT 12+ (Pfizer) 03/03/2021, 03/25/2021, 11/24/2021     Influenza (High Dose) Trivalent,PF (Fluzone) 10/11/2024     Influenza (IIV3) PF 11/03/2004, 10/08/2015, 11/01/2016     Influenza Vaccine 18-64 (Flublok) 04/19/2019, 12/13/2019     Influenza Vaccine 65+ (FLUAD) 11/26/2021, 12/01/2023     Influenza Vaccine 65+ (Fluzone HD) 10/07/2020, 10/07/2022     Pneumococcal 20 valent Conjugate (Prevnar 20) 02/15/2023     TDAP (Adacel,Boostrix) 10/12/2005     TDAP Vaccine (Boostrix) 10/08/2015     Td (Adult), Adsorbed 10/12/2005     Twinrix A/B 10/12/2005, 11/15/2005     Typhoid IM 11/15/2005     Zoster recombinant adjuvanted (SHINGRIX) 11/26/2021, 05/04/2022       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     Osteoporosis     Anxiety     Vitamin B 12 deficiency     History of kevin     Steroid dependent for adrenal supression     Adrenal insufficiency (H)     Bipolar 2 disorder (H)     Hx of psychiatric care     Hypertriglyceridemia     Lactose intolerance     Vertebral fracture, osteoporotic (H)     Esophageal reflux     Abdominal pain     Diarrhea     Colorectal anastomotic stricture     Abdominal cramping     Partial small bowel obstruction (H)     Chronic kidney disease, stage 3 (H)     Dehydration     Pain and swelling of knee, right     Calculus of kidney with calculus of ureter       Current Outpatient Medications   Medication Sig Dispense Refill     Ascorbic Acid (VITAMIN C) 500 MG CAPS Take 500 mg by mouth daily.       Cyanocobalamin (VITAMIN B 12) 500 MCG TABS Take 500 mcg by mouth every evening        dicyclomine (BENTYL) 10 MG capsule Take 10 mg by mouth as needed.       diphenoxylate-atropine (LOMOTIL) 2.5-0.025 MG tablet Take 1 tablet by mouth as needed for diarrhea.       Iron Combinations  "(IRON COMPLEX PO) Take 1 tablet by mouth daily       multivitamin w/minerals (THERA-VIT-M) tablet Take 1 tablet by mouth every evening        potassium citrate 15 MEQ (1620 MG) TBCR Take 15 mEq by mouth 2 times daily (with meals) 180 tablet 3     traZODone (DESYREL) 50 MG tablet Take 1 tablet (50 mg) by mouth at bedtime 90 tablet 1     vitamin B6 (PYRIDOXINE) 100 MG tablet Take 100 mg by mouth daily       Vitamin D, Cholecalciferol, 25 MCG (1000 UT) CAPS Take 25 mcg by mouth daily       No current facility-administered medications for this visit.       Allergies   Allergen Reactions     Prednisone Other (See Comments)     Diana with more than 2.5mg chronic dosing of prednisone due to fluconazole interaction per patient.               Physical Exam:   Vitals were reviewed.  All vitals stable  Ht 1.778 m (5' 10\")   Wt 68 kg (150 lb)   BMI 21.52 kg/m      Exam:  GENERAL:  well-developed, well-nourished, alert, oriented, in no acute distress.  HEENT:  Head is normocephalic, atraumatic   EYES:  Eyes have anicteric sclerae.    LUNGS:  Breathing comfortably  SKIN:  No acute rashes.    NEUROLOGIC:  Grossly nonfocal.         Laboratory Data:       Inflammatory Markers    Recent Labs   Lab Test 01/20/21  1446 01/18/21  0544 01/17/21  1926 01/17/21  0354 04/21/19  1610 04/18/19  0550 04/17/19  0645 01/08/18  1345 07/05/17  1442 04/14/17  1526 01/03/17  1103 11/11/16  1343   SED  --  6 6  --  6  --   --  4 8 5 6 7   CRP 8.4* 8.8*  --  <2.9 <2.9 3.2 5.8 <2.9 4.5 <2.9 <2.9 14.0*       Metabolic Studies       Recent Labs   Lab Test 09/12/24  1046 07/11/24  1335 06/10/24  1539 03/08/24  1444 10/24/23  1001 07/11/23  1350 05/17/23  0526 05/11/23  1804 04/28/23  0919 03/18/21  1319 01/18/21  0544 01/27/20  1454 08/22/19  0828 05/21/19  1511 04/21/19  2117     --   --  143 141  --  141 144 138   < > 143   < > 139   < >  --    POTASSIUM 5.3 5.1 5.0 4.9 5.1 4.5 4.5 5.0 4.7   < > 4.0   < > 4.5   < >  --    CHLORIDE 103  --   --  " 105 103  --  105 105 102   < > 114*   < > 104   < >  --    CO2 27  --   --  28 28  --  24 25 29   < > 20   < > 30   < >  --    ANIONGAP 10  --   --  10 10  --  12 14 7   < > 9   < > 5   < >  --    BUN 16.3  --   --  21.1 18.0  --  16.3 16.6 18.1   < > 22   < > 17   < >  --    CR 1.55*  --   --  1.53* 1.51* 1.48* 1.34* 1.43* 1.47*   < > 1.22   < > 1.30*   < >  --    GFRESTIMATED 48*  --   --  49* 50* 51* 58* 54* 52*   < > 62   < > 58*   < >  --    GLC 99  --   --  85 101*  --  92 111* 97   < > 92   < > 87   < >  --    A1C  --   --   --   --   --   --   --   --   --   --   --   --  5.6  --   --    YUNIER 9.8  --   --  9.7 10.0  --  9.4 9.2 10.0   < > 7.9*   < > 9.0   < >  --    PHOS 3.3  --   --   --  4.2  --   --   --  3.7   < >  --   --   --   --   --    MAG 1.9  --   --   --   --   --   --   --   --   --   --   --   --   --   --    LACT  --   --   --   --   --   --   --   --   --   --  0.7  --   --   --  0.6*    < > = values in this interval not displayed.       Hematology Studies      Recent Labs   Lab Test 09/12/24  1046 03/08/24  1444 10/24/23  1001 05/17/23  0146 04/28/23  0919 02/15/23  1325 10/07/22  1445 05/14/21  1656 01/18/21  0544 01/17/21  0354 12/10/20  0955 05/21/19  1511 04/21/19  1610 04/17/19  0645 04/16/19  1856 06/05/18  0332   WBC 6.8 5.7  --  7.6 6.2 5.3 5.0   < > 7.2 10.8 4.4   < > 6.1   < > 13.0* 5.9   ANEU  --   --   --   --   --   --   --   --  5.2 7.2 2.9  --  3.1  --  9.0* 3.9   ALYM  --   --   --   --   --   --   --   --  0.9 2.4 0.7*  --  2.2  --  2.9 1.2   STEVEN  --   --   --   --   --   --   --   --  0.9 0.9 0.7  --  0.6  --  1.0 0.7   AEOS  --   --   --   --   --   --   --   --  0.1 0.1 0.0  --  0.1  --  0.1 0.1   HGB 16.3 16.2 16.1 15.0 15.4 14.2 16.2   < > 14.7 16.0 16.7   < > 15.5   < > 20.4* 15.5   HCT 47.2 47.6  --  45.3 46.0 43.5 46.9   < > 45.9 48.2 46.7   < > 46.8   < > 62.4* 44.5    255  --  219 215 242 245   < > 247 308 171   < > 262   < > 350 186    < > = values in this  interval not displayed.       Clotting Studies    Recent Labs   Lab Test 04/21/19  1610 04/17/19  0645   INR 0.99 1.04       Urine Studies     Recent Labs   Lab Test 09/12/24  1053 10/24/23  1004 05/17/23  0124 04/28/23  0940 10/07/22  1453   URINEPH 5.5 5.5 5.5 5.5 6.0   NITRITE Negative Negative Negative Negative Negative   LEUKEST Negative Negative Negative Negative Negative   WBCU <1 1 11* 1 1       Microbiology:  Last 6 Culture results with specimen source  Culture Micro   Date Value Ref Range Status   04/21/2019 No growth  Final   09/06/2007 No growth  Final   09/06/2007 No growth  Final   09/06/2007 No growth  Final   05/16/2007 No growth  Final   04/25/2007 No growth  Final    Specimen Description   Date Value Ref Range Status   01/20/2021 Feces  Final   12/20/2020 Feces  Final   12/20/2020 Feces  Final   04/21/2019 Unspecified Urine  Final   07/07/2017 Feces  Final   04/15/2017 Feces  Final   04/15/2017 Feces  Final   04/15/2017 Feces  Final        Last check of C difficile  C Diff Toxin B PCR   Date Value Ref Range Status   01/20/2021 Negative NEG^Negative Final     Comment:     Negative: C. difficile target DNA sequences NOT detected, presumed negative   for C.difficile toxin B or the number of bacteria present may be below the   limit of detection for the test.  FDA approved assay performed using [employer] GeneXpert real-time PCR.  A negative result does not exclude actual disease due to C. difficile and may   be due to improper collection, handling and storage of the specimen or the   number of organisms in the specimen is below the detection limit of the assay.          Hepatitis C Antibody   Date Value Ref Range Status   01/23/2007 Negative NEG Final       Imaging:  Results for orders placed or performed during the hospital encounter of 03/18/24   XR Surgery CANDELARIA Fluoro Less Than 5 Min w Stills    Narrative    This exam was marked as non-reportable because it will not be read by a   radiologist or a  [facility] non-radiologist provider.             Again, thank you for allowing me to participate in the care of your patient.      Sincerely,    Christiano Wilder MD

## 2024-12-05 NOTE — NURSING NOTE
Current patient location: [address]   [facility] [address]    Is the patient currently in the state of [place]? YES    Visit mode:VIDEO    If the visit is dropped, the patient can be reconnected by:VIDEO VISIT: Text to cell phone:   Telephone Information:   Mobile [phone]    and VIDEO VISIT: Send to e-mail at: [email or web address]    Will anyone else be joining the visit? NO  (If patient encounters technical issues they should call [phone])    Are changes needed to the allergy or medication list? No    Are refills needed on medications prescribed by this physician? NO    Rooming Documentation:  Unable to complete questionnaire(s) due to time    Reason for visit: Consult      Shanika BUSTAMANTE

## 2024-12-09 NOTE — PROGRESS NOTES
Video visit via [employer].    Pt location: Home  Provider location: Off site  Video start time; 245 pm  Video stop time: Approx 307 pm    [facility]  Infectious Disease Clinic Note:  New Patient     Patient:  Trevin Gee, Date of birth 1955, Medical record number [id number]  Date of Visit:  12/09/2024  Consult requested by  Lacey Feng APRN CNP   For follow up of coccidioidal granuloma.           Assessment and Recommendations:       ID clinic virtual visit    Patient previously followed by my ID colleagues Chad Mccabe, and Dc. Last seen by Dr Irwin in 12/2021    Assessment:  1. Coccidiomycosis, right wrist and right knee  Trevin has been maintained on fluconazole since 2013 given recurrence of disease when fluconazole had been previously stopped (he did not have any new fungal exposures prior to his second episode). Dr. Mccabe had conferred with experts at the time electing to continue fluconazole life long (assuming he would always been on some immunesuppression). He is now off iatrogenic immunosuppression, but Dr Irwin at last visit preferred to continue secondary ppx with fluconazole for now given that his immune system may be influenced by entities other than iatrogenic IS, including suboptimal nutritional state (though noted that his albumin is normal). In the setting of elevated Cr his dose noted reduced to 100 mg daily from 200 mg daily in 5/2024. Trevin is doing well. We will continue Fluconazole at 100 mg daily. Initially sent prescription to [facility] for refills, but just so insurance does not get confused given other recent refill Trevin requested that he will call when refills needed, so I have called [facility] and canceled. He got a 90 day supply from Lacey DAMON CNP in 11/2024.     2. Chronic Sinusitis  Trevin has about 3 weeks of sinus symptoms, offered 5-7 days of antibiotics but they hat may have begun get better. If it persists, we  "can re-evaluate, consider ENT referral and requests cultures. He prefers to reach out  as needed    3. Presumed tinea cruris  Trevin reports jock itch, with no erythema/discharge, worsened ever since going down to the 100 of Fluconazole. He is using some topicals. He is careful to keep groin folds dry, Given lack of erythema an exam in person could be helpful to definitively cinch diagnosis. Differentials include seborrheic dermatitis, contact dermatitis. No scaling visualized per pt.  Usual dose of Tinea is 150 to 200 mg once weekly for 2 to 4 weeks so he should be covered. He prefers to reach out as needed for this too. Derm input can be helpful as well, he follows with Dr mcneil already.     4. Crohn's disease with confirmed ileal anastomotic stricture and patient-suspected jejunal abnormality. Is currently off immunosuppressive agents.     5. CKD, Cr baseline 1.3-1.5. previous suspicion that some prior Cr measurements have been influenced by hypovolemia due to GI issues.    6. Reactivated VZV with post-herpetic neuralgia.    Recommendations:  - Continue Fluconazole 100 mg daily (have canceled new prescription sent to [facility] per pt request, advised to call when refills are due)  - Reach out if presumed tinea cruris continues to be an issue, can seek Dermatology input too  - If sinus issues remain, reach out and we can consider an ENT referral for fungal cultures  - Follow up in 1 year    I have reviewed vitals labs, images, cultures and antibiotics    Christiano Wilder  Staff Physician  Division of Infectious Diseases           History of the Infectious Disease lllness:       Trevin is a 68 y/o man with IBD and coccidiomycosis that relapsed following cessation of an initial antifungal course.  He was last seen by my colleague Dr. Mccabe in ~2015, and Dr Irwin in 12/2021. I am meeting him for the first time today.    Per 4/30/2015 note from Chad Lora/Estelle:  \"7 years ago he took a trip with his wife " "to the [facility], about 2 weeks after he noted R sided bumps on his arm. The lesions persisted and eventually were biopsied.  He did feel somewhat sick in general at the time (mostly weakness) but didn't remember any respiratory symptoms, HA or joint pains.  Culture from the biopsy grew coccidiomycoses and he was started on 400 mg fluconazole.  He took this x 1 year and then stopped fluconazole.  He was then well until March 2013 when he noticed a lesion on the dorsum of his R wrist.  He initially thought the lesion was due to playing to many computer games but noted that it kept growing over time, he then noted his rt knee was swelling.  He didn't have any issues with fevers, chills, night sweats, HA, neck stiffness, additional rash or joint pains, cough, dyspnea, or chest pain.  He had an R knee arthrocentesis and culture of the fluid grew coccidiomycoses.  He was started on 400 mg fluconazole (this was in [facility] and he didn't have an additional exposure in the interim after his first illness) and took this x about 1 year and has been taking 200mg fluconazole since.  He did have surgery on the R wrist to remove the lesion though there is some residual scar tissue. In October 2014 he had no complaints and both the R wrist and knee both functioned well.  He does have a h/o Crohn's disease and takes 1 mg MWF of prednisone as well as daily budesonide.  He is happy on that low dose and is nervous to switch to any other agent (has tried MTX in the past and didn't tolerate).  He's not had any issues tolerating the fluconazole.\"    Per Dr Mccabe 10/2015:  \"When performing a ROS, he reported that he developed signficant right knee swelling 3 weeks ago after a long, strenuous hike. The knee was not painful, red or warm. He was seen in the [facility] [place] where an arthrocentesis was performed. He was told that the fluid was benign and cultures have remained negative. After the fluid was removed, the swellling did not recur " "and he has no pain or other symptoms. \"    In the setting of elevated Cr his dose noted reduced to 100 mg daily from 200 mg daily in 5/2024. Trevin is doing well in terms of his wrist, no other unsuual joint pain.     Trevin has about 3 weeks of sinus symptoms. Has post nasal drip, positional variation.     Also reports worsened jock itch since reducing Fluconazole dose. No erythema/discharge, just itching. No flakiness.    Denies other complaints.      Past Medical History:   Diagnosis Date    Anemia 2007    Anxiety 2012?    much worse since July 2014    Cataract 12/2007    both eyes, too much prednisone, implants    Coccidioidomycosis     Crohn disease (H)     Crohn's disease (H) 01/13/2015    Depressive disorder 07/2014    much worse since July 2014    Fracture 1956    green fracture of leg    Gallbladder problem 2005?    much gravel, removed    GERD (gastroesophageal reflux disease)     History of blood transfusion 1988    ulcerated anastomosis term. ilium bleed    Hypertriglyceridemia 07/08/2016    Infection 2007    persistant coccidioidomycosis    Kidney stone various    both sides, all passed naturally    Mental health problem 2007    bipolar though perhaps different    Nephrolithiasis     Noninfectious ileitis     Osteoporosis 2007    osteoporosis, too much prednisone, last dexa 1/2014    Osteoporosis     Other nervous system complications 2007    prednisone overload induced kevin    Personal history of colonic polyps     small ones found during colonoscopies    Steroid-induced psychosis     Patient extremely sensitive to regular doses of steroids.  Unclear if this is due to chronic fluconazole use or genetic issue.  In the future, use caution when prescribing steroids.    TB (tuberculosis)        Past Surgical History:   Procedure Laterality Date    ABDOMEN SURGERY  1974, 1986, 1989, 2005    open surgeries except last laparoscopic    APPENDECTOMY  01/01/1974    coincidental with Crohn's surgery    BACK SURGERY  " 12/01/2007    kyphoplasty on compressed 4th??? vertebra    BIOPSY  2007, 2013    lump on arm, knee, back of hand for coccidioidomycosis cult.    CHOLECYSTECTOMY  01/01/2005    much gravel, removed laparoscopically    COLONOSCOPY  7/14/2014 last    Dr. Catherine Bowden, [facility] - many previous    COLONOSCOPY Left 09/11/2015    Procedure: COMBINED COLONOSCOPY, SINGLE OR MULTIPLE BIOPSY/POLYPECTOMY BY BIOPSY;  Surgeon: Fransisco Leach MD;  Location: [facility]    COLONOSCOPY N/A 08/03/2016    Procedure: COMBINED COLONOSCOPY, SINGLE OR MULTIPLE BIOPSY/POLYPECTOMY BY BIOPSY;  Surgeon: Fransisco Leach MD;  Location: [facility]    COLONOSCOPY N/A 08/16/2017    Procedure: COMBINED COLONOSCOPY, SINGLE OR MULTIPLE BIOPSY/POLYPECTOMY BY BIOPSY;;  Surgeon: Fransisco Leach MD;  [facility]    COLONOSCOPY N/A 10/01/2019    Procedure: Colonoscopy With Colonic Stent Insertion;  Surgeon: Robbie Cruz MD;  Location: [facility]    COLONOSCOPY N/A 02/05/2020    Procedure: COLONOSCOPY, FOREIGN BODY REMOVAL;  Surgeon: Robbie Cruz MD;  [facility]    COLONOSCOPY N/A 05/26/2021    Procedure: COLONOSCOPY, WITH COLONIC STENT INSERTION;  Surgeon: Robbie Cruz MD;  [facility]    COLONOSCOPY N/A 12/06/2021    Procedure: COLONOSCOPY with dilation;  Surgeon: Robbie Cruz MD;  Location:  OR    COLONOSCOPY N/A 03/18/2024    Procedure: COLONOSCOPY with fluoroscopy dilation;  Surgeon: Robbie Cruz MD;  Location:  OR    ENT SURGERY  ?    upper endoscopy    ENTEROSCOPY SMALL BOWEL N/A 09/22/2021    Procedure: ENTEROSCOPY;  Surgeon: Robbie Cruz MD;  [facility]    ESOPHAGOSCOPY, GASTROSCOPY, DUODENOSCOPY (EGD), COMBINED N/A 11/08/2016    Procedure: COMBINED ENDOSCOPIC ULTRASOUND, ESOPHAGOSCOPY, GASTROSCOPY, DUODENOSCOPY (EGD), FINE NEEDLE ASPIRATE/BIOPSY;  Surgeon: Guru Alexsandra Ballesteros MD;  [facility]    ESOPHAGOSCOPY, GASTROSCOPY,  "DUODENOSCOPY (EGD), COMBINED N/A 11/08/2016    Procedure: COMBINED ESOPHAGOSCOPY, GASTROSCOPY, DUODENOSCOPY (EGD), BIOPSY SINGLE OR MULTIPLE;  Surgeon: Guru Alexsandra Ballesteros MD;  Location:  GI    ESOPHAGOSCOPY, GASTROSCOPY, DUODENOSCOPY (EGD), COMBINED N/A 08/16/2017    Procedure: COMBINED ESOPHAGOSCOPY, GASTROSCOPY, DUODENOSCOPY (EGD), BIOPSY SINGLE OR MULTIPLE;;  Surgeon: Fransisco Leach MD;  [facility]    EYE SURGERY  12/01/2007    cataract surgery both sides    GI SURGERY  1974, 1986(x2), 1989    Crohn's, 1974 RO 18\" term. ilium + 9\" asc. colon all one pc    SOFT TISSUE SURGERY  03/01/2013    removed buildup on back of r hand, coccidioidomycosis       Family History   Problem Relation Age of Onset    Cancer - colorectal Mother     Colon Cancer Mother     Heart Failure Father     Musculoskeletal Disorder Father         Parkinson's    Cerebrovascular Disease Paternal Grandmother     Musculoskeletal Disorder Brother         Parkinson's    Cancer Other     Other Cancer Other     Anesthesia Reaction No family hx of     Deep Vein Thrombosis (DVT) No family hx of     Melanoma No family hx of     Skin Cancer No family hx of        Social History     Social History Narrative    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       Social History     Tobacco Use    Smoking status: Never    Smokeless tobacco: Never   Vaping Use    Vaping status: Never Used   Substance Use Topics    Alcohol use: Yes     Comment: rarely these days    Drug use: No       Immunization History   Administered Date(s) Administered    COVID-19 12+ (Pfizer) 03/22/2024, 10/11/2024    COVID-19 Bivalent 12+ (Pfizer) 10/07/2022, 08/16/2023    COVID-19 MONOVALENT 12+ (Pfizer) 03/03/2021, 03/25/2021, 11/24/2021    Influenza (High Dose) Trivalent,PF (Fluzone) 10/11/2024    Influenza (IIV3) PF 11/03/2004, 10/08/2015, 11/01/2016    Influenza Vaccine 18-64 (Flublok) 04/19/2019, 12/13/2019    Influenza Vaccine " 65+ (FLUAD) 11/26/2021, 12/01/2023    Influenza Vaccine 65+ (Fluzone HD) 10/07/2020, 10/07/2022    Pneumococcal 20 valent Conjugate (Prevnar 20) 02/15/2023    TDAP (Adacel,Boostrix) 10/12/2005    TDAP Vaccine (Boostrix) 10/08/2015    Td (Adult), Adsorbed 10/12/2005    Twinrix A/B 10/12/2005, 11/15/2005    Typhoid IM 11/15/2005    Zoster recombinant adjuvanted (SHINGRIX) 11/26/2021, 05/04/2022       Patient Active Problem List   Diagnosis    Crohn's disease (H)    Infection by Coccidioides immitis    Osteoporosis    Anxiety    Vitamin B 12 deficiency    History of kevin    Steroid dependent for adrenal supression    Adrenal insufficiency (H)    Bipolar 2 disorder (H)    Hx of psychiatric care    Hypertriglyceridemia    Lactose intolerance    Vertebral fracture, osteoporotic (H)    Esophageal reflux    Abdominal pain    Diarrhea    Colorectal anastomotic stricture    Abdominal cramping    Partial small bowel obstruction (H)    Chronic kidney disease, stage 3 (H)    Dehydration    Pain and swelling of knee, right    Calculus of kidney with calculus of ureter       Current Outpatient Medications   Medication Sig Dispense Refill    Ascorbic Acid (VITAMIN C) 500 MG CAPS Take 500 mg by mouth daily.      Cyanocobalamin (VITAMIN B 12) 500 MCG TABS Take 500 mcg by mouth every evening       dicyclomine (BENTYL) 10 MG capsule Take 10 mg by mouth as needed.      diphenoxylate-atropine (LOMOTIL) 2.5-0.025 MG tablet Take 1 tablet by mouth as needed for diarrhea.      Iron Combinations (IRON COMPLEX PO) Take 1 tablet by mouth daily      multivitamin w/minerals (THERA-VIT-M) tablet Take 1 tablet by mouth every evening       potassium citrate 15 MEQ (1620 MG) TBCR Take 15 mEq by mouth 2 times daily (with meals) 180 tablet 3    traZODone (DESYREL) 50 MG tablet Take 1 tablet (50 mg) by mouth at bedtime 90 tablet 1    vitamin B6 (PYRIDOXINE) 100 MG tablet Take 100 mg by mouth daily      Vitamin D, Cholecalciferol, 25 MCG (1000 UT) CAPS  "Take 25 mcg by mouth daily       No current facility-administered medications for this visit.       Allergies   Allergen Reactions    Prednisone Other (See Comments)     Diana with more than 2.5mg chronic dosing of prednisone due to fluconazole interaction per patient.               Physical Exam:   Vitals were reviewed.  All vitals stable  Ht 1.778 m (5' 10\")   Wt 68 kg (150 lb)   BMI 21.52 kg/m      Exam:  GENERAL:  well-developed, well-nourished, alert, oriented, in no acute distress.  HEENT:  Head is normocephalic, atraumatic   EYES:  Eyes have anicteric sclerae.    LUNGS:  Breathing comfortably  SKIN:  No acute rashes.    NEUROLOGIC:  Grossly nonfocal.         Laboratory Data:       Inflammatory Markers    Recent Labs   Lab Test 01/20/21  1446 01/18/21  0544 01/17/21  1926 01/17/21  0354 04/21/19  1610 04/18/19  0550 04/17/19  0645 01/08/18  1345 07/05/17  1442 04/14/17  1526 01/03/17  1103 11/11/16  1343   SED  --  6 6  --  6  --   --  4 8 5 6 7   CRP 8.4* 8.8*  --  <2.9 <2.9 3.2 5.8 <2.9 4.5 <2.9 <2.9 14.0*       Metabolic Studies       Recent Labs   Lab Test 09/12/24  1046 07/11/24  1335 06/10/24  1539 03/08/24  1444 10/24/23  1001 07/11/23  1350 05/17/23  0526 05/11/23  1804 04/28/23  0919 03/18/21  1319 01/18/21  0544 01/27/20  1454 08/22/19  0828 05/21/19  1511 04/21/19  2117     --   --  143 141  --  141 144 138   < > 143   < > 139   < >  --    POTASSIUM 5.3 5.1 5.0 4.9 5.1 4.5 4.5 5.0 4.7   < > 4.0   < > 4.5   < >  --    CHLORIDE 103  --   --  105 103  --  105 105 102   < > 114*   < > 104   < >  --    CO2 27  --   --  28 28  --  24 25 29   < > 20   < > 30   < >  --    ANIONGAP 10  --   --  10 10  --  12 14 7   < > 9   < > 5   < >  --    BUN 16.3  --   --  21.1 18.0  --  16.3 16.6 18.1   < > 22   < > 17   < >  --    CR 1.55*  --   --  1.53* 1.51* 1.48* 1.34* 1.43* 1.47*   < > 1.22   < > 1.30*   < >  --    GFRESTIMATED 48*  --   --  49* 50* 51* 58* 54* 52*   < > 62   < > 58*   < >  --    GLC 99  " --   --  85 101*  --  92 111* 97   < > 92   < > 87   < >  --    A1C  --   --   --   --   --   --   --   --   --   --   --   --  5.6  --   --    YUNIER 9.8  --   --  9.7 10.0  --  9.4 9.2 10.0   < > 7.9*   < > 9.0   < >  --    PHOS 3.3  --   --   --  4.2  --   --   --  3.7   < >  --   --   --   --   --    MAG 1.9  --   --   --   --   --   --   --   --   --   --   --   --   --   --    LACT  --   --   --   --   --   --   --   --   --   --  0.7  --   --   --  0.6*    < > = values in this interval not displayed.       Hematology Studies      Recent Labs   Lab Test 09/12/24  1046 03/08/24  1444 10/24/23  1001 05/17/23  0146 04/28/23  0919 02/15/23  1325 10/07/22  1445 05/14/21  1656 01/18/21  0544 01/17/21  0354 12/10/20  0955 05/21/19  1511 04/21/19  1610 04/17/19  0645 04/16/19  1856 06/05/18  0332   WBC 6.8 5.7  --  7.6 6.2 5.3 5.0   < > 7.2 10.8 4.4   < > 6.1   < > 13.0* 5.9   ANEU  --   --   --   --   --   --   --   --  5.2 7.2 2.9  --  3.1  --  9.0* 3.9   ALYM  --   --   --   --   --   --   --   --  0.9 2.4 0.7*  --  2.2  --  2.9 1.2   STEVEN  --   --   --   --   --   --   --   --  0.9 0.9 0.7  --  0.6  --  1.0 0.7   AEOS  --   --   --   --   --   --   --   --  0.1 0.1 0.0  --  0.1  --  0.1 0.1   HGB 16.3 16.2 16.1 15.0 15.4 14.2 16.2   < > 14.7 16.0 16.7   < > 15.5   < > 20.4* 15.5   HCT 47.2 47.6  --  45.3 46.0 43.5 46.9   < > 45.9 48.2 46.7   < > 46.8   < > 62.4* 44.5    255  --  219 215 242 245   < > 247 308 171   < > 262   < > 350 186    < > = values in this interval not displayed.       Clotting Studies    Recent Labs   Lab Test 04/21/19  1610 04/17/19  0645   INR 0.99 1.04       Urine Studies     Recent Labs   Lab Test 09/12/24  1053 10/24/23  1004 05/17/23  0124 04/28/23  0940 10/07/22  1453   URINEPH 5.5 5.5 5.5 5.5 6.0   NITRITE Negative Negative Negative Negative Negative   LEUKEST Negative Negative Negative Negative Negative   WBCU <1 1 11* 1 1       Microbiology:  Last 6 Culture results with specimen  source  Culture Micro   Date Value Ref Range Status   04/21/2019 No growth  Final   09/06/2007 No growth  Final   09/06/2007 No growth  Final   09/06/2007 No growth  Final   05/16/2007 No growth  Final   04/25/2007 No growth  Final    Specimen Description   Date Value Ref Range Status   01/20/2021 Feces  Final   12/20/2020 Feces  Final   12/20/2020 Feces  Final   04/21/2019 Unspecified Urine  Final   07/07/2017 Feces  Final   04/15/2017 Feces  Final   04/15/2017 Feces  Final   04/15/2017 Feces  Final        Last check of C difficile  C Diff Toxin B PCR   Date Value Ref Range Status   01/20/2021 Negative NEG^Negative Final     Comment:     Negative: C. difficile target DNA sequences NOT detected, presumed negative   for C.difficile toxin B or the number of bacteria present may be below the   limit of detection for the test.  FDA approved assay performed using [employer] GeneXpert real-time PCR.  A negative result does not exclude actual disease due to C. difficile and may   be due to improper collection, handling and storage of the specimen or the   number of organisms in the specimen is below the detection limit of the assay.          Hepatitis C Antibody   Date Value Ref Range Status   01/23/2007 Negative NEG Final       Imaging:  Results for orders placed or performed during the hospital encounter of 03/18/24   XR Surgery CANDELARIA Fluoro Less Than 5 Min w Stills    Narrative    This exam was marked as non-reportable because it will not be read by a   radiologist or a [place] non-radiologist provider.

## 2025-01-02 NOTE — LETTER
"Patient Education     Ear infections in adults   The Basics   Written by the doctors and editors at [facility]   What is an ear infection? -- An ear infection is a condition that can cause pain in the ear, fever, and trouble hearing. Ear infections are more common in children than in adults.  Ear infections often occur after a cold or problem with seasonal allergies. Ear infections in adults happen more often in people who have a problem with their Eustachian tubes. The Eustachian tube connects the middle ear (the part of the ear behind the eardrum) to the back of the nose and throat.  Fluid can build up in the middle part of the ear behind the eardrum. This fluid can become infected and press on the eardrum, causing it to bulge (figure 1). This causes symptoms.  The medical term for middle ear infections is \"otitis media.\"  What are the symptoms of an ear infection? -- In adults, the symptoms include:   Ear pain   Temporary hearing loss   Feeling dizzy  How do I know if I have an ear infection? -- If you think that you have an ear infection, see a doctor or nurse. They will ask you about your symptoms, do an exam, and look in your ears.  How is an ear infection treated? -- Doctors treat ear infections in adults with antibiotics. These medicines kill the bacteria that cause some ear infections. Even though some ear infections are caused by a virus, doctors often prescribe antibiotics for adults anyway. That's because ear infections can lead to other problems if they are not treated quickly.  Is there anything I can do on my own to feel better?    Take all of your medicines as instructed. If the doctor prescribes antibiotics, finish all of them, even if you start to feel better.   You can take non-prescription medicines to relieve pain. Examples include acetaminophen (sample brand name: Tylenol), ibuprofen (sample brand names: Advil, Motrin), or naproxen (sample brand name: Aleve).   You can try ice or heat to help " 5/30/2023       RE: Trevin Gee  [address]  [address]  [place] [place] [facility] [address]     Dear Colleague,    Thank you for referring your patient, Trevin Gee, to the [facility] [place] at [facility]. Please see a copy of my visit note below.    Spoke to patient via phone  He dropped off the stone today for analysis  He is having lot of GI symptoms   I discussed that he has follow-up in Nov with renal US prior to monitor the renal stone      Sincerely,    Sebastián Coe MD       "with ear pain. Do not sleep with ice or heat on your ear.   Put a cold gel pack, bag of ice, or bag of frozen vegetables on the ear every 1 to 2 hours, for 15 minutes each time. Put a thin towel between the ice (or other cold object) and the skin.   Put a heating pad, warm towel, or hot water bottle on the ear every 1 to 2 hours, for 15 minutes at a time. Put a thin towel between the warm object and the skin.   Do not put anything in your ear unless the doctor or nurse told you to.   Airplane travel can make ear pain worse, especially as the plane starts to land. Chewing gum, drinking, or eating food might help.  Can ear infections be prevented? -- To lower your risk of getting an ear infection:   If you smoke, try to stop. Avoid places where others are smoking.   Wash your hands often.   Stay away from others who are sick with a cold or viral infection.   Get all of the vaccines your doctor recommends.  When should I call the doctor? -- Call for advice if:   Your symptoms are not getting better in 2 to 3 days.   You continue to have hearing problems after 2 to 3 weeks.   You have a fever of 100.4°F (38°C) or higher, or chills.   You have discharge or drainage coming from your ear.  All topics are updated as new evidence becomes available and our peer review process is complete.  This topic retrieved from [employer] on: May 15, 2024.  Topic 715047 Version 1.0  Release: 32.4.3 - C32.134  © 2024 UpToDate, Inc. and/or its affiliates. All rights reserved.  figure 1: Ear infection (otitis media)     The ear on the left is normal and does not have an infection. The ear on the right shows what an infection can look like. The infected fluid in the middle ear causes the eardrum to bulge. Normally, fluid in the middle ear drains into the throat through a tube called the \"Eustachian tube.\" But during an infection, swelling blocks off the tube, so fluid builds up.  Graphic 33157 Version 8.0  Consumer Information Use and " Disclaimer   Disclaimer: This generalized information is a limited summary of diagnosis, treatment, and/or medication information. It is not meant to be comprehensive and should be used as a tool to help the user understand and/or assess potential diagnostic and treatment options. It does NOT include all information about conditions, treatments, medications, side effects, or risks that may apply to a specific patient. It is not intended to be medical advice or a substitute for the medical advice, diagnosis, or treatment of a health care provider based on the health care provider's examination and assessment of a patient's specific and unique circumstances. Patients must speak with a health care provider for complete information about their health, medical questions, and treatment options, including any risks or benefits regarding use of medications. This information does not endorse any treatments or medications as safe, effective, or approved for treating a specific patient. UpToDate, Inc. and its affiliates disclaim any warranty or liability relating to this information or the use thereof.The use of this information is governed by the Terms of Use, available at https://www.w[employer]ters[employer] Devicesuwer.com/en/know/clinical-effectiveness-terms. 2024© UpToDate, Inc. and its affiliates and/or licensors. All rights reserved.  Copyright   © 2024 UpToDate, Inc. and/or its affiliates. All rights reserved.

## 2025-01-08 ENCOUNTER — OFFICE VISIT (OUTPATIENT)
Dept: INTERNAL MEDICINE | Facility: CLINIC | Age: 70
End: 2025-01-08
Payer: COMMERCIAL

## 2025-01-08 ENCOUNTER — LAB (OUTPATIENT)
Dept: LAB | Facility: CLINIC | Age: 70
End: 2025-01-08
Payer: COMMERCIAL

## 2025-01-08 VITALS
WEIGHT: 158.4 LBS | TEMPERATURE: 97.6 F | HEIGHT: 70 IN | SYSTOLIC BLOOD PRESSURE: 135 MMHG | BODY MASS INDEX: 22.68 KG/M2 | DIASTOLIC BLOOD PRESSURE: 86 MMHG | OXYGEN SATURATION: 99 % | HEART RATE: 80 BPM | RESPIRATION RATE: 16 BRPM

## 2025-01-08 DIAGNOSIS — N20.0 KIDNEY STONE: ICD-10-CM

## 2025-01-08 DIAGNOSIS — N18.30 STAGE 3 CHRONIC KIDNEY DISEASE, UNSPECIFIED WHETHER STAGE 3A OR 3B CKD (H): Primary | ICD-10-CM

## 2025-01-08 DIAGNOSIS — R09.82 POSTNASAL DRIP: ICD-10-CM

## 2025-01-08 RX ORDER — FLUTICASONE PROPIONATE 50 MCG
1 SPRAY, SUSPENSION (ML) NASAL DAILY
Qty: 11.1 ML | Refills: 0 | Status: SHIPPED | OUTPATIENT
Start: 2025-01-08

## 2025-01-08 ASSESSMENT — PATIENT HEALTH QUESTIONNAIRE - PHQ9
SUM OF ALL RESPONSES TO PHQ QUESTIONS 1-9: 0
10. IF YOU CHECKED OFF ANY PROBLEMS, HOW DIFFICULT HAVE THESE PROBLEMS MADE IT FOR YOU TO DO YOUR WORK, TAKE CARE OF THINGS AT HOME, OR GET ALONG WITH OTHER PEOPLE: NOT DIFFICULT AT ALL
SUM OF ALL RESPONSES TO PHQ QUESTIONS 1-9: 0

## 2025-01-08 NOTE — PROGRESS NOTES
"  Assessment & Plan     Postnasal drip  He has been taking Sudafed to help dry out/reduced postnasal drip.  Recommended cutting back on this and avoid frequent use.  Advised starting Flonase nasal spray 1 spray in each nostril daily to help dry out the sinuses and reduce drainage.  Reviewed instructions for use.  He agrees with the plan.  - fluticasone (FLONASE) 50 MCG/ACT nasal spray; Spray 1 spray into both nostrils daily.    Stage 3 chronic kidney disease, unspecified whether stage 3a or 3b CKD (H)  Stable.  Last seen by Dr. Greenfield in September; no changes were made at that visit.  He will follow-up with nephrology as scheduled in March.            Return if symptoms worsen or fail to improve.  Otherwise in May for Medicare wellness exam    Angela Zhong is a 69 year old, presenting for the following health issues:  Consult (Persistent post nasal drip)        1/8/2025     4:51 PM   Additional Questions   Roomed by SK EMT     History of Present Illness       Reason for visit:  Persistant post nasal drip   He is taking medications regularly.     Persistent PND--if takes sudafed will be okay.  Symptoms present ever since a cold that initially started with a sore throat in November.  Home COVID tests negative.  Postnasal drip persisted even after other symptoms resolved.  No sinus pain or pressure, not congested.  Some seasonal allergies, but not usually in the winter months.    PND triggers cough.  Tries to sleep on his side and keep his chest clear.   Follows with Dr. David in GI for hx Crohn's      Review of Systems  Constitutional, HEENT, cardiovascular, pulmonary, gi and gu systems are negative, except as otherwise noted.      Objective    /86 (BP Location: Right arm, Patient Position: Sitting, Cuff Size: Adult Regular)   Pulse 80   Temp 97.6  F (36.4  C) (Oral)   Resp 16   Ht 1.778 m (5' 10\")   Wt 71.8 kg (158 lb 6.4 oz)   SpO2 99%   BMI 22.73 kg/m    Body mass index is 22.73 kg/m .  Physical " Exam   GENERAL: alert and no distress  EYES: Eyes grossly normal to inspection, PERRL and conjunctivae and sclerae normal  HENT: ear canals and TM's normal, nose and mouth without ulcers or lesions  NECK: no adenopathy, no asymmetry, masses, or scars  RESP: lungs clear to auscultation - no rales, rhonchi or wheezes  CV: regular rate and rhythm, normal S1 S2, no S3 or S4, no murmur, click or rub, no peripheral edema  ABDOMEN: soft, nontender, no hepatosplenomegaly, no masses and bowel sounds normal  MS: no gross musculoskeletal defects noted, no edema  NEURO: Normal strength and tone, mentation intact and speech normal  PSYCH: mentation appears normal, affect normal/bright            Signed Electronically by: MARISOL Alfaro CNP

## 2025-01-28 DIAGNOSIS — R82.991 HYPOCITRATURIA: Primary | ICD-10-CM

## 2025-01-28 RX ORDER — POTASSIUM CITRATE 10 MEQ/1
10 TABLET, EXTENDED RELEASE ORAL 2 TIMES DAILY WITH MEALS
Qty: 180 TABLET | Refills: 3 | Status: SHIPPED | OUTPATIENT
Start: 2025-01-28

## 2025-02-06 ENCOUNTER — TELEPHONE (OUTPATIENT)
Dept: NEPHROLOGY | Facility: CLINIC | Age: 70
End: 2025-02-06
Payer: COMMERCIAL

## 2025-02-17 ENCOUNTER — OFFICE VISIT (OUTPATIENT)
Dept: INTERNAL MEDICINE | Facility: CLINIC | Age: 70
End: 2025-02-17
Payer: COMMERCIAL

## 2025-02-17 ENCOUNTER — LAB (OUTPATIENT)
Dept: LAB | Facility: CLINIC | Age: 70
End: 2025-02-17
Payer: COMMERCIAL

## 2025-02-17 VITALS
WEIGHT: 159.8 LBS | BODY MASS INDEX: 22.88 KG/M2 | HEIGHT: 70 IN | TEMPERATURE: 98 F | HEART RATE: 76 BPM | RESPIRATION RATE: 16 BRPM | OXYGEN SATURATION: 98 % | SYSTOLIC BLOOD PRESSURE: 131 MMHG | DIASTOLIC BLOOD PRESSURE: 73 MMHG

## 2025-02-17 DIAGNOSIS — R53.83 OTHER FATIGUE: ICD-10-CM

## 2025-02-17 DIAGNOSIS — F31.81 BIPOLAR 2 DISORDER (H): ICD-10-CM

## 2025-02-17 DIAGNOSIS — G45.9 TIA (TRANSIENT ISCHEMIC ATTACK): Primary | ICD-10-CM

## 2025-02-17 DIAGNOSIS — G45.9 TIA (TRANSIENT ISCHEMIC ATTACK): ICD-10-CM

## 2025-02-17 DIAGNOSIS — N18.30 CHRONIC KIDNEY DISEASE, STAGE 3 (H): Primary | ICD-10-CM

## 2025-02-17 DIAGNOSIS — R26.89 IMBALANCE: ICD-10-CM

## 2025-02-17 DIAGNOSIS — K50.019 CROHN'S DISEASE OF SMALL INTESTINE WITH COMPLICATION (H): ICD-10-CM

## 2025-02-17 DIAGNOSIS — N18.31 STAGE 3A CHRONIC KIDNEY DISEASE (H): ICD-10-CM

## 2025-02-17 LAB
ALBUMIN SERPL BCG-MCNC: 4.5 G/DL (ref 3.5–5.2)
ALP SERPL-CCNC: 76 U/L (ref 40–150)
ALT SERPL W P-5'-P-CCNC: 22 U/L (ref 0–70)
ANION GAP SERPL CALCULATED.3IONS-SCNC: 9 MMOL/L (ref 7–15)
AST SERPL W P-5'-P-CCNC: 23 U/L (ref 0–45)
BASOPHILS # BLD AUTO: 0.1 10E3/UL (ref 0–0.2)
BASOPHILS NFR BLD AUTO: 1 %
BILIRUB SERPL-MCNC: 0.6 MG/DL
BUN SERPL-MCNC: 20.1 MG/DL (ref 8–23)
CALCIUM SERPL-MCNC: 9.8 MG/DL (ref 8.8–10.4)
CHLORIDE SERPL-SCNC: 105 MMOL/L (ref 98–107)
CREAT SERPL-MCNC: 1.52 MG/DL (ref 0.67–1.17)
CREAT UR-MCNC: 80.6 MG/DL
CRP SERPL-MCNC: <3 MG/L
EGFRCR SERPLBLD CKD-EPI 2021: 49 ML/MIN/1.73M2
EOSINOPHIL # BLD AUTO: 0.1 10E3/UL (ref 0–0.7)
EOSINOPHIL NFR BLD AUTO: 1 %
ERYTHROCYTE [DISTWIDTH] IN BLOOD BY AUTOMATED COUNT: 12.9 % (ref 10–15)
GLUCOSE SERPL-MCNC: 91 MG/DL (ref 70–99)
HCO3 SERPL-SCNC: 27 MMOL/L (ref 22–29)
HCT VFR BLD AUTO: 47.2 % (ref 40–53)
HGB BLD-MCNC: 16.2 G/DL (ref 13.3–17.7)
IMM GRANULOCYTES # BLD: 0 10E3/UL
IMM GRANULOCYTES NFR BLD: 0 %
LYMPHOCYTES # BLD AUTO: 1.8 10E3/UL (ref 0.8–5.3)
LYMPHOCYTES NFR BLD AUTO: 30 %
MCH RBC QN AUTO: 31.2 PG (ref 26.5–33)
MCHC RBC AUTO-ENTMCNC: 34.3 G/DL (ref 31.5–36.5)
MCV RBC AUTO: 91 FL (ref 78–100)
MICROALBUMIN UR-MCNC: <12 MG/L
MICROALBUMIN/CREAT UR: NORMAL MG/G{CREAT}
MONOCYTES # BLD AUTO: 0.6 10E3/UL (ref 0–1.3)
MONOCYTES NFR BLD AUTO: 10 %
NEUTROPHILS # BLD AUTO: 3.3 10E3/UL (ref 1.6–8.3)
NEUTROPHILS NFR BLD AUTO: 57 %
NRBC # BLD AUTO: 0 10E3/UL
NRBC BLD AUTO-RTO: 0 /100
PLATELET # BLD AUTO: 218 10E3/UL (ref 150–450)
POTASSIUM SERPL-SCNC: 4.9 MMOL/L (ref 3.4–5.3)
PROT SERPL-MCNC: 7.2 G/DL (ref 6.4–8.3)
RBC # BLD AUTO: 5.2 10E6/UL (ref 4.4–5.9)
SODIUM SERPL-SCNC: 141 MMOL/L (ref 135–145)
TSH SERPL DL<=0.005 MIU/L-ACNC: 2.21 UIU/ML (ref 0.3–4.2)
VIT B12 SERPL-MCNC: 702 PG/ML (ref 232–1245)
WBC # BLD AUTO: 5.8 10E3/UL (ref 4–11)

## 2025-02-17 PROCEDURE — 82570 ASSAY OF URINE CREATININE: CPT | Performed by: NURSE PRACTITIONER

## 2025-02-17 PROCEDURE — 84443 ASSAY THYROID STIM HORMONE: CPT | Performed by: PATHOLOGY

## 2025-02-17 PROCEDURE — 82607 VITAMIN B-12: CPT | Performed by: INTERNAL MEDICINE

## 2025-02-17 PROCEDURE — 36415 COLL VENOUS BLD VENIPUNCTURE: CPT | Performed by: PATHOLOGY

## 2025-02-17 PROCEDURE — 86140 C-REACTIVE PROTEIN: CPT | Performed by: PATHOLOGY

## 2025-02-17 PROCEDURE — 99000 SPECIMEN HANDLING OFFICE-LAB: CPT | Performed by: PATHOLOGY

## 2025-02-17 PROCEDURE — 80053 COMPREHEN METABOLIC PANEL: CPT | Performed by: PATHOLOGY

## 2025-02-17 PROCEDURE — 85025 COMPLETE CBC W/AUTO DIFF WBC: CPT | Performed by: PATHOLOGY

## 2025-02-17 RX ORDER — LYSINE HCL 500 MG
500 TABLET ORAL DAILY
COMMUNITY

## 2025-02-17 RX ORDER — ASPIRIN 81 MG/1
81 TABLET ORAL DAILY
Qty: 90 TABLET | Refills: 3 | Status: SHIPPED | OUTPATIENT
Start: 2025-02-17

## 2025-02-17 NOTE — PROGRESS NOTES
"  Assessment & Plan     TIA (transient ischemic attack)  68 yo without know CAD or cerebrovascular disease seen 24 hrs after near fall due to sudden onset \"dizziness\" with imbalance (would fall to one side). NOT vertigo. No ear/hearing symptoms or past vertigo. No pre-aura. Did have a feeling of \"fuzziness\" for subsequent 12 hours which is now resolved. Working diagnosis is a TIA and need imaging of brain/carotids with MRI/MRA and an echocardiogram. No h/o Afib and heart exam normal today. NO new meds. Not on Crohns meds and not felt to have active disease so ASA should be safe. Will also check B12 given past active IBD and imbalance.  - Echocardiogram Complete; Future  - Comprehensive metabolic panel (BMP + Alb, Alk Phos, ALT, AST, Total. Bili, TP); Future  - CBC with platelets differential; Future  - CRP, inflammation; Future  - aspirin 81 MG EC tablet; Take 1 tablet (81 mg) by mouth daily.  - MRA Brain ([place]) wo & w Contrast; Future  -B12 level    Other fatigue  - TSH with free T4 reflex; Future    Bipolar 2 disorder (H)  Stable without active symptoms.    Crohn's disease of small intestine with complication (H)  No active disease per his report and no blood in stools.    Stage 3a chronic kidney disease (H)  GFR Estimate   Date Value Ref Range Status   09/12/2024 48 (L) >60 mL/min/1.73m2 Final     Comment:     eGFR calculated using 2021 CKD-EPI equation.   05/20/2021 55 (L) >60 mL/min/[1.73_m2] Final     Comment:     Non  GFR Calc  Starting 12/18/2018, serum creatinine based estimated GFR (eGFR) will be   calculated using the Chronic Kidney Disease Epidemiology Collaboration   (CKD-EPI) equation.       Avoid NSAIDs and nephrotoxins.    Return in about 2 weeks (around 3/3/2025).    40 minutes spent on the date of the encounter performing chart review, history and exam, documentation and further activities as noted above.    Donald Kam MD  Primary [facility] Center  [facility] " "Lola Zhong is a 69 year old, presenting for the following health issues:  Dizziness (Started yesterday morning when singing in his choir, left with lingering fuzzy feeling in head, denies any pain, reports elevated blood pressure, possibly dehydrated, change with timing on potassium citrate), Facial symptoms, and Medication side effect (potassium citrate (UROCIT-K) 10 MEQ (1080 MG) CR tablet)      2/17/2025     2:23 PM   Additional Questions   Roomed by selma     History of Present Illness       Reason for visit:  Sudden dizzy spell yesterday morning, effects not totally gone  Symptom onset:  1-3 days ago  Symptoms include:  Sudden dizziness, fuzzy feeling in head  Symptom intensity:  Severe  Symptom progression:  Staying the same  Had these symptoms before:  No  What makes it worse:  Not yet  What makes it better:  Not yet He is missing 1 dose(s) of medications per week.  He is not taking prescribed medications regularly due to remembering to take and other.     Review of Systems  Constitutional, HEENT, cardiovascular, pulmonary, gi and gu systems are negative, except as otherwise noted.      Objective    /73 (BP Location: Right arm, Patient Position: Sitting, Cuff Size: Adult Regular)   Pulse 76   Temp 98  F (36.7  C) (Oral)   Resp 16   Ht 1.778 m (5' 10\")   Wt 72.5 kg (159 lb 12.8 oz)   SpO2 98%   BMI 22.93 kg/m    Body mass index is 22.93 kg/m .  Physical Exam   GENERAL: alert and no distress  NECK: no adenopathy, no asymmetry, masses, or scars  RESP: lungs clear to auscultation - no rales, rhonchi or wheezes  CV: regular rate and rhythm, normal S1 S2, no S3 or S4, no murmur, click or rub, no peripheral edema  ABDOMEN: soft, nontender, no hepatosplenomegaly, no masses and bowel sounds normal  MS: no gross musculoskeletal defects noted, no edema  SKIN: no suspicious lesions or rashes  NEURO: Normal strength and tone, mentation intact and speech normal. Romberg nl. CN2-12 " intact. Gait nl today.  PSYCH: mentation appears normal, affect normal/bright. No pressured speech or flight of ideas.    Lab on 09/12/2024   Component Date Value Ref Range Status    Magnesium 09/12/2024 1.9  1.7 - 2.3 mg/dL Final    WBC Count 09/12/2024 6.8  4.0 - 11.0 10e3/uL Final    RBC Count 09/12/2024 5.07  4.40 - 5.90 10e6/uL Final    Hemoglobin 09/12/2024 16.3  13.3 - 17.7 g/dL Final    Hematocrit 09/12/2024 47.2  40.0 - 53.0 % Final    MCV 09/12/2024 93  78 - 100 fL Final    MCH 09/12/2024 32.1  26.5 - 33.0 pg Final    MCHC 09/12/2024 34.5  31.5 - 36.5 g/dL Final    RDW 09/12/2024 12.9  10.0 - 15.0 % Final    Platelet Count 09/12/2024 236  150 - 450 10e3/uL Final    Color Urine 09/12/2024 Light Yellow  Colorless, Straw, Light Yellow, Yellow Final    Appearance Urine 09/12/2024 Clear  Clear Final    Glucose Urine 09/12/2024 Negative  Negative mg/dL Final    Bilirubin Urine 09/12/2024 Negative  Negative Final    Ketones Urine 09/12/2024 Negative  Negative mg/dL Final    Specific Gravity Urine 09/12/2024 1.014  1.003 - 1.035 Final    Blood Urine 09/12/2024 Negative  Negative Final    pH Urine 09/12/2024 5.5  5.0 - 7.0 Final    Protein Albumin Urine 09/12/2024 Negative  Negative mg/dL Final    Urobilinogen Urine 09/12/2024 Normal  Normal, 2.0 mg/dL Final    Nitrite Urine 09/12/2024 Negative  Negative Final    Leukocyte Esterase Urine 09/12/2024 Negative  Negative Final    Mucus Urine 09/12/2024 Present (A)  None Seen /LPF Final    RBC Urine 09/12/2024 1  <=2 /HPF Final    WBC Urine 09/12/2024 <1  <=5 /HPF Final    Sodium 09/12/2024 140  135 - 145 mmol/L Final    Potassium 09/12/2024 5.3  3.4 - 5.3 mmol/L Final    Chloride 09/12/2024 103  98 - 107 mmol/L Final    Carbon Dioxide (CO2) 09/12/2024 27  22 - 29 mmol/L Final    Anion Gap 09/12/2024 10  7 - 15 mmol/L Final    Glucose 09/12/2024 99  70 - 99 mg/dL Final    Urea Nitrogen 09/12/2024 16.3  8.0 - 23.0 mg/dL Final    Creatinine 09/12/2024 1.55 (H)  0.67 -  1.17 mg/dL Final    GFR Estimate 09/12/2024 48 (L)  >60 mL/min/1.73m2 Final    eGFR calculated using 2021 CKD-EPI equation.    Calcium 09/12/2024 9.8  8.8 - 10.4 mg/dL Final    Reference intervals for this test were updated on 7/16/2024 to reflect our healthy population more accurately. There may be differences in the flagging of prior results with similar values performed with this method. Those prior results can be interpreted in the context of the updated reference intervals.    Albumin 09/12/2024 4.4  3.5 - 5.2 g/dL Final    Phosphorus 09/12/2024 3.3  2.5 - 4.5 mg/dL Final    Total Protein Urine mg/dL 09/12/2024 9.7    mg/dL Final    Total Protein Urine mg/mg Creat 09/12/2024 0.09  0.00 - 0.20 mg/mg Cr Final    Creatinine Urine mg/dL 09/12/2024 114.0  mg/dL Final    Cystatin C 09/12/2024 1.5 (H)  0.6 - 1.0 mg/L Final    GFR Calculated with Cystatin C 09/12/2024 44 (L)  >=60 mL/min/1.73m2 Final           Signed Electronically by: Jose Kam MD

## 2025-02-18 ENCOUNTER — HOSPITAL ENCOUNTER (OUTPATIENT)
Dept: CARDIOLOGY | Facility: CLINIC | Age: 70
Discharge: HOME | End: 2025-02-18
Attending: INTERNAL MEDICINE
Payer: COMMERCIAL

## 2025-02-18 ENCOUNTER — HOSPITAL ENCOUNTER (OUTPATIENT)
Dept: MRI IMAGING | Facility: CLINIC | Age: 70
Discharge: HOME | End: 2025-02-18
Attending: INTERNAL MEDICINE
Payer: COMMERCIAL

## 2025-02-18 DIAGNOSIS — G45.9 TIA (TRANSIENT ISCHEMIC ATTACK): ICD-10-CM

## 2025-02-18 LAB — LVEF ECHO: NORMAL

## 2025-02-18 PROCEDURE — C8929 TTE W OR WO FOL WCON,DOPPLER: HCPCS

## 2025-02-18 PROCEDURE — 258N000001 HC RX 258: Performed by: STUDENT IN AN ORGANIZED HEALTH CARE EDUCATION/TRAINING PROGRAM

## 2025-02-18 PROCEDURE — 70544 MR ANGIOGRAPHY HEAD W/O DYE: CPT | Mod: 26 | Performed by: RADIOLOGY

## 2025-02-18 PROCEDURE — 70544 MR ANGIOGRAPHY HEAD W/O DYE: CPT

## 2025-02-18 PROCEDURE — 255N000002 HC RX 255 OP 636: Performed by: STUDENT IN AN ORGANIZED HEALTH CARE EDUCATION/TRAINING PROGRAM

## 2025-02-18 RX ORDER — ACYCLOVIR 200 MG/1
10 CAPSULE ORAL ONCE
Status: COMPLETED | OUTPATIENT
Start: 2025-02-18 | End: 2025-02-18

## 2025-02-18 RX ADMIN — SODIUM CHLORIDE 10 ML: 9 INJECTION, SOLUTION INTRAMUSCULAR; INTRAVENOUS; SUBCUTANEOUS at 08:54

## 2025-02-18 RX ADMIN — HUMAN ALBUMIN MICROSPHERES AND PERFLUTREN 5 ML: 10; .22 INJECTION, SOLUTION INTRAVENOUS at 08:53

## 2025-02-25 ENCOUNTER — APPOINTMENT (OUTPATIENT)
Dept: LAB | Facility: CLINIC | Age: 70
End: 2025-02-25
Payer: COMMERCIAL

## 2025-03-02 LAB
AMMONIA 24H UR-SRATE: 24 MMOL/24 H (ref 15–56)
BSA: ABNORMAL 1.73M(2)
CA H2 PHOS DIHYD 24H SATFR UR: -3.39 DG
CALCIUM 24H UR-MRATE: 126 MG/24 H
CAOX 24H ENGDIFF UR: 1.58 DG
CHLORIDE 24H UR-SRATE: 135 MMOL/24 H (ref 34–286)
CITRATE 24H UR-MRATE: 113 MG/24 H
CLINICAL BIOCHEMIST REVIEW: ABNORMAL
COLLECT DURATION TIME UR: 24 H
CREAT 24H UR-MRATE: 1350 MG/24 H (ref 930–2955)
HEIGHT (CM): 175 CM
HYDROXYAPATITE 24H ENGDIFF UR: -0.81 DG
MAGNESIUM 24H UR-MRATE: 72 MG/24 H (ref 51–269)
OSMOLALITY 24H UR: 365 MOSM/KG (ref 150–1150)
OXALATE 24H UR-MRATE: 23.8 MG/24 H (ref 9.7–40.5)
OXALATE 24H UR-SRATE: 0.27 MMOL/24 H (ref 0.11–0.46)
PH 24H UR: 5.1 [PH] (ref 4.5–8)
PHOSPHATE 24H UR-MRATE: 432 MG/24 H (ref 226–1797)
POTASSIUM 24H UR-SRATE: 43 MMOL/24 H (ref 16–105)
PROTEIN CATABOLIC RATE 24H UR-MRATE: 70 G/24 H (ref 56–125)
SODIUM 24H UR-SRATE: 139 MMOL/24 H (ref 22–328)
SPECIMEN VOL 24H UR: 1800 ML
SULFATE 24H UR-SRATE: 12 MMOL/24 H (ref 7–47)
URATE 24H SATFR UR: 1.17 DG
URATE 24H UR-MRATE: 198 MG/24 H (ref 200–1000)
UUN 24H UR-MRATE: 7.2 G/24 H (ref 7–42)

## 2025-03-07 ENCOUNTER — LAB (OUTPATIENT)
Dept: LAB | Facility: CLINIC | Age: 70
End: 2025-03-07
Payer: COMMERCIAL

## 2025-03-07 DIAGNOSIS — N18.30 STAGE 3 CHRONIC KIDNEY DISEASE, UNSPECIFIED WHETHER STAGE 3A OR 3B CKD (H): ICD-10-CM

## 2025-03-07 LAB
ALBUMIN SERPL BCG-MCNC: 4.4 G/DL (ref 3.5–5.2)
ALBUMIN UR-MCNC: NEGATIVE MG/DL
ALP SERPL-CCNC: 80 U/L (ref 40–150)
ALT SERPL W P-5'-P-CCNC: 23 U/L (ref 0–70)
ANION GAP SERPL CALCULATED.3IONS-SCNC: 10 MMOL/L (ref 7–15)
APPEARANCE UR: CLEAR
AST SERPL W P-5'-P-CCNC: 26 U/L (ref 0–45)
BILIRUB SERPL-MCNC: 0.8 MG/DL
BILIRUB UR QL STRIP: NEGATIVE
BUN SERPL-MCNC: 18.8 MG/DL (ref 8–23)
CALCIUM SERPL-MCNC: 9.7 MG/DL (ref 8.8–10.4)
CHLORIDE SERPL-SCNC: 103 MMOL/L (ref 98–107)
COLOR UR AUTO: ABNORMAL
CREAT SERPL-MCNC: 1.52 MG/DL (ref 0.67–1.17)
EGFRCR SERPLBLD CKD-EPI 2021: 49 ML/MIN/1.73M2
GLUCOSE SERPL-MCNC: 105 MG/DL (ref 70–99)
GLUCOSE UR STRIP-MCNC: NEGATIVE MG/DL
HCO3 SERPL-SCNC: 27 MMOL/L (ref 22–29)
HGB UR QL STRIP: NEGATIVE
KETONES UR STRIP-MCNC: NEGATIVE MG/DL
LEUKOCYTE ESTERASE UR QL STRIP: NEGATIVE
MUCOUS THREADS #/AREA URNS LPF: PRESENT /LPF
NITRATE UR QL: NEGATIVE
PH UR STRIP: 5 [PH] (ref 5–7)
POTASSIUM SERPL-SCNC: 4.9 MMOL/L (ref 3.4–5.3)
PROT SERPL-MCNC: 7.1 G/DL (ref 6.4–8.3)
RBC URINE: 0 /HPF
SODIUM SERPL-SCNC: 140 MMOL/L (ref 135–145)
SP GR UR STRIP: 1.01 (ref 1–1.03)
UROBILINOGEN UR STRIP-MCNC: NORMAL MG/DL
WBC URINE: 0 /HPF

## 2025-03-07 PROCEDURE — 81001 URINALYSIS AUTO W/SCOPE: CPT | Performed by: PATHOLOGY

## 2025-03-07 PROCEDURE — 36415 COLL VENOUS BLD VENIPUNCTURE: CPT | Performed by: PATHOLOGY

## 2025-03-07 PROCEDURE — 80053 COMPREHEN METABOLIC PANEL: CPT | Performed by: PATHOLOGY

## 2025-03-09 ENCOUNTER — MYC REFILL (OUTPATIENT)
Dept: INTERNAL MEDICINE | Facility: CLINIC | Age: 70
End: 2025-03-09
Payer: COMMERCIAL

## 2025-03-09 DIAGNOSIS — R09.82 POSTNASAL DRIP: ICD-10-CM

## 2025-03-10 RX ORDER — FLUTICASONE PROPIONATE 50 MCG
1 SPRAY, SUSPENSION (ML) NASAL DAILY
Qty: 48 G | Refills: 3 | Status: SHIPPED | OUTPATIENT
Start: 2025-03-10

## 2025-03-10 NOTE — TELEPHONE ENCOUNTER
Medication Requested: fluticasone (FLONASE) 50 MCG/ACT nasal spray   ---------------------  Last Visit Date: 3/7/2025 [facility] [place]  ----------------------  [x]  Refill decision: Medication refilled per  Medication Refill in Ambulatory Care  policy: fluticasone (FLONASE) 50 MCG/ACT nasal spray passed protocol    []  Refill decision: Medication unable to be refilled by RN due to:     []    Pt not seen within past 12 months;  No FOV;  or FOV exceeds timeframe per protocol requirements  []    Compliance - lapse in therapy/gap in refills; No Shows; Cancellations  []    Verification - order discrepancy, clarification needed, Sig modification needed  []    Controlled medication  []    Medication not included in refill protocol policy  []    Abnormal labs/test:  []    Overdue labs/test:    []    Medication not active on Pt's med list  []    Drug interaction Warning  []    Medication - Last script is Reported/Historical/Transitional  []    Advanced refill request  []    Review Needed: New med; Med adjusted within <= 30 days; Safety Alert; Lab monitoring required  []    Other:     Request from pharmacy:  Requested Prescriptions   Pending Prescriptions Disp Refills    fluticasone (FLONASE) 50 MCG/ACT nasal spray 11.1 mL 0     Sig: Spray 1 spray into both nostrils daily.       Nasal Allergy Protocol Passed - 3/10/2025  4:05 PM        Passed - Patient is age 12 or older        Passed - Medication is active on med list and the sig matches. RN to manually verify dose and sig if red X/fail.     If the protocol passes (green check), you do not need to verify med dose and sig.    A prescription matches if they are the same clinical intention.    For Example: once daily and every morning are the same.    The protocol can not identify upper and lower case letters as matching and will fail.     For Example: Take 1 tablet (50 mg) by mouth daily     TAKE 1 TABLET (50 MG) BY MOUTH DAILY    For all  fails (red x), verify dose and sig.    If the refill does match what is on file, the RN can still proceed to approve the refill request.       If they do not match, route to the appropriate provider.             Passed - Recent (12 mo) or future (90 days) visit within the authorizing provider's specialty     The patient must have completed an in-person or virtual visit within the past 12 months or has a future visit scheduled within the next 90 days with the authorizing provider s specialty.  Urgent care and e-visits do not qualify as an office visit for this protocol.          Passed - Medication indicated for associated diagnosis     Medication is associated with one or more of the following diagnoses:   Allergic conjunctivitis  Allergies  Nasal congestion  Nasal discharge  Rhinitis  Sinus congestion  Recurrent acute maxillary sinusitis  Environmental allergies   Acute sinusitis   Cystic Fibrosis  Bronchiectasis

## 2025-03-12 DIAGNOSIS — B38.7 COCCIDIOIDAL GRANULOMA: Primary | ICD-10-CM

## 2025-03-12 NOTE — TELEPHONE ENCOUNTER
[facility] Call Center    Phone Message    May a detailed message be left on voicemail: yes     Reason for Call: Other: Trevin is requesting to have fluconazole (DIFLUCAN) 100 MG tablet  sent to [facility] mail order pharmacy per his insurance. He needs a 3 month supply. Please  review and assist. Trevin is requesting a notification sent to him via my chart to confirm the order was sent to [facility] please     Action Taken: Other: ID    Travel Screening: Not Applicable

## 2025-03-13 NOTE — TELEPHONE ENCOUNTER
Pt requesting 3 month fill.    MD notes 12/05/25    Recommendations:  - Continue Fluconazole 100 mg daily (have canceled new prescription sent to [facility] per pt request, advised to call when refills are due)  - Reach out if presumed tinea cruris continues to be an issue, can seek Dermatology input too  - If sinus issues remain, reach out and we can consider an ENT referral for fungal cultures  - Follow up in 1 year    Routing to provider for approval.    Janina Yates RN

## 2025-03-17 RX ORDER — FLUCONAZOLE 100 MG/1
100 TABLET ORAL DAILY
Qty: 90 TABLET | Refills: 1 | Status: SHIPPED | OUTPATIENT
Start: 2025-03-17

## 2025-03-28 ENCOUNTER — ANCILLARY PROCEDURE (OUTPATIENT)
Dept: BONE DENSITY | Facility: CLINIC | Age: 70
End: 2025-03-28
Attending: NURSE PRACTITIONER
Payer: COMMERCIAL

## 2025-03-28 ENCOUNTER — ANCILLARY PROCEDURE (OUTPATIENT)
Dept: ULTRASOUND IMAGING | Facility: CLINIC | Age: 70
End: 2025-03-28
Attending: NURSE PRACTITIONER
Payer: COMMERCIAL

## 2025-03-28 DIAGNOSIS — G45.9 TIA (TRANSIENT ISCHEMIC ATTACK): ICD-10-CM

## 2025-03-28 DIAGNOSIS — M85.89 OSTEOPENIA OF MULTIPLE SITES: ICD-10-CM

## 2025-03-28 PROCEDURE — 77080 DXA BONE DENSITY AXIAL: CPT

## 2025-03-28 PROCEDURE — 93880 EXTRACRANIAL BILAT STUDY: CPT | Mod: GC | Performed by: RADIOLOGY

## 2025-04-07 ENCOUNTER — DOCUMENTATION ONLY (OUTPATIENT)
Dept: MULTI SPECIALTY CLINIC | Facility: CLINIC | Age: 70
End: 2025-04-07
Payer: COMMERCIAL

## 2025-04-09 ENCOUNTER — MYC MEDICAL ADVICE (OUTPATIENT)
Dept: NEPHROLOGY | Facility: CLINIC | Age: 70
End: 2025-04-09
Payer: COMMERCIAL

## 2025-04-09 NOTE — TELEPHONE ENCOUNTER
[facility] Call Center    Phone Message    May a detailed message be left on voicemail: yes     Reason for Call: Order(s): Other:   Reason for requested: nephrology lab orders before appointment with Dr Greenfield 4/17/25  Date needed: patient is scheduled for lab appointment 4/10/25  Provider name: Gin    Action Taken: Message routed to:  [facility] neph    Travel Screening: Not Applicable     Date of Service:

## 2025-04-10 ENCOUNTER — LAB (OUTPATIENT)
Dept: LAB | Facility: CLINIC | Age: 70
End: 2025-04-10
Payer: COMMERCIAL

## 2025-04-10 DIAGNOSIS — N18.31 CKD STAGE 3A, GFR 45-59 ML/MIN (H): Primary | ICD-10-CM

## 2025-04-10 DIAGNOSIS — N18.31 CKD STAGE 3A, GFR 45-59 ML/MIN (H): ICD-10-CM

## 2025-04-10 LAB
ALBUMIN MFR UR ELPH: 10.6 MG/DL
ALBUMIN SERPL BCG-MCNC: 4.3 G/DL (ref 3.5–5.2)
ANION GAP SERPL CALCULATED.3IONS-SCNC: 12 MMOL/L (ref 7–15)
BUN SERPL-MCNC: 18.8 MG/DL (ref 8–23)
CALCIUM SERPL-MCNC: 9.4 MG/DL (ref 8.8–10.4)
CHLORIDE SERPL-SCNC: 104 MMOL/L (ref 98–107)
CREAT SERPL-MCNC: 1.43 MG/DL (ref 0.67–1.17)
CREAT UR-MCNC: 107 MG/DL
EGFRCR SERPLBLD CKD-EPI 2021: 53 ML/MIN/1.73M2
GLUCOSE SERPL-MCNC: 121 MG/DL (ref 70–99)
HCO3 SERPL-SCNC: 26 MMOL/L (ref 22–29)
HGB BLD-MCNC: 15.6 G/DL (ref 13.3–17.7)
PHOSPHATE SERPL-MCNC: 2.8 MG/DL (ref 2.5–4.5)
POTASSIUM SERPL-SCNC: 4.2 MMOL/L (ref 3.4–5.3)
PROT/CREAT 24H UR: 0.1 MG/MG CR (ref 0–0.2)
SODIUM SERPL-SCNC: 142 MMOL/L (ref 135–145)
VIT D+METAB SERPL-MCNC: 33 NG/ML (ref 20–50)

## 2025-04-17 ENCOUNTER — VIRTUAL VISIT (OUTPATIENT)
Dept: NEPHROLOGY | Facility: CLINIC | Age: 70
End: 2025-04-17
Attending: INTERNAL MEDICINE
Payer: COMMERCIAL

## 2025-04-17 DIAGNOSIS — R82.991 HYPOCITRATURIA: ICD-10-CM

## 2025-04-17 DIAGNOSIS — N18.31 STAGE 3A CHRONIC KIDNEY DISEASE (H): ICD-10-CM

## 2025-04-17 DIAGNOSIS — N20.0 KIDNEY STONE: Primary | ICD-10-CM

## 2025-04-17 RX ORDER — POTASSIUM CITRATE 540 MG/1
5 TABLET, EXTENDED RELEASE ORAL
Status: SHIPPED
Start: 2025-04-17

## 2025-04-17 NOTE — NURSING NOTE
Current patient location: [address]   [facility] [address]    Is the patient currently in the state of [place]? YES    Visit mode: VIDEO    If the visit is dropped, the patient can be reconnected by:VIDEO VISIT: Send to e-mail at: [email or web address]    Will anyone else be joining the visit? NO  (If patient encounters technical issues they should call [phone]    Are changes needed to the allergy or medication list? No    Are refills needed on medications prescribed by this physician? NO    Rooming Documentation:  Not applicable    Reason for visit: ARSALAN BUSTAMANTE

## 2025-04-17 NOTE — PATIENT INSTRUCTIONS
It was a pleasure taking care of you today.  I've included a brief summary of our discussion and care plan from today's visit below.  Please review this information with your primary care provider.    My recommendations are summarized as follows:  - Check a kidney ultrasound with a bladder ultrasound given your urinary symptoms.  I will let you know when I get the results.  - Return to Nephrology Clinic in 6 months to review your progress. Check a CKD panel prior to the appointment.    Who do I call with any questions after my visit?  Please be in touch if there are any further questions that arise following today's visit.  There are multiple ways to contact your nephrology care team.      You can always send a secure message through [employer] or call [phone]. [employer] messages are answered by your nurse or doctor typically within 24-48 hours. Please allow extra time on weekends and holidays. For urgent/emergent questions after business hours, you may reach the on-call Nephrology Fellow by contacting the [facility]  at [phone].    How do I schedule appointments?  During business hours, you may reach your Nephrology Care Team or schedule or reschedule an appointment or lab at [phone]. If you need to schedule imaging, please call [phone].      How will I get the results of any tests ordered?    You will receive all of your results.  If you have signed up for [employer], any tests ordered at your visit will be available to you once resulted on [employer]. Typically the physician reviews them and may or may not make further recommendations. If there are urgent results that require a change in your care plan, your physician or nurse will call you to discuss the next steps. If you are not on MyChart, a letter may be generated and mailed to you with your results.    Sincerely,    Pedro Greenfield MD  [facility]  Division of Nephrology and Hypertension

## 2025-04-17 NOTE — LETTER
4/17/2025       RE: Trevin Gee  [address]  [facility] [address]     Dear Colleague,    Thank you for referring your patient, Trevin Gee, to the [facility] [place] at [facility]. Please see a copy of my visit note below.        Nephrology Outpatient Visit Note (04/17/2025)    Chief Complain/Reason for Visit  CKD and nephrolithiasis. This is a billable virtual visit.    History of Present Illness  This is a 69 year old male who returns for a follow up appointment. He was previously seen by Dr. Sánchez.Please refer to her note 3/8/2024 for details.Briefly, his PMH significant for Chron's disease s/p colectomies, gout, nephrolithiasis, and chronic kidney disease.     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      Most recent kidney stone event was in May of 2023. He was able to pass the renal stone. The kidney stone was composed predominately of calcium oxalate.     Overall, the patient is doing well today.  He denies acute health issues or concerns.  He continues on potassium citrate at 5 mill equivalents 3 times daily.  He does report a feeling of incomplete emptying of his bladder.  He wakes up at night 2 times to urinate.  He denies shortness of breath, pain, or lower extremity edema.      His serum creatinine baseline is between 1.3 to 1.5 mg/dL.  His most recent kidney ultrasound was in November 2023.  This was notable for multiple subcentimeter nonobstructing kidney stones without evidence of hydronephrosis or obstruction.     His most recent evaluation was performed earlier this month.  His serum creatinine was 1.4 mg/dL with an estimated GFR of 53 mL/min.  Electrolytes are within a normal range serum albumin is normal.  Vitamin D  level 33.  Hemoglobin is 15.6.  Urine protein to carb ratio 0.1 g/g.     The following portions of the patient's history were reviewed and updated as appropriate: allergies, current medications, past family history, past medical history, past social history, past surgical history, and problem list.    Subjective  Review of Systems  A comprehensive review of systems was performed. Pertinent positives and negatives are described above.    Social and Family History  Patient reports that he has never smoked. He has never used smokeless tobacco. He reports current alcohol use. He reports that he does not use drugs.  family history includes Cancer in an other family member; Cancer - colorectal in his mother; Cerebrovascular Disease in his maternal grandmother and paternal grandmother; Colon Cancer in his mother; Heart Failure in his father; Musculoskeletal Disorder in his brother and father; Other Cancer in an other family member; cardiac arrest in his brother.     Examination  There were no vitals taken for this visit. There is no height or weight on file to calculate BMI.    Objective  Pertinent Laboratory and Imaging Results  Most Recent Serum Chemistries  Lab Results   Component Value Date    WBC 5.8 02/17/2025    HGB 15.6 04/10/2025    HCT 47.2 02/17/2025     02/17/2025     04/10/2025    POTASSIUM 4.2 04/10/2025    CHLORIDE 104 04/10/2025    CO2 26 04/10/2025    BUN 18.8 04/10/2025    CR 1.43 (H) 04/10/2025     (H) 04/10/2025    SED 6 01/18/2021    TROPONIN <0.04 08/04/2007    AST 26 03/07/2025    ALT 23 03/07/2025    ALKPHOS 80 03/07/2025    INR 0.99 04/21/2019     Most Recent Urinalysis  Lab Results   Component Value Date    COLOR Light Yellow 03/07/2025    APPEARANCE Clear 03/07/2025    URINEGLC Negative 03/07/2025    URINEBILI Negative 03/07/2025    URINEKETONE Negative 03/07/2025    SG 1.007 03/07/2025    URINEPH 5.0 03/07/2025    PROTEIN Negative 03/07/2025    NITRITE Negative 03/07/2025     LEUKEST Negative 03/07/2025     Current Outpatient Medications   Medication Sig Dispense Refill     potassium citrate (UROCIT-K) 5 MEQ (540 MG) CR tablet Take 1 tablet (5 mEq) by mouth 3 times daily (with meals).       aspirin 81 MG EC tablet Take 1 tablet (81 mg) by mouth daily. 90 tablet 3     Cyanocobalamin (VITAMIN B 12) 500 MCG TABS Take 500 mcg by mouth every evening        fluconazole (DIFLUCAN) 100 MG tablet Take 1 tablet (100 mg) by mouth daily. 90 tablet 1     fluticasone (FLONASE) 50 MCG/ACT nasal spray Spray 1 spray into both nostrils daily. 48 g 3     Iron Combinations (IRON COMPLEX PO) Take 1 tablet by mouth daily       l-lysine HCl 500 MG TABS tablet Take 500 mg by mouth daily.       multivitamin w/minerals (THERA-VIT-M) tablet Take 1 tablet by mouth every evening        traZODone (DESYREL) 50 MG tablet Take 1 tablet (50 mg) by mouth at bedtime (Patient taking differently: Take 25 mg by mouth at bedtime.) 90 tablet 1     vitamin B6 (PYRIDOXINE) 100 MG tablet Take 100 mg by mouth daily       Vitamin D, Cholecalciferol, 25 MCG (1000 UT) CAPS Take 25 mcg by mouth daily       No current facility-administered medications for this visit.        Assessment & Plan  # CKD stage IIIa  # Calcium oxalate nephrolithiasis  # Hypertension     Overall, the patient is doing well.  Baseline creatinine of 1.5 with eGFR in 50's which put him in CKD stage IIIa. His urinalysis is normal.  His most recent kidney ultrasound was in November 2023.  This was notable for multiple subcentimeter nonobstructing kidney stones without evidence of hydronephrosis or obstruction. His kidney function is fairly stable.  He has not passed the kidney stone for more than a year now.     I discussed with the patient the importance of following a diet that reduces risk of kidney stones.  He indicated that he has significantly reduced the amount of food that contains oxalate.  I reviewed with him his 24 urine supersaturation profile from  February.  His urine was supersaturated and calcium oxalate.  However, this was within the reference range.  His 24 urine calcium was 126.  Overall, the results were good.  His urine volume was slightly low at 1.8 L and I recommended that he tries to drink more water.     For chronic kidney disease perspective, his kidney function is fairly stable.  His blood pressure is normal. I instructed him to continue his current regimen.     My recommendations are the following:  - Check a kidney ultrasound with a bladder ultrasound given your urinary symptoms.  I will let you know when I get the results.  - Return to Nephrology Clinic in 6 months to review your progress. Check a CKD panel prior to the appointment.    Total time was of this encounter on the date of service was 30 minutes. All questions were answered to the patient's satisfaction.  Chart documentation was completed, in part, with [employer] voice-recognition software. Even though reviewed, some grammatical, spelling, and word errors may remain.    Telemedicine Visit Addendum:  Consultation provided at the request of above provider for advice regarding the diagnosis and treatment of patient's condition. The patient's condition can be safely assessed via telemedicine. Patient has agreed to receiving services via telemedicine technology. Date of service is 04/17/25. Time Service Began: 9:22 AM. Time Service Ended: 9:45 AM. Originating Location (pt. Location): Home. Distant Location (provider location):  [facility].Reason that telemedicine is appropriate: Patient unable to travel. Mode of transmission: Secure real time interactive audio and visual telecommunication system  [employer]    Orders placed today:  Orders Placed This Encounter   Procedures     US Renal Complete Non-Vascular     US Bladder w Pre and Post Void Residual     Oxalate serum or plasma     Hemoglobin and hematocrit     Iron and iron binding capacity     Ferritin     Renal panel     Parathyroid Hormone Intact      Vitamin D Deficiency     UA with Microscopic reflex to Culture     Albumin Random Urine Quantitative with Creat Ratio     Pedro Greenfield MD  Division of Nephrology and Hypertension  [employer] ([employer])   Vocera Web Console ([employer])       Again, thank you for allowing me to participate in the care of your patient.      Sincerely,    Pedro Greenfield MD

## 2025-04-17 NOTE — PROGRESS NOTES
"Virtual Visit Details    Type of service:  Video Visit   Video Start Time: {video visit start/end time for provider to select:209202}  Video End Time:{video visit start/end time for provider to select:621589}    Originating Location (pt. Location): {video visit patient location:297610::\"Home\"}  {PROVIDER LOCATION On-site should be selected for visits conducted from your clinic location or adjoining [facility] hospital, academic office, or other nearby [facility] building. Off-site should be selected for all other provider locations, including home:279814}  Distant Location (provider location):  {virtual location provider:503876}  Platform used for Video Visit: {Virtual Visit Platforms:282047::\"[employer]\"}    "

## 2025-04-17 NOTE — PROGRESS NOTES
Nephrology Outpatient Visit Note (04/17/2025)    Chief Complain/Reason for Visit  CKD and nephrolithiasis. This is a billable virtual visit.    History of Present Illness  This is a 69 year old male who returns for a follow up appointment. He was previously seen by Dr. Sánchez.Please refer to her note 3/8/2024 for details.Briefly, his PMH significant for Chron's disease s/p colectomies, gout, nephrolithiasis, and chronic kidney disease.     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      Most recent kidney stone event was in May of 2023. He was able to pass the renal stone. The kidney stone was composed predominately of calcium oxalate.     Overall, the patient is doing well today.  He denies acute health issues or concerns.  He continues on potassium citrate at 5 mill equivalents 3 times daily.  He does report a feeling of incomplete emptying of his bladder.  He wakes up at night 2 times to urinate.  He denies shortness of breath, pain, or lower extremity edema.      His serum creatinine baseline is between 1.3 to 1.5 mg/dL.  His most recent kidney ultrasound was in November 2023.  This was notable for multiple subcentimeter nonobstructing kidney stones without evidence of hydronephrosis or obstruction.     His most recent evaluation was performed earlier this month.  His serum creatinine was 1.4 mg/dL with an estimated GFR of 53 mL/min.  Electrolytes are within a normal range serum albumin is normal.  Vitamin D level 33.  Hemoglobin is 15.6.  Urine protein to carb ratio 0.1 g/g.     The following portions of the patient's history were reviewed and updated as appropriate: allergies, current medications, past family history, past medical history, past social history, past surgical history, and problem list.    Subjective   Review of  Systems  A comprehensive review of systems was performed. Pertinent positives and negatives are described above.    Social and Family History  Patient reports that he has never smoked. He has never used smokeless tobacco. He reports current alcohol use. He reports that he does not use drugs.  family history includes Cancer in an other family member; Cancer - colorectal in his mother; Cerebrovascular Disease in his maternal grandmother and paternal grandmother; Colon Cancer in his mother; Heart Failure in his father; Musculoskeletal Disorder in his brother and father; Other Cancer in an other family member; cardiac arrest in his brother.     Examination  There were no vitals taken for this visit. There is no height or weight on file to calculate BMI.    Objective   Pertinent Laboratory and Imaging Results  Most Recent Serum Chemistries  Lab Results   Component Value Date    WBC 5.8 02/17/2025    HGB 15.6 04/10/2025    HCT 47.2 02/17/2025     02/17/2025     04/10/2025    POTASSIUM 4.2 04/10/2025    CHLORIDE 104 04/10/2025    CO2 26 04/10/2025    BUN 18.8 04/10/2025    CR 1.43 (H) 04/10/2025     (H) 04/10/2025    SED 6 01/18/2021    TROPONIN <0.04 08/04/2007    AST 26 03/07/2025    ALT 23 03/07/2025    ALKPHOS 80 03/07/2025    INR 0.99 04/21/2019     Most Recent Urinalysis  Lab Results   Component Value Date    COLOR Light Yellow 03/07/2025    APPEARANCE Clear 03/07/2025    URINEGLC Negative 03/07/2025    URINEBILI Negative 03/07/2025    URINEKETONE Negative 03/07/2025    SG 1.007 03/07/2025    URINEPH 5.0 03/07/2025    PROTEIN Negative 03/07/2025    NITRITE Negative 03/07/2025    LEUKEST Negative 03/07/2025     Current Outpatient Medications   Medication Sig Dispense Refill    potassium citrate (UROCIT-K) 5 MEQ (540 MG) CR tablet Take 1 tablet (5 mEq) by mouth 3 times daily (with meals).      aspirin 81 MG EC tablet Take 1 tablet (81 mg) by mouth daily. 90 tablet 3    Cyanocobalamin (VITAMIN B 12)  500 MCG TABS Take 500 mcg by mouth every evening       fluconazole (DIFLUCAN) 100 MG tablet Take 1 tablet (100 mg) by mouth daily. 90 tablet 1    fluticasone (FLONASE) 50 MCG/ACT nasal spray Spray 1 spray into both nostrils daily. 48 g 3    Iron Combinations (IRON COMPLEX PO) Take 1 tablet by mouth daily      l-lysine HCl 500 MG TABS tablet Take 500 mg by mouth daily.      multivitamin w/minerals (THERA-VIT-M) tablet Take 1 tablet by mouth every evening       traZODone (DESYREL) 50 MG tablet Take 1 tablet (50 mg) by mouth at bedtime (Patient taking differently: Take 25 mg by mouth at bedtime.) 90 tablet 1    vitamin B6 (PYRIDOXINE) 100 MG tablet Take 100 mg by mouth daily      Vitamin D, Cholecalciferol, 25 MCG (1000 UT) CAPS Take 25 mcg by mouth daily       No current facility-administered medications for this visit.        Assessment & Plan   # CKD stage IIIa  # Calcium oxalate nephrolithiasis  # Hypertension     Overall, the patient is doing well.  Baseline creatinine of 1.5 with eGFR in 50's which put him in CKD stage IIIa. His urinalysis is normal.  His most recent kidney ultrasound was in November 2023.  This was notable for multiple subcentimeter nonobstructing kidney stones without evidence of hydronephrosis or obstruction. His kidney function is fairly stable.  He has not passed the kidney stone for more than a year now.     I discussed with the patient the importance of following a diet that reduces risk of kidney stones.  He indicated that he has significantly reduced the amount of food that contains oxalate.  I reviewed with him his 24 urine supersaturation profile from February.  His urine was supersaturated and calcium oxalate.  However, this was within the reference range.  His 24 urine calcium was 126.  Overall, the results were good.  His urine volume was slightly low at 1.8 L and I recommended that he tries to drink more water.     For chronic kidney disease perspective, his kidney function is  fairly stable.  His blood pressure is normal. I instructed him to continue his current regimen.     My recommendations are the following:  - Check a kidney ultrasound with a bladder ultrasound given your urinary symptoms.  I will let you know when I get the results.  - Return to Nephrology Clinic in 6 months to review your progress. Check a CKD panel prior to the appointment.    Total time was of this encounter on the date of service was 30 minutes. All questions were answered to the patient's satisfaction.  Chart documentation was completed, in part, with [facility] voice-recognition software. Even though reviewed, some grammatical, spelling, and word errors may remain.    Telemedicine Visit Addendum:  Consultation provided at the request of above provider for advice regarding the diagnosis and treatment of patient's condition. The patient's condition can be safely assessed via telemedicine. Patient has agreed to receiving services via telemedicine technology. Date of service is 04/17/25. Time Service Began: 9:22 AM. Time Service Ended: 9:45 AM. Originating Location (pt. Location): Home. [place]ant Location (provider [place]  [facility] [place]Reason that telemedicine is appropriate: Patient unable to travel. Mode of transmission: Secure real time interactive audio and visual telecommunication system  [employer]    Orders placed today:  Orders Placed This Encounter   Procedures    US Renal Complete Non-Vascular    US Bladder w Pre and Post Void Residual    Oxalate serum or plasma    Hemoglobin and hematocrit    Iron and iron binding capacity    Ferritin    Renal panel    Parathyroid Hormone Intact    Vitamin D Deficiency    UA with Microscopic reflex to Culture    Albumin Random Urine Quantitative with Creat Ratio     Pedro Greenfield MD  Division of Nephrology and Hypertension  [employer] ([employer])   Vocera Web Console ([employer])

## 2025-04-30 ENCOUNTER — ANCILLARY PROCEDURE (OUTPATIENT)
Dept: GENERAL RADIOLOGY | Facility: CLINIC | Age: 70
End: 2025-04-30
Attending: PEDIATRICS
Payer: COMMERCIAL

## 2025-04-30 ENCOUNTER — OFFICE VISIT (OUTPATIENT)
Dept: INTERNAL MEDICINE | Facility: CLINIC | Age: 70
End: 2025-04-30
Payer: COMMERCIAL

## 2025-04-30 VITALS
BODY MASS INDEX: 22.82 KG/M2 | RESPIRATION RATE: 16 BRPM | OXYGEN SATURATION: 97 % | DIASTOLIC BLOOD PRESSURE: 76 MMHG | HEIGHT: 70 IN | SYSTOLIC BLOOD PRESSURE: 127 MMHG | TEMPERATURE: 97.7 F | WEIGHT: 159.4 LBS | HEART RATE: 83 BPM

## 2025-04-30 DIAGNOSIS — R09.82 POSTNASAL DRIP: ICD-10-CM

## 2025-04-30 DIAGNOSIS — M79.645 PAIN IN FINGER OF BOTH HANDS: ICD-10-CM

## 2025-04-30 DIAGNOSIS — M79.645 PAIN IN FINGER OF BOTH HANDS: Primary | ICD-10-CM

## 2025-04-30 DIAGNOSIS — Z87.19 HISTORY OF CROHN'S DISEASE: ICD-10-CM

## 2025-04-30 DIAGNOSIS — M79.644 PAIN IN FINGER OF BOTH HANDS: ICD-10-CM

## 2025-04-30 DIAGNOSIS — M79.644 PAIN IN FINGER OF BOTH HANDS: Primary | ICD-10-CM

## 2025-04-30 DIAGNOSIS — Z23 NEED FOR VACCINATION: ICD-10-CM

## 2025-04-30 PROCEDURE — 3078F DIAST BP <80 MM HG: CPT | Performed by: PEDIATRICS

## 2025-04-30 PROCEDURE — 99215 OFFICE O/P EST HI 40 MIN: CPT | Performed by: PEDIATRICS

## 2025-04-30 PROCEDURE — 3074F SYST BP LT 130 MM HG: CPT | Performed by: PEDIATRICS

## 2025-04-30 PROCEDURE — 73130 X-RAY EXAM OF HAND: CPT | Mod: LT | Performed by: RADIOLOGY

## 2025-04-30 RX ORDER — FLUTICASONE PROPIONATE 50 MCG
1 SPRAY, SUSPENSION (ML) NASAL DAILY
Qty: 48 G | Refills: 3 | Status: SHIPPED | OUTPATIENT
Start: 2025-04-30

## 2025-04-30 NOTE — PROGRESS NOTES
"Dear patient. Thank you for visiting with me. I want you to feel respected, understood, and empowered. \"Respect\" is valuing you as much as I would a close family member. \"Empowerment\" happens when you are fully informed, and can make the best possible decision for you.  Please ask me questions!  Challenge anything that is not clear.    Medical records are primarily used as memory aids for me and my colleagues. Things to know about my documentation style:  - The 'problem list' includes current symptoms or diagnoses, and some problems that are resolved but may return. I use the past medical history for problems not expected to return.  - I use single quotation marks for things that you or I said, when I want to clarify who was speaking.  - I use double quotation marks when copying a term from elsewhere in your records. Italics (besides here) may also denote a quotation.  If you have questions or concerns, please contact me; I will reply as soon as time allows.    Trevin Gee is a 69 year old man, with concerns including:  Patient presents with:  Follow Up: Last Thursday, post nasal drip worsened. Has improved slightly since Thursday    The main issue for today was history of nasal congestion. At the last minute he mentioned hand pain.      PCP: Lacey Feng Visit type: problem-oriented      Assessment & Plan         Postnasal drip:  - Chronic inflammation in the sinuses likely due to sensitivity or allergy, with a recent viral infection exacerbating symptoms.  - Continue with fluticasone spray daily and reevaluate in July or early August. Consider using pseudoephedrine for congestion as needed. Avoid Afrin or similar nasal sprays. Use a vaporizer or steam treatment during dry air conditions if symptoms worsen.    Hand pain:  - Possible arthritis, with slight hypertrophy of the distal interphalangeal joints. Wondering about significance of Crohn's history.  - Obtain hand X-rays to further evaluate " arthritis. Use topical diclofenac (Voltaren) gel for pain relief after activities causing discomfort. Follow up with PCP for further assessment.      ADDENDUM:   Hello,  It was good to meet you today. I am writing with some comments about your XRAYs.    Your hand XRAYs were helpful. There were two findings:  #1. There were scattered areas of degeneration (arthritis) in various finger joints.  #2. The was worst at the bases of your thumbs (metacarpophalangeal or MCP joints). This turns out to be the most common site of arthritis for people who have had Crohn's disease.    Next steps: This depends on how bad your pain and problems are.       When your hands and fingers are worse, e.g. after making salads, try rubbing voltaren gel into the areas that are worst. The other name for this is diclofenac gel.        If your hand pain is bad enough, I recommend:  -- Seeing an occupational therapist (OT) who specializes in hands. These OTs are down the harman from us, and might be especially helpful for the use-related problems you have. Someone will contact you about this soon.  -- Seeing a hand specialist for joint injections, as with the MCP joints. I haven't ordered this, but the OT or your PCP can, if you need it.  -- Seeing a rheumatologist, if the inflammation continues to spread over your other joints.    If you have any questions or concerns, it would be best to discuss this with your primary care provider..     Jarod Puri MD  Internal Medicine & Pediatrics  [facility], [facility]         Time note (e5, 40'): The total of my time (on the date of service) for this service was 40 minutes, including extensive discussion/face-to-face, chart review, interpretation not otherwise reported, documentation, and updating of the computerized record.        Subjective   Trevin is a 69 year old, presenting for the following health issues:  Follow Up (Last Thursday, post nasal drip worsened, improved  "slightly since Thursday)      4/30/2025    11:01 AM   Additional Questions   Roomed by EM     History of Present Illness       Reason for visit:  Worsened sinus infection    He eats 4 or more servings of fruits and vegetables daily.He consumes 0 sweetened beverage(s) daily.He exercises with enough effort to increase his heart rate 20 to 29 minutes per day.  He exercises with enough effort to increase his heart rate 4 days per week.   He is taking medications regularly.            History of Present Illness-  Trevin Gee, 69 years    Sinus Problems  - Had a severe upper respiratory infection in late November to early December last year.  - Home tests were negative for COVID during that time.  - Persistent postnasal drip following the infection.  - Fluticasone prescribed by primary care physician earlier this year, initially effective.  - Symptoms worsened last Thursday or Friday, with increased mucus production and nasal drainage.  - Pseudoephedrine taken recently, providing significant relief.  - No significant exposure to allergens such as pets or dust; lives alone.  - Notices increased sneezing in spring but considers allergies minor.  - No history of smoking.  - Attempts to manage drainage by sleeping with head down to prevent mucus from entering lungs.  - No significant exposure to airborne particles or pathogens in work environment.    Hand Pain  Limited time for evaluation.  - Hands starting to get sore, particularly after extensive finger work like cutting and grating for salads.  - No visible enlargement of joints noted.            Objective    /76 (BP Location: Right arm, Patient Position: Sitting, Cuff Size: Adult Regular)   Pulse 83   Temp 97.7  F (36.5  C) (Oral)   Resp 16   Ht 1.778 m (5' 10\")   Wt 72.3 kg (159 lb 6.4 oz)   SpO2 97%   BMI 22.87 kg/m    Body mass index is 22.87 kg/m .  Physical Exam  Constitutional:       Appearance: Normal appearance.   HENT:      Head: " Normocephalic.      Right Ear: Tympanic membrane and external ear normal.      Left Ear: Tympanic membrane and external ear normal.      Nose: Nose normal.   Eyes:      General: No scleral icterus.        Right eye: No discharge.         Left eye: No discharge.      Extraocular Movements: Extraocular movements intact.   Pulmonary:      Effort: Pulmonary effort is normal. No respiratory distress.      Breath sounds: No stridor. No wheezing.   Skin:     Findings: No rash.   Neurological:      Mental Status: He is alert.   Psychiatric:         Mood and Affect: Mood normal.         Behavior: Behavior normal.         Thought Content: Thought content normal.         Judgment: Judgment normal.          Physical Exam  - HEENT: Nasal examination reveals pale edema, indicating possible allergy or sensitivity. Ears appear unremarkable.  - LUNGS: Auscultation reveals unremarkable breath sounds.  - MUSCULOSKELETAL: Hand examination shows mild hypertrophy of the distal interphalangeal joints in two or three fingers.            Signed Electronically by: Madi Puri MD

## 2025-06-15 ENCOUNTER — HEALTH MAINTENANCE LETTER (OUTPATIENT)
Age: 70
End: 2025-06-15

## 2025-06-16 ENCOUNTER — THERAPY VISIT (OUTPATIENT)
Dept: OCCUPATIONAL THERAPY | Facility: CLINIC | Age: 70
End: 2025-06-16
Attending: PEDIATRICS
Payer: COMMERCIAL

## 2025-06-16 DIAGNOSIS — M79.644 PAIN IN FINGER OF BOTH HANDS: ICD-10-CM

## 2025-06-16 DIAGNOSIS — M79.641 BILATERAL HAND PAIN: Primary | ICD-10-CM

## 2025-06-16 DIAGNOSIS — M79.642 BILATERAL HAND PAIN: Primary | ICD-10-CM

## 2025-06-16 DIAGNOSIS — M79.645 PAIN IN FINGER OF BOTH HANDS: ICD-10-CM

## 2025-06-16 PROCEDURE — 97165 OT EVAL LOW COMPLEX 30 MIN: CPT | Mod: GO | Performed by: OCCUPATIONAL THERAPIST

## 2025-06-16 PROCEDURE — 97760 ORTHOTIC MGMT&TRAING 1ST ENC: CPT | Mod: GO | Performed by: OCCUPATIONAL THERAPIST

## 2025-06-16 NOTE — PROGRESS NOTES
OCCUPATIONAL THERAPY EVALUATION  Type of Visit: Evaluation       Fall Risk Screen:  Have you fallen 2 or more times in the past year?: No  Have you fallen and had an injury in the past year?: No    Subjective        Presenting condition or subjective complaint: arthritis in thumbs  Date of onset: 04/30/25 (Order date)    Relevant medical history: Anemia; Arthritis; Depression; Kidney disease   Past Medical History:   Diagnosis Date    Anemia 2007    Anxiety 2012?    much worse since July 2014    Cataract 12/2007    both eyes, too much prednisone, implants    Coccidioidomycosis     Crohn disease (H)     Crohn's disease (H) 01/13/2015    Depressive disorder 07/2014    much worse since July 2014    Fracture 1956    green fracture of leg    Gallbladder problem 2005?    much gravel, removed    GERD (gastroesophageal reflux disease)     History of blood transfusion 1988    ulcerated anastomosis term. ilium bleed    Hypertriglyceridemia 07/08/2016    Infection 2007    persistant coccidioidomycosis    Kidney stone various    both sides, all passed naturally    Mental health problem 2007    bipolar though perhaps different    Nephrolithiasis     Noninfectious ileitis     Osteoporosis 2007    osteoporosis, too much prednisone, last dexa 1/2014    Osteoporosis     Other nervous system complications 2007    prednisone overload induced kevin    Personal history of colonic polyps     small ones found during colonoscopies    Steroid-induced psychosis     Patient extremely sensitive to regular doses of steroids.  Unclear if this is due to chronic fluconazole use or genetic issue.  In the future, use caution when prescribing steroids.    TB (tuberculosis)      Dates & types of surgery: look at my chart too many to list  Past Surgical History:   Procedure Laterality Date    ABDOMEN SURGERY  1974, 1986, 1989, 2005    open surgeries except last laparoscopic    APPENDECTOMY  01/01/1974    coincidental with Crohn's surgery    BACK  SURGERY  12/01/2007    kyphoplasty on compressed 4th??? vertebra    BIOPSY  2007, 2013    lump on arm, knee, back of hand for coccidioidomycosis cult.    CHOLECYSTECTOMY  01/01/2005    much gravel, removed laparoscopically    COLONOSCOPY  7/14/2014 last    Dr. Catherine Bowden, [facility] - many previous    COLONOSCOPY Left 09/11/2015    Procedure: COMBINED COLONOSCOPY, SINGLE OR MULTIPLE BIOPSY/POLYPECTOMY BY BIOPSY;  Surgeon: Fransisco Leach MD;  [facility]: [facility]    COLONOSCOPY N/A 08/03/2016    Procedure: COMBINED COLONOSCOPY, SINGLE OR MULTIPLE BIOPSY/POLYPECTOMY BY BIOPSY;  Surgeon: Fransisco Leach MD;  [facility]    COLONOSCOPY N/A 08/16/2017    Procedure: COMBINED COLONOSCOPY, SINGLE OR MULTIPLE BIOPSY/POLYPECTOMY BY BIOPSY;;  Surgeon: Fransisco Leach MD;  [facility]    COLONOSCOPY N/A 10/01/2019    Procedure: Colonoscopy With Colonic Stent Insertion;  Surgeon: Robbie Cruz MD;  [facility]    COLONOSCOPY N/A 02/05/2020    Procedure: COLONOSCOPY, FOREIGN BODY REMOVAL;  Surgeon: Robbie Cruz MD;  [facility]    COLONOSCOPY N/A 05/26/2021    Procedure: COLONOSCOPY, WITH COLONIC STENT INSERTION;  Surgeon: Robbie Cruz MD;  [facility]    COLONOSCOPY N/A 12/06/2021    Procedure: COLONOSCOPY with dilation;  Surgeon: Robbie Cruz MD;  Location:  OR    COLONOSCOPY N/A 03/18/2024    Procedure: COLONOSCOPY with fluoroscopy dilation;  Surgeon: Robbie Cruz MD;  Location:  OR    ENT SURGERY  ?    upper endoscopy    ENTEROSCOPY SMALL BOWEL N/A 09/22/2021    Procedure: ENTEROSCOPY;  Surgeon: Robbie Cruz MD;  Location:  OR    ESOPHAGOSCOPY, GASTROSCOPY, DUODENOSCOPY (EGD), COMBINED N/A 11/08/2016    Procedure: COMBINED ENDOSCOPIC ULTRASOUND, ESOPHAGOSCOPY, GASTROSCOPY, DUODENOSCOPY (EGD), FINE NEEDLE ASPIRATE/BIOPSY;  Surgeon: Guru Alexsandra Ballesteros MD;  Location:  GI    ESOPHAGOSCOPY,  "GASTROSCOPY, DUODENOSCOPY (EGD), COMBINED N/A 11/08/2016    Procedure: COMBINED ESOPHAGOSCOPY, GASTROSCOPY, DUODENOSCOPY (EGD), BIOPSY SINGLE OR MULTIPLE;  Surgeon: Guru Alexsandra Ballesteros MD;  Location:  GI    ESOPHAGOSCOPY, GASTROSCOPY, DUODENOSCOPY (EGD), COMBINED N/A 08/16/2017    Procedure: COMBINED ESOPHAGOSCOPY, GASTROSCOPY, DUODENOSCOPY (EGD), BIOPSY SINGLE OR MULTIPLE;;  Surgeon: Fransisco Leach MD;  [facility]    EYE SURGERY  12/01/2007    cataract surgery both sides    GI SURGERY  1974, 1986(x2), 1989    Crohn's, 1974 RO 18\" term. ilium + 9\" asc. colon all one pc    SOFT TISSUE SURGERY  03/01/2013    removed buildup on back of r hand, coccidioidomycosis     Prior diagnostic imaging/testing results: X-ray Severe first carpometacarpal joint degenerative change.  Additional  scattered degenerative change in the interphalangeal joints,  particularly distally. Chondrocalcinosis.   Prior therapy history for the same diagnosis, illness or injury: No      Prior Level of Function  Transfers: Independent  Ambulation: Independent  ADL: Independent  IADL: IND    Living Environment  Social support: Alone   Type of home: Apartment/condo   Stairs to enter the home: Yes 20 Is there a railing: Yes     Ramp: No   Stairs inside the home: No       Help at home: None  Equipment owned: Crutches     Employment: No    Hobbies/Interests: choir and other Mandaeism activities Xand science comuting problems    Patient goals for therapy: much hand work like making salads can become painful     Pain assessment: Pain present     Objective   ADDITIONAL HISTORY:  Right hand dominant  Patient reports symptoms of pain  Transportation: drives    Functional Outcome Measure:   Upper Extremity Functional Index Score:  SCORE:   Column Totals: /80: (Patient-Rptd) 77   (A lower score indicates greater disability.)     PAIN:  Pain Level at Rest: 0/10  Pain Level with Use: 3/10  Pain Location: thumb and thenar area and CMC " joints  Pain Quality: Aching, Pressure, and Sharp  Pain Frequency: intermittent  Pain is Worst: daytime  Pain is Exacerbated By: Gripping, movement related chopping veggies  Pain is Relieved By: rest and massage  Pain Progression: Worsened    EDEMA: Mild     SENSATION: WNL throughout all nerve distributions; per patient report     ROM:   Wrist ROM  Left AROM Right AROM    Extension 62 50   Flexion 50 20   Radial Deviation (RD) 20 16   Ulnar Deviation (UD) 25 30   Supination 90 90   Pronation 90 90     Thumb ROM  Left AROM Right AROM    MP Joint 29 49   IP Joint  70 75   Radial Abduction 40 36   Palmar Abduction 40 + 31   Kapandji Opposition Scale (0-10/10) 10/10 10/10     OBSERVATIONS/APPEARANCE:   Thumb Left Right   Shoulder deformity present at CMC joint ++ ++   Edema over CMC joint + +   Noted collapse of MP Joint into hyperextension during pinch +++ +      SPECIAL TESTS:   CMC OA Provocative Tests  Pain Report Left Right   Thumb Adduction Stress Test - -   C Thumb Extension Stress Test + +     STRENGTH:     Measured in pounds 6/16/2025 6/16/2025    Left Right   Trial 1 34 51     Lateral Pinch  Measured in pounds 6/16/2025 6/16/2025    Left Right   Trial 1 7 16     3 Point Pinch  Measured in pounds 6/16/2025 6/16/2025    Left Right   Trial 1 13 15     PALPATION:   Thumb Palpation Left Right   Thumb CMC Joint - -   Thenar Hundred - -   Web Space - +   1st Dorsal Compartment - -   Radial Styloid - -   FCR Insertion - -       Assessment & Plan   CLINICAL IMPRESSIONS  Medical Diagnosis: B Hand Pain    Treatment Diagnosis: B Hand Pain    Impression/Assessment: Pt is a 70 year old male presenting to Occupational Therapy due to Hand Pain.  The following significant findings have been identified: Impaired ROM, Impaired strength, and Pain.  These identified deficits interfere with their ability to perform self care tasks, recreational activities, and meal planning and preparation as compared to previous level of  function.     Clinical Decision Making (Complexity):  Assessment of Occupational Performance: 1-3 Performance Deficits  Occupational Performance Limitations: home establishment and management and meal preparation and cleanup  Clinical Decision Making (Complexity): Low complexity    PLAN OF CARE  Treatment Interventions:  Modalities:  US, Iontophoresis, Paraffin, TENS, and E-Stim  Therapeutic Exercise:  AROM, AAROM, PROM, Tendon Gliding, Blocking, Reverse Blocking, Place and Hold, Contract Relax, Extensor Tracking, Isotonics, Isometrics, and Stabilization  Neuromuscular re-education:  Nerve Gliding, Coordination/Dexterity, Sensory re-education, Desensitization, Kinesthetic Training, Proprioceptive Training, Posture, Kinesiotaping, Isometrics, and Stabilization  Manual Techniques:  Coordination/Dexterity, Joint mobilization, Scar mobilization, Friction massage, Myofascial release, and Manual edema mobilization  Orthotic Fabrication:  Static, Static progressive, and Dynamic  Self Care:  Self Care Tasks, Ergonomic Considerations, and Work Tasks    Long Term Goals   OT Goal 1  Goal Identifier: Meal Prep  Goal Description: Pt will reported a decreased of thumb pain by 2 points while performing meal prep activities.  Rationale: In order to maximize safety and independence with performance of self-care activities  Goal Progress: Initial  Target Date: 08/16/25      Frequency of Treatment: 1x a week  Duration of Treatment: 8 weeks     Recommended Referrals to Other Professionals:   Education Assessment: Learner/Method: Patient;Demonstration;Pictures/Video;No Barriers to Learning     Risks and benefits of evaluation/treatment have been explained.   Patient/Family/caregiver agrees with Plan of Care.     Evaluation Time:    OT Eval, Low Complexity Minutes (74437): 30       Signing Clinician: JO Barron [facility]                                                                                    OUTPATIENT OCCUPATIONAL THERAPY      PLAN OF TREATMENT FOR OUTPATIENT REHABILITATION   Patient's Last Name, First Name, Trevin James YOB: 1955   Provider's Name   [facility]   Medical Record No.  [id number]     Onset Date: 04/30/25 (Order date) Start of Care Date: 06/16/25     Medical Diagnosis:  B Hand Pain      OT Treatment Diagnosis:  B Hand Pain Plan of Treatment  Frequency/Duration:1x a week/8 weeks    Certification date from 06/16/25   To 08/16/25        See note for plan of treatment details and functional goals     JO Barron                         I CERTIFY THE NEED FOR THESE SERVICES FURNISHED UNDER        THIS PLAN OF TREATMENT AND WHILE UNDER MY CARE     (Physician attestation of this document indicates review and certification of the therapy plan).              Referring Provider:  Madi Puri    Initial Assessment  See Epic Evaluation- 06/16/25

## 2025-07-10 ENCOUNTER — OFFICE VISIT (OUTPATIENT)
Dept: DERMATOLOGY | Facility: CLINIC | Age: 70
End: 2025-07-10
Attending: DERMATOLOGY
Payer: COMMERCIAL

## 2025-07-10 DIAGNOSIS — D22.9 MULTIPLE MELANOCYTIC NEVI: ICD-10-CM

## 2025-07-10 DIAGNOSIS — Z85.828 HISTORY OF NONMELANOMA SKIN CANCER: Primary | ICD-10-CM

## 2025-07-10 DIAGNOSIS — Z12.83 SKIN CANCER SCREENING: ICD-10-CM

## 2025-07-10 DIAGNOSIS — L81.4 SOLAR LENTIGO: ICD-10-CM

## 2025-07-10 DIAGNOSIS — B02.29 POST HERPETIC NEURALGIA: ICD-10-CM

## 2025-07-10 DIAGNOSIS — D18.01 CHERRY ANGIOMA: ICD-10-CM

## 2025-07-10 DIAGNOSIS — L82.1 SEBORRHEIC KERATOSIS: ICD-10-CM

## 2025-07-10 ASSESSMENT — PAIN SCALES - GENERAL: PAINLEVEL_OUTOF10: NO PAIN (0)

## 2025-07-10 NOTE — PROGRESS NOTES
[facility] Dermatology Note  Encounter Date: Jul 10, 2025    Dermatology Problem List:  1. Hx of NMSC  - BCC on central upper back s/p Mohs 3/2023  2. Post-herpetic neuralgia: T5, (initial zoster episode 5/2021)  - current tx: gabapentin 600 mg BID     ______________________________________    Impression/Plan:    1. History of NMSC: no evidence of recurrence. Discussed sun protective behaviors including OTC spf 30+ sunscreen use and sun avoidance strategies.    2. Reassurance provided for benign lesions not treated today including cherry angiomata, solar lentigines, seborrheic keratoses, and banal-appearing melanocytic nevi.    3. Postherpetic neuralgia: gradually improving; gabapentin PRN  - Not taking the gabapentin as often      Follow-up in 6 months.     Staff Involved:  Scribe Disclosure:   By signing my name below, I, Rebekah Price, attest that this documentation has been prepared under the direction and in the presence of Jose Martin Cheney MD.  - Electronically Signed: Rebekah Price 07/10/25     Casandra Sparrow MD  [facility]at[facility] PGY-3     Provider Disclosure:   The documentation recorded by the scribe accurately reflects the services I personally performed and the decisions made by me.    Staff Physician Comments:   I saw and evaluated the patient with the resident and I edited the assessment and plan as documented in the note. I was present for the examination.    Jose Martin Cheney MD   of Dermatology  Department of Dermatology  [facility]        CC:   Skin Check (Trevin is here today for a skin check with no concerns )    History of Present Illness:  Mr. Trevin Gee is a 70 year old male who presents as a return patient for [facility].    Labs:  N/A    Physical exam:  Vitals: There were no vitals taken for this visit.  GEN: This is a well developed, well-nourished male in no acute distress, in a pleasant mood.    SKIN:  [place] Valle phototype II  - Full skin, which includes the head/face, both arms, chest, back, abdomen,both legs, genitalia and/or groin buttocks, digits and/or nails, was examined.  - There are dome shaped bright red papules on the head/neck, trunk, extremities.   - Multiple regular brown pigmented macules and papules are identified on the head/neck, trunk, extremities.   - Scattered brown macules on sun exposed areas.  - There are waxy stuck on tan to brown papules on the head/neck, trunk, extremities.  - No other lesions of concern on areas examined.     Past Medical History:   Past Medical History:   Diagnosis Date    Anemia 2007    Anxiety 2012?    much worse since July 2014    Cataract 12/2007    both eyes, too much prednisone, implants    Coccidioidomycosis     Crohn disease (H)     Crohn's disease (H) 01/13/2015    Depressive disorder 07/2014    much worse since July 2014    Fracture 1956    green fracture of leg    Gallbladder problem 2005?    much gravel, removed    GERD (gastroesophageal reflux disease)     History of blood transfusion 1988    ulcerated anastomosis term. ilium bleed    Hypertriglyceridemia 07/08/2016    Infection 2007    persistant coccidioidomycosis    Kidney stone various    both sides, all passed naturally    Mental health problem 2007    bipolar though perhaps different    Nephrolithiasis     Noninfectious ileitis     Osteoporosis 2007    osteoporosis, too much prednisone, last dexa 1/2014    Osteoporosis     Other nervous system complications 2007    prednisone overload induced kevin    Personal history of colonic polyps     small ones found during colonoscopies    Steroid-induced psychosis     Patient extremely sensitive to regular doses of steroids.  Unclear if this is due to chronic fluconazole use or genetic issue.  In the future, use caution when prescribing steroids.    TB (tuberculosis)      Past Surgical History:   Procedure Laterality Date    ABDOMEN SURGERY  1974, 1986, 1989,  2005    open surgeries except last laparoscopic    APPENDECTOMY  01/01/1974    coincidental with Crohn's surgery    BACK SURGERY  12/01/2007    kyphoplasty on compressed 4th??? vertebra    BIOPSY  2007, 2013    lump on arm, knee, back of hand for coccidioidomycosis cult.    CHOLECYSTECTOMY  01/01/2005    much gravel, removed laparoscopically    COLONOSCOPY  7/14/2014 last    Dr. Catherine Bowden, [facility] - many previous    COLONOSCOPY Left 09/11/2015    Procedure: COMBINED COLONOSCOPY, SINGLE OR MULTIPLE BIOPSY/POLYPECTOMY BY BIOPSY;  Surgeon: Fransisco Leach MD;  [facility]    COLONOSCOPY N/A 08/03/2016    Procedure: COMBINED COLONOSCOPY, SINGLE OR MULTIPLE BIOPSY/POLYPECTOMY BY BIOPSY;  Surgeon: Fransisco Leach MD;  [facility]    COLONOSCOPY N/A 08/16/2017    Procedure: COMBINED COLONOSCOPY, SINGLE OR MULTIPLE BIOPSY/POLYPECTOMY BY BIOPSY;;  Surgeon: Fransisco Leach MD;  [facility]    COLONOSCOPY N/A 10/01/2019    Procedure: Colonoscopy With Colonic Stent Insertion;  Surgeon: Robbie Cruz MD;  [facility]    COLONOSCOPY N/A 02/05/2020    Procedure: COLONOSCOPY, FOREIGN BODY REMOVAL;  Surgeon: Robbie Cruz MD;  [facility]    COLONOSCOPY N/A 05/26/2021    Procedure: COLONOSCOPY, WITH COLONIC STENT INSERTION;  Surgeon: Robbie Cruz MD;  [facility]    COLONOSCOPY N/A 12/06/2021    Procedure: COLONOSCOPY with dilation;  Surgeon: Robbie Cruz MD;  Location:  OR    COLONOSCOPY N/A 03/18/2024    Procedure: COLONOSCOPY with fluoroscopy dilation;  Surgeon: Robbie Cruz MD;  Location:  OR    ENT SURGERY  ?    upper endoscopy    ENTEROSCOPY SMALL BOWEL N/A 09/22/2021    Procedure: ENTEROSCOPY;  Surgeon: Robbie Cruz MD;  [facility]    ESOPHAGOSCOPY, GASTROSCOPY, DUODENOSCOPY (EGD), COMBINED N/A 11/08/2016    Procedure: COMBINED ENDOSCOPIC ULTRASOUND, ESOPHAGOSCOPY, GASTROSCOPY, DUODENOSCOPY (EGD),  "FINE NEEDLE ASPIRATE/BIOPSY;  Surgeon: Guru Alexsandra Ballesteros MD;  Location:  GI    ESOPHAGOSCOPY, GASTROSCOPY, DUODENOSCOPY (EGD), COMBINED N/A 11/08/2016    Procedure: COMBINED ESOPHAGOSCOPY, GASTROSCOPY, DUODENOSCOPY (EGD), BIOPSY SINGLE OR MULTIPLE;  Surgeon: Guru Alexsandra Ballesteros MD;  Location:  GI    ESOPHAGOSCOPY, GASTROSCOPY, DUODENOSCOPY (EGD), COMBINED N/A 08/16/2017    Procedure: COMBINED ESOPHAGOSCOPY, GASTROSCOPY, DUODENOSCOPY (EGD), BIOPSY SINGLE OR MULTIPLE;;  Surgeon: Fransisco Leach MD;  [facility]    EYE SURGERY  12/01/2007    cataract surgery both sides    GI SURGERY  1974, 1986(x2), 1989    Crohn's, 1974 RO 18\" term. ilium + 9\" asc. colon all one pc    SOFT TISSUE SURGERY  03/01/2013    removed buildup on back of r hand, coccidioidomycosis       Social History:   reports that he has never smoked. He has never used smokeless tobacco. He reports current alcohol use. He reports that he does not use drugs.    Family History:  Family History   Problem Relation Age of Onset    Cancer - colorectal Mother     Colon Cancer Mother     Heart Failure Father     Musculoskeletal Disorder Father         Parkinson's    Musculoskeletal Disorder Brother         Parkinson's    Other Problems  (cardiac arrest) Brother     Cerebrovascular Disease Maternal Grandmother     Cerebrovascular Disease Paternal Grandmother     Cancer Other     Other Cancer Other     Anesthesia Reaction No family hx of     Deep Vein Thrombosis (DVT) No family hx of     Melanoma No family hx of     Skin Cancer No family hx of        Medications:  Current Outpatient Medications   Medication Sig Dispense Refill    aspirin 81 MG EC tablet Take 1 tablet (81 mg) by mouth daily. 90 tablet 3    Cyanocobalamin (VITAMIN B 12) 500 MCG TABS Take 500 mcg by mouth every evening       fluconazole (DIFLUCAN) 100 MG tablet Take 1 tablet (100 mg) by mouth daily. 90 tablet 1    fluticasone (FLONASE) 50 MCG/ACT nasal " spray Spray 1 spray into both nostrils daily. 48 g 3    Iron Combinations (IRON COMPLEX PO) Take 1 tablet by mouth daily      l-lysine HCl 500 MG TABS tablet Take 500 mg by mouth daily.      multivitamin w/minerals (THERA-VIT-M) tablet Take 1 tablet by mouth every evening       potassium citrate (UROCIT-K) 5 MEQ (540 MG) CR tablet Take 1 tablet (5 mEq) by mouth 3 times daily (with meals).      traZODone (DESYREL) 50 MG tablet Take 1 tablet (50 mg) by mouth at bedtime (Patient taking differently: Take 25 mg by mouth at bedtime.) 90 tablet 1    vitamin B6 (PYRIDOXINE) 100 MG tablet Take 100 mg by mouth daily      Vitamin D, Cholecalciferol, 25 MCG (1000 UT) CAPS Take 25 mcg by mouth daily       Allergies   Allergen Reactions    Prednisone Other (See Comments)     Diana with more than 2.5mg chronic dosing of prednisone due to fluconazole interaction per patient.

## 2025-07-10 NOTE — NURSING NOTE
Dermatology Rooming Note    Trevin Gee's goals for this visit include:   Chief Complaint   Patient presents with    Skin Check     Trevin is here today for a skin check with no concerns      Dave DUNLAP CMA

## 2025-07-10 NOTE — LETTER
7/10/2025       RE: Trevin Gee  [address]  [facility] [id number]     Dear Colleague,    Thank you for referring your patient, Trevin Gee, to the [facility] DERMATOLOGY [facility] [place] at [facility]. Please see a copy of my visit note below.    [facility] Dermatology Note  Encounter Date: Jul 10, 2025    Dermatology Problem List:  1. Hx of NMSC  - BCC on central upper back s/p Mohs 3/2023  2. Post-herpetic neuralgia: T5, (initial zoster episode 5/2021)  - current tx: gabapentin 600 mg BID     ______________________________________    Impression/Plan:    1. History of NMSC: no evidence of recurrence. Discussed sun protective behaviors including OTC spf 30+ sunscreen use and sun avoidance strategies.    2. Reassurance provided for benign lesions not treated today including cherry angiomata, solar lentigines, seborrheic keratoses, and banal-appearing melanocytic nevi.    3. Postherpetic neuralgia: gradually improving; gabapentin PRN  - Not taking the gabapentin as often      Follow-up in 6 months.     Staff Involved:  Scribe Disclosure:   By signing my name below, I, Rebekah Price, attest that this documentation has been prepared under the direction and in the presence of Jose Martin Cheney MD.  - Electronically Signed: Rebekah Priec 07/10/25     Casandra Sparrow MD  [facility] Dermatology PGY-3     Provider Disclosure:   The documentation recorded by the scribe accurately reflects the services I personally performed and the decisions made by me.    Staff Physician Comments:   I saw and evaluated the patient with the resident and I edited the assessment and plan as documented in the note. I was present for the examination.    Jose Martin Cheney MD   of Dermatology  Department of Dermatology  [facility] [employer]        CC:   Skin Check (Trevin is here today for a skin  check with no concerns )    History of Present Illness:  Mr. Trevin Gee is a 70 year old male who [place] [facility]    Labs:  N/A    Physical exam:  Vitals: There were no vitals taken for this visit.  GEN: This is a well developed, well-nourished male in no acute distress, in a pleasant mood.    SKIN: [place]le phototype II  - Full skin, which includes the head/face, both arms, chest, back, abdomen,both legs, genitalia and/or groin buttocks, digits and/or nails, was examined.  - There are dome shaped bright red papules on the head/neck, trunk, extremities.   - Multiple regular brown pigmented macules and papules are identified on the head/neck, trunk, extremities.   - Scattered brown macules on sun exposed areas.  - There are waxy stuck on tan to brown papules on the head/neck, trunk, extremities.  - No other lesions of concern on areas examined.     Past Medical History:   Past Medical History:   Diagnosis Date     Anemia 2007     Anxiety 2012?    much worse since July 2014     Cataract 12/2007    both eyes, too much prednisone, implants     Coccidioidomycosis      Crohn disease (H)      Crohn's disease (H) 01/13/2015     Depressive disorder 07/2014    much worse since July 2014     Fracture 1956    green fracture of leg     Gallbladder problem 2005?    much gravel, removed     GERD (gastroesophageal reflux disease)      History of blood transfusion 1988    ulcerated anastomosis term. ilium bleed     Hypertriglyceridemia 07/08/2016     Infection 2007    persistant coccidioidomycosis     Kidney stone various    both sides, all passed naturally     Mental health problem 2007    bipolar though perhaps different     Nephrolithiasis      Noninfectious ileitis      Osteoporosis 2007    osteoporosis, too much prednisone, last dexa 1/2014     Osteoporosis      Other nervous system complications 2007    prednisone overload induced kevin     Personal history of colonic polyps     small ones  found during colonoscopies     Steroid-induced psychosis     Patient extremely sensitive to regular doses of steroids.  Unclear if this is due to chronic fluconazole use or genetic issue.  In the future, use caution when prescribing steroids.     TB (tuberculosis)      Past Surgical History:   Procedure Laterality Date     ABDOMEN SURGERY  1974, 1986, 1989, 2005    open surgeries except last laparoscopic     APPENDECTOMY  01/01/1974    coincidental with Crohn's surgery     BACK SURGERY  12/01/2007    kyphoplasty on compressed 4th??? vertebra     BIOPSY  2007, 2013    lump on arm, knee, back of hand for coccidioidomycosis cult.     CHOLECYSTECTOMY  01/01/2005    much gravel, removed laparoscopically     COLONOSCOPY  7/14/2014 last    Dr. Catherine Bowden, [facility] - many previous     COLONOSCOPY Left 09/11/2015    Procedure: COMBINED COLONOSCOPY, SINGLE OR MULTIPLE BIOPSY/POLYPECTOMY BY BIOPSY;  Surgeon: Fransisco Leach MD;  Location: [facility]     COLONOSCOPY N/A 08/03/2016    Procedure: COMBINED COLONOSCOPY, SINGLE OR MULTIPLE BIOPSY/POLYPECTOMY BY BIOPSY;  Surgeon: Fransisco Leach MD;  Location: [facility]     COLONOSCOPY N/A 08/16/2017    Procedure: COMBINED COLONOSCOPY, SINGLE OR MULTIPLE BIOPSY/POLYPECTOMY BY BIOPSY;;  Surgeon: Fransisco Leach MD;  [facility]     COLONOSCOPY N/A 10/01/2019    Procedure: Colonoscopy With Colonic Stent Insertion;  Surgeon: Robbie Cruz MD;  Location: [facility] OR     COLONOSCOPY N/A 02/05/2020    Procedure: COLONOSCOPY, FOREIGN BODY REMOVAL;  Surgeon: Robbie Cruz MD;  Location: [facility]     COLONOSCOPY N/A 05/26/2021    Procedure: COLONOSCOPY, WITH COLONIC STENT INSERTION;  Surgeon: Robbie Cruz MD;  Location: [facility]     COLONOSCOPY N/A 12/06/2021    Procedure: COLONOSCOPY with dilation;  Surgeon: Robbie Cruz MD;  Location:  OR     COLONOSCOPY N/A 03/18/2024    Procedure: COLONOSCOPY with fluoroscopy dilation;  Surgeon:  "Robbie Cruz MD;  Location: [facility] OR     ENT SURGERY  ?    upper endoscopy     ENTEROSCOPY SMALL BOWEL N/A 09/22/2021    Procedure: ENTEROSCOPY;  Surgeon: Robbie Cruz MD;  Location: [facility]     ESOPHAGOSCOPY, GASTROSCOPY, DUODENOSCOPY (EGD), COMBINED N/A 11/08/2016    Procedure: COMBINED ENDOSCOPIC ULTRASOUND, ESOPHAGOSCOPY, GASTROSCOPY, DUODENOSCOPY (EGD), FINE NEEDLE ASPIRATE/BIOPSY;  Surgeon: Guru Alexsandra Ballesteros MD;  Location: [facility]     ESOPHAGOSCOPY, GASTROSCOPY, DUODENOSCOPY (EGD), COMBINED N/A 11/08/2016    Procedure: COMBINED ESOPHAGOSCOPY, GASTROSCOPY, DUODENOSCOPY (EGD), BIOPSY SINGLE OR MULTIPLE;  Surgeon: Guru Alexsandra Ballesteros MD;  Location:  GI     ESOPHAGOSCOPY, GASTROSCOPY, DUODENOSCOPY (EGD), COMBINED N/A 08/16/2017    Procedure: COMBINED ESOPHAGOSCOPY, GASTROSCOPY, DUODENOSCOPY (EGD), BIOPSY SINGLE OR MULTIPLE;;  Surgeon: Fransisco Leach MD;  [facility]     EYE SURGERY  12/01/2007    cataract surgery both sides     GI SURGERY  1974, 1986(x2), 1989    Crohn's, 1974 RO 18\" term. ilium + 9\" asc. colon all one pc     SOFT TISSUE SURGERY  03/01/2013    removed buildup on back of r hand, coccidioidomycosis       Social History:   reports that he has never smoked. He has never used smokeless tobacco. He reports current alcohol use. He reports that he does not use drugs.    Family History:  Family History   Problem Relation Age of Onset     Cancer - colorectal Mother      Colon Cancer Mother      Heart Failure Father      Musculoskeletal Disorder Father         Parkinson's     Musculoskeletal Disorder Brother         Parkinson's     Other Problems  (cardiac arrest) Brother      Cerebrovascular Disease Maternal Grandmother      Cerebrovascular Disease Paternal Grandmother      Cancer Other      Other Cancer Other      Anesthesia Reaction No family hx of      Deep Vein Thrombosis (DVT) No family hx of      Melanoma No family hx of      Skin Cancer No " family hx of        Medications:  Current Outpatient Medications   Medication Sig Dispense Refill     aspirin 81 MG EC tablet Take 1 tablet (81 mg) by mouth daily. 90 tablet 3     Cyanocobalamin (VITAMIN B 12) 500 MCG TABS Take 500 mcg by mouth every evening        fluconazole (DIFLUCAN) 100 MG tablet Take 1 tablet (100 mg) by mouth daily. 90 tablet 1     fluticasone (FLONASE) 50 MCG/ACT nasal spray Spray 1 spray into both nostrils daily. 48 g 3     Iron Combinations (IRON COMPLEX PO) Take 1 tablet by mouth daily       l-lysine HCl 500 MG TABS tablet Take 500 mg by mouth daily.       multivitamin w/minerals (THERA-VIT-M) tablet Take 1 tablet by mouth every evening        potassium citrate (UROCIT-K) 5 MEQ (540 MG) CR tablet Take 1 tablet (5 mEq) by mouth 3 times daily (with meals).       traZODone (DESYREL) 50 MG tablet Take 1 tablet (50 mg) by mouth at bedtime (Patient taking differently: Take 25 mg by mouth at bedtime.) 90 tablet 1     vitamin B6 (PYRIDOXINE) 100 MG tablet Take 100 mg by mouth daily       Vitamin D, Cholecalciferol, 25 MCG (1000 UT) CAPS Take 25 mcg by mouth daily       Allergies   Allergen Reactions     Prednisone Other (See Comments)     Diana with more than 2.5mg chronic dosing of prednisone due to fluconazole interaction per patient.                  Again, thank you for allowing me to participate in the care of your patient.      Sincerely,    Jose Martin Cheney MD

## 2025-07-14 ENCOUNTER — THERAPY VISIT (OUTPATIENT)
Dept: OCCUPATIONAL THERAPY | Facility: CLINIC | Age: 70
End: 2025-07-14
Payer: COMMERCIAL

## 2025-07-14 DIAGNOSIS — M79.642 BILATERAL HAND PAIN: Primary | ICD-10-CM

## 2025-07-14 DIAGNOSIS — M79.641 BILATERAL HAND PAIN: Primary | ICD-10-CM

## 2025-07-14 PROCEDURE — 97535 SELF CARE MNGMENT TRAINING: CPT | Mod: GO | Performed by: OCCUPATIONAL THERAPIST

## 2025-07-16 ENCOUNTER — DOCUMENTATION ONLY (OUTPATIENT)
Dept: FAMILY MEDICINE | Facility: CLINIC | Age: 70
End: 2025-07-16
Payer: COMMERCIAL

## 2025-07-16 NOTE — PROGRESS NOTES
Type of Form Received: [facility] Occupational Therapy Eval    Form Received (Date) 7/14/2025   Form Filled out No   Placed in provider folder Yes

## 2025-07-28 ENCOUNTER — MYC REFILL (OUTPATIENT)
Dept: INTERNAL MEDICINE | Facility: CLINIC | Age: 70
End: 2025-07-28

## 2025-07-28 ENCOUNTER — THERAPY VISIT (OUTPATIENT)
Dept: OCCUPATIONAL THERAPY | Facility: CLINIC | Age: 70
End: 2025-07-28
Payer: COMMERCIAL

## 2025-07-28 DIAGNOSIS — M79.642 BILATERAL HAND PAIN: Primary | ICD-10-CM

## 2025-07-28 DIAGNOSIS — M79.641 BILATERAL HAND PAIN: Primary | ICD-10-CM

## 2025-07-28 DIAGNOSIS — F31.81 BIPOLAR 2 DISORDER (H): ICD-10-CM

## 2025-07-28 DIAGNOSIS — G47.00 INSOMNIA, UNSPECIFIED TYPE: ICD-10-CM

## 2025-07-28 PROCEDURE — 97110 THERAPEUTIC EXERCISES: CPT | Mod: GO | Performed by: OCCUPATIONAL THERAPIST

## 2025-07-28 PROCEDURE — 97535 SELF CARE MNGMENT TRAINING: CPT | Mod: GO | Performed by: OCCUPATIONAL THERAPIST

## 2025-07-29 NOTE — TELEPHONE ENCOUNTER
Last Visit: 4/30/2025 [facility] [place]  Future Visit Date: none  ----------------------  Medication Requested: traZODone (DESYREL) 50 MG tablet   Last Written Prescription: 3/22/2024 Disp:90 R:1  ---------------------  [x]  Refill decision: Medication unable to be refilled by RN due to:     []    Pt not seen within past 12 months;  No future visit or future office visit exceeds timeframe per protocol requirements  []    Compliance - lapse in therapy/gap in refills; No Shows; Cancellations  []    Verification - order discrepancy, clarification needed, Sig modification needed  []    Controlled medication  []    Medication not included in refill protocol policy  []    Abnormal labs/test:  []    Overdue labs/test:    []    Medication not active on patient's med list  []    Drug interaction Warning  []    Medication - Last script is Reported/Historical/Transitional  []    Advanced refill request  [x]    Review Needed: Last ordered and on med list as take 50 mg at bedtime.  Patient reported taking differently 25 mg at bedtime.  []    Other:     Request from pharmacy:  Requested Prescriptions   Pending Prescriptions Disp Refills    traZODone (DESYREL) 50 MG tablet 90 tablet 1     Sig: Take 1 tablet (50 mg) by mouth at bedtime.       Serotonin Modulators Passed - 7/29/2025  5:06 PM        Passed - Medication is active on med list and the sig matches. RN to manually verify dose and sig if red X/fail.     If the protocol passes (green check), you do not need to verify med dose and sig.    A prescription matches if they are the same clinical intention.    For Example: once daily and every morning are the same.    The protocol can not identify upper and lower case letters as matching and will fail.     For Example: Take 1 tablet (50 mg) by mouth daily     TAKE 1 TABLET (50 MG) BY MOUTH DAILY    For all fails (red x), verify dose and sig.    If the refill does match what is on file, the RN  can still proceed to approve the refill request.       If they do not match, route to the appropriate provider.             Passed - Recent (12 month) or future (90 days) visit with authorizing provider's specialty (provided they have been seen in the past 15 months)     The patient must have completed an in-person or virtual visit within the past 12 months or has a future visit scheduled within the next 90 days with the authorizing provider s specialty.  Urgent care and e-visits do not qualify as an office visit for this protocol.          Passed - Medication indicated for associated diagnosis     Medication is associated with one or more of the following diagnoses:     Depression   Insomnia          Passed - Patient is age 18 or older

## 2025-07-30 ENCOUNTER — TELEPHONE (OUTPATIENT)
Dept: DERMATOLOGY | Facility: CLINIC | Age: 70
End: 2025-07-30
Payer: COMMERCIAL

## 2025-07-30 NOTE — TELEPHONE ENCOUNTER
7/30 Left Voicemail (1st Attempt) and Sent Mychart (1st Attempt) for the patient to call back and schedule the following:    Appointment type: Return Dermatology  Provider: Shravan  Return date: July 2026  Specialty phone number: [phone]  Additional appointment(s) needed: na  Additonal Notes: na

## 2025-07-31 RX ORDER — TRAZODONE HYDROCHLORIDE 50 MG/1
50 TABLET ORAL AT BEDTIME
Qty: 90 TABLET | Refills: 3 | Status: SHIPPED | OUTPATIENT
Start: 2025-07-31

## 2025-07-31 RX ORDER — TRAZODONE HYDROCHLORIDE 50 MG/1
50 TABLET ORAL AT BEDTIME
Qty: 90 TABLET | Refills: 0 | Status: SHIPPED | OUTPATIENT
Start: 2025-07-31 | End: 2025-07-31

## 2025-08-08 ENCOUNTER — ORDERS ONLY (AUTO-RELEASED) (OUTPATIENT)
Dept: NEUROLOGY | Facility: CLINIC | Age: 70
End: 2025-08-08
Payer: COMMERCIAL

## 2025-08-08 DIAGNOSIS — G45.9 TIA (TRANSIENT ISCHEMIC ATTACK): ICD-10-CM

## 2025-08-25 ENCOUNTER — THERAPY VISIT (OUTPATIENT)
Dept: OCCUPATIONAL THERAPY | Facility: CLINIC | Age: 70
End: 2025-08-25
Payer: COMMERCIAL

## 2025-08-25 DIAGNOSIS — M79.642 BILATERAL HAND PAIN: Primary | ICD-10-CM

## 2025-08-25 DIAGNOSIS — M79.641 BILATERAL HAND PAIN: Primary | ICD-10-CM

## 2025-08-25 PROCEDURE — 97110 THERAPEUTIC EXERCISES: CPT | Mod: GO | Performed by: OCCUPATIONAL THERAPIST

## 2025-09-03 LAB — CV ZIO PRELIM RESULTS: NORMAL

## 2025-11-14 ENCOUNTER — PRE VISIT (OUTPATIENT)
Dept: ENDOCRINOLOGY | Facility: CLINIC | Age: 70
End: 2025-11-14

## (undated) DEVICE — ENDO TUBING CO2 SMARTCAP STERILE DISP 100145CO2EXT

## (undated) DEVICE — PACK ENDOSCOPY GI CUSTOM UMMC

## (undated) DEVICE — INFLATION DEVICE BIG 60 ENDO-AN6012

## (undated) DEVICE — ENDO CAP AND TUBING STERILE FOR ENDOGATOR  100130

## (undated) DEVICE — ENDO DEVICE LOCKING AND BIOPSY CAP M00545261

## (undated) DEVICE — SOL WATER IRRIG 1000ML BOTTLE 2F7114

## (undated) DEVICE — KIT CONNECTOR FOR OLYMPUS ENDOSCOPES DEFENDO 100310

## (undated) DEVICE — WIPE PREMOIST CLEANSING WASHCLOTHS 7988

## (undated) DEVICE — PAD CHUX UNDERPAD 23X24" 7136

## (undated) DEVICE — KIT ENDO FIRST STEP DISINFECTANT 200ML W/POUCH EP-4

## (undated) DEVICE — SUCTION MANIFOLD NEPTUNE 2 SYS 4 PORT 0702-020-000

## (undated) DEVICE — WIRE GUIDE 0.025"X450CM ANG VISIGLIDE G-240-2545A

## (undated) DEVICE — GRASPING DEVICE RAPTOR RAT TOOTH 00711177

## (undated) DEVICE — ENDO TUBE SPLINTING ST-SB1

## (undated) DEVICE — GUIDE WIRE

## (undated) DEVICE — BALLOON ELATION 240CML 11-12-13.5-15-16MM 5.5CM EPCX12

## (undated) DEVICE — ENDO BITE BLOCK ADULT OMNI-BLOC

## (undated) DEVICE — ENDO TANK RESERVOIR FOR SPLINTING TUBE MAJ-1727

## (undated) DEVICE — BALLOON ELATION 240CML 17-18-19-20-21MM 5.5CM EPCX18

## (undated) DEVICE — ENDO FORCEP ENDOJAW BIOPSY 2.8MMX230CM FB-220U

## (undated) DEVICE — DECANTER BAG 2002S

## (undated) DEVICE — Device

## (undated) DEVICE — WIRE GUIDE 0.025"X450CM STR VISIGLIDE G-240-2545S

## (undated) RX ORDER — PROPOFOL 10 MG/ML
INJECTION, EMULSION INTRAVENOUS
Status: DISPENSED
Start: 2019-10-01

## (undated) RX ORDER — SIMETHICONE 40MG/0.6ML
SUSPENSION, DROPS(FINAL DOSAGE FORM)(ML) ORAL
Status: DISPENSED
Start: 2021-05-26

## (undated) RX ORDER — INDOMETHACIN 50 MG/1
SUPPOSITORY RECTAL
Status: DISPENSED
Start: 2019-10-01

## (undated) RX ORDER — FENTANYL CITRATE 50 UG/ML
INJECTION, SOLUTION INTRAMUSCULAR; INTRAVENOUS
Status: DISPENSED
Start: 2021-05-26

## (undated) RX ORDER — GLYCOPYRROLATE 0.2 MG/ML
INJECTION, SOLUTION INTRAMUSCULAR; INTRAVENOUS
Status: DISPENSED
Start: 2019-10-01

## (undated) RX ORDER — PROPOFOL 10 MG/ML
INJECTION, EMULSION INTRAVENOUS
Status: DISPENSED
Start: 2020-02-05

## (undated) RX ORDER — ONDANSETRON 2 MG/ML
INJECTION INTRAMUSCULAR; INTRAVENOUS
Status: DISPENSED
Start: 2019-06-17

## (undated) RX ORDER — FENTANYL CITRATE 50 UG/ML
INJECTION, SOLUTION INTRAMUSCULAR; INTRAVENOUS
Status: DISPENSED
Start: 2019-10-01

## (undated) RX ORDER — IOPAMIDOL 510 MG/ML
INJECTION, SOLUTION INTRAVASCULAR
Status: DISPENSED
Start: 2019-10-01

## (undated) RX ORDER — EPHEDRINE SULFATE 50 MG/ML
INJECTION, SOLUTION INTRAMUSCULAR; INTRAVENOUS; SUBCUTANEOUS
Status: DISPENSED
Start: 2019-10-01

## (undated) RX ORDER — SIMETHICONE 40MG/0.6ML
SUSPENSION, DROPS(FINAL DOSAGE FORM)(ML) ORAL
Status: DISPENSED
Start: 2017-08-16

## (undated) RX ORDER — INDOMETHACIN 50 MG/1
SUPPOSITORY RECTAL
Status: DISPENSED
Start: 2021-05-26

## (undated) RX ORDER — ALBUTEROL SULFATE 90 UG/1
AEROSOL, METERED RESPIRATORY (INHALATION)
Status: DISPENSED
Start: 2019-10-01

## (undated) RX ORDER — ESMOLOL HYDROCHLORIDE 10 MG/ML
INJECTION INTRAVENOUS
Status: DISPENSED
Start: 2024-03-18

## (undated) RX ORDER — LIDOCAINE HYDROCHLORIDE 20 MG/ML
INJECTION, SOLUTION EPIDURAL; INFILTRATION; INTRACAUDAL; PERINEURAL
Status: DISPENSED
Start: 2021-12-06

## (undated) RX ORDER — FENTANYL CITRATE 50 UG/ML
INJECTION, SOLUTION INTRAMUSCULAR; INTRAVENOUS
Status: DISPENSED
Start: 2020-02-05

## (undated) RX ORDER — IOPAMIDOL 510 MG/ML
INJECTION, SOLUTION INTRAVASCULAR
Status: DISPENSED
Start: 2021-05-26

## (undated) RX ORDER — FENTANYL CITRATE 50 UG/ML
INJECTION, SOLUTION INTRAMUSCULAR; INTRAVENOUS
Status: DISPENSED
Start: 2017-08-16

## (undated) RX ORDER — LIDOCAINE HYDROCHLORIDE 20 MG/ML
INJECTION, SOLUTION EPIDURAL; INFILTRATION; INTRACAUDAL; PERINEURAL
Status: DISPENSED
Start: 2019-10-01

## (undated) RX ORDER — SIMETHICONE 40MG/0.6ML
SUSPENSION, DROPS(FINAL DOSAGE FORM)(ML) ORAL
Status: DISPENSED
Start: 2019-10-01

## (undated) RX ORDER — ONDANSETRON 2 MG/ML
INJECTION INTRAMUSCULAR; INTRAVENOUS
Status: DISPENSED
Start: 2021-12-06

## (undated) RX ORDER — PHENYLEPHRINE HCL IN 0.9% NACL 1 MG/10 ML
SYRINGE (ML) INTRAVENOUS
Status: DISPENSED
Start: 2019-10-01

## (undated) RX ORDER — FENTANYL CITRATE 50 UG/ML
INJECTION, SOLUTION INTRAMUSCULAR; INTRAVENOUS
Status: DISPENSED
Start: 2021-09-22

## (undated) RX ORDER — DEXAMETHASONE SODIUM PHOSPHATE 4 MG/ML
INJECTION, SOLUTION INTRA-ARTICULAR; INTRALESIONAL; INTRAMUSCULAR; INTRAVENOUS; SOFT TISSUE
Status: DISPENSED
Start: 2024-03-18

## (undated) RX ORDER — EPINEPHRINE 1 MG/ML
INJECTION, SOLUTION INTRAMUSCULAR; SUBCUTANEOUS
Status: DISPENSED
Start: 2019-10-01